# Patient Record
Sex: MALE | Race: WHITE | NOT HISPANIC OR LATINO | Employment: OTHER | URBAN - METROPOLITAN AREA
[De-identification: names, ages, dates, MRNs, and addresses within clinical notes are randomized per-mention and may not be internally consistent; named-entity substitution may affect disease eponyms.]

---

## 2022-12-26 ENCOUNTER — HOSPITAL ENCOUNTER (INPATIENT)
Facility: HOSPITAL | Age: 67
LOS: 16 days | Discharge: NURSING, REHAB OR LONG-TERM CARE | DRG: 853 | End: 2023-01-11
Attending: INTERNAL MEDICINE | Admitting: INTERNAL MEDICINE
Payer: MEDICARE

## 2022-12-26 ENCOUNTER — APPOINTMENT (OUTPATIENT)
Dept: CARDIOLOGY | Facility: HOSPITAL | Age: 67
DRG: 853 | End: 2022-12-26
Payer: MEDICARE

## 2022-12-26 ENCOUNTER — APPOINTMENT (OUTPATIENT)
Dept: GENERAL RADIOLOGY | Facility: HOSPITAL | Age: 67
DRG: 853 | End: 2022-12-26
Payer: MEDICARE

## 2022-12-26 DIAGNOSIS — M00.9 PYOGENIC ARTHRITIS OF RIGHT SHOULDER REGION, DUE TO UNSPECIFIED ORGANISM: Primary | ICD-10-CM

## 2022-12-26 DIAGNOSIS — M00.019: ICD-10-CM

## 2022-12-26 PROBLEM — Z72.0 TOBACCO ABUSE: Status: ACTIVE | Noted: 2022-02-21

## 2022-12-26 PROBLEM — Z98.890 HX OF NECK SURGERY: Status: ACTIVE | Noted: 2019-04-17

## 2022-12-26 PROBLEM — I50.22 ACC/AHA STAGE C CHRONIC SYSTOLIC HEART FAILURE: Status: ACTIVE | Noted: 2022-03-03

## 2022-12-26 PROBLEM — E85.4 CARDIAC AMYLOIDOSIS: Status: ACTIVE | Noted: 2022-03-03

## 2022-12-26 PROBLEM — M54.2 NECK PAIN: Status: ACTIVE | Noted: 2017-11-02

## 2022-12-26 PROBLEM — E87.1 HYPONATREMIA: Status: ACTIVE | Noted: 2022-12-26

## 2022-12-26 PROBLEM — I43 CARDIAC AMYLOIDOSIS: Status: ACTIVE | Noted: 2022-03-03

## 2022-12-26 PROBLEM — I10 PRIMARY HYPERTENSION: Status: ACTIVE | Noted: 2019-04-17

## 2022-12-26 PROBLEM — Z85.46 HISTORY OF PROSTATE CANCER: Status: ACTIVE | Noted: 2019-04-17

## 2022-12-26 LAB
ALBUMIN SERPL-MCNC: 3.3 G/DL (ref 3.5–5.2)
ALBUMIN/GLOB SERPL: 1.7 G/DL
ALP SERPL-CCNC: 83 U/L (ref 39–117)
ALT SERPL W P-5'-P-CCNC: 17 U/L (ref 1–41)
ANION GAP SERPL CALCULATED.3IONS-SCNC: 12 MMOL/L (ref 5–15)
ANION GAP SERPL CALCULATED.3IONS-SCNC: 18 MMOL/L (ref 5–15)
ARTERIAL PATENCY WRIST A: POSITIVE
ARTERIAL PATENCY WRIST A: POSITIVE
AST SERPL-CCNC: 26 U/L (ref 1–40)
ATMOSPHERIC PRESS: ABNORMAL MM[HG]
ATMOSPHERIC PRESS: ABNORMAL MM[HG]
BACTERIA BLD CULT: ABNORMAL
BACTERIA UR QL AUTO: ABNORMAL /HPF
BASE EXCESS BLDA CALC-SCNC: -5.5 MMOL/L (ref 0–3)
BASE EXCESS BLDA CALC-SCNC: -6.4 MMOL/L (ref 0–3)
BDY SITE: ABNORMAL
BDY SITE: ABNORMAL
BH CV ECHO MEAS - ACS: 1.1 CM
BH CV ECHO MEAS - AO MAX PG: 17.6 MMHG
BH CV ECHO MEAS - AO MEAN PG: 13 MMHG
BH CV ECHO MEAS - AO ROOT DIAM: 2.8 CM
BH CV ECHO MEAS - AO V2 MAX: 210 CM/SEC
BH CV ECHO MEAS - AO V2 VTI: 29.2 CM
BH CV ECHO MEAS - AVA(I,D): 1.66 CM2
BH CV ECHO MEAS - EDV(CUBED): 68.9 ML
BH CV ECHO MEAS - EDV(MOD-SP2): 65.4 ML
BH CV ECHO MEAS - EDV(MOD-SP4): 68.3 ML
BH CV ECHO MEAS - EF(MOD-BP): 61.6 %
BH CV ECHO MEAS - EF(MOD-SP2): 61 %
BH CV ECHO MEAS - EF(MOD-SP4): 63 %
BH CV ECHO MEAS - ESV(CUBED): 28.1 ML
BH CV ECHO MEAS - ESV(MOD-SP2): 25.5 ML
BH CV ECHO MEAS - ESV(MOD-SP4): 25.3 ML
BH CV ECHO MEAS - FS: 25.9 %
BH CV ECHO MEAS - IVS/LVPW: 1.19 CM
BH CV ECHO MEAS - IVSD: 1.85 CM
BH CV ECHO MEAS - LA DIMENSION: 3.5 CM
BH CV ECHO MEAS - LV DIASTOLIC VOL/BSA (35-75): 35.7 CM2
BH CV ECHO MEAS - LV MASS(C)D: 295.7 GRAMS
BH CV ECHO MEAS - LV MAX PG: 11.2 MMHG
BH CV ECHO MEAS - LV MEAN PG: 5 MMHG
BH CV ECHO MEAS - LV SYSTOLIC VOL/BSA (12-30): 13.2 CM2
BH CV ECHO MEAS - LV V1 MAX: 167 CM/SEC
BH CV ECHO MEAS - LV V1 VTI: 15.4 CM
BH CV ECHO MEAS - LVIDD: 4.1 CM
BH CV ECHO MEAS - LVIDS: 3 CM
BH CV ECHO MEAS - LVOT AREA: 3.1 CM2
BH CV ECHO MEAS - LVOT DIAM: 2 CM
BH CV ECHO MEAS - LVPWD: 1.56 CM
BH CV ECHO MEAS - MR MAX PG: 57.5 MMHG
BH CV ECHO MEAS - MR MAX VEL: 379 CM/SEC
BH CV ECHO MEAS - MV DEC SLOPE: 545.3 CM/SEC2
BH CV ECHO MEAS - MV DEC TIME: 162 MSEC
BH CV ECHO MEAS - MV E MAX VEL: 113.5 CM/SEC
BH CV ECHO MEAS - MV MAX PG: 4.8 MMHG
BH CV ECHO MEAS - MV MEAN PG: 3 MMHG
BH CV ECHO MEAS - MV P1/2T: 62.8 MSEC
BH CV ECHO MEAS - MV V2 VTI: 22.5 CM
BH CV ECHO MEAS - MVA(P1/2T): 3.5 CM2
BH CV ECHO MEAS - MVA(VTI): 2.15 CM2
BH CV ECHO MEAS - PA ACC TIME: 0.1 SEC
BH CV ECHO MEAS - PA PR(ACCEL): 34.9 MMHG
BH CV ECHO MEAS - PA V2 MAX: 144 CM/SEC
BH CV ECHO MEAS - QP/QS: 0.31
BH CV ECHO MEAS - RV MAX PG: 5.5 MMHG
BH CV ECHO MEAS - RV V1 MAX: 117 CM/SEC
BH CV ECHO MEAS - RV V1 VTI: 13.3 CM
BH CV ECHO MEAS - RVDD: 2.44 CM
BH CV ECHO MEAS - RVOT DIAM: 1.2 CM
BH CV ECHO MEAS - SI(MOD-SP2): 20.9 ML/M2
BH CV ECHO MEAS - SI(MOD-SP4): 22.5 ML/M2
BH CV ECHO MEAS - SV(LVOT): 48.4 ML
BH CV ECHO MEAS - SV(MOD-SP2): 39.9 ML
BH CV ECHO MEAS - SV(MOD-SP4): 43 ML
BH CV ECHO MEAS - SV(RVOT): 15 ML
BH CV ECHO MEAS - TAPSE (>1.6): 1.8 CM
BH CV XLRA - RV BASE: 3.5 CM
BH CV XLRA - RV LENGTH: 8.4 CM
BH CV XLRA - RV MID: 2.8 CM
BILIRUB SERPL-MCNC: 4.8 MG/DL (ref 0–1.2)
BILIRUB UR QL STRIP: ABNORMAL
BOTTLE TYPE: ABNORMAL
BUN SERPL-MCNC: 55 MG/DL (ref 8–23)
BUN SERPL-MCNC: 58 MG/DL (ref 8–23)
BUN/CREAT SERPL: 23.1 (ref 7–25)
BUN/CREAT SERPL: 23.7 (ref 7–25)
BURR CELLS BLD QL SMEAR: ABNORMAL
CA-I SERPL ISE-MCNC: 1.13 MMOL/L (ref 1.2–1.3)
CALCIUM SPEC-SCNC: 7.7 MG/DL (ref 8.6–10.5)
CALCIUM SPEC-SCNC: 8.2 MG/DL (ref 8.6–10.5)
CHLORIDE SERPL-SCNC: 84 MMOL/L (ref 98–107)
CHLORIDE SERPL-SCNC: 91 MMOL/L (ref 98–107)
CHOLEST SERPL-MCNC: 64 MG/DL (ref 0–200)
CK SERPL-CCNC: 166 U/L (ref 20–200)
CLARITY UR: CLEAR
CO2 BLDA-SCNC: 17.3 MMOL/L (ref 22–29)
CO2 BLDA-SCNC: 18.1 MMOL/L (ref 22–29)
CO2 SERPL-SCNC: 16 MMOL/L (ref 22–29)
CO2 SERPL-SCNC: 19 MMOL/L (ref 22–29)
COLOR UR: ABNORMAL
CREAT SERPL-MCNC: 2.38 MG/DL (ref 0.76–1.27)
CREAT SERPL-MCNC: 2.45 MG/DL (ref 0.76–1.27)
CRP SERPL-MCNC: 28.17 MG/DL (ref 0–0.5)
D-LACTATE SERPL-SCNC: 1.9 MMOL/L (ref 0.5–2)
D-LACTATE SERPL-SCNC: 2.5 MMOL/L (ref 0.5–2)
D-LACTATE SERPL-SCNC: 2.5 MMOL/L (ref 0.5–2)
DEPRECATED RDW RBC AUTO: 52.1 FL (ref 37–54)
EGFRCR SERPLBLD CKD-EPI 2021: 28.1 ML/MIN/1.73
EGFRCR SERPLBLD CKD-EPI 2021: 29.1 ML/MIN/1.73
ERYTHROCYTE [DISTWIDTH] IN BLOOD BY AUTOMATED COUNT: 15.2 % (ref 12.3–15.4)
ERYTHROCYTE [SEDIMENTATION RATE] IN BLOOD: 21 MM/HR (ref 0–20)
GLOBULIN UR ELPH-MCNC: 1.9 GM/DL
GLUCOSE BLDC GLUCOMTR-MCNC: 122 MG/DL (ref 70–105)
GLUCOSE BLDC GLUCOMTR-MCNC: 132 MG/DL (ref 70–105)
GLUCOSE BLDC GLUCOMTR-MCNC: 60 MG/DL (ref 70–105)
GLUCOSE BLDC GLUCOMTR-MCNC: 88 MG/DL (ref 70–105)
GLUCOSE BLDC GLUCOMTR-MCNC: 92 MG/DL (ref 70–105)
GLUCOSE BLDC GLUCOMTR-MCNC: 97 MG/DL (ref 74–100)
GLUCOSE SERPL-MCNC: 75 MG/DL (ref 65–99)
GLUCOSE SERPL-MCNC: 87 MG/DL (ref 65–99)
GLUCOSE UR STRIP-MCNC: NEGATIVE MG/DL
GRAN CASTS URNS QL MICRO: ABNORMAL /LPF
HBA1C MFR BLD: 4.2 % (ref 3.5–5.6)
HCO3 BLDA-SCNC: 16.6 MMOL/L (ref 21–28)
HCO3 BLDA-SCNC: 17.3 MMOL/L (ref 21–28)
HCT VFR BLD AUTO: 38 % (ref 37.5–51)
HDLC SERPL-MCNC: 21 MG/DL (ref 40–60)
HEMODILUTION: NO
HEMODILUTION: NO
HGB BLD-MCNC: 12.4 G/DL (ref 13–17.7)
HGB UR QL STRIP.AUTO: ABNORMAL
HYALINE CASTS UR QL AUTO: ABNORMAL /LPF
INHALED O2 CONCENTRATION: 32 %
INHALED O2 CONCENTRATION: 32 %
KETONES UR QL STRIP: ABNORMAL
LDLC SERPL CALC-MCNC: 22 MG/DL (ref 0–100)
LDLC/HDLC SERPL: 0.96 {RATIO}
LEFT ATRIUM VOLUME INDEX: 38.7 ML/M2
LEUKOCYTE ESTERASE UR QL STRIP.AUTO: NEGATIVE
LYMPHOCYTES # BLD MANUAL: 0 10*3/MM3 (ref 0.7–3.1)
LYMPHOCYTES NFR BLD MANUAL: 2 % (ref 5–12)
MAGNESIUM SERPL-MCNC: 1.8 MG/DL (ref 1.6–2.4)
MAXIMAL PREDICTED HEART RATE: 153 BPM
MCH RBC QN AUTO: 32 PG (ref 26.6–33)
MCHC RBC AUTO-ENTMCNC: 32.5 G/DL (ref 31.5–35.7)
MCV RBC AUTO: 98.4 FL (ref 79–97)
METAMYELOCYTES NFR BLD MANUAL: 9 % (ref 0–0)
MODALITY: ABNORMAL
MODALITY: ABNORMAL
MONOCYTES # BLD: 0.14 10*3/MM3 (ref 0.1–0.9)
MRSA DNA SPEC QL NAA+PROBE: NORMAL
NEUTROPHILS # BLD AUTO: 6.41 10*3/MM3 (ref 1.7–7)
NEUTROPHILS NFR BLD MANUAL: 65 % (ref 42.7–76)
NEUTS BAND NFR BLD MANUAL: 24 % (ref 0–5)
NITRITE UR QL STRIP: POSITIVE
PCO2 BLDA: 24.8 MM HG (ref 35–48)
PCO2 BLDA: 25.4 MM HG (ref 35–48)
PH BLDA: 7.43 PH UNITS (ref 7.35–7.45)
PH BLDA: 7.44 PH UNITS (ref 7.35–7.45)
PH UR STRIP.AUTO: 5 [PH] (ref 5–8)
PHOSPHATE SERPL-MCNC: 3.7 MG/DL (ref 2.5–4.5)
PLATELET # BLD AUTO: 99 10*3/MM3 (ref 140–450)
PMV BLD AUTO: 7.6 FL (ref 6–12)
PO2 BLDA: 68 MM HG (ref 83–108)
PO2 BLDA: 71.5 MM HG (ref 83–108)
POIKILOCYTOSIS BLD QL SMEAR: ABNORMAL
POTASSIUM SERPL-SCNC: 4.3 MMOL/L (ref 3.5–5.2)
POTASSIUM SERPL-SCNC: 5.4 MMOL/L (ref 3.5–5.2)
PROCALCITONIN SERPL-MCNC: 7.9 NG/ML (ref 0–0.25)
PROT SERPL-MCNC: 5.2 G/DL (ref 6–8.5)
PROT UR QL STRIP: ABNORMAL
QT INTERVAL: 346 MS
RBC # BLD AUTO: 3.86 10*6/MM3 (ref 4.14–5.8)
RBC # UR STRIP: ABNORMAL /HPF
REF LAB TEST METHOD: ABNORMAL
SAO2 % BLDCOA: 94.4 % (ref 94–98)
SAO2 % BLDCOA: 95.1 % (ref 94–98)
SCAN SLIDE: NORMAL
SINUS: 2.7 CM
SMALL PLATELETS BLD QL SMEAR: ABNORMAL
SODIUM SERPL-SCNC: 118 MMOL/L (ref 136–145)
SODIUM SERPL-SCNC: 122 MMOL/L (ref 136–145)
SODIUM UR-SCNC: <20 MMOL/L
SP GR UR STRIP: 1.01 (ref 1–1.03)
SQUAMOUS #/AREA URNS HPF: ABNORMAL /HPF
STRESS TARGET HR: 130 BPM
TRANS CELLS #/AREA URNS HPF: ABNORMAL /HPF
TRIGL SERPL-MCNC: 114 MG/DL (ref 0–150)
TROPONIN T SERPL-MCNC: 0.07 NG/ML (ref 0–0.03)
UNIDENT CRYS URNS QL MICRO: ABNORMAL /HPF
URATE SERPL-MCNC: 4.7 MG/DL (ref 3.4–7)
UROBILINOGEN UR QL STRIP: ABNORMAL
VARIANT LYMPHS NFR BLD MANUAL: 0 % (ref 19.6–45.3)
VLDLC SERPL-MCNC: 21 MG/DL (ref 5–40)
WBC # UR STRIP: ABNORMAL /HPF
WBC MORPH BLD: NORMAL
WBC NRBC COR # BLD: 7.2 10*3/MM3 (ref 3.4–10.8)

## 2022-12-26 PROCEDURE — 82962 GLUCOSE BLOOD TEST: CPT

## 2022-12-26 PROCEDURE — 93010 ELECTROCARDIOGRAM REPORT: CPT | Performed by: INTERNAL MEDICINE

## 2022-12-26 PROCEDURE — 85007 BL SMEAR W/DIFF WBC COUNT: CPT

## 2022-12-26 PROCEDURE — 93971 EXTREMITY STUDY: CPT

## 2022-12-26 PROCEDURE — 93306 TTE W/DOPPLER COMPLETE: CPT | Performed by: INTERNAL MEDICINE

## 2022-12-26 PROCEDURE — 02HV33Z INSERTION OF INFUSION DEVICE INTO SUPERIOR VENA CAVA, PERCUTANEOUS APPROACH: ICD-10-PCS | Performed by: NURSE PRACTITIONER

## 2022-12-26 PROCEDURE — C1751 CATH, INF, PER/CENT/MIDLINE: HCPCS

## 2022-12-26 PROCEDURE — 71045 X-RAY EXAM CHEST 1 VIEW: CPT

## 2022-12-26 PROCEDURE — 87040 BLOOD CULTURE FOR BACTERIA: CPT

## 2022-12-26 PROCEDURE — 81001 URINALYSIS AUTO W/SCOPE: CPT | Performed by: NURSE PRACTITIONER

## 2022-12-26 PROCEDURE — 85652 RBC SED RATE AUTOMATED: CPT

## 2022-12-26 PROCEDURE — 80053 COMPREHEN METABOLIC PANEL: CPT

## 2022-12-26 PROCEDURE — 84484 ASSAY OF TROPONIN QUANT: CPT

## 2022-12-26 PROCEDURE — 82330 ASSAY OF CALCIUM: CPT

## 2022-12-26 PROCEDURE — 80061 LIPID PANEL: CPT

## 2022-12-26 PROCEDURE — 36600 WITHDRAWAL OF ARTERIAL BLOOD: CPT

## 2022-12-26 PROCEDURE — 87147 CULTURE TYPE IMMUNOLOGIC: CPT

## 2022-12-26 PROCEDURE — 84300 ASSAY OF URINE SODIUM: CPT | Performed by: INTERNAL MEDICINE

## 2022-12-26 PROCEDURE — 83735 ASSAY OF MAGNESIUM: CPT

## 2022-12-26 PROCEDURE — 82550 ASSAY OF CK (CPK): CPT

## 2022-12-26 PROCEDURE — 87186 SC STD MICRODIL/AGAR DIL: CPT

## 2022-12-26 PROCEDURE — 63710000001 INSULIN REGULAR HUMAN PER 5 UNITS

## 2022-12-26 PROCEDURE — 93005 ELECTROCARDIOGRAM TRACING: CPT

## 2022-12-26 PROCEDURE — 82803 BLOOD GASES ANY COMBINATION: CPT

## 2022-12-26 PROCEDURE — 87150 DNA/RNA AMPLIFIED PROBE: CPT

## 2022-12-26 PROCEDURE — 0 MILRINONE LACTATE IN DEXTROSE 20-5 MG/100ML-% SOLUTION

## 2022-12-26 PROCEDURE — 93005 ELECTROCARDIOGRAM TRACING: CPT | Performed by: NURSE PRACTITIONER

## 2022-12-26 PROCEDURE — 25010000002 CEFEPIME PER 500 MG

## 2022-12-26 PROCEDURE — 25010000002 AMIODARONE IN DEXTROSE 5% 150-4.21 MG/100ML-% SOLUTION

## 2022-12-26 PROCEDURE — 83036 HEMOGLOBIN GLYCOSYLATED A1C: CPT

## 2022-12-26 PROCEDURE — 84145 PROCALCITONIN (PCT): CPT

## 2022-12-26 PROCEDURE — 93306 TTE W/DOPPLER COMPLETE: CPT

## 2022-12-26 PROCEDURE — 87641 MR-STAPH DNA AMP PROBE: CPT

## 2022-12-26 PROCEDURE — 25010000002 HEPARIN (PORCINE) PER 1000 UNITS: Performed by: NURSE PRACTITIONER

## 2022-12-26 PROCEDURE — 85025 COMPLETE CBC W/AUTO DIFF WBC: CPT

## 2022-12-26 PROCEDURE — 84100 ASSAY OF PHOSPHORUS: CPT

## 2022-12-26 PROCEDURE — 25010000002 AMIODARONE IN DEXTROSE 5% 150-4.21 MG/100ML-% SOLUTION: Performed by: NURSE PRACTITIONER

## 2022-12-26 PROCEDURE — 86140 C-REACTIVE PROTEIN: CPT

## 2022-12-26 PROCEDURE — 83605 ASSAY OF LACTIC ACID: CPT

## 2022-12-26 PROCEDURE — 25010000002 PHENYLEPHRINE 10 MG/ML SOLUTION 5 ML VIAL: Performed by: NURSE PRACTITIONER

## 2022-12-26 PROCEDURE — 25010000002 CALCIUM GLUCONATE-NACL 1-0.675 GM/50ML-% SOLUTION

## 2022-12-26 PROCEDURE — 84550 ASSAY OF BLOOD/URIC ACID: CPT

## 2022-12-26 PROCEDURE — 25010000002 AMIODARONE IN DEXTROSE 5% 360-4.14 MG/200ML-% SOLUTION: Performed by: NURSE PRACTITIONER

## 2022-12-26 RX ORDER — HYDROCODONE BITARTRATE AND ACETAMINOPHEN 10; 325 MG/1; MG/1
1 TABLET ORAL EVERY 4 HOURS PRN
COMMUNITY
End: 2023-01-11 | Stop reason: HOSPADM

## 2022-12-26 RX ORDER — METOPROLOL SUCCINATE 25 MG/1
25 TABLET, EXTENDED RELEASE ORAL DAILY
COMMUNITY
End: 2023-01-11 | Stop reason: HOSPADM

## 2022-12-26 RX ORDER — SODIUM BICARBONATE 650 MG/1
650 TABLET ORAL 2 TIMES DAILY
COMMUNITY
End: 2023-01-11 | Stop reason: HOSPADM

## 2022-12-26 RX ORDER — MORPHINE SULFATE 2 MG/ML
2 INJECTION, SOLUTION INTRAMUSCULAR; INTRAVENOUS EVERY 4 HOURS PRN
Status: DISPENSED | OUTPATIENT
Start: 2022-12-26 | End: 2023-01-02

## 2022-12-26 RX ORDER — PANTOPRAZOLE SODIUM 40 MG/10ML
40 INJECTION, POWDER, LYOPHILIZED, FOR SOLUTION INTRAVENOUS
Status: DISCONTINUED | OUTPATIENT
Start: 2022-12-27 | End: 2022-12-30

## 2022-12-26 RX ORDER — ACETAMINOPHEN 650 MG/1
650 SUPPOSITORY RECTAL EVERY 4 HOURS PRN
Status: DISCONTINUED | OUTPATIENT
Start: 2022-12-26 | End: 2023-01-11 | Stop reason: HOSPADM

## 2022-12-26 RX ORDER — DEXTROSE MONOHYDRATE 25 G/50ML
50 INJECTION, SOLUTION INTRAVENOUS ONCE
Status: COMPLETED | OUTPATIENT
Start: 2022-12-26 | End: 2022-12-26

## 2022-12-26 RX ORDER — NICOTINE POLACRILEX 4 MG
15 LOZENGE BUCCAL
Status: DISCONTINUED | OUTPATIENT
Start: 2022-12-26 | End: 2023-01-11 | Stop reason: HOSPADM

## 2022-12-26 RX ORDER — ALUMINA, MAGNESIA, AND SIMETHICONE 2400; 2400; 240 MG/30ML; MG/30ML; MG/30ML
15 SUSPENSION ORAL EVERY 6 HOURS PRN
Status: DISCONTINUED | OUTPATIENT
Start: 2022-12-26 | End: 2023-01-11 | Stop reason: HOSPADM

## 2022-12-26 RX ORDER — TIOTROPIUM BROMIDE AND OLODATEROL 3.124; 2.736 UG/1; UG/1
2 SPRAY, METERED RESPIRATORY (INHALATION)
COMMUNITY

## 2022-12-26 RX ORDER — MIDODRINE HYDROCHLORIDE 5 MG/1
10 TABLET ORAL
Status: DISCONTINUED | OUTPATIENT
Start: 2022-12-26 | End: 2022-12-29

## 2022-12-26 RX ORDER — SODIUM BICARBONATE IN D5W 150/1000ML
150 PLASTIC BAG, INJECTION (ML) INTRAVENOUS CONTINUOUS
Status: DISCONTINUED | OUTPATIENT
Start: 2022-12-26 | End: 2022-12-27

## 2022-12-26 RX ORDER — ONDANSETRON 2 MG/ML
4 INJECTION INTRAMUSCULAR; INTRAVENOUS EVERY 6 HOURS PRN
Status: DISCONTINUED | OUTPATIENT
Start: 2022-12-26 | End: 2022-12-26

## 2022-12-26 RX ORDER — ONDANSETRON 4 MG/1
4 TABLET, FILM COATED ORAL EVERY 6 HOURS PRN
Status: DISCONTINUED | OUTPATIENT
Start: 2022-12-26 | End: 2023-01-11 | Stop reason: HOSPADM

## 2022-12-26 RX ORDER — MILRINONE LACTATE 0.2 MG/ML
.25-.75 INJECTION, SOLUTION INTRAVENOUS
Status: DISCONTINUED | OUTPATIENT
Start: 2022-12-26 | End: 2022-12-26

## 2022-12-26 RX ORDER — DICLOFENAC SODIUM 75 MG/1
75 TABLET, DELAYED RELEASE ORAL DAILY PRN
COMMUNITY
End: 2023-01-11 | Stop reason: HOSPADM

## 2022-12-26 RX ORDER — HEPARIN SODIUM 5000 [USP'U]/ML
5000 INJECTION, SOLUTION INTRAVENOUS; SUBCUTANEOUS EVERY 12 HOURS SCHEDULED
Status: DISCONTINUED | OUTPATIENT
Start: 2022-12-26 | End: 2022-12-27

## 2022-12-26 RX ORDER — SODIUM CHLORIDE 9 MG/ML
100 INJECTION, SOLUTION INTRAVENOUS CONTINUOUS
Status: DISCONTINUED | OUTPATIENT
Start: 2022-12-26 | End: 2022-12-27

## 2022-12-26 RX ORDER — SPIRONOLACTONE 25 MG/1
12.5 TABLET ORAL DAILY
COMMUNITY
End: 2023-01-11 | Stop reason: HOSPADM

## 2022-12-26 RX ORDER — CALCIUM GLUCONATE 20 MG/ML
1 INJECTION, SOLUTION INTRAVENOUS ONCE
Status: COMPLETED | OUTPATIENT
Start: 2022-12-26 | End: 2022-12-26

## 2022-12-26 RX ORDER — DEXTROSE MONOHYDRATE 25 G/50ML
25 INJECTION, SOLUTION INTRAVENOUS
Status: DISCONTINUED | OUTPATIENT
Start: 2022-12-26 | End: 2023-01-11 | Stop reason: HOSPADM

## 2022-12-26 RX ORDER — OMEPRAZOLE 20 MG/1
20 CAPSULE, DELAYED RELEASE ORAL DAILY
COMMUNITY

## 2022-12-26 RX ORDER — SODIUM CHLORIDE 0.9 % (FLUSH) 0.9 %
10 SYRINGE (ML) INJECTION AS NEEDED
Status: DISCONTINUED | OUTPATIENT
Start: 2022-12-26 | End: 2023-01-11 | Stop reason: HOSPADM

## 2022-12-26 RX ORDER — DEXTROSE MONOHYDRATE 25 G/50ML
INJECTION, SOLUTION INTRAVENOUS
Status: COMPLETED
Start: 2022-12-26 | End: 2022-12-26

## 2022-12-26 RX ORDER — SODIUM CHLORIDE 9 MG/ML
40 INJECTION, SOLUTION INTRAVENOUS AS NEEDED
Status: DISCONTINUED | OUTPATIENT
Start: 2022-12-26 | End: 2023-01-05

## 2022-12-26 RX ORDER — ACETAMINOPHEN 325 MG/1
650 TABLET ORAL EVERY 4 HOURS PRN
Status: DISCONTINUED | OUTPATIENT
Start: 2022-12-26 | End: 2023-01-11 | Stop reason: HOSPADM

## 2022-12-26 RX ORDER — ONDANSETRON 2 MG/ML
4 INJECTION INTRAMUSCULAR; INTRAVENOUS EVERY 6 HOURS PRN
Status: DISCONTINUED | OUTPATIENT
Start: 2022-12-26 | End: 2023-01-11 | Stop reason: HOSPADM

## 2022-12-26 RX ORDER — OLANZAPINE 10 MG/2ML
1 INJECTION, POWDER, LYOPHILIZED, FOR SOLUTION INTRAMUSCULAR
Status: DISCONTINUED | OUTPATIENT
Start: 2022-12-26 | End: 2023-01-11 | Stop reason: HOSPADM

## 2022-12-26 RX ORDER — SODIUM CHLORIDE 0.9 % (FLUSH) 0.9 %
10 SYRINGE (ML) INJECTION EVERY 12 HOURS SCHEDULED
Status: DISCONTINUED | OUTPATIENT
Start: 2022-12-26 | End: 2023-01-11 | Stop reason: HOSPADM

## 2022-12-26 RX ORDER — ALBUTEROL SULFATE 90 UG/1
2 AEROSOL, METERED RESPIRATORY (INHALATION) EVERY 4 HOURS PRN
COMMUNITY

## 2022-12-26 RX ADMIN — SODIUM CHLORIDE 2454 ML: 9 INJECTION, SOLUTION INTRAVENOUS at 07:55

## 2022-12-26 RX ADMIN — CALCIUM GLUCONATE 1 G: 20 INJECTION, SOLUTION INTRAVENOUS at 06:39

## 2022-12-26 RX ADMIN — PHENYLEPHRINE HYDROCHLORIDE 2 MCG/KG/MIN: 10 INJECTION INTRAVENOUS at 22:49

## 2022-12-26 RX ADMIN — MILRINONE LACTATE 0.25 MCG/KG/MIN: 0.2 INJECTION, SOLUTION INTRAVENOUS at 03:16

## 2022-12-26 RX ADMIN — MILRINONE LACTATE 0.7 MCG/KG/MIN: 0.2 INJECTION, SOLUTION INTRAVENOUS at 09:23

## 2022-12-26 RX ADMIN — Medication 150 MEQ: at 21:04

## 2022-12-26 RX ADMIN — CEFEPIME 2 G: 2 INJECTION, POWDER, FOR SOLUTION INTRAVENOUS at 07:56

## 2022-12-26 RX ADMIN — Medication 150 MEQ: at 10:29

## 2022-12-26 RX ADMIN — INSULIN HUMAN 10 UNITS: 100 INJECTION, SOLUTION PARENTERAL at 06:40

## 2022-12-26 RX ADMIN — AMIODARONE HYDROCHLORIDE 150 MG: 1.5 INJECTION, SOLUTION INTRAVENOUS at 04:26

## 2022-12-26 RX ADMIN — AMIODARONE HYDROCHLORIDE 1 MG/MIN: 1.8 INJECTION, SOLUTION INTRAVENOUS at 21:37

## 2022-12-26 RX ADMIN — Medication 10 ML: at 20:32

## 2022-12-26 RX ADMIN — DEXTROSE MONOHYDRATE 25 G: 25 INJECTION, SOLUTION INTRAVENOUS at 11:34

## 2022-12-26 RX ADMIN — MIDODRINE HYDROCHLORIDE 10 MG: 5 TABLET ORAL at 10:35

## 2022-12-26 RX ADMIN — DEXTROSE MONOHYDRATE 50 ML: 25 INJECTION, SOLUTION INTRAVENOUS at 07:35

## 2022-12-26 RX ADMIN — CEFEPIME HYDROCHLORIDE 1 G: 1 INJECTION, POWDER, FOR SOLUTION INTRAMUSCULAR; INTRAVENOUS at 14:57

## 2022-12-26 RX ADMIN — SODIUM CHLORIDE 1000 ML: 9 INJECTION, SOLUTION INTRAVENOUS at 04:43

## 2022-12-26 RX ADMIN — SODIUM CHLORIDE 75 ML/HR: 9 INJECTION, SOLUTION INTRAVENOUS at 02:56

## 2022-12-26 RX ADMIN — AMIODARONE HYDROCHLORIDE 150 MG: 1.5 INJECTION, SOLUTION INTRAVENOUS at 21:26

## 2022-12-26 RX ADMIN — SODIUM BICARBONATE 50 MEQ: 84 INJECTION, SOLUTION INTRAVENOUS at 06:40

## 2022-12-26 RX ADMIN — PHENYLEPHRINE HYDROCHLORIDE 0.5 MCG/KG/MIN: 10 INJECTION INTRAVENOUS at 14:57

## 2022-12-26 RX ADMIN — Medication 10 ML: at 09:24

## 2022-12-26 RX ADMIN — HEPARIN SODIUM 5000 UNITS: 5000 INJECTION INTRAVENOUS; SUBCUTANEOUS at 20:23

## 2022-12-26 NOTE — NURSING NOTE
Patient increasingly lethargic throughout shift. NPO until neuro status improves. Patient was admitted to [facility] ICU for hyponatremia and STEVEN. Nephrology was consulted. UA and Urine sodium sent. Straight cath x 1 for urinary retention.  Cardiology was consulted for afib with RVR and wide complex tachycardia. Patients BP is liable. Patient was transferred to [facility] on dobutmaine, and gtt was changed to Milrinone. Milrionone d/c and Miguelangel gtt was started. PICC placed by IV team d/t to pressor support. Echo completed. Doppler completed on right arm d/t increased pain, redness and swelling. Sodium improved from 118 to 122. 2.4 liter bolus given for + sepsis screen. Family at bedside updated on plans of care.

## 2022-12-26 NOTE — H&P
Critical Care History and Physical   Jaime Bar : 1955 MRN:[id number] LOS:0 ROOM: 2301/1     Reason for admission: Hyponatremia     Assessment / Plan     Septic Shock: ( HR>90, RR >20 or PCO2 <32, Lactic acid>2, Creatinine >2, Change in mental status and Platelets <100 )  • Likely due to ?right shoulder septic joint, shoulder is red, very painful, hot to touch   • CRP, 28.17, Procal 7.20, lactic 2.5, sed rate 21  • Will do blood / urine   • Started on cefepime.   • Persistent hypotension in spite of adequate fluid resuscitation.   • C/w additional fluids as needed. Avoid fluid overload.   • Titrate vasopressors for a target MAP of 65.    Hyponatremia likely secondary to decreased intake, chronic pain and pain medication regimen   · Patient has chronic history   · Monitor neuro status  · IVF for rehydration    Code Status (Patient has no pulse and is not breathing): CPR (Attempt to Resuscitate)  Medical Interventions (Patient has pulse or is breathing): Full Support       Nutrition: Diet: Regular/House Diet; Texture: Regular Texture (IDDSI 7); Fluid Consistency: Thin (IDDSI 0)     DVT Prophylaxis:   Mechanical Order History:      Ordered        22  Place Sequential Compression Device  Once            22  Maintain Sequential Compression Device  Continuous                    Pharmalogical Order History:     None           History of Present illness     A 67 y.o. old male patient with PMH of cervicalgia, primary osteoarthritis involving multiple joints, hyponatremia, cardiac amloidosis presents to the hospital with complaints of worsening right shoulder pain for 3 days, weakness, and altered mental status. The patient stated that the pain has continued to increase in severity and today the pain was too much. Any movement increases pain. He states that his normal pain regimen for his osteoarthritis has not provided any pain relief. He reports no other alleviating factors. The patient  sought care for his shoulder pain at [facility]. ER work-up showed that the patient was hypotensive and hyponatremic. Lactic was elevated at 3.9. The patient was given 1L fluid and started on dobutamine at 2.5 mcg/kg/min. The patient reported that he has not been eating due no appetite and has attemepted to drink some water but hasn't had anything since the night morning of 12/25/22. He denies nausea and vomiting and relates his decrease in oral intake to weakness. The patient is lethargic but oriented.He denies difficulty breathing, chest pain or pressures, fever, chills, abdominal pain.     ACP: Patient denies having advance directives and declines information at this time.    Patient was seen and examined on 12/26/22 at 06:29 EST .    Subjective / Review of systems     Review of Systems   Constitutional: Positive for activity change, appetite change and fatigue. Negative for fever.   Respiratory: Negative for chest tightness and shortness of breath.    Cardiovascular: Negative for chest pain.   Gastrointestinal: Negative for abdominal pain, nausea and vomiting.   Genitourinary: Positive for decreased urine volume.   Musculoskeletal: Positive for arthralgias and joint swelling.   Skin: Negative for rash.   Neurological: Negative for syncope and light-headedness.   Psychiatric/Behavioral: Positive for sleep disturbance. The patient is not nervous/anxious.         Past Medical/Surgical/Social/Family History & Allergies     History reviewed. No pertinent past medical history.   History reviewed. No pertinent surgical history.   Social History     Socioeconomic History   • Marital status: Unknown   Tobacco Use   • Smoking status: Every Day     Packs/day: 0.50     Years: 10.00     Pack years: 5.00     Types: Cigarettes   • Smokeless tobacco: Never   Vaping Use   • Vaping Use: Never used   Substance and Sexual Activity   • Alcohol use: Not Currently   • Drug use: Never   • Sexual activity: Defer      History  "reviewed. No pertinent family history.   Allergies   Allergen Reactions   • Tramadol-Acetaminophen Anxiety     Worms in the brain feeling   • Codeine Other (See Comments)     nausea   • Tramadol Other (See Comments)     \"I just felt really crawly, it did weird things with my head\"        Home Medications     Prior to Admission medications    Not on File        Objective / Physical Exam     Vital signs:  Temp: 98 °F (36.7 °C)  BP: (!) 78/50  Heart Rate: 111  SpO2: 93 %  Weight: 81.8 kg (180 lb 5.4 oz)    Admission Weight: Weight: 81.8 kg (180 lb 5.4 oz)    Physical Exam     Labs     Results from last 7 days   Lab Units 12/26/22  0435   WBC 10*3/mm3 7.20   HEMATOCRIT % 38.0   PLATELETS 10*3/mm3 99*      Results from last 7 days   Lab Units 12/26/22  0435   SODIUM mmol/L 118*   POTASSIUM mmol/L 5.4*   CHLORIDE mmol/L 84*   CO2 mmol/L 16.0*   BUN mg/dL 55*   CREATININE mg/dL 2.38*        Imaging     Chest X ray: My independent assessment showed no infiltrates or effusions    EKG: My independent evaluation showed atrial fibrillation, no ST -T changes    Current Medications     Scheduled Meds:  amiodarone in dextrose 5%, , ,   calcium gluconate, 1 g, Intravenous, Once  cefepime, 1 g, Intravenous, Q12H  cefepime, 2 g, Intravenous, Once  dextrose, 50 mL, Intravenous, Once   And  insulin regular, 10 Units, Intravenous, Once  sodium bicarbonate, 50 mEq, Intravenous, Once  sodium chloride, 10 mL, Intravenous, Q12H         Continuous Infusions:  milrinone, 0.25-0.75 mcg/kg/min, Last Rate: 0.7 mcg/kg/min (12/26/22 0427)  sodium chloride, 100 mL/hr, Last Rate: 100 mL/hr (12/26/22 0308)           Electronically signed by RITU Ruby, 12/26/22, 6:30 AM EST.      RITU Ruby   Critical Care  12/26/22   06:29 EST     " verbal cues/1 person assist

## 2022-12-26 NOTE — CONSULTS
NEPHROLOGY CONSULTATION-----[facility]    [facility]  [phone]  Kyle Haas MD    Patient Care Team:  Provider, No Known as PCP - Kyle Gardner MD as Consulting Physician (Nephrology)    CC/REASON FOR CONSULTATION: Hyponatremia/STEVEN    PHYSICIAN REQUESTING CONSULTATION: Dr Graves    History of Present Illness  67 year old male presented in transfer from [facility] with hyponatremia and STEVEN. He presented there with shoulder pain, noted to have sodium 118. CR 2.3, CO2 16. His B was in the 60s. Currently on bicarb drip. No hx of vomiting or diarrhea. No dysuria, gross hematuria. Med list not available. He was also noted to be in a fib with RVR.    Review of Systems   As noted above, otherwise 10 systems reviewed and were negative.    History reviewed. No pertinent past medical history.    History reviewed. No pertinent surgical history.    History reviewed. No pertinent family history.    Social History     Tobacco Use   • Smoking status: Every Day     Packs/day: 0.50     Years: 10.00     Pack years: 5.00     Types: Cigarettes   • Smokeless tobacco: Never   Vaping Use   • Vaping Use: Never used   Substance Use Topics   • Alcohol use: Not Currently   • Drug use: Never       Home Meds:   No medications prior to admission.       Scheduled Meds:  cefepime, 1 g, Intravenous, Q12H  heparin (porcine), 5,000 Units, Subcutaneous, Q12H  midodrine, 10 mg, Oral, TID AC  sodium chloride, 10 mL, Intravenous, Q12H        Continuous Infusions:  phenylephrine, 0.5-3 mcg/kg/min, Last Rate: 0.5 mcg/kg/min (12/26/22 9347)  sodium bicarbonate, 150 mEq, Last Rate: 150 mEq (12/26/22 1029)  sodium chloride, 100 mL/hr, Last Rate: 100 mL/hr (12/26/22 0308)        PRN Meds:  •  acetaminophen **OR** acetaminophen  •  aluminum-magnesium hydroxide-simethicone  •  dextrose  •  dextrose  •  glucagon (human recombinant)  •  Morphine  •  ondansetron **OR**  ondansetron  •  sodium chloride  •  sodium chloride    Allergies:  Tramadol-acetaminophen, Codeine, and Tramadol    OBJECTIVE    Vital Signs  Temp:  [97.4 °F (36.3 °C)-98 °F (36.7 °C)] 97.5 °F (36.4 °C)  Heart Rate:  [105-142] 117  Resp:  [22] 22  BP: ()/(41-73) 100/69    No intake/output data recorded.  No intake/output data recorded.    Physical Exam:  General Appearance: NAD  Head: normocephalic, without obvious abnormality and atraumatic  Eyes: conjunctivae and sclerae normal and no icterus  Neck: supple and no JVD  Lungs: clear to auscultation and respirations regular  Heart: regular rhythm & normal rate and normal S1, S2  Chest Wall: no abnormalities observed  Abdomen: normal bowel sounds and soft, nontender  Extremities:  no edema, no cyanosis  Skin: no bleeding, bruising or rash  Neurologic: Lethargic.     Results Review:    I reviewed the patient's new clinical results.    WBC WBC   Date Value Ref Range Status   12/26/2022 7.20 3.40 - 10.80 10*3/mm3 Final      HGB Hemoglobin   Date Value Ref Range Status   12/26/2022 12.4 (L) 13.0 - 17.7 g/dL Final      HCT Hematocrit   Date Value Ref Range Status   12/26/2022 38.0 37.5 - 51.0 % Final      Platelets No results found for: LABPLAT   MCV MCV   Date Value Ref Range Status   12/26/2022 98.4 (H) 79.0 - 97.0 fL Final          Sodium Sodium   Date Value Ref Range Status   12/26/2022 118 (C) 136 - 145 mmol/L Final      Potassium Potassium   Date Value Ref Range Status   12/26/2022 5.4 (H) 3.5 - 5.2 mmol/L Final      Chloride Chloride   Date Value Ref Range Status   12/26/2022 84 (L) 98 - 107 mmol/L Final      CO2 CO2   Date Value Ref Range Status   12/26/2022 16.0 (L) 22.0 - 29.0 mmol/L Final      BUN BUN   Date Value Ref Range Status   12/26/2022 55 (H) 8 - 23 mg/dL Final      Creatinine Creatinine   Date Value Ref Range Status   12/26/2022 2.38 (H) 0.76 - 1.27 mg/dL Final      Calcium Calcium   Date Value Ref Range Status   12/26/2022 8.2 (L) 8.6 - 10.5  mg/dL Final      PO4 No results found for: CAPO4   Albumin Albumin   Date Value Ref Range Status   12/26/2022 3.30 (L) 3.50 - 5.20 g/dL Final      Magnesium Magnesium   Date Value Ref Range Status   12/26/2022 1.8 1.6 - 2.4 mg/dL Final      Uric Acid Uric Acid   Date Value Ref Range Status   12/26/2022 4.7 3.4 - 7.0 mg/dL Final          Imaging Results (Last 72 Hours)     Procedure Component Value Units Date/Time    XR Chest 1 View [[id number]] Resulted: 12/26/22 1652     Updated: 12/26/22 1637    XR Chest 1 View [[id number]] Collected: 12/26/22 0456     Updated: 12/26/22 0657    Narrative:      EXAM:  XR CHEST 1 VW     DATE: 12/26/2022 5:55 AM    HISTORY: Change in patient's condition    COMPARISON:  None.    FINDINGS and     Impression:      1.  Coarsened airspace, bronchovascular thickening.  Consider small airways disease (bronchitis, asthma), edema, atypical/viral infection, aspiration.  2.  Heart size is normal.   3.  No acute bony findings.       Electronically signed by:  Bernadette Huddleston M.D.    12/26/2022 4:56 AM Mountain Time            Results for orders placed during the hospital encounter of 12/26/22    XR Chest 1 View    Narrative  EXAM:  XR CHEST 1 VW  DATE: 12/26/2022 5:55 AM    HISTORY: Change in patient's condition    COMPARISON:  None.    FINDINGS and    Impression  1.  Coarsened airspace, bronchovascular thickening.  Consider small airways disease (bronchitis, asthma), edema, atypical/viral infection, aspiration.  2.  Heart size is normal.  3.  No acute bony findings.      Electronically signed by:  Bernadette Huddleston M.D.  12/26/2022 4:56 AM Mountain Time            ASSESSMENT / PLAN      Hyponatremia    · Hyponatremia--acute. No previous labs available. Not clear etiology, missing med information too. Probably dehydrated, given STEVEN. Will check urine sodium and osm.   · STEVEN--pre-renal and or ATN given hypotension/ a fib with RVR. Baseline CR unknown  · Anion gap metabolic acidosis--due to  STEVEN/hypotension/ elevated lactate. Bicarb drip. Hemodynamic support.  · Acute urinary retention--PVR > 500. Place guillaume  · ? Sepsis--ATBx per primary team    Will check urine na and Uosm  Continue IV hydration with isotonic sodium bicarb  Stat Bmp ordered. Check renal US  Guillaume for urinary retention, and accurate I/O  Closely monitor UOP, renal function, electrolytes          I discussed the patient's findings and my recommendations with patient, family, nursing staff and consulting provider    Will follow along closely. Thank you for allowing us to see this patient in renal consultation.    [facility]  [phone]  MD Kyle Rockwell MD  12/26/22  16:52 EST

## 2022-12-26 NOTE — CONSULTS
PICC Line Insertion Procedure Note    Procedure: Insertion of # PICC    Indications:  Other    Active Time Out:  Correct patient: Yes  Correct procedure: Yes  Correct site: Yes  Verified with: kinsey    Procedure Details:  Informed consent was obtained for the procedure.  Risk include, but are not limited to infection, air embolism, catheter tip moving, catheter blockage and phlebitis.     Maximum sterile technique was used including antiseptics, cap, gloves, gown, hand hygiene, mask and sheet.    Ultrasound Guidance: Yes    #5 FR/16G PICC inserted to the L Basilic vein per hospital protocol by MADHU euceda.   Non-pulsatile blood return: yes    Lot #: [id number]  Expiration date: 06/30/2023    Complications:  none    Findings:  Catheter inserted to 43 cm, with 0 cm exposed.   Mid upper arm circumference is 29 cm.   Catheter was flushed with 20 cc NS and sterile dressing applied.  Patient tolerated procedure well.  PICC tip verified by:       [] Sapiens 3cg       [x] Chest X-ray    Recommendations:  Verbal and/or written Care/Maintenance instructions provided to patient.   Primary nurse notified.    Milly Peopels RN  12/26/22  16:53 EST

## 2022-12-26 NOTE — CASE MANAGEMENT/SOCIAL WORK
Discharge Planning Assessment   Michael     Patient Name: Jaime Bar  MRN: [id number]  Today's Date: 12/26/2022    Admit Date: 12/26/2022    Plan: DC Plan: Anticipate home with family.   Discharge Needs Assessment     Row Name 12/26/22 1534       Living Environment    People in Home spouse    Current Living Arrangements home    Primary Care Provided by self    Provides Primary Care For no one    Family Caregiver if Needed spouse    Quality of Family Relationships supportive    Able to Return to Prior Arrangements yes       Resource/Environmental Concerns    Resource/Environmental Concerns none    Transportation Concerns none       Transition Planning    Patient/Family Anticipates Transition to home with family    Patient/Family Anticipated Services at Transition none    Transportation Anticipated family or friend will provide       Discharge Needs Assessment    Readmission Within the Last 30 Days no previous admission in last 30 days    Equipment Currently Used at Home walker, rolling    Concerns to be Addressed discharge planning    Anticipated Changes Related to Illness none    Equipment Needed After Discharge none               Discharge Plan     Row Name 12/26/22 1536       Plan    Plan DC Plan: Anticipate home with family.    Patient/Family in Agreement with Plan yes    Plan Comments Met with pt, daughter and spouse in room. Pt asleep, CM assessment with pt family. IMM reviewed/discussed, signed copy obtained, and copy provided to pt spouse. Pt lives with spouse, is IADLs for the most part, pt spouse assists with some things. Pt walks with vines and RW. PCP and pharmacy verified. Denies trouble affording home medications. Pt spouse will provide transportation at d/c. Barriers to d/c: Hyponatremia, sodium bicarb gtt., IV Abx.              Continued Care and Services - Admitted Since 12/26/2022           Expected Discharge Date and Time     Expected Discharge Date Expected Discharge Time    Jan 2, 2023           Demographic Summary     Row Name 12/26/22 1533       General Information    Admission Type inpatient    Arrived From emergency department    Required Notices Provided Important Message from Medicare    Referral Source admission list    Reason for Consult discharge planning    Preferred Language English               Functional Status     Row Name 12/26/22 1534       Functional Status    Usual Activity Tolerance moderate    Current Activity Tolerance moderate       Functional Status, IADL    Medications independent    Meal Preparation assistive person    Housekeeping assistive person    Laundry assistive person    Shopping assistive person       Mental Status    General Appearance WDL WDL       Mental Status Summary    Recent Changes in Mental Status/Cognitive Functioning unable to assess                      Patient Forms     Row Name 12/26/22 1533       Patient Forms    Important Message from Medicare (IMM) Delivered    Delivered to Support person    Method of delivery In person                  Dot Lenz RN

## 2022-12-26 NOTE — PLAN OF CARE
Goal Outcome Evaluation:    Pt transferred from [facility] for hyponatremia. On arrival pt was hypotensive on Dobutamine but alert and oriented. Switched to milrinone, given 1L bolus and fluids started at 100ml/hr. Pt BP improved but went into AFIB/RVR with a rate in the 170's. A 150 Amio bolus given per NP. Pt complaining of pain in right shoulder and lacks mobility in that joint.

## 2022-12-27 ENCOUNTER — APPOINTMENT (OUTPATIENT)
Dept: GENERAL RADIOLOGY | Facility: HOSPITAL | Age: 67
DRG: 853 | End: 2022-12-27
Payer: MEDICARE

## 2022-12-27 ENCOUNTER — APPOINTMENT (OUTPATIENT)
Dept: MRI IMAGING | Facility: HOSPITAL | Age: 67
DRG: 853 | End: 2022-12-27
Payer: MEDICARE

## 2022-12-27 PROBLEM — M15.9 PRIMARY OSTEOARTHRITIS INVOLVING MULTIPLE JOINTS: Status: ACTIVE | Noted: 2022-12-27

## 2022-12-27 PROBLEM — M54.2 CERVICALGIA: Status: ACTIVE | Noted: 2022-12-27

## 2022-12-27 LAB
ANION GAP SERPL CALCULATED.3IONS-SCNC: 12 MMOL/L (ref 5–15)
ARTERIAL PATENCY WRIST A: POSITIVE
ATMOSPHERIC PRESS: ABNORMAL MM[HG]
BASE EXCESS BLDA CALC-SCNC: -4.6 MMOL/L (ref 0–3)
BDY SITE: ABNORMAL
BH CV UPPER VENOUS LEFT INTERNAL JUGULAR AUGMENT: NORMAL
BH CV UPPER VENOUS LEFT INTERNAL JUGULAR COMPRESS: NORMAL
BH CV UPPER VENOUS LEFT INTERNAL JUGULAR PHASIC: NORMAL
BH CV UPPER VENOUS LEFT INTERNAL JUGULAR SPONT: NORMAL
BH CV UPPER VENOUS LEFT SUBCLAVIAN AUGMENT: NORMAL
BH CV UPPER VENOUS LEFT SUBCLAVIAN COMPRESS: NORMAL
BH CV UPPER VENOUS LEFT SUBCLAVIAN PHASIC: NORMAL
BH CV UPPER VENOUS LEFT SUBCLAVIAN SPONT: NORMAL
BH CV UPPER VENOUS RIGHT AXILLARY AUGMENT: NORMAL
BH CV UPPER VENOUS RIGHT AXILLARY COMPRESS: NORMAL
BH CV UPPER VENOUS RIGHT AXILLARY PHASIC: NORMAL
BH CV UPPER VENOUS RIGHT AXILLARY SPONT: NORMAL
BH CV UPPER VENOUS RIGHT BASILIC FOREARM COMPRESS: NORMAL
BH CV UPPER VENOUS RIGHT BASILIC UPPER COMPRESS: NORMAL
BH CV UPPER VENOUS RIGHT BRACHIAL COMPRESS: NORMAL
BH CV UPPER VENOUS RIGHT CEPHALIC FOREARM COLOR: 1
BH CV UPPER VENOUS RIGHT CEPHALIC FOREARM COMPRESS: NORMAL
BH CV UPPER VENOUS RIGHT CEPHALIC FOREARM THROMBUS: NORMAL
BH CV UPPER VENOUS RIGHT CEPHALIC UPPER COMPRESS: NORMAL
BH CV UPPER VENOUS RIGHT INTERNAL JUGULAR AUGMENT: NORMAL
BH CV UPPER VENOUS RIGHT INTERNAL JUGULAR COMPRESS: NORMAL
BH CV UPPER VENOUS RIGHT INTERNAL JUGULAR PHASIC: NORMAL
BH CV UPPER VENOUS RIGHT INTERNAL JUGULAR SPONT: NORMAL
BH CV UPPER VENOUS RIGHT RADIAL COMPRESS: NORMAL
BH CV UPPER VENOUS RIGHT SUBCLAVIAN AUGMENT: NORMAL
BH CV UPPER VENOUS RIGHT SUBCLAVIAN COMPRESS: NORMAL
BH CV UPPER VENOUS RIGHT SUBCLAVIAN PHASIC: NORMAL
BH CV UPPER VENOUS RIGHT SUBCLAVIAN SPONT: NORMAL
BH CV UPPER VENOUS RIGHT ULNAR COMPRESS: NORMAL
BUN SERPL-MCNC: 64 MG/DL (ref 8–23)
BUN/CREAT SERPL: 28.4 (ref 7–25)
BURR CELLS BLD QL SMEAR: ABNORMAL
CA-I SERPL ISE-MCNC: 1.08 MMOL/L (ref 1.2–1.3)
CALCIUM SPEC-SCNC: 7.8 MG/DL (ref 8.6–10.5)
CHLORIDE SERPL-SCNC: 92 MMOL/L (ref 98–107)
CO2 BLDA-SCNC: 19.4 MMOL/L (ref 22–29)
CO2 SERPL-SCNC: 21 MMOL/L (ref 22–29)
CORTIS SERPL-MCNC: 26.11 MCG/DL
CREAT SERPL-MCNC: 2.25 MG/DL (ref 0.76–1.27)
D-LACTATE SERPL-SCNC: 1.9 MMOL/L (ref 0.5–2)
DEPRECATED RDW RBC AUTO: 47.7 FL (ref 37–54)
EGFRCR SERPLBLD CKD-EPI 2021: 31.2 ML/MIN/1.73
EOSINOPHIL # BLD MANUAL: 0.09 10*3/MM3 (ref 0–0.4)
EOSINOPHIL NFR BLD MANUAL: 1 % (ref 0.3–6.2)
ERYTHROCYTE [DISTWIDTH] IN BLOOD BY AUTOMATED COUNT: 14.7 % (ref 12.3–15.4)
GLUCOSE BLDC GLUCOMTR-MCNC: 101 MG/DL (ref 70–105)
GLUCOSE BLDC GLUCOMTR-MCNC: 67 MG/DL (ref 70–105)
GLUCOSE BLDC GLUCOMTR-MCNC: 79 MG/DL (ref 70–105)
GLUCOSE BLDC GLUCOMTR-MCNC: 86 MG/DL (ref 70–105)
GLUCOSE BLDC GLUCOMTR-MCNC: 94 MG/DL (ref 70–105)
GLUCOSE SERPL-MCNC: 107 MG/DL (ref 65–99)
HCO3 BLDA-SCNC: 18.5 MMOL/L (ref 21–28)
HCT VFR BLD AUTO: 33 % (ref 37.5–51)
HEMODILUTION: NO
HGB BLD-MCNC: 11 G/DL (ref 13–17.7)
INHALED O2 CONCENTRATION: 28 %
LYMPHOCYTES # BLD MANUAL: 0 10*3/MM3 (ref 0.7–3.1)
LYMPHOCYTES NFR BLD MANUAL: 4 % (ref 5–12)
MAGNESIUM SERPL-MCNC: 1.7 MG/DL (ref 1.6–2.4)
MAXIMAL PREDICTED HEART RATE: 153 BPM
MCH RBC QN AUTO: 31.7 PG (ref 26.6–33)
MCHC RBC AUTO-ENTMCNC: 33.5 G/DL (ref 31.5–35.7)
MCV RBC AUTO: 94.9 FL (ref 79–97)
METAMYELOCYTES NFR BLD MANUAL: 9 % (ref 0–0)
MODALITY: ABNORMAL
MONOCYTES # BLD: 0.36 10*3/MM3 (ref 0.1–0.9)
NEUTROPHILS # BLD AUTO: 7.83 10*3/MM3 (ref 1.7–7)
NEUTROPHILS NFR BLD MANUAL: 67 % (ref 42.7–76)
NEUTS BAND NFR BLD MANUAL: 19 % (ref 0–5)
NEUTS VAC BLD QL SMEAR: ABNORMAL
OSMOLALITY UR: 410 MOSM/KG (ref 300–800)
PCO2 BLDA: 27.6 MM HG (ref 35–48)
PH BLDA: 7.43 PH UNITS (ref 7.35–7.45)
PHOSPHATE SERPL-MCNC: 2.8 MG/DL (ref 2.5–4.5)
PLATELET # BLD AUTO: 66 10*3/MM3 (ref 140–450)
PMV BLD AUTO: 8.1 FL (ref 6–12)
PO2 BLDA: 74.7 MM HG (ref 83–108)
POIKILOCYTOSIS BLD QL SMEAR: ABNORMAL
POTASSIUM SERPL-SCNC: 4.3 MMOL/L (ref 3.5–5.2)
RBC # BLD AUTO: 3.48 10*6/MM3 (ref 4.14–5.8)
SAO2 % BLDCOA: 95.6 % (ref 94–98)
SCAN SLIDE: NORMAL
SMALL PLATELETS BLD QL SMEAR: ABNORMAL
SODIUM SERPL-SCNC: 125 MMOL/L (ref 136–145)
STRESS TARGET HR: 130 BPM
TOXIC GRANULATION: ABNORMAL
VARIANT LYMPHS NFR BLD MANUAL: 0 % (ref 19.6–45.3)
WBC NRBC COR # BLD: 9.1 10*3/MM3 (ref 3.4–10.8)

## 2022-12-27 PROCEDURE — 25010000002 CEFTRIAXONE PER 250 MG: Performed by: INTERNAL MEDICINE

## 2022-12-27 PROCEDURE — 73030 X-RAY EXAM OF SHOULDER: CPT

## 2022-12-27 PROCEDURE — 85025 COMPLETE CBC W/AUTO DIFF WBC: CPT

## 2022-12-27 PROCEDURE — 83935 ASSAY OF URINE OSMOLALITY: CPT | Performed by: INTERNAL MEDICINE

## 2022-12-27 PROCEDURE — 83735 ASSAY OF MAGNESIUM: CPT

## 2022-12-27 PROCEDURE — 71045 X-RAY EXAM CHEST 1 VIEW: CPT

## 2022-12-27 PROCEDURE — 84100 ASSAY OF PHOSPHORUS: CPT

## 2022-12-27 PROCEDURE — 82962 GLUCOSE BLOOD TEST: CPT

## 2022-12-27 PROCEDURE — 83605 ASSAY OF LACTIC ACID: CPT | Performed by: INTERNAL MEDICINE

## 2022-12-27 PROCEDURE — 73221 MRI JOINT UPR EXTREM W/O DYE: CPT

## 2022-12-27 PROCEDURE — 82533 TOTAL CORTISOL: CPT | Performed by: INTERNAL MEDICINE

## 2022-12-27 PROCEDURE — 25010000002 HEPARIN (PORCINE) PER 1000 UNITS: Performed by: NURSE PRACTITIONER

## 2022-12-27 PROCEDURE — 93005 ELECTROCARDIOGRAM TRACING: CPT

## 2022-12-27 PROCEDURE — 93010 ELECTROCARDIOGRAM REPORT: CPT | Performed by: INTERNAL MEDICINE

## 2022-12-27 PROCEDURE — 25010000002 AMIODARONE IN DEXTROSE 5% 360-4.14 MG/200ML-% SOLUTION: Performed by: NURSE PRACTITIONER

## 2022-12-27 PROCEDURE — 87040 BLOOD CULTURE FOR BACTERIA: CPT | Performed by: INTERNAL MEDICINE

## 2022-12-27 PROCEDURE — 25010000002 CALCIUM GLUCONATE-NACL 1-0.675 GM/50ML-% SOLUTION: Performed by: INTERNAL MEDICINE

## 2022-12-27 PROCEDURE — 25010000002 CEFEPIME PER 500 MG

## 2022-12-27 PROCEDURE — 85007 BL SMEAR W/DIFF WBC COUNT: CPT

## 2022-12-27 PROCEDURE — 25010000002 MORPHINE PER 10 MG: Performed by: INTERNAL MEDICINE

## 2022-12-27 PROCEDURE — 25010000002 MAGNESIUM SULFATE IN D5W 1G/100ML (PREMIX) 1-5 GM/100ML-% SOLUTION: Performed by: INTERNAL MEDICINE

## 2022-12-27 PROCEDURE — 80048 BASIC METABOLIC PNL TOTAL CA: CPT | Performed by: INTERNAL MEDICINE

## 2022-12-27 PROCEDURE — 82330 ASSAY OF CALCIUM: CPT

## 2022-12-27 PROCEDURE — 87147 CULTURE TYPE IMMUNOLOGIC: CPT | Performed by: INTERNAL MEDICINE

## 2022-12-27 PROCEDURE — 25010000002 PHENYLEPHRINE 10 MG/ML SOLUTION 5 ML VIAL: Performed by: NURSE PRACTITIONER

## 2022-12-27 PROCEDURE — 82803 BLOOD GASES ANY COMBINATION: CPT

## 2022-12-27 PROCEDURE — 36600 WITHDRAWAL OF ARTERIAL BLOOD: CPT

## 2022-12-27 RX ORDER — ENOXAPARIN SODIUM 100 MG/ML
40 INJECTION SUBCUTANEOUS DAILY
Status: DISCONTINUED | OUTPATIENT
Start: 2022-12-27 | End: 2022-12-27

## 2022-12-27 RX ORDER — MAGNESIUM SULFATE 1 G/100ML
1 INJECTION INTRAVENOUS EVERY 12 HOURS
Status: COMPLETED | OUTPATIENT
Start: 2022-12-27 | End: 2022-12-27

## 2022-12-27 RX ORDER — CALCIUM GLUCONATE 20 MG/ML
1 INJECTION, SOLUTION INTRAVENOUS EVERY 12 HOURS
Status: COMPLETED | OUTPATIENT
Start: 2022-12-27 | End: 2022-12-27

## 2022-12-27 RX ADMIN — MAGNESIUM SULFATE 1 G: 1 INJECTION INTRAVENOUS at 08:23

## 2022-12-27 RX ADMIN — SODIUM CHLORIDE 1000 ML: 9 INJECTION, SOLUTION INTRAVENOUS at 04:00

## 2022-12-27 RX ADMIN — CALCIUM GLUCONATE 1 G: 20 INJECTION, SOLUTION INTRAVENOUS at 08:23

## 2022-12-27 RX ADMIN — CEFTRIAXONE 2 G: 2 INJECTION, POWDER, FOR SOLUTION INTRAMUSCULAR; INTRAVENOUS at 20:50

## 2022-12-27 RX ADMIN — CALCIUM GLUCONATE 1 G: 20 INJECTION, SOLUTION INTRAVENOUS at 20:50

## 2022-12-27 RX ADMIN — PHENYLEPHRINE HYDROCHLORIDE 1.5 MCG/KG/MIN: 10 INJECTION INTRAVENOUS at 04:46

## 2022-12-27 RX ADMIN — SODIUM CHLORIDE 1000 ML: 9 INJECTION, SOLUTION INTRAVENOUS at 05:08

## 2022-12-27 RX ADMIN — PANTOPRAZOLE SODIUM 40 MG: 40 INJECTION, POWDER, FOR SOLUTION INTRAVENOUS at 06:27

## 2022-12-27 RX ADMIN — MAGNESIUM SULFATE 1 G: 1 INJECTION INTRAVENOUS at 23:13

## 2022-12-27 RX ADMIN — Medication 10 ML: at 20:52

## 2022-12-27 RX ADMIN — CEFEPIME 2 G: 2 INJECTION, POWDER, FOR SOLUTION INTRAVENOUS at 07:49

## 2022-12-27 RX ADMIN — Medication 10 ML: at 08:05

## 2022-12-27 RX ADMIN — HEPARIN SODIUM 5000 UNITS: 5000 INJECTION INTRAVENOUS; SUBCUTANEOUS at 08:04

## 2022-12-27 RX ADMIN — AMIODARONE HYDROCHLORIDE 1 MG/MIN: 1.8 INJECTION, SOLUTION INTRAVENOUS at 03:05

## 2022-12-27 RX ADMIN — AMIODARONE HYDROCHLORIDE 0.5 MG/MIN: 1.8 INJECTION, SOLUTION INTRAVENOUS at 17:00

## 2022-12-27 RX ADMIN — DEXTROSE MONOHYDRATE 25 G: 25 INJECTION, SOLUTION INTRAVENOUS at 23:17

## 2022-12-27 RX ADMIN — PHENYLEPHRINE HYDROCHLORIDE 0.5 MCG/KG/MIN: 10 INJECTION INTRAVENOUS at 17:18

## 2022-12-27 RX ADMIN — MORPHINE SULFATE 2 MG: 2 INJECTION, SOLUTION INTRAMUSCULAR; INTRAVENOUS at 20:52

## 2022-12-27 NOTE — NURSING NOTE
Spoke with Tracy, the patients niece, she stated the patient has a wife, however she has dementia and is unable to make decisions for the patient. It was also reported that the patient does not have any children. The patient does have a sister, Vibha Menendez that is being contacted for consents as she is the known NOK.

## 2022-12-27 NOTE — CASE MANAGEMENT/SOCIAL WORK
Continued Stay Note   Michael     Patient Name: Jaime Bar  MRN: [id number]  Today's Date: 12/27/2022    Admit Date: 12/26/2022    Plan: DC Plan: Anticipate home with family pending clinical course.   Discharge Plan     Row Name 12/27/22 1336       Plan    Plan DC Plan: Anticipate home with family pending clinical course.    Provided Post Acute Provider List? N/A    Provided Post Acute Provider Quality & Resource List? N/A    Plan Comments CM spoke with patient’s nurse, intensivist, and NP to obtain clinical updates. Plan to stop IV Bicarb and IVF's today. ID consult placed for possible infected shoulder joint and positive cultures.CM will continue to follow for any additional needs that may develop and adjust discharge plan accordingly. DC Barriers: O2@2L nc, PICC, amiodarone gtt, Neosynepherine gtt, IV abx, abnormal labs.           Expected Discharge Date and Time     Expected Discharge Date Expected Discharge Time    Jan 2, 2023           Phone communication or documentation only- no physical contact with patient or family.    Melyssa Pascual RN     Office Phone: [phone]  Office Cell:     [phone]

## 2022-12-27 NOTE — PROGRESS NOTES
Critical Care Progress Note   Jaime Bar : 1955 MRN:[id number] LOS:1  Rm: 230/     Principal Problem: Hyponatremia     Reason for follow up: All the medical problems listed below    Summary     A 67 y.o. old male patient with PMH of cervicalgia, primary osteoarthritis involving multiple joints, hyponatremia, cardiac amloidosis presents to the hospital with complaints of worsening right shoulder pain for 3 days, weakness, and altered mental status. The patient stated that the pain has continued to increase in severity and today the pain was too much. Any movement increases pain. He states that his normal pain regimen for his osteoarthritis has not provided any pain relief. He reports no other alleviating factors. The patient sought care for his shoulder pain at [facility]. ER work-up showed that the patient was hypotensive and hyponatremic. Lactic was elevated at 3.9. The patient was given 1L fluid and started on dobutamine at 2.5 mcg/kg/min. The patient reported that he has not been eating due no appetite and has attemepted to drink some water but hasn't had anything since the night morning of 22.     Significant Events     22 : ID Consulted for +blood cultures and potentially septic joint. Patient now requiring vasopressor support. Echo pending.    Assessment / Plan     Septic Shock: ( HR>90, RR >20 or PCO2 <32, Lactic acid>2, Creatinine >2, Change in mental status and Platelets <100 )  • Likely due to ?right shoulder septic joint, shoulder is red, very painful, hot to touch   • CRP, 28.17, Procal 7.20, lactic 2.5, sed rate 21  • Will do blood / urine   • Started on cefepime.   • Persistent hypotension in spite of adequate fluid resuscitation.   • C/w additional fluids as needed. Avoid fluid overload.   • Titrate vasopressors for a target MAP of 65.     Hyponatremia likely secondary to decreased intake, chronic pain and pain medication regimen   • Patient has chronic history    • Monitor neuro status  • IVF for rehydration    Code status:   Code Status (Patient has no pulse and is not breathing): CPR (Attempt to Resuscitate)  Medical Interventions (Patient has pulse or is breathing): Full Support       Nutrition: NPO Diet NPO Type: Strict NPO     DVT prophylaxis:   Mechanical Order History:      Ordered        12/26/22 0245  Place Sequential Compression Device  Once            12/26/22 0245  Maintain Sequential Compression Device  Continuous                    Pharmalogical Order History:      Ordered     Dose Route Frequency Stop    12/26/22 1513  heparin (porcine) 5000 UNIT/ML injection 5,000 Units         5,000 Units SC Every 12 Hours Scheduled --                 Subjective / Review of systems     Review of Systems   Constitutional: Positive for fatigue.   Respiratory: Negative for chest tightness and shortness of breath.    Cardiovascular: Negative for chest pain.   Gastrointestinal: Negative for abdominal pain and nausea.   Genitourinary: Negative for difficulty urinating.   Musculoskeletal: Positive for arthralgias and joint swelling.   Skin: Negative for rash.   Neurological: Negative for dizziness and syncope.   Psychiatric/Behavioral: Positive for sleep disturbance.        Objective / Physical Exam     Vital signs:  Temp: 98.3 °F (36.8 °C)  BP: 90/46  Heart Rate: 119  Resp: 22  SpO2: 96 %  Weight: 86 kg (189 lb 9.5 oz)    Admission Weight: Weight: 81.8 kg (180 lb 5.4 oz)  Current Weight: Weight: 86 kg (189 lb 9.5 oz)    Input/Output in last 24 hours:    Intake/Output Summary (Last 24 hours) at 12/27/2022 0936  Last data filed at 12/27/2022 0630  Gross per 24 hour   Intake 66258 ml   Output 390 ml   Net 9707 ml      Net IO Since Admission: 9,707 mL [12/27/22 0936]     Physical Exam  HENT:      Head: Normocephalic.      Mouth/Throat:      Mouth: Mucous membranes are dry.      Pharynx: Oropharynx is clear.   Eyes:      Extraocular Movements: Extraocular movements intact.       Conjunctiva/sclera: Conjunctivae normal.      Pupils: Pupils are equal, round, and reactive to light.   Cardiovascular:      Rate and Rhythm: Normal rate and regular rhythm.      Pulses: Normal pulses.      Heart sounds: Normal heart sounds.   Pulmonary:      Effort: Pulmonary effort is normal.      Breath sounds: Normal breath sounds.   Abdominal:      General: Abdomen is flat. Bowel sounds are normal.      Palpations: Abdomen is soft.   Musculoskeletal:         General: Tenderness present.      Right lower leg: No edema.      Left lower leg: No edema.      Comments: Right arm redness and swelling.    Skin:     General: Skin is warm and dry.      Findings: Erythema present.   Neurological:      Mental Status: He is lethargic.      GCS: GCS eye subscore is 4. GCS verbal subscore is 4. GCS motor subscore is 6.      Motor: Weakness present.   Psychiatric:         Speech: Speech normal.         Behavior: Behavior is cooperative.          Radiology and Labs     Results from last 7 days   Lab Units 12/27/22  0557 12/26/22  0435   WBC 10*3/mm3 9.10 7.20   HEMOGLOBIN g/dL 11.0* 12.4*   HEMATOCRIT % 33.0* 38.0   PLATELETS 10*3/mm3 66* 99*      Results from last 7 days   Lab Units 12/27/22  0742 12/26/22  1641 12/26/22  0435   SODIUM mmol/L 125* 122* 118*   POTASSIUM mmol/L 4.3 4.3 5.4*   CHLORIDE mmol/L 92* 91* 84*   CO2 mmol/L 21.0* 19.0* 16.0*   BUN mg/dL 64* 58* 55*   CREATININE mg/dL 2.25* 2.45* 2.38*        Current medications     Scheduled Meds:   calcium gluconate, 1 g, Intravenous, Q12H  cefepime, 2 g, Intravenous, Q24H  heparin (porcine), 5,000 Units, Subcutaneous, Q12H  magnesium sulfate, 1 g, Intravenous, Q12H  midodrine, 10 mg, Oral, TID AC  pantoprazole, 40 mg, Intravenous, Q AM  sodium chloride, 10 mL, Intravenous, Q12H        Continuous Infusions:   amiodarone, 0.5 mg/min, Last Rate: 0.5 mg/min (12/27/22 5151)  phenylephrine, 0.5-3 mcg/kg/min, Last Rate: 1.5 mcg/kg/min (12/27/22 8523)  sodium bicarbonate, 150  mEq, Last Rate: 150 mEq (12/26/22 2104)  sodium chloride, 100 mL/hr, Last Rate: 100 mL/hr (12/26/22 0308)        Plan discussed with RN. Reviewed all other data in the last 24 hours, including but not limited to vitals, labs, microbiology, imaging and pertinent notes from other providers.  Plan also discussed with patient and family at the bedside.    Electronically signed by RITU Ruby, 12/27/22, 9:36 AM EST.      RITU Ruby   Critical Care  12/27/22   09:36 EST

## 2022-12-27 NOTE — CONSULTS
Infectious Diseases Consult Note    Referring Provider: Doug Graves MD    Reason for Consultation: Bacteremia    Patient Care Team:  Provider, No Known as PCP - Kyle Gardner MD as Consulting Physician (Nephrology)    Chief complaint right shoulder and left shoulder pain    Subjective     History of present illness:      This is 67-year-old male who was hospitalized [facility] on December 26, 2022.  He was transferred from the emergency room at [facility] in [facility].  The patient presented there with a right shoulder pain started few days before admission.  He was noted to be septic with hypotension requiring pressors he was moved to the ICU at [facility].  The patient was started on IV cefepime.  2 sets of blood cultures are positive for Staph aureus.  The patient denied having recent procedure to his right or left shoulder but he had remote surgery to both shoulders.  He also has a right total knee replacement but there is no issue with his knee.  The patient is some what confused and I was able to obtain more history from his family, daughter and wife who are present at bedside.    Review of Systems   Review of Systems   Constitutional: Positive for chills.   Musculoskeletal: Positive for joint swelling.       Medications  Medications Prior to Admission   Medication Sig Dispense Refill Last Dose   • albuterol sulfate  (90 Base) MCG/ACT inhaler Inhale 2 puffs Every 4 (Four) Hours As Needed for Wheezing or Shortness of Air.      • diclofenac (VOLTAREN) 75 MG EC tablet Take 75 mg by mouth Daily As Needed.      • HYDROcodone-acetaminophen (NORCO)  MG per tablet Take 1 tablet by mouth Every 4 (Four) Hours As Needed for Moderate Pain.      • metoprolol succinate XL (TOPROL-XL) 25 MG 24 hr tablet Take 25 mg by mouth Daily.      • omeprazole (priLOSEC) 20 MG capsule Take 20 mg by mouth Daily.      • sodium bicarbonate 650 MG tablet Take 650 mg by  mouth 2 (Two) Times a Day.      • spironolactone (ALDACTONE) 25 MG tablet Take 12.5 mg by mouth Daily.      • tiotropium bromide-olodaterol (Stiolto Respimat) 2.5-2.5 MCG/ACT aerosol solution inhaler Inhale 2 puffs Daily.          History  History reviewed. No pertinent past medical history.  History reviewed. No pertinent surgical history.    Family History  History reviewed. No pertinent family history.    Social History   reports that he has been smoking cigarettes. He has a 5.00 pack-year smoking history. He has never used smokeless tobacco. He reports that he does not currently use alcohol. He reports that he does not use drugs.    Allergies  Tramadol-acetaminophen, Codeine, and Tramadol    Objective     Vital Signs   Vital Signs (last 24 hours)       12/26 0700  12/27 0659 12/27 0700  12/27 1200   Most Recent      Temp (°F) 97.4 -  100.1       98.3 (36.8) 12/27 0400    Heart Rate 103 -  127       119 12/26 1800    Resp   22            BP 62/35 -  100/69       90/46 12/26 1800    SpO2 (%) 90 -  97       96 12/26 1800          Physical Exam:  Physical Exam  Vitals and nursing note reviewed.   Constitutional:       General: He is in acute distress.      Appearance: He is well-developed.      Comments: Lethargic   HENT:      Head: Normocephalic and atraumatic.   Eyes:      Pupils: Pupils are equal, round, and reactive to light.   Cardiovascular:      Rate and Rhythm: Normal rate and regular rhythm.      Heart sounds: Normal heart sounds.   Pulmonary:      Effort: Pulmonary effort is normal. No respiratory distress.      Breath sounds: Normal breath sounds. No wheezing or rales.   Abdominal:      General: Bowel sounds are normal. There is no distension.      Palpations: Abdomen is soft. There is no mass.      Tenderness: There is no abdominal tenderness. There is no guarding or rebound.   Musculoskeletal:      Cervical back: Normal range of motion and neck supple.      Comments: Effusion and tenderness of the right  and left shoulders.  More pronounced on the right side   Skin:     General: Skin is warm.      Findings: No erythema or rash.   Neurological:      Mental Status: He is disoriented.      Cranial Nerves: No cranial nerve deficit.         Microbiology  Microbiology Results (last 10 days)     Procedure Component Value - Date/Time    Blood Culture - Blood, Hand, Right [[id number]]  (Abnormal) Collected: 12/26/22 0435    Lab Status: Preliminary result Specimen: Blood from Hand, Right Updated: 12/27/22 1102     Blood Culture Staphylococcus aureus     Comment:   Infectious disease consultation is highly recommended to rule out distant foci of infection.        Isolated from Aerobic and Anaerobic Bottles     Gram Stain Aerobic Bottle Gram positive cocci in clusters      Anaerobic Bottle Gram positive cocci in clusters    Narrative:      Less than seven (7) mL's of blood was collected.  Insufficient quantity may yield false negative results.    Blood Culture ID, PCR - Blood, Hand, Right [[id number]]  (Abnormal) Collected: 12/26/22 0435    Lab Status: Final result Specimen: Blood from Hand, Right Updated: 12/26/22 2054     BCID, PCR Staph aureus. mecA/C and MREJ (methicillin resistance gene) NOT detected. Identification by BCID2 PCR     BOTTLE TYPE Aerobic Bottle    Narrative:      Infectious disease consultation is highly recommended to rule out distant foci of infection.    Blood Culture - Blood, Hand, Left [[id number]]  (Abnormal) Collected: 12/26/22 0434    Lab Status: Preliminary result Specimen: Blood from Hand, Left Updated: 12/27/22 1103     Blood Culture Staphylococcus aureus     Comment:   Infectious disease consultation is highly recommended to rule out distant foci of infection.        Isolated from Aerobic and Anaerobic Bottles     Gram Stain Aerobic Bottle Gram positive cocci in clusters      Anaerobic Bottle Gram positive cocci in clusters    Narrative:      Less than seven (7) mL's of blood was collected.   Insufficient quantity may yield false negative results.    MRSA Screen, PCR (Inpatient) - Swab, Nares [[id number]]  (Normal) Collected: 12/26/22 0307    Lab Status: Final result Specimen: Swab from Nares Updated: 12/26/22 0507     MRSA PCR No MRSA Detected    Narrative:      The negative predictive value of this diagnostic test is high and should only be used to consider de-escalating anti-MRSA therapy. A positive result may indicate colonization with MRSA and must be correlated clinically.          Laboratory  Results from last 7 days   Lab Units 12/27/22  0557   WBC 10*3/mm3 9.10   HEMOGLOBIN g/dL 11.0*   HEMATOCRIT % 33.0*   PLATELETS 10*3/mm3 66*     Results from last 7 days   Lab Units 12/27/22  0742   SODIUM mmol/L 125*   POTASSIUM mmol/L 4.3   CHLORIDE mmol/L 92*   CO2 mmol/L 21.0*   BUN mg/dL 64*   CREATININE mg/dL 2.25*   GLUCOSE mg/dL 107*   CALCIUM mg/dL 7.8*     Results from last 7 days   Lab Units 12/27/22  0742   SODIUM mmol/L 125*   POTASSIUM mmol/L 4.3   CHLORIDE mmol/L 92*   CO2 mmol/L 21.0*   BUN mg/dL 64*   CREATININE mg/dL 2.25*   GLUCOSE mg/dL 107*   CALCIUM mg/dL 7.8*     Results from last 7 days   Lab Units 12/26/22  0435   CK TOTAL U/L 166     Results from last 7 days   Lab Units 12/26/22  0506   SED RATE mm/hr 21*           Radiology  Imaging Results (Last 72 Hours)     Procedure Component Value Units Date/Time    XR Chest 1 View [[id number]] Collected: 12/27/22 0845     Updated: 12/27/22 0849    Narrative:      DATE OF EXAM:  12/27/2022 8:12 AM     PROCEDURE:  XR CHEST 1 VW-     INDICATIONS:  sob     COMPARISON:  12/26/2022     TECHNIQUE:   Single radiographic view of the chest was obtained.     FINDINGS:  There are persistent interstitial type opacities throughout the lungs  bilaterally. Cardiomegaly is unchanged. Mediastinal contour is  unchanged. Left upper extremity PICC line is unchanged in position.  Lower cervical spine ACDF.       Impression:         1. Persistent diffuse interstitial  opacities. Differential includes  interstitial pulmonary edema, or atypical infection.     Electronically Signed By-Juan Francisco Lozoya MD On:12/27/2022 8:46 AM  This report was finalized on [id number] by  Juan Francisco Lozoya MD.    XR Chest 1 View [[id number]] Collected: 12/26/22 1652     Updated: 12/26/22 1658    Narrative:      XR CHEST 1 VW-     Date of Exam: 12/26/2022 4:26 PM     Indication: confirm left sided picc tip location.     Comparison: 12/26/2022 5:58 AM     Technique: A single view of the chest was obtained.     FINDINGS:      Cardiomegaly stable.  Pulmonary vessels are within normal limits.   Interstitial markings remain prominent but unchanged.  No new airspace  consolidation.  No pleural effusion or pneumothorax.  There is been  interval placement of left-sided PICC line with the tip projecting over  the superior vena cava.  Postoperative and degenerative changes are  noted of the left shoulder.             Impression:            1.  Interval placement of left-sided PICC line with the tip projecting  over the superior vena cava.  2.  No change in coarsened reticular interstitial markings throughout  both lungs.  No new airspace consolidation or pleural effusion.     Electronically Signed By-Los Diop MD On:12/26/2022 4:56 PM  This report was finalized on [id number] by  Los Diop MD.    XR Chest 1 View [[id number]] Collected: 12/26/22 0456     Updated: 12/26/22 0657    Narrative:      EXAM:  XR CHEST 1 VW     DATE: 12/26/2022 5:55 AM    HISTORY: Change in patient's condition    COMPARISON:  None.    FINDINGS and     Impression:      1.  Coarsened airspace, bronchovascular thickening.  Consider small airways disease (bronchitis, asthma), edema, atypical/viral infection, aspiration.  2.  Heart size is normal.   3.  No acute bony findings.       Electronically signed by:  Bernadette Huddleston M.D.    12/26/2022 4:56 AM Mountain Time          Cardiology      Results Review:  I have reviewed all clinical  data, test, lab, and imaging results.       Schedule Meds  calcium gluconate, 1 g, Intravenous, Q12H  cefTRIAXone, 2 g, Intravenous, Q24H  heparin (porcine), 5,000 Units, Subcutaneous, Q12H  magnesium sulfate, 1 g, Intravenous, Q12H  midodrine, 10 mg, Oral, TID AC  pantoprazole, 40 mg, Intravenous, Q AM  sodium chloride, 10 mL, Intravenous, Q12H        Infusion Meds  amiodarone, 0.5 mg/min, Last Rate: 0.5 mg/min (12/27/22 0307)  phenylephrine, 0.5-3 mcg/kg/min, Last Rate: 1.5 mcg/kg/min (12/27/22 6406)        PRN Meds  •  acetaminophen **OR** acetaminophen  •  aluminum-magnesium hydroxide-simethicone  •  dextrose  •  dextrose  •  glucagon (human recombinant)  •  Morphine  •  ondansetron **OR** ondansetron  •  sodium chloride  •  sodium chloride  •  sodium chloride      Assessment & Plan       Assessment    Staph aureus bacteremia.  I am suspecting the source is a right shoulder.  We need to rule out endocarditis.  2D echo showed sclerotic aortic valve and was not good diagnostic test.  PCR did not detect MRSA gene    Right shoulder pain and effusion.  Concerning for right shoulder septic arthritis.  Patient was also complaining of left shoulder pain.  He had remote surgeries of both shoulders but I do not see hardware in both joints    Mild septic shock requiring low-dose of pressors    Plan    Discontinue IV cefepime  Start ceftriaxone 2 g IV daily waiting on final blood culture results  Repeat 1 set of blood culture today  Consult cardiology for transesophageal echo  Consult orthopedic surgery service  Request MRI of the right and left shoulders without contrast  A.m. labs with sed rate    Continue supportive care and pressors    Case was discussed with the patient's family at bedside    Antonina Delacruz MD  12/27/22  12:00 EST    Note is dictated utilizing voice recognition software/Dragon

## 2022-12-27 NOTE — ATTESTATION SEPSIS FOCUSED EXAM
SEPTIC SHOCK FOCUSED EXAM ATTESTATION    I attest that I have reassessed tissue perfusion after the fluid bolus given.    Emily Malik, APRN  12/27/22  06:06 EST

## 2022-12-27 NOTE — PROGRESS NOTES
" COVERING FOR  2022    [facility]  [phone]  Brenda Diamond MD      Patient Care Team:  Provider, No Known as PCP - Kyle Gardner MD as Consulting Physician (Nephrology)      Provider:  Brenda Diamond MD  Patient Name: Jaime Bar  :  1955    SUBJECTIVE:    F/U ARF/ACIDOSIS/HYPONATREMIA        Medication:  cefepime, 2 g, Intravenous, Q24H  heparin (porcine), 5,000 Units, Subcutaneous, Q12H  midodrine, 10 mg, Oral, TID AC  pantoprazole, 40 mg, Intravenous, Q AM  sodium chloride, 10 mL, Intravenous, Q12H      amiodarone, 0.5 mg/min, Last Rate: 0.5 mg/min (22 030)  phenylephrine, 0.5-3 mcg/kg/min, Last Rate: 1.5 mcg/kg/min (22)  sodium bicarbonate, 150 mEq, Last Rate: 150 mEq (22)  sodium chloride, 100 mL/hr, Last Rate: 100 mL/hr (22 030)        OBJECTIVE    Vital Sign Min/Max for last 24 hours  Temp  Min: 97.4 °F (36.3 °C)  Max: 100.1 °F (37.8 °C)   BP  Min: 62/35  Max: 100/69   Pulse  Min: 103  Max: 127   No data recorded   SpO2  Min: 90 %  Max: 97 %   No data recorded   Weight  Min: 86 kg (189 lb 9.5 oz)  Max: 86 kg (189 lb 9.5 oz)     Flowsheet Rows    Flowsheet Row First Filed Value   Admission Height 167.6 cm (66\") Documented at 2022 0230   Admission Weight 81.8 kg (180 lb 5.4 oz) Documented at 2022 0230          No intake/output data recorded.  I/O last 3 completed shifts:  In: 5870 [I.V.:5870]  Out: 325 [Urine:325]    Physical Exam:  General Appearance: alert, appears stated age and cooperative  Head: normocephalic, without obvious abnormality and atraumatic  Eyes: conjunctivae and sclerae normal and no icterus  Neck: supple and no JVD  Lungs: +FEW SCATTERED RHONCHI  Heart: IRREG IRREG +NATAN  Chest: Wall no abnormalities observed  Abdomen: normal bowel sounds and soft, nontender  Extremities: moves extremities well, no edema, no cyanosis and no redness  Skin: no bleeding, " bruising or rash, turgor normal, color normal and no lesions noted  Neurologic: Alert, and oriented. No focal deficits    Labs:    WBC WBC   Date Value Ref Range Status   12/27/2022 9.10 3.40 - 10.80 10*3/mm3 Final   12/26/2022 7.20 3.40 - 10.80 10*3/mm3 Final      HGB Hemoglobin   Date Value Ref Range Status   12/27/2022 11.0 (L) 13.0 - 17.7 g/dL Final   12/26/2022 12.4 (L) 13.0 - 17.7 g/dL Final      HCT Hematocrit   Date Value Ref Range Status   12/27/2022 33.0 (L) 37.5 - 51.0 % Final   12/26/2022 38.0 37.5 - 51.0 % Final      Platelets No results found for: LABPLAT   MCV MCV   Date Value Ref Range Status   12/27/2022 94.9 79.0 - 97.0 fL Final   12/26/2022 98.4 (H) 79.0 - 97.0 fL Final          Sodium Sodium   Date Value Ref Range Status   12/26/2022 122 (L) 136 - 145 mmol/L Final   12/26/2022 118 (C) 136 - 145 mmol/L Final      Potassium Potassium   Date Value Ref Range Status   12/26/2022 4.3 3.5 - 5.2 mmol/L Final   12/26/2022 5.4 (H) 3.5 - 5.2 mmol/L Final      Chloride Chloride   Date Value Ref Range Status   12/26/2022 91 (L) 98 - 107 mmol/L Final   12/26/2022 84 (L) 98 - 107 mmol/L Final      CO2 CO2   Date Value Ref Range Status   12/26/2022 19.0 (L) 22.0 - 29.0 mmol/L Final   12/26/2022 16.0 (L) 22.0 - 29.0 mmol/L Final      BUN BUN   Date Value Ref Range Status   12/26/2022 58 (H) 8 - 23 mg/dL Final   12/26/2022 55 (H) 8 - 23 mg/dL Final      Creatinine Creatinine   Date Value Ref Range Status   12/26/2022 2.45 (H) 0.76 - 1.27 mg/dL Final   12/26/2022 2.38 (H) 0.76 - 1.27 mg/dL Final      Calcium Calcium   Date Value Ref Range Status   12/26/2022 7.7 (L) 8.6 - 10.5 mg/dL Final   12/26/2022 8.2 (L) 8.6 - 10.5 mg/dL Final      PO4 No components found for: PO4   Albumin Albumin   Date Value Ref Range Status   12/26/2022 3.30 (L) 3.50 - 5.20 g/dL Final      Magnesium Magnesium   Date Value Ref Range Status   12/27/2022 1.7 1.6 - 2.4 mg/dL Final   12/26/2022 1.8 1.6 - 2.4 mg/dL Final      Uric Acid No  components found for: URIC ACID     Imaging Results (Last 72 Hours)     Procedure Component Value Units Date/Time    XR Chest 1 View [[id number]] Collected: 12/26/22 1652     Updated: 12/26/22 1658    Narrative:      XR CHEST 1 VW-     Date of Exam: 12/26/2022 4:26 PM     Indication: confirm left sided picc tip location.     Comparison: 12/26/2022 5:58 AM     Technique: A single view of the chest was obtained.     FINDINGS:      Cardiomegaly stable.  Pulmonary vessels are within normal limits.   Interstitial markings remain prominent but unchanged.  No new airspace  consolidation.  No pleural effusion or pneumothorax.  There is been  interval placement of left-sided PICC line with the tip projecting over  the superior vena cava.  Postoperative and degenerative changes are  noted of the left shoulder.             Impression:            1.  Interval placement of left-sided PICC line with the tip projecting  over the superior vena cava.  2.  No change in coarsened reticular interstitial markings throughout  both lungs.  No new airspace consolidation or pleural effusion.     Electronically Signed By-Los Diop MD On:12/26/2022 4:56 PM  This report was finalized on [id number] by  Los Diop MD.    XR Chest 1 View [[id number]] Collected: 12/26/22 0456     Updated: 12/26/22 0657    Narrative:      EXAM:  XR CHEST 1 VW     DATE: 12/26/2022 5:55 AM    HISTORY: Change in patient's condition    COMPARISON:  None.    FINDINGS and     Impression:      1.  Coarsened airspace, bronchovascular thickening.  Consider small airways disease (bronchitis, asthma), edema, atypical/viral infection, aspiration.  2.  Heart size is normal.   3.  No acute bony findings.       Electronically signed by:  Bernadette Huddleston M.D.    12/26/2022 4:56 AM Mountain Time          Results for orders placed during the hospital encounter of 12/26/22    XR Chest 1 View    Narrative  XR CHEST 1 VW-    Date of Exam: 12/26/2022 4:26 PM    Indication:  confirm left sided picc tip location.    Comparison: 12/26/2022 5:58 AM    Technique: A single view of the chest was obtained.    FINDINGS:    Cardiomegaly stable.  Pulmonary vessels are within normal limits.  Interstitial markings remain prominent but unchanged.  No new airspace  consolidation.  No pleural effusion or pneumothorax.  There is been  interval placement of left-sided PICC line with the tip projecting over  the superior vena cava.  Postoperative and degenerative changes are  noted of the left shoulder.    Impression  1.  Interval placement of left-sided PICC line with the tip projecting  over the superior vena cava.  2.  No change in coarsened reticular interstitial markings throughout  both lungs.  No new airspace consolidation or pleural effusion.    Electronically Signed By-Los Diop MD On:12/26/2022 4:56 PM  This report was finalized on [id number] by  Los Diop MD.      XR Chest 1 View    Narrative  EXAM:  XR CHEST 1 VW  DATE: 12/26/2022 5:55 AM    HISTORY: Change in patient's condition    COMPARISON:  None.    FINDINGS and    Impression  1.  Coarsened airspace, bronchovascular thickening.  Consider small airways disease (bronchitis, asthma), edema, atypical/viral infection, aspiration.  2.  Heart size is normal.  3.  No acute bony findings.      Electronically signed by:  Bernadette Huddleston M.D.  12/26/2022 4:56 AM Mountain Time            ASSESSMENT / PLAN      Hyponatremia    • Hyponatremia--acute. No previous labs available. Not clear etiology, missing med information too. Probably dehydrated, given STEVEN. Will check urine sodium and osm.   • STEVEN--pre-renal and or ATN given hypotension/ a fib with RVR. Baseline CR unknown  • Anion gap metabolic acidosis--due to STEVEN/hypotension/ elevated lactate. Bicarb drip. Hemodynamic support.  • Acute urinary retention--PVR > 500. Place guillaume  • ? Sepsis--ATBx per primary team     Hyponatremia and acidosis slowly improving with Bicarb gtt that's  hypertonic.   Replace Ca and Mg.  Hyperkalemia resolved.  ARF/STEVEN slowly improving also.          Brenda Diamond MD  [facility]  [phone]  12/27/22  07:33 EST

## 2022-12-28 ENCOUNTER — ANESTHESIA (OUTPATIENT)
Dept: CARDIOLOGY | Facility: HOSPITAL | Age: 67
DRG: 853 | End: 2022-12-28
Payer: MEDICARE

## 2022-12-28 ENCOUNTER — APPOINTMENT (OUTPATIENT)
Dept: CARDIOLOGY | Facility: HOSPITAL | Age: 67
DRG: 853 | End: 2022-12-28
Payer: MEDICARE

## 2022-12-28 ENCOUNTER — ANESTHESIA EVENT (OUTPATIENT)
Dept: CARDIOLOGY | Facility: HOSPITAL | Age: 67
DRG: 853 | End: 2022-12-28
Payer: MEDICARE

## 2022-12-28 ENCOUNTER — ANESTHESIA (OUTPATIENT)
Dept: PERIOP | Facility: HOSPITAL | Age: 67
DRG: 853 | End: 2022-12-28
Payer: MEDICARE

## 2022-12-28 ENCOUNTER — ANESTHESIA EVENT (OUTPATIENT)
Dept: PERIOP | Facility: HOSPITAL | Age: 67
DRG: 853 | End: 2022-12-28
Payer: MEDICARE

## 2022-12-28 LAB
ABO GROUP BLD: NORMAL
ALBUMIN SERPL-MCNC: 2.4 G/DL (ref 3.5–5.2)
ALBUMIN/GLOB SERPL: 0.9 G/DL
ALP SERPL-CCNC: 83 U/L (ref 39–117)
ALT SERPL W P-5'-P-CCNC: 30 U/L (ref 1–41)
ANION GAP SERPL CALCULATED.3IONS-SCNC: 13 MMOL/L (ref 5–15)
APPEARANCE FLD: ABNORMAL
APPEARANCE FLD: ABNORMAL
AST SERPL-CCNC: 44 U/L (ref 1–40)
BACTERIA SPEC AEROBE CULT: ABNORMAL
BACTERIA SPEC AEROBE CULT: ABNORMAL
BH CV UPPER VENOUS LEFT INTERNAL JUGULAR AUGMENT: NORMAL
BH CV UPPER VENOUS LEFT INTERNAL JUGULAR COMPRESS: NORMAL
BH CV UPPER VENOUS LEFT INTERNAL JUGULAR PHASIC: NORMAL
BH CV UPPER VENOUS LEFT INTERNAL JUGULAR SPONT: NORMAL
BH CV UPPER VENOUS LEFT SUBCLAVIAN AUGMENT: NORMAL
BH CV UPPER VENOUS LEFT SUBCLAVIAN COMPRESS: NORMAL
BH CV UPPER VENOUS LEFT SUBCLAVIAN PHASIC: NORMAL
BH CV UPPER VENOUS LEFT SUBCLAVIAN SPONT: NORMAL
BH CV UPPER VENOUS RIGHT AXILLARY AUGMENT: NORMAL
BH CV UPPER VENOUS RIGHT AXILLARY COMPRESS: NORMAL
BH CV UPPER VENOUS RIGHT AXILLARY PHASIC: NORMAL
BH CV UPPER VENOUS RIGHT AXILLARY SPONT: NORMAL
BH CV UPPER VENOUS RIGHT BASILIC UPPER COMPRESS: NORMAL
BH CV UPPER VENOUS RIGHT BRACHIAL COMPRESS: NORMAL
BH CV UPPER VENOUS RIGHT CEPHALIC FOREARM COLOR: 1
BH CV UPPER VENOUS RIGHT CEPHALIC FOREARM COMPRESS: NORMAL
BH CV UPPER VENOUS RIGHT CEPHALIC FOREARM THROMBUS: NORMAL
BH CV UPPER VENOUS RIGHT CEPHALIC UPPER COMPRESS: NORMAL
BH CV UPPER VENOUS RIGHT INTERNAL JUGULAR AUGMENT: NORMAL
BH CV UPPER VENOUS RIGHT INTERNAL JUGULAR COMPRESS: NORMAL
BH CV UPPER VENOUS RIGHT INTERNAL JUGULAR PHASIC: NORMAL
BH CV UPPER VENOUS RIGHT INTERNAL JUGULAR SPONT: NORMAL
BH CV UPPER VENOUS RIGHT RADIAL COMPRESS: NORMAL
BH CV UPPER VENOUS RIGHT SUBCLAVIAN AUGMENT: NORMAL
BH CV UPPER VENOUS RIGHT SUBCLAVIAN COMPRESS: NORMAL
BH CV UPPER VENOUS RIGHT SUBCLAVIAN PHASIC: NORMAL
BH CV UPPER VENOUS RIGHT SUBCLAVIAN SPONT: NORMAL
BH CV UPPER VENOUS RIGHT ULNAR COMPRESS: NORMAL
BILIRUB SERPL-MCNC: 4.6 MG/DL (ref 0–1.2)
BLD GP AB SCN SERPL QL: NEGATIVE
BUN SERPL-MCNC: 59 MG/DL (ref 8–23)
BUN/CREAT SERPL: 43.4 (ref 7–25)
CA-I SERPL ISE-MCNC: 1.17 MMOL/L (ref 1.2–1.3)
CALCIUM SPEC-SCNC: 8.5 MG/DL (ref 8.6–10.5)
CHLORIDE SERPL-SCNC: 97 MMOL/L (ref 98–107)
CO2 SERPL-SCNC: 20 MMOL/L (ref 22–29)
COLOR FLD: YELLOW
COLOR FLD: YELLOW
CREAT SERPL-MCNC: 1.36 MG/DL (ref 0.76–1.27)
DEPRECATED RDW RBC AUTO: 50.8 FL (ref 37–54)
EGFRCR SERPLBLD CKD-EPI 2021: 57 ML/MIN/1.73
EOSINOPHIL # BLD MANUAL: 0.06 10*3/MM3 (ref 0–0.4)
EOSINOPHIL NFR BLD MANUAL: 1 % (ref 0.3–6.2)
ERYTHROCYTE [DISTWIDTH] IN BLOOD BY AUTOMATED COUNT: 15.4 % (ref 12.3–15.4)
ERYTHROCYTE [SEDIMENTATION RATE] IN BLOOD: 39 MM/HR (ref 0–20)
GLOBULIN UR ELPH-MCNC: 2.6 GM/DL
GLUCOSE BLDC GLUCOMTR-MCNC: 120 MG/DL (ref 70–105)
GLUCOSE BLDC GLUCOMTR-MCNC: 33 MG/DL (ref 70–105)
GLUCOSE BLDC GLUCOMTR-MCNC: 88 MG/DL (ref 70–105)
GLUCOSE BLDC GLUCOMTR-MCNC: 90 MG/DL (ref 70–105)
GLUCOSE SERPL-MCNC: 82 MG/DL (ref 65–99)
GRAM STN SPEC: ABNORMAL
HCT VFR BLD AUTO: 34.2 % (ref 37.5–51)
HGB BLD-MCNC: 11.5 G/DL (ref 13–17.7)
ISOLATED FROM: ABNORMAL
ISOLATED FROM: ABNORMAL
LYMPHOCYTES # BLD MANUAL: 0.06 10*3/MM3 (ref 0.7–3.1)
LYMPHOCYTES NFR BLD MANUAL: 1 % (ref 5–12)
LYMPHOCYTES NFR FLD MANUAL: 19 %
LYMPHOCYTES NFR FLD MANUAL: 7 %
MAGNESIUM SERPL-MCNC: 2.1 MG/DL (ref 1.6–2.4)
MAXIMAL PREDICTED HEART RATE: 153 BPM
MCH RBC QN AUTO: 32.3 PG (ref 26.6–33)
MCHC RBC AUTO-ENTMCNC: 33.8 G/DL (ref 31.5–35.7)
MCV RBC AUTO: 95.7 FL (ref 79–97)
MESOTHL CELL NFR FLD MANUAL: 2 %
METAMYELOCYTES NFR BLD MANUAL: 2 % (ref 0–0)
METHOD: ABNORMAL
METHOD: ABNORMAL
MONOCYTES # BLD: 0.06 10*3/MM3 (ref 0.1–0.9)
MONOCYTES NFR FLD: 2 %
MONOCYTES NFR FLD: 5 %
MYELOCYTES NFR BLD MANUAL: 1 % (ref 0–0)
NEUTROPHILS # BLD AUTO: 5.83 10*3/MM3 (ref 1.7–7)
NEUTROPHILS NFR BLD MANUAL: 72 % (ref 42.7–76)
NEUTROPHILS NFR FLD MANUAL: 74 %
NEUTROPHILS NFR FLD MANUAL: 91 %
NEUTS BAND NFR BLD MANUAL: 22 % (ref 0–5)
NEUTS VAC BLD QL SMEAR: ABNORMAL
NUC CELL # FLD: ABNORMAL /MM3
NUC CELL # FLD: ABNORMAL /MM3
PHOSPHATE SERPL-MCNC: 2.9 MG/DL (ref 2.5–4.5)
PLATELET # BLD AUTO: 33 10*3/MM3 (ref 140–450)
PMV BLD AUTO: 7.9 FL (ref 6–12)
POTASSIUM SERPL-SCNC: 3.9 MMOL/L (ref 3.5–5.2)
PROT SERPL-MCNC: 5 G/DL (ref 6–8.5)
RBC # BLD AUTO: 3.57 10*6/MM3 (ref 4.14–5.8)
RBC MORPH BLD: NORMAL
RH BLD: POSITIVE
SCAN SLIDE: NORMAL
SMALL PLATELETS BLD QL SMEAR: ABNORMAL
SODIUM SERPL-SCNC: 130 MMOL/L (ref 136–145)
STRESS TARGET HR: 130 BPM
T&S EXPIRATION DATE: NORMAL
TOXIC GRANULATION: ABNORMAL
VARIANT LYMPHS NFR BLD MANUAL: 1 % (ref 19.6–45.3)
WBC NRBC COR # BLD: 6.2 10*3/MM3 (ref 3.4–10.8)

## 2022-12-28 PROCEDURE — 82962 GLUCOSE BLOOD TEST: CPT

## 2022-12-28 PROCEDURE — 87147 CULTURE TYPE IMMUNOLOGIC: CPT | Performed by: ORTHOPAEDIC SURGERY

## 2022-12-28 PROCEDURE — 0R9K3ZZ DRAINAGE OF LEFT SHOULDER JOINT, PERCUTANEOUS APPROACH: ICD-10-PCS | Performed by: ORTHOPAEDIC SURGERY

## 2022-12-28 PROCEDURE — 29822 SHO ARTHRS SRG LMTD DBRDMT: CPT

## 2022-12-28 PROCEDURE — 87186 SC STD MICRODIL/AGAR DIL: CPT | Performed by: ORTHOPAEDIC SURGERY

## 2022-12-28 PROCEDURE — 25010000002 PHENYLEPHRINE 10 MG/ML SOLUTION 5 ML VIAL: Performed by: NURSE PRACTITIONER

## 2022-12-28 PROCEDURE — 94799 UNLISTED PULMONARY SVC/PX: CPT

## 2022-12-28 PROCEDURE — 80053 COMPREHEN METABOLIC PANEL: CPT | Performed by: NURSE PRACTITIONER

## 2022-12-28 PROCEDURE — P9100 PATHOGEN TEST FOR PLATELETS: HCPCS

## 2022-12-28 PROCEDURE — 86901 BLOOD TYPING SEROLOGIC RH(D): CPT | Performed by: ANESTHESIOLOGY

## 2022-12-28 PROCEDURE — 25010000002 PROPOFOL 200 MG/20ML EMULSION: Performed by: ANESTHESIOLOGY

## 2022-12-28 PROCEDURE — 0R9J3ZZ DRAINAGE OF RIGHT SHOULDER JOINT, PERCUTANEOUS APPROACH: ICD-10-PCS | Performed by: ORTHOPAEDIC SURGERY

## 2022-12-28 PROCEDURE — 25010000002 CEFTRIAXONE PER 250 MG: Performed by: INTERNAL MEDICINE

## 2022-12-28 PROCEDURE — 93971 EXTREMITY STUDY: CPT

## 2022-12-28 PROCEDURE — 25010000002 HEPARIN (PORCINE) 1000-0.9 UT/500ML-% SOLUTION: Performed by: ANESTHESIOLOGY

## 2022-12-28 PROCEDURE — 25010000002 MIDAZOLAM PER 1 MG

## 2022-12-28 PROCEDURE — 93320 DOPPLER ECHO COMPLETE: CPT | Performed by: INTERNAL MEDICINE

## 2022-12-28 PROCEDURE — 93312 ECHO TRANSESOPHAGEAL: CPT

## 2022-12-28 PROCEDURE — 25010000002 CALCIUM GLUCONATE 2-0.675 GM/100ML-% SOLUTION: Performed by: INTERNAL MEDICINE

## 2022-12-28 PROCEDURE — 29822 SHO ARTHRS SRG LMTD DBRDMT: CPT | Performed by: ORTHOPAEDIC SURGERY

## 2022-12-28 PROCEDURE — 93010 ELECTROCARDIOGRAM REPORT: CPT | Performed by: INTERNAL MEDICINE

## 2022-12-28 PROCEDURE — 86900 BLOOD TYPING SEROLOGIC ABO: CPT

## 2022-12-28 PROCEDURE — 25010000002 EPINEPHRINE PER 0.1 MG: Performed by: ORTHOPAEDIC SURGERY

## 2022-12-28 PROCEDURE — 87205 SMEAR GRAM STAIN: CPT | Performed by: ORTHOPAEDIC SURGERY

## 2022-12-28 PROCEDURE — 0RBK4ZZ EXCISION OF LEFT SHOULDER JOINT, PERCUTANEOUS ENDOSCOPIC APPROACH: ICD-10-PCS | Performed by: ORTHOPAEDIC SURGERY

## 2022-12-28 PROCEDURE — 86900 BLOOD TYPING SEROLOGIC ABO: CPT | Performed by: ANESTHESIOLOGY

## 2022-12-28 PROCEDURE — 36430 TRANSFUSION BLD/BLD COMPNT: CPT

## 2022-12-28 PROCEDURE — 0RBJ4ZZ EXCISION OF RIGHT SHOULDER JOINT, PERCUTANEOUS ENDOSCOPIC APPROACH: ICD-10-PCS | Performed by: ORTHOPAEDIC SURGERY

## 2022-12-28 PROCEDURE — 83735 ASSAY OF MAGNESIUM: CPT | Performed by: NURSE PRACTITIONER

## 2022-12-28 PROCEDURE — 93312 ECHO TRANSESOPHAGEAL: CPT | Performed by: INTERNAL MEDICINE

## 2022-12-28 PROCEDURE — 93325 DOPPLER ECHO COLOR FLOW MAPG: CPT | Performed by: INTERNAL MEDICINE

## 2022-12-28 PROCEDURE — 93325 DOPPLER ECHO COLOR FLOW MAPG: CPT

## 2022-12-28 PROCEDURE — 85652 RBC SED RATE AUTOMATED: CPT | Performed by: NURSE PRACTITIONER

## 2022-12-28 PROCEDURE — 89051 BODY FLUID CELL COUNT: CPT | Performed by: ORTHOPAEDIC SURGERY

## 2022-12-28 PROCEDURE — 82330 ASSAY OF CALCIUM: CPT | Performed by: NURSE PRACTITIONER

## 2022-12-28 PROCEDURE — 86901 BLOOD TYPING SEROLOGIC RH(D): CPT

## 2022-12-28 PROCEDURE — 85007 BL SMEAR W/DIFF WBC COUNT: CPT | Performed by: NURSE PRACTITIONER

## 2022-12-28 PROCEDURE — 29823 SHO ARTHRS SRG XTNSV DBRDMT: CPT | Performed by: ORTHOPAEDIC SURGERY

## 2022-12-28 PROCEDURE — 86850 RBC ANTIBODY SCREEN: CPT | Performed by: ANESTHESIOLOGY

## 2022-12-28 PROCEDURE — 25010000002 FENTANYL CITRATE (PF) 50 MCG/ML SOLUTION

## 2022-12-28 PROCEDURE — 5A1945Z RESPIRATORY VENTILATION, 24-96 CONSECUTIVE HOURS: ICD-10-PCS | Performed by: ORTHOPAEDIC SURGERY

## 2022-12-28 PROCEDURE — 93320 DOPPLER ECHO COMPLETE: CPT

## 2022-12-28 PROCEDURE — B24BZZ4 ULTRASONOGRAPHY OF HEART WITH AORTA, TRANSESOPHAGEAL: ICD-10-PCS | Performed by: INTERNAL MEDICINE

## 2022-12-28 PROCEDURE — 25010000002 FENTANYL CITRATE (PF) 100 MCG/2ML SOLUTION: Performed by: ANESTHESIOLOGY

## 2022-12-28 PROCEDURE — 99221 1ST HOSP IP/OBS SF/LOW 40: CPT | Performed by: ORTHOPAEDIC SURGERY

## 2022-12-28 PROCEDURE — P9035 PLATELET PHERES LEUKOREDUCED: HCPCS

## 2022-12-28 PROCEDURE — 93005 ELECTROCARDIOGRAM TRACING: CPT | Performed by: NURSE PRACTITIONER

## 2022-12-28 PROCEDURE — 84100 ASSAY OF PHOSPHORUS: CPT | Performed by: NURSE PRACTITIONER

## 2022-12-28 PROCEDURE — 85025 COMPLETE CBC W/AUTO DIFF WBC: CPT | Performed by: NURSE PRACTITIONER

## 2022-12-28 PROCEDURE — 25010000002 MIDAZOLAM PER 1 MG: Performed by: ANESTHESIOLOGY

## 2022-12-28 PROCEDURE — 87070 CULTURE OTHR SPECIMN AEROBIC: CPT | Performed by: ORTHOPAEDIC SURGERY

## 2022-12-28 PROCEDURE — 29823 SHO ARTHRS SRG XTNSV DBRDMT: CPT

## 2022-12-28 PROCEDURE — 25010000002 PROPOFOL 10 MG/ML EMULSION: Performed by: ANESTHESIOLOGY

## 2022-12-28 RX ORDER — MIDAZOLAM HYDROCHLORIDE 1 MG/ML
INJECTION INTRAMUSCULAR; INTRAVENOUS
Status: COMPLETED
Start: 2022-12-28 | End: 2022-12-28

## 2022-12-28 RX ORDER — FENTANYL CITRATE 50 UG/ML
INJECTION, SOLUTION INTRAMUSCULAR; INTRAVENOUS AS NEEDED
Status: DISCONTINUED | OUTPATIENT
Start: 2022-12-28 | End: 2022-12-28 | Stop reason: SURG

## 2022-12-28 RX ORDER — MIDAZOLAM HYDROCHLORIDE 1 MG/ML
1 INJECTION INTRAMUSCULAR; INTRAVENOUS ONCE
Status: COMPLETED | OUTPATIENT
Start: 2022-12-28 | End: 2022-12-28

## 2022-12-28 RX ORDER — FENTANYL CITRATE 50 UG/ML
50 INJECTION, SOLUTION INTRAMUSCULAR; INTRAVENOUS
Status: CANCELLED | OUTPATIENT
Start: 2022-12-28

## 2022-12-28 RX ORDER — MIDAZOLAM HYDROCHLORIDE 1 MG/ML
INJECTION INTRAMUSCULAR; INTRAVENOUS AS NEEDED
Status: DISCONTINUED | OUTPATIENT
Start: 2022-12-28 | End: 2022-12-28 | Stop reason: SURG

## 2022-12-28 RX ORDER — FENTANYL CITRATE 50 UG/ML
25 INJECTION, SOLUTION INTRAMUSCULAR; INTRAVENOUS
Status: CANCELLED | OUTPATIENT
Start: 2022-12-28

## 2022-12-28 RX ORDER — MIDAZOLAM HYDROCHLORIDE 1 MG/ML
2 INJECTION INTRAMUSCULAR; INTRAVENOUS ONCE
Status: COMPLETED | OUTPATIENT
Start: 2022-12-28 | End: 2022-12-28

## 2022-12-28 RX ORDER — CALCIUM GLUCONATE 20 MG/ML
2 INJECTION, SOLUTION INTRAVENOUS ONCE
Status: COMPLETED | OUTPATIENT
Start: 2022-12-28 | End: 2022-12-28

## 2022-12-28 RX ORDER — PHENYLEPHRINE HCL IN 0.9% NACL 1 MG/10 ML
SYRINGE (ML) INTRAVENOUS AS NEEDED
Status: DISCONTINUED | OUTPATIENT
Start: 2022-12-28 | End: 2022-12-28 | Stop reason: SURG

## 2022-12-28 RX ORDER — FENTANYL CITRATE 50 UG/ML
25 INJECTION, SOLUTION INTRAMUSCULAR; INTRAVENOUS ONCE
Status: COMPLETED | OUTPATIENT
Start: 2022-12-28 | End: 2022-12-28

## 2022-12-28 RX ORDER — KETAMINE HCL IN NACL, ISO-OSM 100MG/10ML
SYRINGE (ML) INJECTION AS NEEDED
Status: DISCONTINUED | OUTPATIENT
Start: 2022-12-28 | End: 2022-12-28 | Stop reason: SURG

## 2022-12-28 RX ORDER — IPRATROPIUM BROMIDE AND ALBUTEROL SULFATE 2.5; .5 MG/3ML; MG/3ML
3 SOLUTION RESPIRATORY (INHALATION) ONCE AS NEEDED
Status: CANCELLED | OUTPATIENT
Start: 2022-12-28

## 2022-12-28 RX ORDER — ROCURONIUM BROMIDE 10 MG/ML
INJECTION, SOLUTION INTRAVENOUS AS NEEDED
Status: DISCONTINUED | OUTPATIENT
Start: 2022-12-28 | End: 2022-12-28 | Stop reason: SURG

## 2022-12-28 RX ORDER — HEPARIN SODIUM 200 [USP'U]/100ML
INJECTION, SOLUTION INTRAVENOUS
Status: COMPLETED | OUTPATIENT
Start: 2022-12-28 | End: 2022-12-28

## 2022-12-28 RX ORDER — FENTANYL CITRATE 50 UG/ML
INJECTION, SOLUTION INTRAMUSCULAR; INTRAVENOUS
Status: COMPLETED
Start: 2022-12-28 | End: 2022-12-28

## 2022-12-28 RX ORDER — SODIUM CHLORIDE, SODIUM LACTATE, POTASSIUM CHLORIDE, CALCIUM CHLORIDE 600; 310; 30; 20 MG/100ML; MG/100ML; MG/100ML; MG/100ML
INJECTION, SOLUTION INTRAVENOUS CONTINUOUS PRN
Status: DISCONTINUED | OUTPATIENT
Start: 2022-12-28 | End: 2022-12-28 | Stop reason: SURG

## 2022-12-28 RX ORDER — PROPOFOL 10 MG/ML
INJECTION, EMULSION INTRAVENOUS AS NEEDED
Status: DISCONTINUED | OUTPATIENT
Start: 2022-12-28 | End: 2022-12-28 | Stop reason: SURG

## 2022-12-28 RX ORDER — MIDAZOLAM HYDROCHLORIDE 1 MG/ML
1 INJECTION INTRAMUSCULAR; INTRAVENOUS
Status: CANCELLED | OUTPATIENT
Start: 2022-12-28

## 2022-12-28 RX ADMIN — CEFTRIAXONE 2 G: 2 INJECTION, POWDER, FOR SOLUTION INTRAMUSCULAR; INTRAVENOUS at 21:04

## 2022-12-28 RX ADMIN — MIDAZOLAM HYDROCHLORIDE 2 MG: 1 INJECTION INTRAMUSCULAR; INTRAVENOUS at 13:29

## 2022-12-28 RX ADMIN — HEPARIN SODIUM 1000 UNITS: 200 INJECTION, SOLUTION INTRAVENOUS at 19:29

## 2022-12-28 RX ADMIN — Medication 25 MG: at 19:52

## 2022-12-28 RX ADMIN — CALCIUM GLUCONATE 2 G: 20 INJECTION, SOLUTION INTRAVENOUS at 11:07

## 2022-12-28 RX ADMIN — FENTANYL CITRATE 25 MCG: 50 INJECTION, SOLUTION INTRAMUSCULAR; INTRAVENOUS at 13:26

## 2022-12-28 RX ADMIN — PROPOFOL INJECTABLE EMULSION 5 MCG/KG/MIN: 10 INJECTION, EMULSION INTRAVENOUS at 20:53

## 2022-12-28 RX ADMIN — MIDAZOLAM HYDROCHLORIDE 2 MG: 1 INJECTION, SOLUTION INTRAMUSCULAR; INTRAVENOUS at 19:59

## 2022-12-28 RX ADMIN — MIDAZOLAM HYDROCHLORIDE 1 MG: 1 INJECTION INTRAMUSCULAR; INTRAVENOUS at 13:29

## 2022-12-28 RX ADMIN — MIDAZOLAM HYDROCHLORIDE 2 MG: 1 INJECTION, SOLUTION INTRAMUSCULAR; INTRAVENOUS at 13:29

## 2022-12-28 RX ADMIN — PHENYLEPHRINE HYDROCHLORIDE 0.5 MCG/KG/MIN: 10 INJECTION INTRAVENOUS at 05:17

## 2022-12-28 RX ADMIN — Medication 10 ML: at 21:00

## 2022-12-28 RX ADMIN — FENTANYL CITRATE 100 MCG: 50 INJECTION, SOLUTION INTRAMUSCULAR; INTRAVENOUS at 19:08

## 2022-12-28 RX ADMIN — DEXTROSE MONOHYDRATE 25 G: 25 INJECTION, SOLUTION INTRAVENOUS at 23:35

## 2022-12-28 RX ADMIN — Medication 25 MG: at 19:37

## 2022-12-28 RX ADMIN — Medication 200 MCG: at 18:43

## 2022-12-28 RX ADMIN — Medication 10 ML: at 09:08

## 2022-12-28 RX ADMIN — ROCURONIUM BROMIDE 50 MG: 10 INJECTION, SOLUTION INTRAVENOUS at 18:38

## 2022-12-28 RX ADMIN — MIDAZOLAM HYDROCHLORIDE 1 MG: 1 INJECTION, SOLUTION INTRAMUSCULAR; INTRAVENOUS at 13:29

## 2022-12-28 RX ADMIN — PROPOFOL 100 MG: 10 INJECTION, EMULSION INTRAVENOUS at 18:38

## 2022-12-28 RX ADMIN — SODIUM CHLORIDE, SODIUM LACTATE, POTASSIUM CHLORIDE, AND CALCIUM CHLORIDE: .6; .31; .03; .02 INJECTION, SOLUTION INTRAVENOUS at 18:27

## 2022-12-28 RX ADMIN — PANTOPRAZOLE SODIUM 40 MG: 40 INJECTION, POWDER, FOR SOLUTION INTRAVENOUS at 05:17

## 2022-12-28 NOTE — PROGRESS NOTES
Infectious Diseases Progress Note      LOS: 2 days   Patient Care Team:  Provider, No Known as PCP - Kyle Gardner MD as Consulting Physician (Nephrology)    Chief Complaint: Confusion    Subjective     The patient had no fever during the last 24 hours.  He is hemodynamically stable currently on no vasopressors.  He is very lethargic.  He is s/p aspiration of the right and left shoulders yesterday    Review of Systems:   Review of Systems   Unable to perform ROS: Mental status change   Musculoskeletal: Positive for joint swelling.        Objective     Vital Signs  Temp:  [98 °F (36.7 °C)-99.4 °F (37.4 °C)] 98 °F (36.7 °C)  Heart Rate:  [] 93  Resp:  [17] 17  BP: ()/() 112/63    Physical Exam:  Physical Exam  Constitutional:       Comments: Very lethargic   HENT:      Head: Normocephalic and atraumatic.   Eyes:      Pupils: Pupils are equal, round, and reactive to light.   Cardiovascular:      Rate and Rhythm: Normal rate and regular rhythm.   Pulmonary:      Effort: Pulmonary effort is normal.      Breath sounds: Normal breath sounds.   Abdominal:      General: Abdomen is flat. Bowel sounds are normal.      Palpations: Abdomen is soft.   Musculoskeletal:      Cervical back: Neck supple.      Right lower leg: No edema.      Left lower leg: No edema.      Comments: Bilateral shoulder effusion   Neurological:      Mental Status: He is oriented to person, place, and time.          Results Review:    I have reviewed all clinical data, test, lab, and imaging results.     Radiology  MRI Shoulder Right Without Contrast    Result Date: 12/28/2022  MRI SHOULDER RIGHT WO CONTRAST Date of Exam: 12/27/2022 3:21 PM EST Indication: Probable right shoulder septic arthritis.  Comparison: Radiograph 12/27/2022 Technique:  Routine multiplanar/multisequence images of the right shoulder were obtained without contrast administration.  Findings: RC/Biceps Tendons:  Limited evaluation due to motion artifact  and technique. Post surgical changes are seen related to previous rotator cuff repair. Suspected complete tears of the supraspinatus and infraspinatus tendons present. The subscapularis and teres minor tendons are not well evaluated. AC Joints: The acromioclavicular joint appears anatomically aligned. Moderate degenerative changes are present. GH Joint and Labrum: The glenohumeral joint appears anatomically aligned.  A moderate-sized effusion is present. Destructive changes are seen within the glenohumeral joint with severe degenerative changes and deformity of the glenoid. The labrum is completely macerated. No significant intra-articular body identified. Significant subcoracoid bursitis is present. Bones: No evidence of fracture. Patchy edema is present throughout the humeral head and the glenoid. Soft Tissue:  There is no obvious soft tissue swelling.     Impression: 1. Moderate-sized joint effusion with advanced degenerative changes and destructive changes of the glenohumeral joint. Findings may be related to septic arthritis given clinical history. Fluid aspiration is recommended for confirmation. Joint effusion with subcoracoid bursitis may also be reactive and related to severe osteoarthritis. 2. Suspected complete tears of the supraspinatus and infraspinatus tendons. 3. Completely macerated appearance of the labrum. 4. Moderate acromial clavicular osteoarthritis. Electronically Signed: Linnette Pacheco  12/28/2022 10:08 AM EST  Workstation ID: [id number]    MRI Shoulder Left Without Contrast    Result Date: 12/28/2022  MRI SHOULDER LEFT WO CONTRAST Date of Exam: 12/27/2022 3:50 PM EST Indication: Possible left shoulder septic arthritis.  Comparison: Radiograph 12/27/2022 Technique:  Routine multiplanar/multisequence images of the left shoulder were obtained without contrast administration.  Findings: Limited evaluation as only 4 sequences were obtained. Diffuse muscle atrophy is present. Limited evaluation of the  rotator cuff due to technique. Suspected complete tears of the supraspinatus, infraspinatus and possibly subscapularis tendons are present.  The biceps tendon is not visualized. AC Joints: The acromial clavicular joint is not well evaluated. GH Joint and Labrum: There is a very large glenohumeral joint effusion identified. Susceptibility artifact is present within the humeral head likely related to previous rotator cuff repair. Deformity of the glenoid is present with probable destructive changes. The labrum is not well evaluated. Bones: Limited evaluation of the bone marrow signal is limited sequences were obtained. No evidence of fracture. No significant focal edema identified. Soft Tissue:  There is no obvious soft tissue swelling.     Impression: Very large left-sided joint effusion with destructive changes of the glenohumeral joint with deformity of the glenoid likely related to septic arthritis. Fluid sampling is recommended for further characterization. Ancillary findings as described above. Electronically Signed: Linnette Pacheco  12/28/2022 10:06 AM EST  Workstation ID: [id number]    Duplex Venous Upper Extremity - Right CAR    Result Date: 12/28/2022  •  Acute right upper extremity superficial thrombophlebitis noted in the cephalic (forearm). •  All other right sided vessels appear normal. •  No significant change when compared to the previous study.       Cardiology    Laboratory    Results from last 7 days   Lab Units 12/28/22  0839 12/27/22  0557 12/26/22  0435   WBC 10*3/mm3 6.20 9.10 7.20   HEMOGLOBIN g/dL 11.5* 11.0* 12.4*   HEMATOCRIT % 34.2* 33.0* 38.0   PLATELETS 10*3/mm3 33* 66* 99*     Results from last 7 days   Lab Units 12/28/22  0839 12/27/22  0742 12/26/22  1641 12/26/22  0435   SODIUM mmol/L 130* 125* 122* 118*   POTASSIUM mmol/L 3.9 4.3 4.3 5.4*   CHLORIDE mmol/L 97* 92* 91* 84*   CO2 mmol/L 20.0* 21.0* 19.0* 16.0*   BUN mg/dL 59* 64* 58* 55*   CREATININE mg/dL 1.36* 2.25* 2.45* 2.38*   GLUCOSE  mg/dL 82 107* 87 75   ALBUMIN g/dL 2.4*  --   --  3.30*   BILIRUBIN mg/dL 4.6*  --   --  4.8*   ALK PHOS U/L 83  --   --  83   AST (SGOT) U/L 44*  --   --  26   ALT (SGPT) U/L 30  --   --  17   CALCIUM mg/dL 8.5* 7.8* 7.7* 8.2*     Results from last 7 days   Lab Units 12/26/22  0435   CK TOTAL U/L 166     Results from last 7 days   Lab Units 12/28/22  0839   SED RATE mm/hr 39*         Microbiology   Microbiology Results (last 10 days)     Procedure Component Value - Date/Time    Body Fluid Culture - Body Fluid, Shoulder, Right [[id number]] Collected: 12/28/22 0713    Lab Status: Preliminary result Specimen: Body Fluid from Shoulder, Right Updated: 12/28/22 0956     Body Fluid Culture Abnormal Stain     Gram Stain Many (4+) WBCs seen      Many (4+) Gram positive cocci in clusters    Blood Culture - Blood, Arm, Left [[id number]]  (Normal) Collected: 12/27/22 1300    Lab Status: Preliminary result Specimen: Blood from Arm, Left Updated: 12/28/22 1316     Blood Culture No growth at 24 hours    Narrative:      Less than seven (7) mL's of blood was collected.  Insufficient quantity may yield false negative results.    Blood Culture - Blood, Hand, Right [[id number]]  (Abnormal)  (Susceptibility) Collected: 12/26/22 0435    Lab Status: Final result Specimen: Blood from Hand, Right Updated: 12/28/22 0657     Blood Culture Staphylococcus aureus     Comment:   Infectious disease consultation is highly recommended to rule out distant foci of infection.        Isolated from Aerobic and Anaerobic Bottles     Gram Stain Aerobic Bottle Gram positive cocci in clusters      Anaerobic Bottle Gram positive cocci in clusters    Narrative:      Less than seven (7) mL's of blood was collected.  Insufficient quantity may yield false negative results.    Susceptibility      Staphylococcus aureus      PARMINDER      Gentamicin Susceptible      Oxacillin Susceptible      Rifampin Susceptible      Vancomycin Susceptible                        Susceptibility Comments     Staphylococcus aureus    This isolate does not demonstrate inducible clindamycin resistance in vitro.               Blood Culture ID, PCR - Blood, Hand, Right [[id number]]  (Abnormal) Collected: 12/26/22 0435    Lab Status: Final result Specimen: Blood from Hand, Right Updated: 12/26/22 2054     BCID, PCR Staph aureus. mecA/C and MREJ (methicillin resistance gene) NOT detected. Identification by BCID2 PCR     BOTTLE TYPE Aerobic Bottle    Narrative:      Infectious disease consultation is highly recommended to rule out distant foci of infection.    Blood Culture - Blood, Hand, Left [[id number]]  (Abnormal) Collected: 12/26/22 0434    Lab Status: Final result Specimen: Blood from Hand, Left Updated: 12/28/22 0657     Blood Culture Staphylococcus aureus     Comment:   Infectious disease consultation is highly recommended to rule out distant foci of infection.        Isolated from Aerobic and Anaerobic Bottles     Gram Stain Aerobic Bottle Gram positive cocci in clusters      Anaerobic Bottle Gram positive cocci in clusters    Narrative:      Refer to previous blood culture collected on 12/26/2022 0435 for MICs    Less than seven (7) mL's of blood was collected.  Insufficient quantity may yield false negative results.    MRSA Screen, PCR (Inpatient) - Swab, Nares [[id number]]  (Normal) Collected: 12/26/22 0307    Lab Status: Final result Specimen: Swab from Nares Updated: 12/26/22 0507     MRSA PCR No MRSA Detected    Narrative:      The negative predictive value of this diagnostic test is high and should only be used to consider de-escalating anti-MRSA therapy. A positive result may indicate colonization with MRSA and must be correlated clinically.          Medication Review:       Schedule Meds  cefTRIAXone, 2 g, Intravenous, Q24H  midodrine, 10 mg, Oral, TID AC  pantoprazole, 40 mg, Intravenous, Q AM  sodium chloride, 10 mL, Intravenous, Q12H        Infusion Meds  phenylephrine, 0.5-3  mcg/kg/min, Last Rate: Stopped (12/28/22 0526)        PRN Meds  •  acetaminophen **OR** acetaminophen  •  aluminum-magnesium hydroxide-simethicone  •  dextrose  •  dextrose  •  glucagon (human recombinant)  •  Morphine  •  ondansetron **OR** ondansetron  •  sodium chloride  •  sodium chloride  •  sodium chloride        Assessment & Plan       Antimicrobial Therapy   1.  IV ceftriaxone        2.        3.        4.        5.            Assessment    Bilateral shoulder septic arthritis.  Most likely caused by Staph aureus.  Patient s/p aspiration of the right and left shoulders on December 28, 2022 and fluid analysis are consistent with septic arthritis    Staph aureus bacteremia.  Blood cultures were positive x2 sets on admission on December 26, 2022.  Repeat blood culture from December 27, 2022 are negative so far    Mild septic shock.  Currently off vasopressors      Plan    Continue IV ceftriaxone 2 g daily for 6 weeks  The patient is scheduled for bilateral shoulder washout later today by orthopedic surgery service  Continue supportive care  Dennis Delacruz MD  12/28/22  14:03 EST    Note is dictated utilizing voice recognition software/Dragon

## 2022-12-28 NOTE — ANESTHESIA PROCEDURE NOTES
Airway  Urgency: elective    Date/Time: 12/28/2022 6:40 PM  End Time:12/28/2022 6:40 PM  Airway not difficult    General Information and Staff    Patient location during procedure: OR  Anesthesiologist: Los Prasad MD    Indications and Patient Condition  Indications for airway management: airway protection    Preoxygenated: yes  MILS maintained throughout  Mask difficulty assessment: 1 - vent by mask    Final Airway Details  Final airway type: endotracheal airway      Successful airway: ETT  Cuffed: yes   Successful intubation technique: direct laryngoscopy  Endotracheal tube insertion site: oral  Blade: Martinez  Blade size: 4  ETT size (mm): 8.0  Cormack-Lehane Classification: grade I - full view of glottis  Placement verified by: chest auscultation and capnometry   Measured from: teeth  ETT/EBT  to teeth (cm): 24  Number of attempts at approach: 1  Assessment: lips, teeth, and gum same as pre-op and atraumatic intubation    Additional Comments  X1 WITH EASE. USING MARTINEZ 4. ATRAUMATIC.  TEETH IN PREOP CONDITION.  CUFF TO MINIMUM OCCLUSIVE CUFF PRESSURE. POS ETCO2. BS=BS. GAUZE BITE BLOCK

## 2022-12-28 NOTE — PROGRESS NOTES
[facility] Critical Care Medicine Services   Daily ICU Progress Note    Patient Name: Jaime Bar  : 1955  MRN: [id number]  Primary Care Physician:  Provider, No Known  Date of admission: 2022  Date of Service: 2022      Subjective      Interval History:    Patient seen and examined this morning  Off pressors today  Of amiodarone drip  Still lethargic confused      Objective      Vitals:   Temp:  [98.2 °F (36.8 °C)-99.4 °F (37.4 °C)] 98.2 °F (36.8 °C)  Heart Rate:  [] 93  Resp:  [17] 17  BP: ()/() 112/63  Flow (L/min):  [2] 2    Hemodynamic Parameters for last 24 hours       Input/Output:    Intake/Output Summary (Last 24 hours) at 2022 1122  Last data filed at 2022 0600  Gross per 24 hour   Intake 1262.12 ml   Output 1800 ml   Net -537.88 ml       PHYSICAL EXAM:    General: Lethargic, Mild distress  Eyes:  Pupils are equal, round and reactive to light, Extraocular movements are intacts, Normal conjunctiva, vision unchanged    HENT:  Normocephalic, atraumatic   Respiratory: Decrease breathing sounds anteriorly bilaterally, No wheezing  Cardiovascular: Regular rate, Regular, S1 auscultate, S2 auscultated , No edema   Gastrointestinal: Soft, Non-tender, Non-distended, Normal bowel sounds  Musculoskeletal: No tenderness   Neurologic: Lethargic, No focal defect   Psychiatric: Unable to assess now       MEDS:  Medication Administration Record reviewed today    Scheduled meds:    cefTRIAXone, 2 g, Intravenous, Q24H  midodrine, 10 mg, Oral, TID AC  pantoprazole, 40 mg, Intravenous, Q AM  sodium chloride, 10 mL, Intravenous, Q12H          Drips:    phenylephrine, 0.5-3 mcg/kg/min, Last Rate: Stopped (22)        PRNs Meds:    •  acetaminophen **OR** acetaminophen  •  aluminum-magnesium hydroxide-simethicone  •  dextrose  •  dextrose  •  glucagon (human recombinant)  •  Morphine  •  ondansetron **OR** ondansetron  •  sodium chloride  •  sodium chloride  •   sodium chloride        Labs Results:    No results found for: PHVEN, PSV7HHC, PO2VEN, YGL4SAG, U6AYRALH    Lab Results   Component Value Date    LACTATE 1.9 12/27/2022    LACTATE 1.9 12/26/2022    LACTATE 2.5 (C) 12/26/2022       @(TSH)@    Lab Results   Component Value Date    WBC 6.20 12/28/2022    HGB 11.5 (L) 12/28/2022    PLT 33 (C) 12/28/2022        Lab Results   Component Value Date     (L) 12/28/2022    K 3.9 12/28/2022    CL 97 (L) 12/28/2022    CO2 20.0 (L) 12/28/2022     Lab Results   Component Value Date    BUN 59 (H) 12/28/2022     No components found for: CREATINNE  Lab Results   Component Value Date    GLUCOSE 82 12/28/2022       @RESUFAST(BNP,PT,INR,PTT)    Lab Results   Component Value Date    ALT 30 12/28/2022    AST 44 (H) 12/28/2022    ALKPHOS 83 12/28/2022    BILITOT 4.6 (H) 12/28/2022    ALBUMIN 2.4 (L) 12/28/2022       Lab Results   Component Value Date    BLOODCX Staphylococcus aureus (C) 12/26/2022       No results found for: URINECX            IMAGES:  MRI Shoulder Left Without Contrast  Narrative: MRI SHOULDER LEFT WO CONTRAST    Date of Exam: 12/27/2022 3:50 PM EST    Indication: Possible left shoulder septic arthritis.     Comparison: Radiograph 12/27/2022    Technique:  Routine multiplanar/multisequence images of the left shoulder were obtained without contrast administration.      Findings:  Limited evaluation as only 4 sequences were obtained. Diffuse muscle atrophy is present. Limited evaluation of the rotator cuff due to technique. Suspected complete tears of the supraspinatus, infraspinatus and possibly subscapularis tendons are present.   The biceps tendon is not visualized.    AC Joints:  The acromial clavicular joint is not well evaluated.    GH Joint and Labrum:  There is a very large glenohumeral joint effusion identified. Susceptibility artifact is present within the humeral head likely related to previous rotator cuff repair. Deformity of the glenoid is present with  probable destructive changes. The labrum is   not well evaluated.    Bones:  Limited evaluation of the bone marrow signal is limited sequences were obtained. No evidence of fracture. No significant focal edema identified.    Soft Tissue:   There is no obvious soft tissue swelling.  Impression: Impression:  Very large left-sided joint effusion with destructive changes of the glenohumeral joint with deformity of the glenoid likely related to septic arthritis. Fluid sampling is recommended for further characterization. Ancillary findings as described above.    Electronically Signed: Linnette Pacheco    12/28/2022 10:06 AM EST    Workstation ID: [id number]  MRI Shoulder Right Without Contrast  Narrative: MRI SHOULDER RIGHT WO CONTRAST    Date of Exam: 12/27/2022 3:21 PM EST    Indication: Probable right shoulder septic arthritis.     Comparison: Radiograph 12/27/2022    Technique:  Routine multiplanar/multisequence images of the right shoulder were obtained without contrast administration.      Findings:  RC/Biceps Tendons:   Limited evaluation due to motion artifact and technique. Post surgical changes are seen related to previous rotator cuff repair. Suspected complete tears of the supraspinatus and infraspinatus tendons present. The subscapularis and teres minor tendons   are not well evaluated.    AC Joints:  The acromioclavicular joint appears anatomically aligned. Moderate degenerative changes are present.    GH Joint and Labrum:  The glenohumeral joint appears anatomically aligned.  A moderate-sized effusion is present. Destructive changes are seen within the glenohumeral joint with severe degenerative changes and deformity of the glenoid. The labrum is completely macerated. No   significant intra-articular body identified. Significant subcoracoid bursitis is present.    Bones:  No evidence of fracture. Patchy edema is present throughout the humeral head and the glenoid.    Soft Tissue:   There is no obvious soft tissue  swelling.  Impression: Impression:    1. Moderate-sized joint effusion with advanced degenerative changes and destructive changes of the glenohumeral joint. Findings may be related to septic arthritis given clinical history. Fluid aspiration is recommended for confirmation. Joint effusion   with subcoracoid bursitis may also be reactive and related to severe osteoarthritis.  2. Suspected complete tears of the supraspinatus and infraspinatus tendons.  3. Completely macerated appearance of the labrum.  4. Moderate acromial clavicular osteoarthritis.    Electronically Signed: Linnette Pacheco    12/28/2022 10:08 AM EST    Workstation ID: [id number]  Duplex Venous Upper Extremity - Right CAR  •  Acute right upper extremity superficial thrombophlebitis noted in the   cephalic (forearm).  •  All other right sided vessels appear normal.  •  No significant change when compared to the previous study.      Assessment & Plan        Active Hospital Problems:  Active Hospital Problems    Diagnosis    • **Hyponatremia    • Cervicalgia, spine surgery 2017    • Primary osteoarthritis involving multiple joints    • Cardiac amyloidosis (HCC)    • ACC/AHA stage C chronic systolic heart failure (HCC)    • Tobacco abuse    • Primary hypertension          Assessment and Plan:     1.  Septic shock  2.  Shoulder septic arthritis  3.  Bacteremia    Continue with ICU care  off pressors today  Blood culture showed MSSA patient on ceftriaxone  ID follow-up appreciated  Cardiology consulted for ALMA DELIA possible ALMA DELIA today  Orthopedic consult appreciated s/p aspiration of the right shoulder which showed pus plan for arthroscopy and irrigation debridement     4.  A. fib with RVR  Cardiology following up  Of amiodarone drip today  Telemetry    5.  STEVEN  Likely related to dehydration  Will monitor creatinine avoid nephrotoxins  Nephrology following up    6.  Hyponatremia likely related to dehydration improved sodium today 125 will monitor    7.  Acute proximal  respiratory failure  Likely related to fluid overload and edema  I stopped IV fluids  Supplemental oxygen to keep saturation above 92  Echo reviewed showed diastolic dysfunction with a EF 60%    8.  Thrombocytopenia  Plt 33  Acute drop in platelets likely related to sepsis  Will hold heparin subcu for now    9.  Right upper extremity swelling  Likely related to shoulder infection  Doppler showed superficial DVT plan to repeat Doppler to follow-up.    GI and DVT prophylaxis    Today's labs reviewed    CXR independently reviewed , my interpretation is pulmonary edema    Tele strip independently reviewwed; My interpretation is A. fib        Electronically signed by Pascual Hogan MD, 12/28/22, 11:22 EST.  [facility]

## 2022-12-28 NOTE — PROGRESS NOTES
"                                                                                                                                      Nephrology  Progress Note                                        [facility]    Patient Identification    Name: Jaime Bar  Age: 67 y.o.  Sex: male  :  1955  MRN: [id number]      DATE OF SERVICE:  2022        Subective     chart reviewed    patient is currently off pressors and off amiodarone drip  Still confused     Objective   Scheduled Meds:cefTRIAXone, 2 g, Intravenous, Q24H  midodrine, 10 mg, Oral, TID AC  pantoprazole, 40 mg, Intravenous, Q AM  sodium chloride, 10 mL, Intravenous, Q12H          Continuous Infusions:phenylephrine, 0.5-3 mcg/kg/min, Last Rate: Stopped (22 05)        PRN Meds:•  acetaminophen **OR** acetaminophen  •  aluminum-magnesium hydroxide-simethicone  •  dextrose  •  dextrose  •  glucagon (human recombinant)  •  Morphine  •  ondansetron **OR** ondansetron  •  sodium chloride  •  sodium chloride  •  sodium chloride     Exam:  /73   Pulse 89   Temp 98.2 °F (36.8 °C) (Oral)   Resp 22   Ht 167.6 cm (66\")   Wt 87.2 kg (192 lb 3.9 oz)   SpO2 98%   BMI 31.03 kg/m²     Intake/Output last 3 shifts:  I/O last 3 completed shifts:  In: 51150.1 [I.V.:15870.1]  Out: 2190 [Urine:2190]    Intake/Output this shift:  No intake/output data recorded.    Physical exam:  General Appearance: Confused  Head:  Normocephalic, without obvious abnormality, atraumatic  Eyes:  PERRL, conjunctiva/corneas clear     Neck:  Supple,  no adenopathy;      Lungs:  Decreased BS occasion ronchi  Heart:  Regular rate and rhythm, S1 and S2 normal  Abdomen:  Soft, non-tender, bowel sounds active   Extremities: trace edema  Pulses: 2+ and symmetric all extremities  Skin:  No rashes or lesions       Data Review:  All labs (24hrs):   Recent Results (from the past 24 hour(s))   POC Glucose Once    Collection Time: 22 12:59 PM    Specimen: Blood "   Result Value Ref Range    Glucose 86 70 - 105 mg/dL   POC Glucose Once    Collection Time: 12/27/22  5:24 PM    Specimen: Blood   Result Value Ref Range    Glucose 79 70 - 105 mg/dL   POC Glucose Once    Collection Time: 12/27/22 11:15 PM    Specimen: Blood   Result Value Ref Range    Glucose 67 (L) 70 - 105 mg/dL   POC Glucose Once    Collection Time: 12/28/22 12:06 AM    Specimen: Blood   Result Value Ref Range    Glucose 120 (H) 70 - 105 mg/dL   ECG 12 Lead Drug Monitoring; Amiodarone    Collection Time: 12/28/22  5:50 AM   Result Value Ref Range    QT Interval 395 ms   Duplex Venous Upper Extremity - Right CAR    Collection Time: 12/28/22  6:39 AM   Result Value Ref Range    Target HR (85%) 130 bpm    Max. Pred. HR (100%) 153 bpm    Right Cephalic Forearm Color 1.0     Right Internal Jugular Spont Y     Right Internal Jugular Phasic Y     Right Internal Jugular Compress C     Right Internal Jugular Augment Y     Right Subclavian Spont Y     Right Subclavian Phasic Y     Right Subclavian Compress C     Right Subclavian Augment Y     Right Axillary Spont Y     Right Axillary Phasic Y     Right Axillary Compress C     Right Axillary Augment Y     Right Brachial Compress C     Right Radial Compress C     Right Ulnar Compress C     Right Basilic Upper Compress C     Right Cephalic Upper Compress C     Right Cephalic Forearm Compress N     Right Cephalic Forearm Thrombus A     Left Internal Jugular Spont Y     Left Internal Jugular Phasic Y     Left Internal Jugular Compress C     Left Internal Jugular Augment Y     Left Subclavian Spont Y     Left Subclavian Phasic Y     Left Subclavian Compress C     Left Subclavian Augment Y           Imaging:  XR Shoulder 2+ View Right    Result Date: 12/27/2022   1. Severe degenerative changes of the glenohumeral joint as well as a high riding humeral head, suggestive of chronic rotator cuff pathology. No definite evidence of osteomyelitis is identified. However plain films  cannot exclude septic arthritis.  Electronically Signed By-Juan Francisco Lozoya MD On:12/27/2022 1:42 PM This report was finalized on [id number] by  Juan Francisco Lozoya MD.    XR Chest 1 View    Result Date: 12/27/2022   1. Persistent diffuse interstitial opacities. Differential includes interstitial pulmonary edema, or atypical infection.  Electronically Signed By-Juan Francisco Lozoya MD On:12/27/2022 8:46 AM This report was finalized on [id number] by  Juan Francisco Lozoya MD.    XR Chest 1 View    Result Date: 12/26/2022    1.  Interval placement of left-sided PICC line with the tip projecting over the superior vena cava. 2.  No change in coarsened reticular interstitial markings throughout both lungs.  No new airspace consolidation or pleural effusion.  Electronically Signed By-Los Dipo MD On:12/26/2022 4:56 PM This report was finalized on [id number] by  Los Diop MD.    XR Chest 1 View    Result Date: 12/26/2022  1.  Coarsened airspace, bronchovascular thickening.  Consider small airways disease (bronchitis, asthma), edema, atypical/viral infection, aspiration. 2.  Heart size is normal. 3.  No acute bony findings. Electronically signed by:  Bernadette Huddleston M.D.  12/26/2022 4:56 AM Mountain Time      Assessment/Plan:     Hyponatremia    ACC/AHA stage C chronic systolic heart failure (HCC)    Cardiac amyloidosis (HCC)    Primary hypertension    Tobacco abuse    Cervicalgia, spine surgery 2017    Primary osteoarthritis involving multiple joints   hyponatremia improving   Acute kidney injury on chronic kidney disease improving  Atrial fibrillation with rapid ventricular response  Metabolic acidosis  Urinary retention   Sepsis   Hypocalcemia   Hyperkalemia      follow repeat labs   Avoid nephrotoxic medication  Watch sodium and acidosis

## 2022-12-28 NOTE — ANESTHESIA PREPROCEDURE EVALUATION
Anesthesia Evaluation     Patient summary reviewed and Nursing notes reviewed   NPO Solid Status: > 8 hours  NPO Liquid Status: > 8 hours           Airway   Mallampati: II  TM distance: >3 FB  Neck ROM: full  No difficulty expected  Dental - normal exam     Pulmonary - normal exam   Cardiovascular - normal exam    (+) hypertension,       Neuro/Psych  GI/Hepatic/Renal/Endo      Musculoskeletal     (+) neck pain,   Abdominal  - normal exam    Bowel sounds: normal.   Substance History      OB/GYN          Other   arthritis,      ROS/Med Hx Other: Sepsis, aortic stenosis    2d echo  Left ventricular ejection fraction appears to be 61 - 65%.  •  Left ventricular wall thickness is consistent with moderate concentric hypertrophy.  •  Left ventricular diastolic function is consistent with (grade I) impaired relaxation.  •  The right ventricular cavity is mildly dilated.  •  The right atrial cavity is mildly  dilated.  •  Aortic valve was heavily sclerotic with impaired leaflet mobility.  Although gradient across the valve was not very elevated, there seems to be at least moderate aortic stenosis.  Clinical correlation is required.                     Anesthesia Plan    ASA 4     MAC     intravenous induction     Anesthetic plan, risks, benefits, and alternatives have been provided, discussed and informed consent has been obtained with: patient.        CODE STATUS:    Medical Intervention Limits: NO intubation (DNI)  Level Of Support Discussed With: Next of Kin (If No Surrogate)  Code Status (Patient has no pulse and is not breathing): No CPR (Do Not Attempt to Resuscitate)  Medical Interventions (Patient has pulse or is breathing): Limited Support  Release to patient: Routine Release

## 2022-12-28 NOTE — CONSULTS
"     Patient ID: Jaime Bar is a 67 y.o. male.    Chief Complaint:  Bilateral shoulder pain and sepsis  No chief complaint on file.      HPI:  This is a 67-year-old gentleman admitted with sepsis he has had a history of bilateral shoulder surgeries at another facility he is somewhat confused chart review was used to obtain most of the history he is reported worsening bilateral right greater than left shoulder pain and then lethargy with sepsis he has been admitted on antibiotics and low level pressors  History reviewed. No pertinent past medical history.    History reviewed. No pertinent surgical history.    History reviewed. No pertinent family history.       Social History     Occupational History   • Not on file   Tobacco Use   • Smoking status: Every Day     Packs/day: 0.50     Years: 10.00     Pack years: 5.00     Types: Cigarettes   • Smokeless tobacco: Never   Vaping Use   • Vaping Use: Never used   Substance and Sexual Activity   • Alcohol use: Not Currently   • Drug use: Never   • Sexual activity: Defer      Review of Systems   Cardiovascular: Negative for chest pain.   Musculoskeletal: Positive for arthralgias.       Objective:    /73   Pulse 89   Temp 98.9 °F (37.2 °C) (Axillary)   Resp 22   Ht 167.6 cm (66\")   Wt 87.2 kg (192 lb 3.9 oz)   SpO2 98%   BMI 31.03 kg/m²     Physical Examination:  Both shoulders are examined demonstrate healed incisions from previous open shoulder surgery no drainage no significant redness mild warmth bilateral moderate swelling right minimal swelling left painful range of motion of both shoulders arm is a little bit swollen on the right side fingers warm and perfused does wiggle his hands to follow commands    Imaging:  X-rays of the right shoulder demonstrate moderate degenerative joint disease MRI both shoulders demonstrate moderate joint effusion    Assessment:  Bilateral shoulder pain sepsis    Plan:  I recommend aspiration which is performed under sterile " technique both shoulders skylar pus was obtained from the right shoulder cloudy but yellow fluid was obtained from the left will be sent for analysis.  He will require right shoulder arthroscopy and irrigation debridement today depending on lab analysis elective could be a bilateral procedure hold blood thinners continue n.p.o.  Risks and benefits, specifically risks of bleeding, scar, continued infection, , stiffness,, nerve, tendon or artery damage, the need for further surgery, DVT, and loss of life or limb and rehab were discussed. All questions were answered and addressed.    Fluid analysis is consistent with septic arthritis of both shoulders we will proceed this afternoon with bilateral shoulder arthroscopy and debridement    Disclaimer: Part of this note may be an electronic transcription/translation of spoken language to printed text using the Dragon Dictation System

## 2022-12-28 NOTE — ANESTHESIA PREPROCEDURE EVALUATION
Anesthesia Evaluation     Patient summary reviewed and Nursing notes reviewed   no history of anesthetic complications:  NPO Solid Status: > 8 hours  NPO Liquid Status: > 8 hours           Airway   Mallampati: II  TM distance: >3 FB  Neck ROM: full  No difficulty expected  Dental      Pulmonary - negative pulmonary ROS    breath sounds clear to auscultation  Cardiovascular     ECG reviewed  Rhythm: regular  Rate: normal    (+) hypertension, dysrhythmias Atrial Fib,       Neuro/Psych- negative ROS  GI/Hepatic/Renal/Endo - negative ROS     Musculoskeletal (-) negative ROS    Abdominal     Abdomen: soft.   Substance History - negative use     OB/GYN negative ob/gyn ROS         Other   blood dyscrasia thrombocytopenia,                     Anesthesia Plan    ASA 4     general     (Platelets preop)  intravenous induction           CODE STATUS:    Medical Intervention Limits: NO intubation (DNI)  Level Of Support Discussed With: Next of Kin (If No Surrogate)  Code Status (Patient has no pulse and is not breathing): No CPR (Do Not Attempt to Resuscitate)  Medical Interventions (Patient has pulse or is breathing): Limited Support  Release to patient: Routine Release

## 2022-12-28 NOTE — CASE MANAGEMENT/SOCIAL WORK
Continued Stay Note   Michael     Patient Name: Jaime Bar  MRN: [id number]  Today's Date: 12/28/2022    Admit Date: 12/26/2022    Plan: DC Plan: Anticipate home with family pending clinical course. Watch for home IV abx needs.   Discharge Plan     Row Name 12/28/22 1552       Plan    Plan DC Plan: Anticipate home with family pending clinical course. Watch for home IV abx needs.    Provided Post Acute Provider List? N/A    Provided Post Acute Provider Quality & Resource List? N/A    Plan Comments CM spoke with patient’s nurse, intensivist, and NP to obtain clinical updates. Nursing reports surgery aspirated purulent fluid from bilateral shoulders this morning. Plan for patient to have incision and drainage completed.CM will continue to follow for any additional needs that may develop and adjust discharge plan accordingly. DC Barriers: Procedure I&D of shoulder, IV abx, ALMA DELIA, MRI of shoulder, and pending cultures.           Expected Discharge Date and Time     Expected Discharge Date Expected Discharge Time    Dec 30, 2022         Phone communication or documentation only- no physical contact with patient or family.      Melyssa Pascual RN     Office Phone: [phone]  Office Cell:     [phone]

## 2022-12-29 ENCOUNTER — APPOINTMENT (OUTPATIENT)
Dept: GENERAL RADIOLOGY | Facility: HOSPITAL | Age: 67
DRG: 853 | End: 2022-12-29
Payer: MEDICARE

## 2022-12-29 ENCOUNTER — APPOINTMENT (OUTPATIENT)
Dept: CARDIOLOGY | Facility: HOSPITAL | Age: 67
DRG: 853 | End: 2022-12-29
Payer: MEDICARE

## 2022-12-29 LAB
ALBUMIN SERPL-MCNC: 2.6 G/DL (ref 3.5–5.2)
ALBUMIN SERPL-MCNC: 2.7 G/DL (ref 3.5–5.2)
ALBUMIN/GLOB SERPL: 1.1 G/DL
ALBUMIN/GLOB SERPL: 1.1 G/DL
ALP SERPL-CCNC: 108 U/L (ref 39–117)
ALP SERPL-CCNC: 88 U/L (ref 39–117)
ALT SERPL W P-5'-P-CCNC: 117 U/L (ref 1–41)
ALT SERPL W P-5'-P-CCNC: 36 U/L (ref 1–41)
ANION GAP SERPL CALCULATED.3IONS-SCNC: 11 MMOL/L (ref 5–15)
ANION GAP SERPL CALCULATED.3IONS-SCNC: 13 MMOL/L (ref 5–15)
ANION GAP SERPL CALCULATED.3IONS-SCNC: 15 MMOL/L (ref 5–15)
ANISOCYTOSIS BLD QL: ABNORMAL
APTT PPP: 38.7 SECONDS (ref 24–31)
ARTERIAL PATENCY WRIST A: ABNORMAL
ARTERIAL PATENCY WRIST A: ABNORMAL
AST SERPL-CCNC: 238 U/L (ref 1–40)
AST SERPL-CCNC: 62 U/L (ref 1–40)
ATMOSPHERIC PRESS: ABNORMAL MM[HG]
ATMOSPHERIC PRESS: ABNORMAL MM[HG]
BACTERIA SPEC AEROBE CULT: ABNORMAL
BASE EXCESS BLDA CALC-SCNC: -6.6 MMOL/L (ref 0–3)
BASE EXCESS BLDA CALC-SCNC: -6.9 MMOL/L (ref 0–3)
BDY SITE: ABNORMAL
BDY SITE: ABNORMAL
BH CV ECHO SHUNT ASSESSMENT PERFORMED (HIDDEN SCRIPTING): 1
BH CV LOWER VASCULAR LEFT COMMON FEMORAL AUGMENT: NORMAL
BH CV LOWER VASCULAR LEFT COMMON FEMORAL COMPETENT: NORMAL
BH CV LOWER VASCULAR LEFT COMMON FEMORAL COMPRESS: NORMAL
BH CV LOWER VASCULAR LEFT COMMON FEMORAL PHASIC: NORMAL
BH CV LOWER VASCULAR LEFT COMMON FEMORAL SPONT: NORMAL
BH CV LOWER VASCULAR LEFT DISTAL FEMORAL COMPRESS: NORMAL
BH CV LOWER VASCULAR LEFT GASTRONEMIUS COMPRESS: NORMAL
BH CV LOWER VASCULAR LEFT GREATER SAPH AK COMPRESS: NORMAL
BH CV LOWER VASCULAR LEFT GREATER SAPH BK COMPRESS: NORMAL
BH CV LOWER VASCULAR LEFT LESSER SAPH COMPRESS: NORMAL
BH CV LOWER VASCULAR LEFT MID FEMORAL AUGMENT: NORMAL
BH CV LOWER VASCULAR LEFT MID FEMORAL COMPETENT: NORMAL
BH CV LOWER VASCULAR LEFT MID FEMORAL COMPRESS: NORMAL
BH CV LOWER VASCULAR LEFT MID FEMORAL PHASIC: NORMAL
BH CV LOWER VASCULAR LEFT MID FEMORAL SPONT: NORMAL
BH CV LOWER VASCULAR LEFT PERONEAL COMPRESS: NORMAL
BH CV LOWER VASCULAR LEFT POPLITEAL AUGMENT: NORMAL
BH CV LOWER VASCULAR LEFT POPLITEAL COMPETENT: NORMAL
BH CV LOWER VASCULAR LEFT POPLITEAL COMPRESS: NORMAL
BH CV LOWER VASCULAR LEFT POPLITEAL PHASIC: NORMAL
BH CV LOWER VASCULAR LEFT POPLITEAL SPONT: NORMAL
BH CV LOWER VASCULAR LEFT POSTERIOR TIBIAL COMPRESS: NORMAL
BH CV LOWER VASCULAR LEFT PROXIMAL FEMORAL COMPRESS: NORMAL
BH CV LOWER VASCULAR LEFT SAPHENOFEMORAL JUNCTION COMPRESS: NORMAL
BH CV LOWER VASCULAR RIGHT COMMON FEMORAL AUGMENT: NORMAL
BH CV LOWER VASCULAR RIGHT COMMON FEMORAL COMPETENT: NORMAL
BH CV LOWER VASCULAR RIGHT COMMON FEMORAL COMPRESS: NORMAL
BH CV LOWER VASCULAR RIGHT COMMON FEMORAL PHASIC: NORMAL
BH CV LOWER VASCULAR RIGHT COMMON FEMORAL SPONT: NORMAL
BH CV LOWER VASCULAR RIGHT DISTAL FEMORAL COMPRESS: NORMAL
BH CV LOWER VASCULAR RIGHT GASTRONEMIUS COMPRESS: NORMAL
BH CV LOWER VASCULAR RIGHT GREATER SAPH AK COMPRESS: NORMAL
BH CV LOWER VASCULAR RIGHT GREATER SAPH BK COMPRESS: NORMAL
BH CV LOWER VASCULAR RIGHT LESSER SAPH COMPRESS: NORMAL
BH CV LOWER VASCULAR RIGHT MID FEMORAL AUGMENT: NORMAL
BH CV LOWER VASCULAR RIGHT MID FEMORAL COMPETENT: NORMAL
BH CV LOWER VASCULAR RIGHT MID FEMORAL COMPRESS: NORMAL
BH CV LOWER VASCULAR RIGHT MID FEMORAL PHASIC: NORMAL
BH CV LOWER VASCULAR RIGHT MID FEMORAL SPONT: NORMAL
BH CV LOWER VASCULAR RIGHT PERONEAL COMPRESS: NORMAL
BH CV LOWER VASCULAR RIGHT POPLITEAL AUGMENT: NORMAL
BH CV LOWER VASCULAR RIGHT POPLITEAL COMPETENT: NORMAL
BH CV LOWER VASCULAR RIGHT POPLITEAL COMPRESS: NORMAL
BH CV LOWER VASCULAR RIGHT POPLITEAL PHASIC: NORMAL
BH CV LOWER VASCULAR RIGHT POPLITEAL SPONT: NORMAL
BH CV LOWER VASCULAR RIGHT POSTERIOR TIBIAL COMPRESS: NORMAL
BH CV LOWER VASCULAR RIGHT PROXIMAL FEMORAL COMPRESS: NORMAL
BH CV LOWER VASCULAR RIGHT SAPHENOFEMORAL JUNCTION COMPRESS: NORMAL
BH CV UPPER VENOUS LEFT AXILLARY AUGMENT: NORMAL
BH CV UPPER VENOUS LEFT AXILLARY COLOR: 1
BH CV UPPER VENOUS LEFT AXILLARY COMPRESS: NORMAL
BH CV UPPER VENOUS LEFT AXILLARY PHASIC: NORMAL
BH CV UPPER VENOUS LEFT AXILLARY SPONT: NORMAL
BH CV UPPER VENOUS LEFT AXILLARY THROMBUS: NORMAL
BH CV UPPER VENOUS LEFT BASILIC FOREARM COMPRESS: NORMAL
BH CV UPPER VENOUS LEFT BASILIC UPPER COLOR: 1
BH CV UPPER VENOUS LEFT BASILIC UPPER COMPRESS: NORMAL
BH CV UPPER VENOUS LEFT BASILIC UPPER THROMBUS: NORMAL
BH CV UPPER VENOUS LEFT BRACHIAL COMPRESS: NORMAL
BH CV UPPER VENOUS LEFT CEPHALIC FOREARM COMPRESS: NORMAL
BH CV UPPER VENOUS LEFT CEPHALIC UPPER COMPRESS: NORMAL
BH CV UPPER VENOUS LEFT INTERNAL JUGULAR AUGMENT: NORMAL
BH CV UPPER VENOUS LEFT INTERNAL JUGULAR COMPRESS: NORMAL
BH CV UPPER VENOUS LEFT INTERNAL JUGULAR PHASIC: NORMAL
BH CV UPPER VENOUS LEFT INTERNAL JUGULAR SPONT: NORMAL
BH CV UPPER VENOUS LEFT RADIAL COMPRESS: NORMAL
BH CV UPPER VENOUS LEFT SUBCLAVIAN AUGMENT: NORMAL
BH CV UPPER VENOUS LEFT SUBCLAVIAN COMPRESS: NORMAL
BH CV UPPER VENOUS LEFT SUBCLAVIAN PHASIC: NORMAL
BH CV UPPER VENOUS LEFT SUBCLAVIAN SPONT: NORMAL
BH CV UPPER VENOUS LEFT ULNAR COMPRESS: NORMAL
BH CV UPPER VENOUS RIGHT INTERNAL JUGULAR AUGMENT: NORMAL
BH CV UPPER VENOUS RIGHT INTERNAL JUGULAR COMPRESS: NORMAL
BH CV UPPER VENOUS RIGHT INTERNAL JUGULAR PHASIC: NORMAL
BH CV UPPER VENOUS RIGHT INTERNAL JUGULAR SPONT: NORMAL
BH CV UPPER VENOUS RIGHT SUBCLAVIAN AUGMENT: NORMAL
BH CV UPPER VENOUS RIGHT SUBCLAVIAN COMPRESS: NORMAL
BH CV UPPER VENOUS RIGHT SUBCLAVIAN PHASIC: NORMAL
BH CV UPPER VENOUS RIGHT SUBCLAVIAN SPONT: NORMAL
BH CV VAS PRELIMINARY FINDINGS SCRIPTING: 1
BILIRUB CONJ SERPL-MCNC: 4.2 MG/DL (ref 0–0.3)
BILIRUB INDIRECT SERPL-MCNC: 1.1 MG/DL
BILIRUB SERPL-MCNC: 4.6 MG/DL (ref 0–1.2)
BILIRUB SERPL-MCNC: 5.3 MG/DL (ref 0–1.2)
BILIRUB SERPL-MCNC: 5.3 MG/DL (ref 0–1.2)
BUN SERPL-MCNC: 59 MG/DL (ref 8–23)
BUN SERPL-MCNC: 65 MG/DL (ref 8–23)
BUN SERPL-MCNC: 74 MG/DL (ref 8–23)
BUN/CREAT SERPL: 39.2 (ref 7–25)
BUN/CREAT SERPL: 44.8 (ref 7–25)
BUN/CREAT SERPL: 50.4 (ref 7–25)
BURR CELLS BLD QL SMEAR: ABNORMAL
BURR CELLS BLD QL SMEAR: ABNORMAL
CA-I SERPL ISE-MCNC: 1.21 MMOL/L (ref 1.2–1.3)
CALCIUM SPEC-SCNC: 8.1 MG/DL (ref 8.6–10.5)
CALCIUM SPEC-SCNC: 8.6 MG/DL (ref 8.6–10.5)
CALCIUM SPEC-SCNC: 8.7 MG/DL (ref 8.6–10.5)
CHLORIDE SERPL-SCNC: 100 MMOL/L (ref 98–107)
CHLORIDE SERPL-SCNC: 102 MMOL/L (ref 98–107)
CHLORIDE SERPL-SCNC: 99 MMOL/L (ref 98–107)
CO2 BLDA-SCNC: 20.3 MMOL/L (ref 22–29)
CO2 BLDA-SCNC: 22.7 MMOL/L (ref 22–29)
CO2 SERPL-SCNC: 19 MMOL/L (ref 22–29)
CO2 SERPL-SCNC: 20 MMOL/L (ref 22–29)
CO2 SERPL-SCNC: 20 MMOL/L (ref 22–29)
CREAT SERPL-MCNC: 1.17 MG/DL (ref 0.76–1.27)
CREAT SERPL-MCNC: 1.65 MG/DL (ref 0.76–1.27)
CREAT SERPL-MCNC: 1.66 MG/DL (ref 0.76–1.27)
D DIMER PPP FEU-MCNC: 11.83 MG/L (FEU) (ref 0–0.67)
DEPRECATED RDW RBC AUTO: 52.1 FL (ref 37–54)
DEPRECATED RDW RBC AUTO: 56 FL (ref 37–54)
DEPRECATED RDW RBC AUTO: 56 FL (ref 37–54)
DOHLE BODIES: PRESENT
EGFRCR SERPLBLD CKD-EPI 2021: 44.9 ML/MIN/1.73
EGFRCR SERPLBLD CKD-EPI 2021: 45.2 ML/MIN/1.73
EGFRCR SERPLBLD CKD-EPI 2021: 68.3 ML/MIN/1.73
ERYTHROCYTE [DISTWIDTH] IN BLOOD BY AUTOMATED COUNT: 15.5 % (ref 12.3–15.4)
ERYTHROCYTE [DISTWIDTH] IN BLOOD BY AUTOMATED COUNT: 15.9 % (ref 12.3–15.4)
ERYTHROCYTE [DISTWIDTH] IN BLOOD BY AUTOMATED COUNT: 16 % (ref 12.3–15.4)
FERRITIN SERPL-MCNC: 3525 NG/ML (ref 30–400)
FIBRINOGEN PPP-MCNC: 493 MG/DL (ref 210–450)
GIANT PLATELETS: ABNORMAL
GLOBULIN UR ELPH-MCNC: 2.3 GM/DL
GLOBULIN UR ELPH-MCNC: 2.4 GM/DL
GLUCOSE BLDC GLUCOMTR-MCNC: 100 MG/DL (ref 70–105)
GLUCOSE BLDC GLUCOMTR-MCNC: 130 MG/DL (ref 70–105)
GLUCOSE BLDC GLUCOMTR-MCNC: 75 MG/DL (ref 70–105)
GLUCOSE SERPL-MCNC: 108 MG/DL (ref 65–99)
GLUCOSE SERPL-MCNC: 137 MG/DL (ref 65–99)
GLUCOSE SERPL-MCNC: 90 MG/DL (ref 65–99)
GRAM STN SPEC: ABNORMAL
HAV IGM SERPL QL IA: NORMAL
HBV CORE IGM SERPL QL IA: NORMAL
HBV SURFACE AG SERPL QL IA: NORMAL
HCO3 BLDA-SCNC: 19.1 MMOL/L (ref 21–28)
HCO3 BLDA-SCNC: 21.1 MMOL/L (ref 21–28)
HCT VFR BLD AUTO: 32 % (ref 37.5–51)
HCT VFR BLD AUTO: 34.6 % (ref 37.5–51)
HCT VFR BLD AUTO: 35.7 % (ref 37.5–51)
HCV AB SER DONR QL: NORMAL
HEMODILUTION: NO
HEMODILUTION: NO
HGB BLD-MCNC: 10.9 G/DL (ref 13–17.7)
HGB BLD-MCNC: 11.4 G/DL (ref 13–17.7)
HGB BLD-MCNC: 12 G/DL (ref 13–17.7)
INHALED O2 CONCENTRATION: 40 %
INHALED O2 CONCENTRATION: 90 %
INR PPP: 1.55 (ref 0.93–1.1)
IRON 24H UR-MRATE: 29 MCG/DL (ref 59–158)
IRON SATN MFR SERPL: 15 % (ref 20–50)
ISOLATED FROM: ABNORMAL
LARGE PLATELETS: ABNORMAL
LYMPHOCYTES # BLD MANUAL: 0.21 10*3/MM3 (ref 0.7–3.1)
LYMPHOCYTES # BLD MANUAL: 0.28 10*3/MM3 (ref 0.7–3.1)
LYMPHOCYTES # BLD MANUAL: 0.39 10*3/MM3 (ref 0.7–3.1)
LYMPHOCYTES NFR BLD MANUAL: 3 % (ref 5–12)
LYMPHOCYTES NFR BLD MANUAL: 5 % (ref 5–12)
LYMPHOCYTES NFR BLD MANUAL: 5 % (ref 5–12)
MAGNESIUM SERPL-MCNC: 2 MG/DL (ref 1.6–2.4)
MAXIMAL PREDICTED HEART RATE: 153 BPM
MCH RBC QN AUTO: 31.6 PG (ref 26.6–33)
MCH RBC QN AUTO: 31.9 PG (ref 26.6–33)
MCH RBC QN AUTO: 32.3 PG (ref 26.6–33)
MCHC RBC AUTO-ENTMCNC: 32.8 G/DL (ref 31.5–35.7)
MCHC RBC AUTO-ENTMCNC: 33.6 G/DL (ref 31.5–35.7)
MCHC RBC AUTO-ENTMCNC: 34 G/DL (ref 31.5–35.7)
MCV RBC AUTO: 94.9 FL (ref 79–97)
MCV RBC AUTO: 95.1 FL (ref 79–97)
MCV RBC AUTO: 96.5 FL (ref 79–97)
METAMYELOCYTES NFR BLD MANUAL: 1 % (ref 0–0)
METAMYELOCYTES NFR BLD MANUAL: 4 % (ref 0–0)
MODALITY: ABNORMAL
MODALITY: ABNORMAL
MONOCYTES # BLD: 0.49 10*3/MM3 (ref 0.1–0.9)
MONOCYTES # BLD: 0.63 10*3/MM3 (ref 0.1–0.9)
MONOCYTES # BLD: 0.71 10*3/MM3 (ref 0.1–0.9)
NEUTROPHILS # BLD AUTO: 12.64 10*3/MM3 (ref 1.7–7)
NEUTROPHILS # BLD AUTO: 20.26 10*3/MM3 (ref 1.7–7)
NEUTROPHILS # BLD AUTO: 8.82 10*3/MM3 (ref 1.7–7)
NEUTROPHILS NFR BLD MANUAL: 67 % (ref 42.7–76)
NEUTROPHILS NFR BLD MANUAL: 69 % (ref 42.7–76)
NEUTROPHILS NFR BLD MANUAL: 80 % (ref 42.7–76)
NEUTS BAND NFR BLD MANUAL: 10 % (ref 0–5)
NEUTS BAND NFR BLD MANUAL: 20 % (ref 0–5)
NEUTS BAND NFR BLD MANUAL: 29 % (ref 0–5)
NEUTS VAC BLD QL SMEAR: ABNORMAL
PATHOLOGY REVIEW: YES
PCO2 BLDA: 39.1 MM HG (ref 35–48)
PCO2 BLDA: 50.5 MM HG (ref 35–48)
PEEP RESPIRATORY: 5 CM[H2O]
PEEP RESPIRATORY: 5 CM[H2O]
PH BLDA: 7.23 PH UNITS (ref 7.35–7.45)
PH BLDA: 7.3 PH UNITS (ref 7.35–7.45)
PHOSPHATE SERPL-MCNC: 4.3 MG/DL (ref 2.5–4.5)
PLAT MORPH BLD: NORMAL
PLATELET # BLD AUTO: 46 10*3/MM3 (ref 140–450)
PLATELET # BLD AUTO: 47 10*3/MM3 (ref 140–450)
PLATELET # BLD AUTO: 57 10*3/MM3 (ref 140–450)
PMV BLD AUTO: 9 FL (ref 6–12)
PMV BLD AUTO: 9.2 FL (ref 6–12)
PMV BLD AUTO: 9.3 FL (ref 6–12)
PO2 BLDA: 114.4 MM HG (ref 83–108)
PO2 BLDA: 355.5 MM HG (ref 83–108)
POIKILOCYTOSIS BLD QL SMEAR: ABNORMAL
POIKILOCYTOSIS BLD QL SMEAR: ABNORMAL
POTASSIUM SERPL-SCNC: 3.9 MMOL/L (ref 3.5–5.2)
POTASSIUM SERPL-SCNC: 4.3 MMOL/L (ref 3.5–5.2)
POTASSIUM SERPL-SCNC: 4.5 MMOL/L (ref 3.5–5.2)
PROT SERPL-MCNC: 4.9 G/DL (ref 6–8.5)
PROT SERPL-MCNC: 5.1 G/DL (ref 6–8.5)
PROTHROMBIN TIME: 15.6 SECONDS (ref 9.6–11.7)
QT INTERVAL: 381 MS
QT INTERVAL: 403 MS
RBC # BLD AUTO: 3.37 10*6/MM3 (ref 4.14–5.8)
RBC # BLD AUTO: 3.59 10*6/MM3 (ref 4.14–5.8)
RBC # BLD AUTO: 3.75 10*6/MM3 (ref 4.14–5.8)
RESPIRATORY RATE: 16
RESPIRATORY RATE: 20
RETICS # AUTO: 0.03 10*6/MM3 (ref 0.02–0.13)
RETICS/RBC NFR AUTO: 1.05 % (ref 0.7–1.9)
SAO2 % BLDCOA: 98 % (ref 94–98)
SAO2 % BLDCOA: 99.9 % (ref 94–98)
SCAN SLIDE: NORMAL
SMALL PLATELETS BLD QL SMEAR: ABNORMAL
SMALL PLATELETS BLD QL SMEAR: ABNORMAL
SODIUM SERPL-SCNC: 130 MMOL/L (ref 136–145)
SODIUM SERPL-SCNC: 134 MMOL/L (ref 136–145)
SODIUM SERPL-SCNC: 135 MMOL/L (ref 136–145)
STRESS TARGET HR: 130 BPM
TIBC SERPL-MCNC: 195 MCG/DL (ref 298–536)
TOXIC GRANULATION: ABNORMAL
TOXIC GRANULATION: ABNORMAL
TRANSFERRIN SERPL-MCNC: 131 MG/DL (ref 200–360)
VARIANT LYMPHS NFR BLD MANUAL: 1 % (ref 0–5)
VARIANT LYMPHS NFR BLD MANUAL: 1 % (ref 19.6–45.3)
VARIANT LYMPHS NFR BLD MANUAL: 1 % (ref 19.6–45.3)
VARIANT LYMPHS NFR BLD MANUAL: 4 % (ref 19.6–45.3)
VENTILATOR MODE: ABNORMAL
VENTILATOR MODE: ABNORMAL
VT ON VENT VENT: 500 ML
VT ON VENT VENT: 500 ML
WBC MORPH BLD: NORMAL
WBC NRBC COR # BLD: 14.2 10*3/MM3 (ref 3.4–10.8)
WBC NRBC COR # BLD: 21.1 10*3/MM3 (ref 3.4–10.8)
WBC NRBC COR # BLD: 9.8 10*3/MM3 (ref 3.4–10.8)

## 2022-12-29 PROCEDURE — 94003 VENT MGMT INPAT SUBQ DAY: CPT

## 2022-12-29 PROCEDURE — 85379 FIBRIN DEGRADATION QUANT: CPT | Performed by: NURSE PRACTITIONER

## 2022-12-29 PROCEDURE — 93971 EXTREMITY STUDY: CPT

## 2022-12-29 PROCEDURE — 82728 ASSAY OF FERRITIN: CPT | Performed by: NURSE PRACTITIONER

## 2022-12-29 PROCEDURE — 85730 THROMBOPLASTIN TIME PARTIAL: CPT | Performed by: NURSE PRACTITIONER

## 2022-12-29 PROCEDURE — 85045 AUTOMATED RETICULOCYTE COUNT: CPT | Performed by: NURSE PRACTITIONER

## 2022-12-29 PROCEDURE — 25010000002 PHENYLEPHRINE 10 MG/ML SOLUTION 5 ML VIAL: Performed by: ORTHOPAEDIC SURGERY

## 2022-12-29 PROCEDURE — 83010 ASSAY OF HAPTOGLOBIN QUANT: CPT | Performed by: NURSE PRACTITIONER

## 2022-12-29 PROCEDURE — 25010000002 CEFTRIAXONE PER 250 MG: Performed by: INTERNAL MEDICINE

## 2022-12-29 PROCEDURE — 93005 ELECTROCARDIOGRAM TRACING: CPT | Performed by: NURSE PRACTITIONER

## 2022-12-29 PROCEDURE — 82803 BLOOD GASES ANY COMBINATION: CPT

## 2022-12-29 PROCEDURE — 93970 EXTREMITY STUDY: CPT

## 2022-12-29 PROCEDURE — 84165 PROTEIN E-PHORESIS SERUM: CPT | Performed by: NURSE PRACTITIONER

## 2022-12-29 PROCEDURE — 84466 ASSAY OF TRANSFERRIN: CPT | Performed by: NURSE PRACTITIONER

## 2022-12-29 PROCEDURE — G0103 PSA SCREENING: HCPCS | Performed by: NURSE PRACTITIONER

## 2022-12-29 PROCEDURE — 85025 COMPLETE CBC W/AUTO DIFF WBC: CPT | Performed by: NURSE PRACTITIONER

## 2022-12-29 PROCEDURE — 82784 ASSAY IGA/IGD/IGG/IGM EACH: CPT | Performed by: NURSE PRACTITIONER

## 2022-12-29 PROCEDURE — 82248 BILIRUBIN DIRECT: CPT | Performed by: NURSE PRACTITIONER

## 2022-12-29 PROCEDURE — 86022 PLATELET ANTIBODIES: CPT | Performed by: NURSE PRACTITIONER

## 2022-12-29 PROCEDURE — 94799 UNLISTED PULMONARY SVC/PX: CPT

## 2022-12-29 PROCEDURE — 86665 EPSTEIN-BARR CAPSID VCA: CPT | Performed by: NURSE PRACTITIONER

## 2022-12-29 PROCEDURE — 84100 ASSAY OF PHOSPHORUS: CPT | Performed by: ORTHOPAEDIC SURGERY

## 2022-12-29 PROCEDURE — 83521 IG LIGHT CHAINS FREE EACH: CPT | Performed by: NURSE PRACTITIONER

## 2022-12-29 PROCEDURE — 85025 COMPLETE CBC W/AUTO DIFF WBC: CPT | Performed by: STUDENT IN AN ORGANIZED HEALTH CARE EDUCATION/TRAINING PROGRAM

## 2022-12-29 PROCEDURE — 93010 ELECTROCARDIOGRAM REPORT: CPT | Performed by: INTERNAL MEDICINE

## 2022-12-29 PROCEDURE — 25010000002 FENTANYL CITRATE (PF) 50 MCG/ML SOLUTION: Performed by: STUDENT IN AN ORGANIZED HEALTH CARE EDUCATION/TRAINING PROGRAM

## 2022-12-29 PROCEDURE — 86334 IMMUNOFIX E-PHORESIS SERUM: CPT | Performed by: NURSE PRACTITIONER

## 2022-12-29 PROCEDURE — 83735 ASSAY OF MAGNESIUM: CPT | Performed by: ORTHOPAEDIC SURGERY

## 2022-12-29 PROCEDURE — 87040 BLOOD CULTURE FOR BACTERIA: CPT | Performed by: INTERNAL MEDICINE

## 2022-12-29 PROCEDURE — 80053 COMPREHEN METABOLIC PANEL: CPT | Performed by: ORTHOPAEDIC SURGERY

## 2022-12-29 PROCEDURE — 82247 BILIRUBIN TOTAL: CPT | Performed by: NURSE PRACTITIONER

## 2022-12-29 PROCEDURE — 71045 X-RAY EXAM CHEST 1 VIEW: CPT

## 2022-12-29 PROCEDURE — 85610 PROTHROMBIN TIME: CPT | Performed by: NURSE PRACTITIONER

## 2022-12-29 PROCEDURE — 85007 BL SMEAR W/DIFF WBC COUNT: CPT | Performed by: STUDENT IN AN ORGANIZED HEALTH CARE EDUCATION/TRAINING PROGRAM

## 2022-12-29 PROCEDURE — 85384 FIBRINOGEN ACTIVITY: CPT | Performed by: NURSE PRACTITIONER

## 2022-12-29 PROCEDURE — 82330 ASSAY OF CALCIUM: CPT | Performed by: ORTHOPAEDIC SURGERY

## 2022-12-29 PROCEDURE — 87147 CULTURE TYPE IMMUNOLOGIC: CPT | Performed by: INTERNAL MEDICINE

## 2022-12-29 PROCEDURE — 82746 ASSAY OF FOLIC ACID SERUM: CPT | Performed by: NURSE PRACTITIONER

## 2022-12-29 PROCEDURE — 82525 ASSAY OF COPPER: CPT | Performed by: NURSE PRACTITIONER

## 2022-12-29 PROCEDURE — 85362 FIBRIN DEGRADATION PRODUCTS: CPT | Performed by: NURSE PRACTITIONER

## 2022-12-29 PROCEDURE — 85007 BL SMEAR W/DIFF WBC COUNT: CPT | Performed by: NURSE PRACTITIONER

## 2022-12-29 PROCEDURE — 86645 CMV ANTIBODY IGM: CPT | Performed by: NURSE PRACTITIONER

## 2022-12-29 PROCEDURE — 83540 ASSAY OF IRON: CPT | Performed by: NURSE PRACTITIONER

## 2022-12-29 PROCEDURE — 25010000002 HEPARIN (PORCINE) PER 1000 UNITS: Performed by: INTERNAL MEDICINE

## 2022-12-29 PROCEDURE — 82962 GLUCOSE BLOOD TEST: CPT

## 2022-12-29 PROCEDURE — 82607 VITAMIN B-12: CPT | Performed by: NURSE PRACTITIONER

## 2022-12-29 PROCEDURE — 86038 ANTINUCLEAR ANTIBODIES: CPT | Performed by: NURSE PRACTITIONER

## 2022-12-29 PROCEDURE — 80074 ACUTE HEPATITIS PANEL: CPT | Performed by: NURSE PRACTITIONER

## 2022-12-29 RX ORDER — ARGATROBAN 1 MG/ML
.5-1 INJECTION INTRAVENOUS
Status: DISCONTINUED | OUTPATIENT
Start: 2022-12-29 | End: 2023-01-01

## 2022-12-29 RX ORDER — MIDODRINE HYDROCHLORIDE 5 MG/1
10 TABLET ORAL EVERY 8 HOURS
Status: DISCONTINUED | OUTPATIENT
Start: 2022-12-29 | End: 2022-12-30

## 2022-12-29 RX ORDER — FENTANYL CITRATE 50 UG/ML
50 INJECTION, SOLUTION INTRAMUSCULAR; INTRAVENOUS
Status: DISCONTINUED | OUTPATIENT
Start: 2022-12-29 | End: 2023-01-11 | Stop reason: HOSPADM

## 2022-12-29 RX ORDER — HEPARIN SODIUM 5000 [USP'U]/ML
5000 INJECTION, SOLUTION INTRAVENOUS; SUBCUTANEOUS EVERY 8 HOURS
Status: DISCONTINUED | OUTPATIENT
Start: 2022-12-29 | End: 2022-12-29

## 2022-12-29 RX ORDER — DEXTROSE AND SODIUM CHLORIDE 5; .45 G/100ML; G/100ML
50 INJECTION, SOLUTION INTRAVENOUS CONTINUOUS
Status: DISCONTINUED | OUTPATIENT
Start: 2022-12-29 | End: 2023-01-02

## 2022-12-29 RX ADMIN — ACETAMINOPHEN 650 MG: 650 SUPPOSITORY RECTAL at 11:32

## 2022-12-29 RX ADMIN — PHENYLEPHRINE HYDROCHLORIDE 0.5 MCG/KG/MIN: 10 INJECTION INTRAVENOUS at 00:09

## 2022-12-29 RX ADMIN — ACETAMINOPHEN 650 MG: 650 SUPPOSITORY RECTAL at 04:33

## 2022-12-29 RX ADMIN — FENTANYL CITRATE 50 MCG: 50 INJECTION, SOLUTION INTRAMUSCULAR; INTRAVENOUS at 05:25

## 2022-12-29 RX ADMIN — Medication 10 ML: at 10:00

## 2022-12-29 RX ADMIN — PHENYLEPHRINE HYDROCHLORIDE 1 MCG/KG/MIN: 10 INJECTION INTRAVENOUS at 07:39

## 2022-12-29 RX ADMIN — Medication 10 ML: at 21:54

## 2022-12-29 RX ADMIN — ARGATROBAN 0.5 MCG/KG/MIN: 50 INJECTION, SOLUTION INTRAVENOUS at 21:37

## 2022-12-29 RX ADMIN — PANTOPRAZOLE SODIUM 40 MG: 40 INJECTION, POWDER, FOR SOLUTION INTRAVENOUS at 05:25

## 2022-12-29 RX ADMIN — Medication: at 21:36

## 2022-12-29 RX ADMIN — DEXTROSE AND SODIUM CHLORIDE 50 ML/HR: 5; 450 INJECTION, SOLUTION INTRAVENOUS at 11:31

## 2022-12-29 RX ADMIN — HEPARIN SODIUM 5000 UNITS: 5000 INJECTION INTRAVENOUS; SUBCUTANEOUS at 17:00

## 2022-12-29 RX ADMIN — CEFTRIAXONE 2 G: 2 INJECTION, POWDER, FOR SOLUTION INTRAMUSCULAR; INTRAVENOUS at 21:38

## 2022-12-29 NOTE — OP NOTE
ENT Office Consult Note     Date of Consult: 2022     Patient Name: Zaida Yates  MRN: [id number]   : 1993     Referring Provider: No ref. provider found    Care Team: Patient Care Team:  Liset Hunt APRN as PCP - General (Family Medicine)     Chief Complaint:    Chief Complaint   Patient presents with   • New Patient   • Ear Problem     Busted ear drum       History of Present Illness: Zaida Yates is a 28 y.o. female who presents today for evaluation of acute right otitis media with subsequent perforation and otorrhea following an upper respiratory tract infection which began 5 days ago.  2 days ago she was placed on cefdinir 300 mg twice daily and a Medrol Dosepak.  Of significance this is the same area that she had a tympanoplasty performed by Dr. Cortez in 2016.  Her last audiogram in 2018 and apparently did show improvement in her hearing in that ear.  Of significance Mr. Chavarriaacet as many as 6 sets of ear tubes all prior to the tympanoplasty in 2016.  She basically has had a lifelong history of recurrent otitis media.    Subjective      Review of Systems:   Review of Systems   Constitutional: Positive for fatigue and fever.   HENT: Positive for ear discharge, ear pain and hearing loss.    Respiratory: Positive for cough and wheezing.    Allergic/Immunologic: Positive for immunocompromised state.   Neurological: Positive for dizziness.   All other systems reviewed and are negative.     I have reviewed and confirmed the accuracy of the ROS as documented by the MA/LPN/RN Lester Erazo MD     Pertinent items are noted in HPI.     Past Medical History:   Past Medical History:   Diagnosis Date   • Anxiety    • Anxiety    • Depression    • Headache    • HL (hearing loss)    • POTS (postural orthostatic tachycardia syndrome)        Past Surgical History:   Past Surgical History:   Procedure Laterality Date   • OTHER SURGICAL HISTORY      Malious bone left ear   • TONSILLECTOMY   .SHOULDER ARTHROSCOPY INCISION AND DRAINAGE  Procedure Report    Patient Name:  Jaime Bar  YOB: 1955    Date of Surgery:  12/28/2022     Indications: This is a 67 y.o. male with pain to the bilateral shoulders.  Work-up consistent with bilateral septic arthritis.Treatment options were discussed.  They desired to proceed with shoulder arthroscopy with rotator cuff repair after discussing the risks including bleeding, scarring, infection, stiffness, nerve damage, tendon damage, artery damage, continued pain, DVT, loss of life or limb, and a need for further surgery.      Pre-op Diagnosis:   Bilateral shoulder septic arthritis       Post-op Diagnosis:    Same    Procedure/CPT® Codes:87617-58    Procedure(s):  Bilateral shoulder arthroscopy with extensive debridement    Assistant: Nishant Duarte first assist    was responsible for performing the following activities: Retraction, Suction, Irrigation, Suturing, Closing and Placing Dressing and their skilled assistance was necessary for the success of this case.         Anesthesia: General    IV fluids: See anesthesia record    Estimated Blood Loss: minimal    Implants:    2 drains bilateral      Complications: None    Specimens:none    Description of Procedure: The patient's operative site was marked.  .  They were brought to the operating room and placed  on the operating room table.  General anesthesia was administered. Antibiotics were dosed on the floor.  A timeout was taken, confirming the correct operative site and procedure..  They were placed in a semilateral position for the right shoulder first.  An axillary roll and SCDs were placed.  The right shoulder was prepped and draped in the standard surgical fashion.    A posterior portal was created.  A camera was inserted.  Complete destruction of the glenohumeral joint was noted with purulent material synovitis throughout there was chronic changes of the humeral head and glenoid consistent with  "12/19/2016       Family History:   Family History   Problem Relation Age of Onset   • Breast cancer Maternal Aunt 30   • Hypertension Mother    • Thyroid disease Mother    • Heart disease Father    • Heart attack Father    • Stroke Father    • Diabetes Father    • Neuropathy Father    • Ovarian cancer Neg Hx    • Colon cancer Neg Hx    • BRCA 1/2 Neg Hx        Social History:   Social History     Socioeconomic History   • Marital status: Single   Tobacco Use   • Smoking status: Never Smoker   • Smokeless tobacco: Never Used   Substance and Sexual Activity   • Alcohol use: No   • Drug use: No   • Sexual activity: Never     Comment: wearing nuva ring insert       Medications:     Current Outpatient Medications:   •  fexofenadine (ALLEGRA) 180 MG tablet, , Disp: , Rfl:   •  FLUoxetine (PROzac) 20 MG capsule, Take 20 mg by mouth Daily., Disp: , Rfl:   •  fluticasone (FLONASE) 50 MCG/ACT nasal spray, , Disp: , Rfl:   •  sodium chloride 1 g tablet, Take 1 tablet by mouth 2 (Two) Times a Day With Meals., Disp: 60 tablet, Rfl: 11  •  traZODone (DESYREL) 50 MG tablet, Take 50 mg by mouth., Disp: , Rfl:     Allergies:   No Known Allergies    Objective     Physical Exam:  Vital Signs:   Vitals:    05/13/22 1329   BP: 116/74   Pulse: 84   SpO2: 98%   Weight: 110 kg (242 lb 12.8 oz)   Height: 160 cm (63\")     Body mass index is 43.01 kg/m².     General Appearance:  Alert, cooperative, in no acute distress   Head:  Normocephalic, without obvious abnormality, atraumatic   Eyes:          Conjunctivae and sclerae normal, PERRLA   Ears:   Left tympanic membrane is intact and normal in appearance; right tympanic membrane is atrophic and acutely inflamed with some seropurulent drainage which I microscopically debrided from the ear   Nose:  Pink nasal mucosa with   Throat:  Status post tonsillectomy with minimal postnasal drainage   Fiberoptic Exam:  Deferred   Neck:  Right level 2 lymphadenopathy   Lungs:   Clear to " chronic infection several small flaps of bone were present throughout the humeral head and glenoid which were also removed along with apparent material and synovitis rotator cuff completely absent 9 L of fluid ran through the shoulder and subacromial space and 2 drains placed   Patient was then flipped for the contralateral procedure in standard fashion.  Draping had to be modified due to multiple lines present on the left upper arm.  Again there was complete massive destruction of both joints likely consistent with a more chronic infection rotator cuff completely absent not as much bone changes on the glenoid and humeral head is the contralateral extremity but there were several large flaps of cartilage that were delaminated from the humeral head which were removed.  2 drains were placed     sterile dressing was applied to the rest of the shoulder.  They were placed in a sling and taken to the recovery room.  There were no complications.  I was present for all portions.  All counts were correct.  Good capillary refill was noted to the hand.      Nikunj Velez MD     Date: 12/29/2022  Time: 09:00 EST         auscultation,respirations regular, wilda and unlabored      Heart:  Regular rhythm and normal rate, normal S1 and S2, no       murmur, no gallop, no rub, no click   Pulses: Pulses palpable and equal bilaterally   Skin: No bleeding, bruising or rash   Lymph nodes: No palpable adenopathy   Neurologic: Cranial nerves 2 - 12 grossly intact        Results Review:   Labs:     Imaging: No Images in the past 120 days found..      Assessment / Plan      Assessment/Plan:   Diagnoses and all orders for this visit:    1. Recurrent acute suppurative otitis media of right ear with spontaneous rupture of tympanic membrane (Primary)    2. Obstruction of both eustachian tubes         Lilli is a pleasant 28-year-old with longstanding recurrent acute otitis media.  Over the years she has required as many as 6 sets of ear tubes.  She underwent a right tympanoplasty by my partner Dr. Cortez in 2016.  She then had done reasonably well until recently when she developed an upper respiratory tract infection and 4 days ago developed severe otalgia in the right ear and ultimately a spontaneous TM perforation with otorrhea.  2 days ago she was placed on cefdinir 300 mg twice daily and a Medrol Dosepak.  I have debrided the right ear and would like tear to use some Ciprodex drops twice daily for the next 3 days.  She likely will will require a second week of the cefdinir and I will call this in.  I will see her back in our ENT clinic at [facility] on 6/1/2022.  I we will determine at that point if she needs any more antibiotics and will arrange at some point for us to update her hearing test.      Follow Up:   No follow-ups on file.    Time:    Discussed plan of care in detail with patient today. Patient verbally understands and agrees. I have spent and counseled for approximately 30 minutes face to face, with greater than 50 % of the time counseling.     Lester Erazo MD

## 2022-12-29 NOTE — PROGRESS NOTES
"                                                                                                                                      Nephrology  Progress Note                                        [facility]    Patient Identification    Name: Jaime Bar  Age: 67 y.o.  Sex: male  :  1955  MRN: [id number]      DATE OF SERVICE:  2022        Subective    Events noted  Patient had surgery yesterday for his pleural effusion intubated and remained on the vent  Good urine output      Objective   Scheduled Meds:cefTRIAXone, 2 g, Intravenous, Q24H  midodrine, 10 mg, Oral, Q8H  pantoprazole, 40 mg, Intravenous, Q AM  sodium chloride, 10 mL, Intravenous, Q12H          Continuous Infusions:phenylephrine, 0.5-3 mcg/kg/min, Last Rate: 1 mcg/kg/min (22 0739)  propofol, 5-50 mcg/kg/min, Last Rate: Stopped (22)        PRN Meds:•  acetaminophen **OR** acetaminophen  •  aluminum-magnesium hydroxide-simethicone  •  dextrose  •  dextrose  •  fentanyl  •  glucagon (human recombinant)  •  Morphine  •  ondansetron **OR** ondansetron  •  sodium chloride  •  sodium chloride  •  sodium chloride     Exam:  /64   Pulse 95   Temp 98.7 °F (37.1 °C) (Axillary)   Resp 25   Ht 167.6 cm (66\")   Wt 87.8 kg (193 lb 9 oz)   SpO2 98%   BMI 31.24 kg/m²     Intake/Output last 3 shifts:  I/O last 3 completed shifts:  In: 1329.6 [I.V.:1329.6]  Out: 2710 [Urine:2500; Drains:210]    Intake/Output this shift:  No intake/output data recorded.    Physical exam:  General Appearance: Confused  Head:  Normocephalic, without obvious abnormality, atraumatic  Eyes:  PERRL, conjunctiva/corneas clear     Neck:  Supple,  no adenopathy;      Lungs:  Decreased BS occasion ronchi  Heart:  Regular rate and rhythm, S1 and S2 normal  Abdomen:  Soft, non-tender, bowel sounds active   Extremities: trace edema  Pulses: 2+ and symmetric all extremities  Skin:  No rashes or lesions       Data Review:  All labs " (24hrs):   Recent Results (from the past 24 hour(s))   Calcium, Ionized    Collection Time: 12/28/22  8:39 AM    Specimen: Blood   Result Value Ref Range    Ionized Calcium 1.17 (L) 1.20 - 1.30 mmol/L   Comprehensive Metabolic Panel    Collection Time: 12/28/22  8:39 AM    Specimen: Blood   Result Value Ref Range    Glucose 82 65 - 99 mg/dL    BUN 59 (H) 8 - 23 mg/dL    Creatinine 1.36 (H) 0.76 - 1.27 mg/dL    Sodium 130 (L) 136 - 145 mmol/L    Potassium 3.9 3.5 - 5.2 mmol/L    Chloride 97 (L) 98 - 107 mmol/L    CO2 20.0 (L) 22.0 - 29.0 mmol/L    Calcium 8.5 (L) 8.6 - 10.5 mg/dL    Total Protein 5.0 (L) 6.0 - 8.5 g/dL    Albumin 2.4 (L) 3.5 - 5.2 g/dL    ALT (SGPT) 30 1 - 41 U/L    AST (SGOT) 44 (H) 1 - 40 U/L    Alkaline Phosphatase 83 39 - 117 U/L    Total Bilirubin 4.6 (H) 0.0 - 1.2 mg/dL    Globulin 2.6 gm/dL    A/G Ratio 0.9 g/dL    BUN/Creatinine Ratio 43.4 (H) 7.0 - 25.0    Anion Gap 13.0 5.0 - 15.0 mmol/L    eGFR 57.0 (L) >60.0 mL/min/1.73   Magnesium    Collection Time: 12/28/22  8:39 AM    Specimen: Blood   Result Value Ref Range    Magnesium 2.1 1.6 - 2.4 mg/dL   Phosphorus    Collection Time: 12/28/22  8:39 AM    Specimen: Blood   Result Value Ref Range    Phosphorus 2.9 2.5 - 4.5 mg/dL   CBC Auto Differential    Collection Time: 12/28/22  8:39 AM    Specimen: Blood   Result Value Ref Range    WBC 6.20 3.40 - 10.80 10*3/mm3    RBC 3.57 (L) 4.14 - 5.80 10*6/mm3    Hemoglobin 11.5 (L) 13.0 - 17.7 g/dL    Hematocrit 34.2 (L) 37.5 - 51.0 %    MCV 95.7 79.0 - 97.0 fL    MCH 32.3 26.6 - 33.0 pg    MCHC 33.8 31.5 - 35.7 g/dL    RDW 15.4 12.3 - 15.4 %    RDW-SD 50.8 37.0 - 54.0 fl    MPV 7.9 6.0 - 12.0 fL    Platelets 33 (C) 140 - 450 10*3/mm3   Sedimentation Rate    Collection Time: 12/28/22  8:39 AM    Specimen: Blood   Result Value Ref Range    Sed Rate 39 (H) 0 - 20 mm/hr   Scan Slide    Collection Time: 12/28/22  8:39 AM    Specimen: Blood   Result Value Ref Range    Scan Slide     Manual Differential     Collection Time: 12/28/22  8:39 AM    Specimen: Blood   Result Value Ref Range    Neutrophil % 72.0 42.7 - 76.0 %    Lymphocyte % 1.0 (L) 19.6 - 45.3 %    Monocyte % 1.0 (L) 5.0 - 12.0 %    Eosinophil % 1.0 0.3 - 6.2 %    Bands %  22.0 (H) 0.0 - 5.0 %    Metamyelocyte % 2.0 (H) 0.0 - 0.0 %    Myelocyte % 1.0 (H) 0.0 - 0.0 %    Neutrophils Absolute 5.83 1.70 - 7.00 10*3/mm3    Lymphocytes Absolute 0.06 (L) 0.70 - 3.10 10*3/mm3    Monocytes Absolute 0.06 (L) 0.10 - 0.90 10*3/mm3    Eosinophils Absolute 0.06 0.00 - 0.40 10*3/mm3    RBC Morphology Normal Normal    Toxic Granulation Mod/2+ None Seen    Vacuolated Neutrophils Slight/1+ None Seen    Platelet Estimate Decreased Normal   POC Glucose Once    Collection Time: 12/28/22 11:05 AM    Specimen: Blood   Result Value Ref Range    Glucose 88 70 - 105 mg/dL   Adult Transesophageal Echo ([facility] DELIA) W/ Cont if Necessary Per Protocol (Cardiology Department)    Collection Time: 12/28/22  1:36 PM   Result Value Ref Range    Target HR (85%) 130 bpm    Max. Pred. HR (100%) 153 bpm    BH CV ECHO SHUNT ASSESSMENT PERFORMED (HIDDEN SCRIPTING) 1    Type & Screen    Collection Time: 12/28/22  3:59 PM    Specimen: Blood   Result Value Ref Range    ABO Type A     RH type Positive     Antibody Screen Negative     T&S Expiration Date 12/31/2022 11:59:59 PM    POC Glucose Once    Collection Time: 12/28/22  4:42 PM    Specimen: Blood   Result Value Ref Range    Glucose 90 70 - 105 mg/dL   Prepare Platelet Pheresis, 1 Units    Collection Time: 12/28/22  5:31 PM   Result Value Ref Range    Product Code J2996L23     Unit Number [id number]     UNIT  ABO A     UNIT  RH POS     Dispense Status IS     Blood Expiration Date [id number]     Blood Type Barcode 6200    POC Glucose Once    Collection Time: 12/28/22 11:28 PM    Specimen: Blood   Result Value Ref Range    Glucose 33 (L) 70 - 105 mg/dL   POC Glucose Once    Collection Time: 12/29/22 12:01 AM    Specimen: Blood   Result Value Ref Range     Glucose 130 (H) 70 - 105 mg/dL   Calcium, Ionized    Collection Time: 12/29/22 12:02 AM    Specimen: Blood   Result Value Ref Range    Ionized Calcium 1.21 1.20 - 1.30 mmol/L   Comprehensive Metabolic Panel    Collection Time: 12/29/22 12:02 AM    Specimen: Blood   Result Value Ref Range    Glucose 137 (H) 65 - 99 mg/dL    BUN 59 (H) 8 - 23 mg/dL    Creatinine 1.17 0.76 - 1.27 mg/dL    Sodium 130 (L) 136 - 145 mmol/L    Potassium 3.9 3.5 - 5.2 mmol/L    Chloride 99 98 - 107 mmol/L    CO2 20.0 (L) 22.0 - 29.0 mmol/L    Calcium 8.6 8.6 - 10.5 mg/dL    Total Protein 4.9 (L) 6.0 - 8.5 g/dL    Albumin 2.6 (L) 3.5 - 5.2 g/dL    ALT (SGPT) 36 1 - 41 U/L    AST (SGOT) 62 (H) 1 - 40 U/L    Alkaline Phosphatase 88 39 - 117 U/L    Total Bilirubin 4.6 (H) 0.0 - 1.2 mg/dL    Globulin 2.3 gm/dL    A/G Ratio 1.1 g/dL    BUN/Creatinine Ratio 50.4 (H) 7.0 - 25.0    Anion Gap 11.0 5.0 - 15.0 mmol/L    eGFR 68.3 >60.0 mL/min/1.73   Magnesium    Collection Time: 12/29/22 12:02 AM    Specimen: Blood   Result Value Ref Range    Magnesium 2.0 1.6 - 2.4 mg/dL   Phosphorus    Collection Time: 12/29/22 12:02 AM    Specimen: Blood   Result Value Ref Range    Phosphorus 4.3 2.5 - 4.5 mg/dL   CBC Auto Differential    Collection Time: 12/29/22 12:02 AM    Specimen: Blood   Result Value Ref Range    WBC 14.20 (H) 3.40 - 10.80 10*3/mm3    RBC 3.59 (L) 4.14 - 5.80 10*6/mm3    Hemoglobin 11.4 (L) 13.0 - 17.7 g/dL    Hematocrit 34.6 (L) 37.5 - 51.0 %    MCV 96.5 79.0 - 97.0 fL    MCH 31.6 26.6 - 33.0 pg    MCHC 32.8 31.5 - 35.7 g/dL    RDW 15.5 (H) 12.3 - 15.4 %    RDW-SD 52.1 37.0 - 54.0 fl    MPV 9.0 6.0 - 12.0 fL    Platelets 46 (C) 140 - 450 10*3/mm3   Scan Slide    Collection Time: 12/29/22 12:02 AM    Specimen: Blood   Result Value Ref Range    Scan Slide     Manual Differential    Collection Time: 12/29/22 12:02 AM    Specimen: Blood   Result Value Ref Range    Neutrophil % 69.0 42.7 - 76.0 %    Lymphocyte % 1.0 (L) 19.6 - 45.3 %     Monocyte % 5.0 5.0 - 12.0 %    Bands %  20.0 (H) 0.0 - 5.0 %    Metamyelocyte % 4.0 (H) 0.0 - 0.0 %    Atypical Lymphocyte % 1.0 0.0 - 5.0 %    Neutrophils Absolute 12.64 (H) 1.70 - 7.00 10*3/mm3    Lymphocytes Absolute 0.28 (L) 0.70 - 3.10 10*3/mm3    Monocytes Absolute 0.71 0.10 - 0.90 10*3/mm3    Elaine Cells Slight/1+ None Seen    Poikilocytes Slight/1+ None Seen    WBC Morphology Normal Normal    Platelet Estimate Decreased Normal    Large Platelets Slight/1+ None Seen   Blood Gas, Arterial -    Collection Time: 12/29/22 12:38 AM    Specimen: Arterial Blood   Result Value Ref Range    Site Arterial Line     Patrick's Test N/A     pH, Arterial 7.230 (L) 7.350 - 7.450 pH units    pCO2, Arterial 50.5 (H) 35.0 - 48.0 mm Hg    pO2, Arterial 355.5 (H) 83.0 - 108.0 mm Hg    HCO3, Arterial 21.1 21.0 - 28.0 mmol/L    Base Excess, Arterial -6.6 (L) 0.0 - 3.0 mmol/L    O2 Saturation, Arterial 99.9 (H) 94.0 - 98.0 %    CO2 Content 22.7 22 - 29 mmol/L    Barometric Pressure for Blood Gas      Modality Adult Vent     FIO2 90 %    Ventilator Mode ;AC     Set Tidal Volume 500     PEEP 5     Hemodilution No     Respiratory Rate 16    Blood Gas, Arterial -    Collection Time: 12/29/22  4:27 AM    Specimen: Arterial Blood   Result Value Ref Range    Site Arterial Line     Patrick's Test N/A     pH, Arterial 7.297 (L) 7.350 - 7.450 pH units    pCO2, Arterial 39.1 35.0 - 48.0 mm Hg    pO2, Arterial 114.4 (H) 83.0 - 108.0 mm Hg    HCO3, Arterial 19.1 (L) 21.0 - 28.0 mmol/L    Base Excess, Arterial -6.9 (L) 0.0 - 3.0 mmol/L    O2 Saturation, Arterial 98.0 94.0 - 98.0 %    CO2 Content 20.3 (L) 22 - 29 mmol/L    Barometric Pressure for Blood Gas      Modality Adult Vent     FIO2 40 %    Ventilator Mode ;AC     Set Tidal Volume 500     PEEP 5     Hemodilution No     Respiratory Rate 20    ECG 12 Lead Drug Monitoring; Amiodarone    Collection Time: 12/29/22  5:14 AM   Result Value Ref Range    QT Interval 381 ms   Comprehensive Metabolic Panel     Collection Time: 12/29/22  5:41 AM    Specimen: Blood   Result Value Ref Range    Glucose 90 65 - 99 mg/dL    BUN 65 (H) 8 - 23 mg/dL    Creatinine 1.66 (H) 0.76 - 1.27 mg/dL    Sodium 134 (L) 136 - 145 mmol/L    Potassium 4.5 3.5 - 5.2 mmol/L    Chloride 100 98 - 107 mmol/L    CO2 19.0 (L) 22.0 - 29.0 mmol/L    Calcium 8.7 8.6 - 10.5 mg/dL    Total Protein 5.1 (L) 6.0 - 8.5 g/dL    Albumin 2.7 (L) 3.5 - 5.2 g/dL    ALT (SGPT) 117 (H) 1 - 41 U/L    AST (SGOT) 238 (H) 1 - 40 U/L    Alkaline Phosphatase 108 39 - 117 U/L    Total Bilirubin 5.3 (H) 0.0 - 1.2 mg/dL    Globulin 2.4 gm/dL    A/G Ratio 1.1 g/dL    BUN/Creatinine Ratio 39.2 (H) 7.0 - 25.0    Anion Gap 15.0 5.0 - 15.0 mmol/L    eGFR 44.9 (L) >60.0 mL/min/1.73   CBC Auto Differential    Collection Time: 12/29/22  5:41 AM    Specimen: Blood   Result Value Ref Range    WBC 21.10 (H) 3.40 - 10.80 10*3/mm3    RBC 3.75 (L) 4.14 - 5.80 10*6/mm3    Hemoglobin 12.0 (L) 13.0 - 17.7 g/dL    Hematocrit 35.7 (L) 37.5 - 51.0 %    MCV 95.1 79.0 - 97.0 fL    MCH 31.9 26.6 - 33.0 pg    MCHC 33.6 31.5 - 35.7 g/dL    RDW 15.9 (H) 12.3 - 15.4 %    RDW-SD 56.0 (H) 37.0 - 54.0 fl    MPV 9.3 6.0 - 12.0 fL    Platelets 57 (L) 140 - 450 10*3/mm3   Scan Slide    Collection Time: 12/29/22  5:41 AM    Specimen: Blood   Result Value Ref Range    Scan Slide     Manual Differential    Collection Time: 12/29/22  5:41 AM    Specimen: Blood   Result Value Ref Range    Neutrophil % 67.0 42.7 - 76.0 %    Lymphocyte % 1.0 (L) 19.6 - 45.3 %    Monocyte % 3.0 (L) 5.0 - 12.0 %    Bands %  29.0 (H) 0.0 - 5.0 %    Neutrophils Absolute 20.26 (H) 1.70 - 7.00 10*3/mm3    Lymphocytes Absolute 0.21 (L) 0.70 - 3.10 10*3/mm3    Monocytes Absolute 0.63 0.10 - 0.90 10*3/mm3    Anisocytosis Slight/1+ None Seen    Poikilocytes Slight/1+ None Seen    Dohle Bodies Present None Seen    Toxic Granulation Slight/1+ None Seen    Platelet Morphology Normal Normal          Imaging:  XR Shoulder 2+ View  Right    Result Date: 12/27/2022   1. Severe degenerative changes of the glenohumeral joint as well as a high riding humeral head, suggestive of chronic rotator cuff pathology. No definite evidence of osteomyelitis is identified. However plain films cannot exclude septic arthritis.  Electronically Signed By-Juan Francisco Lozoya MD On:12/27/2022 1:42 PM This report was finalized on [id number] by  Juan Francisco Lozoya MD.    XR Chest 1 View    Result Date: 12/29/2022  The endotracheal tube is approximately 6 mm above the sarah. Recommend repositioning. Left subclavian central venous catheter has its catheter tip overlying the superior vena cava. The cardiac silhouette is moderately to significantly enlarged and the pulmonary vessels are within normal limits. There is no focal area of consolidation otherwise seen. Electronically signed by:  Los Mccoy D.O.  12/28/2022 11:13 PM Mountain Time    XR Chest 1 View    Result Date: 12/27/2022   1. Persistent diffuse interstitial opacities. Differential includes interstitial pulmonary edema, or atypical infection.  Electronically Signed By-Juan Francisco Lozoya MD On:12/27/2022 8:46 AM This report was finalized on [id number] by  Juan Francisco Lozoya MD.    XR Chest 1 View    Result Date: 12/26/2022    1.  Interval placement of left-sided PICC line with the tip projecting over the superior vena cava. 2.  No change in coarsened reticular interstitial markings throughout both lungs.  No new airspace consolidation or pleural effusion.  Electronically Signed By-Los Diop MD On:12/26/2022 4:56 PM This report was finalized on [id number] by  Los Diop MD.    XR Chest 1 View    Result Date: 12/26/2022  1.  Coarsened airspace, bronchovascular thickening.  Consider small airways disease (bronchitis, asthma), edema, atypical/viral infection, aspiration. 2.  Heart size is normal. 3.  No acute bony findings. Electronically signed by:  Bernadette Huddleston M.D.  12/26/2022 4:56 AM Mountain  Time    MRI Shoulder Right Without Contrast    Result Date: 12/28/2022  Impression: 1. Moderate-sized joint effusion with advanced degenerative changes and destructive changes of the glenohumeral joint. Findings may be related to septic arthritis given clinical history. Fluid aspiration is recommended for confirmation. Joint effusion with subcoracoid bursitis may also be reactive and related to severe osteoarthritis. 2. Suspected complete tears of the supraspinatus and infraspinatus tendons. 3. Completely macerated appearance of the labrum. 4. Moderate acromial clavicular osteoarthritis. Electronically Signed: Linnette Pacheco  12/28/2022 10:08 AM EST  Workstation ID: [id number]    MRI Shoulder Left Without Contrast    Result Date: 12/28/2022  Impression: Very large left-sided joint effusion with destructive changes of the glenohumeral joint with deformity of the glenoid likely related to septic arthritis. Fluid sampling is recommended for further characterization. Ancillary findings as described above. Electronically Signed: Linnette Pacheco  12/28/2022 10:06 AM EST  Workstation ID: [id number]      Assessment/Plan:     Hyponatremia    ACC/AHA stage C chronic systolic heart failure (HCC)    Cardiac amyloidosis (HCC)    Primary hypertension    Tobacco abuse    Cervicalgia, spine surgery 2017    Primary osteoarthritis involving multiple joints   hyponatremia improving   Acute kidney injury on chronic kidney disease improving  Atrial fibrillation with rapid ventricular response  Metabolic acidosis  Urinary retention   Sepsis   Hypocalcemia   Hyperkalemia      creatinine up to 1.6   Slightly more acidotic 19 with a sodium of 134  Likely due to temporary hypotension during surgery yesterday  Continue IV fluid  Recheck labs this afternoon

## 2022-12-29 NOTE — PROGRESS NOTES
[facility] Critical Care Medicine Services   Daily ICU Progress Note    Patient Name: Jaime Bar  : 1955  MRN: [id number]  Primary Care Physician:  Provider, No Known  Date of admission: 2022  Date of Service: 2022      Subjective      Interval History:    Patient seen and examined this morning  Intubated yesterday for the surgery  Sedated on mechanical ventilation  Started on pressors  Family at the bedside      Objective      Vitals:   Temp:  [98 °F (36.7 °C)-101.9 °F (38.8 °C)] 98.7 °F (37.1 °C)  Heart Rate:  [] 105  Resp:  [25] 25  BP: ()/(59-86) 129/70  Arterial Line BP: ()/(46-60) 103/55  FiO2 (%):  [40 %-90 %] 40 %    Hemodynamic Parameters for last 24 hours       Input/Output:    Intake/Output Summary (Last 24 hours) at 2022 1126  Last data filed at 2022 0400  Gross per 24 hour   Intake 616.61 ml   Output 1560 ml   Net -943.39 ml       PHYSICAL EXAM:    General: Intubated mechanical ventilation   eyes:  Pupils are equal, round and reactive to light, Extraocular movements are intacts, Normal conjunctiva, vision unchanged    HENT:  Normocephalic, atraumatic   Respiratory: Decrease breathing sounds anteriorly bilaterally, No wheezing  Cardiovascular: Regular rate, Regular, S1 auscultate, S2 auscultated , No edema   Gastrointestinal: Soft, Non-tender, Non-distended, Normal bowel sounds  Musculoskeletal: No tenderness   Neurologic: Intubated unable to assess   psychiatric: Unable to assess now       MEDS:  Medication Administration Record reviewed today    Scheduled meds:    cefTRIAXone, 2 g, Intravenous, Q24H  heparin (porcine), 5,000 Units, Subcutaneous, Q8H  midodrine, 10 mg, Oral, Q8H  pantoprazole, 40 mg, Intravenous, Q AM  sodium chloride, 10 mL, Intravenous, Q12H          Drips:    phenylephrine, 0.5-3 mcg/kg/min, Last Rate: 1 mcg/kg/min (22 0874)  propofol, 5-50 mcg/kg/min, Last Rate: Stopped (22 0538)        PRNs Meds:    •   acetaminophen **OR** acetaminophen  •  aluminum-magnesium hydroxide-simethicone  •  dextrose  •  dextrose  •  fentanyl  •  glucagon (human recombinant)  •  Morphine  •  ondansetron **OR** ondansetron  •  sodium chloride  •  sodium chloride  •  sodium chloride        Labs Results:    No results found for: PHVEN, NWY5XNA, PO2VEN, DLV8TIZ, X2JVGOHY    Lab Results   Component Value Date    LACTATE 1.9 12/27/2022    LACTATE 1.9 12/26/2022    LACTATE 2.5 (C) 12/26/2022       @(TSH)@    Lab Results   Component Value Date    WBC 21.10 (H) 12/29/2022    HGB 12.0 (L) 12/29/2022    PLT 57 (L) 12/29/2022        Lab Results   Component Value Date     (L) 12/29/2022    K 4.5 12/29/2022     12/29/2022    CO2 19.0 (L) 12/29/2022     Lab Results   Component Value Date    BUN 65 (H) 12/29/2022     No components found for: CREATINNE  Lab Results   Component Value Date    GLUCOSE 90 12/29/2022       @RESUFAST(BNP,PT,INR,PTT)    Lab Results   Component Value Date     (H) 12/29/2022     (H) 12/29/2022    ALKPHOS 108 12/29/2022    BILITOT 5.3 (H) 12/29/2022    ALBUMIN 2.7 (L) 12/29/2022       Lab Results   Component Value Date    BLOODCX Staphylococcus aureus (C) 12/27/2022       No results found for: URINECX    Wounds (last 24 hours)     LDA Wound     Row Name 12/29/22 0802 12/29/22 0408 12/29/22 0030       Wound 12/28/22 1856 Right shoulder Incision    Wound - Properties Group Placement Date: 12/28/22  -HB Placement Time: 1856 -HB Side: Right  -HB Location: shoulder  -HB Primary Wound Type: Incision  -HB    Dressing Appearance dry;intact;no drainage  -MP -- --    Closure KATHRYN  -MP Tape  -QJ Tape  -QJ    Base dressing in place, unable to visualize  -MP dressing in place, unable to visualize  -QJ dressing in place, unable to visualize  -QJ    Retired Wound - Properties Group Placement Date: 12/28/22  -HB Placement Time: 1856  -HB Side: Right  -HB Location: shoulder  -HB Primary Wound Type: Incision  -HB     Retired Wound - Properties Group Date first assessed: 12/28/22  -HB Time first assessed: 1856 -HB Side: Right  -HB Location: shoulder  -HB Primary Wound Type: Incision  -HB       Wound 12/28/22 1940 Left shoulder Incision    Wound - Properties Group Placement Date: 12/28/22  -HB Placement Time: 1940 -HB Side: Left  -HB Location: shoulder  -HB Primary Wound Type: Incision  -HB    Dressing Appearance dry;intact;no drainage  -MP -- --    Closure KATHRYN  -MP Tape  -QJ Tape  -QJ    Base dressing in place, unable to visualize  -MP dressing in place, unable to visualize  -QJ dressing in place, unable to visualize  -QJ    Retired Wound - Properties Group Placement Date: 12/28/22  -HB Placement Time: 1940 -HB Side: Left  -HB Location: shoulder  -HB Primary Wound Type: Incision  -HB    Retired Wound - Properties Group Date first assessed: 12/28/22  -HB Time first assessed: 1940  -HB Side: Left  -HB Location: shoulder  -HB Primary Wound Type: Incision  -HB    Row Name 12/28/22 2100 12/28/22 1950 12/28/22 1913       Wound 12/28/22 1856 Right shoulder Incision    Wound - Properties Group Placement Date: 12/28/22  -HB Placement Time: 1856 -HB Side: Right  -HB Location: shoulder  -HB Primary Wound Type: Incision  -HB    Closure Tape  -QJ -- --    Base dressing in place, unable to visualize  -QJ -- --    Dressing Care -- -- dressing applied  steri strips,telfa, 4x4, abd, foam tape  -HB    Retired Wound - Properties Group Placement Date: 12/28/22  -HB Placement Time: 1856 -HB Side: Right  -HB Location: shoulder  -HB Primary Wound Type: Incision  -HB    Retired Wound - Properties Group Date first assessed: 12/28/22  -HB Time first assessed: 1856  -HB Side: Right  -HB Location: shoulder  -HB Primary Wound Type: Incision  -HB       Wound 12/28/22 1940 Left shoulder Incision    Wound - Properties Group Placement Date: 12/28/22  -HB Placement Time: 1940 -HB Side: Left  -HB Location: shoulder  -HB Primary Wound Type: Incision  -HB     Closure Tape  -QJ -- --    Base dressing in place, unable to visualize  -QJ -- --    Dressing Care -- dressing applied  telfa, 4x4, abd, foam tape  -HB --    Retired Wound - Properties Group Placement Date: 12/28/22  -HB Placement Time: 1940  -HB Side: Left  -HB Location: shoulder  -HB Primary Wound Type: Incision  -HB    Retired Wound - Properties Group Date first assessed: 12/28/22  -HB Time first assessed: 1940  -HB Side: Left  -HB Location: shoulder  -HB Primary Wound Type: Incision  -HB          User Key  (r) = Recorded By, (t) = Taken By, (c) = Cosigned By    Initials Name Provider Type    Carolyn Jay, RN Registered Nurse    Kathi Almeida RN Registered Nurse    Koffi Guzman RN Registered Nurse                    IMAGES:  Adult Transesophageal Echo (ALMA DELIA) W/ Cont if Necessary Per Protocol (Cardiology Department)  •  Left ventricular ejection fraction appears to be 46 - 50%.  •  Mild aortic valve stenosis is present.  •  Estimated right ventricular systolic pressure from tricuspid   regurgitation is normal (<35 mmHg).  •  All 4 cardiac valves were well visualized, no vegetations were seen, no   evidence of bacterial endocarditis.  Duplex Venous Lower Extremity - Bilateral CAR  •  There was deep venous valvular incompetence noted in the right   popliteal.  •  All other veins appeared normal bilaterally.  Duplex Venous Upper Extremity - Left CAR  •  Acute catheter-associated deep vein thrombosis in the left axillary   vein.  •  Acute superficial vein thrombosis in the left basilic vein in the upper   arm.  •  All other left sided vessels appear normal.  XR Chest 1 View  Narrative: Chest one view.    DATE: 12/29/2022.    COMPARISON: 12/26/2022.    CLINICAL HISTORY: Endotracheal tube placement.  Impression: The endotracheal tube is approximately 6 mm above the sarah. Recommend repositioning. Left subclavian central venous catheter has its catheter tip overlying the superior vena  cava.    The cardiac silhouette is moderately to significantly enlarged and the pulmonary vessels are within normal limits.    There is no focal area of consolidation otherwise seen.    Electronically signed by:  Los Mccoy D.O.    12/28/2022 11:13 PM Mountain Time      Assessment & Plan        Active Hospital Problems:  Active Hospital Problems    Diagnosis    • **Hyponatremia    • Cervicalgia, spine surgery 2017    • Primary osteoarthritis involving multiple joints    • Cardiac amyloidosis (HCC)    • ACC/AHA stage C chronic systolic heart failure (HCC)    • Tobacco abuse    • Primary hypertension          Assessment and Plan:     1.  Septic shock  2.  Shoulder septic arthritis  3.  Bacteremia    Continue with ICU care  On pressors Miguelangel today keep map above 65  Blood culture showed MSSA patient on ceftriaxone  ID follow-up appreciated  Cardiology consulted for ALMA DELIA   Orthopedic consult appreciated s/p aspiration of the right shoulder which showed pus  S/p bilateral shoulders arthroscopy with irrigation and debridement     4.  A. fib with RVR  Cardiology following up  Of amiodarone drip   Telemetry    5.  STEVEN  Likely related to dehydration  Will monitor creatinine avoid nephrotoxins  Nephrology following up    6.  Hyponatremia likely related to dehydration improved sodium today 125 will monitor    7.  Acute hypoxic respiratory failure  8.  Ventilator dependent respiratory failure  Likely related to fluid overload and edema  I stopped IV fluids  On mechanical ventilation discussed setting with RT  Will wean sedation off and do weaning trial  Supplemental oxygen to keep saturation above 92  Echo reviewed showed diastolic dysfunction with a EF 60%    9.  Thrombocytopenia  Acute drop in platelets likely related to sepsis  Will consult hematology    9.  Right upper extremity swelling  Likely related to shoulder infection  Doppler showed superficial DVT plan to repeat Doppler to follow-up.    10.  Left upper extremity  DVT  Patient has low platelets  I will start subcu heparin  I will consult hematology      GI and DVT prophylaxis    Today's labs reviewed    Tele strip independently reviewwed; My interpretation is A. fib        Electronically signed by Pascual Hogan MD, 12/29/22, 11:26 EST.  [facility]ist Team

## 2022-12-29 NOTE — ANESTHESIA POSTPROCEDURE EVALUATION
Patient: Jaime Bar    Procedure Summary     Date: 12/28/22 Room / Location: [facility] OR 06 / [facility] MA[facility]    Anesthesia Start: 1827 Anesthesia Stop: 2030    Procedures:       SHOULDER ARTHROSCOPY INCISION AND DRAINAGE. (Right: Shoulder)      SHOULDER ARTHROSCOPY INCISION AND DRAINAGE (Left: Shoulder) Diagnosis:     Surgeons: Nikunj Velez MD Provider: Daniel Cuellar MD    Anesthesia Type: general ASA Status: 4          Anesthesia Type: general    Vitals  No vitals data found for the desired time range.          Post Anesthesia Care and Evaluation    Patient location during evaluation: ICU  Patient participation: complete - patient cannot participate  Level of consciousness: obtunded/minimal responses  Pain scale: See nurse's notes for pain score.  Pain management: adequate    Airway patency: patent  Anesthetic complications: No anesthetic complications  PONV Status: none  Cardiovascular status: acceptable  Respiratory status: acceptable, ETT and ventilator  Hydration status: acceptable    Comments: Patient seen and examined postoperatively; vital signs stable; SpO2 greater than or equal to 90%; cardiopulmonary status stable; nausea/vomiting adequately controlled; pain adequately controlled; no apparent anesthesia complications; patient discharged from anesthesia care when discharge criteria were met

## 2022-12-29 NOTE — PROGRESS NOTES
"     Patient ID: Jaime Bar is a 67 y.o. male.    Patient intubated    Objective:    /64   Pulse 95   Temp 98.7 °F (37.1 °C) (Axillary)   Resp 25   Ht 167.6 cm (66\")   Wt 87.8 kg (193 lb 9 oz)   SpO2 98%   BMI 31.24 kg/m²     Physical Examination:    Drains in place bilateral with bloody drainage dressing intact hands warm and perfused    Imaging:      Assessment:  Doing well after bilateral shoulder irrigation and debridements    Plan:  Continue antibiotics continue drains keep dressings on we will follow okay to begin blood thinners if medically indicated  "

## 2022-12-29 NOTE — ANESTHESIA PROCEDURE NOTES
Arterial Line      Patient reassessed immediately prior to procedure    Patient location during procedure: OR   Line placed for hemodynamic monitoring and ABGs/Labs/ISTAT.  Performed By   Anesthesiologist: Daniel Cuellar MD   Preanesthetic Checklist  Completed: patient identified, IV checked, site marked, risks and benefits discussed, surgical consent, monitors and equipment checked, pre-op evaluation and timeout performed  Arterial Line Prep    Sterile Tech: cap, gloves and mask  Prep: ChloraPrep  Patient monitoring: blood pressure monitoring, continuous pulse oximetry and EKG  Arterial Line Procedure   Laterality:right  Location:  radial artery  Catheter size: 20 G   Guidance: landmark technique and palpation technique  Number of attempts: 1  Successful placement: yes  Heparin (Porcine) in NaCl 1000-0.9 UT/500ML-% for arterial line pressure bag - Intra-arterial   1,000 Units - 12/28/2022 7:29:00 PM   Post Assessment   Dressing Type: occlusive dressing applied, secured with tape and wrist guard applied.   Complications no  Circ/Move/Sens Assessment: unchanged.   Patient Tolerance: patient tolerated the procedure well with no apparent complications  Additional Notes  Pre-procedure:  Patient identified; pre-procedure vital signs, all relevant labs/studies, complete medical/surgical/anesthetic history, full medication list, full allergy list, and NPO status obtained/reviewed; physical assessment performed; anesthetic options, side effects, potential complications, risks, and benefits discussed; questions answered; written anesthesia procedure consent obtained; patient cleared for procedure; IV access in situ    Procedure:  Arterial line placed after induction of general anesthesia; ASA monitor placed; patient positioned; hand hygiene performed; sterile technique maintained throughout the procedure; sterile prep and drape applied; insertion site determined by anatomical landmarks and palpation; needle placed  into the skin and advanced into the target artery with correct placement confirmed by return of arterial blood; wire slide into the target artery; arterial catheter threaded into the target artery over the needle/wire; needle/wire removed; correct catheter placement confirmed by transducing systemic arterial blood pressure; catheter secured with occlusive dressing and tape    Post-procedure:  Arterial catheter placed successfully; no apparent complications; vital signs stable throughout; minimal estimated blood loss

## 2022-12-29 NOTE — CASE MANAGEMENT/SOCIAL WORK
Continued Stay Note   Michael     Patient Name: Jaime Bar  MRN: [id number]  Today's Date: 12/29/2022    Admit Date: 12/26/2022    Plan: DC Plan: Anticipate home with family pending clinical course. Watch for home IV abx needs.   Discharge Plan     Row Name 12/29/22 1241       Plan    Plan DC Plan: Anticipate home with family pending clinical course. Watch for home IV abx needs.    Provided Post Acute Provider List? N/A    Provided Post Acute Provider Quality & Resource List? N/A    Plan Comments CM spoke with patient’s nurse, intensivist, and NP to obtain clinical updates. Patient remains on ventilator post procedure from 12/28/22. PLan for SBT and possible extubation today.CM will continue to follow for any additional needs that may develop and adjust discharge plan accordingly. DC Barriers: Vent, PICC, arterial line, PADMINI drain x4, Neosynepherine gtt, Propofol gtt, IV abx, abnormal labs, and Restraints.           Expected Discharge Date and Time     Expected Discharge Date Expected Discharge Time    Jan 5, 2023         Phone communication or documentation only- no physical contact with patient or family.      Melyssa Pascual RN     Office Phone: [phone]  Office Cell:     [phone]

## 2022-12-29 NOTE — PLAN OF CARE
Goal Outcome Evaluation:  Plan of Care Reviewed With: family        Progress: improving  Outcome Evaluation: Patient on vent today from surgical procedure yesterday evening. Sedation has been off since 0600 and he is slowly waking throughout the day. Plan is to SBT him when appropriate and assess him for extubation. D5 1/2NS started at 50mL due to NPO status and blood sugars beginning to trend down. Miguelangel was started for hypotension but has been titrated down throughout the day. VSS. PADMINI drains x4, PICC, a-line, and guillaume remain in place.

## 2022-12-29 NOTE — PROGRESS NOTES
Infectious Diseases Progress Note      LOS: 3 days   Patient Care Team:  Provider, No Known as PCP - Kyle Gardner MD as Consulting Physician (Nephrology)    Chief Complaint: On the ventilator    Subjective     The patient spiked fever up to 101.9.  He is s/p bilateral shoulder washout yesterday.  He is currently intubated on the ventilator.  He is on a low-dose of Miguelangel-Synephrine.    Review of Systems:   Review of Systems   Unable to perform ROS: Intubated   Constitutional: Positive for fever.        Objective     Vital Signs  Temp:  [98 °F (36.7 °C)-101.9 °F (38.8 °C)] 101 °F (38.3 °C)  Heart Rate:  [] 98  Resp:  [25] 25  BP: ()/(59-86) 129/70  Arterial Line BP: ()/(46-60) 118/60  FiO2 (%):  [40 %-90 %] 40 %    Physical Exam:  Physical Exam  Constitutional:       Comments: Intubated on the ventilator patient started to follow commands   HENT:      Head: Normocephalic and atraumatic.   Eyes:      Pupils: Pupils are equal, round, and reactive to light.   Cardiovascular:      Rate and Rhythm: Normal rate and regular rhythm.   Pulmonary:      Effort: Pulmonary effort is normal.      Breath sounds: Normal breath sounds.   Abdominal:      General: Abdomen is flat. Bowel sounds are normal.      Palpations: Abdomen is soft.   Musculoskeletal:      Cervical back: Neck supple.      Right lower leg: No edema.      Left lower leg: No edema.      Comments: Drain tubes are present in the right and left shoulders          Results Review:    I have reviewed all clinical data, test, lab, and imaging results.     Radiology  Adult Transesophageal Echo (ALMA DELIA) W/ Cont if Necessary Per Protocol (Cardiology Department)    Result Date: 12/29/2022  •  Left ventricular ejection fraction appears to be 46 - 50%. •  Mild aortic valve stenosis is present. •  Estimated right ventricular systolic pressure from tricuspid regurgitation is normal (<35 mmHg). •  All 4 cardiac valves were well visualized, no vegetations  were seen, no evidence of bacterial endocarditis.     XR Chest 1 View    Result Date: 12/29/2022  Chest one view. DATE: 12/29/2022. COMPARISON: 12/26/2022. CLINICAL HISTORY: Endotracheal tube placement.     The endotracheal tube is approximately 6 mm above the sarah. Recommend repositioning. Left subclavian central venous catheter has its catheter tip overlying the superior vena cava. The cardiac silhouette is moderately to significantly enlarged and the pulmonary vessels are within normal limits. There is no focal area of consolidation otherwise seen. Electronically signed by:  Los Mccoy D.O.  12/28/2022 11:13 PM Mountain Time    Duplex Venous Upper Extremity - Left CAR    Result Date: 12/29/2022  •  Acute catheter-associated deep vein thrombosis in the left axillary vein. •  Acute superficial vein thrombosis in the left basilic vein in the upper arm. •  All other left sided vessels appear normal.     Duplex Venous Lower Extremity - Bilateral CAR    Result Date: 12/29/2022  •  There was deep venous valvular incompetence noted in the right popliteal. •  All other veins appeared normal bilaterally.     Arterial Line    Result Date: 12/28/2022  Daniel Cuellar MD     12/28/2022  7:38 PM Arterial Line Patient reassessed immediately prior to procedure Patient location during procedure: OR Line placed for hemodynamic monitoring and ABGs/Labs/ISTAT. Performed By Anesthesiologist: Daniel Cuellar MD Preanesthetic Checklist Completed: patient identified, IV checked, site marked, risks and benefits discussed, surgical consent, monitors and equipment checked, pre-op evaluation and timeout performed Arterial Line Prep  Sterile Tech: cap, gloves and mask Prep: ChloraPrep Patient monitoring: blood pressure monitoring, continuous pulse oximetry and EKG Arterial Line Procedure Laterality:right Location:  radial artery Catheter size: 20 G Guidance: landmark technique and palpation technique Number of attempts: 1  Successful placement: yes Heparin (Porcine) in NaCl 1000-0.9 UT/500ML-% for arterial line pressure bag - Intra-arterial  1,000 Units - 12/28/2022 7:29:00 PM Post Assessment Dressing Type: occlusive dressing applied, secured with tape and wrist guard applied. Complications no Circ/Move/Sens Assessment: unchanged. Patient Tolerance: patient tolerated the procedure well with no apparent complications Additional Notes Pre-procedure: Patient identified; pre-procedure vital signs, all relevant labs/studies, complete medical/surgical/anesthetic history, full medication list, full allergy list, and NPO status obtained/reviewed; physical assessment performed; anesthetic options, side effects, potential complications, risks, and benefits discussed; questions answered; written anesthesia procedure consent obtained; patient cleared for procedure; IV access in situ Procedure: Arterial line placed after induction of general anesthesia; ASA monitor placed; patient positioned; hand hygiene performed; sterile technique maintained throughout the procedure; sterile prep and drape applied; insertion site determined by anatomical landmarks and palpation; needle placed into the skin and advanced into the target artery with correct placement confirmed by return of arterial blood; wire slide into the target artery; arterial catheter threaded into the target artery over the needle/wire; needle/wire removed; correct catheter placement confirmed by transducing systemic arterial blood pressure; catheter secured with occlusive dressing and tape Post-procedure: Arterial catheter placed successfully; no apparent complications; vital signs stable throughout; minimal estimated blood loss      Cardiology    Laboratory    Results from last 7 days   Lab Units 12/29/22  0541 12/29/22  0002 12/28/22  0839 12/27/22  0557 12/26/22  0435   WBC 10*3/mm3 21.10* 14.20* 6.20 9.10 7.20   HEMOGLOBIN g/dL 12.0* 11.4* 11.5* 11.0* 12.4*   HEMATOCRIT % 35.7* 34.6*  34.2* 33.0* 38.0   PLATELETS 10*3/mm3 57* 46* 33* 66* 99*     Results from last 7 days   Lab Units 12/29/22  0541 12/29/22  0002 12/28/22  0839 12/27/22  0742 12/26/22  1641 12/26/22  0435   SODIUM mmol/L 134* 130* 130* 125* 122* 118*   POTASSIUM mmol/L 4.5 3.9 3.9 4.3 4.3 5.4*   CHLORIDE mmol/L 100 99 97* 92* 91* 84*   CO2 mmol/L 19.0* 20.0* 20.0* 21.0* 19.0* 16.0*   BUN mg/dL 65* 59* 59* 64* 58* 55*   CREATININE mg/dL 1.66* 1.17 1.36* 2.25* 2.45* 2.38*   GLUCOSE mg/dL 90 137* 82 107* 87 75   ALBUMIN g/dL 2.7* 2.6* 2.4*  --   --  3.30*   BILIRUBIN mg/dL 5.3* 4.6* 4.6*  --   --  4.8*   ALK PHOS U/L 108 88 83  --   --  83   AST (SGOT) U/L 238* 62* 44*  --   --  26   ALT (SGPT) U/L 117* 36 30  --   --  17   CALCIUM mg/dL 8.7 8.6 8.5* 7.8* 7.7* 8.2*     Results from last 7 days   Lab Units 12/26/22  0435   CK TOTAL U/L 166     Results from last 7 days   Lab Units 12/28/22  0839   SED RATE mm/hr 39*         Microbiology   Microbiology Results (last 10 days)     Procedure Component Value - Date/Time    Body Fluid Culture - Body Fluid, Shoulder, Right [[id number]]  (Abnormal) Collected: 12/28/22 0713    Lab Status: Preliminary result Specimen: Body Fluid from Shoulder, Right Updated: 12/29/22 1047     Body Fluid Culture Heavy growth (4+) Staphylococcus aureus     Gram Stain Many (4+) WBCs seen      Many (4+) Gram positive cocci in clusters    Blood Culture - Blood, Arm, Left [[id number]]  (Abnormal) Collected: 12/27/22 1300    Lab Status: Final result Specimen: Blood from Arm, Left Updated: 12/29/22 0721     Blood Culture Staphylococcus aureus     Comment:   Infectious disease consultation is highly recommended to rule out distant foci of infection.        Isolated from Anaerobic Bottle     Gram Stain Anaerobic Bottle Gram positive cocci in clusters    Narrative:      Refer to previous blood culture collected on 12/26/2022 0435 for MICs    Less than seven (7) mL's of blood was collected.  Insufficient quantity may yield  false negative results.    Blood Culture - Blood, Hand, Right [[id number]]  (Abnormal)  (Susceptibility) Collected: 12/26/22 0435    Lab Status: Final result Specimen: Blood from Hand, Right Updated: 12/28/22 0657     Blood Culture Staphylococcus aureus     Comment:   Infectious disease consultation is highly recommended to rule out distant foci of infection.        Isolated from Aerobic and Anaerobic Bottles     Gram Stain Aerobic Bottle Gram positive cocci in clusters      Anaerobic Bottle Gram positive cocci in clusters    Narrative:      Less than seven (7) mL's of blood was collected.  Insufficient quantity may yield false negative results.    Susceptibility      Staphylococcus aureus      PARMINDER      Gentamicin Susceptible      Oxacillin Susceptible      Rifampin Susceptible      Vancomycin Susceptible                       Susceptibility Comments     Staphylococcus aureus    This isolate does not demonstrate inducible clindamycin resistance in vitro.               Blood Culture ID, PCR - Blood, Hand, Right [[id number]]  (Abnormal) Collected: 12/26/22 0435    Lab Status: Final result Specimen: Blood from Hand, Right Updated: 12/26/22 2054     BCID, PCR Staph aureus. mecA/C and MREJ (methicillin resistance gene) NOT detected. Identification by BCID2 PCR     BOTTLE TYPE Aerobic Bottle    Narrative:      Infectious disease consultation is highly recommended to rule out distant foci of infection.    Blood Culture - Blood, Hand, Left [[id number]]  (Abnormal) Collected: 12/26/22 0434    Lab Status: Final result Specimen: Blood from Hand, Left Updated: 12/28/22 0657     Blood Culture Staphylococcus aureus     Comment:   Infectious disease consultation is highly recommended to rule out distant foci of infection.        Isolated from Aerobic and Anaerobic Bottles     Gram Stain Aerobic Bottle Gram positive cocci in clusters      Anaerobic Bottle Gram positive cocci in clusters    Narrative:      Refer to previous blood  culture collected on 12/26/2022 0435 for MICs    Less than seven (7) mL's of blood was collected.  Insufficient quantity may yield false negative results.    MRSA Screen, PCR (Inpatient) - Swab, Nares [[id number]]  (Normal) Collected: 12/26/22 0307    Lab Status: Final result Specimen: Swab from Nares Updated: 12/26/22 0507     MRSA PCR No MRSA Detected    Narrative:      The negative predictive value of this diagnostic test is high and should only be used to consider de-escalating anti-MRSA therapy. A positive result may indicate colonization with MRSA and must be correlated clinically.          Medication Review:       Schedule Meds  cefTRIAXone, 2 g, Intravenous, Q24H  heparin (porcine), 5,000 Units, Subcutaneous, Q8H  midodrine, 10 mg, Oral, Q8H  pantoprazole, 40 mg, Intravenous, Q AM  sodium chloride, 10 mL, Intravenous, Q12H        Infusion Meds  dextrose 5 % and sodium chloride 0.45 %, 50 mL/hr, Last Rate: 50 mL/hr (12/29/22 4241)  phenylephrine, 0.5-3 mcg/kg/min, Last Rate: 1 mcg/kg/min (12/29/22 2111)  propofol, 5-50 mcg/kg/min, Last Rate: Stopped (12/29/22 9664)        PRN Meds  •  acetaminophen **OR** acetaminophen  •  aluminum-magnesium hydroxide-simethicone  •  dextrose  •  dextrose  •  fentanyl  •  glucagon (human recombinant)  •  Morphine  •  ondansetron **OR** ondansetron  •  sodium chloride  •  sodium chloride  •  sodium chloride        Assessment & Plan       Antimicrobial Therapy   1.  IV ceftriaxone        2.        3.        4.        5.            Assessment    Bilateral shoulder septic arthritis.  Synovial fluid culture was consistent with infection and cultures are growing 4+ Staph aureus    Methicillin susceptible Staph aureus bacteremia secondary to above.  Blood cultures were positive x2 sets on admission on December 26, 2022.  Repeat blood culture from December 27, 2022 turned positive  ALMA DELIA was done on December 28, 2022 and was verbally reported negative for vegetation    Mild septic  shock.  Currently off vasopressors    Respiratory failure on the ventilator after surgery    DVT of the left arm secondary to PICC line.  Hematology service was consulted      Plan    Continue IV ceftriaxone 2 g daily for 6 weeks  I will repeat 1 set of blood culture today  I will eventually replace the current PICC line with a new PICC line in 2 days after starting anticoagulation and recommending of bacteremia clearance  Continue supportive care  Dennis Delacruz MD  12/29/22  12:43 EST    Note is dictated utilizing voice recognition software/Dragon

## 2022-12-29 NOTE — CONSULTS
Hematology/Oncology Inpatient Consultation    Patient name: Jaime Bar  : 1955  MRN: [id number]  Referring Provider: RITU David  Reason for Consultation: Thrombocytopenia; provoked catheter associated LUE DVT, LUE SVT, and RUE SVT.    Chief complaint: Sepsis and PICC line associated LUE DVT    History of present illness:    67 y.o. male admitted to [facility] on transfer from [facility] on 2022.  The patient was intubated and unresponsive at time of consultation with histories obtained from review of records and discussion with patient's niece and nephew.  He reported a history of bilateral shoulder pain for few days prior to admission.  He had presented to [place] ED on 2022 where he was hypotensive and hyponatremic and transferred to [facility].  CXR on 2022 showed coarsened airspace and bronchovascular thickening with small airway disease such as bronchitis or asthma, edema, atypical/viral infection, and aspiration in differential.  A RUE venous Doppler showed acute RUE superficial thrombophlebitis in the cephalic vein and no evidence of DVT for which he was started on subcu heparin.  On 2022 CBC revealed WBC 7.2, hemoglobin 12.4, MCV 98.4, and platelets 99,000.  Creatinine was elevated to 2.38, BUN 55, sodium was low at 118, potassium was high at 5.4 and LFTs revealed T bili elevated to 4.8 (0-1.2) with transaminases normal.  Blood cultures grew Staph aureus.  Urinalysis was concerning for UTI.  Urine sodium was less than 20, a.m. cortisol 26.11 (6.02-18.4), and urine osmolality was 410 ().   X-rays and MRIs of the bilateral shoulders were performed 2022 revealing joint effusion and advanced degenerative changes/destructive changes of glenohumeral joint.  There was suspected complete tear of the supraspinatus and infraspinatus tendons and a completely macerated appearance of the labrum on the right shoulder.  On 2022 RUE venous Doppler was  repeated and felt stable.  He then underwent bilateral shoulder arthroscopy incision and drainage with culture growing heavy growth of Staph aureus.  He had been intubated prior to the surgery and remained sedated on ventilator with pressors postop.  At time of consultation 12/29/2022 LUE venous Doppler showed acute catheter associated DVT in the left axillary vein, SVT in the left basilic vein of the upper arm.  BLE venous Doppler was negative for DVT/SVT but did show deep venous valvular incompetence in the right popliteal vein.  CBC revealed WBC 14.2, hemoglobin 11.4, MCV 96.5, and platelets 46,000.  T bili was elevated to 5.3 and transaminases were now elevated with  and .  PT was 15.6 (9.6-11.7), PTT was 38.7 (24-31), and fibrinogen was 493 (210-450).  D-dimer was 11.83 (0-0.67).  Transesophageal echocardiogram showed LVEF 46-50%, mild aortic valve stenosis, and no evidence of vegetation or bacterial endocarditis.  Heparin was changed to argatroban.  ID was managing antibiotics with patient on ceftriaxone with plan for treatment x6 weeks and recommending replacing PICC line 2 days after starting anticoagulation.  His niece and nephew reported the patient to have lost some weight but they were unsure the amount.  They reported PMH significant for prostate cancer treated approximately 2010 with radiation alone and no evidence of recurrence to their knowledge.  He also was under the care of Dr. Damico at  for cardiac amyloid.  Chart review showed PSA was 0.1 (0-4) in August 2022.  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  IgG was 768 (603-1613), IgA was 238 (), IgM was 64 ().  Kappa/lambda ratio was 1.36 (0.2-1.65).  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  LVEF in February 2022 was 45-50%.  Cardiac MRI 4/6/2022  revealed a myocardial mass 276 g, global hypokinesis of left ventricle with ejection fraction 43%, no myocardial iron overload, the thickness and appearance of intra-atrial septum was also consistent with cardiac amyloidosis.  The note stated that the patient was followed by Dr. Banks at [facility] heart failure program where he was on Vyndamax therapy for cardiac amyloid and had been on that treatment for about 2 weeks.  Additionally in January 2022 T bili was noted to be elevated to 2.6 (0.2-1.0) and he was anemic with hemoglobin 11.8 and MCV 86.5 platelets were normal at 261,000.    12/29/22  Hematology/Oncology was consulted by RITU Jimenez for thrombocytopenia.    Past Medical History: Prostate cancer approximately 2010 treated with radiation only.  Cardiac amyloid managed by Dr. Damico at .  CKD.  Surgical History: Several orthopedic surgeries in the past.  Bilateral shoulder arthroscopy with incision and drainage 12/28/2022.  Social History: He lives in [place], [place] with his spouse.  His spouse is known to have Alzheimer's disease.  He has smoked for many years.  He has no alcohol or recreational drug use however he was narcotic dependent secondary to chronic pain.  He is a retired .  Family History: No significant known family history.  Allergies: Tramadol causes him to feel weird and codeine causes nausea.    PCP: Provider, No Known    History:  History reviewed. No pertinent past medical history.,   Past Surgical History:   Procedure Laterality Date   • SHOULDER ARTHROSCOPY Right 12/28/2022    Procedure: SHOULDER ARTHROSCOPY INCISION AND DRAINAGE.;  Surgeon: Nikunj Velez MD;  Location: [facility] MAIN OR;  Service: Orthopedics;  Laterality: Right;   • SHOULDER ARTHROSCOPY Left 12/28/2022    Procedure: SHOULDER ARTHROSCOPY INCISION AND DRAINAGE;  Surgeon: Nikunj Velez MD;  Location: [facility] MAIN OR;  Service: Orthopedics;  Laterality: Left;   , History  reviewed. No pertinent family history.,   Social History     Tobacco Use   • Smoking status: Every Day     Packs/day: 0.50     Years: 10.00     Pack years: 5.00     Types: Cigarettes   • Smokeless tobacco: Never   Vaping Use   • Vaping Use: Never used   Substance Use Topics   • Alcohol use: Not Currently   • Drug use: Never   ,   Medications Prior to Admission   Medication Sig Dispense Refill Last Dose   • albuterol sulfate  (90 Base) MCG/ACT inhaler Inhale 2 puffs Every 4 (Four) Hours As Needed for Wheezing or Shortness of Air.      • diclofenac (VOLTAREN) 75 MG EC tablet Take 75 mg by mouth Daily As Needed.      • HYDROcodone-acetaminophen (NORCO)  MG per tablet Take 1 tablet by mouth Every 4 (Four) Hours As Needed for Moderate Pain.      • metoprolol succinate XL (TOPROL-XL) 25 MG 24 hr tablet Take 25 mg by mouth Daily.      • omeprazole (priLOSEC) 20 MG capsule Take 20 mg by mouth Daily.      • sodium bicarbonate 650 MG tablet Take 650 mg by mouth 2 (Two) Times a Day.      • spironolactone (ALDACTONE) 25 MG tablet Take 12.5 mg by mouth Daily.      • tiotropium bromide-olodaterol (Stiolto Respimat) 2.5-2.5 MCG/ACT aerosol solution inhaler Inhale 2 puffs Daily.      , Scheduled Meds:  cefTRIAXone, 2 g, Intravenous, Q24H  midodrine, 10 mg, Oral, Q8H  pantoprazole, 40 mg, Intravenous, Q AM  Pharmacy Consult, , Does not apply, Once  sodium chloride, 10 mL, Intravenous, Q12H    , Continuous Infusions:  argatroban infusion, 0.5-10 mcg/kg/min  dextrose 5 % and sodium chloride 0.45 %, 50 mL/hr, Last Rate: 50 mL/hr (12/29/22 9911)  phenylephrine, 0.5-3 mcg/kg/min, Last Rate: Stopped (12/29/22 2112)  propofol, 5-50 mcg/kg/min, Last Rate: Stopped (12/29/22 3043)    , PRN Meds:  •  acetaminophen **OR** acetaminophen  •  aluminum-magnesium hydroxide-simethicone  •  dextrose  •  dextrose  •  fentanyl  •  glucagon (human recombinant)  •  Morphine  •  ondansetron **OR** ondansetron  •  sodium chloride  •  sodium  "chloride  •  sodium chloride   Allergies:  Tramadol-acetaminophen, Codeine, and Tramadol    ROS:  Review of Systems   Unable to perform ROS: Intubated        Objective     Vital Signs:   /59   Pulse 87   Temp 99.6 °F (37.6 °C) (Axillary)   Resp 25   Ht 167.6 cm (66\")   Wt 87.8 kg (193 lb 9 oz)   SpO2 100%   BMI 31.24 kg/m²     Physical Exam:  Physical Exam  Vitals and nursing note reviewed.   Constitutional:       General: He is not in acute distress.     Appearance: Normal appearance. He is well-developed. He is not diaphoretic.   HENT:      Head: Normocephalic and atraumatic.      Comments: Alopecia     Right Ear: External ear normal.      Left Ear: External ear normal.      Nose: Nose normal.      Mouth/Throat:      Mouth: Mucous membranes are moist.      Pharynx: Oropharynx is clear. No oropharyngeal exudate or posterior oropharyngeal erythema.      Comments: Orally intubated  Eyes:      General: No scleral icterus.     Extraocular Movements: Extraocular movements intact.      Conjunctiva/sclera: Conjunctivae normal.      Pupils: Pupils are equal, round, and reactive to light.   Cardiovascular:      Rate and Rhythm: Normal rate and regular rhythm.      Heart sounds: Normal heart sounds. No murmur heard.  Pulmonary:      Effort: Pulmonary effort is normal. No respiratory distress.      Breath sounds: Normal breath sounds. No wheezing or rales.   Abdominal:      General: Bowel sounds are normal. There is no distension.      Palpations: Abdomen is soft. There is no mass.      Tenderness: There is no abdominal tenderness. There is no guarding.   Genitourinary:     Comments: Thompson catheter.  Musculoskeletal:         General: No swelling, tenderness or deformity. Normal range of motion.      Cervical back: Normal range of motion and neck supple.      Right lower leg: No edema.      Left lower leg: No edema.      Comments: BLE SCDs.  LUE PICC line.  Right hand O2 monitor.   Lymphadenopathy:      Cervical: " No cervical adenopathy.      Upper Body:      Right upper body: No supraclavicular adenopathy.      Left upper body: No supraclavicular adenopathy.   Skin:     General: Skin is warm and dry.      Coloration: Skin is not pale.      Findings: No bruising, erythema or rash.      Comments: Bilateral shoulder dressings with drains.   Neurological:      Mental Status: He is alert.      Comments: Orally intubated on the ventilator.   Psychiatric:      Comments: Unable to assess.          Results Review:  Lab Results (last 48 hours)     Procedure Component Value Units Date/Time    Manual Differential [[id number]]  (Abnormal) Collected: 12/29/22 1721    Specimen: Blood Updated: 12/29/22 1801     Neutrophil % 80.0 %      Lymphocyte % 4.0 %      Monocyte % 5.0 %      Bands %  10.0 %      Metamyelocyte % 1.0 %      Neutrophils Absolute 8.82 10*3/mm3      Lymphocytes Absolute 0.39 10*3/mm3      Monocytes Absolute 0.49 10*3/mm3      Elaine Cells Slight/1+     Toxic Granulation Slight/1+     Vacuolated Neutrophils Slight/1+     Platelet Estimate Decreased     Giant Platelets Slight/1+    CBC & Differential [[id number]]  (Abnormal) Collected: 12/29/22 1721    Specimen: Blood Updated: 12/29/22 1801    Narrative:      The following orders were created for panel order CBC & Differential.  Procedure                               Abnormality         Status                     ---------                               -----------         ------                     CBC Auto Differential[[id number]]        Abnormal            Final result               Scan Slide[[id number]]                                       Final result                 Please view results for these tests on the individual orders.    Scan Slide [[id number]] Collected: 12/29/22 1721    Specimen: Blood Updated: 12/29/22 1801     Scan Slide --     Comment: See Manual Differential Results       CBC Auto Differential [[id number]]  (Abnormal) Collected: 12/29/22 1721    Specimen:  "Blood Updated: 12/29/22 1801     WBC 9.80 10*3/mm3      RBC 3.37 10*6/mm3      Hemoglobin 10.9 g/dL      Hematocrit 32.0 %      MCV 94.9 fL      MCH 32.3 pg      MCHC 34.0 g/dL      RDW 16.0 %      RDW-SD 56.0 fl      MPV 9.2 fL      Platelets 47 10*3/mm3     Narrative:      The previously reported component NRBC is no longer being reported. Previous result was 0.2 /100 WBC (Reference Range: 0.0-0.2 /100 WBC) on 12/29/2022 at 1737 EST.    aPTT [[id number]]  (Abnormal) Collected: 12/29/22 1721    Specimen: Blood Updated: 12/29/22 1754     PTT 38.7 seconds     Protime-INR [[id number]]  (Abnormal) Collected: 12/29/22 1721    Specimen: Blood Updated: 12/29/22 1754     Protime 15.6 Seconds      INR 1.55    Fibrinogen [[id number]]  (Abnormal) Collected: 12/29/22 1721    Specimen: Blood Updated: 12/29/22 1754     Fibrinogen 493 mg/dL     D-dimer, Quantitative [[id number]]  (Abnormal) Collected: 12/29/22 1721    Specimen: Blood Updated: 12/29/22 1754     D-Dimer, Quantitative 11.83 mg/L (FEU)     Narrative:      According to the assay 's published package insert, a normal (<0.50 mg/L (FEU)) D-dimer result in conjunction with a non-high clinical probability assessment, excludes deep vein thrombosis (DVT) and pulmonary embolism (PE) with high sensitivity.    D-dimer values increase with age and this can make VTE exclusion of an older population difficult. To address this, the American College of Physicians, based on best available evidence and recent guidelines, recommends that clinicians use age-adjusted D-dimer thresholds in patients greater than 50 years of age with: a) a low probability of PE who do not meet all Pulmonary Embolism Rule Out Criteria, or b) in those with intermediate probability of PE.   The formula for an age-adjusted D-dimer cut-off is \"age/100\".  For example, a 60 year old patient would have an age-adjusted cut-off of 0.60 mg/L (FEU) and an 80 year old 0.80 mg/L (FEU).    Basic Metabolic Panel " [[id number]]  (Abnormal) Collected: 12/29/22 1710    Specimen: Blood from Cannula Updated: 12/29/22 1752     Glucose 108 mg/dL      BUN 74 mg/dL      Creatinine 1.65 mg/dL      Sodium 135 mmol/L      Potassium 4.3 mmol/L      Chloride 102 mmol/L      CO2 20.0 mmol/L      Calcium 8.1 mg/dL      BUN/Creatinine Ratio 44.8     Anion Gap 13.0 mmol/L      eGFR 45.2 mL/min/1.73      Comment: National Kidney Foundation and American Society of Nephrology (ASN) Task Force recommended calculation based on the Chronic Kidney Disease Epidemiology Collaboration (CKD-EPI) equation refit without adjustment for race.       Narrative:      GFR Normal >60  Chronic Kidney Disease <60  Kidney Failure <15      Peripheral Blood Smear [[id number]] Collected: 12/29/22 1721    Specimen: Blood Updated: 12/29/22 1740     Pathology Review Yes    Heparin Induced PLT Antibody With / Rfx [[id number]] Collected: 12/29/22 1721    Specimen: Blood Updated: 12/29/22 1726    Fibrin Split Products [[id number]] Collected: 12/29/22 1721    Specimen: Blood Updated: 12/29/22 1726    POC Glucose Once [[id number]]  (Normal) Collected: 12/29/22 1050    Specimen: Blood Updated: 12/29/22 1052     Glucose 75 mg/dL      Comment: Serial Number: [id number]:  [id number]       Body Fluid Culture - Body Fluid, Shoulder, Right [[id number]]  (Abnormal) Collected: 12/28/22 0713    Specimen: Body Fluid from Shoulder, Right Updated: 12/29/22 1047     Body Fluid Culture Heavy growth (4+) Staphylococcus aureus     Gram Stain Many (4+) WBCs seen      Many (4+) Gram positive cocci in clusters    Blood Culture - Blood, Hand, Left [[id number]] Collected: 12/29/22 1015    Specimen: Blood from Hand, Left Updated: 12/29/22 1031    Blood Culture - Blood, Arm, Left [[id number]]  (Abnormal) Collected: 12/27/22 1300    Specimen: Blood from Arm, Left Updated: 12/29/22 0721     Blood Culture Staphylococcus aureus     Comment:   Infectious disease consultation is highly recommended to rule  out distant foci of infection.        Isolated from Anaerobic Bottle     Gram Stain Anaerobic Bottle Gram positive cocci in clusters    Narrative:      Refer to previous blood culture collected on 12/26/2022 0435 for MICs    Less than seven (7) mL's of blood was collected.  Insufficient quantity may yield false negative results.    Manual Differential [[id number]]  (Abnormal) Collected: 12/29/22 0541    Specimen: Blood Updated: 12/29/22 0650     Neutrophil % 67.0 %      Lymphocyte % 1.0 %      Monocyte % 3.0 %      Bands %  29.0 %      Neutrophils Absolute 20.26 10*3/mm3      Lymphocytes Absolute 0.21 10*3/mm3      Monocytes Absolute 0.63 10*3/mm3      Anisocytosis Slight/1+     Poikilocytes Slight/1+     Dohle Bodies Present     Toxic Granulation Slight/1+     Platelet Morphology Normal    CBC & Differential [[id number]]  (Abnormal) Collected: 12/29/22 0541    Specimen: Blood Updated: 12/29/22 0650    Narrative:      The following orders were created for panel order CBC & Differential.  Procedure                               Abnormality         Status                     ---------                               -----------         ------                     CBC Auto Differential[[id number]]        Abnormal            Final result               Scan Slide[[id number]]                                       Final result                 Please view results for these tests on the individual orders.    Scan Slide [[id number]] Collected: 12/29/22 0541    Specimen: Blood Updated: 12/29/22 0650     Scan Slide --     Comment: See Manual Differential Results       CBC Auto Differential [[id number]]  (Abnormal) Collected: 12/29/22 0541    Specimen: Blood Updated: 12/29/22 0650     WBC 21.10 10*3/mm3      RBC 3.75 10*6/mm3      Hemoglobin 12.0 g/dL      Hematocrit 35.7 %      MCV 95.1 fL      MCH 31.9 pg      MCHC 33.6 g/dL      RDW 15.9 %      RDW-SD 56.0 fl      MPV 9.3 fL      Platelets 57 10*3/mm3     Narrative:      The  previously reported component NRBC is no longer being reported. Previous result was 0.1 /100 WBC (Reference Range: 0.0-0.2 /100 WBC) on 12/29/2022 at 0603 EST.    Comprehensive Metabolic Panel [[id number]]  (Abnormal) Collected: 12/29/22 0541    Specimen: Blood Updated: 12/29/22 0624     Glucose 90 mg/dL      BUN 65 mg/dL      Creatinine 1.66 mg/dL      Sodium 134 mmol/L      Potassium 4.5 mmol/L      Chloride 100 mmol/L      CO2 19.0 mmol/L      Calcium 8.7 mg/dL      Total Protein 5.1 g/dL      Albumin 2.7 g/dL      ALT (SGPT) 117 U/L      AST (SGOT) 238 U/L      Alkaline Phosphatase 108 U/L      Total Bilirubin 5.3 mg/dL      Globulin 2.4 gm/dL      A/G Ratio 1.1 g/dL      BUN/Creatinine Ratio 39.2     Anion Gap 15.0 mmol/L      eGFR 44.9 mL/min/1.73      Comment: National Kidney Foundation and American Society of Nephrology (ASN) Task Force recommended calculation based on the Chronic Kidney Disease Epidemiology Collaboration (CKD-EPI) equation refit without adjustment for race.       Narrative:      GFR Normal >60  Chronic Kidney Disease <60  Kidney Failure <15      Blood Gas, Arterial - [[id number]]  (Abnormal) Collected: 12/29/22 0427    Specimen: Arterial Blood Updated: 12/29/22 0431     Site Arterial Line     Patrick's Test N/A     pH, Arterial 7.297 pH units      pCO2, Arterial 39.1 mm Hg      pO2, Arterial 114.4 mm Hg      HCO3, Arterial 19.1 mmol/L      Base Excess, Arterial -6.9 mmol/L      Comment: Serial Number: [id number]:  [id number]        O2 Saturation, Arterial 98.0 %      CO2 Content 20.3 mmol/L      Barometric Pressure for Blood Gas --     Comment: N/A        Modality Adult Vent     FIO2 40 %      Ventilator Mode ;AC     Set Tidal Volume 500     PEEP 5     Hemodilution No     Respiratory Rate 20    Manual Differential [[id number]]  (Abnormal) Collected: 12/29/22 0002    Specimen: Blood Updated: 12/29/22 0113     Neutrophil % 69.0 %      Lymphocyte % 1.0 %      Monocyte % 5.0 %      Bands %  20.0 %       Metamyelocyte % 4.0 %      Atypical Lymphocyte % 1.0 %      Neutrophils Absolute 12.64 10*3/mm3      Lymphocytes Absolute 0.28 10*3/mm3      Monocytes Absolute 0.71 10*3/mm3      Sibley Cells Slight/1+     Poikilocytes Slight/1+     WBC Morphology Normal     Platelet Estimate Decreased     Large Platelets Slight/1+    CBC & Differential [[id number]]  (Abnormal) Collected: 12/29/22 0002    Specimen: Blood Updated: 12/29/22 0113    Narrative:      The following orders were created for panel order CBC & Differential.  Procedure                               Abnormality         Status                     ---------                               -----------         ------                     CBC Auto Differential[[id number]]        Abnormal            Final result               Scan Slide[[id number]]                                       Final result                 Please view results for these tests on the individual orders.    Scan Slide [[id number]] Collected: 12/29/22 0002    Specimen: Blood Updated: 12/29/22 0113     Scan Slide --     Comment: See Manual Differential Results       CBC Auto Differential [[id number]]  (Abnormal) Collected: 12/29/22 0002    Specimen: Blood Updated: 12/29/22 0113     WBC 14.20 10*3/mm3      RBC 3.59 10*6/mm3      Hemoglobin 11.4 g/dL      Hematocrit 34.6 %      MCV 96.5 fL      MCH 31.6 pg      MCHC 32.8 g/dL      RDW 15.5 %      RDW-SD 52.1 fl      MPV 9.0 fL      Platelets 46 10*3/mm3     Narrative:      The previously reported component NRBC is no longer being reported. Previous result was 0.0 /100 WBC (Reference Range: 0.0-0.2 /100 WBC) on 12/29/2022 at 0041 EST.    Phosphorus [[id number]]  (Normal) Collected: 12/29/22 0002    Specimen: Blood Updated: 12/29/22 0048     Phosphorus 4.3 mg/dL      Comment: Result checked         Comprehensive Metabolic Panel [[id number]]  (Abnormal) Collected: 12/29/22 0002    Specimen: Blood Updated: 12/29/22 0047     Glucose 137 mg/dL      BUN 59 mg/dL       Creatinine 1.17 mg/dL      Sodium 130 mmol/L      Potassium 3.9 mmol/L      Comment: Slight hemolysis detected by analyzer. Results may be affected.        Chloride 99 mmol/L      CO2 20.0 mmol/L      Calcium 8.6 mg/dL      Total Protein 4.9 g/dL      Albumin 2.6 g/dL      ALT (SGPT) 36 U/L      AST (SGOT) 62 U/L      Alkaline Phosphatase 88 U/L      Total Bilirubin 4.6 mg/dL      Globulin 2.3 gm/dL      A/G Ratio 1.1 g/dL      BUN/Creatinine Ratio 50.4     Anion Gap 11.0 mmol/L      eGFR 68.3 mL/min/1.73      Comment: National Kidney Foundation and American Society of Nephrology (ASN) Task Force recommended calculation based on the Chronic Kidney Disease Epidemiology Collaboration (CKD-EPI) equation refit without adjustment for race.       Narrative:      GFR Normal >60  Chronic Kidney Disease <60  Kidney Failure <15      Blood Gas, Arterial - [[id number]]  (Abnormal) Collected: 12/29/22 0038    Specimen: Arterial Blood Updated: 12/29/22 0041     Site Arterial Line     Patrick's Test N/A     pH, Arterial 7.230 pH units      pCO2, Arterial 50.5 mm Hg      pO2, Arterial 355.5 mm Hg      HCO3, Arterial 21.1 mmol/L      Base Excess, Arterial -6.6 mmol/L      Comment: Serial Number: [id number]:  [id number]        O2 Saturation, Arterial 99.9 %      CO2 Content 22.7 mmol/L      Barometric Pressure for Blood Gas --     Comment: N/A        Modality Adult Vent     FIO2 90 %      Ventilator Mode ;AC     Set Tidal Volume 500     PEEP 5     Hemodilution No     Respiratory Rate 16    Magnesium [[id number]]  (Normal) Collected: 12/29/22 0002    Specimen: Blood Updated: 12/29/22 0033     Magnesium 2.0 mg/dL     Calcium, Ionized [[id number]]  (Normal) Collected: 12/29/22 0002    Specimen: Blood Updated: 12/29/22 0028     Ionized Calcium 1.21 mmol/L     POC Glucose Once [[id number]]  (Abnormal) Collected: 12/29/22 0001    Specimen: Blood Updated: 12/29/22 0002     Glucose 130 mg/dL      Comment: Serial Number: [id number]:   [id number]       POC Glucose Once [[id number]]  (Abnormal) Collected: 12/28/22 2328    Specimen: Blood Updated: 12/28/22 2330     Glucose 33 mg/dL      Comment: Serial Number: [id number]:  [id number]       POC Glucose Once [[id number]]  (Normal) Collected: 12/28/22 1642    Specimen: Blood Updated: 12/28/22 1643     Glucose 90 mg/dL      Comment: Serial Number: [id number]:  [id number]       POC Glucose Once [[id number]]  (Normal) Collected: 12/28/22 1105    Specimen: Blood Updated: 12/28/22 1106     Glucose 88 mg/dL      Comment: Serial Number: [id number]:  [id number]       Manual Differential [[id number]]  (Abnormal) Collected: 12/28/22 0839    Specimen: Blood Updated: 12/28/22 1010     Neutrophil % 72.0 %      Lymphocyte % 1.0 %      Monocyte % 1.0 %      Eosinophil % 1.0 %      Bands %  22.0 %      Metamyelocyte % 2.0 %      Myelocyte % 1.0 %      Neutrophils Absolute 5.83 10*3/mm3      Lymphocytes Absolute 0.06 10*3/mm3      Monocytes Absolute 0.06 10*3/mm3      Eosinophils Absolute 0.06 10*3/mm3      RBC Morphology Normal     Toxic Granulation Mod/2+     Vacuolated Neutrophils Slight/1+     Platelet Estimate Decreased    CBC & Differential [[id number]]  (Abnormal) Collected: 12/28/22 0839    Specimen: Blood Updated: 12/28/22 1010    Narrative:      The following orders were created for panel order CBC & Differential.  Procedure                               Abnormality         Status                     ---------                               -----------         ------                     CBC Auto Differential[[id number]]        Abnormal            Final result               Scan Slide[[id number]]                                       Final result                 Please view results for these tests on the individual orders.    Scan Slide [[id number]] Collected: 12/28/22 0839    Specimen: Blood Updated: 12/28/22 1010     Scan Slide --     Comment: See Manual Differential Results       CBC Auto Differential  [[id number]]  (Abnormal) Collected: 12/28/22 0839    Specimen: Blood Updated: 12/28/22 1010     WBC 6.20 10*3/mm3      RBC 3.57 10*6/mm3      Hemoglobin 11.5 g/dL      Hematocrit 34.2 %      MCV 95.7 fL      MCH 32.3 pg      MCHC 33.8 g/dL      RDW 15.4 %      RDW-SD 50.8 fl      MPV 7.9 fL      Platelets 33 10*3/mm3     Narrative:      The previously reported component NRBC is no longer being reported. Previous result was 0.1 /100 WBC (Reference Range: 0.0-0.2 /100 WBC) on 12/28/2022 at 0933 EST.    Sedimentation Rate [[id number]]  (Abnormal) Collected: 12/28/22 0839    Specimen: Blood Updated: 12/28/22 1010     Sed Rate 39 mm/hr     Comprehensive Metabolic Panel [[id number]]  (Abnormal) Collected: 12/28/22 0839    Specimen: Blood Updated: 12/28/22 0944     Glucose 82 mg/dL      BUN 59 mg/dL      Creatinine 1.36 mg/dL      Sodium 130 mmol/L      Potassium 3.9 mmol/L      Chloride 97 mmol/L      CO2 20.0 mmol/L      Calcium 8.5 mg/dL      Total Protein 5.0 g/dL      Albumin 2.4 g/dL      ALT (SGPT) 30 U/L      AST (SGOT) 44 U/L      Alkaline Phosphatase 83 U/L      Total Bilirubin 4.6 mg/dL      Globulin 2.6 gm/dL      A/G Ratio 0.9 g/dL      BUN/Creatinine Ratio 43.4     Anion Gap 13.0 mmol/L      eGFR 57.0 mL/min/1.73      Comment: National Kidney Foundation and American Society of Nephrology (ASN) Task Force recommended calculation based on the Chronic Kidney Disease Epidemiology Collaboration (CKD-EPI) equation refit without adjustment for race.       Narrative:      GFR Normal >60  Chronic Kidney Disease <60  Kidney Failure <15      Phosphorus [[id number]]  (Normal) Collected: 12/28/22 0839    Specimen: Blood Updated: 12/28/22 0944     Phosphorus 2.9 mg/dL     Magnesium [[id number]]  (Normal) Collected: 12/28/22 0839    Specimen: Blood Updated: 12/28/22 0944     Magnesium 2.1 mg/dL     Body Fluid Cell Count With Differential - Body Fluid, Shoulder, Right [[id number]]  (Abnormal) Collected: 12/28/22 0713    Specimen:  Body Fluid from Shoulder, Right Updated: 12/28/22 0941    Narrative:      The following orders were created for panel order Body Fluid Cell Count With Differential - Body Fluid, Shoulder, Right.  Procedure                               Abnormality         Status                     ---------                               -----------         ------                     Body fluid cell count - ...[[id number]]  Abnormal            Final result               Body fluid differential ...[[id number]]                      Final result                 Please view results for these tests on the individual orders.    Body fluid differential - Body Fluid, Shoulder, Right [[id number]] Collected: 12/28/22 0713    Specimen: Body Fluid from Shoulder, Right Updated: 12/28/22 0941     Neutrophils, Fluid 91 %      Lymphocytes, Fluid 7 %      Monocytes, Fluid 2 %     Narrative:      Concentrated Smear by Cytocentrifuge Prep.    Calcium, Ionized [[id number]]  (Abnormal) Collected: 12/28/22 0839    Specimen: Blood Updated: 12/28/22 0932     Ionized Calcium 1.17 mmol/L     Body Fluid Cell Count With Differential - Body Fluid, Shoulder, Left [[id number]]  (Abnormal) Collected: 12/28/22 0713    Specimen: Body Fluid from Shoulder, Left Updated: 12/28/22 0910    Narrative:      The following orders were created for panel order Body Fluid Cell Count With Differential - Body Fluid, Shoulder, Left.  Procedure                               Abnormality         Status                     ---------                               -----------         ------                     Body fluid cell count - ...[[id number]]  Abnormal            Final result               Body fluid differential ...[[id number]]                      Final result                 Please view results for these tests on the individual orders.    Body fluid differential - Body Fluid, Shoulder, Left [[id number]] Collected: 12/28/22 0713    Specimen: Body Fluid from Shoulder, Left Updated:  12/28/22 0910     Neutrophils, Fluid 74 %      Lymphocytes, Fluid 19 %      Monocytes, Fluid 5 %      Mesothelial Cells, Fluid 2 %     Narrative:      Concentrated Smear by Cytocentrifuge Prep.    Body fluid cell count - Body Fluid, Shoulder, Left [[id number]]  (Abnormal) Collected: 12/28/22 0713    Specimen: Body Fluid from Shoulder, Left Updated: 12/28/22 0908     Color, Fluid Yellow     Appearance, Fluid Turbid     Nucleated Cells, Fluid 81,500 /mm3      Method: Hemacytometer Method    Body fluid cell count - Body Fluid, Shoulder, Right [[id number]]  (Abnormal) Collected: 12/28/22 0713    Specimen: Body Fluid from Shoulder, Right Updated: 12/28/22 0859     Color, Fluid Yellow     Appearance, Fluid Turbid     Nucleated Cells, Fluid 192,000 /mm3      Method: Hemacytometer Method    Blood Culture - Blood, Hand, Left [[id number]]  (Abnormal) Collected: 12/26/22 0434    Specimen: Blood from Hand, Left Updated: 12/28/22 0657     Blood Culture Staphylococcus aureus     Comment:   Infectious disease consultation is highly recommended to rule out distant foci of infection.        Isolated from Aerobic and Anaerobic Bottles     Gram Stain Aerobic Bottle Gram positive cocci in clusters      Anaerobic Bottle Gram positive cocci in clusters    Narrative:      Refer to previous blood culture collected on 12/26/2022 0435 for MICs    Less than seven (7) mL's of blood was collected.  Insufficient quantity may yield false negative results.    Blood Culture - Blood, Hand, Right [[id number]]  (Abnormal)  (Susceptibility) Collected: 12/26/22 0435    Specimen: Blood from Hand, Right Updated: 12/28/22 0657     Blood Culture Staphylococcus aureus     Comment:   Infectious disease consultation is highly recommended to rule out distant foci of infection.        Isolated from Aerobic and Anaerobic Bottles     Gram Stain Aerobic Bottle Gram positive cocci in clusters      Anaerobic Bottle Gram positive cocci in clusters    Narrative:      Less  than seven (7) mL's of blood was collected.  Insufficient quantity may yield false negative results.    Susceptibility      Staphylococcus aureus      PARMINDER      Gentamicin Susceptible      Oxacillin Susceptible      Rifampin Susceptible      Vancomycin Susceptible                       Susceptibility Comments     Staphylococcus aureus    This isolate does not demonstrate inducible clindamycin resistance in vitro.               POC Glucose Once [[id number]]  (Abnormal) Collected: 12/28/22 0006    Specimen: Blood Updated: 12/28/22 0008     Glucose 120 mg/dL      Comment: Serial Number: [id number]:  [id number]       POC Glucose Once [[id number]]  (Abnormal) Collected: 12/27/22 2315    Specimen: Blood Updated: 12/27/22 2317     Glucose 67 mg/dL      Comment: Serial Number: [id number]:  [id number]              Pending Results: HIT ab, vitamin B12, folate, CMV IgM, EBV IgM, SHAR, fractionate bilirubin, p. Smear, acute hepatitis panel, iron, TIBC, ferritin, reticulocytes, haptoglobin, copper, SPEP, SIFE, free light chains, immunoglobulins, UIFE.    Imaging Reviewed:   XR Shoulder 2+ View Right    Result Date: 12/27/2022   1. Severe degenerative changes of the glenohumeral joint as well as a high riding humeral head, suggestive of chronic rotator cuff pathology. No definite evidence of osteomyelitis is identified. However plain films cannot exclude septic arthritis.  Electronically Signed By-Juan Francisco Lozoya MD On:12/27/2022 1:42 PM This report was finalized on [id number] by  Juan Francisco Lozoya MD.    XR Chest 1 View    Result Date: 12/29/2022  The endotracheal tube is approximately 6 mm above the sarah. Recommend repositioning. Left subclavian central venous catheter has its catheter tip overlying the superior vena cava. The cardiac silhouette is moderately to significantly enlarged and the pulmonary vessels are within normal limits. There is no focal area of consolidation otherwise seen. Electronically signed by:   Los Mccoy D.O.  12/28/2022 11:13 PM Mountain Time    XR Chest 1 View    Result Date: 12/27/2022   1. Persistent diffuse interstitial opacities. Differential includes interstitial pulmonary edema, or atypical infection.  Electronically Signed By-Juan Francisco Lozoya MD On:12/27/2022 8:46 AM This report was finalized on [id number] by  Juan Francisco Lozoya MD.    XR Chest 1 View    Result Date: 12/26/2022    1.  Interval placement of left-sided PICC line with the tip projecting over the superior vena cava. 2.  No change in coarsened reticular interstitial markings throughout both lungs.  No new airspace consolidation or pleural effusion.  Electronically Signed By-Los Diop MD On:12/26/2022 4:56 PM This report was finalized on [id number] by  Los Diop MD.    XR Chest 1 View    Result Date: 12/26/2022  1.  Coarsened airspace, bronchovascular thickening.  Consider small airways disease (bronchitis, asthma), edema, atypical/viral infection, aspiration. 2.  Heart size is normal. 3.  No acute bony findings. Electronically signed by:  Bernadette Huddleston M.D.  12/26/2022 4:56 AM Mountain Time    MRI Shoulder Right Without Contrast    Result Date: 12/28/2022  Impression: 1. Moderate-sized joint effusion with advanced degenerative changes and destructive changes of the glenohumeral joint. Findings may be related to septic arthritis given clinical history. Fluid aspiration is recommended for confirmation. Joint effusion with subcoracoid bursitis may also be reactive and related to severe osteoarthritis. 2. Suspected complete tears of the supraspinatus and infraspinatus tendons. 3. Completely macerated appearance of the labrum. 4. Moderate acromial clavicular osteoarthritis. Electronically Signed: Linnette Pacheco  12/28/2022 10:08 AM EST  Workstation ID: [id number]    MRI Shoulder Left Without Contrast    Result Date: 12/28/2022  Impression: Very large left-sided joint effusion with destructive changes of the glenohumeral joint  with deformity of the glenoid likely related to septic arthritis. Fluid sampling is recommended for further characterization. Ancillary findings as described above. Electronically Signed: Linnette Junior  12/28/2022 10:06 AM EST  Workstation ID: [id number]      I have reviewed the patient's labs, imaging, reports, and other clinician documentation.         Assessment & Plan       ASSESSMENT  1. Thrombocytopenia- platelets were normal in early 2022.  Platelets were low on admission and continued to drop.  HIT antibody pending and heparin was changed to argatroban.  Received ceftriaxone and now on cefepime which can cause thrombocytopenia.  Coags mildly elevated with fibrinogen not low, not likely DIC.  We will send remainder of acute on chronic thrombocytopenia work-up to evaluate for viral etiologies, vitamin deficiencies, and autoimmune etiology.  We will plan to hold argatroban anytime patient develops any active bleeding at present.  2. Acute on chronic anemia/IgA lambda monoclonal gammopathy- mild anemia with hemoglobin in the 11's in early 2022.  Gammopathy labs at that time showed IgA lambda monoclonal protein with normal immunoglobulins and free light chains.  We will send work-up to evaluate for iron deficiency, vitamin deficiencies, copper deficiency, and hemolysis.  We will also check gammopathy labs.  3. Acute provoked catheter associated LUE DVT/SVT and RUE SVT- RUE SVT was diagnosed prior to LUE DVT and he had received several doses of subcu heparin starting on 12/26. LUE DVT provoked by PICC line which remains in place and patient receiving argatroban.    4. Elevated LFTs- possible shock liver.  Will fractionate bilirubin and check acute hepatitis panel.  5. Bilateral shoulder septic arthritis/staph RS bacteremia-on cefepime per ID.  No vegetation on echocardiogram.  6. Acute hypoxic respiratory failure/smoking history- remains on ventilator post surgery.  Primary team managing.  7. History of prostate  cancer-PSA in August was normal.   8. Cardiac amyloidosis- on chart review it has been treated at .  He did have IgA lambda monoclonal gammopathy but not sure if he systemic or primary cardiac amyloidosis.  9. STEVEN/hyponatremia-nephrology managing.  10. A. fib with RVR-cardiology managing.    PLAN  1. Thrombocytopenia and anemia work-ups.  2. Monoclonal gammopathy labs.  3. Hold argatroban if patient develops any bleeding.  4. Fractionate bilirubin and check acute hepatitis panel.  5. Obtain records from .    Note prepared by KURT Alicea.  Patient seen and examined by Vicente Mendoza MD.  Electronically signed by RTIU Taylor, 12/29/22, 7:47 PM EST.    I have personally performed a face-to-face diagnostic evaluation on this patient. I have performed a complete history and physical examination, reviewed laboratory studies, and radiographic examinations.  I have completed the majority and substantive portion of the medical decision making.  I have formulated the assessment on this patient and the plan of action as noted above. I have discussed the case with Darrin Nixon NP, have edited/reviewed the note, and agree with the care plan.  The patient came to the hospital with shoulder pains and then was found to be septic.  He is currently post surgical drainage for shoulder effusions and is intubated on the ventilator.  His platelet count dropped from the 90s down to 30,000 and he was originally on heparin.  He also received cephalosporins.  He has developed a PICC associated DVT left upper extremity which is being treated with argatroban.  We will look for reasons for acute drop in platelet count.  We will also obtain records on cardiac amyloid treatments.        I discussed the patient's findings and my recommendations with family.    Thank you for this consult.  We will be happy to follow along in the care of this patient.     Part of this note may be an electronic transcription/translation of spoken  language to printed text using the Dragon Dictation System.    Electronically signed by Vicente Mendoza MD, 12/29/22, 9:04 PM EST.

## 2022-12-30 ENCOUNTER — APPOINTMENT (OUTPATIENT)
Dept: GENERAL RADIOLOGY | Facility: HOSPITAL | Age: 67
DRG: 853 | End: 2022-12-30
Payer: MEDICARE

## 2022-12-30 ENCOUNTER — APPOINTMENT (OUTPATIENT)
Dept: ULTRASOUND IMAGING | Facility: HOSPITAL | Age: 67
DRG: 853 | End: 2022-12-30
Payer: MEDICARE

## 2022-12-30 PROBLEM — R65.21 SHOCK, SEPTIC: Status: RESOLVED | Noted: 2022-12-26 | Resolved: 2022-12-30

## 2022-12-30 PROBLEM — N17.9 ACUTE KIDNEY INJURY SUPERIMPOSED ON CHRONIC KIDNEY DISEASE: Status: ACTIVE | Noted: 2022-12-26

## 2022-12-30 PROBLEM — A41.9 SHOCK, SEPTIC: Status: RESOLVED | Noted: 2022-12-26 | Resolved: 2022-12-30

## 2022-12-30 PROBLEM — N18.9 ACUTE KIDNEY INJURY SUPERIMPOSED ON CHRONIC KIDNEY DISEASE: Status: ACTIVE | Noted: 2022-12-26

## 2022-12-30 PROBLEM — A41.9 SHOCK, SEPTIC: Status: ACTIVE | Noted: 2022-12-26

## 2022-12-30 PROBLEM — R65.21 SHOCK, SEPTIC: Status: ACTIVE | Noted: 2022-12-26

## 2022-12-30 PROBLEM — R78.81 BACTEREMIA DUE TO METHICILLIN SUSCEPTIBLE STAPHYLOCOCCUS AUREUS (MSSA): Status: ACTIVE | Noted: 2022-12-30

## 2022-12-30 PROBLEM — B95.61 BACTEREMIA DUE TO METHICILLIN SUSCEPTIBLE STAPHYLOCOCCUS AUREUS (MSSA): Status: ACTIVE | Noted: 2022-12-30

## 2022-12-30 PROBLEM — M00.019: Status: ACTIVE | Noted: 2022-12-30

## 2022-12-30 LAB
ALBUMIN SERPL-MCNC: 2.3 G/DL (ref 3.5–5.2)
ALBUMIN/GLOB SERPL: 1.1 G/DL
ALP SERPL-CCNC: 105 U/L (ref 39–117)
ALT SERPL W P-5'-P-CCNC: 236 U/L (ref 1–41)
ANION GAP SERPL CALCULATED.3IONS-SCNC: 11 MMOL/L (ref 5–15)
APTT PPP: 111.5 SECONDS (ref 61–76.5)
APTT PPP: 118.2 SECONDS (ref 24–31)
APTT PPP: 119.9 SECONDS (ref 61–76.5)
APTT PPP: 70.8 SECONDS (ref 61–76.5)
APTT PPP: 91.2 SECONDS (ref 61–76.5)
AST SERPL-CCNC: 431 U/L (ref 1–40)
BH BB BLOOD EXPIRATION DATE: NORMAL
BH BB BLOOD TYPE BARCODE: 6200
BH BB DISPENSE STATUS: NORMAL
BH BB PRODUCT CODE: NORMAL
BH BB UNIT NUMBER: NORMAL
BILIRUB SERPL-MCNC: 5.2 MG/DL (ref 0–1.2)
BUN SERPL-MCNC: 77 MG/DL (ref 8–23)
BUN/CREAT SERPL: 51.3 (ref 7–25)
BURR CELLS BLD QL SMEAR: ABNORMAL
CA-I SERPL ISE-MCNC: 1.15 MMOL/L (ref 1.2–1.3)
CALCIUM SPEC-SCNC: 8.1 MG/DL (ref 8.6–10.5)
CHLORIDE SERPL-SCNC: 103 MMOL/L (ref 98–107)
CO2 SERPL-SCNC: 21 MMOL/L (ref 22–29)
CREAT SERPL-MCNC: 1.5 MG/DL (ref 0.76–1.27)
D DIMER PPP FEU-MCNC: 10.87 MG/L (FEU) (ref 0–0.67)
DEPRECATED RDW RBC AUTO: 55.6 FL (ref 37–54)
EGFRCR SERPLBLD CKD-EPI 2021: 50.7 ML/MIN/1.73
ERYTHROCYTE [DISTWIDTH] IN BLOOD BY AUTOMATED COUNT: 16 % (ref 12.3–15.4)
FOLATE SERPL-MCNC: 7.13 NG/ML (ref 4.78–24.2)
GLOBULIN UR ELPH-MCNC: 2.1 GM/DL
GLUCOSE BLDC GLUCOMTR-MCNC: 107 MG/DL (ref 70–105)
GLUCOSE BLDC GLUCOMTR-MCNC: 74 MG/DL (ref 70–105)
GLUCOSE SERPL-MCNC: 122 MG/DL (ref 65–99)
HAPTOGLOB SERPL-MCNC: 148 MG/DL (ref 30–200)
HCT VFR BLD AUTO: 32.3 % (ref 37.5–51)
HGB BLD-MCNC: 11.1 G/DL (ref 13–17.7)
LAB AP CASE REPORT: NORMAL
LYMPHOCYTES # BLD MANUAL: 0.47 10*3/MM3 (ref 0.7–3.1)
LYMPHOCYTES NFR BLD MANUAL: 3 % (ref 5–12)
MAGNESIUM SERPL-MCNC: 2.3 MG/DL (ref 1.6–2.4)
MCH RBC QN AUTO: 32.5 PG (ref 26.6–33)
MCHC RBC AUTO-ENTMCNC: 34.4 G/DL (ref 31.5–35.7)
MCV RBC AUTO: 94.4 FL (ref 79–97)
METAMYELOCYTES NFR BLD MANUAL: 1 % (ref 0–0)
MONOCYTES # BLD: 0.28 10*3/MM3 (ref 0.1–0.9)
NEUTROPHILS # BLD AUTO: 8.46 10*3/MM3 (ref 1.7–7)
NEUTROPHILS NFR BLD MANUAL: 72 % (ref 42.7–76)
NEUTS BAND NFR BLD MANUAL: 19 % (ref 0–5)
NEUTS VAC BLD QL SMEAR: ABNORMAL
PATH REPORT.FINAL DX SPEC: NORMAL
PHOSPHATE SERPL-MCNC: 4 MG/DL (ref 2.5–4.5)
PLATELET # BLD AUTO: 38 10*3/MM3 (ref 140–450)
PMV BLD AUTO: 8.9 FL (ref 6–12)
POTASSIUM SERPL-SCNC: 3.8 MMOL/L (ref 3.5–5.2)
PROT SERPL-MCNC: 4.4 G/DL (ref 6–8.5)
PSA SERPL-MCNC: 0.23 NG/ML (ref 0–4)
RBC # BLD AUTO: 3.42 10*6/MM3 (ref 4.14–5.8)
RETICS # AUTO: 0.03 10*6/MM3 (ref 0.02–0.13)
RETICS/RBC NFR AUTO: 0.96 % (ref 0.7–1.9)
SCAN SLIDE: NORMAL
SMALL PLATELETS BLD QL SMEAR: ABNORMAL
SODIUM SERPL-SCNC: 135 MMOL/L (ref 136–145)
TOXIC GRANULATION: ABNORMAL
UNIT  ABO: NORMAL
UNIT  RH: NORMAL
VARIANT LYMPHS NFR BLD MANUAL: 1 % (ref 0–5)
VARIANT LYMPHS NFR BLD MANUAL: 4 % (ref 19.6–45.3)
VIT B12 BLD-MCNC: >2000 PG/ML (ref 211–946)
WBC NRBC COR # BLD: 9.3 10*3/MM3 (ref 3.4–10.8)

## 2022-12-30 PROCEDURE — 85730 THROMBOPLASTIN TIME PARTIAL: CPT | Performed by: STUDENT IN AN ORGANIZED HEALTH CARE EDUCATION/TRAINING PROGRAM

## 2022-12-30 PROCEDURE — 84100 ASSAY OF PHOSPHORUS: CPT | Performed by: ORTHOPAEDIC SURGERY

## 2022-12-30 PROCEDURE — 85007 BL SMEAR W/DIFF WBC COUNT: CPT | Performed by: ORTHOPAEDIC SURGERY

## 2022-12-30 PROCEDURE — 25010000002 MORPHINE PER 10 MG: Performed by: ORTHOPAEDIC SURGERY

## 2022-12-30 PROCEDURE — 85045 AUTOMATED RETICULOCYTE COUNT: CPT | Performed by: NURSE PRACTITIONER

## 2022-12-30 PROCEDURE — 82962 GLUCOSE BLOOD TEST: CPT

## 2022-12-30 PROCEDURE — 80053 COMPREHEN METABOLIC PANEL: CPT | Performed by: ORTHOPAEDIC SURGERY

## 2022-12-30 PROCEDURE — 99222 1ST HOSP IP/OBS MODERATE 55: CPT | Performed by: INTERNAL MEDICINE

## 2022-12-30 PROCEDURE — 99024 POSTOP FOLLOW-UP VISIT: CPT | Performed by: ORTHOPAEDIC SURGERY

## 2022-12-30 PROCEDURE — 25010000002 CEFTRIAXONE PER 250 MG: Performed by: INTERNAL MEDICINE

## 2022-12-30 PROCEDURE — 87147 CULTURE TYPE IMMUNOLOGIC: CPT | Performed by: INTERNAL MEDICINE

## 2022-12-30 PROCEDURE — 85379 FIBRIN DEGRADATION QUANT: CPT | Performed by: NURSE PRACTITIONER

## 2022-12-30 PROCEDURE — 87040 BLOOD CULTURE FOR BACTERIA: CPT | Performed by: INTERNAL MEDICINE

## 2022-12-30 PROCEDURE — 76705 ECHO EXAM OF ABDOMEN: CPT

## 2022-12-30 PROCEDURE — 74018 RADEX ABDOMEN 1 VIEW: CPT

## 2022-12-30 PROCEDURE — 85730 THROMBOPLASTIN TIME PARTIAL: CPT | Performed by: NURSE PRACTITIONER

## 2022-12-30 PROCEDURE — 85025 COMPLETE CBC W/AUTO DIFF WBC: CPT | Performed by: ORTHOPAEDIC SURGERY

## 2022-12-30 PROCEDURE — 82330 ASSAY OF CALCIUM: CPT | Performed by: ORTHOPAEDIC SURGERY

## 2022-12-30 PROCEDURE — 83735 ASSAY OF MAGNESIUM: CPT | Performed by: ORTHOPAEDIC SURGERY

## 2022-12-30 PROCEDURE — 86335 IMMUNFIX E-PHORSIS/URINE/CSF: CPT | Performed by: NURSE PRACTITIONER

## 2022-12-30 PROCEDURE — 71045 X-RAY EXAM CHEST 1 VIEW: CPT

## 2022-12-30 RX ORDER — MIDODRINE HYDROCHLORIDE 5 MG/1
10 TABLET ORAL EVERY 8 HOURS
Status: DISCONTINUED | OUTPATIENT
Start: 2022-12-30 | End: 2023-01-09

## 2022-12-30 RX ORDER — LANSOPRAZOLE 30 MG/1
30 TABLET, ORALLY DISINTEGRATING, DELAYED RELEASE ORAL
Status: DISCONTINUED | OUTPATIENT
Start: 2022-12-31 | End: 2023-01-11 | Stop reason: HOSPADM

## 2022-12-30 RX ORDER — HYDROCODONE BITARTRATE AND ACETAMINOPHEN 10; 325 MG/1; MG/1
1 TABLET ORAL EVERY 4 HOURS PRN
Status: DISCONTINUED | OUTPATIENT
Start: 2022-12-30 | End: 2023-01-11 | Stop reason: HOSPADM

## 2022-12-30 RX ORDER — FERROUS SULFATE TAB EC 324 MG (65 MG FE EQUIVALENT) 324 (65 FE) MG
324 TABLET DELAYED RESPONSE ORAL
Status: DISCONTINUED | OUTPATIENT
Start: 2022-12-30 | End: 2022-12-30

## 2022-12-30 RX ORDER — FERROUS SULFATE 300 MG/5ML
300 LIQUID (ML) ORAL DAILY
Status: DISCONTINUED | OUTPATIENT
Start: 2022-12-30 | End: 2023-01-11 | Stop reason: HOSPADM

## 2022-12-30 RX ORDER — SODIUM BICARBONATE 650 MG/1
650 TABLET ORAL 2 TIMES DAILY
Status: DISCONTINUED | OUTPATIENT
Start: 2022-12-30 | End: 2023-01-03

## 2022-12-30 RX ADMIN — Medication 10 ML: at 20:31

## 2022-12-30 RX ADMIN — MORPHINE SULFATE 2 MG: 2 INJECTION, SOLUTION INTRAMUSCULAR; INTRAVENOUS at 19:41

## 2022-12-30 RX ADMIN — MIDODRINE HYDROCHLORIDE 10 MG: 5 TABLET ORAL at 17:31

## 2022-12-30 RX ADMIN — PANTOPRAZOLE SODIUM 40 MG: 40 INJECTION, POWDER, FOR SOLUTION INTRAVENOUS at 05:50

## 2022-12-30 RX ADMIN — SODIUM BICARBONATE 650 MG TABLET 650 MG: at 20:31

## 2022-12-30 RX ADMIN — ARGATROBAN 0.5 MCG/KG/MIN: 50 INJECTION, SOLUTION INTRAVENOUS at 08:06

## 2022-12-30 RX ADMIN — CEFTRIAXONE 2 G: 2 INJECTION, POWDER, FOR SOLUTION INTRAMUSCULAR; INTRAVENOUS at 20:30

## 2022-12-30 RX ADMIN — Medication 300 MG: at 15:48

## 2022-12-30 RX ADMIN — HYDROCODONE BITARTRATE AND ACETAMINOPHEN 1 TABLET: 10; 325 TABLET ORAL at 20:31

## 2022-12-30 RX ADMIN — DEXTROSE AND SODIUM CHLORIDE 50 ML/HR: 5; 450 INJECTION, SOLUTION INTRAVENOUS at 07:59

## 2022-12-30 RX ADMIN — Medication 10 ML: at 08:07

## 2022-12-30 NOTE — PROGRESS NOTES
Infectious Diseases Progress Note      LOS: 4 days   Patient Care Team:  Provider, No Known as PCP - Kyle Gardner MD as Consulting Physician (Nephrology)  Vicente Mendoza MD as Consulting Physician (Hematology and Oncology)    Chief Complaint: On the ventilator    Subjective     The patient had no high-grade fever during the last 24 hours.  He remained intubated on the ventilator currently on 40% FiO2.  He is on no vasopressors.    Review of Systems:   Review of Systems   Unable to perform ROS: Intubated   Constitutional: Positive for fever.        Objective     Vital Signs  Temp:  [97 °F (36.1 °C)-99.6 °F (37.6 °C)] 97.8 °F (36.6 °C)  Heart Rate:  [74-98] 87  BP: ()/(47-66) 101/59  Arterial Line BP: ()/(42-63) 128/62  FiO2 (%):  [40 %] 40 %    Physical Exam:  Physical Exam  Constitutional:       Comments: Intubated on the ventilator patient started to follow commands   HENT:      Head: Normocephalic and atraumatic.   Eyes:      Pupils: Pupils are equal, round, and reactive to light.   Cardiovascular:      Rate and Rhythm: Normal rate and regular rhythm.   Pulmonary:      Effort: Pulmonary effort is normal.      Breath sounds: Normal breath sounds.   Abdominal:      General: Abdomen is flat. Bowel sounds are normal.      Palpations: Abdomen is soft.   Musculoskeletal:      Cervical back: Neck supple.      Right lower leg: No edema.      Left lower leg: No edema.      Comments: Drain tubes are present in the right and left shoulders          Results Review:    I have reviewed all clinical data, test, lab, and imaging results.     Radiology  XR Chest 1 View    Result Date: 12/30/2022   DATE OF EXAM: 12/30/2022 8:52 AM  PROCEDURE: XR CHEST 1 VW-  INDICATIONS: sob; M00.9-Pyogenic arthritis, unspecified  COMPARISON: 12/29/2022 and prior  TECHNIQUE: Portable  FINDINGS:     Left upper extremity PICC line projects at the SVC. Endotracheal tube has been removed. Study is limited by overlying  support and monitoring apparatus. The heart and mediastinal contours appear grossly stable with mild cardiomegaly and pulmonary vascular congestion. Diffuse increased groundglass and interstitial opacities with a bibasilar predominance are slightly more prominent in the comparison. Osseous structures appear stable      Cardiomegaly and increased pulmonary vascular congestion and suspected interstitial edema  Lines and tubes as above[  Electronically Signed By-Jaxon Aly On:12/30/2022 10:19 AM This report was finalized on [id number] by  Jaxon Aly, .    XR Abdomen KUB    Result Date: 12/30/2022  DATE OF EXAM: 12/30/2022 11:28 AM  PROCEDURE: XR ABDOMEN KUB-  INDICATIONS: NGt placement; M00.9-Pyogenic arthritis, unspecified  COMPARISON: No Comparisons Available  TECHNIQUE: Single radiographic view of the abdomen was obtained.   FINDINGS: NG tube is seen extending to the left midabdomen over the expected position of the stomach.  Limited imaging of the lung bases there shows mild pulmonary vascular congestion and suspected underlying pulmonary edema. PICC line extends to the cavoatrial junction.  Nonspecific nonobstructing bowel gas pattern noted. Osseous structures demonstrate multilevel degenerative change      NG tube projects over the expected position of the body of the stomach  Electronically Signed By-Jaxon Aly On:12/30/2022 12:00 PM This report was finalized on [id number] by  Jaxon Aly, .      Cardiology    Laboratory    Results from last 7 days   Lab Units 12/30/22  0540 12/29/22  1721 12/29/22  0541 12/29/22  0002 12/28/22  0839 12/27/22  0557 12/26/22  0435   WBC 10*3/mm3 9.30 9.80 21.10* 14.20* 6.20 9.10 7.20   HEMOGLOBIN g/dL 11.1* 10.9* 12.0* 11.4* 11.5* 11.0* 12.4*   HEMATOCRIT % 32.3* 32.0* 35.7* 34.6* 34.2* 33.0* 38.0   PLATELETS 10*3/mm3 38* 47* 57* 46* 33* 66* 99*     Results from last 7 days   Lab Units 12/30/22  0540 12/29/22  1710 12/29/22  0541 12/29/22  0002  12/28/22  0839 12/27/22  0742 12/26/22  1641 12/26/22  0435   SODIUM mmol/L 135* 135* 134* 130* 130* 125* 122* 118*   POTASSIUM mmol/L 3.8 4.3 4.5 3.9 3.9 4.3 4.3 5.4*   CHLORIDE mmol/L 103 102 100 99 97* 92* 91* 84*   CO2 mmol/L 21.0* 20.0* 19.0* 20.0* 20.0* 21.0* 19.0* 16.0*   BUN mg/dL 77* 74* 65* 59* 59* 64* 58* 55*   CREATININE mg/dL 1.50* 1.65* 1.66* 1.17 1.36* 2.25* 2.45* 2.38*   GLUCOSE mg/dL 122* 108* 90 137* 82 107* 87 75   ALBUMIN g/dL 2.3*  --  2.7* 2.6* 2.4*  --   --  3.30*   BILIRUBIN mg/dL 5.2* 5.3* 5.3* 4.6* 4.6*  --   --  4.8*   ALK PHOS U/L 105  --  108 88 83  --   --  83   AST (SGOT) U/L 431*  --  238* 62* 44*  --   --  26   ALT (SGPT) U/L 236*  --  117* 36 30  --   --  17   CALCIUM mg/dL 8.1* 8.1* 8.7 8.6 8.5* 7.8* 7.7* 8.2*     Results from last 7 days   Lab Units 12/26/22  0435   CK TOTAL U/L 166     Results from last 7 days   Lab Units 12/28/22  0839   SED RATE mm/hr 39*         Microbiology   Microbiology Results (last 10 days)     Procedure Component Value - Date/Time    Blood Culture - Blood, Hand, Left [[id number]]  (Abnormal) Collected: 12/29/22 1015    Lab Status: Preliminary result Specimen: Blood from Hand, Left Updated: 12/30/22 0627     Blood Culture Abnormal Stain     Gram Stain Aerobic Bottle Gram positive cocci in clusters    Narrative:      Less than seven (7) mL's of blood was collected.  Insufficient quantity may yield false negative results.    Body Fluid Culture - Body Fluid, Shoulder, Right [[id number]]  (Abnormal)  (Susceptibility) Collected: 12/28/22 0758    Lab Status: Preliminary result Specimen: Body Fluid from Shoulder, Right Updated: 12/30/22 0934     Body Fluid Culture Heavy growth (4+) Staphylococcus aureus     Gram Stain Many (4+) WBCs seen      Many (4+) Gram positive cocci in clusters    Susceptibility      Staphylococcus aureus      PARMINDER      Gentamicin Susceptible      Oxacillin Susceptible      Rifampin Susceptible      Vancomycin Susceptible                        Susceptibility Comments     Staphylococcus aureus    This isolate does not demonstrate inducible clindamycin resistance in vitro.               Blood Culture - Blood, Arm, Left [[id number]]  (Abnormal) Collected: 12/27/22 1300    Lab Status: Final result Specimen: Blood from Arm, Left Updated: 12/29/22 0721     Blood Culture Staphylococcus aureus     Comment:   Infectious disease consultation is highly recommended to rule out distant foci of infection.        Isolated from Anaerobic Bottle     Gram Stain Anaerobic Bottle Gram positive cocci in clusters    Narrative:      Refer to previous blood culture collected on 12/26/2022 0435 for MICs    Less than seven (7) mL's of blood was collected.  Insufficient quantity may yield false negative results.    Blood Culture - Blood, Hand, Right [[id number]]  (Abnormal)  (Susceptibility) Collected: 12/26/22 0435    Lab Status: Final result Specimen: Blood from Hand, Right Updated: 12/28/22 0657     Blood Culture Staphylococcus aureus     Comment:   Infectious disease consultation is highly recommended to rule out distant foci of infection.        Isolated from Aerobic and Anaerobic Bottles     Gram Stain Aerobic Bottle Gram positive cocci in clusters      Anaerobic Bottle Gram positive cocci in clusters    Narrative:      Less than seven (7) mL's of blood was collected.  Insufficient quantity may yield false negative results.    Susceptibility      Staphylococcus aureus      PARMINDER      Gentamicin Susceptible      Oxacillin Susceptible      Rifampin Susceptible      Vancomycin Susceptible                       Susceptibility Comments     Staphylococcus aureus    This isolate does not demonstrate inducible clindamycin resistance in vitro.               Blood Culture ID, PCR - Blood, Hand, Right [[id number]]  (Abnormal) Collected: 12/26/22 0435    Lab Status: Final result Specimen: Blood from Hand, Right Updated: 12/26/22 2054     BCID, PCR Staph aureus. mecA/C and MREJ (methicillin  resistance gene) NOT detected. Identification by BCID2 PCR     BOTTLE TYPE Aerobic Bottle    Narrative:      Infectious disease consultation is highly recommended to rule out distant foci of infection.    Blood Culture - Blood, Hand, Left [[id number]]  (Abnormal) Collected: 12/26/22 0434    Lab Status: Final result Specimen: Blood from Hand, Left Updated: 12/28/22 0657     Blood Culture Staphylococcus aureus     Comment:   Infectious disease consultation is highly recommended to rule out distant foci of infection.        Isolated from Aerobic and Anaerobic Bottles     Gram Stain Aerobic Bottle Gram positive cocci in clusters      Anaerobic Bottle Gram positive cocci in clusters    Narrative:      Refer to previous blood culture collected on 12/26/2022 0435 for MICs    Less than seven (7) mL's of blood was collected.  Insufficient quantity may yield false negative results.    MRSA Screen, PCR (Inpatient) - Swab, Nares [[id number]]  (Normal) Collected: 12/26/22 0307    Lab Status: Final result Specimen: Swab from Nares Updated: 12/26/22 0507     MRSA PCR No MRSA Detected    Narrative:      The negative predictive value of this diagnostic test is high and should only be used to consider de-escalating anti-MRSA therapy. A positive result may indicate colonization with MRSA and must be correlated clinically.          Medication Review:       Schedule Meds  cefTRIAXone, 2 g, Intravenous, Q24H  ferrous sulfate, 324 mg, Oral, Daily With Breakfast  midodrine, 10 mg, Oral, Q8H  pantoprazole, 40 mg, Intravenous, Q AM  sodium chloride, 10 mL, Intravenous, Q12H        Infusion Meds  argatroban infusion, 0.5-10 mcg/kg/min, Last Rate: 0.5 mcg/kg/min (12/30/22 0806)  dextrose 5 % and sodium chloride 0.45 %, 75 mL/hr, Last Rate: 75 mL/hr (12/30/22 1150)  phenylephrine, 0.5-3 mcg/kg/min, Last Rate: Stopped (12/29/22 1805)  propofol, 5-50 mcg/kg/min, Last Rate: Stopped (12/29/22 5148)        PRN Meds  •  acetaminophen **OR**  acetaminophen  •  aluminum-magnesium hydroxide-simethicone  •  dextrose  •  dextrose  •  fentanyl  •  glucagon (human recombinant)  •  Morphine  •  ondansetron **OR** ondansetron  •  sodium chloride  •  sodium chloride  •  sodium chloride        Assessment & Plan       Antimicrobial Therapy   1.  IV ceftriaxone        2.        3.        4.        5.            Assessment    Bilateral shoulder septic arthritis.  Synovial fluid culture was consistent with infection and cultures are growing 4+ Staph aureus    Methicillin susceptible Staph aureus bacteremia secondary to above.  Blood cultures were positive x2 sets on admission on December 26, 2022.  Repeat blood culture from December 27, 2022 turned positive  Repeat blood culture from December 29, 2022 is positive  ALMA DELIA was done on December 28, 2022 and showed no vegetation    Mild septic shock.  Currently off vasopressors    Respiratory failure on the ventilator after surgery    DVT of the left arm secondary to PICC line.  Hematology service was consulted and patient was started on anticoagulation      Plan    Continue IV ceftriaxone 2 g daily for 6 weeks  Repeat 1 more sets of blood culture today  I will eventually replace the current PICC line with a new PICC line in 2 days after starting anticoagulation and recommending of bacteremia clearance  Continue supportive care  A.celestina Delacruz MD  12/30/22  12:49 EST    Note is dictated utilizing voice recognition software/Dragon

## 2022-12-30 NOTE — PROGRESS NOTES
Hematology/Oncology Inpatient Progress Note    PATIENT NAME: Jaime Bar  : 1955  MRN: [id number]    CHIEF COMPLAINT: Sepsis; Thrombocytopenia; provoked catheter associated LUE DVT, LUE SVT, and RUE SVT    HISTORY OF PRESENT ILLNESS:    67 y.o. male admitted to [facility] on transfer from [facility] on 2022.  The patient was intubated and unresponsive at time of consultation with histories obtained from review of records and discussion with patient's niece and nephew.  He reported a history of bilateral shoulder pain for few days prior to admission.  He had presented to [place] [facility] on 2022 where he was hypotensive and hyponatremic and transferred to [facility].  CXR on 2022 showed coarsened airspace and bronchovascular thickening with small airway disease such as bronchitis or asthma, edema, atypical/viral infection, and aspiration in differential.  A RUE venous Doppler showed acute RUE superficial thrombophlebitis in the cephalic vein and no evidence of DVT for which he was started on subcu heparin.  On 2022 CBC revealed WBC 7.2, hemoglobin 12.4, MCV 98.4, and platelets 99,000.  Creatinine was elevated to 2.38, BUN 55, sodium was low at 118, potassium was high at 5.4 and LFTs revealed T bili elevated to 4.8 (0-1.2) with transaminases normal.  Blood cultures grew Staph aureus.  Urinalysis was concerning for UTI.  Urine sodium was less than 20, a.m. cortisol 26.11 (6.02-18.4), and urine osmolality was 410 ().   X-rays and MRIs of the bilateral shoulders were performed 2022 revealing joint effusion and advanced degenerative changes/destructive changes of glenohumeral joint.  There was suspected complete tear of the supraspinatus and infraspinatus tendons and a completely macerated appearance of the labrum on the right shoulder.  On 2022 RUE venous Doppler was repeated and felt stable.  He then underwent bilateral shoulder arthroscopy incision and drainage  with culture growing heavy growth of Staph aureus.  He had been intubated prior to the surgery and remained sedated on ventilator with pressors postop.  At time of consultation 12/29/2022 LUE venous Doppler showed acute catheter associated DVT in the left axillary vein, SVT in the left basilic vein of the upper arm.  BLE venous Doppler was negative for DVT/SVT but did show deep venous valvular incompetence in the right popliteal vein.  CBC revealed WBC 14.2, hemoglobin 11.4, MCV 96.5, and platelets 46,000.  T bili was elevated to 5.3 and transaminases were now elevated with  and .  PT was 15.6 (9.6-11.7), PTT was 38.7 (24-31), and fibrinogen was 493 (210-450).  D-dimer was 11.83 (0-0.67).  Transesophageal echocardiogram showed LVEF 46-50%, mild aortic valve stenosis, and no evidence of vegetation or bacterial endocarditis.  Heparin was changed to argatroban.  ID was managing antibiotics with patient on ceftriaxone with plan for treatment x6 weeks and recommending replacing PICC line 2 days after starting anticoagulation.  His niece and nephew reported the patient to have lost some weight but they were unsure the amount.  They reported PMH significant for prostate cancer treated approximately 2010 with radiation alone and no evidence of recurrence to their knowledge.  He also was under the care of Dr. Damico at  for cardiac amyloid.  Chart review showed PSA was 0.1 (0-4) in August 2022.  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  IgG was 768 (603-1613), IgA was 238 (), IgM was 64 ().  Kappa/lambda ratio was 1.36 (0.2-1.65).  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  LVEF in February 2022 was 45-50%.  Cardiac MRI 4/6/2022 revealed a myocardial mass 276 g, global hypokinesis of left ventricle with ejection fraction 43%, no  myocardial iron overload, the thickness and appearance of intra-atrial septum was also consistent with cardiac amyloidosis.  The note stated that the patient was followed by Dr. Banks at  [facility] where he was on Vyndamax therapy for cardiac amyloid and had been on that treatment for about 2 weeks.  Additionally in January 2022 T bili was noted to be elevated to 2.6 (0.2-1.0) and he was anemic with hemoglobin 11.8 and MCV 86.5 platelets were normal at 261,000.     12/29/22  Hematology/Oncology was consulted by RITU Jimenez for thrombocytopenia.     Past Medical History: Prostate cancer approximately 2010 treated with radiation only.  Cardiac amyloid managed by Dr. Damico at .  [facility].  Surgical History: Several orthopedic surgeries in the past.  Bilateral shoulder arthroscopy with incision and drainage 12/28/2022.  Social History: He lives in [place], [place] with his spouse.  His spouse is known to have Alzheimer's disease.  He has smoked for many years.  He has no alcohol or recreational drug use however he was narcotic dependent secondary to chronic pain.  He is a retired .  Family History: No significant known family history.  Allergies: Tramadol causes him to feel weird and codeine causes nausea.     PCP: Provider, No Known    INTERVAL HISTORY:  • 12/30/2022- platelets 38,000, hemoglobin 11.1.  Folate 7.13 (4.78-24.2), vitamin B12 greater than 2000.  Iron 29 (), iron saturation 15% (20-50), TIBC 195 (298-536), and ferritin 3525 ().  Reticulocytes 1.05 (0.7-1.9), haptoglobin 148 (). PSA 0.234 (0-4).  T bili 5.3 with direct bili 4.2 (0-0.3).  Hepatitis panel nonreactive.  Extubated 12/29/2022 and off pressors.    History of present illness reviewed since last visit and changes noted on 12/30/22.    Subjective   Feels sleepy and weak.    ROS:  Review of Systems   Constitutional: Positive for fatigue. Negative for activity change, chills, fever and  unexpected weight change.   HENT: Negative for congestion, dental problem, hearing loss, mouth sores, nosebleeds, sore throat and trouble swallowing.    Eyes: Negative for photophobia and visual disturbance.   Respiratory: Negative for apnea, cough, chest tightness and shortness of breath.    Cardiovascular: Negative for chest pain, palpitations and leg swelling.   Gastrointestinal: Negative for abdominal distention, abdominal pain, blood in stool, constipation, diarrhea, nausea and vomiting.   Endocrine: Negative for cold intolerance and heat intolerance.   Genitourinary: Negative for decreased urine volume, difficulty urinating, frequency, hematuria and urgency.   Musculoskeletal: Negative for arthralgias and gait problem.   Skin: Negative for rash and wound.   Neurological: Positive for weakness. Negative for dizziness, tremors, light-headedness, numbness and headaches.   Hematological: Negative for adenopathy. Does not bruise/bleed easily.   Psychiatric/Behavioral: Negative for confusion and hallucinations. The patient is not nervous/anxious.    All other systems reviewed and are negative.       MEDICATIONS:    Scheduled Meds:  cefTRIAXone, 2 g, Intravenous, Q24H  midodrine, 10 mg, Oral, Q8H  pantoprazole, 40 mg, Intravenous, Q AM  sodium chloride, 10 mL, Intravenous, Q12H       Continuous Infusions:  argatroban infusion, 0.5-10 mcg/kg/min, Last Rate: 0.5 mcg/kg/min (12/30/22 0806)  dextrose 5 % and sodium chloride 0.45 %, 50 mL/hr, Last Rate: 50 mL/hr (12/30/22 7244)  phenylephrine, 0.5-3 mcg/kg/min, Last Rate: Stopped (12/29/22 1758)  propofol, 5-50 mcg/kg/min, Last Rate: Stopped (12/29/22 6912)       PRN Meds:  •  acetaminophen **OR** acetaminophen  •  aluminum-magnesium hydroxide-simethicone  •  dextrose  •  dextrose  •  fentanyl  •  glucagon (human recombinant)  •  Morphine  •  ondansetron **OR** ondansetron  •  sodium chloride  •  sodium chloride  •  sodium chloride     ALLERGIES:    Allergies   Allergen  "Reactions   • Tramadol-Acetaminophen Anxiety     Worms in the brain feeling   • Codeine Other (See Comments)     nausea   • Tramadol Other (See Comments)     \"I just felt really crawly, it did weird things with my head\"       Objective    VITALS:   /59   Pulse 87   Temp 97.5 °F (36.4 °C)   Resp 25   Ht 167.6 cm (66\")   Wt 88.7 kg (195 lb 8.8 oz)   SpO2 96%   BMI 31.56 kg/m²     PHYSICAL EXAM:  Physical Exam  Vitals and nursing note reviewed.   Constitutional:       General: He is not in acute distress.     Appearance: Normal appearance. He is well-developed. He is ill-appearing. He is not diaphoretic.   HENT:      Head: Normocephalic and atraumatic.      Comments: Male pattern baldness     Ears:      Comments: Right ear O2 monitor.     Nose: Nose normal.      Mouth/Throat:      Mouth: Mucous membranes are moist.      Pharynx: Oropharynx is clear. No oropharyngeal exudate or posterior oropharyngeal erythema.      Comments: Dental fillings  Eyes:      General: No scleral icterus.     Extraocular Movements: Extraocular movements intact.      Conjunctiva/sclera: Conjunctivae normal.      Pupils: Pupils are equal, round, and reactive to light.   Cardiovascular:      Rate and Rhythm: Normal rate and regular rhythm.      Heart sounds: Normal heart sounds. No murmur heard.     Comments: Cardiac monitor leads  Pulmonary:      Effort: Pulmonary effort is normal. No respiratory distress.      Breath sounds: Normal breath sounds. No wheezing or rales.   Abdominal:      General: Bowel sounds are normal. There is no distension.      Palpations: Abdomen is soft. There is no mass.      Tenderness: There is no abdominal tenderness. There is no guarding.   Genitourinary:     Comments: Deferred   Musculoskeletal:         General: Swelling (BUE swelling and 1+ BLE edema) present. No tenderness or deformity. Normal range of motion.      Cervical back: Normal range of motion and neck supple.      Comments: Bilateral shoulder " dressings with drains.  RUE IV.  LUE PICC line.  BLE SCDs.  Right foot inflation boot.   Lymphadenopathy:      Cervical: No cervical adenopathy.      Upper Body:      Right upper body: No supraclavicular adenopathy.      Left upper body: No supraclavicular adenopathy.   Skin:     General: Skin is warm and dry.      Coloration: Skin is not pale.      Findings: No bruising, erythema or rash.      Comments: Vertical scar right knee   Neurological:      General: No focal deficit present.      Mental Status: He is alert and oriented to person, place, and time.      Coordination: Coordination normal.   Psychiatric:         Mood and Affect: Mood normal.         Behavior: Behavior normal.         Thought Content: Thought content normal.           RECENT LABS:  Lab Results (last 24 hours)     Procedure Component Value Units Date/Time    Folate [[id number]]  (Normal) Collected: 12/29/22 2133    Specimen: Blood Updated: 12/30/22 1100     Folate 7.13 ng/mL     Narrative:      Results may be falsely increased if patient taking Biotin.      Vitamin B12 [[id number]]  (Abnormal) Collected: 12/29/22 2133    Specimen: Blood Updated: 12/30/22 1100     Vitamin B-12 >2,000 pg/mL     Narrative:      Results may be falsely increased if patient taking Biotin.      Pathology Consultation [[id number]] Collected: 12/29/22 1721    Specimen: Blood, Venous Line Updated: 12/30/22 1057     Final Diagnosis --     Left shifted granulocytes  Anemia  Thrombocytopenia  No blasts identified       Case Report --     Surgical Pathology Report                         Case: [id number]                                  Authorizing Provider:  Catarino Gonzalez APRN      Collected:           12/29/2022 05:21 PM          Ordering Location:     [facility]       Received:            12/30/2022 08:29 AM                                 [facility]                                                          Pathologist:           Satinder Bullard MD                                                             Specimen:    Blood, Venous Line                                                                         PSA Screen [[id number]]  (Normal) Collected: 12/29/22 1710    Specimen: Blood from Cannula Updated: 12/30/22 1052     PSA 0.234 ng/mL     Narrative:      Results may be falsely decreased if patient taking Biotin.      Haptoglobin [[id number]]  (Normal) Collected: 12/29/22 2133    Specimen: Blood Updated: 12/30/22 1042     Haptoglobin 148 mg/dL     Body Fluid Culture - Body Fluid, Shoulder, Right [[id number]]  (Abnormal)  (Susceptibility) Collected: 12/28/22 0713    Specimen: Body Fluid from Shoulder, Right Updated: 12/30/22 0934     Body Fluid Culture Heavy growth (4+) Staphylococcus aureus     Gram Stain Many (4+) WBCs seen      Many (4+) Gram positive cocci in clusters    Susceptibility      Staphylococcus aureus      PARMINDER      Gentamicin Susceptible      Oxacillin Susceptible      Rifampin Susceptible      Vancomycin Susceptible                       Susceptibility Comments     Staphylococcus aureus    This isolate does not demonstrate inducible clindamycin resistance in vitro.               Comprehensive Metabolic Panel [[id number]]  (Abnormal) Collected: 12/30/22 0540    Specimen: Blood Updated: 12/30/22 0858     Glucose 122 mg/dL      BUN 77 mg/dL      Creatinine 1.50 mg/dL      Sodium 135 mmol/L      Potassium 3.8 mmol/L      Chloride 103 mmol/L      CO2 21.0 mmol/L      Calcium 8.1 mg/dL      Total Protein 4.4 g/dL      Albumin 2.3 g/dL      ALT (SGPT) 236 U/L      AST (SGOT) 431 U/L      Alkaline Phosphatase 105 U/L      Total Bilirubin 5.2 mg/dL      Globulin 2.1 gm/dL      A/G Ratio 1.1 g/dL      BUN/Creatinine Ratio 51.3     Anion Gap 11.0 mmol/L      eGFR 50.7 mL/min/1.73      Comment: National Kidney Foundation and American Society of Nephrology (ASN) Task Force recommended calculation based on the Chronic Kidney Disease Epidemiology Collaboration  (CKD-EPI) equation refit without adjustment for race.       Narrative:      GFR Normal >60  Chronic Kidney Disease <60  Kidney Failure <15      Phosphorus [[id number]]  (Normal) Collected: 12/30/22 0540    Specimen: Blood Updated: 12/30/22 0857     Phosphorus 4.0 mg/dL     Magnesium [[id number]]  (Normal) Collected: 12/30/22 0540    Specimen: Blood Updated: 12/30/22 0857     Magnesium 2.3 mg/dL     Manual Differential [[id number]]  (Abnormal) Collected: 12/30/22 0540    Specimen: Blood Updated: 12/30/22 0713     Neutrophil % 72.0 %      Lymphocyte % 4.0 %      Monocyte % 3.0 %      Bands %  19.0 %      Metamyelocyte % 1.0 %      Atypical Lymphocyte % 1.0 %      Neutrophils Absolute 8.46 10*3/mm3      Lymphocytes Absolute 0.47 10*3/mm3      Monocytes Absolute 0.28 10*3/mm3      Crenated RBC's Slight/1+     Toxic Granulation Slight/1+     Vacuolated Neutrophils Slight/1+     Platelet Estimate Decreased    CBC & Differential [[id number]]  (Abnormal) Collected: 12/30/22 0540    Specimen: Blood Updated: 12/30/22 0713    Narrative:      The following orders were created for panel order CBC & Differential.  Procedure                               Abnormality         Status                     ---------                               -----------         ------                     CBC Auto Differential[[id number]]        Abnormal            Final result               Scan Slide[[id number]]                                       Final result                 Please view results for these tests on the individual orders.    Scan Slide [[id number]] Collected: 12/30/22 0540    Specimen: Blood Updated: 12/30/22 0713     Scan Slide --     Comment: See Manual Differential Results       CBC Auto Differential [[id number]]  (Abnormal) Collected: 12/30/22 0540    Specimen: Blood Updated: 12/30/22 0713     WBC 9.30 10*3/mm3      RBC 3.42 10*6/mm3      Hemoglobin 11.1 g/dL      Hematocrit 32.3 %      MCV 94.4 fL      MCH 32.5 pg      MCHC 34.4 g/dL   "    RDW 16.0 %      RDW-SD 55.6 fl      MPV 8.9 fL      Platelets 38 10*3/mm3     Narrative:      The previously reported component NRBC is no longer being reported. Previous result was 0.0 /100 WBC (Reference Range: 0.0-0.2 /100 WBC) on 12/30/2022 at 0613 EST.    aPTT [[id number]]  (Abnormal) Collected: 12/30/22 0540    Specimen: Blood Updated: 12/30/22 0656     PTT 91.2 seconds     D-dimer, Quantitative [[id number]]  (Abnormal) Collected: 12/30/22 0540    Specimen: Blood Updated: 12/30/22 0630     D-Dimer, Quantitative 10.87 mg/L (FEU)     Narrative:      According to the assay 's published package insert, a normal (<0.50 mg/L (FEU)) D-dimer result in conjunction with a non-high clinical probability assessment, excludes deep vein thrombosis (DVT) and pulmonary embolism (PE) with high sensitivity.    D-dimer values increase with age and this can make VTE exclusion of an older population difficult. To address this, the American College of Physicians, based on best available evidence and recent guidelines, recommends that clinicians use age-adjusted D-dimer thresholds in patients greater than 50 years of age with: a) a low probability of PE who do not meet all Pulmonary Embolism Rule Out Criteria, or b) in those with intermediate probability of PE.   The formula for an age-adjusted D-dimer cut-off is \"age/100\".  For example, a 60 year old patient would have an age-adjusted cut-off of 0.60 mg/L (FEU) and an 80 year old 0.80 mg/L (FEU).    Reticulocytes [[id number]]  (Normal) Collected: 12/30/22 0540    Specimen: Blood Updated: 12/30/22 0630     Reticulocyte % 0.96 %      Reticulocyte Absolute 0.0327 10*6/mm3     Blood Culture - Blood, Hand, Left [[id number]]  (Abnormal) Collected: 12/29/22 1015    Specimen: Blood from Hand, Left Updated: 12/30/22 0627     Blood Culture Abnormal Stain     Gram Stain Aerobic Bottle Gram positive cocci in clusters    Narrative:      Less than seven (7) mL's of blood was " collected.  Insufficient quantity may yield false negative results.    Calcium, Ionized [[id number]]  (Abnormal) Collected: 12/30/22 0540    Specimen: Blood Updated: 12/30/22 0604     Ionized Calcium 1.15 mmol/L     aPTT [[id number]]  (Normal) Collected: 12/30/22 0212    Specimen: Blood Updated: 12/30/22 0255     PTT 70.8 seconds     Immunofixation, Urine - Urine, Clean Catch [[id number]] Collected: 12/30/22 0015    Specimen: Urine, Clean Catch Updated: 12/30/22 0018    POC Glucose Once [[id number]]  (Normal) Collected: 12/29/22 2321    Specimen: Blood Updated: 12/29/22 2323     Glucose 100 mg/dL      Comment: Serial Number: [id number]:  [id number]       Hepatitis Panel, Acute [[id number]]  (Normal) Collected: 12/29/22 2133    Specimen: Blood Updated: 12/29/22 2244     Hepatitis B Surface Ag Non-Reactive     Hep A IgM Non-Reactive     Hep B C IgM Non-Reactive     Hepatitis C Ab Non-Reactive    Narrative:      Results may be falsely decreased if patient taking Biotin.     Reticulocytes [[id number]]  (Normal) Collected: 12/29/22 2133    Specimen: Blood Updated: 12/29/22 2143     Reticulocyte % 1.05 %      Reticulocyte Absolute 0.0345 10*6/mm3     Cytomegalovirus Antibody, IgM [[id number]] Collected: 12/29/22 2133    Specimen: Blood Updated: 12/29/22 2136    Ivy-Barr Virus VCA, IgM [[id number]] Collected: 12/29/22 2133    Specimen: Blood Updated: 12/29/22 2136    Immunoglobulin Free LT Chains Blood [[id number]] Collected: 12/29/22 2133    Specimen: Blood Updated: 12/29/22 2136    SHAR by IFA, Reflex 9-biomarkers profile [[id number]] Collected: 12/29/22 2133    Specimen: Blood Updated: 12/29/22 2136    Immunofixation, Serum [[id number]] Collected: 12/29/22 2133    Specimen: Blood Updated: 12/29/22 2136    Protein Elec + Interp, Serum [[id number]] Collected: 12/29/22 2133    Specimen: Blood Updated: 12/29/22 2136    Copper, Serum [[id number]] Collected: 12/29/22 2133    Specimen: Blood Updated: 12/29/22 2136    Ferritin  [[id number]]  (Abnormal) Collected: 12/29/22 1710    Specimen: Blood from Cannula Updated: 12/29/22 2104     Ferritin 3,525.00 ng/mL     Narrative:      Results may be falsely decreased if patient taking Biotin.      Iron Profile [[id number]]  (Abnormal) Collected: 12/29/22 1710    Specimen: Blood from Cannula Updated: 12/29/22 2039     Iron 29 mcg/dL      Iron Saturation 15 %      Transferrin 131 mg/dL      TIBC 195 mcg/dL     Bilirubin, Total & Direct [[id number]]  (Abnormal) Collected: 12/29/22 1710    Specimen: Blood from Cannula Updated: 12/29/22 2039     Total Bilirubin 5.3 mg/dL      Bilirubin, Direct 4.2 mg/dL      Bilirubin, Indirect 1.1 mg/dL     Manual Differential [[id number]]  (Abnormal) Collected: 12/29/22 1721    Specimen: Blood Updated: 12/29/22 1801     Neutrophil % 80.0 %      Lymphocyte % 4.0 %      Monocyte % 5.0 %      Bands %  10.0 %      Metamyelocyte % 1.0 %      Neutrophils Absolute 8.82 10*3/mm3      Lymphocytes Absolute 0.39 10*3/mm3      Monocytes Absolute 0.49 10*3/mm3      Waldorf Cells Slight/1+     Toxic Granulation Slight/1+     Vacuolated Neutrophils Slight/1+     Platelet Estimate Decreased     Giant Platelets Slight/1+    CBC & Differential [[id number]]  (Abnormal) Collected: 12/29/22 1721    Specimen: Blood Updated: 12/29/22 1801    Narrative:      The following orders were created for panel order CBC & Differential.  Procedure                               Abnormality         Status                     ---------                               -----------         ------                     CBC Auto Differential[[id number]]        Abnormal            Final result               Scan Slide[[id number]]                                       Final result                 Please view results for these tests on the individual orders.    Scan Slide [[id number]] Collected: 12/29/22 1721    Specimen: Blood Updated: 12/29/22 1801     Scan Slide --     Comment: See Manual Differential Results       CBC  "Auto Differential [[id number]]  (Abnormal) Collected: 12/29/22 1721    Specimen: Blood Updated: 12/29/22 1801     WBC 9.80 10*3/mm3      RBC 3.37 10*6/mm3      Hemoglobin 10.9 g/dL      Hematocrit 32.0 %      MCV 94.9 fL      MCH 32.3 pg      MCHC 34.0 g/dL      RDW 16.0 %      RDW-SD 56.0 fl      MPV 9.2 fL      Platelets 47 10*3/mm3     Narrative:      The previously reported component NRBC is no longer being reported. Previous result was 0.2 /100 WBC (Reference Range: 0.0-0.2 /100 WBC) on 12/29/2022 at 1737 EST.    aPTT [[id number]]  (Abnormal) Collected: 12/29/22 1721    Specimen: Blood Updated: 12/29/22 1754     PTT 38.7 seconds     Protime-INR [[id number]]  (Abnormal) Collected: 12/29/22 1721    Specimen: Blood Updated: 12/29/22 1754     Protime 15.6 Seconds      INR 1.55    Fibrinogen [[id number]]  (Abnormal) Collected: 12/29/22 1721    Specimen: Blood Updated: 12/29/22 1754     Fibrinogen 493 mg/dL     D-dimer, Quantitative [[id number]]  (Abnormal) Collected: 12/29/22 1721    Specimen: Blood Updated: 12/29/22 1754     D-Dimer, Quantitative 11.83 mg/L (FEU)     Narrative:      According to the assay 's published package insert, a normal (<0.50 mg/L (FEU)) D-dimer result in conjunction with a non-high clinical probability assessment, excludes deep vein thrombosis (DVT) and pulmonary embolism (PE) with high sensitivity.    D-dimer values increase with age and this can make VTE exclusion of an older population difficult. To address this, the American College of Physicians, based on best available evidence and recent guidelines, recommends that clinicians use age-adjusted D-dimer thresholds in patients greater than 50 years of age with: a) a low probability of PE who do not meet all Pulmonary Embolism Rule Out Criteria, or b) in those with intermediate probability of PE.   The formula for an age-adjusted D-dimer cut-off is \"age/100\".  For example, a 60 year old patient would have an age-adjusted cut-off of " 0.60 mg/L (FEU) and an 80 year old 0.80 mg/L (FEU).    Basic Metabolic Panel [[id number]]  (Abnormal) Collected: 12/29/22 1710    Specimen: Blood from Cannula Updated: 12/29/22 1752     Glucose 108 mg/dL      BUN 74 mg/dL      Creatinine 1.65 mg/dL      Sodium 135 mmol/L      Potassium 4.3 mmol/L      Chloride 102 mmol/L      CO2 20.0 mmol/L      Calcium 8.1 mg/dL      BUN/Creatinine Ratio 44.8     Anion Gap 13.0 mmol/L      eGFR 45.2 mL/min/1.73      Comment: National Kidney Foundation and American Society of Nephrology (ASN) Task Force recommended calculation based on the Chronic Kidney Disease Epidemiology Collaboration (CKD-EPI) equation refit without adjustment for race.       Narrative:      GFR Normal >60  Chronic Kidney Disease <60  Kidney Failure <15      Peripheral Blood Smear [[id number]] Collected: 12/29/22 1721    Specimen: Blood Updated: 12/29/22 1740     Pathology Review Yes    Heparin Induced PLT Antibody With / Rfx [[id number]] Collected: 12/29/22 1721    Specimen: Blood Updated: 12/29/22 1726    Fibrin Split Products [[id number]] Collected: 12/29/22 1721    Specimen: Blood Updated: 12/29/22 1726          PENDING RESULTS: HIT ab, CMV IgM, EBV IgM, SHAR, p. Smear, copper, SPEP, SIFE, free light chains, immunoglobulins, UIFE. US abd/liver    IMAGING REVIEWED:  XR Chest 1 View    Result Date: 12/30/2022  Cardiomegaly and increased pulmonary vascular congestion and suspected interstitial edema  Lines and tubes as above[  Electronically Signed By-Jaxon Aly On:12/30/2022 10:19 AM This report was finalized on [id number] by  Jaxon Aly, .    XR Chest 1 View    Result Date: 12/29/2022  The endotracheal tube is approximately 6 mm above the sarah. Recommend repositioning. Left subclavian central venous catheter has its catheter tip overlying the superior vena cava. The cardiac silhouette is moderately to significantly enlarged and the pulmonary vessels are within normal limits. There is no focal  area of consolidation otherwise seen. Electronically signed by:  Los Mccoy D.O.  12/28/2022 11:13 PM Mountain Time      I have reviewed the patient's labs, imaging, reports, and other clinician documentation.    Assessment & Plan   ASSESSMENT:  1. Thrombocytopenia- platelets were normal in early 2022.  Platelets were low on admission and continued to drop.  HIT antibody remains pending and heparin was changed to argatroban.  Received ceftriaxone and now on cefepime which can cause thrombocytopenia.  Coags mildly elevated with fibrinogen not low, not likely DIC.  No hemolysis so not likely TTP either.  No vitamin B12 or folate deficiencies. Remainder of acute on chronic thrombocytopenia work-up in progress.  We will plan to hold argatroban if patient develops any active bleeding.  2. Acute on chronic anemia/IgA lambda monoclonal gammopathy/SWETHA- mild anemia with hemoglobin in the 11's in early 2022.  Gammopathy labs at that time showed IgA lambda monoclonal protein with normal immunoglobulins and free light chains.  Iron studies show SWETHA and starting oral iron.  No hemolysis or vitamin B12/folate deficiencies.  Hemoglobin stable.  Copper and gammopathy labs remain pending.  3. Acute provoked catheter associated LUE DVT/SVT and RUE SVT- RUE SVT was diagnosed prior to LUE DVT and he had received several doses of subcu heparin starting on 12/26. LUE DVT provoked by PICC line which remains in place and patient is receiving argatroban.    4. Elevated LFTs- possible shock liver.  T bili remains elevated with elevated direct bili suggesting primary liver disease.  Hepatitis panel negative.  Getting ultrasound of the liver.  5. Bilateral shoulder septic arthritis/staph RS bacteremia-on cefepime per ID.  No vegetation on echocardiogram.  6. Acute hypoxic respiratory failure/smoking history-  transiently on ventilator postsurgery.  7. History of prostate cancer-PSA remains normal.   8. Cardiac amyloidosis- on chart review  it has been treated at .  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  Pending receipt of records.  9. STEVEN/hyponatremia-nephrology managing.  10. A. fib with RVR-cardiology managing.    PLAN:  1. Hold argatroban if patient develops any bleeding.  2. Ultrasound abdomen attention liver.  3. Start ferrous sulfate 325 mg by mouth daily.  4. Continue daily CBC.  5. Await remaining thrombocytopenia, anemia, and gammopathy labs.  6. Pending records from  regarding cardiac amyloid        I have personally performed a face-to-face diagnostic evaluation on this patient. I have performed a complete history and physical examination, reviewed laboratory studies, and radiographic examinations.  I have completed the majority and substantive portion of the medical decision making.  I have formulated the assessment on this patient and the plan of action as noted above. I have discussed the case with Darrin Nixon NP, have edited/reviewed the note, and agree with the care plan.  Patient complains of weakness.  On examination, he has bilateral shoulder dressing with drains present.  He has been extubated but remains seriously ill in ICU.  Labs are significant for further drop in platelet count.  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  Extensive work-up in progress.  We will hold argatroban if active bleeding develops.        Note prepared by KURT Alicea.  Patient seen and examined by Vicente Mendoza MD.  Electronically signed by RITU Taylor, 12/30/22, 11:44 AM EST.    I discussed the patient's findings and my recommendations with patient and nursing.    Part of this note may be an electronic transcription/translation of spoken language to printed text using the Dragon Dictation System.    Electronically signed by Vicente Mendoza MD, 12/30/22, 3:23 PM EST.

## 2022-12-30 NOTE — CONSULTS
"Nutrition Services    Patient Name: Jaime Bar  YOB: 1955  MRN: [id number]  Admission date: 12/26/2022    Comment:  -- Begin Nutren 1.5 at 20 mL/hr + 10mL q 4 hr water flush       PPE Documentation        PPE Worn By Provider mask, gloves and eye protection   PPE Worn By Patient  None      CLINICAL NUTRITION ASSESSMENT      Reason for Assessment 12/30: NPO x 4 days, Consult for tube feeding      H&P      History reviewed. No pertinent past medical history.    Past Surgical History:   Procedure Laterality Date   • SHOULDER ARTHROSCOPY Right 12/28/2022    Procedure: SHOULDER ARTHROSCOPY INCISION AND DRAINAGE.;  Surgeon: Nikunj Velez MD;  Location: [facility] OR;  Service: Orthopedics;  Laterality: Right;   • SHOULDER ARTHROSCOPY Left 12/28/2022    Procedure: SHOULDER ARTHROSCOPY INCISION AND DRAINAGE;  Surgeon: Nikunj Velez MD;  Location: [facility];  Service: Orthopedics;  Laterality: Left;        Current Problems   Septic shock    Hyponatremia   - Nephrology following    Right upper extremity swelling  - I&D 12/28, extubated 12/29       Encounter Information        Trending Narrative     12/30: RD visited patient at bedside.  Patient asleep.  No visitors present.  RN concerned about nutritional status, MD agreed to placement of NG tube and beginning tube feeding per discussion in rounds.  Extubated yesterday.  No pressors.  Unable to diagnosis with malnutrition at this time.  Patient with severe temporal and clavicle muscle loss, unable to assess multiple areas legs seems edematous despite documentation of no edema.  RD will continue to monitor.       Anthropometrics        Current Height, Weight Height: 167.6 cm (66\")  Weight: 88.7 kg (195 lb 8.8 oz) (12/30/22 3245)       Ideal Body Weight (IBW) 142#   Usual Body Weight (UBW) Unable to obtain from patient        Trending Weight Hx     This admission: 12/30: 180-195# range              PTA: No weight history to note     Wt " Readings from Last 30 Encounters:   12/30/22 0435 88.7 kg (195 lb 8.8 oz)   12/29/22 0457 87.8 kg (193 lb 9 oz)   12/28/22 0516 87.2 kg (192 lb 3.9 oz)   12/27/22 0245 86 kg (189 lb 9.5 oz)   12/26/22 0542 81.8 kg (180 lb 5.4 oz)   12/26/22 0230 81.8 kg (180 lb 5.4 oz)      BMI kg/m2 Body mass index is 31.56 kg/m².       Labs        Pertinent Labs    Results from last 7 days   Lab Units 12/30/22  0540 12/29/22  1710 12/29/22  0541 12/29/22  0002   SODIUM mmol/L 135* 135* 134* 130*   POTASSIUM mmol/L 3.8 4.3 4.5 3.9   CHLORIDE mmol/L 103 102 100 99   CO2 mmol/L 21.0* 20.0* 19.0* 20.0*   BUN mg/dL 77* 74* 65* 59*   CREATININE mg/dL 1.50* 1.65* 1.66* 1.17   CALCIUM mg/dL 8.1* 8.1* 8.7 8.6   BILIRUBIN mg/dL 5.2* 5.3* 5.3* 4.6*   ALK PHOS U/L 105  --  108 88   ALT (SGPT) U/L 236*  --  117* 36   AST (SGOT) U/L 431*  --  238* 62*   GLUCOSE mg/dL 122* 108* 90 137*     Results from last 7 days   Lab Units 12/30/22  0540 12/29/22  0541 12/29/22  0002 12/28/22  0839 12/27/22  0557 12/26/22  0435   MAGNESIUM mg/dL 2.3  --  2.0 2.1   < > 1.8   PHOSPHORUS mg/dL 4.0  --  4.3 2.9   < > 3.7   HEMOGLOBIN g/dL 11.1*   < > 11.4* 11.5*   < > 12.4*   HEMATOCRIT % 32.3*   < > 34.6* 34.2*   < > 38.0   TRIGLYCERIDES mg/dL  --   --   --   --   --  114    < > = values in this interval not displayed.     SARS-CoV-2, CASANDRA   Date Value Ref Range Status   09/10/2021 Not Detected Not Detected Final     Comment:     Testing was performed using the lauren(R) SARS-CoV-2 test.  This nucleic acid amplification test was developed and its performance  characteristics determined by [employer]. Nucleic acid  amplification tests include RT-PCR and TMA. This test has not been  FDA cleared or approved. This test has been authorized by FDA under  an Emergency Use Authorization (EUA). This test is only authorized  for the duration of time the declaration that circumstances exist  justifying the authorization of the emergency use of in vitro  diagnostic  tests for detection of SARS-CoV-2 virus and/or diagnosis  of COVID-19 infection under section 564(b)(1) of the Act, 21 U.S.C.  360bbb-3(b) (1), unless the authorization is terminated or revoked  sooner.  When diagnostic testing is negative, the possibility of a false  negative result should be considered in the context of a patient's  recent exposures and the presence of clinical signs and symptoms  consistent with COVID-19. An individual without symptoms of COVID-19  and who is not shedding SARS-CoV-2 virus would expect to have a  negative (not detected) result in this assay.  [address], [place]  [id number]  : Leo Rod PhD, Phone:  [phone]     Lab Results   Component Value Date    BA1C 4.2 12/26/2022        Medications    Scheduled Medications cefTRIAXone, 2 g, Intravenous, Q24H  ferrous sulfate, 324 mg, Oral, Daily With Breakfast  midodrine, 10 mg, Oral, Q8H  pantoprazole, 40 mg, Intravenous, Q AM  sodium chloride, 10 mL, Intravenous, Q12H        Infusions argatroban infusion, 0.5-10 mcg/kg/min, Last Rate: Stopped (12/30/22 1355)  dextrose 5 % and sodium chloride 0.45 %, 75 mL/hr, Last Rate: 75 mL/hr (12/30/22 1150)  phenylephrine, 0.5-3 mcg/kg/min, Last Rate: Stopped (12/29/22 3135)  propofol, 5-50 mcg/kg/min, Last Rate: Stopped (12/29/22 2343)        PRN Medications •  acetaminophen **OR** acetaminophen  •  aluminum-magnesium hydroxide-simethicone  •  dextrose  •  dextrose  •  fentanyl  •  glucagon (human recombinant)  •  Morphine  •  ondansetron **OR** ondansetron  •  sodium chloride  •  sodium chloride  •  sodium chloride     Physical Findings        Trending Physical   Appearance, NFPE 12/30: Unable to diagnosis with malnutrition at this time.  Patient with severe temporal and clavicle muscle loss, unable to assess multiple areas, legs seems edematous despite documentation of no edema.  Unsure of PO intakes PTA.  No weight history available.  RD will continue to monitor.   "    Fat Loss Unable  None  Mild  Moderate Severe   Orbital    x    Upper Arm x       Thoracic  x       Muscle Loss         Temple     x   Clavicle      x   Acromion  x       Scapular  x       Dorsal Hand         Patellar x       Thigh  x       Calf  x            --  Edema  No edema documented      Bowel Function Last BM 12/27 (x 3 days ago)     Tubes NG tube      Chewing/Swallowing NPO      Skin Right shoulder incisions  Left shoulder incisions        Estimated/Assessed Needs    Estimated 12/30/22   Energy Requirements    Height for Calculation  Height: 167.6 cm (66\")   Weight for Calculation 64.5 kg IBW   Method for Estimation  30-40 kcal/kg    EST Needs (kcal/day) 4678-2310 kcal/day        Protein Requirements    Weight for Calculation 64.5 kg IBW   EST Protein Needs (g/kg) 1.5-2.0 g/kg    EST Daily Needs (g/day)  grams/day       Fluid Requirements     Estimated Needs (mL/day) 1 mL/kcal, will monitor per hydration status        Fluid Deficit    Current Na Level (mEq/L)    Desired Na Level (mEq/L)    Estimated Fluid Deficit (L)       Current Nutrition Orders & Evaluation of Intake       Oral Nutrition     Food Allergies NKFA   Current PO Diet NPO Diet NPO Type: Strict NPO   Supplement None ordered    PO Evaluation     Trending % PO Intake 12/30: NPO      Enteral Nutrition    Enteral Route    TF Modular    TF Delivery Method    Current TF Order    Current Water Flush     TF Residual/Tolerance     TF Observation         Parenteral Nutrition     TPN Route    Current TPN Order    Lipids (mL/%/frequency)     MVI Frequency     Trace Element Frequency     Total # Days on TPN    TPN Observation         Nutrition Course Details    PO Diets: Regular 12/26  NPO 12/26 to current 12/30   Nutrition Support: Plans to begin tube feeding today      Nutritional Risk Screening        NRS-2002 Score          Nutrition Diagnosis         Nutrition Dx Problem 1 Inadequate oral intake related to clinical course as evidence by NPO, " TF for nutrition.        Nutrition Dx Problem 2        Intervention Goal         Intervention Goal(s) Begin and tolerate EN     Nutrition Intervention        RD Action Order EN     Nutrition Prescription          Diet Prescription NPO   Supplement Prescription      Enteral Prescription Initial Goal:  *initial goal conservative d/t risk of RFS     Nutren 1.5 at 20 mL/hr + 10mL q 4 hr water flush      End Goal:    Nutren 1.5 at 65 mL/hr + water flush per clinical picture      Calories  2145 kcals (111% lower end)    Protein  97 g (100% lower end)    Free water  1087 mL    Flushes  Will monitor per clinical picture      The above end goal rate is for 22 hrs/day to assume interruptions for ADLs. Water flushes adjusted based on clinical picture + Rx flushes/IV fluids          TPN Prescription      Monitor/Evaluation        Monitor Per protocol, I&O, Pertinent labs, EN delivery/tolerance, Weight, Skin status, GI status, Symptoms, POC/GOC       Electronically signed by:  Gloria Park RD  12/30/22 13:58 EST

## 2022-12-30 NOTE — PROGRESS NOTES
[facility] Critical Care Medicine Services   Daily ICU Progress Note    Patient Name: Jaime Bar  : 1955  MRN: [id number]  Primary Care Physician:  Provider, No Known  Date of admission: 2022  Date of Service: 2022      Subjective      Interval History:  Guarded prognosis.  Patient seen and examined this morning  Extubated last night, now with decreased mentation, with critical/chronic illness myopathy.  Vasopressors remain off as of yesterday.  Family at the bedside.  Remains with bilateral drains.  On argatroban, monitor for S/Sx of bleeding.    Objective      Vitals:   Temp:  [97 °F (36.1 °C)-99.6 °F (37.6 °C)] 97.8 °F (36.6 °C)  Heart Rate:  [74-98] 83  BP: (101-124)/(59-66) 101/59  Arterial Line BP: ()/(42-67) 125/64  Flow (L/min):  [2-4] 2  FiO2 (%):  [40 %] 40 %    Hemodynamic Parameters for last 24 hours       Input/Output:    Intake/Output Summary (Last 24 hours) at 2022 1423  Last data filed at 2022 0806  Gross per 24 hour   Intake 998 ml   Output 1100 ml   Net -102 ml       PHYSICAL EXAM:  Physical Exam  Vitals and nursing note reviewed.   Constitutional:       General: He is not in acute distress.     Appearance: He is ill-appearing and toxic-appearing. He is not diaphoretic.   HENT:      Head: Normocephalic and atraumatic.      Nose: Nose normal. No congestion.      Mouth/Throat:      Mouth: Mucous membranes are moist.      Pharynx: Oropharynx is clear. No oropharyngeal exudate.   Eyes:      General: No scleral icterus.     Conjunctiva/sclera: Conjunctivae normal.      Pupils: Pupils are equal, round, and reactive to light.   Cardiovascular:      Rate and Rhythm: Normal rate and regular rhythm.      Pulses: Normal pulses.      Heart sounds: Normal heart sounds. No murmur heard.  Pulmonary:      Effort: Pulmonary effort is normal. No respiratory distress.      Comments: Diminished bibasilar breath sounds, without rhonchi, rales, or wheezes.  Abdominal:       General: Bowel sounds are normal. There is no distension.      Palpations: Abdomen is soft.      Tenderness: There is no abdominal tenderness.      Hernia: No hernia is present.   Musculoskeletal:      Comments: Bilateral shoulders with drains in place.   Skin:     General: Skin is warm and dry.   Neurological:      Comments: Drowsy, unable to assess orientation, weak and frail, no obvious unilateral deficits noted.   Psychiatric:      Comments: Flat, drowsy.            MEDS:  Medication Administration Record reviewed today    Scheduled meds:    cefTRIAXone, 2 g, Intravenous, Q24H  ferrous sulfate, 324 mg, Oral, Daily With Breakfast  midodrine, 10 mg, Oral, Q8H  pantoprazole, 40 mg, Intravenous, Q AM  sodium chloride, 10 mL, Intravenous, Q12H          Drips:    argatroban infusion, 0.5-10 mcg/kg/min, Last Rate: Stopped (12/30/22 1355)  dextrose 5 % and sodium chloride 0.45 %, 75 mL/hr, Last Rate: 75 mL/hr (12/30/22 1150)  phenylephrine, 0.5-3 mcg/kg/min, Last Rate: Stopped (12/29/22 1805)  propofol, 5-50 mcg/kg/min, Last Rate: Stopped (12/29/22 7063)        PRNs Meds:    •  acetaminophen **OR** acetaminophen  •  aluminum-magnesium hydroxide-simethicone  •  dextrose  •  dextrose  •  fentanyl  •  glucagon (human recombinant)  •  Morphine  •  ondansetron **OR** ondansetron  •  sodium chloride  •  sodium chloride  •  sodium chloride        Labs Results:    No results found for: PHVEN, TVO8PKO, PO2VEN, ZKN9WWQ, R0OEFCEF    Lab Results   Component Value Date    LACTATE 1.9 12/27/2022    LACTATE 1.9 12/26/2022    LACTATE 2.5 (C) 12/26/2022       Lab Results   Component Value Date    WBC 9.30 12/30/2022    HGB 11.1 (L) 12/30/2022    PLT 38 (C) 12/30/2022        Lab Results   Component Value Date     (L) 12/30/2022    K 3.8 12/30/2022     12/30/2022    CO2 21.0 (L) 12/30/2022     Lab Results   Component Value Date    BUN 77 (H) 12/30/2022       Lab Results   Component Value Date    GLUCOSE 122 (H) 12/30/2022        Lab Results   Component Value Date     (H) 12/30/2022     (H) 12/30/2022    ALKPHOS 105 12/30/2022    BILITOT 5.2 (H) 12/30/2022    ALBUMIN 2.3 (L) 12/30/2022       Lab Results   Component Value Date    BLOODCX Abnormal Stain (C) 12/29/2022       Wounds (last 24 hours)     LDA Wound     Row Name 12/30/22 0806 12/30/22 0400 12/30/22 0000       Wound 12/28/22 1856 Right shoulder Incision    Wound - Properties Group Placement Date: 12/28/22  -HB Placement Time: 1856  -HB Side: Right  -HB Location: shoulder  -HB Primary Wound Type: Incision  -HB    Closure KATHRYN  -AA KATHRYN  -QJ KATHRYN  -QJ    Base dressing in place, unable to visualize  -AA dressing in place, unable to visualize  -QJ dressing in place, unable to visualize  -QJ    Retired Wound - Properties Group Placement Date: 12/28/22  -HB Placement Time: 1856  -HB Side: Right  -HB Location: shoulder  -HB Primary Wound Type: Incision  -HB    Retired Wound - Properties Group Date first assessed: 12/28/22  -HB Time first assessed: 1856  -HB Side: Right  -HB Location: shoulder  -HB Primary Wound Type: Incision  -HB       Wound 12/28/22 1940 Left shoulder Incision    Wound - Properties Group Placement Date: 12/28/22  -HB Placement Time: 1940  -HB Side: Left  -HB Location: shoulder  -HB Primary Wound Type: Incision  -HB    Closure KATHRYN  -AA KATHRYN  -QJ KATHRYN  -QJ    Base dressing in place, unable to visualize  -AA dressing in place, unable to visualize  -QJ dressing in place, unable to visualize  -QJ    Retired Wound - Properties Group Placement Date: 12/28/22  -HB Placement Time: 1940  -HB Side: Left  -HB Location: shoulder  -HB Primary Wound Type: Incision  -HB    Retired Wound - Properties Group Date first assessed: 12/28/22  -HB Time first assessed: 1940  -HB Side: Left  -HB Location: shoulder  -HB Primary Wound Type: Incision  -HB    Row Name 12/29/22 2000 12/29/22 1521          Wound 12/28/22 1856 Right shoulder Incision    Wound - Properties Group Placement  Date: 12/28/22  -HB Placement Time: 1856 -HB Side: Right  -HB Location: shoulder  -HB Primary Wound Type: Incision  -HB    Dressing Appearance -- dry;intact;no drainage  -MP     Closure KATHRYN  -QJ KATHRYN  -MP     Base dressing in place, unable to visualize  -QJ dressing in place, unable to visualize  -MP     Retired Wound - Properties Group Placement Date: 12/28/22  -HB Placement Time: 1856  -HB Side: Right  -HB Location: shoulder  -HB Primary Wound Type: Incision  -HB    Retired Wound - Properties Group Date first assessed: 12/28/22  -HB Time first assessed: 1856  -HB Side: Right  -HB Location: shoulder  -HB Primary Wound Type: Incision  -HB       Wound 12/28/22 1940 Left shoulder Incision    Wound - Properties Group Placement Date: 12/28/22 -HB Placement Time: 1940 -HB Side: Left  -HB Location: shoulder  -HB Primary Wound Type: Incision  -HB    Dressing Appearance -- dry;intact;no drainage  -MP     Closure KATHRYN  -QJ KATHRYN  -MP     Base dressing in place, unable to visualize  -QJ dressing in place, unable to visualize  -MP     Retired Wound - Properties Group Placement Date: 12/28/22 -HB Placement Time: 1940 -HB Side: Left  -HB Location: shoulder  -HB Primary Wound Type: Incision  -HB    Retired Wound - Properties Group Date first assessed: 12/28/22 -HB Time first assessed: 1940 -HB Side: Left  -HB Location: shoulder  -HB Primary Wound Type: Incision  -HB          User Key  (r) = Recorded By, (t) = Taken By, (c) = Cosigned By    Initials Name Provider Type    AA Anurag Boland RN Registered Nurse    Carolyn Jay RN Registered Nurse    Kathi Almeida RN Registered Nurse    Koffi Guzman RN Registered Nurse                IMAGES:  XR Abdomen KUB  Narrative: DATE OF EXAM:  12/30/2022 11:28 AM     PROCEDURE:  XR ABDOMEN KUB-     INDICATIONS:  NGt placement; M00.9-Pyogenic arthritis, unspecified     COMPARISON:  No Comparisons Available     TECHNIQUE:   Single radiographic view of the  abdomen was obtained.        FINDINGS:  NG tube is seen extending to the left midabdomen over the expected  position of the stomach.     Limited imaging of the lung bases there shows mild pulmonary vascular  congestion and suspected underlying pulmonary edema. PICC line extends  to the cavoatrial junction.     Nonspecific nonobstructing bowel gas pattern noted. Osseous structures  demonstrate multilevel degenerative change      Impression: NG tube projects over the expected position of the body of the stomach     Electronically Signed By-Jaxon Aly On:12/30/2022 12:00 PM  This report was finalized on [id number] by  Jaxon Aly, .  XR Chest 1 View  Narrative:    DATE OF EXAM:   12/30/2022 8:52 AM     PROCEDURE:   XR CHEST 1 VW-     INDICATIONS:   sob; M00.9-Pyogenic arthritis, unspecified     COMPARISON:  12/29/2022 and prior     TECHNIQUE:   Portable     FINDINGS:             Left upper extremity PICC line projects at the SVC. Endotracheal tube  has been removed. Study is limited by overlying support and monitoring  apparatus. The heart and mediastinal contours appear grossly stable with  mild cardiomegaly and pulmonary vascular congestion. Diffuse increased  groundglass and interstitial opacities with a bibasilar predominance are  slightly more prominent in the comparison. Osseous structures appear  stable     Impression: Cardiomegaly and increased pulmonary vascular congestion and suspected  interstitial edema     Lines and tubes as above[     Electronically Signed By-Jaxon Aly On:12/30/2022 10:19 AM  This report was finalized on [id number] by  Jaxon Aly, .      Assessment & Plan        Active Hospital Problems:  Active Hospital Problems    Diagnosis    • **Hyponatremia    • Cervicalgia, spine surgery 2017    • Primary osteoarthritis involving multiple joints    • Cardiac amyloidosis (HCC)    • ACC/AHA stage C chronic systolic heart failure (HCC)    • Tobacco abuse    • Primary  hypertension          Assessment and Plan:     Severe sepsis, shock-resolved  Shoulder septic arthritis with Staphylococcus aureus  Bacteremia with Staphylococcus aureus  -IVF as ordered, vasopressors have been discontinued.  -Blood culture showed MSSA patient on ceftriaxone  -ID follow-up appreciated  -Cardiology consulted for ALMA DELIA   -Ortho following, s/p bilateral shoulders arthroscopy with irrigation and debridement     A. fib with RVR, resolved  -Cardiology following up  -Off amiodarone drip     STEVEN, likely related to dehydration  -Will monitor creatinine avoid nephrotoxins  -Nephrology following up    Hyponatremia likely related to dehydration  -Monitor and trend labs    Acute hypoxic respiratory failure, secondary to surgery  -Extubated on 12/29, now a DNI  -Supplemental oxygen to keep saturation above 92    Chronic diastolic heart failure, with preserved ejection fraction  -Echo reviewed showed diastolic dysfunction with a EF 60%  -Avoid excessive fluids.    DVT in left upper extremity  -On argatroban, monitor for bleeding  -Heme/Onc following.    Thrombocytopenia  -Acute drop in platelets likely related to sepsis  -Will consult hematology      GI and DVT prophylaxis    Today's labs reviewed      Electronically signed by RITU Ybarra, 12/30/22, 14:23 ROGERS Rodriguez Intensivist Team

## 2022-12-30 NOTE — CONSULTS
[facility] Medicine Services   Consult Note    Patient Name: Jaime Bar  : 1955  MRN: [id number]  Primary Care Physician:  Provider, No Known  Referring Physician: No Known Provider  Date of admission: 2022  Date and Time of Care: 2022 at 10:45    Consults    Subjective      Reason for Consult/ Chief Complaint: Hyponatremia, septic shock    Consult Requested By: Intensivist     History of Present Illness: Jaime Bar is a 67 y.o. male with past medical history of cervicalgia, primary osteoarthritis involving multiple joints with surgery on bilateral shoulders and right knee replacement but no recent procedure or surgery, hyponatremia, prostate cancer, and cardiac amyloidosis presented to [facility] on 2022 with complaints of right shoulder pain, weakness, and altered mental status.  He was found to be hypotensive and hyponatremic with elevated lactic acid.  Patient had reported decreased oral intake.  He was transferred to [facility] ICU for further treatment of septic shock requiring vasopressors and hyponatremia.     Since admission he was found to be bacteremic with 2 sets of blood cultures positive for staph aureus, source being bilateral shoulder septic arthritis. ID has been treating the patient with IV ceftriaxone 2G and he will need a total of 6 weeks of antibiotic therapy.  He has status post bilateral shoulder arthroscopy with extensive debridement by Dr. Velez 2022. He remained intubated after surgery until  and now extubated. He has been weaned off vasopressors. Nephrology is managing his hyponatremia and STEVEN on CKD. He was found to have LUE DVT provoked from PICC line currently on argatroban. Hematology has been consulted for his thrombocytopenia.     Review of Systems   Constitutional: Negative.   HENT: Negative.    Eyes: Negative.    Cardiovascular: Negative.    Respiratory: Negative.    Endocrine: Negative.   "  Hematologic/Lymphatic: Negative.    Skin: Negative.    Musculoskeletal: Positive for joint pain.   Gastrointestinal: Negative.    Genitourinary: Negative.    Neurological: Negative.    Psychiatric/Behavioral: Negative.    Allergic/Immunologic: Negative.    All other systems reviewed and are negative.       Personal History     History reviewed. No pertinent past medical history.    Past Surgical History:   Procedure Laterality Date   • SHOULDER ARTHROSCOPY Right 12/28/2022    Procedure: SHOULDER ARTHROSCOPY INCISION AND DRAINAGE.;  Surgeon: Nikunj Velez MD;  Location: [facility] MAIN OR;  Service: Orthopedics;  Laterality: Right;   • SHOULDER ARTHROSCOPY Left 12/28/2022    Procedure: SHOULDER ARTHROSCOPY INCISION AND DRAINAGE;  Surgeon: Nikunj Velez MD;  Location: [facility] OR;  Service: Orthopedics;  Laterality: Left;       Family History: KATHRYN, unknown by pt    Social History:  reports that he has been smoking cigarettes. He has a 5.00 pack-year smoking history. He has never used smokeless tobacco. He reports that he does not currently use alcohol. He reports that he does not use drugs.    Home Medications:   HYDROcodone-acetaminophen, albuterol sulfate HFA, diclofenac, metoprolol succinate XL, omeprazole, sodium bicarbonate, spironolactone, and tiotropium bromide-olodaterol    Allergies:  Allergies   Allergen Reactions   • Tramadol-Acetaminophen Anxiety     Worms in the brain feeling   • Codeine Other (See Comments)     nausea   • Tramadol Other (See Comments)     \"I just felt really crawly, it did weird things with my head\"         Objective      Vitals:  Temp:  [97 °F (36.1 °C)-99.6 °F (37.6 °C)] 97.8 °F (36.6 °C)  Heart Rate:  [74-98] 90  BP: (101-124)/(59-66) 101/59  Arterial Line BP: ()/(42-67) 123/64  Flow (L/min):  [2-4] 2  FiO2 (%):  [40 %] 40 %    Physical Exam  Vitals and nursing note reviewed.   HENT:      Head: Normocephalic and atraumatic.   Eyes:      Extraocular " Movements: Extraocular movements intact.      Pupils: Pupils are equal, round, and reactive to light.   Cardiovascular:      Rate and Rhythm: Normal rate and regular rhythm.      Pulses: Normal pulses.      Heart sounds: Normal heart sounds.   Pulmonary:      Effort: Pulmonary effort is normal.      Breath sounds: Normal breath sounds.   Abdominal:      General: Bowel sounds are normal.      Palpations: Abdomen is soft.      Tenderness: There is no abdominal tenderness.   Musculoskeletal:      Comments: Bilateral shoulders with surgical dressings in place   Skin:     General: Skin is warm and dry.   Neurological:      Mental Status: He is alert.      Comments: Pt drowsy, confused. Follows all commands and moves all extremities.    Psychiatric:         Mood and Affect: Mood normal.         Behavior: Behavior normal.          Result Review    Result Review:  I have personally reviewed the results from the time of this admission to 12/30/2022 14:31 EST and agree with these findings:  [x]  Laboratory  []  Microbiology  [x]  Radiology  []  EKG/Telemetry   []  Cardiology/Vascular   []  Pathology  [x]  Old records      Assessment & Plan        Active Hospital Problems:  Active Hospital Problems    Diagnosis    • Bacteremia due to methicillin susceptible Staphylococcus aureus (MSSA)    • Staphylococcal arthritis of shoulder (HCC)    • Hyponatremia    • Cervicalgia, spine surgery 2017    • Primary osteoarthritis involving multiple joints    • Cardiac amyloidosis (HCC)    • ACC/AHA stage C chronic systolic heart failure (HCC)    • Tobacco abuse    • Primary hypertension      Brief patient summary:  67 year old male with PMH of transferred from [facility] on 12/25/2022 with complaints of right shoulder pain, weakness, and altered mental status. He was found to have hyponatremia and septic shock requiring vasopressors. He has since been found to have MSSA bacteremia secondary to bilateral shoulder  septic arthritis. He is status post debridement of bilateral shoulders and is on antibiotics per ID. Weaned off the ventilator post surgery now on room air. Weaned off vasopressors now on midodrine.     Assessment/Plan:    Septic shock secondary to MSSA bacteremia  - 2/2 blood cultures positive for MSSA secondary to bilateral shoulder septic arthritis   - ALMA DELIA 12/28: Left ventricular ejection fraction appears to be 46 - 50%.  •  Mild aortic valve stenosis is present.  •  Estimated right ventricular systolic pressure from tricuspid regurgitation is normal (<35 mmHg).  •  All 4 cardiac valves were well visualized, no vegetations were seen, no evidence of bacterial endocarditis.  - IV Rocephin 2 g daily x6 weeks per ID  - Weaned off vasopressors now on midodrine 10 mg every 8 hours    Bilateral shoulder septic arthritis  - Synovial fluid culture was consistent with infection and cultures are growing 4+ Staph aureus  - status post bilateral shoulder arthroscopy with extensive debridement by Dr. Velez 12/28/2022  - abx as above    STEVEN on CKD  Hyponatremia  - Managed by nephrology on D5 half-normal saline at 50ml/hr    Acute respiratory failure with hypoxia  - remained intubated postoperatively  - now extubated on room air    Thrombocytopenia  - plts 38  - subcutaneous heparin discontinued 12/29 now on argatroban drip  - hematology following     A. fib with RVR  - amiodarone off   - argatroban drip as above  - cardiology has been consulted     LUE DVT   -provoked from PICC  -on argatroban drip as above     H/o cardiac amyloidosis  H/o prostate CA  Osteoarthritis       This patient has been examined wearing appropriate Personal Protective Equipment. 12/30/22      Electronically signed by RITU Merchant, 12/30/22, 2:33 PM EST.

## 2022-12-30 NOTE — CASE MANAGEMENT/SOCIAL WORK
Continued Stay Note   Michael     Patient Name: Jaime Bar  MRN: [id number]  Today's Date: 12/30/2022    Admit Date: 12/26/2022    Plan: DC Plan: Anticipate home with family pending clinical course. Watch for home IV abx needs.   Discharge Plan     Row Name 12/30/22 1344       Plan    Plan DC Plan: Anticipate home with family pending clinical course. Watch for home IV abx needs.    Provided Post Acute Provider List? N/A    Provided Post Acute Provider Quality & Resource List? N/A    Plan Comments CM spoke with patient’s nurse, intensivist, and NP to obtain clinical updates. Patient extubated 12/29/22 and off of all pressors. Patient remains drowsy. Patient downgraded to PCU level of care.CM will continue to follow for any additional needs that may develop and adjust discharge plan accordingly. DC Barriers: Recent extubation, NGT, PICC, arterial line, PADMINI drains x4, Precedex gtt, Argatroban gtt, IVF's, IV abx, and abnormal labs.           Expected Discharge Date and Time     Expected Discharge Date Expected Discharge Time    Jan 4, 2023         Phone communication or documentation only- no physical contact with patient or family.      Melyssa Pascual RN     Office Phone: [phone]  Office Cell:     [phone]

## 2022-12-30 NOTE — CONSULTS
HP      Name: Jaime Bra ADMIT: 2022   : 1955  PCP: Provider, No Known    MRN: [id number] LOS: 4 days   AGE/SEX: 67 y.o. male  ROOM: 2301/1     No chief complaint on file.      Subjective        History of present illness  Jaime Bar is a 67-year-old male patient who presented to [place] and then transferred to our facility for primarily shoulder pain.  Patient was found to be septic, bacteremic with staph aureus and was found to have septic arthritis in both of his shoulders.  He underwent arthroscopy and drainage of abscess for both his shoulders on 2022.  I was initially consulted to perform a transesophageal echocardiogram to rule out bacterial endocarditis due to positive blood cultures.  ALMA DELIA was done on 2022 which did not show any vegetations.    Patient however does have prior cardiac history, he does see an outside cardiologist and was told he had cardiac amyloidosis.  During this hospitalization he was also found to be in atrial fibrillation which is probably of new or recent onset since his previous EKG according to chart review was from 2022 and he was in sinus rhythm then.  Most of the history is gathered from chart review and also speaking with family members.  They denied him having any history of coronary artery disease.    History reviewed. No pertinent past medical history.  Past Surgical History:   Procedure Laterality Date   • SHOULDER ARTHROSCOPY Right 2022    Procedure: SHOULDER ARTHROSCOPY INCISION AND DRAINAGE.;  Surgeon: Nikunj Velez MD;  Location: [facility] OR;  Service: Orthopedics;  Laterality: Right;   • SHOULDER ARTHROSCOPY Left 2022    Procedure: SHOULDER ARTHROSCOPY INCISION AND DRAINAGE;  Surgeon: Nikunj Velez MD;  Location: [facility] OR;  Service: Orthopedics;  Laterality: Left;     History reviewed. No pertinent family history.  Social History     Tobacco Use   • Smoking status: Every Day      Packs/day: 0.50     Years: 10.00     Pack years: 5.00     Types: Cigarettes   • Smokeless tobacco: Never   Vaping Use   • Vaping Use: Never used   Substance Use Topics   • Alcohol use: Not Currently   • Drug use: Never     Medications Prior to Admission   Medication Sig Dispense Refill Last Dose   • albuterol sulfate  (90 Base) MCG/ACT inhaler Inhale 2 puffs Every 4 (Four) Hours As Needed for Wheezing or Shortness of Air.      • diclofenac (VOLTAREN) 75 MG EC tablet Take 75 mg by mouth Daily As Needed.      • HYDROcodone-acetaminophen (NORCO)  MG per tablet Take 1 tablet by mouth Every 4 (Four) Hours As Needed for Moderate Pain.      • metoprolol succinate XL (TOPROL-XL) 25 MG 24 hr tablet Take 25 mg by mouth Daily.      • omeprazole (priLOSEC) 20 MG capsule Take 20 mg by mouth Daily.      • sodium bicarbonate 650 MG tablet Take 650 mg by mouth 2 (Two) Times a Day.      • spironolactone (ALDACTONE) 25 MG tablet Take 12.5 mg by mouth Daily.      • tiotropium bromide-olodaterol (Stiolto Respimat) 2.5-2.5 MCG/ACT aerosol solution inhaler Inhale 2 puffs Daily.        Allergies:  Tramadol-acetaminophen, Codeine, and Tramadol    Review of systems    Constitutional: Negative.    Respiratory and cardiovascular: As detailed in HPI section.  Gastrointestinal: Negative for constipation, nausea and vomiting negative for abdominal distention, abdominal pain and diarrhea.   Genitourinary: Negative for difficulty urinating and flank pain.   Musculoskeletal: Negative for arthralgias, joint swelling and myalgias.   Skin: Negative for color change, rash and wound.   Neurological: Negative for dizziness, syncope, weakness and headaches.   Hematological: Negative for adenopathy.   Psychiatric/Behavioral: Negative for confusion.   All other systems reviewed and are negative.       Physical Exam  VITALS REVIEWED    General:      well developed, in no acute distress.    Head:      normocephalic and atraumatic.    Eyes:       PERRL/EOM intact, conjunctiva and sclera clear with out nystagmus.    Neck:      no masses, thyromegaly,  trachea central with normal respiratory effort and PMI displaced laterally  Lungs:      Clear to auscultation bilaterally  Heart:       Irregular rhythm  Msk:      no deformity or scoliosis noted of thoracic or lumbar spine.    Pulses:      pulses normal in all 4 extremities.    Extremities:       No lower extremity edema  Neurologic:      no focal deficits.   alert oriented x3  Skin:      intact without lesions or rashes.    Psych:      alert and cooperative; normal mood and affect; normal attention span and concentration.      Result Review :               Pertinent cardiac workup    1. ALMA DELIA 12/28/2022 ejection fraction 45 to 50%, no evidence of endocarditis  2. EKG 12/26/2022 atrial fibrillation with right bundle branch block and left anterior fascicular block, ventricular rate of 128 bpm.  3. Cardiac MRI 3/31/2022 outside facility, ejection fraction 45%, suspicious for cardiac amyloidosis.  4. EKG outside facility on 1/13/2022 sinus rhythm with right bundle branch block and left anterior fascicular block.      Assessment and Plan         Hyponatremia    ACC/AHA stage C chronic systolic heart failure (HCC)    Cardiac amyloidosis (HCC)    Primary hypertension    Tobacco abuse    Cervicalgia, spine surgery 2017    Primary osteoarthritis involving multiple joints    Bacteremia due to methicillin susceptible Staphylococcus aureus (MSSA)    Staphylococcal arthritis of shoulder (HCC)    Acute kidney injury superimposed on chronic kidney disease (HCC)      Jaime Bar is a 67-year-old male patient who presented to the hospital with complaints of bilateral shoulder pain.  Patient was found to be septic with staph aureus bacteremia and had bilateral shoulder abscesses.  He underwent arthroscopy and drainage of both shoulders on 12/28/2022.  A transesophageal echocardiogram done on 12/28/2022 did not show any  endocarditis.  Patient however does have history of cardiac amyloidosis based on an MRI done in March 2022, he does have cardiomyopathy with ejection fraction of around 45% based on MRI as well as ALMA DELIA.  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  Patient was found to be in atrial fibrillation during this hospitalization.  The exact onset of A. fib is unknown, however in January 2022 he was in sinus rhythm based on EKG report from then.  It is likely that the A. fib was triggered by his infection.  We will go ahead and start him on Eliquis 5 mg twice daily since he does not have any anticipated surgeries and if he still in A. fib next week, we can perform cardioversion.  No need to repeat ALMA DELIA for cardioversion.        No follow-ups on file.  Patient was given instructions and counseling regarding his condition or for health maintenance advice. Please see specific information pulled into the AVS if appropriate.

## 2022-12-30 NOTE — PLAN OF CARE
Goal Outcome Evaluation:  Patient is now a PCU overflow. Hospitalist consulted. NGT placed d/t lethargy and inability to eat. Dietary consulted for tube feeds. VSS on room air. Argatroban held for elevated PTTs. Pharmacy and MD aware. D5 + 1/2 NS increased to 75cc/hr for hypoglycemia. Cardiology suggested patient may need cardiac ablation during this admission for afib. Patient and family updated at bedside for plans of care.

## 2022-12-30 NOTE — PROGRESS NOTES
"                                                                                                                                      Nephrology  Progress Note                                        [facility]    Patient Identification    Name: Jaime Bar  Age: 67 y.o.  Sex: male  :  1955  MRN: [id number]      DATE OF SERVICE:  2022        Subective    Events noted  Extubated      Objective   Scheduled Meds:cefTRIAXone, 2 g, Intravenous, Q24H  midodrine, 10 mg, Oral, Q8H  pantoprazole, 40 mg, Intravenous, Q AM  sodium chloride, 10 mL, Intravenous, Q12H          Continuous Infusions:argatroban infusion, 0.5-10 mcg/kg/min, Last Rate: 0.5 mcg/kg/min (22 0806)  dextrose 5 % and sodium chloride 0.45 %, 50 mL/hr, Last Rate: 50 mL/hr (22 075)  phenylephrine, 0.5-3 mcg/kg/min, Last Rate: Stopped (22 180)  propofol, 5-50 mcg/kg/min, Last Rate: Stopped (22 05)        PRN Meds:•  acetaminophen **OR** acetaminophen  •  aluminum-magnesium hydroxide-simethicone  •  dextrose  •  dextrose  •  fentanyl  •  glucagon (human recombinant)  •  Morphine  •  ondansetron **OR** ondansetron  •  sodium chloride  •  sodium chloride  •  sodium chloride     Exam:  /59   Pulse 93   Temp 97.5 °F (36.4 °C)   Resp 25   Ht 167.6 cm (66\")   Wt 88.7 kg (195 lb 8.8 oz)   SpO2 96%   BMI 31.56 kg/m²     Intake/Output last 3 shifts:  I/O last 3 completed shifts:  In: 3389 [I.V.:3389]  Out: 2315 [Urine:2000; Drains:315]    Intake/Output this shift:  No intake/output data recorded.    Physical exam:  General Appearance: Confused  Head:  Normocephalic, without obvious abnormality, atraumatic  Eyes:  PERRL, conjunctiva/corneas clear     Neck:  Supple,  no adenopathy;      Lungs:  Decreased BS occasion ronchi  Heart:  Regular rate and rhythm, S1 and S2 normal  Abdomen:  Soft, non-tender, bowel sounds active   Extremities: trace edema  Pulses: 2+ and symmetric all extremities  Skin:  No " rashes or lesions       Data Review:  All labs (24hrs):   Recent Results (from the past 24 hour(s))   Duplex Venous Lower Extremity - Bilateral CAR    Collection Time: 12/29/22  9:12 AM   Result Value Ref Range    Target HR (85%) 130 bpm    Max. Pred. HR (100%) 153 bpm    Right Common Femoral Spont Y     Right Common Femoral Competent Y     Right Common Femoral Phasic Y     Right Common Femoral Compress C     Right Common Femoral Augment Y     Right Saphenofemoral Junction Compress C     Right Proximal Femoral Compress C     Right Mid Femoral Spont Y     Right Mid Femoral Competent Y     Right Mid Femoral Phasic Y     Right Mid Femoral Compress C     Right Mid Femoral Augment Y     Right Distal Femoral Compress C     Right Popliteal Spont Y     Right Popliteal Competent Y     Right Popliteal Phasic Y     Right Popliteal Compress C     Right Popliteal Augment Y     Right Posterior Tibial Compress C     Right Peroneal Compress C     Right Gastronemius Compress C     Right Greater Saph AK Compress C     Right Greater Saph BK Compress C     Right Lesser Saph Compress C     Left Common Femoral Spont Y     Left Common Femoral Competent Y     Left Common Femoral Phasic Y     Left Common Femoral Compress C     Left Common Femoral Augment Y     Left Saphenofemoral Junction Compress C     Left Proximal Femoral Compress C     Left Mid Femoral Spont Y     Left Mid Femoral Competent Y     Left Mid Femoral Phasic Y     Left Mid Femoral Compress C     Left Mid Femoral Augment Y     Left Distal Femoral Compress C     Left Popliteal Spont Y     Left Popliteal Competent Y     Left Popliteal Phasic Y     Left Popliteal Compress C     Left Popliteal Augment Y     Left Posterior Tibial Compress C     Left Peroneal Compress C     Left Gastronemius Compress C     Left Greater Saph AK Compress C     Left Greater Saph BK Compress C     Left Lesser Saph Compress C    Duplex Venous Upper Extremity - Left CAR    Collection Time: 12/29/22  9:25  AM   Result Value Ref Range    Target HR (85%) 130 bpm    Max. Pred. HR (100%) 153 bpm    Left Axillary Color 1.0     Left Basilic Upper Color 1.0     Right Internal Jugular Spont Y     Right Internal Jugular Phasic Y     Right Internal Jugular Compress C     Right Internal Jugular Augment Y     Right Subclavian Spont Y     Right Subclavian Phasic Y     Right Subclavian Compress C     Right Subclavian Augment Y     Left Internal Jugular Spont Y     Left Internal Jugular Phasic Y     Left Internal Jugular Compress C     Left Internal Jugular Augment Y     Left Subclavian Spont Y     Left Subclavian Phasic Y     Left Subclavian Compress C     Left Subclavian Augment Y     Left Axillary Spont N     Left Axillary Phasic N     Left Axillary Compress P     Left Axillary Augment N     Left Axillary Thrombus A     Left Brachial Compress C     Left Radial Compress C     Left Ulnar Compress C     Left Basilic Upper Compress N     Left Basilic Upper Thrombus A     Left Basilic Forearm Compress C     Left Cephalic Upper Compress C     Left Cephalic Forearm Compress C     BH CV VAS PRELIMINARY FINDINGS SCRIPTING 1.0    Blood Culture - Blood, Hand, Left    Collection Time: 12/29/22 10:15 AM    Specimen: Hand, Left; Blood   Result Value Ref Range    Blood Culture Abnormal Stain (C)     Gram Stain Aerobic Bottle Gram positive cocci in clusters (C)    POC Glucose Once    Collection Time: 12/29/22 10:50 AM    Specimen: Blood   Result Value Ref Range    Glucose 75 70 - 105 mg/dL   Basic Metabolic Panel    Collection Time: 12/29/22  5:10 PM    Specimen: Cannula; Blood   Result Value Ref Range    Glucose 108 (H) 65 - 99 mg/dL    BUN 74 (H) 8 - 23 mg/dL    Creatinine 1.65 (H) 0.76 - 1.27 mg/dL    Sodium 135 (L) 136 - 145 mmol/L    Potassium 4.3 3.5 - 5.2 mmol/L    Chloride 102 98 - 107 mmol/L    CO2 20.0 (L) 22.0 - 29.0 mmol/L    Calcium 8.1 (L) 8.6 - 10.5 mg/dL    BUN/Creatinine Ratio 44.8 (H) 7.0 - 25.0    Anion Gap 13.0 5.0 - 15.0  mmol/L    eGFR 45.2 (L) >60.0 mL/min/1.73   Bilirubin, Total & Direct    Collection Time: 12/29/22  5:10 PM    Specimen: Cannula; Blood   Result Value Ref Range    Total Bilirubin 5.3 (H) 0.0 - 1.2 mg/dL    Bilirubin, Direct 4.2 (H) 0.0 - 0.3 mg/dL    Bilirubin, Indirect 1.1 mg/dL   Iron Profile    Collection Time: 12/29/22  5:10 PM    Specimen: Cannula; Blood   Result Value Ref Range    Iron 29 (L) 59 - 158 mcg/dL    Iron Saturation 15 (L) 20 - 50 %    Transferrin 131 (L) 200 - 360 mg/dL    TIBC 195 (L) 298 - 536 mcg/dL   Ferritin    Collection Time: 12/29/22  5:10 PM    Specimen: Cannula; Blood   Result Value Ref Range    Ferritin 3,525.00 (H) 30.00 - 400.00 ng/mL   Fibrinogen    Collection Time: 12/29/22  5:21 PM    Specimen: Blood   Result Value Ref Range    Fibrinogen 493 (H) 210 - 450 mg/dL   D-dimer, Quantitative    Collection Time: 12/29/22  5:21 PM    Specimen: Blood   Result Value Ref Range    D-Dimer, Quantitative 11.83 (H) 0.00 - 0.67 mg/L (FEU)   Protime-INR    Collection Time: 12/29/22  5:21 PM    Specimen: Blood   Result Value Ref Range    Protime 15.6 (H) 9.6 - 11.7 Seconds    INR 1.55 (H) 0.93 - 1.10   aPTT    Collection Time: 12/29/22  5:21 PM    Specimen: Blood   Result Value Ref Range    PTT 38.7 (H) 24.0 - 31.0 seconds   Peripheral Blood Smear    Collection Time: 12/29/22  5:21 PM    Specimen: Blood   Result Value Ref Range    Pathology Review Yes    CBC Auto Differential    Collection Time: 12/29/22  5:21 PM    Specimen: Blood   Result Value Ref Range    WBC 9.80 3.40 - 10.80 10*3/mm3    RBC 3.37 (L) 4.14 - 5.80 10*6/mm3    Hemoglobin 10.9 (L) 13.0 - 17.7 g/dL    Hematocrit 32.0 (L) 37.5 - 51.0 %    MCV 94.9 79.0 - 97.0 fL    MCH 32.3 26.6 - 33.0 pg    MCHC 34.0 31.5 - 35.7 g/dL    RDW 16.0 (H) 12.3 - 15.4 %    RDW-SD 56.0 (H) 37.0 - 54.0 fl    MPV 9.2 6.0 - 12.0 fL    Platelets 47 (C) 140 - 450 10*3/mm3   Scan Slide    Collection Time: 12/29/22  5:21 PM    Specimen: Blood   Result Value Ref  Range    Scan Slide     Manual Differential    Collection Time: 12/29/22  5:21 PM    Specimen: Blood   Result Value Ref Range    Neutrophil % 80.0 (H) 42.7 - 76.0 %    Lymphocyte % 4.0 (L) 19.6 - 45.3 %    Monocyte % 5.0 5.0 - 12.0 %    Bands %  10.0 (H) 0.0 - 5.0 %    Metamyelocyte % 1.0 (H) 0.0 - 0.0 %    Neutrophils Absolute 8.82 (H) 1.70 - 7.00 10*3/mm3    Lymphocytes Absolute 0.39 (L) 0.70 - 3.10 10*3/mm3    Monocytes Absolute 0.49 0.10 - 0.90 10*3/mm3    Elaine Cells Slight/1+ None Seen    Toxic Granulation Slight/1+ None Seen    Vacuolated Neutrophils Slight/1+ None Seen    Platelet Estimate Decreased Normal    Giant Platelets Slight/1+ None Seen   Hepatitis Panel, Acute    Collection Time: 12/29/22  9:33 PM    Specimen: Blood   Result Value Ref Range    Hepatitis B Surface Ag Non-Reactive Non-Reactive    Hep A IgM Non-Reactive Non-Reactive    Hep B C IgM Non-Reactive Non-Reactive    Hepatitis C Ab Non-Reactive Non-Reactive   Reticulocytes    Collection Time: 12/29/22  9:33 PM    Specimen: Blood   Result Value Ref Range    Reticulocyte % 1.05 0.70 - 1.90 %    Reticulocyte Absolute 0.0345 0.0200 - 0.1300 10*6/mm3   POC Glucose Once    Collection Time: 12/29/22 11:21 PM    Specimen: Blood   Result Value Ref Range    Glucose 100 70 - 105 mg/dL   Prepare Platelet Pheresis, 1 Units    Collection Time: 12/30/22  2:00 AM   Result Value Ref Range    Product Code R0978J64     Unit Number [id number]     UNIT  ABO A     UNIT  RH POS     Dispense Status PT     Blood Expiration Date 641350811035     Blood Type Barcode 6200    aPTT    Collection Time: 12/30/22  2:12 AM    Specimen: Blood   Result Value Ref Range    PTT 70.8 61.0 - 76.5 seconds   Calcium, Ionized    Collection Time: 12/30/22  5:40 AM    Specimen: Blood   Result Value Ref Range    Ionized Calcium 1.15 (L) 1.20 - 1.30 mmol/L   D-dimer, Quantitative    Collection Time: 12/30/22  5:40 AM    Specimen: Blood   Result Value Ref Range    D-Dimer, Quantitative  10.87 (H) 0.00 - 0.67 mg/L (FEU)   Reticulocytes    Collection Time: 12/30/22  5:40 AM    Specimen: Blood   Result Value Ref Range    Reticulocyte % 0.96 0.70 - 1.90 %    Reticulocyte Absolute 0.0327 0.0200 - 0.1300 10*6/mm3   CBC Auto Differential    Collection Time: 12/30/22  5:40 AM    Specimen: Blood   Result Value Ref Range    WBC 9.30 3.40 - 10.80 10*3/mm3    RBC 3.42 (L) 4.14 - 5.80 10*6/mm3    Hemoglobin 11.1 (L) 13.0 - 17.7 g/dL    Hematocrit 32.3 (L) 37.5 - 51.0 %    MCV 94.4 79.0 - 97.0 fL    MCH 32.5 26.6 - 33.0 pg    MCHC 34.4 31.5 - 35.7 g/dL    RDW 16.0 (H) 12.3 - 15.4 %    RDW-SD 55.6 (H) 37.0 - 54.0 fl    MPV 8.9 6.0 - 12.0 fL    Platelets 38 (C) 140 - 450 10*3/mm3   aPTT    Collection Time: 12/30/22  5:40 AM    Specimen: Blood   Result Value Ref Range    PTT 91.2 (H) 61.0 - 76.5 seconds   Scan Slide    Collection Time: 12/30/22  5:40 AM    Specimen: Blood   Result Value Ref Range    Scan Slide     Manual Differential    Collection Time: 12/30/22  5:40 AM    Specimen: Blood   Result Value Ref Range    Neutrophil % 72.0 42.7 - 76.0 %    Lymphocyte % 4.0 (L) 19.6 - 45.3 %    Monocyte % 3.0 (L) 5.0 - 12.0 %    Bands %  19.0 (H) 0.0 - 5.0 %    Metamyelocyte % 1.0 (H) 0.0 - 0.0 %    Atypical Lymphocyte % 1.0 0.0 - 5.0 %    Neutrophils Absolute 8.46 (H) 1.70 - 7.00 10*3/mm3    Lymphocytes Absolute 0.47 (L) 0.70 - 3.10 10*3/mm3    Monocytes Absolute 0.28 0.10 - 0.90 10*3/mm3    Crenated RBC's Slight/1+ None Seen    Toxic Granulation Slight/1+ None Seen    Vacuolated Neutrophils Slight/1+ None Seen    Platelet Estimate Decreased Normal          Imaging:  XR Shoulder 2+ View Right    Result Date: 12/27/2022   1. Severe degenerative changes of the glenohumeral joint as well as a high riding humeral head, suggestive of chronic rotator cuff pathology. No definite evidence of osteomyelitis is identified. However plain films cannot exclude septic arthritis.  Electronically Signed By-Juan Francisco Lozoya MD On:12/27/2022  1:42 PM This report was finalized on [id number] by  Juan Francisco Lozoya MD.    XR Chest 1 View    Result Date: 12/29/2022  The endotracheal tube is approximately 6 mm above the sarah. Recommend repositioning. Left subclavian central venous catheter has its catheter tip overlying the superior vena cava. The cardiac silhouette is moderately to significantly enlarged and the pulmonary vessels are within normal limits. There is no focal area of consolidation otherwise seen. Electronically signed by:  Los Mccoy D.O.  12/28/2022 11:13 PM Mountain Time    XR Chest 1 View    Result Date: 12/27/2022   1. Persistent diffuse interstitial opacities. Differential includes interstitial pulmonary edema, or atypical infection.  Electronically Signed By-Juan Francisco Lozoya MD On:12/27/2022 8:46 AM This report was finalized on [id number] by  Juan Francisco Lozoya MD.    XR Chest 1 View    Result Date: 12/26/2022    1.  Interval placement of left-sided PICC line with the tip projecting over the superior vena cava. 2.  No change in coarsened reticular interstitial markings throughout both lungs.  No new airspace consolidation or pleural effusion.  Electronically Signed By-Los Diop MD On:12/26/2022 4:56 PM This report was finalized on [id number] by  Los Diop MD.    XR Chest 1 View    Result Date: 12/26/2022  1.  Coarsened airspace, bronchovascular thickening.  Consider small airways disease (bronchitis, asthma), edema, atypical/viral infection, aspiration. 2.  Heart size is normal. 3.  No acute bony findings. Electronically signed by:  Bernadette Huddleston M.D.  12/26/2022 4:56 AM Mountain Time    MRI Shoulder Right Without Contrast    Result Date: 12/28/2022  Impression: 1. Moderate-sized joint effusion with advanced degenerative changes and destructive changes of the glenohumeral joint. Findings may be related to septic arthritis given clinical history. Fluid aspiration is recommended for confirmation. Joint effusion with  subcoracoid bursitis may also be reactive and related to severe osteoarthritis. 2. Suspected complete tears of the supraspinatus and infraspinatus tendons. 3. Completely macerated appearance of the labrum. 4. Moderate acromial clavicular osteoarthritis. Electronically Signed: Linnette Pacheco  12/28/2022 10:08 AM EST  Workstation ID: [id number]    MRI Shoulder Left Without Contrast    Result Date: 12/28/2022  Impression: Very large left-sided joint effusion with destructive changes of the glenohumeral joint with deformity of the glenoid likely related to septic arthritis. Fluid sampling is recommended for further characterization. Ancillary findings as described above. Electronically Signed: Linnette Pacheco  12/28/2022 10:06 AM EST  Workstation ID: [id number]      Assessment/Plan:     Hyponatremia    ACC/AHA stage C chronic systolic heart failure (HCC)    Cardiac amyloidosis (HCC)    Primary hypertension    Tobacco abuse    Cervicalgia, spine surgery 2017    Primary osteoarthritis involving multiple joints   hyponatremia improving   Acute kidney injury on chronic kidney disease improving  Atrial fibrillation with rapid ventricular response  Metabolic acidosis  Urinary retention   Sepsis   Hypocalcemia   Hyperkalemia     Creatinine down to 1.5 from 1.7  Continue gentle hydration  Watch electrolytes  Recheck labs am

## 2022-12-31 ENCOUNTER — APPOINTMENT (OUTPATIENT)
Dept: CT IMAGING | Facility: HOSPITAL | Age: 67
DRG: 853 | End: 2022-12-31
Payer: MEDICARE

## 2022-12-31 LAB
ALBUMIN SERPL-MCNC: 2.2 G/DL (ref 3.5–5.2)
ALBUMIN/GLOB SERPL: 1 G/DL
ALP SERPL-CCNC: 157 U/L (ref 39–117)
ALT SERPL W P-5'-P-CCNC: 347 U/L (ref 1–41)
ANION GAP SERPL CALCULATED.3IONS-SCNC: 11 MMOL/L (ref 5–15)
ANISOCYTOSIS BLD QL: ABNORMAL
APTT PPP: 105.2 SECONDS (ref 24–31)
APTT PPP: 74.1 SECONDS (ref 24–31)
APTT PPP: 94.8 SECONDS (ref 24–31)
APTT PPP: 98.2 SECONDS (ref 24–31)
ARTERIAL PATENCY WRIST A: ABNORMAL
AST SERPL-CCNC: 520 U/L (ref 1–40)
ATMOSPHERIC PRESS: ABNORMAL MM[HG]
BASE EXCESS BLDA CALC-SCNC: -2.3 MMOL/L (ref 0–3)
BDY SITE: ABNORMAL
BILIRUB SERPL-MCNC: 4.4 MG/DL (ref 0–1.2)
BUN SERPL-MCNC: 68 MG/DL (ref 8–23)
BUN/CREAT SERPL: 60.7 (ref 7–25)
CA-I SERPL ISE-MCNC: 1.15 MMOL/L (ref 1.2–1.3)
CALCIUM SPEC-SCNC: 8 MG/DL (ref 8.6–10.5)
CHLORIDE SERPL-SCNC: 107 MMOL/L (ref 98–107)
CMV IGM SERPL IA-ACNC: <30 AU/ML (ref 0–29.9)
CO2 BLDA-SCNC: 23.5 MMOL/L (ref 22–29)
CO2 SERPL-SCNC: 22 MMOL/L (ref 22–29)
CREAT SERPL-MCNC: 1.12 MG/DL (ref 0.76–1.27)
D DIMER PPP FEU-MCNC: 8.94 MG/L (FEU) (ref 0–0.67)
DEPRECATED RDW RBC AUTO: 54.7 FL (ref 37–54)
EBV VCA IGM SER IA-ACNC: <36 U/ML (ref 0–35.9)
EGFRCR SERPLBLD CKD-EPI 2021: 72 ML/MIN/1.73
ERYTHROCYTE [DISTWIDTH] IN BLOOD BY AUTOMATED COUNT: 15.8 % (ref 12.3–15.4)
FSP PPP-MCNC: 20 UG/ML
GLOBULIN UR ELPH-MCNC: 2.1 GM/DL
GLUCOSE BLDC GLUCOMTR-MCNC: 115 MG/DL (ref 70–105)
GLUCOSE BLDC GLUCOMTR-MCNC: 116 MG/DL (ref 70–105)
GLUCOSE BLDC GLUCOMTR-MCNC: 126 MG/DL (ref 70–105)
GLUCOSE BLDC GLUCOMTR-MCNC: 132 MG/DL (ref 70–105)
GLUCOSE SERPL-MCNC: 144 MG/DL (ref 65–99)
HCO3 BLDA-SCNC: 22.4 MMOL/L (ref 21–28)
HCT VFR BLD AUTO: 32.6 % (ref 37.5–51)
HEMODILUTION: NO
HGB BLD-MCNC: 11.3 G/DL (ref 13–17.7)
INHALED O2 CONCENTRATION: 28 %
LARGE PLATELETS: ABNORMAL
LYMPHOCYTES # BLD MANUAL: 0.29 10*3/MM3 (ref 0.7–3.1)
LYMPHOCYTES NFR BLD MANUAL: 3 % (ref 5–12)
MAGNESIUM SERPL-MCNC: 2.2 MG/DL (ref 1.6–2.4)
MCH RBC QN AUTO: 32.4 PG (ref 26.6–33)
MCHC RBC AUTO-ENTMCNC: 34.6 G/DL (ref 31.5–35.7)
MCV RBC AUTO: 93.7 FL (ref 79–97)
MODALITY: ABNORMAL
MONOCYTES # BLD: 0.29 10*3/MM3 (ref 0.1–0.9)
NEUTROPHILS # BLD AUTO: 9.21 10*3/MM3 (ref 1.7–7)
NEUTROPHILS NFR BLD MANUAL: 80 % (ref 42.7–76)
NEUTS BAND NFR BLD MANUAL: 14 % (ref 0–5)
NRBC SPEC MANUAL: 1 /100 WBC (ref 0–0.2)
PATHOLOGY REVIEW: YES
PCO2 BLDA: 37.1 MM HG (ref 35–48)
PF4 HEPARIN CMPLX IGG SERPL IA: 0.12 OD (ref 0–0.4)
PH BLDA: 7.39 PH UNITS (ref 7.35–7.45)
PHOSPHATE SERPL-MCNC: 2.9 MG/DL (ref 2.5–4.5)
PLATELET # BLD AUTO: 46 10*3/MM3 (ref 140–450)
PMV BLD AUTO: 10.1 FL (ref 6–12)
PO2 BLDA: 84.5 MM HG (ref 83–108)
POTASSIUM SERPL-SCNC: 3.8 MMOL/L (ref 3.5–5.2)
PROT SERPL-MCNC: 4.3 G/DL (ref 6–8.5)
RBC # BLD AUTO: 3.48 10*6/MM3 (ref 4.14–5.8)
RETICS # AUTO: 0.02 10*6/MM3 (ref 0.02–0.13)
RETICS/RBC NFR AUTO: 0.63 % (ref 0.7–1.9)
SAO2 % BLDCOA: 96.3 % (ref 94–98)
SCAN SLIDE: NORMAL
SMALL PLATELETS BLD QL SMEAR: ABNORMAL
SODIUM SERPL-SCNC: 140 MMOL/L (ref 136–145)
TOXIC GRANULATION: ABNORMAL
VARIANT LYMPHS NFR BLD MANUAL: 3 % (ref 19.6–45.3)
WBC NRBC COR # BLD: 9.8 10*3/MM3 (ref 3.4–10.8)

## 2022-12-31 PROCEDURE — C1751 CATH, INF, PER/CENT/MIDLINE: HCPCS

## 2022-12-31 PROCEDURE — 85730 THROMBOPLASTIN TIME PARTIAL: CPT | Performed by: INTERNAL MEDICINE

## 2022-12-31 PROCEDURE — 25010000002 CEFTRIAXONE PER 250 MG: Performed by: NURSE PRACTITIONER

## 2022-12-31 PROCEDURE — 83735 ASSAY OF MAGNESIUM: CPT | Performed by: ORTHOPAEDIC SURGERY

## 2022-12-31 PROCEDURE — 70450 CT HEAD/BRAIN W/O DYE: CPT

## 2022-12-31 PROCEDURE — 82962 GLUCOSE BLOOD TEST: CPT

## 2022-12-31 PROCEDURE — 82330 ASSAY OF CALCIUM: CPT | Performed by: ORTHOPAEDIC SURGERY

## 2022-12-31 PROCEDURE — 85379 FIBRIN DEGRADATION QUANT: CPT | Performed by: NURSE PRACTITIONER

## 2022-12-31 PROCEDURE — 82803 BLOOD GASES ANY COMBINATION: CPT

## 2022-12-31 PROCEDURE — 80053 COMPREHEN METABOLIC PANEL: CPT | Performed by: ORTHOPAEDIC SURGERY

## 2022-12-31 PROCEDURE — 85007 BL SMEAR W/DIFF WBC COUNT: CPT | Performed by: ORTHOPAEDIC SURGERY

## 2022-12-31 PROCEDURE — 25010000002 FENTANYL CITRATE (PF) 50 MCG/ML SOLUTION: Performed by: STUDENT IN AN ORGANIZED HEALTH CARE EDUCATION/TRAINING PROGRAM

## 2022-12-31 PROCEDURE — 87040 BLOOD CULTURE FOR BACTERIA: CPT | Performed by: INTERNAL MEDICINE

## 2022-12-31 PROCEDURE — 84100 ASSAY OF PHOSPHORUS: CPT | Performed by: ORTHOPAEDIC SURGERY

## 2022-12-31 PROCEDURE — 87070 CULTURE OTHR SPECIMN AEROBIC: CPT | Performed by: NURSE PRACTITIONER

## 2022-12-31 PROCEDURE — 85045 AUTOMATED RETICULOCYTE COUNT: CPT | Performed by: NURSE PRACTITIONER

## 2022-12-31 PROCEDURE — 85025 COMPLETE CBC W/AUTO DIFF WBC: CPT | Performed by: ORTHOPAEDIC SURGERY

## 2022-12-31 RX ADMIN — HYDROCODONE BITARTRATE AND ACETAMINOPHEN 1 TABLET: 10; 325 TABLET ORAL at 11:36

## 2022-12-31 RX ADMIN — MIDODRINE HYDROCHLORIDE 10 MG: 5 TABLET ORAL at 17:32

## 2022-12-31 RX ADMIN — SODIUM BICARBONATE 650 MG TABLET 650 MG: at 08:39

## 2022-12-31 RX ADMIN — HYDROCODONE BITARTRATE AND ACETAMINOPHEN 1 TABLET: 10; 325 TABLET ORAL at 23:00

## 2022-12-31 RX ADMIN — SODIUM BICARBONATE 650 MG TABLET 650 MG: at 20:49

## 2022-12-31 RX ADMIN — HYDROCODONE BITARTRATE AND ACETAMINOPHEN 1 TABLET: 10; 325 TABLET ORAL at 02:46

## 2022-12-31 RX ADMIN — DEXTROSE AND SODIUM CHLORIDE 50 ML/HR: 5; 450 INJECTION, SOLUTION INTRAVENOUS at 11:49

## 2022-12-31 RX ADMIN — FENTANYL CITRATE 50 MCG: 50 INJECTION, SOLUTION INTRAMUSCULAR; INTRAVENOUS at 05:11

## 2022-12-31 RX ADMIN — HYDROCODONE BITARTRATE AND ACETAMINOPHEN 1 TABLET: 10; 325 TABLET ORAL at 19:08

## 2022-12-31 RX ADMIN — Medication 10 ML: at 08:39

## 2022-12-31 RX ADMIN — Medication 10 ML: at 20:54

## 2022-12-31 RX ADMIN — CEFTRIAXONE SODIUM 2 G: 2 INJECTION, POWDER, FOR SOLUTION INTRAMUSCULAR; INTRAVENOUS at 14:44

## 2022-12-31 RX ADMIN — LANSOPRAZOLE 30 MG: 30 TABLET, ORALLY DISINTEGRATING ORAL at 05:11

## 2022-12-31 RX ADMIN — DEXTROSE AND SODIUM CHLORIDE 75 ML/HR: 5; 450 INJECTION, SOLUTION INTRAVENOUS at 02:48

## 2022-12-31 RX ADMIN — Medication 300 MG: at 08:39

## 2022-12-31 RX ADMIN — FENTANYL CITRATE 50 MCG: 50 INJECTION, SOLUTION INTRAMUSCULAR; INTRAVENOUS at 00:30

## 2022-12-31 RX ADMIN — MIDODRINE HYDROCHLORIDE 10 MG: 5 TABLET ORAL at 02:46

## 2022-12-31 NOTE — PROGRESS NOTES
Hematology/Oncology Inpatient Progress Note    PATIENT NAME: Jaime Bar  : 1955  MRN: [id number]    CHIEF COMPLAINT: Sepsis; Thrombocytopenia; provoked catheter associated LUE DVT, LUE SVT, and RUE SVT    HISTORY OF PRESENT ILLNESS:    67 y.o. male admitted to [facility] on transfer from [facility] on 2022.  The patient was intubated and unresponsive at time of consultation with histories obtained from review of records and discussion with patient's niece and nephew.  He reported a history of bilateral shoulder pain for few days prior to admission.  He had presented to [place] ED on 2022 where he was hypotensive and hyponatremic and transferred to [facility].  CXR on 2022 showed coarsened airspace and bronchovascular thickening with small airway disease such as bronchitis or asthma, edema, atypical/viral infection, and aspiration in differential.  A RUE venous Doppler showed acute RUE superficial thrombophlebitis in the cephalic vein and no evidence of DVT for which he was started on subcu heparin.  On 2022 CBC revealed WBC 7.2, hemoglobin 12.4, MCV 98.4, and platelets 99,000.  Creatinine was elevated to 2.38, BUN 55, sodium was low at 118, potassium was high at 5.4 and LFTs revealed T bili elevated to 4.8 (0-1.2) with transaminases normal.  Blood cultures grew Staph aureus.  Urinalysis was concerning for UTI.  Urine sodium was less than 20, a.m. cortisol 26.11 (6.02-18.4), and urine osmolality was 410 ().   X-rays and MRIs of the bilateral shoulders were performed 2022 revealing joint effusion and advanced degenerative changes/destructive changes of glenohumeral joint.  There was suspected complete tear of the supraspinatus and infraspinatus tendons and a completely macerated appearance of the labrum on the right shoulder.  On 2022 RUE venous Doppler was repeated and felt stable.  He then underwent bilateral shoulder arthroscopy incision and drainage  with culture growing heavy growth of Staph aureus.  He had been intubated prior to the surgery and remained sedated on ventilator with pressors postop.  At time of consultation 12/29/2022 LUE venous Doppler showed acute catheter associated DVT in the left axillary vein, SVT in the left basilic vein of the upper arm.  BLE venous Doppler was negative for DVT/SVT but did show deep venous valvular incompetence in the right popliteal vein.  CBC revealed WBC 14.2, hemoglobin 11.4, MCV 96.5, and platelets 46,000.  T bili was elevated to 5.3 and transaminases were now elevated with  and .  PT was 15.6 (9.6-11.7), PTT was 38.7 (24-31), and fibrinogen was 493 (210-450).  D-dimer was 11.83 (0-0.67).  Transesophageal echocardiogram showed LVEF 46-50%, mild aortic valve stenosis, and no evidence of vegetation or bacterial endocarditis.  Heparin was changed to argatroban.  ID was managing antibiotics with patient on ceftriaxone with plan for treatment x6 weeks and recommending replacing PICC line 2 days after starting anticoagulation.  His niece and nephew reported the patient to have lost some weight but they were unsure the amount.  They reported PMH significant for prostate cancer treated approximately 2010 with radiation alone and no evidence of recurrence to their knowledge.  He also was under the care of Dr. Damico at  for cardiac amyloid.  Chart review showed PSA was 0.1 (0-4) in August 2022.  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  IgG was 768 (603-1613), IgA was 238 (), IgM was 64 ().  Kappa/lambda ratio was 1.36 (0.2-1.65).  A note from Roosevelt Damico MD (cardiologist) at [facility] dated 7/13/2022 reported patient was followed for restrictive cardiomyopathy secondary to cardiac amyloidosis.  LVEF in February 2022 was 45-50%.  Cardiac MRI 4/6/2022 revealed a myocardial mass 276 g, global hypokinesis of left ventricle with ejection fraction 43%, no  myocardial iron overload, the thickness and appearance of intra-atrial septum was also consistent with cardiac amyloidosis.  The note stated that the patient was followed by Dr. Banks at  [facility] where he was on Vyndamax therapy for cardiac amyloid and had been on that treatment for about 2 weeks.  Additionally in January 2022 T bili was noted to be elevated to 2.6 (0.2-1.0) and he was anemic with hemoglobin 11.8 and MCV 86.5 platelets were normal at 261,000.     12/29/22  Hematology/Oncology was consulted by RITU Jimenez for thrombocytopenia.     Past Medical History: Prostate cancer approximately 2010 treated with radiation only.  Cardiac amyloid managed by Dr. Damico at .  CKD.  Surgical History: Several orthopedic surgeries in the past.  Bilateral shoulder arthroscopy with incision and drainage 12/28/2022.  Social History: He lives in [place], [place] with his spouse.  His spouse is known to have Alzheimer's disease.  He has smoked for many years.  He has no alcohol or recreational drug use however he was narcotic dependent secondary to chronic pain.  He is a retired .  Family History: No significant known family history.  Allergies: Tramadol causes him to feel weird and codeine causes nausea.     PCP: Provider, No Known    INTERVAL HISTORY:  • 12/30/2022- platelets 38,000, hemoglobin 11.1.  Folate 7.13 (4.78-24.2), vitamin B12 greater than 2000.  Iron 29 (), iron saturation 15% (20-50), TIBC 195 (298-536), and ferritin 3525 ().  Reticulocytes 1.05 (0.7-1.9), haptoglobin 148 (). PSA 0.234 (0-4).  T bili 5.3 with direct bili 4.2 (0-0.3).  Hepatitis panel nonreactive.  Extubated 12/29/2022 and off pressors.  • 12/31/2022- platelets 46,000, hemoglobin 11.3.  HIT antibody negative at 0.121 (0-0.4).  CMV IgM less than 30 (0-29.9) and EBV IgM less than 36 (0-35.9).  D-dimer mildly improved to 8.94 (0-0.67).  Transaminases rising with  and AST  520.  T bili 4.4.  Peripheral smear was negative for schistocytes again.  Abdominal ultrasound revealed cholelithiasis with adenomyomatosis involving the gallbladder wall.  The common bile duct was in the upper limits of normal.  The liver appeared normal.    History of present illness reviewed since last visit and changes noted on 12/31/22.    Subjective   He reports feeling tired.  He remains very drowsy.      ROS:   Review of Systems   Constitutional: Positive for fatigue. Negative for activity change, chills, fever and unexpected weight change.        Limited ROS due to patient drowsiness.   HENT: Negative for congestion, dental problem, hearing loss, mouth sores, nosebleeds, sore throat and trouble swallowing.    Eyes: Negative for photophobia and visual disturbance.   Respiratory: Negative for apnea, cough, chest tightness, shortness of breath and stridor.    Cardiovascular: Negative for chest pain, palpitations and leg swelling.   Gastrointestinal: Negative for abdominal distention, abdominal pain, blood in stool, constipation, diarrhea, nausea and vomiting.   Endocrine: Negative for cold intolerance and heat intolerance.   Genitourinary: Negative for decreased urine volume, difficulty urinating, frequency, hematuria and urgency.   Musculoskeletal: Negative for arthralgias and gait problem.   Skin: Negative for rash and wound.   Neurological: Positive for weakness. Negative for dizziness, tremors, light-headedness, numbness and headaches.   Hematological: Negative for adenopathy. Does not bruise/bleed easily.   Psychiatric/Behavioral: Negative for confusion and hallucinations. The patient is not nervous/anxious.    All other systems reviewed and are negative.       MEDICATIONS:    Scheduled Meds:  cefTRIAXone, 2 g, Intravenous, Q12H  ferrous sulfate, 300 mg, Nasogastric, Daily  lansoprazole, 30 mg, Nasogastric, Q AM  midodrine, 10 mg, Nasogastric, Q8H  sodium bicarbonate, 650 mg, Nasogastric, BID  sodium  "chloride, 10 mL, Intravenous, Q12H       Continuous Infusions:  argatroban infusion, 0.5-10 mcg/kg/min, Last Rate: Stopped (12/30/22 1355)  dextrose 5 % and sodium chloride 0.45 %, 50 mL/hr, Last Rate: 50 mL/hr (12/31/22 1150)  phenylephrine, 0.5-3 mcg/kg/min, Last Rate: Stopped (12/29/22 1805)       PRN Meds:  •  acetaminophen **OR** acetaminophen  •  aluminum-magnesium hydroxide-simethicone  •  dextrose  •  dextrose  •  fentanyl  •  glucagon (human recombinant)  •  HYDROcodone-acetaminophen  •  Morphine  •  ondansetron **OR** ondansetron  •  sodium chloride  •  sodium chloride  •  sodium chloride     ALLERGIES:    Allergies   Allergen Reactions   • Tramadol-Acetaminophen Anxiety     Worms in the brain feeling   • Codeine Other (See Comments)     nausea   • Tramadol Other (See Comments)     \"I just felt really crawly, it did weird things with my head\"       Objective    VITALS:   /81   Pulse 90   Temp 99.2 °F (37.3 °C) (Oral)   Resp 25   Ht 167.6 cm (66\")   Wt 90.1 kg (198 lb 10.2 oz)   SpO2 96%   BMI 32.06 kg/m²     PHYSICAL EXAM:  Physical Exam  Vitals and nursing note reviewed.   Constitutional:       General: He is not in acute distress.     Appearance: Normal appearance. He is well-developed. He is ill-appearing. He is not diaphoretic.   HENT:      Head: Normocephalic and atraumatic.      Comments: Male pattern baldness     Right Ear: External ear normal.      Left Ear: External ear normal.      Ears:      Comments: Left ear O2 monitor.     Nose: Nose normal.      Comments: NG tube.  O2 per NC.     Mouth/Throat:      Mouth: Mucous membranes are moist.      Pharynx: Oropharynx is clear. No oropharyngeal exudate or posterior oropharyngeal erythema.      Comments: Dental fillings  Eyes:      General: No scleral icterus.     Extraocular Movements: Extraocular movements intact.      Conjunctiva/sclera: Conjunctivae normal.      Pupils: Pupils are equal, round, and reactive to light.   Cardiovascular: "      Rate and Rhythm: Normal rate and regular rhythm.      Heart sounds: Normal heart sounds. No murmur heard.     Comments: Cardiac monitor leads  Pulmonary:      Effort: Pulmonary effort is normal. No respiratory distress.      Breath sounds: Normal breath sounds. No wheezing or rales.   Abdominal:      General: Bowel sounds are normal. There is no distension.      Palpations: Abdomen is soft. There is no mass.      Tenderness: There is no abdominal tenderness. There is no guarding.   Genitourinary:     Comments: Thompson catheter  Musculoskeletal:         General: Swelling (2+ BUE edema) present. No tenderness or deformity. Normal range of motion.      Cervical back: Normal range of motion and neck supple.      Comments: Bilateral shoulder dressings with drains. BLE SCDs.   Lymphadenopathy:      Cervical: No cervical adenopathy.      Upper Body:      Right upper body: No supraclavicular adenopathy.      Left upper body: No supraclavicular adenopathy.   Skin:     General: Skin is warm and dry.      Coloration: Skin is not pale.      Findings: No bruising, erythema or rash.      Comments: Vertical scar right knee   Neurological:      General: No focal deficit present.      Mental Status: He is alert and oriented to person, place, and time.      Coordination: Coordination normal.   Psychiatric:         Mood and Affect: Mood normal.         Behavior: Behavior normal.         Thought Content: Thought content normal.           RECENT LABS:  Lab Results (last 24 hours)     Procedure Component Value Units Date/Time    Heparin Induced PLT Antibody With / Rfx [[id number]] Collected: 12/29/22 1721    Specimen: Blood Updated: 12/31/22 1509     Heparin Induced Plt Ab 0.121 OD     Narrative:      Performed at:  [address] [place] [address]  [id number]  : Kaylyn Luo MD, Phone:  [phone]    Blood Culture - Blood, Blood, PICC Line [[id number]] Collected: 12/31/22 1148    Specimen: Blood,  PICC Line Updated: 12/31/22 1155    POC Glucose Once [[id number]]  (Abnormal) Collected: 12/31/22 1120    Specimen: Blood Updated: 12/31/22 1121     Glucose 116 mg/dL      Comment: Serial Number: [id number]:  [id number]       Fibrin Split Products [[id number]]  (Abnormal) Collected: 12/29/22 1721    Specimen: Blood Updated: 12/31/22 1110     FDP 20 ug/mL     Narrative:      Performed at:  [address] [place] [address]  [id number]  : Kaylyn Luo MD, Phone:  [phone]    Peripheral Blood Smear [[id number]] Collected: 12/31/22 0520    Specimen: Blood Updated: 12/31/22 1110     Pathology Review Yes    Blood Culture - Blood, Hand, Left [[id number]]  (Abnormal) Collected: 12/29/22 1015    Specimen: Blood from Hand, Left Updated: 12/31/22 1042     Blood Culture Staphylococcus aureus     Comment:   Infectious disease consultation is highly recommended to rule out distant foci of infection.    Refer to previous blood culture collected on 12/26/2022 0435 for MICs.        Isolated from Aerobic Bottle     Blood Culture Staphylococcus, coagulase negative     Isolated from --     Gram Stain Aerobic Bottle Gram positive cocci in clusters    Narrative:      Less than seven (7) mL's of blood was collected.  Insufficient quantity may yield false negative results.    aPTT [[id number]]  (Abnormal) Collected: 12/31/22 0832    Specimen: Blood from Cannula Updated: 12/31/22 0915     PTT 94.8 seconds     Blood Gas, Arterial - [[id number]]  (Abnormal) Collected: 12/31/22 0843    Specimen: Arterial Blood Updated: 12/31/22 0846     Site Arterial Line     Patrick's Test N/A     pH, Arterial 7.388 pH units      pCO2, Arterial 37.1 mm Hg      pO2, Arterial 84.5 mm Hg      HCO3, Arterial 22.4 mmol/L      Base Excess, Arterial -2.3 mmol/L      Comment: Serial Number: [id number]:  [id number]        O2 Saturation, Arterial 96.3 %      CO2 Content 23.5 mmol/L      Barometric Pressure for Blood Gas --      Comment: N/A        Modality Cannula     FIO2 28 %      Hemodilution No    Ivy-Barr Virus VCA, IgM [[id number]] Collected: 12/29/22 2133    Specimen: Blood Updated: 12/31/22 0813     EBV VCA IgM <36.0 U/mL      Comment:                                  Negative        <36.0                                   Equivocal 36.0 - 43.9                                   Positive        >43.9       Narrative:      Performed at:  [address] [place] [address]  [id number]  : Leo Rod PhD, Phone:  [phone]    Blood Culture - Blood, Hand, Right [[id number]]  (Abnormal) Collected: 12/30/22 1251    Specimen: Blood from Hand, Right Updated: 12/31/22 0643     Blood Culture Abnormal Stain     Gram Stain Aerobic Bottle Gram positive cocci in clusters    Narrative:      Less than seven (7) mL's of blood was collected.  Insufficient quantity may yield false negative results.    Manual Differential [[id number]]  (Abnormal) Collected: 12/31/22 0520    Specimen: Blood Updated: 12/31/22 0636     Neutrophil % 80.0 %      Lymphocyte % 3.0 %      Monocyte % 3.0 %      Bands %  14.0 %      Neutrophils Absolute 9.21 10*3/mm3      Lymphocytes Absolute 0.29 10*3/mm3      Monocytes Absolute 0.29 10*3/mm3      nRBC 1.0 /100 WBC      Anisocytosis Slight/1+     Toxic Granulation Slight/1+     Platelet Estimate Decreased     Large Platelets Slight/1+    CBC & Differential [[id number]]  (Abnormal) Collected: 12/31/22 0520    Specimen: Blood Updated: 12/31/22 0636    Narrative:      The following orders were created for panel order CBC & Differential.  Procedure                               Abnormality         Status                     ---------                               -----------         ------                     CBC Auto Differential[[id number]]        Abnormal            Final result               Scan Slide[[id number]]                                       Final result                 Please view  results for these tests on the individual orders.    Scan Slide [[id number]] Collected: 12/31/22 0520    Specimen: Blood Updated: 12/31/22 0636     Scan Slide --     Comment: See Manual Differential Results       CBC Auto Differential [[id number]]  (Abnormal) Collected: 12/31/22 0520    Specimen: Blood Updated: 12/31/22 0636     WBC 9.80 10*3/mm3      RBC 3.48 10*6/mm3      Hemoglobin 11.3 g/dL      Hematocrit 32.6 %      MCV 93.7 fL      MCH 32.4 pg      MCHC 34.6 g/dL      RDW 15.8 %      RDW-SD 54.7 fl      MPV 10.1 fL      Platelets 46 10*3/mm3     Narrative:      The previously reported component NRBC is no longer being reported. Previous result was 0.1 /100 WBC (Reference Range: 0.0-0.2 /100 WBC) on 12/31/2022 at 0559 EST.    Phosphorus [[id number]]  (Normal) Collected: 12/31/22 0520    Specimen: Blood Updated: 12/31/22 0605     Phosphorus 2.9 mg/dL     Comprehensive Metabolic Panel [[id number]]  (Abnormal) Collected: 12/31/22 0520    Specimen: Blood Updated: 12/31/22 0603     Glucose 144 mg/dL      BUN 68 mg/dL      Creatinine 1.12 mg/dL      Sodium 140 mmol/L      Potassium 3.8 mmol/L      Chloride 107 mmol/L      CO2 22.0 mmol/L      Calcium 8.0 mg/dL      Total Protein 4.3 g/dL      Albumin 2.2 g/dL      ALT (SGPT) 347 U/L      AST (SGOT) 520 U/L      Alkaline Phosphatase 157 U/L      Total Bilirubin 4.4 mg/dL      Globulin 2.1 gm/dL      A/G Ratio 1.0 g/dL      BUN/Creatinine Ratio 60.7     Anion Gap 11.0 mmol/L      eGFR 72.0 mL/min/1.73      Comment: National Kidney Foundation and American Society of Nephrology (ASN) Task Force recommended calculation based on the Chronic Kidney Disease Epidemiology Collaboration (CKD-EPI) equation refit without adjustment for race.       Narrative:      GFR Normal >60  Chronic Kidney Disease <60  Kidney Failure <15      Magnesium [[id number]]  (Normal) Collected: 12/31/22 0520    Specimen: Blood Updated: 12/31/22 0603     Magnesium 2.2 mg/dL     Reticulocytes [[id number]]   "(Abnormal) Collected: 12/31/22 0520    Specimen: Blood Updated: 12/31/22 0557     Reticulocyte % 0.63 %      Reticulocyte Absolute 0.0218 10*6/mm3     Calcium, Ionized [[id number]]  (Abnormal) Collected: 12/31/22 0520    Specimen: Blood Updated: 12/31/22 0555     Ionized Calcium 1.15 mmol/L     Cytomegalovirus Antibody, IgM [[id number]] Collected: 12/29/22 2133    Specimen: Blood Updated: 12/31/22 0508     CMV IgM <30.0 AU/mL      Comment:                                 Negative         <30.0                                  Equivocal  30.0 - 34.9                                  Positive         >34.9  A positive result is generally indicative of acute  infection, reactivation or persistent IgM production.       Narrative:      Performed at:  [address] [place] [address]  [id number]  : Leo Rod PhD, Phone:  [phone]    D-dimer, Quantitative [[id number]]  (Abnormal) Collected: 12/31/22 0413    Specimen: Blood Updated: 12/31/22 0506     D-Dimer, Quantitative 8.94 mg/L (FEU)     Narrative:      According to the assay 's published package insert, a normal (<0.50 mg/L (FEU)) D-dimer result in conjunction with a non-high clinical probability assessment, excludes deep vein thrombosis (DVT) and pulmonary embolism (PE) with high sensitivity.    D-dimer values increase with age and this can make VTE exclusion of an older population difficult. To address this, the American College of Physicians, based on best available evidence and recent guidelines, recommends that clinicians use age-adjusted D-dimer thresholds in patients greater than 50 years of age with: a) a low probability of PE who do not meet all Pulmonary Embolism Rule Out Criteria, or b) in those with intermediate probability of PE.   The formula for an age-adjusted D-dimer cut-off is \"age/100\".  For example, a 60 year old patient would have an age-adjusted cut-off of 0.60 mg/L (FEU) and an 80 year old 0.80 mg/L " (FEU).    aPTT [[id number]]  (Abnormal) Collected: 12/31/22 0413    Specimen: Blood Updated: 12/31/22 0506     PTT 98.2 seconds     aPTT [[id number]]  (Abnormal) Collected: 12/31/22 0002    Specimen: Blood Updated: 12/31/22 0038     .2 seconds     POC Glucose Once [[id number]]  (Abnormal) Collected: 12/31/22 0004    Specimen: Blood Updated: 12/31/22 0005     Glucose 132 mg/dL      Comment: Serial Number: [id number]:  [id number]       aPTT [[id number]]  (Abnormal) Collected: 12/30/22 1943    Specimen: Blood Updated: 12/30/22 2034     .5 seconds     POC Glucose Once [[id number]]  (Abnormal) Collected: 12/30/22 1729    Specimen: Blood Updated: 12/30/22 1731     Glucose 107 mg/dL      Comment: Serial Number: [id number]:  [id number]       aPTT [[id number]]  (Abnormal) Collected: 12/30/22 1542    Specimen: Blood Updated: 12/30/22 1629     .9 seconds           PENDING RESULTS: SHAR, copper, SPEP, SIFE, free light chains, immunoglobulins, UIFE.    IMAGING REVIEWED:  US Liver    Result Date: 12/30/2022  1. Normal hepatic parenchyma. 2. Cholelithiasis with adenomyomatosis involving gallbladder wall. 3. Common bile duct at the upper limits of normal measuring 6 to 7 mm.  Electronically Signed By-Genaro Canada MD On:12/30/2022 3:40 PM This report was finalized on [id number] by  Genaro Canada MD.    XR Chest 1 View    Result Date: 12/30/2022  Cardiomegaly and increased pulmonary vascular congestion and suspected interstitial edema  Lines and tubes as above[  Electronically Signed By-Jaoxn Aly On:12/30/2022 10:19 AM This report was finalized on [id number] by  Jaxon Aly, .    XR Abdomen KUB    Result Date: 12/30/2022  NG tube projects over the expected position of the body of the stomach  Electronically Signed By-Jaxon Aly On:12/30/2022 12:00 PM This report was finalized on [id number] by  Jaxon Aly, .      I have reviewed the patient's labs, imaging, reports, and  other clinician documentation.    Assessment & Plan   ASSESSMENT:  1. Thrombocytopenia- platelets were normal in early 2022.  Platelets were low on admission and continued to drop.  HIT antibody negative.  Received ceftriaxone and now on cefepime which can cause thrombocytopenia.  Coags mildly elevated with fibrinogen not low, not likely DIC but sepsis likely contributing to thrombocytopenia.  No hemolysis and no schistocytes on peripheral smear ruling out TTP.  No vitamin B12 or folate deficiencies. SHAR remains pending.    2. Acute on chronic anemia/IgA lambda monoclonal gammopathy/SWETHA- mild anemia with hemoglobin in the 11's in early 2022.  Gammopathy labs at that time showed IgA lambda monoclonal protein with normal immunoglobulins and free light chains.  Iron studies show SWETHA and started oral iron.  No hemolysis or vitamin B12/folate deficiencies. Copper and gammopathy labs remain pending.  3. Acute provoked catheter associated LUE DVT/SVT and RUE SVT- RUE SVT was diagnosed prior to LUE DVT and he had received several doses of subcu heparin starting on 12/26. LUE DVT provoked by PICC line which remains in place and patient is receiving argatroban.    4. Elevated LFT/cholelithiasis and adenomyomatosis of the gallbladder wall- possible shock liver.  T bili remains elevated with elevated direct bili with normal liver on US.  Hepatitis panel negative.    5. Bilateral shoulder septic arthritis/staph RS bacteremia-on cefepime per ID.  No vegetation on echocardiogram.  6. Acute hypoxic respiratory failure/smoking history-  transiently on ventilator postsurgery.  7. History of prostate cancer-PSA remains normal.   8. Cardiac amyloidosis- on chart review it has been treated at .  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  Pending receipt of records.  9. STEVEN/hyponatremia-nephrology managing.  10. A. fib with RVR- patient evaluated by EP and recommended  start Eliquis 5 mg p.o. twice daily (not started as patient currently on argatroban) with plan for cardioversion should patient remain in A. fib.    PLAN:  1. Hold argatroban if patient develops any bleeding.  2. Consider removing left upper extremity PICC line.  3. Continue ferrous sulfate 325 mg by mouth daily (liquid given NGT).  4. Continue daily CBC.  5. Await remaining thrombocytopenia, anemia, and gammopathy labs.  6. Pending records from  regarding cardiac amyloid        Note prepared by KURT Alicea.  Patient seen and examined by Vicente Mendoza MD.  Electronically signed by RITU Taylor, 12/31/22, 3:50 PM EST.      I have personally performed a face-to-face diagnostic evaluation on this patient. I have performed a complete history and physical examination, reviewed laboratory studies, and radiographic examinations.  I have completed the majority and substantive portion of the medical decision making.  I have formulated the assessment on this patient and the plan of action as noted above. I have discussed the case with Darrin Nixon NP, have edited/reviewed the note, and agree with the care plan.  The patient is complaining of tiredness.  On examination he is quite sleepy with bilateral shoulder dressings and PADMINI drains.  Platelet count has improved some.  Peripheral smear reviewed and does not reveal schistocytes.  Thrombocytopenia seems secondary to sepsis and/or antibiotics.  He remains on anticoagulation for left upper extremity DVT.  Would recommend removing PICC line now that he has been on argatroban for a few days.    I discussed the patient's findings and my recommendations with patient and nursing.    Part of this note may be an electronic transcription/translation of spoken language to printed text using the Dragon Dictation System.    Electronically signed by Vicente Mendoza MD, 12/31/22, 4:16 PM EST.

## 2022-12-31 NOTE — PROGRESS NOTES
[facility] Medicine Services Daily Progress Note    Patient Name: Jaime Bar  : 1955  MRN: [id number]  Primary Care Physician:  Provider, No Known  Date of admission: 2022      Subjective      Chief Complaint: AMS      Patient seen and examined this morning.  Confused, does not follow commands.  Lethargic but arousable to voice command.  Getting more confused per nursing, ABG stable.  Discussed with oncology regarding concern for TTP.    Unable to obtain full review of system due to patient's underlying mental status.      Objective      Vitals:   Temp:  [98.3 °F (36.8 °C)-99.2 °F (37.3 °C)] 99.2 °F (37.3 °C)  Heart Rate:  [] 90  BP: ()/(55-84) 143/81  Arterial Line BP: ()/(46-66) 98/61  Flow (L/min):  [3-4] 4    Physical Exam:    General: Lethargic but arousable, confused, ill-appearing, lying in bed  Eyes: PERRL, EOMI, conjunctivae are clear  Cardiovascular: Tachycardic, irregularly irregular rhythm, no murmurs  Respiratory: Decreased breath sounds bilaterally, no wheezing or rales, unlabored breathing  Abdomen: Soft, nontender, positive bowel sounds, no guarding  Neurologic: Lethargic, does not follow commands, moves all extremities spontaneously  Musculoskeletal: Generalized weakness, no deformities  Skin: Warm, bilateral shoulder incisions bandaged, drains in place.  Bilateral upper extremity pitting edema noted L>R         Result Review    Result Review:  I have personally reviewed the results from the time of this admission to 2022 12:14 EST and agree with these findings:  [x]  Laboratory  [x]  Microbiology  [x]  Radiology  [x]  EKG/Telemetry   []  Cardiology/Vascular   []  Pathology  []  Old records  []  Other:    Wounds (last 24 hours)     LDA Wound     Row Name 22 0830 22 0410 22 0040       Wound 22 185 Right shoulder Incision    Wound - Properties Group Placement Date: 22  -HB Placement Time:   -HB Side:  Right  -HB Location: shoulder  -HB Primary Wound Type: Incision  -HB    Dressing Appearance dry;intact  -SR -- --    Closure Adhesive bandage  -SR KATHRYN  -MB --    Base -- dressing in place, unable to visualize  -MB --    Retired Wound - Properties Group Placement Date: 12/28/22  -HB Placement Time: 1856  -HB Side: Right  -HB Location: shoulder  -HB Primary Wound Type: Incision  -HB    Retired Wound - Properties Group Date first assessed: 12/28/22  -HB Time first assessed: 1856  -HB Side: Right  -HB Location: shoulder  -HB Primary Wound Type: Incision  -HB       Wound 12/28/22 1940 Left shoulder Incision    Wound - Properties Group Placement Date: 12/28/22  -HB Placement Time: 1940  -HB Side: Left  -HB Location: shoulder  -HB Primary Wound Type: Incision  -HB    Dressing Appearance intact  -SR -- --    Closure Adhesive bandage  -SR KATHRYN  -MB --    Base -- dressing in place, unable to visualize  -MB --    Retired Wound - Properties Group Placement Date: 12/28/22  -HB Placement Time: 1940  -HB Side: Left  -HB Location: shoulder  -HB Primary Wound Type: Incision  -HB    Retired Wound - Properties Group Date first assessed: 12/28/22  -HB Time first assessed: 1940  -HB Side: Left  -HB Location: shoulder  -HB Primary Wound Type: Incision  -HB       Wound 12/30/22 2000 sacral spine Pressure Injury    Wound - Properties Group Placement Date: 12/30/22 -MB Placement Time: 2000 -MB Location: sacral spine  -MB Primary Wound Type: Pressure inj  -MB    Wound Image -- -- Images linked: 1  -MB    Pressure Injury Stage -- DTPI  -MB --    Dressing Appearance -- dry;intact  -MB --    Closure Adhesive bandage  -SR -- --    Base non-blanchable;purple  -SR maroon/purple;non-blanchable  -MB --    Retired Wound - Properties Group Placement Date: 12/30/22  -MB Placement Time: 2000 -MB Location: sacral spine  -MB Primary Wound Type: Pressure inj  -MB    Retired Wound - Properties Group Date first assessed: 12/30/22  -MB Time first assessed:  2000  -MB Location: sacral spine  -MB Primary Wound Type: Pressure inj  -MB    Row Name 12/31/22 0000 12/30/22 2030 12/30/22 1545       Wound 12/28/22 1856 Right shoulder Incision    Wound - Properties Group Placement Date: 12/28/22  -HB Placement Time: 1856 -HB Side: Right  -HB Location: shoulder  -HB Primary Wound Type: Incision  -HB    Dressing Appearance dry;intact  -MB dry;intact;no drainage  -MB --    Closure KATHRYN  -MB KATHRYN  -MB KATHRYN  -AA    Base dressing in place, unable to visualize  -MB dressing in place, unable to visualize  -MB dressing in place, unable to visualize  -AA    Retired Wound - Properties Group Placement Date: 12/28/22  -HB Placement Time: 1856  -HB Side: Right  -HB Location: shoulder  -HB Primary Wound Type: Incision  -HB    Retired Wound - Properties Group Date first assessed: 12/28/22  -HB Time first assessed: 1856  -HB Side: Right  -HB Location: shoulder  -HB Primary Wound Type: Incision  -HB       Wound 12/28/22 1940 Left shoulder Incision    Wound - Properties Group Placement Date: 12/28/22  -HB Placement Time: 1940 -HB Side: Left  -HB Location: shoulder  -HB Primary Wound Type: Incision  -HB    Dressing Appearance dry;intact  -MB dry;intact  -MB --    Closure KATHRYN  -MB KATHRYN  -MB KATHRYN  -AA    Base dressing in place, unable to visualize  -MB dressing in place, unable to visualize  -MB dressing in place, unable to visualize  -AA    Retired Wound - Properties Group Placement Date: 12/28/22  -HB Placement Time: 1940  -HB Side: Left  -HB Location: shoulder  -HB Primary Wound Type: Incision  -HB    Retired Wound - Properties Group Date first assessed: 12/28/22  -HB Time first assessed: 1940  -HB Side: Left  -HB Location: shoulder  -HB Primary Wound Type: Incision  -HB       Wound 12/30/22 2000 sacral spine Pressure Injury    Wound - Properties Group Placement Date: 12/30/22  -MB Placement Time: 2000 -MB Location: sacral spine  -MB Primary Wound Type: Pressure inj  -MB    Pressure Injury Stage  DTPI  -MB DTPI  -MB --    Dressing Appearance dry;intact  -MB dry;intact  -MB --    Base maroon/purple;non-blanchable  -MB purple;non-blanchable  -MB --    Retired Wound - Properties Group Placement Date: 12/30/22  -MB Placement Time: 2000 -MB Location: sacral spine  -MB Primary Wound Type: Pressure inj  -MB    Retired Wound - Properties Group Date first assessed: 12/30/22 -MB Time first assessed: 2000 -MB Location: sacral spine  -MB Primary Wound Type: Pressure inj  -MB    Row Name 12/30/22 1225             Wound 12/28/22 1856 Right shoulder Incision    Wound - Properties Group Placement Date: 12/28/22  -HB Placement Time: 1856 -HB Side: Right  -HB Location: shoulder  -HB Primary Wound Type: Incision  -HB    Closure KATHRYN  -AA      Base dressing in place, unable to visualize  -AA      Retired Wound - Properties Group Placement Date: 12/28/22  -HB Placement Time: 1856 -HB Side: Right  -HB Location: shoulder  -HB Primary Wound Type: Incision  -HB    Retired Wound - Properties Group Date first assessed: 12/28/22  -HB Time first assessed: 1856  -HB Side: Right  -HB Location: shoulder  -HB Primary Wound Type: Incision  -HB       Wound 12/28/22 1940 Left shoulder Incision    Wound - Properties Group Placement Date: 12/28/22  -HB Placement Time: 1940 -HB Side: Left  -HB Location: shoulder  -HB Primary Wound Type: Incision  -HB    Closure KATHRYN  -AA      Base dressing in place, unable to visualize  -AA      Retired Wound - Properties Group Placement Date: 12/28/22  -HB Placement Time: 1940 -HB Side: Left  -HB Location: shoulder  -HB Primary Wound Type: Incision  -HB    Retired Wound - Properties Group Date first assessed: 12/28/22  -HB Time first assessed: 1940 -HB Side: Left  -HB Location: shoulder  -HB Primary Wound Type: Incision  -HB          User Key  (r) = Recorded By, (t) = Taken By, (c) = Cosigned By    Initials Name Provider Type    Anurag Thorne, RN Registered Nurse    Martha Monson RN  Registered Nurse    Carolyn Jay, RN Registered Nurse    Sofi Medrano RN Registered Nurse                  Assessment & Plan      Brief Patient Summary:  Jaime Bar is a 67 y.o. male with past medical history of cervicalgia, primary osteoarthritis involving multiple joints with surgery on bilateral shoulders and right knee replacement but no recent procedure or surgery, hyponatremia, prostate cancer, and cardiac amyloidosis presented to [facility] on 12/25/2022 with complaints of right shoulder pain, weakness, and altered mental status.  He was found to be hypotensive and hyponatremic with elevated lactic acid.  Patient had reported decreased oral intake.  He was transferred to [facility] ICU for further treatment of septic shock requiring vasopressors and hyponatremia.      Since admission he was found to be bacteremic with 2 sets of blood cultures positive for staph aureus, source being bilateral shoulder septic arthritis. ID has been treating the patient with IV ceftriaxone 2G and he will need a total of 6 weeks of antibiotic therapy.  He has status post bilateral shoulder arthroscopy with extensive debridement by Dr. Velez 12/28/2022. He remained intubated after surgery until 12/29 and now extubated. He has been weaned off vasopressors. Nephrology is managing his hyponatremia and STEVEN on CKD. He was found to have LUE DVT provoked from PICC line currently on argatroban. Hematology has been consulted for his thrombocytopenia.       cefTRIAXone, 2 g, Intravenous, Q12H  ferrous sulfate, 300 mg, Nasogastric, Daily  lansoprazole, 30 mg, Nasogastric, Q AM  midodrine, 10 mg, Nasogastric, Q8H  sodium bicarbonate, 650 mg, Nasogastric, BID  sodium chloride, 10 mL, Intravenous, Q12H       argatroban infusion, 0.5-10 mcg/kg/min, Last Rate: Stopped (12/30/22 1355)  dextrose 5 % and sodium chloride 0.45 %, 50 mL/hr, Last Rate: 50 mL/hr (12/31/22 1150)  phenylephrine, 0.5-3  mcg/kg/min, Last Rate: Stopped (12/29/22 9408)  propofol, 5-50 mcg/kg/min, Last Rate: Stopped (12/29/22 6690)         Active Hospital Problems:  Active Hospital Problems    Diagnosis    • Bacteremia due to methicillin susceptible Staphylococcus aureus (MSSA)    • Staphylococcal arthritis of shoulder (HCC)    • Cervicalgia, spine surgery 2017    • Primary osteoarthritis involving multiple joints    • Hyponatremia    • Acute kidney injury superimposed on chronic kidney disease (HCC)    • Cardiac amyloidosis (HCC)    • ACC/AHA stage C chronic systolic heart failure (HCC)    • Tobacco abuse    • Primary hypertension      Plan:    Bilateral shoulder septic arthritis  Septic shock secondary to MSSA bacteremia  -Synovial fluid culture was consistent with infection and cultures are growing 4+ Staph aureus  -s/p bilateral shoulder arthroscopy with extensive debridement by Dr. Velez 12/28/2022  - 2/2 blood cultures positive for MSSA secondary to bilateral shoulder septic arthritis   -ALMA DELIA on 12/28, Negative for endocarditis  -On IV Rocephin 2 g daily x6 weeks per ID  -Off IV vasopressors, remains on midodrine  -Art line in place, MAP greater than 65 mmHg at this time     Acute metabolic encephalopathy  Severe thrombocytopenia  LUE DVT, provoked from PICC line  -Patient's mental status worsening  -Concern for TTP, discussed with oncology, will check for schistocytes  -Platelets remain low  -Off argatroban drip, PTT elevated    STEVEN on CKD  Hyponatremia  -Sodium has improved, creatinine also improving  -Fluid management per nephrology     A. fib with RVR  -Off amnio drip  -Not an anticoagulation candidate at this time due to thrombocytopenia  -Cardiology following    Elevated LFTs  -Likely due to shock liver  -Hep panel negative  -Liver ultrasound showed normal hepatic parenchyma, cholelithiasis with adenomyomatosis involving gallbladder wall, CBD upper limit of normal  -Monitor    Acute respiratory failure with hypoxia  -Postop  respiratory failure, extubated on 12/39  -Supportive care, pulmonary toilet     H/o cardiac amyloidosis  H/o prostate CA  Osteoarthritis     GI Ppx.    Guarded prognosis.  DNR/DNI.    CODE STATUS:    Medical Intervention Limits: NO intubation (DNI)  Level Of Support Discussed With: Next of Kin (If No Surrogate)  Code Status (Patient has no pulse and is not breathing): No CPR (Do Not Attempt to Resuscitate)  Medical Interventions (Patient has pulse or is breathing): Limited Support  Release to patient: Routine Release      Disposition:      Electronically signed by James Holguin DO, 12/31/22, 12:14 EST.  [facility] Hospitalist Team      Part of this note may be an electronic transcription/translation of spoken language to printed text using the Dragon Dictation System.

## 2022-12-31 NOTE — PROGRESS NOTES
Nutrition Services    Patient Name: Jaime Bar  YOB: 1955  MRN: [id number]  Admission date: 12/26/2022    PPE Documentation        PPE Worn By Provider Did not enter room for this encounter   PPE Worn By Patient  N/A     PROGRESS NOTE      Encounter Information: Check on for tube feeding.         PO Diet: NPO Diet NPO Type: Strict NPO   PO Supplements: None ordered    PO Intake:  NPO       Current nutrition support: Nutren 1.5 at 20 mL/hr + 10mL q 4 hr water flush   Nutrition support review: Documented as above per EMR        Labs (reviewed below): Elevated BUN  Elevated LFTs  Hyperglycemia         GI Function:  Last BM 12/31 (today)  Residuals WNL       Nutrition Intervention Updates: Gradual advance,ment in tube feeding towards goal to 40 mL/hour     Increase water flush to 10 mL/hour        Results from last 7 days   Lab Units 12/31/22  0520 12/30/22  0540 12/29/22  1710 12/29/22  0541   SODIUM mmol/L 140 135* 135* 134*   POTASSIUM mmol/L 3.8 3.8 4.3 4.5   CHLORIDE mmol/L 107 103 102 100   CO2 mmol/L 22.0 21.0* 20.0* 19.0*   BUN mg/dL 68* 77* 74* 65*   CREATININE mg/dL 1.12 1.50* 1.65* 1.66*   CALCIUM mg/dL 8.0* 8.1* 8.1* 8.7   BILIRUBIN mg/dL 4.4* 5.2* 5.3* 5.3*   ALK PHOS U/L 157* 105  --  108   ALT (SGPT) U/L 347* 236*  --  117*   AST (SGOT) U/L 520* 431*  --  238*   GLUCOSE mg/dL 144* 122* 108* 90     Results from last 7 days   Lab Units 12/31/22  0520 12/30/22  0540 12/29/22  0541 12/29/22  0002 12/27/22  0557 12/26/22  0435   MAGNESIUM mg/dL 2.2 2.3  --  2.0   < > 1.8   PHOSPHORUS mg/dL 2.9 4.0  --  4.3   < > 3.7   HEMOGLOBIN g/dL 11.3* 11.1*   < > 11.4*   < > 12.4*   HEMATOCRIT % 32.6* 32.3*   < > 34.6*   < > 38.0   TRIGLYCERIDES mg/dL  --   --   --   --   --  114    < > = values in this interval not displayed.     Lab Results   Component Value Date    HGBA1C 4.2 12/26/2022       RD to follow up per protocol.    Electronically signed by:  Gloria Park RD  12/31/22 07:04 EST

## 2022-12-31 NOTE — PROGRESS NOTES
"                                                                                                                                      Nephrology  Progress Note                                        [facility]    Patient Identification    Name: Jaime Bar  Age: 67 y.o.  Sex: male  :  1955  MRN: [id number]      DATE OF SERVICE:  2022        Subective    Events noted  Remains off the vent      Objective   Scheduled Meds:cefTRIAXone, 2 g, Intravenous, Q24H  ferrous sulfate, 300 mg, Nasogastric, Daily  lansoprazole, 30 mg, Nasogastric, Q AM  midodrine, 10 mg, Nasogastric, Q8H  sodium bicarbonate, 650 mg, Nasogastric, BID  sodium chloride, 10 mL, Intravenous, Q12H          Continuous Infusions:argatroban infusion, 0.5-10 mcg/kg/min, Last Rate: Stopped (22 3615)  dextrose 5 % and sodium chloride 0.45 %, 75 mL/hr, Last Rate: 75 mL/hr (22 7018)  phenylephrine, 0.5-3 mcg/kg/min, Last Rate: Stopped (22 1805)  propofol, 5-50 mcg/kg/min, Last Rate: Stopped (22 7982)        PRN Meds:•  acetaminophen **OR** acetaminophen  •  aluminum-magnesium hydroxide-simethicone  •  dextrose  •  dextrose  •  fentanyl  •  glucagon (human recombinant)  •  HYDROcodone-acetaminophen  •  Morphine  •  ondansetron **OR** ondansetron  •  sodium chloride  •  sodium chloride  •  sodium chloride     Exam:  BP 98/55   Pulse 90   Temp 98.5 °F (36.9 °C) (Axillary)   Resp 25   Ht 167.6 cm (66\")   Wt 90.1 kg (198 lb 10.2 oz)   SpO2 95%   BMI 32.06 kg/m²     Intake/Output last 3 shifts:  I/O last 3 completed shifts:  In: 2805 [I.V.:2805]  Out: 3697 [Urine:3600; Drains:97]    Intake/Output this shift:  No intake/output data recorded.    Physical exam:  General Appearance: Confused  Head:  Normocephalic, without obvious abnormality, atraumatic  Eyes:  PERRL, conjunctiva/corneas clear     Neck:  Supple,  no adenopathy;      Lungs:  Decreased BS occasion ronchi  Heart:  Regular rate and rhythm, S1 " and S2 normal  Abdomen:  Soft, non-tender, bowel sounds active   Extremities: trace edema  Pulses: 2+ and symmetric all extremities  Skin:  No rashes or lesions       Data Review:  All labs (24hrs):   Recent Results (from the past 24 hour(s))   POC Glucose Once    Collection Time: 12/30/22 11:26 AM    Specimen: Blood   Result Value Ref Range    Glucose 74 70 - 105 mg/dL   Blood Culture - Blood, Hand, Right    Collection Time: 12/30/22 12:51 PM    Specimen: Hand, Right; Blood   Result Value Ref Range    Blood Culture Abnormal Stain (C)     Gram Stain Aerobic Bottle Gram positive cocci in clusters (C)    aPTT    Collection Time: 12/30/22 12:58 PM    Specimen: Blood   Result Value Ref Range    .2 (C) 24.0 - 31.0 seconds   aPTT    Collection Time: 12/30/22  3:42 PM    Specimen: Blood   Result Value Ref Range    .9 (C) 61.0 - 76.5 seconds   POC Glucose Once    Collection Time: 12/30/22  5:29 PM    Specimen: Blood   Result Value Ref Range    Glucose 107 (H) 70 - 105 mg/dL   aPTT    Collection Time: 12/30/22  7:43 PM    Specimen: Blood   Result Value Ref Range    .5 (C) 61.0 - 76.5 seconds   aPTT    Collection Time: 12/31/22 12:02 AM    Specimen: Blood   Result Value Ref Range    .2 (C) 24.0 - 31.0 seconds   POC Glucose Once    Collection Time: 12/31/22 12:04 AM    Specimen: Blood   Result Value Ref Range    Glucose 132 (H) 70 - 105 mg/dL   D-dimer, Quantitative    Collection Time: 12/31/22  4:13 AM    Specimen: Blood   Result Value Ref Range    D-Dimer, Quantitative 8.94 (H) 0.00 - 0.67 mg/L (FEU)   aPTT    Collection Time: 12/31/22  4:13 AM    Specimen: Blood   Result Value Ref Range    PTT 98.2 (C) 24.0 - 31.0 seconds   Calcium, Ionized    Collection Time: 12/31/22  5:20 AM    Specimen: Blood   Result Value Ref Range    Ionized Calcium 1.15 (L) 1.20 - 1.30 mmol/L   Comprehensive Metabolic Panel    Collection Time: 12/31/22  5:20 AM    Specimen: Blood   Result Value Ref Range    Glucose 144  (H) 65 - 99 mg/dL    BUN 68 (H) 8 - 23 mg/dL    Creatinine 1.12 0.76 - 1.27 mg/dL    Sodium 140 136 - 145 mmol/L    Potassium 3.8 3.5 - 5.2 mmol/L    Chloride 107 98 - 107 mmol/L    CO2 22.0 22.0 - 29.0 mmol/L    Calcium 8.0 (L) 8.6 - 10.5 mg/dL    Total Protein 4.3 (L) 6.0 - 8.5 g/dL    Albumin 2.2 (L) 3.5 - 5.2 g/dL    ALT (SGPT) 347 (H) 1 - 41 U/L    AST (SGOT) 520 (H) 1 - 40 U/L    Alkaline Phosphatase 157 (H) 39 - 117 U/L    Total Bilirubin 4.4 (H) 0.0 - 1.2 mg/dL    Globulin 2.1 gm/dL    A/G Ratio 1.0 g/dL    BUN/Creatinine Ratio 60.7 (H) 7.0 - 25.0    Anion Gap 11.0 5.0 - 15.0 mmol/L    eGFR 72.0 >60.0 mL/min/1.73   Magnesium    Collection Time: 12/31/22  5:20 AM    Specimen: Blood   Result Value Ref Range    Magnesium 2.2 1.6 - 2.4 mg/dL   Phosphorus    Collection Time: 12/31/22  5:20 AM    Specimen: Blood   Result Value Ref Range    Phosphorus 2.9 2.5 - 4.5 mg/dL   Reticulocytes    Collection Time: 12/31/22  5:20 AM    Specimen: Blood   Result Value Ref Range    Reticulocyte % 0.63 (L) 0.70 - 1.90 %    Reticulocyte Absolute 0.0218 0.0200 - 0.1300 10*6/mm3   CBC Auto Differential    Collection Time: 12/31/22  5:20 AM    Specimen: Blood   Result Value Ref Range    WBC 9.80 3.40 - 10.80 10*3/mm3    RBC 3.48 (L) 4.14 - 5.80 10*6/mm3    Hemoglobin 11.3 (L) 13.0 - 17.7 g/dL    Hematocrit 32.6 (L) 37.5 - 51.0 %    MCV 93.7 79.0 - 97.0 fL    MCH 32.4 26.6 - 33.0 pg    MCHC 34.6 31.5 - 35.7 g/dL    RDW 15.8 (H) 12.3 - 15.4 %    RDW-SD 54.7 (H) 37.0 - 54.0 fl    MPV 10.1 6.0 - 12.0 fL    Platelets 46 (C) 140 - 450 10*3/mm3   Scan Slide    Collection Time: 12/31/22  5:20 AM    Specimen: Blood   Result Value Ref Range    Scan Slide     Manual Differential    Collection Time: 12/31/22  5:20 AM    Specimen: Blood   Result Value Ref Range    Neutrophil % 80.0 (H) 42.7 - 76.0 %    Lymphocyte % 3.0 (L) 19.6 - 45.3 %    Monocyte % 3.0 (L) 5.0 - 12.0 %    Bands %  14.0 (H) 0.0 - 5.0 %    Neutrophils Absolute 9.21 (H) 1.70 -  7.00 10*3/mm3    Lymphocytes Absolute 0.29 (L) 0.70 - 3.10 10*3/mm3    Monocytes Absolute 0.29 0.10 - 0.90 10*3/mm3    nRBC 1.0 (H) 0.0 - 0.2 /100 WBC    Anisocytosis Slight/1+ None Seen    Toxic Granulation Slight/1+ None Seen    Platelet Estimate Decreased Normal    Large Platelets Slight/1+ None Seen          Imaging:  XR Shoulder 2+ View Right    Result Date: 12/27/2022   1. Severe degenerative changes of the glenohumeral joint as well as a high riding humeral head, suggestive of chronic rotator cuff pathology. No definite evidence of osteomyelitis is identified. However plain films cannot exclude septic arthritis.  Electronically Signed By-Juan Francisco Lozoya MD On:12/27/2022 1:42 PM This report was finalized on [id number] by  Juan Francisco Lozoya MD.    US Liver    Result Date: 12/30/2022  1. Normal hepatic parenchyma. 2. Cholelithiasis with adenomyomatosis involving gallbladder wall. 3. Common bile duct at the upper limits of normal measuring 6 to 7 mm.  Electronically Signed By-Genaro Canada MD On:12/30/2022 3:40 PM This report was finalized on [id number] by  Genaro Canada MD.    XR Chest 1 View    Result Date: 12/30/2022  Cardiomegaly and increased pulmonary vascular congestion and suspected interstitial edema  Lines and tubes as above[  Electronically Signed By-Jaxon Aly On:12/30/2022 10:19 AM This report was finalized on [id number] by  Jaxon Aly, .    XR Chest 1 View    Result Date: 12/29/2022  The endotracheal tube is approximately 6 mm above the sarah. Recommend repositioning. Left subclavian central venous catheter has its catheter tip overlying the superior vena cava. The cardiac silhouette is moderately to significantly enlarged and the pulmonary vessels are within normal limits. There is no focal area of consolidation otherwise seen. Electronically signed by:  Los Mccoy D.O.  12/28/2022 11:13 PM Mountain Time    XR Chest 1 View    Result Date: 12/27/2022   1. Persistent diffuse  interstitial opacities. Differential includes interstitial pulmonary edema, or atypical infection.  Electronically Signed By-Juan Francisco Lozoya MD On:12/27/2022 8:46 AM This report was finalized on [id number] by  Juan Francisco Lozoya MD.    XR Chest 1 View    Result Date: 12/26/2022    1.  Interval placement of left-sided PICC line with the tip projecting over the superior vena cava. 2.  No change in coarsened reticular interstitial markings throughout both lungs.  No new airspace consolidation or pleural effusion.  Electronically Signed By-Los Diop MD On:12/26/2022 4:56 PM This report was finalized on [id number] by  Lso Diop MD.    XR Chest 1 View    Result Date: 12/26/2022  1.  Coarsened airspace, bronchovascular thickening.  Consider small airways disease (bronchitis, asthma), edema, atypical/viral infection, aspiration. 2.  Heart size is normal. 3.  No acute bony findings. Electronically signed by:  Bernadette Huddleston M.D.  12/26/2022 4:56 AM Mountain Time    MRI Shoulder Right Without Contrast    Result Date: 12/28/2022  Impression: 1. Moderate-sized joint effusion with advanced degenerative changes and destructive changes of the glenohumeral joint. Findings may be related to septic arthritis given clinical history. Fluid aspiration is recommended for confirmation. Joint effusion with subcoracoid bursitis may also be reactive and related to severe osteoarthritis. 2. Suspected complete tears of the supraspinatus and infraspinatus tendons. 3. Completely macerated appearance of the labrum. 4. Moderate acromial clavicular osteoarthritis. Electronically Signed: Linnette Pacheco  12/28/2022 10:08 AM EST  Workstation ID: [id number]    MRI Shoulder Left Without Contrast    Result Date: 12/28/2022  Impression: Very large left-sided joint effusion with destructive changes of the glenohumeral joint with deformity of the glenoid likely related to septic arthritis. Fluid sampling is recommended for further characterization.  Ancillary findings as described above. Electronically Signed: Linnette Pacheco  12/28/2022 10:06 AM EST  Workstation ID: [id number]    XR Abdomen KUB    Result Date: 12/30/2022  NG tube projects over the expected position of the body of the stomach  Electronically Signed By-Jaxon Aly On:12/30/2022 12:00 PM This report was finalized on [id number] by  Jaxon Aly, .      Assessment/Plan:     Hyponatremia    ACC/AHA stage C chronic systolic heart failure (HCC)    Cardiac amyloidosis (HCC)    Primary hypertension    Tobacco abuse    Cervicalgia, spine surgery 2017    Primary osteoarthritis involving multiple joints    Bacteremia due to methicillin susceptible Staphylococcus aureus (MSSA)    Staphylococcal arthritis of shoulder (HCC)    Acute kidney injury superimposed on chronic kidney disease (HCC)   hyponatremia improving   Acute kidney injury on chronic kidney disease improving  Atrial fibrillation with rapid ventricular response  Metabolic acidosis  Urinary retention   Sepsis   Hypocalcemia   Hyperkalemia     Creatinine down to 1.1 from 1.7  Continue gentle hydration  Reduce the rate and may stop it if the tube feed advances  Watch electrolytes  Recheck labs am

## 2022-12-31 NOTE — PROGRESS NOTES
Infectious Diseases Progress Note      LOS: 5 days   Patient Care Team:  Provider, No Known as PCP - Kyle Gardner MD as Consulting Physician (Nephrology)  Vicente Mendoza MD as Consulting Physician (Hematology and Oncology)    Chief Complaint: On the ventilator    Subjective     The patient had no high-grade fever during the last 24 hours.  Now extubated and on 4 L of oxygen by nasal cannula.  He is currently off pressors.  Patient's eyes are open but he did not respond to any commands today    Review of Systems:   Review of Systems   Unable to perform ROS: Acuity of condition        Objective     Vital Signs  Temp:  [98.3 °F (36.8 °C)-99.2 °F (37.3 °C)] 99.2 °F (37.3 °C)  Heart Rate:  [] 90  BP: ()/(55-84) 143/81  Arterial Line BP: ()/(46-66) 98/61    Physical Exam:  Physical Exam  Vitals and nursing note reviewed.   Constitutional:       General: He is not in acute distress.     Appearance: He is well-developed and normal weight. He is ill-appearing. He is not diaphoretic.      Comments: Alert responding to commands at the time of my assessment   HENT:      Head: Normocephalic and atraumatic.   Eyes:      Extraocular Movements: Extraocular movements intact.      Conjunctiva/sclera: Conjunctivae normal.      Pupils: Pupils are equal, round, and reactive to light.   Cardiovascular:      Rate and Rhythm: Normal rate and regular rhythm.      Heart sounds: Normal heart sounds, S1 normal and S2 normal.   Pulmonary:      Effort: Pulmonary effort is normal. No respiratory distress.      Breath sounds: No stridor. Rales present. No wheezing.   Abdominal:      General: Abdomen is flat. Bowel sounds are normal. There is no distension.      Palpations: Abdomen is soft. There is no mass.      Tenderness: There is no abdominal tenderness. There is no guarding.      Comments: NG tube   Musculoskeletal:         General: No deformity. Normal range of motion.      Cervical back: Neck supple.       Right lower leg: No edema.      Left lower leg: No edema.      Comments: Drain tubes are present in the right and left shoulders with bloody serous drainage   Skin:     General: Skin is warm and dry.      Coloration: Skin is not pale.      Findings: No erythema or rash.   Neurological:      Mental Status: He is alert.      Cranial Nerves: No cranial nerve deficit.      Comments: Alert          Results Review:    I have reviewed all clinical data, test, lab, and imaging results.     Radiology  US Liver    Result Date: 12/30/2022  DATE OF EXAM: 12/30/2022 1:43 PM  PROCEDURE: US LIVER-  INDICATIONS: elevated direct bilirubin; M00.9-Pyogenic arthritis, unspecified  COMPARISON: No Comparisons Available  TECHNIQUE: Grayscale and color Doppler ultrasound evaluation of the limited abdomen was performed.  FINDINGS: The background liver echogenicity is within normal limits for technique. There is no perihepatic ascites. No discrete liver lesion identified. There is hepatopedal flow in the portal vein. Visualized hepatic veins are patent.  Common bile duct measures 6-7 mm. Echogenic shadowing gallstones. Echogenic foci with ringdown artifact from the gallbladder wall consistent with adenomyomatosis. No pericholecystic fluid.      1. Normal hepatic parenchyma. 2. Cholelithiasis with adenomyomatosis involving gallbladder wall. 3. Common bile duct at the upper limits of normal measuring 6 to 7 mm.  Electronically Signed By-Genaro Canada MD On:12/30/2022 3:40 PM This report was finalized on [id number] by  Genaro Canada MD.      Cardiology    Laboratory    Results from last 7 days   Lab Units 12/31/22  0520 12/30/22  0540 12/29/22  1721 12/29/22  0541 12/29/22  0002 12/28/22  0839 12/27/22  0557   WBC 10*3/mm3 9.80 9.30 9.80 21.10* 14.20* 6.20 9.10   HEMOGLOBIN g/dL 11.3* 11.1* 10.9* 12.0* 11.4* 11.5* 11.0*   HEMATOCRIT % 32.6* 32.3* 32.0* 35.7* 34.6* 34.2* 33.0*   PLATELETS 10*3/mm3 46* 38* 47* 57* 46* 33* 66*     Results from  last 7 days   Lab Units 12/31/22  0520 12/30/22  0540 12/29/22  1710 12/29/22  0541 12/29/22  0002 12/28/22  0839 12/27/22  0742 12/26/22  1641 12/26/22  0435   SODIUM mmol/L 140 135* 135* 134* 130* 130* 125*   < > 118*   POTASSIUM mmol/L 3.8 3.8 4.3 4.5 3.9 3.9 4.3   < > 5.4*   CHLORIDE mmol/L 107 103 102 100 99 97* 92*   < > 84*   CO2 mmol/L 22.0 21.0* 20.0* 19.0* 20.0* 20.0* 21.0*   < > 16.0*   BUN mg/dL 68* 77* 74* 65* 59* 59* 64*   < > 55*   CREATININE mg/dL 1.12 1.50* 1.65* 1.66* 1.17 1.36* 2.25*   < > 2.38*   GLUCOSE mg/dL 144* 122* 108* 90 137* 82 107*   < > 75   ALBUMIN g/dL 2.2* 2.3*  --  2.7* 2.6* 2.4*  --   --  3.30*   BILIRUBIN mg/dL 4.4* 5.2* 5.3* 5.3* 4.6* 4.6*  --   --  4.8*   ALK PHOS U/L 157* 105  --  108 88 83  --   --  83   AST (SGOT) U/L 520* 431*  --  238* 62* 44*  --   --  26   ALT (SGPT) U/L 347* 236*  --  117* 36 30  --   --  17   CALCIUM mg/dL 8.0* 8.1* 8.1* 8.7 8.6 8.5* 7.8*   < > 8.2*    < > = values in this interval not displayed.     Results from last 7 days   Lab Units 12/26/22  0435   CK TOTAL U/L 166     Results from last 7 days   Lab Units 12/28/22  0839   SED RATE mm/hr 39*         Microbiology   Microbiology Results (last 10 days)     Procedure Component Value - Date/Time    Blood Culture - Blood, Hand, Right [[id number]]  (Abnormal) Collected: 12/30/22 1251    Lab Status: Preliminary result Specimen: Blood from Hand, Right Updated: 12/31/22 0643     Blood Culture Abnormal Stain     Gram Stain Aerobic Bottle Gram positive cocci in clusters    Narrative:      Less than seven (7) mL's of blood was collected.  Insufficient quantity may yield false negative results.    Blood Culture - Blood, Hand, Left [[id number]]  (Abnormal) Collected: 12/29/22 1015    Lab Status: Preliminary result Specimen: Blood from Hand, Left Updated: 12/31/22 1042     Blood Culture Staphylococcus aureus     Comment:   Infectious disease consultation is highly recommended to rule out distant foci of  infection.    Refer to previous blood culture collected on 12/26/2022 0435 for MICs.        Isolated from Aerobic Bottle     Blood Culture Staphylococcus, coagulase negative     Isolated from --     Gram Stain Aerobic Bottle Gram positive cocci in clusters    Narrative:      Less than seven (7) mL's of blood was collected.  Insufficient quantity may yield false negative results.    Body Fluid Culture - Body Fluid, Shoulder, Right [[id number]]  (Abnormal)  (Susceptibility) Collected: 12/28/22 0713    Lab Status: Preliminary result Specimen: Body Fluid from Shoulder, Right Updated: 12/30/22 0934     Body Fluid Culture Heavy growth (4+) Staphylococcus aureus     Gram Stain Many (4+) WBCs seen      Many (4+) Gram positive cocci in clusters    Susceptibility      Staphylococcus aureus      PARMINDER      Gentamicin Susceptible      Oxacillin Susceptible      Rifampin Susceptible      Vancomycin Susceptible                       Susceptibility Comments     Staphylococcus aureus    This isolate does not demonstrate inducible clindamycin resistance in vitro.               Blood Culture - Blood, Arm, Left [[id number]]  (Abnormal) Collected: 12/27/22 1300    Lab Status: Final result Specimen: Blood from Arm, Left Updated: 12/29/22 0721     Blood Culture Staphylococcus aureus     Comment:   Infectious disease consultation is highly recommended to rule out distant foci of infection.        Isolated from Anaerobic Bottle     Gram Stain Anaerobic Bottle Gram positive cocci in clusters    Narrative:      Refer to previous blood culture collected on 12/26/2022 0435 for MICs    Less than seven (7) mL's of blood was collected.  Insufficient quantity may yield false negative results.    Blood Culture - Blood, Hand, Right [[id number]]  (Abnormal)  (Susceptibility) Collected: 12/26/22 0435    Lab Status: Final result Specimen: Blood from Hand, Right Updated: 12/28/22 0657     Blood Culture Staphylococcus aureus     Comment:   Infectious disease  consultation is highly recommended to rule out distant foci of infection.        Isolated from Aerobic and Anaerobic Bottles     Gram Stain Aerobic Bottle Gram positive cocci in clusters      Anaerobic Bottle Gram positive cocci in clusters    Narrative:      Less than seven (7) mL's of blood was collected.  Insufficient quantity may yield false negative results.    Susceptibility      Staphylococcus aureus      PARMINDER      Gentamicin Susceptible      Oxacillin Susceptible      Rifampin Susceptible      Vancomycin Susceptible                       Susceptibility Comments     Staphylococcus aureus    This isolate does not demonstrate inducible clindamycin resistance in vitro.               Blood Culture ID, PCR - Blood, Hand, Right [[id number]]  (Abnormal) Collected: 12/26/22 0435    Lab Status: Final result Specimen: Blood from Hand, Right Updated: 12/26/22 2054     BCID, PCR Staph aureus. mecA/C and MREJ (methicillin resistance gene) NOT detected. Identification by BCID2 PCR     BOTTLE TYPE Aerobic Bottle    Narrative:      Infectious disease consultation is highly recommended to rule out distant foci of infection.    Blood Culture - Blood, Hand, Left [[id number]]  (Abnormal) Collected: 12/26/22 0434    Lab Status: Final result Specimen: Blood from Hand, Left Updated: 12/28/22 0657     Blood Culture Staphylococcus aureus     Comment:   Infectious disease consultation is highly recommended to rule out distant foci of infection.        Isolated from Aerobic and Anaerobic Bottles     Gram Stain Aerobic Bottle Gram positive cocci in clusters      Anaerobic Bottle Gram positive cocci in clusters    Narrative:      Refer to previous blood culture collected on 12/26/2022 0435 for MICs    Less than seven (7) mL's of blood was collected.  Insufficient quantity may yield false negative results.    MRSA Screen, PCR (Inpatient) - Swab, Nares [[id number]]  (Normal) Collected: 12/26/22 0307    Lab Status: Final result Specimen: Swab  from Mike Updated: 12/26/22 0507     MRSA PCR No MRSA Detected    Narrative:      The negative predictive value of this diagnostic test is high and should only be used to consider de-escalating anti-MRSA therapy. A positive result may indicate colonization with MRSA and must be correlated clinically.          Medication Review:       Schedule Meds  cefTRIAXone, 2 g, Intravenous, Q12H  ferrous sulfate, 300 mg, Nasogastric, Daily  lansoprazole, 30 mg, Nasogastric, Q AM  midodrine, 10 mg, Nasogastric, Q8H  sodium bicarbonate, 650 mg, Nasogastric, BID  sodium chloride, 10 mL, Intravenous, Q12H        Infusion Meds  argatroban infusion, 0.5-10 mcg/kg/min, Last Rate: Stopped (12/30/22 1355)  dextrose 5 % and sodium chloride 0.45 %, 50 mL/hr, Last Rate: 50 mL/hr (12/31/22 1150)  phenylephrine, 0.5-3 mcg/kg/min, Last Rate: Stopped (12/29/22 1805)        PRN Meds  •  acetaminophen **OR** acetaminophen  •  aluminum-magnesium hydroxide-simethicone  •  dextrose  •  dextrose  •  fentanyl  •  glucagon (human recombinant)  •  HYDROcodone-acetaminophen  •  Morphine  •  ondansetron **OR** ondansetron  •  sodium chloride  •  sodium chloride  •  sodium chloride        Assessment & Plan       Antimicrobial Therapy   1.  IV ceftriaxone        2.        3.        4.        5.            Assessment    Bilateral shoulder septic arthritis.  Synovial fluid culture was consistent with infection and cultures are growing 4+ Staph aureus    Methicillin susceptible Staph aureus bacteremia secondary to above.  Blood cultures were positive x2 sets on admission on December 26, 2022.  Repeat blood culture from December 27, 2022 turned positive  Repeat blood culture from December 29, 2022 is positive  ALMA DELIA was done on December 28, 2022 and showed no vegetation  -Repeat blood culture from 12/30/2022 is now positive    Mild septic shock.  Currently off vasopressors    Respiratory failure on the ventilator after surgery  -Currently extubated and on 4 L  of oxygen by nasal cannula    DVT of the left arm secondary to PICC line.  Hematology service was consulted and patient was started on anticoagulation    Significantly elevated liver transaminase and bilirubin--likely related to infection  -Hepatitis panel was negative      Plan    Continue IV ceftriaxone 2 g for 6 weeks but we will switch to every 12 hours since patient is persistently bacteremic  Ideally we would add IV rifampin however patient's liver transaminase and bilirubin are too elevated  Repeat 1 more sets of blood culture today  I will eventually replace the current PICC line with a new PICC line in 2 days after starting anticoagulation and recommending of bacteremia clearance  Would like to remove A line as soon as possible  Continue supportive care  Dennis labs  Case discussed with pharmacy      Brooklyn Smith, RITU  12/31/22  13:45 EST    Note is dictated utilizing voice recognition software/Dragon

## 2023-01-01 LAB
ALBUMIN SERPL-MCNC: 2.2 G/DL (ref 3.5–5.2)
ALBUMIN/GLOB SERPL: 0.8 G/DL
ALP SERPL-CCNC: 107 U/L (ref 39–117)
ALT SERPL W P-5'-P-CCNC: 306 U/L (ref 1–41)
ANION GAP SERPL CALCULATED.3IONS-SCNC: 8 MMOL/L (ref 5–15)
APTT PPP: 61.1 SECONDS (ref 24–31)
AST SERPL-CCNC: 308 U/L (ref 1–40)
BACTERIA SPEC AEROBE CULT: ABNORMAL
BASOPHILS # BLD AUTO: 0.1 10*3/MM3 (ref 0–0.2)
BASOPHILS NFR BLD AUTO: 1.2 % (ref 0–1.5)
BILIRUB SERPL-MCNC: 4 MG/DL (ref 0–1.2)
BUN SERPL-MCNC: 52 MG/DL (ref 8–23)
BUN/CREAT SERPL: 63.4 (ref 7–25)
CA-I SERPL ISE-MCNC: 1.17 MMOL/L (ref 1.2–1.3)
CALCIUM SPEC-SCNC: 8 MG/DL (ref 8.6–10.5)
CHLORIDE SERPL-SCNC: 113 MMOL/L (ref 98–107)
CO2 SERPL-SCNC: 25 MMOL/L (ref 22–29)
CREAT SERPL-MCNC: 0.82 MG/DL (ref 0.76–1.27)
D DIMER PPP FEU-MCNC: 9.84 MG/L (FEU) (ref 0–0.67)
DEPRECATED RDW RBC AUTO: 53.8 FL (ref 37–54)
EGFRCR SERPLBLD CKD-EPI 2021: 96.3 ML/MIN/1.73
EOSINOPHIL # BLD AUTO: 0 10*3/MM3 (ref 0–0.4)
EOSINOPHIL NFR BLD AUTO: 0.1 % (ref 0.3–6.2)
ERYTHROCYTE [DISTWIDTH] IN BLOOD BY AUTOMATED COUNT: 16 % (ref 12.3–15.4)
FIBRINOGEN PPP-MCNC: 445 MG/DL (ref 210–450)
GLOBULIN UR ELPH-MCNC: 2.6 GM/DL
GLUCOSE BLDC GLUCOMTR-MCNC: 118 MG/DL (ref 70–105)
GLUCOSE BLDC GLUCOMTR-MCNC: 122 MG/DL (ref 70–105)
GLUCOSE BLDC GLUCOMTR-MCNC: 126 MG/DL (ref 70–105)
GLUCOSE BLDC GLUCOMTR-MCNC: 130 MG/DL (ref 70–105)
GLUCOSE SERPL-MCNC: 135 MG/DL (ref 65–99)
GRAM STN SPEC: ABNORMAL
GRAM STN SPEC: ABNORMAL
HCT VFR BLD AUTO: 34.6 % (ref 37.5–51)
HGB BLD-MCNC: 11.4 G/DL (ref 13–17.7)
ISOLATED FROM: ABNORMAL
LYMPHOCYTES # BLD AUTO: 0.2 10*3/MM3 (ref 0.7–3.1)
LYMPHOCYTES NFR BLD AUTO: 2.9 % (ref 19.6–45.3)
MAGNESIUM SERPL-MCNC: 2 MG/DL (ref 1.6–2.4)
MCH RBC QN AUTO: 31.3 PG (ref 26.6–33)
MCHC RBC AUTO-ENTMCNC: 32.8 G/DL (ref 31.5–35.7)
MCV RBC AUTO: 95.5 FL (ref 79–97)
MONOCYTES # BLD AUTO: 0.6 10*3/MM3 (ref 0.1–0.9)
MONOCYTES NFR BLD AUTO: 7 % (ref 5–12)
NEUTROPHILS NFR BLD AUTO: 7 10*3/MM3 (ref 1.7–7)
NEUTROPHILS NFR BLD AUTO: 88.8 % (ref 42.7–76)
NRBC BLD AUTO-RTO: 0 /100 WBC (ref 0–0.2)
PHOSPHATE SERPL-MCNC: 3 MG/DL (ref 2.5–4.5)
PLATELET # BLD AUTO: 60 10*3/MM3 (ref 140–450)
PMV BLD AUTO: 10 FL (ref 6–12)
POTASSIUM SERPL-SCNC: 4 MMOL/L (ref 3.5–5.2)
PROT SERPL-MCNC: 4.8 G/DL (ref 6–8.5)
RBC # BLD AUTO: 3.63 10*6/MM3 (ref 4.14–5.8)
SODIUM SERPL-SCNC: 146 MMOL/L (ref 136–145)
WBC NRBC COR # BLD: 7.8 10*3/MM3 (ref 3.4–10.8)

## 2023-01-01 PROCEDURE — 85730 THROMBOPLASTIN TIME PARTIAL: CPT | Performed by: NURSE PRACTITIONER

## 2023-01-01 PROCEDURE — 82330 ASSAY OF CALCIUM: CPT | Performed by: ORTHOPAEDIC SURGERY

## 2023-01-01 PROCEDURE — 84100 ASSAY OF PHOSPHORUS: CPT | Performed by: ORTHOPAEDIC SURGERY

## 2023-01-01 PROCEDURE — 85025 COMPLETE CBC W/AUTO DIFF WBC: CPT | Performed by: ORTHOPAEDIC SURGERY

## 2023-01-01 PROCEDURE — 85379 FIBRIN DEGRADATION QUANT: CPT | Performed by: NURSE PRACTITIONER

## 2023-01-01 PROCEDURE — 83735 ASSAY OF MAGNESIUM: CPT | Performed by: ORTHOPAEDIC SURGERY

## 2023-01-01 PROCEDURE — 25010000002 ENOXAPARIN PER 10 MG: Performed by: NURSE PRACTITIONER

## 2023-01-01 PROCEDURE — 82962 GLUCOSE BLOOD TEST: CPT

## 2023-01-01 PROCEDURE — 25010000002 CEFTRIAXONE PER 250 MG: Performed by: NURSE PRACTITIONER

## 2023-01-01 PROCEDURE — 80053 COMPREHEN METABOLIC PANEL: CPT | Performed by: ORTHOPAEDIC SURGERY

## 2023-01-01 PROCEDURE — 85384 FIBRINOGEN ACTIVITY: CPT | Performed by: NURSE PRACTITIONER

## 2023-01-01 RX ORDER — ENOXAPARIN SODIUM 100 MG/ML
30 INJECTION SUBCUTANEOUS EVERY 12 HOURS
Status: DISCONTINUED | OUTPATIENT
Start: 2023-01-01 | End: 2023-01-02

## 2023-01-01 RX ADMIN — HYDROCODONE BITARTRATE AND ACETAMINOPHEN 1 TABLET: 10; 325 TABLET ORAL at 18:25

## 2023-01-01 RX ADMIN — MIDODRINE HYDROCHLORIDE 10 MG: 5 TABLET ORAL at 18:25

## 2023-01-01 RX ADMIN — HYDROCODONE BITARTRATE AND ACETAMINOPHEN 1 TABLET: 10; 325 TABLET ORAL at 06:42

## 2023-01-01 RX ADMIN — Medication 300 MG: at 08:20

## 2023-01-01 RX ADMIN — MIDODRINE HYDROCHLORIDE 10 MG: 5 TABLET ORAL at 03:05

## 2023-01-01 RX ADMIN — ACETAMINOPHEN 650 MG: 325 TABLET, FILM COATED ORAL at 11:43

## 2023-01-01 RX ADMIN — HYDROCODONE BITARTRATE AND ACETAMINOPHEN 1 TABLET: 10; 325 TABLET ORAL at 22:45

## 2023-01-01 RX ADMIN — CEFTRIAXONE SODIUM 2 G: 2 INJECTION, POWDER, FOR SOLUTION INTRAMUSCULAR; INTRAVENOUS at 14:48

## 2023-01-01 RX ADMIN — Medication 10 ML: at 08:20

## 2023-01-01 RX ADMIN — ACETAMINOPHEN 650 MG: 325 TABLET, FILM COATED ORAL at 16:55

## 2023-01-01 RX ADMIN — HYDROCODONE BITARTRATE AND ACETAMINOPHEN 1 TABLET: 10; 325 TABLET ORAL at 11:43

## 2023-01-01 RX ADMIN — ENOXAPARIN SODIUM 30 MG: 100 INJECTION SUBCUTANEOUS at 18:25

## 2023-01-01 RX ADMIN — CEFTRIAXONE SODIUM 2 G: 2 INJECTION, POWDER, FOR SOLUTION INTRAMUSCULAR; INTRAVENOUS at 03:05

## 2023-01-01 RX ADMIN — LANSOPRAZOLE 30 MG: 30 TABLET, ORALLY DISINTEGRATING ORAL at 06:15

## 2023-01-01 RX ADMIN — MIDODRINE HYDROCHLORIDE 10 MG: 5 TABLET ORAL at 11:43

## 2023-01-01 RX ADMIN — SODIUM BICARBONATE 650 MG TABLET 650 MG: at 20:30

## 2023-01-01 RX ADMIN — HYDROCODONE BITARTRATE AND ACETAMINOPHEN 1 TABLET: 10; 325 TABLET ORAL at 03:05

## 2023-01-01 RX ADMIN — Medication 10 ML: at 20:30

## 2023-01-01 RX ADMIN — SODIUM BICARBONATE 650 MG TABLET 650 MG: at 08:20

## 2023-01-01 NOTE — PROGRESS NOTES
Infectious Diseases Progress Note      LOS: 6 days   Patient Care Team:  Provider, No Known as PCP - Kyle Gardner MD as Consulting Physician (Nephrology)  Vicente Mendoza MD as Consulting Physician (Hematology and Oncology)    Chief Complaint: On the ventilator    Subjective     The patient had no high-grade fever during the last 24 hours.  Now extubated and on 4 L of oxygen by nasal cannula.  He is currently off pressors.  Patient appears very lethargic today    Review of Systems:   Review of Systems   Unable to perform ROS: Acuity of condition        Objective     Vital Signs  Temp:  [98.7 °F (37.1 °C)-101 °F (38.3 °C)] 101 °F (38.3 °C)  Heart Rate:  [] 91  Arterial Line BP: ()/() 117/64    Physical Exam:  Physical Exam  Vitals and nursing note reviewed.   Constitutional:       General: He is not in acute distress.     Appearance: He is well-developed and normal weight. He is ill-appearing. He is not diaphoretic.      Comments: Appears lethargic   HENT:      Head: Normocephalic and atraumatic.   Eyes:      Pupils: Pupils are equal, round, and reactive to light.   Cardiovascular:      Rate and Rhythm: Normal rate and regular rhythm.      Heart sounds: Normal heart sounds, S1 normal and S2 normal.   Pulmonary:      Effort: Pulmonary effort is normal. No respiratory distress.      Breath sounds: No stridor. Rales present. No wheezing.   Abdominal:      General: Abdomen is flat. Bowel sounds are normal. There is no distension.      Palpations: Abdomen is soft. There is no mass.      Tenderness: There is no abdominal tenderness. There is no guarding.      Comments: NG tube   Musculoskeletal:         General: No deformity. Normal range of motion.      Cervical back: Neck supple.      Right lower leg: No edema.      Left lower leg: No edema.      Comments: Drain tubes are present in the right and left shoulders with bloody serous drainage   Skin:     General: Skin is warm and dry.       Coloration: Skin is not pale.      Findings: No erythema or rash.   Neurological:      Mental Status: He is alert.      Cranial Nerves: No cranial nerve deficit.          Results Review:    I have reviewed all clinical data, test, lab, and imaging results.     Radiology  CT Head Without Contrast    Result Date: 12/31/2022  Exam: CT HEAD WO CONTRAST-  Date of Exam: 12/31/2022 7:59 PM  Indication: Worsening lethargy/ mental status; M00.9-Pyogenic arthritis, unspecified.  Comparison: None available  Technique:  Axial CT images of the head were obtained from skull base to vertex without IV contrast.  Coronal reconstructions were performed. Automated exposure control and iterative reconstruction methods were used.  FINDINGS The ventricles and sulci are proportionately prominent compatible with global cerebral volume loss. There is no mass effect or midline shift. There is no acute intracranial hemorrhage. The gray-white matter differentiation is preserved. There is no significant white matter disease by CT. The calvarium is intact. The mastoid air cells and middle ears are well aerated. The paranasal sinuses demonstrate mild bilateral air-fluid levels. There is a NG tube which courses through the right nasal cavity. Visualized orbits and globes appear symmetric. There is atherosclerotic calcification within the carotid siphons.      1. No acute intracranial abnormality. Specifically, no evidence of acute hemorrhage, mass effect or midline shift. 2. Mild global cerebral volume loss. 3. Mild bilateral air-fluid levels within the maxillary sinuses which can be seen with acute sinus disease in the correct clinical setting.  Electronically Signed By-Willy Baxter MD On:12/31/2022 9:01 PM This report was finalized on [id number] by  Willy Baxter MD.      Cardiology    Laboratory    Results from last 7 days   Lab Units 01/01/23  0614 12/31/22  0520 12/30/22  0540 12/29/22  1721 12/29/22  0541 12/29/22  0002  12/28/22  0839   WBC 10*3/mm3 7.80 9.80 9.30 9.80 21.10* 14.20* 6.20   HEMOGLOBIN g/dL 11.4* 11.3* 11.1* 10.9* 12.0* 11.4* 11.5*   HEMATOCRIT % 34.6* 32.6* 32.3* 32.0* 35.7* 34.6* 34.2*   PLATELETS 10*3/mm3 60* 46* 38* 47* 57* 46* 33*     Results from last 7 days   Lab Units 01/01/23  0614 12/31/22  0520 12/30/22  0540 12/29/22  1710 12/29/22  0541 12/29/22  0002 12/28/22  0839 12/26/22  1641 12/26/22  0435   SODIUM mmol/L 146* 140 135* 135* 134* 130* 130*   < > 118*   POTASSIUM mmol/L 4.0 3.8 3.8 4.3 4.5 3.9 3.9   < > 5.4*   CHLORIDE mmol/L 113* 107 103 102 100 99 97*   < > 84*   CO2 mmol/L 25.0 22.0 21.0* 20.0* 19.0* 20.0* 20.0*   < > 16.0*   BUN mg/dL 52* 68* 77* 74* 65* 59* 59*   < > 55*   CREATININE mg/dL 0.82 1.12 1.50* 1.65* 1.66* 1.17 1.36*   < > 2.38*   GLUCOSE mg/dL 135* 144* 122* 108* 90 137* 82   < > 75   ALBUMIN g/dL 2.2* 2.2* 2.3*  --  2.7* 2.6* 2.4*  --  3.30*   BILIRUBIN mg/dL 4.0* 4.4* 5.2* 5.3* 5.3* 4.6* 4.6*  --  4.8*   ALK PHOS U/L 107 157* 105  --  108 88 83  --  83   AST (SGOT) U/L 308* 520* 431*  --  238* 62* 44*  --  26   ALT (SGPT) U/L 306* 347* 236*  --  117* 36 30  --  17   CALCIUM mg/dL 8.0* 8.0* 8.1* 8.1* 8.7 8.6 8.5*   < > 8.2*    < > = values in this interval not displayed.     Results from last 7 days   Lab Units 12/26/22  0435   CK TOTAL U/L 166     Results from last 7 days   Lab Units 12/28/22  0839   SED RATE mm/hr 39*         Microbiology   Microbiology Results (last 10 days)     Procedure Component Value - Date/Time    Blood Culture - Blood, Blood, PICC Line [[id number]]  (Normal) Collected: 12/31/22 1148    Lab Status: Preliminary result Specimen: Blood, PICC Line Updated: 01/01/23 1200     Blood Culture No growth at 24 hours    Blood Culture - Blood, Hand, Right [[id number]]  (Abnormal) Collected: 12/30/22 1251    Lab Status: Final result Specimen: Blood from Hand, Right Updated: 01/01/23 1030     Blood Culture Staphylococcus aureus     Comment:   Infectious disease consultation  is highly recommended to rule out distant foci of infection.        Isolated from Aerobic Bottle     Gram Stain Aerobic Bottle Gram positive cocci in clusters    Narrative:      Less than seven (7) mL's of blood was collected.  Insufficient quantity may yield false negative results.    Refer to previous blood culture collected on 12/26/2022 0435 for MICs.    Blood Culture - Blood, Hand, Left [[id number]]  (Abnormal) Collected: 12/29/22 1015    Lab Status: Final result Specimen: Blood from Hand, Left Updated: 01/01/23 1030     Blood Culture Staphylococcus aureus     Comment:   Infectious disease consultation is highly recommended to rule out distant foci of infection.    Refer to previous blood culture collected on 12/26/2022 0435 for MICs.        Isolated from Aerobic Bottle     Blood Culture Staphylococcus, coagulase negative     Comment: Probable contaminant requires clinical correlation, susceptibility not performed unless requested by physician.          Isolated from --     Gram Stain Aerobic Bottle Gram positive cocci in clusters    Narrative:      Less than seven (7) mL's of blood was collected.  Insufficient quantity may yield false negative results.    Body Fluid Culture - Body Fluid, Shoulder, Right [[id number]]  (Abnormal)  (Susceptibility) Collected: 12/28/22 0713    Lab Status: Preliminary result Specimen: Body Fluid from Shoulder, Right Updated: 12/30/22 0934     Body Fluid Culture Heavy growth (4+) Staphylococcus aureus     Gram Stain Many (4+) WBCs seen      Many (4+) Gram positive cocci in clusters    Susceptibility      Staphylococcus aureus      PARMINDER      Gentamicin Susceptible      Oxacillin Susceptible      Rifampin Susceptible      Vancomycin Susceptible                       Susceptibility Comments     Staphylococcus aureus    This isolate does not demonstrate inducible clindamycin resistance in vitro.               Blood Culture - Blood, Arm, Left [[id number]]  (Abnormal) Collected: 12/27/22 1300     Lab Status: Final result Specimen: Blood from Arm, Left Updated: 12/29/22 0721     Blood Culture Staphylococcus aureus     Comment:   Infectious disease consultation is highly recommended to rule out distant foci of infection.        Isolated from Anaerobic Bottle     Gram Stain Anaerobic Bottle Gram positive cocci in clusters    Narrative:      Refer to previous blood culture collected on 12/26/2022 0435 for MICs    Less than seven (7) mL's of blood was collected.  Insufficient quantity may yield false negative results.    Blood Culture - Blood, Hand, Right [[id number]]  (Abnormal)  (Susceptibility) Collected: 12/26/22 0435    Lab Status: Final result Specimen: Blood from Hand, Right Updated: 12/28/22 0657     Blood Culture Staphylococcus aureus     Comment:   Infectious disease consultation is highly recommended to rule out distant foci of infection.        Isolated from Aerobic and Anaerobic Bottles     Gram Stain Aerobic Bottle Gram positive cocci in clusters      Anaerobic Bottle Gram positive cocci in clusters    Narrative:      Less than seven (7) mL's of blood was collected.  Insufficient quantity may yield false negative results.    Susceptibility      Staphylococcus aureus      PARMINDER      Gentamicin Susceptible      Oxacillin Susceptible      Rifampin Susceptible      Vancomycin Susceptible                       Susceptibility Comments     Staphylococcus aureus    This isolate does not demonstrate inducible clindamycin resistance in vitro.               Blood Culture ID, PCR - Blood, Hand, Right [[id number]]  (Abnormal) Collected: 12/26/22 0435    Lab Status: Final result Specimen: Blood from Hand, Right Updated: 12/26/22 2054     BCID, PCR Staph aureus. mecA/C and MREJ (methicillin resistance gene) NOT detected. Identification by BCID2 PCR     BOTTLE TYPE Aerobic Bottle    Narrative:      Infectious disease consultation is highly recommended to rule out distant foci of infection.    Blood Culture - Blood,  Hand, Left [[id number]]  (Abnormal) Collected: 12/26/22 0434    Lab Status: Final result Specimen: Blood from Hand, Left Updated: 12/28/22 0657     Blood Culture Staphylococcus aureus     Comment:   Infectious disease consultation is highly recommended to rule out distant foci of infection.        Isolated from Aerobic and Anaerobic Bottles     Gram Stain Aerobic Bottle Gram positive cocci in clusters      Anaerobic Bottle Gram positive cocci in clusters    Narrative:      Refer to previous blood culture collected on 12/26/2022 0435 for MICs    Less than seven (7) mL's of blood was collected.  Insufficient quantity may yield false negative results.    MRSA Screen, PCR (Inpatient) - Swab, Nares [[id number]]  (Normal) Collected: 12/26/22 0307    Lab Status: Final result Specimen: Swab from Nares Updated: 12/26/22 0507     MRSA PCR No MRSA Detected    Narrative:      The negative predictive value of this diagnostic test is high and should only be used to consider de-escalating anti-MRSA therapy. A positive result may indicate colonization with MRSA and must be correlated clinically.          Medication Review:       Schedule Meds  cefTRIAXone, 2 g, Intravenous, Q12H  ferrous sulfate, 300 mg, Nasogastric, Daily  lansoprazole, 30 mg, Nasogastric, Q AM  midodrine, 10 mg, Nasogastric, Q8H  sodium bicarbonate, 650 mg, Nasogastric, BID  sodium chloride, 10 mL, Intravenous, Q12H        Infusion Meds  argatroban infusion, 0.5-10 mcg/kg/min, Last Rate: Stopped (12/30/22 1355)  dextrose 5 % and sodium chloride 0.45 %, 50 mL/hr, Last Rate: 50 mL/hr (12/31/22 1150)        PRN Meds  •  acetaminophen **OR** acetaminophen  •  aluminum-magnesium hydroxide-simethicone  •  dextrose  •  dextrose  •  fentanyl  •  glucagon (human recombinant)  •  HYDROcodone-acetaminophen  •  Morphine  •  ondansetron **OR** ondansetron  •  sodium chloride  •  sodium chloride  •  sodium chloride        Assessment & Plan       Antimicrobial Therapy   1.  IV  ceftriaxone        2.        3.        4.        5.            Assessment    Bilateral shoulder septic arthritis.  Synovial fluid culture was consistent with infection and cultures are growing 4+ Staph aureus    Methicillin susceptible Staph aureus bacteremia secondary to above.  Blood cultures were positive x2 sets on admission on December 26, 2022.  Repeat blood culture from December 27, 2022 turned positive  Repeat blood culture from December 29, 2022 is positive  ALMA DELIA was done on December 28, 2022 and showed no vegetation  -Repeat blood culture from 12/30/2022 is now positive  -PICC line was removed on 12/31/2022- cultures pending  -Blood culture from 12/31/2022 is negative so far    Mild septic shock.  Currently off vasopressors    Respiratory failure on the ventilator after surgery  -Currently extubated and on 4 L of oxygen by nasal cannula    DVT of the left arm secondary to PICC line.  Hematology service was consulted and patient was started on anticoagulation    Significantly elevated liver transaminase and bilirubin--likely related to infection  -Hepatitis panel was negative  -Slowly improving      Plan    Continue IV ceftriaxone 2 g every 12 hours for 6 weeks  Would like to remove A line as soon as possible  Continue supportive care  Dennis Smith, APRN  01/01/23  13:54 EST    Note is dictated utilizing voice recognition software/Dragon

## 2023-01-01 NOTE — PROGRESS NOTES
[facility] Medicine Services Daily Progress Note    Patient Name: Jaime Bar  : 1955  MRN: [id number]  Primary Care Physician:  Provider, No Known  Date of admission: 2022      Subjective      Chief Complaint: AMS      Patient seen and examined this morning.  Remains lethargic but slightly improved compared to yesterday.  No acute events overnight per nursing.  Left upper extremity PICC line has been removed.  No schistocytes seen on peripheral smear per hematology, TTP ruled out at this time.    Unable to obtain full review of system due to patient's underlying mental status.      Objective      Vitals:   Temp:  [98.7 °F (37.1 °C)-101 °F (38.3 °C)] 101 °F (38.3 °C)  Heart Rate:  [] 91  Arterial Line BP: ()/() 117/64  Flow (L/min):  [4] 4    Physical Exam:    General: Lethargic but arousable, confused, ill-appearing, lying in bed  Eyes: PERRL, EOMI, conjunctivae are clear  Cardiovascular: Tachycardic, irregularly irregular rhythm, no murmurs  Respiratory: Decreased breath sounds bilaterally, no wheezing or rales, unlabored breathing  Abdomen: Soft, nontender, positive bowel sounds, no guarding  Neurologic: Lethargic, does not follow commands, limited  Skin: Warm, bilateral shoulder incisions bandaged, drains in place.  Bilateral upper extremity pitting edema noted L>R         Result Review    Result Review:  I have personally reviewed the results from the time of this admission to 2023 12:23 EST and agree with these findings:  [x]  Laboratory  [x]  Microbiology  [x]  Radiology  [x]  EKG/Telemetry   []  Cardiology/Vascular   []  Pathology  []  Old records  []  Other:    Wounds (last 24 hours)     LDA Wound     Row Name 23 0820 23 0400 23 0000       Wound 22 Right shoulder Incision    Wound - Properties Group Placement Date: 22  -HB Placement Time:   -HB Side: Right  -HB Location: shoulder  -HB Primary Wound Type:  Incision  -HB    Dressing Appearance dry;intact  -BR -- --    Closure Adhesive bandage  -BR Adhesive bandage  -MH Adhesive bandage  -MH    Base dressing in place, unable to visualize  -BR -- --    Retired Wound - Properties Group Placement Date: 12/28/22  -HB Placement Time: 1856  -HB Side: Right  -HB Location: shoulder  -HB Primary Wound Type: Incision  -HB    Retired Wound - Properties Group Date first assessed: 12/28/22  -HB Time first assessed: 1856  -HB Side: Right  -HB Location: shoulder  -HB Primary Wound Type: Incision  -HB       Wound 12/28/22 1940 Left shoulder Incision    Wound - Properties Group Placement Date: 12/28/22  -HB Placement Time: 1940 -HB Side: Left  -HB Location: shoulder  -HB Primary Wound Type: Incision  -HB    Dressing Appearance dry;intact  -BR -- --    Closure Adhesive bandage  -BR Adhesive bandage  -MH Adhesive bandage  -MH    Base dressing in place, unable to visualize  -BR -- --    Retired Wound - Properties Group Placement Date: 12/28/22  -HB Placement Time: 1940 -HB Side: Left  -HB Location: shoulder  -HB Primary Wound Type: Incision  -HB    Retired Wound - Properties Group Date first assessed: 12/28/22 -HB Time first assessed: 1940  -HB Side: Left  -HB Location: shoulder  -HB Primary Wound Type: Incision  -HB       Wound 12/30/22 2000 sacral spine Pressure Injury    Wound - Properties Group Placement Date: 12/30/22 -MB Placement Time: 2000 -MB Location: sacral spine  -MB Primary Wound Type: Pressure inj  -MB    Dressing Appearance dry;intact  -BR -- --    Closure Adhesive bandage  -BR Adhesive bandage  -MH Adhesive bandage  -MH    Base non-blanchable;purple  -BR non-blanchable;purple  -MH non-blanchable;purple  -MH    Retired Wound - Properties Group Placement Date: 12/30/22 -MB Placement Time: 2000 -MB Location: sacral spine  -MB Primary Wound Type: Pressure inj  -MB    Retired Wound - Properties Group Date first assessed: 12/30/22 -MB Time first assessed: 2000 -MB  Location: sacral spine  -MB Primary Wound Type: Pressure inj  -MB    Row Name 12/31/22 2000 12/31/22 1540          Wound 12/28/22 1856 Right shoulder Incision    Wound - Properties Group Placement Date: 12/28/22  -HB Placement Time: 1856 -HB Side: Right  -HB Location: shoulder  -HB Primary Wound Type: Incision  -HB    Dressing Appearance intact;dry  -MH --     Closure Adhesive bandage  -MH Adhesive bandage  -SR     Retired Wound - Properties Group Placement Date: 12/28/22  -HB Placement Time: 1856  -HB Side: Right  -HB Location: shoulder  -HB Primary Wound Type: Incision  -HB    Retired Wound - Properties Group Date first assessed: 12/28/22  -HB Time first assessed: 1856  -HB Side: Right  -HB Location: shoulder  -HB Primary Wound Type: Incision  -HB       Wound 12/28/22 1940 Left shoulder Incision    Wound - Properties Group Placement Date: 12/28/22  -HB Placement Time: 1940  -HB Side: Left  -HB Location: shoulder  -HB Primary Wound Type: Incision  -HB    Dressing Appearance intact  -MH --     Closure Adhesive bandage  -MH Adhesive bandage  -SR     Retired Wound - Properties Group Placement Date: 12/28/22  -HB Placement Time: 1940  -HB Side: Left  -HB Location: shoulder  -HB Primary Wound Type: Incision  -HB    Retired Wound - Properties Group Date first assessed: 12/28/22  -HB Time first assessed: 1940  -HB Side: Left  -HB Location: shoulder  -HB Primary Wound Type: Incision  -HB       Wound 12/30/22 2000 sacral spine Pressure Injury    Wound - Properties Group Placement Date: 12/30/22  -MB Placement Time: 2000 -MB Location: sacral spine  -MB Primary Wound Type: Pressure inj  -MB    Closure Adhesive bandage  -MH Adhesive bandage  -SR     Base non-blanchable;purple  -MH non-blanchable;purple  -SR     Retired Wound - Properties Group Placement Date: 12/30/22  -MB Placement Time: 2000 -MB Location: sacral spine  -MB Primary Wound Type: Pressure inj  -MB    Retired Wound - Properties Group Date first assessed:  12/30/22  -MB Time first assessed: 2000 -MB Location: sacral spine  -MB Primary Wound Type: Pressure inj  -MB          User Key  (r) = Recorded By, (t) = Taken By, (c) = Cosigned By    Initials Name Provider Type    Martha Monson, RN Registered Nurse    Carolyn Jay RN Registered Nurse    Sofi Medrano RN Registered Nurse    Funmi Patel RN Registered Nurse    Marimar Renteria RN Registered Nurse                  Assessment & Plan      Brief Patient Summary:  Jaime Bar is a 67 y.o. male with past medical history of cervicalgia, primary osteoarthritis involving multiple joints with surgery on bilateral shoulders and right knee replacement but no recent procedure or surgery, hyponatremia, prostate cancer, and cardiac amyloidosis presented to [facility] on 12/25/2022 with complaints of right shoulder pain, weakness, and altered mental status.  He was found to be hypotensive and hyponatremic with elevated lactic acid.  Patient had reported decreased oral intake.  He was transferred to [facility] ICU for further treatment of septic shock requiring vasopressors and hyponatremia.      Since admission he was found to be bacteremic with 2 sets of blood cultures positive for staph aureus, source being bilateral shoulder septic arthritis. ID has been treating the patient with IV ceftriaxone 2G and he will need a total of 6 weeks of antibiotic therapy.  He has status post bilateral shoulder arthroscopy with extensive debridement by Dr. Velez 12/28/2022. He remained intubated after surgery until 12/29 and now extubated. He has been weaned off vasopressors. Nephrology is managing his hyponatremia and STEVEN on CKD. He was found to have LUE DVT provoked from PICC line currently on argatroban. Hematology has been consulted for his thrombocytopenia.       cefTRIAXone, 2 g, Intravenous, Q12H  ferrous sulfate, 300 mg, Nasogastric, Daily  lansoprazole, 30 mg, Nasogastric,  Q AM  midodrine, 10 mg, Nasogastric, Q8H  sodium bicarbonate, 650 mg, Nasogastric, BID  sodium chloride, 10 mL, Intravenous, Q12H       argatroban infusion, 0.5-10 mcg/kg/min, Last Rate: Stopped (12/30/22 1355)  dextrose 5 % and sodium chloride 0.45 %, 50 mL/hr, Last Rate: 50 mL/hr (12/31/22 1150)  phenylephrine, 0.5-3 mcg/kg/min, Last Rate: Stopped (12/29/22 1805)         Active Hospital Problems:  Active Hospital Problems    Diagnosis    • Bacteremia due to methicillin susceptible Staphylococcus aureus (MSSA)    • Staphylococcal arthritis of shoulder (HCC)    • Cervicalgia, spine surgery 2017    • Primary osteoarthritis involving multiple joints    • Hyponatremia    • Acute kidney injury superimposed on chronic kidney disease (HCC)    • Cardiac amyloidosis (HCC)    • ACC/AHA stage C chronic systolic heart failure (HCC)    • Tobacco abuse    • Primary hypertension      Plan:    Bilateral shoulder septic arthritis  Septic shock secondary to MSSA bacteremia  -Synovial fluid culture was consistent with infection and cultures are growing 4+ Staph aureus  -s/p bilateral shoulder arthroscopy with extensive debridement by Dr. Velez 12/28/2022  - 2/2 blood cultures positive for MSSA secondary to bilateral shoulder septic arthritis   -ALMA DELIA on 12/28, Negative for endocarditis  -On IV Rocephin 2 g daily x6 weeks per ID  -Off IV vasopressors, remains on midodrine  -Art line in place, MAP greater than 65 mmHg at this time  -LUE PICC line removed and tip also sent for culture     Acute metabolic encephalopathy  Severe thrombocytopenia  LUE DVT, provoked from PICC line  -Patient's mental status worsening  -Concern for TTP however no schistocytes seen on peripheral smear per hematology  -Platelets remain low  -Off argatroban drip, PTT elevated  -Anticoagulation per hematology recommendations    STEVEN on CKD  Hyponatremia  -Sodium has improved, creatinine also improving  -Fluid management per nephrology     A. fib with RVR  -Off amnio  drip  -Not an anticoagulation candidate at this time due to thrombocytopenia  -Cardiology following    Elevated LFTs  -Likely due to shock liver  -Hep panel negative  -Liver ultrasound showed normal hepatic parenchyma, cholelithiasis with adenomyomatosis involving gallbladder wall, CBD upper limit of normal  -Monitor    Acute respiratory failure with hypoxia  -Postop respiratory failure, extubated on 12/39  -Supportive care, pulmonary toilet     H/o cardiac amyloidosis  H/o prostate CA  Osteoarthritis     GI Ppx.    Guarded prognosis.  DNR/DNI.    CODE STATUS:    Medical Intervention Limits: NO intubation (DNI)  Level Of Support Discussed With: Next of Kin (If No Surrogate)  Code Status (Patient has no pulse and is not breathing): No CPR (Do Not Attempt to Resuscitate)  Medical Interventions (Patient has pulse or is breathing): Limited Support  Release to patient: Routine Release      Disposition:      Electronically signed by James Holguin DO, 01/01/23, 12:23 EST.  [facility] Hospitalist Team      Part of this note may be an electronic transcription/translation of spoken language to printed text using the Dragon Dictation System.

## 2023-01-01 NOTE — PROGRESS NOTES
"                                                                                                                                      Nephrology  Progress Note                                        [facility]    Patient Identification    Name: Jaime Bar  Age: 67 y.o.  Sex: male  :  1955  MRN: [id number]      DATE OF SERVICE:  2023        Subective    Events noted  Remains off the vent      Objective   Scheduled Meds:cefTRIAXone, 2 g, Intravenous, Q12H  ferrous sulfate, 300 mg, Nasogastric, Daily  lansoprazole, 30 mg, Nasogastric, Q AM  midodrine, 10 mg, Nasogastric, Q8H  sodium bicarbonate, 650 mg, Nasogastric, BID  sodium chloride, 10 mL, Intravenous, Q12H          Continuous Infusions:argatroban infusion, 0.5-10 mcg/kg/min, Last Rate: Stopped (22 1355)  dextrose 5 % and sodium chloride 0.45 %, 50 mL/hr, Last Rate: 50 mL/hr (22 1150)  phenylephrine, 0.5-3 mcg/kg/min, Last Rate: Stopped (22 1805)        PRN Meds:•  acetaminophen **OR** acetaminophen  •  aluminum-magnesium hydroxide-simethicone  •  dextrose  •  dextrose  •  fentanyl  •  glucagon (human recombinant)  •  HYDROcodone-acetaminophen  •  Morphine  •  ondansetron **OR** ondansetron  •  sodium chloride  •  sodium chloride  •  sodium chloride     Exam:  /81   Pulse 93   Temp 99 °F (37.2 °C) (Axillary)   Resp 25   Ht 167.6 cm (66\")   Wt 90.1 kg (198 lb 10.2 oz)   SpO2 95%   BMI 32.06 kg/m²     Intake/Output last 3 shifts:  I/O last 3 completed shifts:  In: 3217 [I.V.:2625; Other:151; NG/GT:441]  Out: 3169.5 [Urine:3050; Drains:119.5]    Intake/Output this shift:  I/O this shift:  In: 1363 [I.V.:839; Other:20; NG/GT:504]  Out: 1610 [Urine:1525; Drains:85]    Physical exam:  General Appearance: Confused  Head:  Normocephalic, without obvious abnormality, atraumatic  Eyes:  PERRL, conjunctiva/corneas clear     Neck:  Supple,  no adenopathy;      Lungs:  Decreased BS occasion ronchi  Heart:  Regular " rate and rhythm, S1 and S2 normal  Abdomen:  Soft, non-tender, bowel sounds active   Extremities: trace edema  Pulses: 2+ and symmetric all extremities  Skin:  No rashes or lesions       Data Review:  All labs (24hrs):   Recent Results (from the past 24 hour(s))   aPTT    Collection Time: 12/31/22  8:32 AM    Specimen: Cannula; Blood   Result Value Ref Range    PTT 94.8 (C) 24.0 - 31.0 seconds   Blood Gas, Arterial -    Collection Time: 12/31/22  8:43 AM    Specimen: Arterial Blood   Result Value Ref Range    Site Arterial Line     Patrick's Test N/A     pH, Arterial 7.388 7.350 - 7.450 pH units    pCO2, Arterial 37.1 35.0 - 48.0 mm Hg    pO2, Arterial 84.5 83.0 - 108.0 mm Hg    HCO3, Arterial 22.4 21.0 - 28.0 mmol/L    Base Excess, Arterial -2.3 (L) 0.0 - 3.0 mmol/L    O2 Saturation, Arterial 96.3 94.0 - 98.0 %    CO2 Content 23.5 22 - 29 mmol/L    Barometric Pressure for Blood Gas      Modality Cannula     FIO2 28 %    Hemodilution No    POC Glucose Once    Collection Time: 12/31/22 11:20 AM    Specimen: Blood   Result Value Ref Range    Glucose 116 (H) 70 - 105 mg/dL   POC Glucose Once    Collection Time: 12/31/22  5:27 PM    Specimen: Blood   Result Value Ref Range    Glucose 126 (H) 70 - 105 mg/dL   aPTT    Collection Time: 12/31/22  5:29 PM    Specimen: Cannula; Blood   Result Value Ref Range    PTT 74.1 (C) 24.0 - 31.0 seconds   POC Glucose Once    Collection Time: 12/31/22 11:22 PM    Specimen: Blood   Result Value Ref Range    Glucose 115 (H) 70 - 105 mg/dL   POC Glucose Once    Collection Time: 01/01/23  6:13 AM    Specimen: Blood   Result Value Ref Range    Glucose 126 (H) 70 - 105 mg/dL   Calcium, Ionized    Collection Time: 01/01/23  6:14 AM    Specimen: Blood   Result Value Ref Range    Ionized Calcium 1.17 (L) 1.20 - 1.30 mmol/L   Comprehensive Metabolic Panel    Collection Time: 01/01/23  6:14 AM    Specimen: Blood   Result Value Ref Range    Glucose 135 (H) 65 - 99 mg/dL    BUN 52 (H) 8 - 23 mg/dL     Creatinine 0.82 0.76 - 1.27 mg/dL    Sodium 146 (H) 136 - 145 mmol/L    Potassium 4.0 3.5 - 5.2 mmol/L    Chloride 113 (H) 98 - 107 mmol/L    CO2 25.0 22.0 - 29.0 mmol/L    Calcium 8.0 (L) 8.6 - 10.5 mg/dL    Total Protein 4.8 (L) 6.0 - 8.5 g/dL    Albumin 2.2 (L) 3.5 - 5.2 g/dL    ALT (SGPT) 306 (H) 1 - 41 U/L    AST (SGOT) 308 (H) 1 - 40 U/L    Alkaline Phosphatase 107 39 - 117 U/L    Total Bilirubin 4.0 (H) 0.0 - 1.2 mg/dL    Globulin 2.6 gm/dL    A/G Ratio 0.8 g/dL    BUN/Creatinine Ratio 63.4 (H) 7.0 - 25.0    Anion Gap 8.0 5.0 - 15.0 mmol/L    eGFR 96.3 >60.0 mL/min/1.73   Magnesium    Collection Time: 01/01/23  6:14 AM    Specimen: Blood   Result Value Ref Range    Magnesium 2.0 1.6 - 2.4 mg/dL   Phosphorus    Collection Time: 01/01/23  6:14 AM    Specimen: Blood   Result Value Ref Range    Phosphorus 3.0 2.5 - 4.5 mg/dL   aPTT    Collection Time: 01/01/23  6:14 AM    Specimen: Blood   Result Value Ref Range    PTT 61.1 (C) 24.0 - 31.0 seconds   D-dimer, Quantitative    Collection Time: 01/01/23  6:14 AM    Specimen: Blood   Result Value Ref Range    D-Dimer, Quantitative 9.84 (H) 0.00 - 0.67 mg/L (FEU)   CBC Auto Differential    Collection Time: 01/01/23  6:14 AM    Specimen: Blood   Result Value Ref Range    WBC 7.80 3.40 - 10.80 10*3/mm3    RBC 3.63 (L) 4.14 - 5.80 10*6/mm3    Hemoglobin 11.4 (L) 13.0 - 17.7 g/dL    Hematocrit 34.6 (L) 37.5 - 51.0 %    MCV 95.5 79.0 - 97.0 fL    MCH 31.3 26.6 - 33.0 pg    MCHC 32.8 31.5 - 35.7 g/dL    RDW 16.0 (H) 12.3 - 15.4 %    RDW-SD 53.8 37.0 - 54.0 fl    MPV 10.0 6.0 - 12.0 fL    Platelets 60 (L) 140 - 450 10*3/mm3    Neutrophil % 88.8 (H) 42.7 - 76.0 %    Lymphocyte % 2.9 (L) 19.6 - 45.3 %    Monocyte % 7.0 5.0 - 12.0 %    Eosinophil % 0.1 (L) 0.3 - 6.2 %    Basophil % 1.2 0.0 - 1.5 %    Neutrophils, Absolute 7.00 1.70 - 7.00 10*3/mm3    Lymphocytes, Absolute 0.20 (L) 0.70 - 3.10 10*3/mm3    Monocytes, Absolute 0.60 0.10 - 0.90 10*3/mm3    Eosinophils, Absolute 0.00  0.00 - 0.40 10*3/mm3    Basophils, Absolute 0.10 0.00 - 0.20 10*3/mm3    nRBC 0.0 0.0 - 0.2 /100 WBC          Imaging:  XR Shoulder 2+ View Right    Result Date: 12/27/2022   1. Severe degenerative changes of the glenohumeral joint as well as a high riding humeral head, suggestive of chronic rotator cuff pathology. No definite evidence of osteomyelitis is identified. However plain films cannot exclude septic arthritis.  Electronically Signed By-Juan Francisco Lozoya MD On:12/27/2022 1:42 PM This report was finalized on [id number] by  Juan Francisco Lozoya MD.    CT Head Without Contrast    Result Date: 12/31/2022  1. No acute intracranial abnormality. Specifically, no evidence of acute hemorrhage, mass effect or midline shift. 2. Mild global cerebral volume loss. 3. Mild bilateral air-fluid levels within the maxillary sinuses which can be seen with acute sinus disease in the correct clinical setting.  Electronically Signed By-Willy Baxter MD On:12/31/2022 9:01 PM This report was finalized on [id number] by  Willy Baxter MD.    US Liver    Result Date: 12/30/2022  1. Normal hepatic parenchyma. 2. Cholelithiasis with adenomyomatosis involving gallbladder wall. 3. Common bile duct at the upper limits of normal measuring 6 to 7 mm.  Electronically Signed By-Genaro Canada MD On:12/30/2022 3:40 PM This report was finalized on [id number] by  Genaro Canada MD.    XR Chest 1 View    Result Date: 12/30/2022  Cardiomegaly and increased pulmonary vascular congestion and suspected interstitial edema  Lines and tubes as above[  Electronically Signed By-Jaxon Aly On:12/30/2022 10:19 AM This report was finalized on [id number] by  Jaxon Aly, .    XR Chest 1 View    Result Date: 12/29/2022  The endotracheal tube is approximately 6 mm above the sarah. Recommend repositioning. Left subclavian central venous catheter has its catheter tip overlying the superior vena cava. The cardiac silhouette is moderately to  significantly enlarged and the pulmonary vessels are within normal limits. There is no focal area of consolidation otherwise seen. Electronically signed by:  Los Mccoy D.O.  12/28/2022 11:13 PM Mountain Time    XR Chest 1 View    Result Date: 12/27/2022   1. Persistent diffuse interstitial opacities. Differential includes interstitial pulmonary edema, or atypical infection.  Electronically Signed By-Juan Francisco Lozoya MD On:12/27/2022 8:46 AM This report was finalized on [id number] by  Juan Francisco Lozoya MD.    XR Chest 1 View    Result Date: 12/26/2022    1.  Interval placement of left-sided PICC line with the tip projecting over the superior vena cava. 2.  No change in coarsened reticular interstitial markings throughout both lungs.  No new airspace consolidation or pleural effusion.  Electronically Signed By-Los Diop MD On:12/26/2022 4:56 PM This report was finalized on [id number] by  Los Diop MD.    XR Chest 1 View    Result Date: 12/26/2022  1.  Coarsened airspace, bronchovascular thickening.  Consider small airways disease (bronchitis, asthma), edema, atypical/viral infection, aspiration. 2.  Heart size is normal. 3.  No acute bony findings. Electronically signed by:  Bernadette Huddleston M.D.  12/26/2022 4:56 AM Mountain Time    MRI Shoulder Right Without Contrast    Result Date: 12/28/2022  Impression: 1. Moderate-sized joint effusion with advanced degenerative changes and destructive changes of the glenohumeral joint. Findings may be related to septic arthritis given clinical history. Fluid aspiration is recommended for confirmation. Joint effusion with subcoracoid bursitis may also be reactive and related to severe osteoarthritis. 2. Suspected complete tears of the supraspinatus and infraspinatus tendons. 3. Completely macerated appearance of the labrum. 4. Moderate acromial clavicular osteoarthritis. Electronically Signed: Linnette Pacheco  12/28/2022 10:08 AM EST  Workstation ID: [id number]    MRI  Shoulder Left Without Contrast    Result Date: 12/28/2022  Impression: Very large left-sided joint effusion with destructive changes of the glenohumeral joint with deformity of the glenoid likely related to septic arthritis. Fluid sampling is recommended for further characterization. Ancillary findings as described above. Electronically Signed: Linnette Pacheco  12/28/2022 10:06 AM EST  Workstation ID: [id number]    XR Abdomen KUB    Result Date: 12/30/2022  NG tube projects over the expected position of the body of the stomach  Electronically Signed By-Jaxon Aly On:12/30/2022 12:00 PM This report was finalized on [id number] by  Jaxon Aly, .      Assessment/Plan:     Hyponatremia    ACC/AHA stage C chronic systolic heart failure (HCC)    Cardiac amyloidosis (HCC)    Primary hypertension    Tobacco abuse    Cervicalgia, spine surgery 2017    Primary osteoarthritis involving multiple joints    Bacteremia due to methicillin susceptible Staphylococcus aureus (MSSA)    Staphylococcal arthritis of shoulder (HCC)    Acute kidney injury superimposed on chronic kidney disease (HCC)   hyponatremia improving   Acute kidney injury on chronic kidney disease improving  Atrial fibrillation with rapid ventricular response  Metabolic acidosis  Urinary retention   Sepsis   Hypocalcemia   Hyperkalemia     Creatinine down to 0.8 from 1.1 from 1.7  Sodium up to 146  Add free water flushes    Continue gentle hydration  Reduce the rate and may stop it if the tube feed advances  Watch electrolytes  Recheck labs am

## 2023-01-01 NOTE — PROGRESS NOTES
Nutrition Services    Patient Name: Jaime Bar  YOB: 1955  MRN: [id number]  Admission date: 12/26/2022    PPE Documentation        PPE Worn By Provider Did not enter room for this encounter   PPE Worn By Patient  N/A     PROGRESS NOTE      Encounter Information: Check on for tube feeding.         PO Diet: NPO Diet NPO Type: Strict NPO   PO Supplements: None ordered    PO Intake:  NPO       Current nutrition support: Nutren 1.5 at 40 mL/hr + 10mL/ hr water flush   Nutrition support review: Documented as above per EMR        Labs (reviewed below): Hypernatremia- will increase water flush         GI Function:  Last BM 12/31   Residuals WNL       Nutrition Intervention Updates: Advance tube feeding to goal of 65 mL/hour     Increase water flush to 25 mL/hour        Results from last 7 days   Lab Units 01/01/23  0614 12/31/22  0520 12/30/22  0540   SODIUM mmol/L 146* 140 135*   POTASSIUM mmol/L 4.0 3.8 3.8   CHLORIDE mmol/L 113* 107 103   CO2 mmol/L 25.0 22.0 21.0*   BUN mg/dL 52* 68* 77*   CREATININE mg/dL 0.82 1.12 1.50*   CALCIUM mg/dL 8.0* 8.0* 8.1*   BILIRUBIN mg/dL 4.0* 4.4* 5.2*   ALK PHOS U/L 107 157* 105   ALT (SGPT) U/L 306* 347* 236*   AST (SGOT) U/L 308* 520* 431*   GLUCOSE mg/dL 135* 144* 122*     Results from last 7 days   Lab Units 01/01/23  0614 12/31/22  0520 12/30/22  0540 12/27/22  0557 12/26/22  0435   MAGNESIUM mg/dL 2.0 2.2 2.3   < > 1.8   PHOSPHORUS mg/dL 3.0 2.9 4.0   < > 3.7   HEMOGLOBIN g/dL 11.4* 11.3* 11.1*   < > 12.4*   HEMATOCRIT % 34.6* 32.6* 32.3*   < > 38.0   TRIGLYCERIDES mg/dL  --   --   --   --  114    < > = values in this interval not displayed.     Lab Results   Component Value Date    HGBA1C 4.2 12/26/2022       RD to follow up per protocol.    Electronically signed by:  Barbara Melendez RD  01/01/23 08:22 EST

## 2023-01-01 NOTE — PROGRESS NOTES
Hematology/Oncology Inpatient Progress Note    PATIENT NAME: Jaime Bar  : 1955  MRN: [id number]    CHIEF COMPLAINT: Sepsis; Thrombocytopenia; provoked catheter associated LUE DVT, LUE SVT, and RUE SVT    HISTORY OF PRESENT ILLNESS:    67 y.o. male admitted to [facility] on transfer from [facility] on 2022.  The patient was intubated and unresponsive at time of consultation with histories obtained from review of records and discussion with patient's niece and nephew.  He reported a history of bilateral shoulder pain for few days prior to admission.  He had presented to [place] ED on 2022 where he was hypotensive and hyponatremic and transferred to [facility].  CXR on 2022 showed coarsened airspace and bronchovascular thickening with small airway disease such as bronchitis or asthma, edema, atypical/viral infection, and aspiration in differential.  A RUE venous Doppler showed acute RUE superficial thrombophlebitis in the cephalic vein and no evidence of DVT for which he was started on subcu heparin.  On 2022 CBC revealed WBC 7.2, hemoglobin 12.4, MCV 98.4, and platelets 99,000.  Creatinine was elevated to 2.38, BUN 55, sodium was low at 118, potassium was high at 5.4 and LFTs revealed T bili elevated to 4.8 (0-1.2) with transaminases normal.  Blood cultures grew Staph aureus.  Urinalysis was concerning for UTI.  Urine sodium was less than 20, a.m. cortisol 26.11 (6.02-18.4), and urine osmolality was 410 ().   X-rays and MRIs of the bilateral shoulders were performed 2022 revealing joint effusion and advanced degenerative changes/destructive changes of glenohumeral joint.  There was suspected complete tear of the supraspinatus and infraspinatus tendons and a completely macerated appearance of the labrum on the right shoulder.  On 2022 RUE venous Doppler was repeated and felt stable.  He then underwent bilateral shoulder arthroscopy incision and drainage  with culture growing heavy growth of Staph aureus.  He had been intubated prior to the surgery and remained sedated on ventilator with pressors postop.  At time of consultation 12/29/2022 LUE venous Doppler showed acute catheter associated DVT in the left axillary vein, SVT in the left basilic vein of the upper arm.  BLE venous Doppler was negative for DVT/SVT but did show deep venous valvular incompetence in the right popliteal vein.  CBC revealed WBC 14.2, hemoglobin 11.4, MCV 96.5, and platelets 46,000.  T bili was elevated to 5.3 and transaminases were now elevated with  and .  PT was 15.6 (9.6-11.7), PTT was 38.7 (24-31), and fibrinogen was 493 (210-450).  D-dimer was 11.83 (0-0.67).  Transesophageal echocardiogram showed LVEF 46-50%, mild aortic valve stenosis, and no evidence of vegetation or bacterial endocarditis.  Heparin was changed to argatroban.  ID was managing antibiotics with patient on ceftriaxone with plan for treatment x6 weeks and recommending replacing PICC line 2 days after starting anticoagulation.  His niece and nephew reported the patient to have lost some weight but they were unsure the amount.  They reported PMH significant for prostate cancer treated approximately 2010 with radiation alone and no evidence of recurrence to their knowledge.  He also was under the care of Dr. Damico at  for cardiac amyloid.  Chart review showed PSA was 0.1 (0-4) in August 2022.  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  IgG was 768 (603-1613), IgA was 238 (), IgM was 64 ().  Kappa/lambda ratio was 1.36 (0.2-1.65).  A note from Roosevelt Damico MD (cardiologist) at [facility] dated 7/13/2022 reported patient was followed for restrictive cardiomyopathy secondary to cardiac amyloidosis.  LVEF in February 2022 was 45-50%.  Cardiac MRI 4/6/2022 revealed a myocardial mass 276 g, global hypokinesis of left ventricle with ejection fraction 43%, no  myocardial iron overload, the thickness and appearance of intra-atrial septum was also consistent with cardiac amyloidosis.  The note stated that the patient was followed by Dr. Banks at  [facility] where he was on Vyndamax therapy for cardiac amyloid and had been on that treatment for about 2 weeks.  Additionally in January 2022 T bili was noted to be elevated to 2.6 (0.2-1.0) and he was anemic with hemoglobin 11.8 and MCV 86.5 platelets were normal at 261,000.     12/29/22  Hematology/Oncology was consulted by RITU Jimenez for thrombocytopenia.     Past Medical History: Prostate cancer approximately 2010 treated with radiation only.  Cardiac amyloid managed by Dr. Damico at .  CKD.  Surgical History: Several orthopedic surgeries in the past.  Bilateral shoulder arthroscopy with incision and drainage 12/28/2022.  Social History: He lives in [place], [place] with his spouse.  His spouse is known to have Alzheimer's disease.  He has smoked for many years.  He has no alcohol or recreational drug use however he was narcotic dependent secondary to chronic pain.  He is a retired .  Family History: No significant known family history.  Allergies: Tramadol causes him to feel weird and codeine causes nausea.     PCP: Provider, No Known    INTERVAL HISTORY:  • 12/30/2022- platelets 38,000, hemoglobin 11.1.  Folate 7.13 (4.78-24.2), vitamin B12 greater than 2000.  Iron 29 (), iron saturation 15% (20-50), TIBC 195 (298-536), and ferritin 3525 ().  Reticulocytes 1.05 (0.7-1.9), haptoglobin 148 (). PSA 0.234 (0-4).  T bili 5.3 with direct bili 4.2 (0-0.3).  Hepatitis panel nonreactive.  Extubated 12/29/2022 and off pressors.  • 12/31/2022- platelets 46,000, hemoglobin 11.3.  HIT antibody negative at 0.121 (0-0.4).  CMV IgM less than 30 (0-29.9) and EBV IgM less than 36 (0-35.9).  D-dimer mildly improved to 8.94 (0-0.67).  Transaminases rising with  and AST  "520.  T bili 4.4.  Peripheral smear was negative for schistocytes again.  Abdominal ultrasound revealed cholelithiasis with adenomyomatosis involving the gallbladder wall.  The common bile duct was in the upper limits of normal.  The liver appeared normal.  • 1/1/2023- platelets 60,000, hemoglobin 11.4.  Argatroban was held with repeat PTT remaining high with subsequent continued hold of argatroban.  D-dimer 9.84.  PTT 61.1 (24-31).  CT head without contrast was negative for acute findings.     History of present illness reviewed since last visit and changes noted on 01/01/23.    Subjective   Sedated/lethargic.  Bedside RN thinks possibly related to hydrocodone.    ROS:   Review of Systems   Unable to perform ROS: Mental status change (Limited ROS due to sedation.  Answers yes to shortness of air)        MEDICATIONS:    Scheduled Meds:  cefTRIAXone, 2 g, Intravenous, Q12H  ferrous sulfate, 300 mg, Nasogastric, Daily  lansoprazole, 30 mg, Nasogastric, Q AM  midodrine, 10 mg, Nasogastric, Q8H  sodium bicarbonate, 650 mg, Nasogastric, BID  sodium chloride, 10 mL, Intravenous, Q12H       Continuous Infusions:  argatroban infusion, 0.5-10 mcg/kg/min, Last Rate: Stopped (12/30/22 1355)  dextrose 5 % and sodium chloride 0.45 %, 50 mL/hr, Last Rate: 50 mL/hr (12/31/22 1150)       PRN Meds:  •  acetaminophen **OR** acetaminophen  •  aluminum-magnesium hydroxide-simethicone  •  dextrose  •  dextrose  •  fentanyl  •  glucagon (human recombinant)  •  HYDROcodone-acetaminophen  •  Morphine  •  ondansetron **OR** ondansetron  •  sodium chloride  •  sodium chloride  •  sodium chloride     ALLERGIES:    Allergies   Allergen Reactions   • Tramadol-Acetaminophen Anxiety     Worms in the brain feeling   • Codeine Other (See Comments)     nausea   • Tramadol Other (See Comments)     \"I just felt really crawly, it did weird things with my head\"       Objective    VITALS:   /81   Pulse 91   Temp (!) 101 °F (38.3 °C) (Axillary) " "Comment: RN notified of temp  Resp 25   Ht 167.6 cm (66\")   Wt 90.1 kg (198 lb 10.2 oz)   SpO2 97%   BMI 32.06 kg/m²     PHYSICAL EXAM:  Physical Exam  Vitals and nursing note reviewed.   Constitutional:       General: He is not in acute distress.     Appearance: Normal appearance. He is well-developed. He is ill-appearing. He is not diaphoretic.   HENT:      Head: Normocephalic and atraumatic.      Comments: Male pattern baldness     Right Ear: External ear normal.      Left Ear: External ear normal.      Nose: Nose normal.      Comments: NG tube.  O2 per NC.  O2 monitor left nostril.     Mouth/Throat:      Mouth: Mucous membranes are moist.      Pharynx: Oropharynx is clear. No oropharyngeal exudate or posterior oropharyngeal erythema.      Comments: Dental fillings  Eyes:      General: No scleral icterus.     Extraocular Movements: Extraocular movements intact.      Conjunctiva/sclera: Conjunctivae normal.      Pupils: Pupils are equal, round, and reactive to light.   Cardiovascular:      Rate and Rhythm: Normal rate and regular rhythm.      Heart sounds: Normal heart sounds. No murmur heard.     Comments: Cardiac monitor leads  Pulmonary:      Effort: Pulmonary effort is normal. No respiratory distress.      Breath sounds: Normal breath sounds. No wheezing or rales.   Abdominal:      General: Bowel sounds are normal. There is no distension.      Palpations: Abdomen is soft. There is no mass.      Tenderness: There is no abdominal tenderness. There is no guarding.   Genitourinary:     Comments: Deferred  Musculoskeletal:         General: Swelling (2+ BUE edema) present. No tenderness or deformity. Normal range of motion.      Cervical back: Normal range of motion and neck supple.      Comments: Bilateral shoulder dressings with drains. BLE SCDs.  Right inflation boot.  RUE A-line.  LUE IV.   Lymphadenopathy:      Cervical: No cervical adenopathy.      Upper Body:      Right upper body: No supraclavicular " adenopathy.      Left upper body: No supraclavicular adenopathy.   Skin:     General: Skin is warm and dry.      Coloration: Skin is not pale.      Findings: No bruising, erythema or rash.      Comments: Vertical scar right knee   Neurological:      Mental Status: He is alert.      Comments: Lethargic.   Psychiatric:      Comments: Unable to assess as patient poorly responsive.           RECENT LABS:  Lab Results (last 24 hours)     Procedure Component Value Units Date/Time    Blood Culture - Blood, Blood, PICC Line [[id number]]  (Normal) Collected: 12/31/22 1148    Specimen: Blood, PICC Line Updated: 01/01/23 1200     Blood Culture No growth at 24 hours    POC Glucose Once [[id number]]  (Abnormal) Collected: 01/01/23 1105    Specimen: Blood Updated: 01/01/23 1107     Glucose 130 mg/dL      Comment: Serial Number: [id number]:  [id number]       Blood Culture - Blood, Hand, Right [[id number]]  (Abnormal) Collected: 12/30/22 1251    Specimen: Blood from Hand, Right Updated: 01/01/23 1030     Blood Culture Staphylococcus aureus     Comment:   Infectious disease consultation is highly recommended to rule out distant foci of infection.        Isolated from Aerobic Bottle     Gram Stain Aerobic Bottle Gram positive cocci in clusters    Narrative:      Less than seven (7) mL's of blood was collected.  Insufficient quantity may yield false negative results.    Refer to previous blood culture collected on 12/26/2022 0435 for MICs.    Blood Culture - Blood, Hand, Left [[id number]]  (Abnormal) Collected: 12/29/22 1015    Specimen: Blood from Hand, Left Updated: 01/01/23 1030     Blood Culture Staphylococcus aureus     Comment:   Infectious disease consultation is highly recommended to rule out distant foci of infection.    Refer to previous blood culture collected on 12/26/2022 0435 for MICs.        Isolated from Aerobic Bottle     Blood Culture Staphylococcus, coagulase negative     Comment: Probable contaminant requires  "clinical correlation, susceptibility not performed unless requested by physician.          Isolated from --     Gram Stain Aerobic Bottle Gram positive cocci in clusters    Narrative:      Less than seven (7) mL's of blood was collected.  Insufficient quantity may yield false negative results.    D-dimer, Quantitative [[id number]]  (Abnormal) Collected: 01/01/23 0614    Specimen: Blood Updated: 01/01/23 0647     D-Dimer, Quantitative 9.84 mg/L (FEU)     Narrative:      According to the assay 's published package insert, a normal (<0.50 mg/L (FEU)) D-dimer result in conjunction with a non-high clinical probability assessment, excludes deep vein thrombosis (DVT) and pulmonary embolism (PE) with high sensitivity.    D-dimer values increase with age and this can make VTE exclusion of an older population difficult. To address this, the American College of Physicians, based on best available evidence and recent guidelines, recommends that clinicians use age-adjusted D-dimer thresholds in patients greater than 50 years of age with: a) a low probability of PE who do not meet all Pulmonary Embolism Rule Out Criteria, or b) in those with intermediate probability of PE.   The formula for an age-adjusted D-dimer cut-off is \"age/100\".  For example, a 60 year old patient would have an age-adjusted cut-off of 0.60 mg/L (FEU) and an 80 year old 0.80 mg/L (FEU).    aPTT [[id number]]  (Abnormal) Collected: 01/01/23 0614    Specimen: Blood Updated: 01/01/23 0647     PTT 61.1 seconds     Comprehensive Metabolic Panel [[id number]]  (Abnormal) Collected: 01/01/23 0614    Specimen: Blood Updated: 01/01/23 0646     Glucose 135 mg/dL      BUN 52 mg/dL      Creatinine 0.82 mg/dL      Sodium 146 mmol/L      Potassium 4.0 mmol/L      Chloride 113 mmol/L      CO2 25.0 mmol/L      Calcium 8.0 mg/dL      Total Protein 4.8 g/dL      Albumin 2.2 g/dL      ALT (SGPT) 306 U/L      AST (SGOT) 308 U/L      Alkaline Phosphatase 107 U/L      " Total Bilirubin 4.0 mg/dL      Globulin 2.6 gm/dL      A/G Ratio 0.8 g/dL      BUN/Creatinine Ratio 63.4     Anion Gap 8.0 mmol/L      eGFR 96.3 mL/min/1.73      Comment: National Kidney Foundation and American Society of Nephrology (ASN) Task Force recommended calculation based on the Chronic Kidney Disease Epidemiology Collaboration (CKD-EPI) equation refit without adjustment for race.       Narrative:      GFR Normal >60  Chronic Kidney Disease <60  Kidney Failure <15      Phosphorus [[id number]]  (Normal) Collected: 01/01/23 0614    Specimen: Blood Updated: 01/01/23 0646     Phosphorus 3.0 mg/dL     Magnesium [[id number]]  (Normal) Collected: 01/01/23 0614    Specimen: Blood Updated: 01/01/23 0646     Magnesium 2.0 mg/dL     Calcium, Ionized [[id number]]  (Abnormal) Collected: 01/01/23 0614    Specimen: Blood Updated: 01/01/23 0632     Ionized Calcium 1.17 mmol/L     CBC & Differential [[id number]]  (Abnormal) Collected: 01/01/23 0614    Specimen: Blood Updated: 01/01/23 0622    Narrative:      The following orders were created for panel order CBC & Differential.  Procedure                               Abnormality         Status                     ---------                               -----------         ------                     CBC Auto Differential[[id number]]        Abnormal            Final result                 Please view results for these tests on the individual orders.    CBC Auto Differential [[id number]]  (Abnormal) Collected: 01/01/23 0614    Specimen: Blood Updated: 01/01/23 0622     WBC 7.80 10*3/mm3      RBC 3.63 10*6/mm3      Hemoglobin 11.4 g/dL      Hematocrit 34.6 %      MCV 95.5 fL      MCH 31.3 pg      MCHC 32.8 g/dL      RDW 16.0 %      RDW-SD 53.8 fl      MPV 10.0 fL      Platelets 60 10*3/mm3      Neutrophil % 88.8 %      Lymphocyte % 2.9 %      Monocyte % 7.0 %      Eosinophil % 0.1 %      Basophil % 1.2 %      Neutrophils, Absolute 7.00 10*3/mm3      Lymphocytes, Absolute 0.20 10*3/mm3       Monocytes, Absolute 0.60 10*3/mm3      Eosinophils, Absolute 0.00 10*3/mm3      Basophils, Absolute 0.10 10*3/mm3      nRBC 0.0 /100 WBC     POC Glucose Once [[id number]]  (Abnormal) Collected: 01/01/23 0613    Specimen: Blood Updated: 01/01/23 0614     Glucose 126 mg/dL      Comment: Serial Number: [id number]:  [id number]       POC Glucose Once [[id number]]  (Abnormal) Collected: 12/31/22 2322    Specimen: Blood Updated: 12/31/22 2324     Glucose 115 mg/dL      Comment: Serial Number: [id number]:  [id number]       Catheter Culture - Cath Tip, Hand, Left [[id number]] Collected: 12/31/22 1839    Specimen: Cath Tip from Hand, Left Updated: 12/31/22 1845    aPTT [[id number]]  (Abnormal) Collected: 12/31/22 1729    Specimen: Blood from Cannula Updated: 12/31/22 1811     PTT 74.1 seconds     POC Glucose Once [[id number]]  (Abnormal) Collected: 12/31/22 1727    Specimen: Blood Updated: 12/31/22 1728     Glucose 126 mg/dL      Comment: Serial Number: [id number]:  [id number]             PENDING RESULTS: SHAR, copper, SPEP, SIFE, free light chains, immunoglobulins, UIFE.    IMAGING REVIEWED:  CT Head Without Contrast    Result Date: 12/31/2022  1. No acute intracranial abnormality. Specifically, no evidence of acute hemorrhage, mass effect or midline shift. 2. Mild global cerebral volume loss. 3. Mild bilateral air-fluid levels within the maxillary sinuses which can be seen with acute sinus disease in the correct clinical setting.  Electronically Signed By-Willy Baxter MD On:12/31/2022 9:01 PM This report was finalized on [id number] by  Willy Baxter MD.      I have reviewed the patient's labs, imaging, reports, and other clinician documentation.    Assessment & Plan   ASSESSMENT:  1. Thrombocytopenia-platelets were normal in early 2022.  Platelets were low on admission and continued to drop.  HIT antibody negative.    On ceftriaxone which can cause thrombocytopenia.  Coags mildly elevated with  fibrinogen not low, not likely DIC but sepsis likely contributing to thrombocytopenia. No hemolysis and no schistocytes on peripheral smear ruling out TTP.  No vitamin B12 or folate deficiencies. SHAR remains pending. Coags remain elevated and D-dimer high.  We will recheck fibrinogen.    Some improvement of platelets today, now over 50,000.  2. Acute on chronic anemia/IgA lambda monoclonal gammopathy/SWETHA- mild anemia with hemoglobin in the 11's in early 2022.  Gammopathy labs at that time showed IgA lambda monoclonal protein with normal immunoglobulins and free light chains.  Iron studies show SWETHA and started oral iron.  No hemolysis or vitamin B12/folate deficiencies. Copper and gammopathy labs remain pending.  Hemoglobin stable.  3. Acute provoked catheter associated LUE DVT/SVT and RUE SVT- RUE SVT was diagnosed prior to LUE DVT and he had received several doses of subcu heparin starting on 12/26. LUE DVT provoked by PICC line which remains in place.  Argatroban was difficult to manage with elevated PTT.  We will start low-dose Lovenox.  4. Elevated LFT/cholelithiasis and adenomyomatosis of the gallbladder wall- possible shock liver.  T bili remains elevated with elevated direct bili with normal liver on US.  Hepatitis panel negative.  Mild improvement today.  5. Bilateral shoulder septic arthritis/staph RS bacteremia-on ceftriaxone per ID.  No vegetation on echocardiogram.  6. Acute hypoxic respiratory failure/smoking history-  transiently on ventilator postsurgery.  7. History of prostate cancer-PSA remains normal.   8. Cardiac amyloidosis- on chart review it has been treated at .  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  Pending receipt of records.  9. STEVEN/hyponatremia-nephrology managing.  10. A. fib with RVR- patient evaluated by EP and recommended start Eliquis 5 mg p.o. twice daily (not started as patient currently on Lovenox) with plan  for cardioversion should patient remain in A. fib.    PLAN:  1. Check fibrinogen.  2. Start Lovenox 30 mg SQ twice daily.  3. Discontinue argatroban.  4. Consider removing left upper extremity PICC line.  5. Continue ferrous sulfate 325 mg by mouth daily (liquid given NGT).  6. Continue daily CBC.  7. Await remaining thrombocytopenia, anemia, and gammopathy labs.  8. Pending records from  regarding cardiac amyloid open (our office has requested)          Note prepared by KURT Alicea.  Patient seen and examined by Vicente Mendoza MD.  Electronically signed by RITU Taylor, 01/01/23, 5:07 PM EST.    I have personally performed a face-to-face diagnostic evaluation on this patient. I have performed a complete history and physical examination, reviewed laboratory studies, and radiographic examinations.  I have completed the majority and substantive portion of the medical decision making.  I have formulated the assessment on this patient and the plan of action as noted above. I have discussed the case with Darrin Nixon NP, have edited/reviewed the note, and agree with the care plan.  Patient is complaining of shortness of air but is overall lethargic and hard to obtain review of systems from.  On examination, continues with 2 PADMINI drains in each shoulder.  Labs are significant for platelet count of 60,000 today.  PTTs have been too high for continuation of argatroban.  We will put him on prophylactic doses of Lovenox while checking for DIC again.  Recommend removing left upper extremity PICC line.    I discussed the patient's findings and my recommendations with patient and nursing.    Part of this note may be an electronic transcription/translation of spoken language to printed text using the Dragon Dictation System.    Electronically signed by Vicente Mendoza MD, 01/01/23, 5:22 PM EST.

## 2023-01-02 LAB
ALBUMIN SERPL-MCNC: 2.4 G/DL (ref 3.5–5.2)
ALBUMIN/GLOB SERPL: 0.9 G/DL
ALP SERPL-CCNC: 102 U/L (ref 39–117)
ALT SERPL W P-5'-P-CCNC: 200 U/L (ref 1–41)
ANA SER QL IF: NEGATIVE
ANION GAP SERPL CALCULATED.3IONS-SCNC: 5 MMOL/L (ref 5–15)
AST SERPL-CCNC: 104 U/L (ref 1–40)
BACTERIA FLD CULT: ABNORMAL
BASOPHILS # BLD AUTO: 0.1 10*3/MM3 (ref 0–0.2)
BASOPHILS NFR BLD AUTO: 1.1 % (ref 0–1.5)
BILIRUB SERPL-MCNC: 3.3 MG/DL (ref 0–1.2)
BUN SERPL-MCNC: 41 MG/DL (ref 8–23)
BUN/CREAT SERPL: 59.4 (ref 7–25)
CA-I SERPL ISE-MCNC: 1.16 MMOL/L (ref 1.2–1.3)
CALCIUM SPEC-SCNC: 8.2 MG/DL (ref 8.6–10.5)
CHLORIDE SERPL-SCNC: 120 MMOL/L (ref 98–107)
CO2 SERPL-SCNC: 27 MMOL/L (ref 22–29)
CREAT SERPL-MCNC: 0.69 MG/DL (ref 0.76–1.27)
DEPRECATED RDW RBC AUTO: 56.4 FL (ref 37–54)
EGFRCR SERPLBLD CKD-EPI 2021: 101.4 ML/MIN/1.73
EOSINOPHIL # BLD AUTO: 0 10*3/MM3 (ref 0–0.4)
EOSINOPHIL NFR BLD AUTO: 0.2 % (ref 0.3–6.2)
ERYTHROCYTE [DISTWIDTH] IN BLOOD BY AUTOMATED COUNT: 16.5 % (ref 12.3–15.4)
GLOBULIN UR ELPH-MCNC: 2.6 GM/DL
GLUCOSE BLDC GLUCOMTR-MCNC: 117 MG/DL (ref 70–105)
GLUCOSE BLDC GLUCOMTR-MCNC: 128 MG/DL (ref 70–105)
GLUCOSE BLDC GLUCOMTR-MCNC: 131 MG/DL (ref 70–105)
GLUCOSE BLDC GLUCOMTR-MCNC: 137 MG/DL (ref 70–105)
GLUCOSE SERPL-MCNC: 144 MG/DL (ref 65–99)
GRAM STN SPEC: ABNORMAL
GRAM STN SPEC: ABNORMAL
HCT VFR BLD AUTO: 34.6 % (ref 37.5–51)
HGB BLD-MCNC: 11.3 G/DL (ref 13–17.7)
LABORATORY COMMENT REPORT: NORMAL
LYMPHOCYTES # BLD AUTO: 0.3 10*3/MM3 (ref 0.7–3.1)
LYMPHOCYTES NFR BLD AUTO: 2.8 % (ref 19.6–45.3)
MAGNESIUM SERPL-MCNC: 2 MG/DL (ref 1.6–2.4)
MCH RBC QN AUTO: 31.8 PG (ref 26.6–33)
MCHC RBC AUTO-ENTMCNC: 32.7 G/DL (ref 31.5–35.7)
MCV RBC AUTO: 97.1 FL (ref 79–97)
MONOCYTES # BLD AUTO: 0.5 10*3/MM3 (ref 0.1–0.9)
MONOCYTES NFR BLD AUTO: 5.4 % (ref 5–12)
NEUTROPHILS NFR BLD AUTO: 8.4 10*3/MM3 (ref 1.7–7)
NEUTROPHILS NFR BLD AUTO: 90.5 % (ref 42.7–76)
NRBC BLD AUTO-RTO: 0 /100 WBC (ref 0–0.2)
PHOSPHATE SERPL-MCNC: 2.6 MG/DL (ref 2.5–4.5)
PLATELET # BLD AUTO: 78 10*3/MM3 (ref 140–450)
PMV BLD AUTO: 10.2 FL (ref 6–12)
POTASSIUM SERPL-SCNC: 4.3 MMOL/L (ref 3.5–5.2)
PROT SERPL-MCNC: 5 G/DL (ref 6–8.5)
RBC # BLD AUTO: 3.56 10*6/MM3 (ref 4.14–5.8)
SODIUM SERPL-SCNC: 152 MMOL/L (ref 136–145)
WBC NRBC COR # BLD: 9.3 10*3/MM3 (ref 3.4–10.8)

## 2023-01-02 PROCEDURE — 80053 COMPREHEN METABOLIC PANEL: CPT | Performed by: ORTHOPAEDIC SURGERY

## 2023-01-02 PROCEDURE — 25010000002 MORPHINE PER 10 MG: Performed by: ORTHOPAEDIC SURGERY

## 2023-01-02 PROCEDURE — 25010000002 CEFTRIAXONE PER 250 MG: Performed by: NURSE PRACTITIONER

## 2023-01-02 PROCEDURE — 85025 COMPLETE CBC W/AUTO DIFF WBC: CPT | Performed by: ORTHOPAEDIC SURGERY

## 2023-01-02 PROCEDURE — 82330 ASSAY OF CALCIUM: CPT | Performed by: ORTHOPAEDIC SURGERY

## 2023-01-02 PROCEDURE — 84100 ASSAY OF PHOSPHORUS: CPT | Performed by: ORTHOPAEDIC SURGERY

## 2023-01-02 PROCEDURE — 82962 GLUCOSE BLOOD TEST: CPT

## 2023-01-02 PROCEDURE — 83735 ASSAY OF MAGNESIUM: CPT | Performed by: ORTHOPAEDIC SURGERY

## 2023-01-02 PROCEDURE — 25010000002 ENOXAPARIN PER 10 MG: Performed by: NURSE PRACTITIONER

## 2023-01-02 RX ORDER — DEXTROSE MONOHYDRATE 50 MG/ML
50 INJECTION, SOLUTION INTRAVENOUS CONTINUOUS
Status: DISCONTINUED | OUTPATIENT
Start: 2023-01-02 | End: 2023-01-03

## 2023-01-02 RX ORDER — ENOXAPARIN SODIUM 100 MG/ML
100 INJECTION SUBCUTANEOUS ONCE
Status: COMPLETED | OUTPATIENT
Start: 2023-01-02 | End: 2023-01-02

## 2023-01-02 RX ORDER — ENOXAPARIN SODIUM 150 MG/ML
1.5 INJECTION SUBCUTANEOUS DAILY
Status: DISCONTINUED | OUTPATIENT
Start: 2023-01-03 | End: 2023-01-11 | Stop reason: HOSPADM

## 2023-01-02 RX ADMIN — ENOXAPARIN SODIUM 100 MG: 100 INJECTION SUBCUTANEOUS at 13:43

## 2023-01-02 RX ADMIN — Medication 10 ML: at 21:00

## 2023-01-02 RX ADMIN — DEXTROSE AND SODIUM CHLORIDE 50 ML/HR: 5; 450 INJECTION, SOLUTION INTRAVENOUS at 00:53

## 2023-01-02 RX ADMIN — Medication: at 21:01

## 2023-01-02 RX ADMIN — Medication 10 ML: at 10:10

## 2023-01-02 RX ADMIN — SODIUM BICARBONATE 650 MG TABLET 650 MG: at 21:00

## 2023-01-02 RX ADMIN — MIDODRINE HYDROCHLORIDE 10 MG: 5 TABLET ORAL at 18:04

## 2023-01-02 RX ADMIN — HYDROCODONE BITARTRATE AND ACETAMINOPHEN 1 TABLET: 10; 325 TABLET ORAL at 10:09

## 2023-01-02 RX ADMIN — HYDROCODONE BITARTRATE AND ACETAMINOPHEN 1 TABLET: 10; 325 TABLET ORAL at 02:44

## 2023-01-02 RX ADMIN — MIDODRINE HYDROCHLORIDE 10 MG: 5 TABLET ORAL at 10:09

## 2023-01-02 RX ADMIN — ENOXAPARIN SODIUM 30 MG: 100 INJECTION SUBCUTANEOUS at 06:25

## 2023-01-02 RX ADMIN — SODIUM BICARBONATE 650 MG TABLET 650 MG: at 10:09

## 2023-01-02 RX ADMIN — ACETAMINOPHEN 650 MG: 325 TABLET, FILM COATED ORAL at 11:58

## 2023-01-02 RX ADMIN — MORPHINE SULFATE 2 MG: 2 INJECTION, SOLUTION INTRAMUSCULAR; INTRAVENOUS at 00:51

## 2023-01-02 RX ADMIN — Medication 1 APPLICATION: at 15:28

## 2023-01-02 RX ADMIN — Medication 300 MG: at 10:09

## 2023-01-02 RX ADMIN — HYDROCODONE BITARTRATE AND ACETAMINOPHEN 1 TABLET: 10; 325 TABLET ORAL at 06:25

## 2023-01-02 RX ADMIN — HYDROCODONE BITARTRATE AND ACETAMINOPHEN 1 TABLET: 10; 325 TABLET ORAL at 23:21

## 2023-01-02 RX ADMIN — CEFTRIAXONE SODIUM 2 G: 2 INJECTION, POWDER, FOR SOLUTION INTRAMUSCULAR; INTRAVENOUS at 02:43

## 2023-01-02 RX ADMIN — DEXTROSE MONOHYDRATE 50 ML/HR: 50 INJECTION, SOLUTION INTRAVENOUS at 10:09

## 2023-01-02 RX ADMIN — CEFTRIAXONE SODIUM 2 G: 2 INJECTION, POWDER, FOR SOLUTION INTRAMUSCULAR; INTRAVENOUS at 13:34

## 2023-01-02 RX ADMIN — LANSOPRAZOLE 30 MG: 30 TABLET, ORALLY DISINTEGRATING ORAL at 06:25

## 2023-01-02 NOTE — CASE MANAGEMENT/SOCIAL WORK
Social Work Assessment  [facility]     Patient Name: Jaime Bar  MRN: [id number]  Today's Date: 1/2/2023    Admit Date: 12/26/2022     Psychosocial     Row Name 01/02/23 1419       Values/Beliefs    Spiritual, Cultural Beliefs, Sikhism Practices, Values that Affect Care no    Values/Beliefs Comment Judaism       Intellectual Performance WDL    Level of Consciousness Confusion       Coping/Stress    Major Change/Loss/Stressor medical condition/diagnosis;caregiver strain;family/significant other illness;financial;job change/loss  Wife is reportedly diagnosed with Alzheimer's dementia.    Patient Personal Strengths self-reliant    Sources of Support Buddhism/Nondenominational organization;other family members;sibling(s)    Techniques to [place] with Loss/Stress/Change diversional activities    Reaction to Health Status unable to assess    Understanding of Condition and Treatment unable to assess       Developmental Stage (Joseikchikaon's)    Developmental Stage Stage 8 (65 years-death/Late Adulthood) Integrity vs. Despair               Abuse/Neglect     Row Name 01/02/23 1426       Personal Safety    Feels Unsafe at Home or Work/School unable to answer (comment required)  Pt confused per RN. SW completed LACE with pt's sister.    Feels Threatened by Someone unable to answer (comment required)  Pt confused per RN. SW completed LACE with pt's sister.    Does Anyone Try to Keep You From Having Contact with Others or Doing Things Outside Your Home? unable to answer (comment required)  Pt confused per RN. SW completed LACE with pt's sister.               Legal     Row Name 01/02/23 1427       Financial/Legal    Source of Income pension/senior care;social security    Who Manages Finances if Patient Unable Self.    Finance Comments Pt's sister reports concern for pt's finances, but then states pt and his wife go out to eat daily and bring in about $4200/month in SSI and pt's pension. She was told he deferred his last house payment in  "2022.       Legal    Criminal Activity/Legal Involvement none               Substance Abuse     Row Name 23 1430       Substance Use    Substance Use Status current tobacco use    Substance Use Comment Pt began smoking at 18 y.o and currently smokes 1ppd. He doesn't drink or participate in illicit substance abuse.       Family Member Substance Use (#4)    Family History of Substance Use father    Substance Use Comments Pt's father was \"a heavy drinker and smoker.\"              SW spoke with RN and was informed pt is not oriented. SW was informed there were some reported concerns about home conditions, as well as NOK issues. SW reviewed chart and there are no Advanced directives on file. SW called pt's sister Vibha, who denies pt has a guardian, reports pt's spouse Fatou ([phone]) is diagnosed with Alzheimer's dementia and has received SSDI for this diagnosis for 2 years (lives at home alone, but her dtr Nadine is helping with her care and ensuring safety), reports pt never had any biological children, nor adopted any children, and both of their parents are . Vibha reports being the only sibling, and therefore, the legal NOK according to House Bill 1119. Vibha also relayed that per Nadine, pt and his wife appear to be hoarding in their home, and says there's little to no space to walk. She is aware that rehab with transition to LTC may be needed and is willing to assist with the discharge planning.    Phone communication or documentation only - no physical contact with patient or family.    [facility], [facility]  Medical Social Worker  Ph [phone]  Fax [phone]  [email or web address]    "

## 2023-01-02 NOTE — PROGRESS NOTES
Infectious Diseases Progress Note      LOS: 7 days   Patient Care Team:  Provider, No Known as PCP - Kyle Gardner MD as Consulting Physician (Nephrology)  Vicente Mendoza MD as Consulting Physician (Hematology and Oncology)    Chief Complaint: Confusion    Subjective     The patient had low-grade fever up to 100.9.  He is hemodynamically stable.  He is currently on no vasopressors.  He is on 4 L of oxygen via nasal cannula.  He is can sedated secondary to pain medication    Review of Systems:   Review of Systems   Unable to perform ROS: Acuity of condition        Objective     Vital Signs  Temp:  [97.5 °F (36.4 °C)-101 °F (38.3 °C)] 100.9 °F (38.3 °C)  Heart Rate:  [] 96  Resp:  [22-32] 32  BP: (106-117)/(55-71) 106/71  Arterial Line BP: ()/(43-73) 115/60    Physical Exam:  Physical Exam  Vitals and nursing note reviewed.   Constitutional:       General: He is not in acute distress.     Appearance: He is well-developed and normal weight. He is ill-appearing. He is not diaphoretic.      Comments: Appears lethargic   HENT:      Head: Normocephalic and atraumatic.   Eyes:      Pupils: Pupils are equal, round, and reactive to light.   Cardiovascular:      Rate and Rhythm: Normal rate and regular rhythm.      Heart sounds: Normal heart sounds, S1 normal and S2 normal.   Pulmonary:      Effort: Pulmonary effort is normal. No respiratory distress.      Breath sounds: No stridor. Rales present. No wheezing.   Abdominal:      General: Abdomen is flat. Bowel sounds are normal. There is no distension.      Palpations: Abdomen is soft. There is no mass.      Tenderness: There is no abdominal tenderness. There is no guarding.      Comments: NG tube   Musculoskeletal:         General: No deformity. Normal range of motion.      Cervical back: Neck supple.      Right lower leg: No edema.      Left lower leg: No edema.      Comments: Drain tubes are present in the right and left shoulders with bloody  serous drainage   Skin:     General: Skin is warm and dry.      Coloration: Skin is not pale.      Findings: No erythema or rash.   Neurological:      Mental Status: He is alert.      Cranial Nerves: No cranial nerve deficit.          Results Review:    I have reviewed all clinical data, test, lab, and imaging results.     Radiology  No Radiology Exams Resulted Within Past 24 Hours    Cardiology    Laboratory    Results from last 7 days   Lab Units 01/02/23  0606 01/01/23  0614 12/31/22  0520 12/30/22  0540 12/29/22  1721 12/29/22  0541 12/29/22  0002   WBC 10*3/mm3 9.30 7.80 9.80 9.30 9.80 21.10* 14.20*   HEMOGLOBIN g/dL 11.3* 11.4* 11.3* 11.1* 10.9* 12.0* 11.4*   HEMATOCRIT % 34.6* 34.6* 32.6* 32.3* 32.0* 35.7* 34.6*   PLATELETS 10*3/mm3 78* 60* 46* 38* 47* 57* 46*     Results from last 7 days   Lab Units 01/02/23  0606 01/01/23  0614 12/31/22  0520 12/30/22  0540 12/29/22  1710 12/29/22  0541 12/29/22  0002 12/28/22  0839   SODIUM mmol/L 152* 146* 140 135* 135* 134* 130* 130*   POTASSIUM mmol/L 4.3 4.0 3.8 3.8 4.3 4.5 3.9 3.9   CHLORIDE mmol/L 120* 113* 107 103 102 100 99 97*   CO2 mmol/L 27.0 25.0 22.0 21.0* 20.0* 19.0* 20.0* 20.0*   BUN mg/dL 41* 52* 68* 77* 74* 65* 59* 59*   CREATININE mg/dL 0.69* 0.82 1.12 1.50* 1.65* 1.66* 1.17 1.36*   GLUCOSE mg/dL 144* 135* 144* 122* 108* 90 137* 82   ALBUMIN g/dL 2.4* 2.2* 2.2* 2.3*  --  2.7* 2.6* 2.4*   BILIRUBIN mg/dL 3.3* 4.0* 4.4* 5.2* 5.3* 5.3* 4.6* 4.6*   ALK PHOS U/L 102 107 157* 105  --  108 88 83   AST (SGOT) U/L 104* 308* 520* 431*  --  238* 62* 44*   ALT (SGPT) U/L 200* 306* 347* 236*  --  117* 36 30   CALCIUM mg/dL 8.2* 8.0* 8.0* 8.1* 8.1* 8.7 8.6 8.5*         Results from last 7 days   Lab Units 12/28/22  0839   SED RATE mm/hr 39*         Microbiology   Microbiology Results (last 10 days)     Procedure Component Value - Date/Time    Catheter Culture - Cath Tip, Hand, Left [[id number]] Collected: 12/31/22 1839    Lab Status: Preliminary result Specimen: Cath  Tip from Hand, Left Updated: 01/02/23 0905     CATHETER CULTURE No growth    Blood Culture - Blood, Blood, PICC Line [[id number]]  (Normal) Collected: 12/31/22 1148    Lab Status: Preliminary result Specimen: Blood, PICC Line Updated: 01/02/23 1200     Blood Culture No growth at 2 days    Blood Culture - Blood, Hand, Right [[id number]]  (Abnormal) Collected: 12/30/22 1251    Lab Status: Final result Specimen: Blood from Hand, Right Updated: 01/01/23 1030     Blood Culture Staphylococcus aureus     Comment:   Infectious disease consultation is highly recommended to rule out distant foci of infection.        Isolated from Aerobic Bottle     Gram Stain Aerobic Bottle Gram positive cocci in clusters    Narrative:      Less than seven (7) mL's of blood was collected.  Insufficient quantity may yield false negative results.    Refer to previous blood culture collected on 12/26/2022 0435 for MICs.    Blood Culture - Blood, Hand, Left [[id number]]  (Abnormal) Collected: 12/29/22 1015    Lab Status: Final result Specimen: Blood from Hand, Left Updated: 01/01/23 1030     Blood Culture Staphylococcus aureus     Comment:   Infectious disease consultation is highly recommended to rule out distant foci of infection.    Refer to previous blood culture collected on 12/26/2022 0435 for MICs.        Isolated from Aerobic Bottle     Blood Culture Staphylococcus, coagulase negative     Comment: Probable contaminant requires clinical correlation, susceptibility not performed unless requested by physician.          Isolated from --     Gram Stain Aerobic Bottle Gram positive cocci in clusters    Narrative:      Less than seven (7) mL's of blood was collected.  Insufficient quantity may yield false negative results.    Body Fluid Culture - Body Fluid, Shoulder, Right [[id number]]  (Abnormal)  (Susceptibility) Collected: 12/28/22 0713    Lab Status: Preliminary result Specimen: Body Fluid from Shoulder, Right Updated: 12/30/22 0934     Body  Fluid Culture Heavy growth (4+) Staphylococcus aureus     Gram Stain Many (4+) WBCs seen      Many (4+) Gram positive cocci in clusters    Susceptibility      Staphylococcus aureus      PARMINDER      Gentamicin Susceptible      Oxacillin Susceptible      Rifampin Susceptible      Vancomycin Susceptible                       Susceptibility Comments     Staphylococcus aureus    This isolate does not demonstrate inducible clindamycin resistance in vitro.               Blood Culture - Blood, Arm, Left [[id number]]  (Abnormal) Collected: 12/27/22 1300    Lab Status: Final result Specimen: Blood from Arm, Left Updated: 12/29/22 0721     Blood Culture Staphylococcus aureus     Comment:   Infectious disease consultation is highly recommended to rule out distant foci of infection.        Isolated from Anaerobic Bottle     Gram Stain Anaerobic Bottle Gram positive cocci in clusters    Narrative:      Refer to previous blood culture collected on 12/26/2022 0435 for MICs    Less than seven (7) mL's of blood was collected.  Insufficient quantity may yield false negative results.    Blood Culture - Blood, Hand, Right [[id number]]  (Abnormal)  (Susceptibility) Collected: 12/26/22 0435    Lab Status: Final result Specimen: Blood from Hand, Right Updated: 12/28/22 0657     Blood Culture Staphylococcus aureus     Comment:   Infectious disease consultation is highly recommended to rule out distant foci of infection.        Isolated from Aerobic and Anaerobic Bottles     Gram Stain Aerobic Bottle Gram positive cocci in clusters      Anaerobic Bottle Gram positive cocci in clusters    Narrative:      Less than seven (7) mL's of blood was collected.  Insufficient quantity may yield false negative results.    Susceptibility      Staphylococcus aureus      PARMINDER      Gentamicin Susceptible      Oxacillin Susceptible      Rifampin Susceptible      Vancomycin Susceptible                       Susceptibility Comments     Staphylococcus aureus    This  isolate does not demonstrate inducible clindamycin resistance in vitro.               Blood Culture ID, PCR - Blood, Hand, Right [[id number]]  (Abnormal) Collected: 12/26/22 0435    Lab Status: Final result Specimen: Blood from Hand, Right Updated: 12/26/22 2054     BCID, PCR Staph aureus. mecA/C and MREJ (methicillin resistance gene) NOT detected. Identification by BCID2 PCR     BOTTLE TYPE Aerobic Bottle    Narrative:      Infectious disease consultation is highly recommended to rule out distant foci of infection.    Blood Culture - Blood, Hand, Left [[id number]]  (Abnormal) Collected: 12/26/22 0434    Lab Status: Final result Specimen: Blood from Hand, Left Updated: 12/28/22 0657     Blood Culture Staphylococcus aureus     Comment:   Infectious disease consultation is highly recommended to rule out distant foci of infection.        Isolated from Aerobic and Anaerobic Bottles     Gram Stain Aerobic Bottle Gram positive cocci in clusters      Anaerobic Bottle Gram positive cocci in clusters    Narrative:      Refer to previous blood culture collected on 12/26/2022 0435 for MICs    Less than seven (7) mL's of blood was collected.  Insufficient quantity may yield false negative results.    MRSA Screen, PCR (Inpatient) - Swab, Nares [[id number]]  (Normal) Collected: 12/26/22 0307    Lab Status: Final result Specimen: Swab from Nares Updated: 12/26/22 0507     MRSA PCR No MRSA Detected    Narrative:      The negative predictive value of this diagnostic test is high and should only be used to consider de-escalating anti-MRSA therapy. A positive result may indicate colonization with MRSA and must be correlated clinically.          Medication Review:       Schedule Meds  cefTRIAXone, 2 g, Intravenous, Q12H  [START ON 1/3/2023] enoxaparin, 1.5 mg/kg, Subcutaneous, Daily  ferrous sulfate, 300 mg, Nasogastric, Daily  lansoprazole, 30 mg, Nasogastric, Q AM  midodrine, 10 mg, Nasogastric, Q8H  sodium bicarbonate, 650 mg,  Nasogastric, BID  sodium chloride, 10 mL, Intravenous, Q12H  Zinc Oxide, , Apply externally, TID        Infusion Meds  dextrose, 50 mL/hr, Last Rate: 50 mL/hr (01/02/23 1009)        PRN Meds  •  acetaminophen **OR** acetaminophen  •  aluminum-magnesium hydroxide-simethicone  •  dextrose  •  dextrose  •  fentanyl  •  glucagon (human recombinant)  •  HYDROcodone-acetaminophen  •  ondansetron **OR** ondansetron  •  sodium chloride  •  sodium chloride  •  sodium chloride        Assessment & Plan       Antimicrobial Therapy   1.  IV ceftriaxone        2.        3.        4.        5.            Assessment    Bilateral shoulder septic arthritis.  Synovial fluid culture was consistent with infection and cultures are growing 4+ Staph aureus    Methicillin susceptible Staph aureus bacteremia secondary to above.  Blood cultures were positive x2 sets on admission on December 26, 2022.  Repeat blood culture from December 27, 2022 turned positive  Repeat blood culture from December 29, 2022 is positive  ALMA DELIA was done on December 28, 2022 and showed no vegetation  -Repeat blood culture from 12/30/2022 is now positive  -PICC line was removed on 12/31/2022- cultures pending  -Blood culture from 12/31/2022 is negative so far    Mild septic shock.  Currently off vasopressors    Respiratory failure was on the ventilator after surgery  -Currently extubated and on 4 L of oxygen by nasal cannula    DVT of the left arm secondary to PICC line.  Hematology service was consulted and patient was started on anticoagulation    Significantly elevated liver transaminase and bilirubin--likely related to infection  -Hepatitis panel was negative  Liver enzymes are trending down      Plan    Continue IV ceftriaxone 2 g every 12 hours for now.  The patient will need 6 weeks of IV antibiotic  Continue supportive care  A.mMikayla Delacruz MD  01/02/23  14:09 EST    Note is dictated utilizing voice recognition software/Dragon

## 2023-01-02 NOTE — PLAN OF CARE
Goal Outcome Evaluation:           Progress: no change       Pt on 4LNC. Febrile @ 101. Tylenol given and ice packs applied. VSS.   Pt is a PCU overflow.

## 2023-01-02 NOTE — PROGRESS NOTES
Hematology/Oncology Inpatient Progress Note    PATIENT NAME: Jaime Bar  : 1955  MRN: [id number]    CHIEF COMPLAINT: Sepsis; Thrombocytopenia; provoked catheter associated LUE DVT, LUE SVT, and RUE SVT    HISTORY OF PRESENT ILLNESS:    67 y.o. male admitted to [facility] on transfer from [facility] on 2022.  The patient was intubated and unresponsive at time of consultation with histories obtained from review of records and discussion with patient's niece and nephew.  He reported a history of bilateral shoulder pain for few days prior to admission.  He had presented to [place] ED on 2022 where he was hypotensive and hyponatremic and transferred to [facility].  CXR on 2022 showed coarsened airspace and bronchovascular thickening with small airway disease such as bronchitis or asthma, edema, atypical/viral infection, and aspiration in differential.  A RUE venous Doppler showed acute RUE superficial thrombophlebitis in the cephalic vein and no evidence of DVT for which he was started on subcu heparin.  On 2022 CBC revealed WBC 7.2, hemoglobin 12.4, MCV 98.4, and platelets 99,000.  Creatinine was elevated to 2.38, BUN 55, sodium was low at 118, potassium was high at 5.4 and LFTs revealed T bili elevated to 4.8 (0-1.2) with transaminases normal.  Blood cultures grew Staph aureus.  Urinalysis was concerning for UTI.  Urine sodium was less than 20, a.m. cortisol 26.11 (6.02-18.4), and urine osmolality was 410 ().   X-rays and MRIs of the bilateral shoulders were performed 2022 revealing joint effusion and advanced degenerative changes/destructive changes of glenohumeral joint.  There was suspected complete tear of the supraspinatus and infraspinatus tendons and a completely macerated appearance of the labrum on the right shoulder.  On 2022 RUE venous Doppler was repeated and felt stable.  He then underwent bilateral shoulder arthroscopy incision and drainage  with culture growing heavy growth of Staph aureus.  He had been intubated prior to the surgery and remained sedated on ventilator with pressors postop.  At time of consultation 12/29/2022 LUE venous Doppler showed acute catheter associated DVT in the left axillary vein, SVT in the left basilic vein of the upper arm.  BLE venous Doppler was negative for DVT/SVT but did show deep venous valvular incompetence in the right popliteal vein.  CBC revealed WBC 14.2, hemoglobin 11.4, MCV 96.5, and platelets 46,000.  T bili was elevated to 5.3 and transaminases were now elevated with  and .  PT was 15.6 (9.6-11.7), PTT was 38.7 (24-31), and fibrinogen was 493 (210-450).  D-dimer was 11.83 (0-0.67).  Transesophageal echocardiogram showed LVEF 46-50%, mild aortic valve stenosis, and no evidence of vegetation or bacterial endocarditis.  Heparin was changed to argatroban.  ID was managing antibiotics with patient on ceftriaxone with plan for treatment x6 weeks and recommending replacing PICC line 2 days after starting anticoagulation.  His niece and nephew reported the patient to have lost some weight but they were unsure the amount.  They reported PMH significant for prostate cancer treated approximately 2010 with radiation alone and no evidence of recurrence to their knowledge.  He also was under the care of Dr. Damcio at  for cardiac amyloid.  Chart review showed PSA was 0.1 (0-4) in August 2022.  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  IgG was 768 (603-1613), IgA was 238 (), IgM was 64 ().  Kappa/lambda ratio was 1.36 (0.2-1.65).  A note from Roosevelt Damico MD (cardiologist) at [facility] dated 7/13/2022 reported patient was followed for restrictive cardiomyopathy secondary to cardiac amyloidosis.  LVEF in February 2022 was 45-50%.  Cardiac MRI 4/6/2022 revealed a myocardial mass 276 g, global hypokinesis of left ventricle with ejection fraction 43%, no  myocardial iron overload, the thickness and appearance of intra-atrial septum was also consistent with cardiac amyloidosis.  The note stated that the patient was followed by Dr. Banks at  [facility] where he was on Vyndamax therapy for cardiac amyloid and had been on that treatment for about 2 weeks.  Additionally in January 2022 T bili was noted to be elevated to 2.6 (0.2-1.0) and he was anemic with hemoglobin 11.8 and MCV 86.5 platelets were normal at 261,000.     12/29/22  Hematology/Oncology was consulted by RITU Jimenez for thrombocytopenia.     Past Medical History: Prostate cancer approximately 2010 treated with radiation only.  Cardiac amyloid managed by Dr. Damico at .  CKD.  Surgical History: Several orthopedic surgeries in the past.  Bilateral shoulder arthroscopy with incision and drainage 12/28/2022.  Social History: He lives in [place], [place] with his spouse.  His spouse is known to have Alzheimer's disease.  He has smoked for many years.  He has no alcohol or recreational drug use however he was narcotic dependent secondary to chronic pain.  He is a retired .  Family History: No significant known family history.  Allergies: Tramadol causes him to feel weird and codeine causes nausea.     PCP: Provider, No Known    INTERVAL HISTORY:  • 12/30/2022- platelets 38,000, hemoglobin 11.1.  Folate 7.13 (4.78-24.2), vitamin B12 greater than 2000.  Iron 29 (), iron saturation 15% (20-50), TIBC 195 (298-536), and ferritin 3525 ().  Reticulocytes 1.05 (0.7-1.9), haptoglobin 148 (). PSA 0.234 (0-4).  T bili 5.3 with direct bili 4.2 (0-0.3).  Hepatitis panel nonreactive.  Extubated 12/29/2022 and off pressors.  • 12/31/2022- platelets 46,000, hemoglobin 11.3.  HIT antibody negative at 0.121 (0-0.4).  CMV IgM less than 30 (0-29.9) and EBV IgM less than 36 (0-35.9).  D-dimer mildly improved to 8.94 (0-0.67).  Transaminases rising with  and AST  520.  T bili 4.4.  Peripheral smear was negative for schistocytes again.  Abdominal ultrasound revealed cholelithiasis with adenomyomatosis involving the gallbladder wall.  The common bile duct was in the upper limits of normal.  The liver appeared normal.  • 1/1/2023- platelets 60,000, hemoglobin 11.4.  Argatroban was held with repeat PTT remaining high with subsequent continued hold of argatroban.  D-dimer 9.84.  PTT 61.1 (24-31).  CT head without contrast was negative for acute findings.  • 1/2/2023- hemoglobin 11.3 and platelets 78,000.  ALT improved to 200 and AST improved to 104.  T bili improved to 3.3.  Fibrinogen 445 (210-150).     History of present illness reviewed since last visit and changes noted on 01/02/23.    Subjective   The patient is slightly more awake today and slow to respond but answering negative to ROS questions.    ROS:   Review of Systems   Constitutional: Negative for activity change, chills, fatigue, fever and unexpected weight change.   HENT: Negative for congestion, dental problem, hearing loss, mouth sores, nosebleeds, sore throat and trouble swallowing.    Eyes: Negative for photophobia and visual disturbance.   Respiratory: Negative for cough, chest tightness, shortness of breath and stridor.    Cardiovascular: Negative for chest pain, palpitations and leg swelling.   Gastrointestinal: Negative for abdominal distention, abdominal pain, blood in stool, constipation, diarrhea, nausea and vomiting.   Endocrine: Negative for cold intolerance and heat intolerance.   Genitourinary: Negative for decreased urine volume, difficulty urinating, frequency, hematuria and urgency.   Musculoskeletal: Negative for arthralgias and gait problem.   Skin: Negative for rash and wound.   Neurological: Negative for dizziness, tremors, weakness, light-headedness, numbness and headaches.   Hematological: Negative for adenopathy. Does not bruise/bleed easily.   Psychiatric/Behavioral: Negative for confusion  "and hallucinations. The patient is not nervous/anxious.    All other systems reviewed and are negative.       MEDICATIONS:    Scheduled Meds:  cefTRIAXone, 2 g, Intravenous, Q12H  enoxaparin, 30 mg, Subcutaneous, Q12H  ferrous sulfate, 300 mg, Nasogastric, Daily  lansoprazole, 30 mg, Nasogastric, Q AM  midodrine, 10 mg, Nasogastric, Q8H  sodium bicarbonate, 650 mg, Nasogastric, BID  sodium chloride, 10 mL, Intravenous, Q12H  Zinc Oxide, , Apply externally, TID       Continuous Infusions:  dextrose, 50 mL/hr, Last Rate: 50 mL/hr (01/02/23 1009)       PRN Meds:  •  acetaminophen **OR** acetaminophen  •  aluminum-magnesium hydroxide-simethicone  •  dextrose  •  dextrose  •  fentanyl  •  glucagon (human recombinant)  •  HYDROcodone-acetaminophen  •  ondansetron **OR** ondansetron  •  sodium chloride  •  sodium chloride  •  sodium chloride     ALLERGIES:    Allergies   Allergen Reactions   • Tramadol-Acetaminophen Anxiety     Worms in the brain feeling   • Codeine Other (See Comments)     nausea   • Tramadol Other (See Comments)     \"I just felt really crawly, it did weird things with my head\"       Objective    VITALS:   /71   Pulse 96   Temp (!) 100.9 °F (38.3 °C) (Axillary)   Resp (!) 32   Ht 167.6 cm (66\")   Wt 90.1 kg (198 lb 10.2 oz)   SpO2 95%   BMI 32.06 kg/m²     PHYSICAL EXAM:  Physical Exam  Vitals and nursing note reviewed.   Constitutional:       General: He is not in acute distress.     Appearance: Normal appearance. He is well-developed. He is ill-appearing. He is not diaphoretic.   HENT:      Head: Normocephalic and atraumatic.      Comments: Male pattern baldness     Right Ear: External ear normal.      Left Ear: External ear normal.      Ears:      Comments: Left ear O2 monitor     Nose: Nose normal.      Comments: NG tube.  O2 per NC.     Mouth/Throat:      Mouth: Mucous membranes are moist.      Pharynx: Oropharynx is clear. No oropharyngeal exudate or posterior oropharyngeal erythema.    "   Comments: Dental fillings  Eyes:      General: No scleral icterus.     Extraocular Movements: Extraocular movements intact.      Conjunctiva/sclera: Conjunctivae normal.      Pupils: Pupils are equal, round, and reactive to light.   Cardiovascular:      Rate and Rhythm: Normal rate and regular rhythm.      Heart sounds: Normal heart sounds. No murmur heard.     Comments: Cardiac monitor leads  Pulmonary:      Effort: Pulmonary effort is normal. No respiratory distress.      Breath sounds: Normal breath sounds. No stridor. No wheezing or rales.   Abdominal:      General: Bowel sounds are normal. There is no distension.      Palpations: Abdomen is soft. There is no mass.      Tenderness: There is no abdominal tenderness. There is no guarding.   Genitourinary:     Comments: Thompson catheter  Musculoskeletal:         General: Swelling (BUE edema is improving) present. No tenderness or deformity. Normal range of motion.      Cervical back: Normal range of motion and neck supple.      Comments: Bilateral shoulder dressings with drains.  BUE IVs.  BLE SCDs.   Lymphadenopathy:      Cervical: No cervical adenopathy.      Upper Body:      Right upper body: No supraclavicular adenopathy.      Left upper body: No supraclavicular adenopathy.   Skin:     General: Skin is warm and dry.      Coloration: Skin is not pale.      Findings: No bruising, erythema or rash.      Comments: Vertical scar right knee.  1 cm scar/scab on left cheek.   Neurological:      Mental Status: He is alert.      Comments: Staring in the air.  Slow to respond.   Psychiatric:      Comments: Slow to respond           RECENT LABS:  Lab Results (last 24 hours)     Procedure Component Value Units Date/Time    Blood Culture - Blood, Blood, PICC Line [[id number]]  (Normal) Collected: 12/31/22 1148    Specimen: Blood, PICC Line Updated: 01/02/23 1200     Blood Culture No growth at 2 days    POC Glucose Once [[id number]]  (Abnormal) Collected: 01/02/23 1155     Specimen: Blood Updated: 01/02/23 1157     Glucose 137 mg/dL      Comment: Serial Number: [id number]:  [id number]       Catheter Culture - Cath Tip, Hand, Left [[id number]] Collected: 12/31/22 1839    Specimen: Cath Tip from Hand, Left Updated: 01/02/23 0905     CATHETER CULTURE No growth    Comprehensive Metabolic Panel [[id number]]  (Abnormal) Collected: 01/02/23 0606    Specimen: Blood Updated: 01/02/23 0634     Glucose 144 mg/dL      BUN 41 mg/dL      Creatinine 0.69 mg/dL      Sodium 152 mmol/L      Potassium 4.3 mmol/L      Chloride 120 mmol/L      CO2 27.0 mmol/L      Calcium 8.2 mg/dL      Total Protein 5.0 g/dL      Albumin 2.4 g/dL      ALT (SGPT) 200 U/L      AST (SGOT) 104 U/L      Alkaline Phosphatase 102 U/L      Total Bilirubin 3.3 mg/dL      Globulin 2.6 gm/dL      A/G Ratio 0.9 g/dL      BUN/Creatinine Ratio 59.4     Anion Gap 5.0 mmol/L      eGFR 101.4 mL/min/1.73      Comment: National Kidney Foundation and American Society of Nephrology (ASN) Task Force recommended calculation based on the Chronic Kidney Disease Epidemiology Collaboration (CKD-EPI) equation refit without adjustment for race.       Narrative:      GFR Normal >60  Chronic Kidney Disease <60  Kidney Failure <15      Phosphorus [[id number]]  (Normal) Collected: 01/02/23 0606    Specimen: Blood Updated: 01/02/23 0634     Phosphorus 2.6 mg/dL     Magnesium [[id number]]  (Normal) Collected: 01/02/23 0606    Specimen: Blood Updated: 01/02/23 0634     Magnesium 2.0 mg/dL     Calcium, Ionized [[id number]]  (Abnormal) Collected: 01/02/23 0606    Specimen: Blood Updated: 01/02/23 0621     Ionized Calcium 1.16 mmol/L     CBC & Differential [[id number]]  (Abnormal) Collected: 01/02/23 0606    Specimen: Blood Updated: 01/02/23 0615    Narrative:      The following orders were created for panel order CBC & Differential.  Procedure                               Abnormality         Status                     ---------                                -----------         ------                     CBC Auto Differential[[id number]]        Abnormal            Final result                 Please view results for these tests on the individual orders.    CBC Auto Differential [[id number]]  (Abnormal) Collected: 01/02/23 0606    Specimen: Blood Updated: 01/02/23 0615     WBC 9.30 10*3/mm3      RBC 3.56 10*6/mm3      Hemoglobin 11.3 g/dL      Hematocrit 34.6 %      MCV 97.1 fL      MCH 31.8 pg      MCHC 32.7 g/dL      RDW 16.5 %      RDW-SD 56.4 fl      MPV 10.2 fL      Platelets 78 10*3/mm3      Neutrophil % 90.5 %      Lymphocyte % 2.8 %      Monocyte % 5.4 %      Eosinophil % 0.2 %      Basophil % 1.1 %      Neutrophils, Absolute 8.40 10*3/mm3      Lymphocytes, Absolute 0.30 10*3/mm3      Monocytes, Absolute 0.50 10*3/mm3      Eosinophils, Absolute 0.00 10*3/mm3      Basophils, Absolute 0.10 10*3/mm3      nRBC 0.0 /100 WBC     POC Glucose Once [[id number]]  (Abnormal) Collected: 01/02/23 0612    Specimen: Blood Updated: 01/02/23 0614     Glucose 131 mg/dL      Comment: Serial Number: [id number]:  [id number]       POC Glucose Once [[id number]]  (Abnormal) Collected: 01/01/23 2357    Specimen: Blood Updated: 01/01/23 2359     Glucose 118 mg/dL      Comment: Serial Number: [id number]:  [id number]       Fibrinogen [[id number]]  (Normal) Collected: 01/01/23 1825    Specimen: Blood Updated: 01/01/23 1844     Fibrinogen 445 mg/dL     POC Glucose Once [[id number]]  (Abnormal) Collected: 01/01/23 1713    Specimen: Blood Updated: 01/01/23 1715     Glucose 122 mg/dL      Comment: Serial Number: [id number]:  [id number]             PENDING RESULTS: SHAR, copper, SPEP, SIFE, free light chains, immunoglobulins, UIFE.    IMAGING REVIEWED:  CT Head Without Contrast    Result Date: 12/31/2022  1. No acute intracranial abnormality. Specifically, no evidence of acute hemorrhage, mass effect or midline shift. 2. Mild global cerebral volume loss. 3. Mild bilateral air-fluid  levels within the maxillary sinuses which can be seen with acute sinus disease in the correct clinical setting.  Electronically Signed By-Willy Baxter MD On:12/31/2022 9:01 PM This report was finalized on [id number] by  Willy Baxter MD.      I have reviewed the patient's labs, imaging, reports, and other clinician documentation.    Assessment & Plan   ASSESSMENT:  1. Thrombocytopenia-platelets were normal in early 2022.  Platelets were low on admission and continued to drop.  HIT antibody negative.    On ceftriaxone which can cause thrombocytopenia.  Coags mildly elevated with fibrinogen not low, not likely DIC but sepsis likely contributing to thrombocytopenia. No hemolysis and no schistocytes on peripheral smear ruling out TTP.  No vitamin B12 or folate deficiencies. SHAR remains pending. Coags remain elevated and D-dimer high.  Repeat fibrinogen remained elevated.    Platelets now rising some.  2. Acute on chronic anemia/IgA lambda monoclonal gammopathy/SWETHA- mild anemia with hemoglobin in the 11's in early 2022.  Gammopathy labs at that time showed IgA lambda monoclonal protein with normal immunoglobulins and free light chains.  Iron studies show SWETHA and started oral iron.  No hemolysis or vitamin B12/folate deficiencies. Copper and gammopathy labs remain pending.  Hemoglobin stable.  3. Acute provoked catheter associated LUE DVT/SVT and RUE SVT- RUE SVT was diagnosed prior to LUE DVT and he had received several doses of subcu heparin starting on 12/26. LUE DVT provoked by PICC line which remains in place.  Argatroban was difficult to manage with elevated PTT.  Started low-dose Lovenox 1/1 and now that platelets are rising some will increase to full treatment dose.  4. Elevated LFT/cholelithiasis and adenomyomatosis of the gallbladder wall- possible shock liver.  T bili remains elevated with elevated direct bili with normal liver on US.  Hepatitis panel negative.  Mild improvement today.  5. Bilateral  shoulder septic arthritis/staph RS bacteremia-on ceftriaxone per ID.  No vegetation on echocardiogram.  6. Acute hypoxic respiratory failure/smoking history-  transiently on ventilator postsurgery.  7. History of prostate cancer-PSA remains normal.   8. Cardiac amyloidosis- on chart review it has been treated at .  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  Pending receipt of records.  9. STEVEN/hyponatremia-nephrology managing.  Creatinine now normal and sodium normalized.  10. A. fib with RVR- patient evaluated by EP and recommended start Eliquis 5 mg p.o. twice daily (not started as patient currently on Lovenox) with plan for cardioversion should patient remain in A. fib.    PLAN:  1. Increase Lovenox to full treatment dose at 1.5 mg/kg daily.  2. Continue ferrous sulfate 325 mg by mouth daily (liquid given NGT).  3. Continue daily CBC.  4. Await remaining thrombocytopenia, anemia, and gammopathy labs.  5. Pending records from  regarding cardiac amyloid open (our office has requested)        Note prepared by KURT Alicea.  Patient seen and examined by Vicente Mendoza MD.  Electronically signed by RITU Taylor, 01/02/23, 1:24 PM EST.    I have personally performed a face-to-face diagnostic evaluation on this patient. I have performed a complete history and physical examination, reviewed laboratory studies, and radiographic examinations.  I have completed the majority and substantive portion of the medical decision making.  I have formulated the assessment on this patient and the plan of action as noted above. I have discussed the case with Darrin Nixon NP, have edited/reviewed the note, and agree with the care plan.  Patient slow to respond but is denying Problems on review of systems.  He has mouth and eyes open staring at the ceiling.  Left arm PICC has been removed.   Platelet count is improving.  Will increase Lovenox to  treatment dose.    I discussed the patient's findings and my recommendations with patient and nursing.    Part of this note may be an electronic transcription/translation of spoken language to printed text using the Dragon Dictation System.    Electronically signed by Vicente Mendoza MD, 01/02/23, 2:01 PM EST.

## 2023-01-02 NOTE — CASE MANAGEMENT/SOCIAL WORK
Continued Stay Note   Michael     Patient Name: Jaime Bar  MRN: [id number]  Today's Date: 1/2/2023    Admit Date: 12/26/2022    Plan: DC Plan: Anticipate home with family pending clinical course. Watch for home IV abx needs.   Discharge Plan     Row Name 01/02/23 1706       Plan    Plan DC Plan: Anticipate home with family pending clinical course. Watch for home IV abx needs.    Provided Post Acute Provider List? N/A    Provided Post Acute Provider Quality & Resource List? N/A    Plan Comments CM reviewed chart documentation to obtain clinical updates.No significant changes in condition or care plans to report. CM will continue to follow for any further needs and adjust discharge plan accordingly. DC Barriers: 4L nc, NGT, D5W gtt, PADMINI drains x3, IV abx, and abnormal labs.           Expected Discharge Date and Time     Expected Discharge Date Expected Discharge Time    Jan 6, 2023         Phone communication or documentation only- no physical contact with patient or family.      Melyssa Pascual RN      Office Phone: [phone]  Office Cell:     [phone]

## 2023-01-02 NOTE — NURSING NOTE
67-year-old male who presented to the hospital on December 26 with weakness, shoulder pain and an altered mental status change.  Patient seen today for a new hospital-acquired pressure injury to the sacrum.    Patient is awake responds to questions but is confused.  Patient has a great deal of generalized edema.  Patient has developed an area of nonblanchable ecchymosis or deep tissue pressure injury to the sacrum the area is intact.  Approximate size of the area 1 x 1-1/2 cm.  The area is not open at this time.  Patient is incontinent.  Will recommend treating with Evan's Butt paste.  He is currently on a critical care surface and has heels offloaded as well as using foam wedges and dry flow pads to help manage moisture and maintain position changes.  Continue with pressure injury prevention strategies and will continue to follow

## 2023-01-02 NOTE — PROGRESS NOTES
[facility] Medicine Services Daily Progress Note    Patient Name: Jaime Bar  : 1955  MRN: [id number]  Primary Care Physician:  Provider, No Known  Date of admission: 2022      Subjective      Chief Complaint: AMS      Patient seen and examined this morning.  Mental status slightly improved, little bit more awake compared to yesterday.  No acute events overnight per nursing.    Unable to obtain full review of system due to patient's underlying mental status.      Objective      Vitals:   Temp:  [97.5 °F (36.4 °C)-101 °F (38.3 °C)] 100.7 °F (38.2 °C)  Heart Rate:  [] 101  Resp:  [22-25] 22  BP: (117)/(55) 117/55  Arterial Line BP: ()/(42-73) 133/67  Flow (L/min):  [4] 4    Physical Exam:    General: Lethargic but arousable, confused, ill-appearing, lying in bed  Cardiovascular: Tachycardic, irregularly irregular rhythm, no murmurs  Respiratory: Decreased breath sounds bilaterally, no wheezing or rales, unlabored breathing  Abdomen: Soft, nontender, positive bowel sounds, no guarding  Neurologic: Lethargic, does not follow commands, limited  Skin: Warm, bilateral shoulder incisions bandaged, drains in place.  Bilateral upper extremity pitting edema noted L>R         Result Review    Result Review:  I have personally reviewed the results from the time of this admission to 2023 10:11 EST and agree with these findings:  [x]  Laboratory  [x]  Microbiology  [x]  Radiology  [x]  EKG/Telemetry   []  Cardiology/Vascular   []  Pathology  []  Old records  []  Other:    Wounds (last 24 hours)     LDA Wound     Row Name 23 0400 23 0000 23 2000       Wound 22 Right shoulder Incision    Wound - Properties Group Placement Date: 22  -HB Placement Time:   -HB Side: Right  -HB Location: shoulder  -HB Primary Wound Type: Incision  -HB    Dressing Appearance -- -- dry;intact  -MH    Closure Adhesive bandage  - Adhesive bandage  - Adhesive  bandage  -MH    Base dressing in place, unable to visualize  -MH dressing in place, unable to visualize  -MH dressing in place, unable to visualize  -MH    Retired Wound - Properties Group Placement Date: 12/28/22  -HB Placement Time: 1856  -HB Side: Right  -HB Location: shoulder  -HB Primary Wound Type: Incision  -HB    Retired Wound - Properties Group Date first assessed: 12/28/22  -HB Time first assessed: 1856  -HB Side: Right  -HB Location: shoulder  -HB Primary Wound Type: Incision  -HB       Wound 12/28/22 1940 Left shoulder Incision    Wound - Properties Group Placement Date: 12/28/22 -HB Placement Time: 1940 -HB Side: Left  -HB Location: shoulder  -HB Primary Wound Type: Incision  -HB    Dressing Appearance -- -- dry;intact  -MH    Closure Adhesive bandage  -MH Adhesive bandage  -MH Adhesive bandage  -MH    Base dressing in place, unable to visualize  -MH dressing in place, unable to visualize  -MH dressing in place, unable to visualize  -MH    Retired Wound - Properties Group Placement Date: 12/28/22  -HB Placement Time: 1940 -HB Side: Left  -HB Location: shoulder  -HB Primary Wound Type: Incision  -HB    Retired Wound - Properties Group Date first assessed: 12/28/22  -HB Time first assessed: 1940  -HB Side: Left  -HB Location: shoulder  -HB Primary Wound Type: Incision  -HB       Wound 12/30/22 2000 sacral spine Pressure Injury    Wound - Properties Group Placement Date: 12/30/22 -MB Placement Time: 2000 -MB Location: sacral spine  -MB Primary Wound Type: Pressure inj  -MB    Dressing Appearance -- -- dry;intact  -MH    Base non-blanchable;purple  -MH non-blanchable;purple  -MH non-blanchable;purple  -MH    Retired Wound - Properties Group Placement Date: 12/30/22  -MB Placement Time: 2000 -MB Location: sacral spine  -MB Primary Wound Type: Pressure inj  -MB    Retired Wound - Properties Group Date first assessed: 12/30/22  -MB Time first assessed: 2000 -MB Location: sacral spine  -MB Primary Wound  Type: Pressure inj  -MB          User Key  (r) = Recorded By, (t) = Taken By, (c) = Cosigned By    Initials Name Provider Type    Martha Monson, RN Registered Nurse    Carolyn Jay, RN Registered Nurse    Marimar Renteria RN Registered Nurse                  Assessment & Plan      Brief Patient Summary:  Jaime Bar is a 67 y.o. male with past medical history of cervicalgia, primary osteoarthritis involving multiple joints with surgery on bilateral shoulders and right knee replacement but no recent procedure or surgery, hyponatremia, prostate cancer, and cardiac amyloidosis presented to [facility] on 12/25/2022 with complaints of right shoulder pain, weakness, and altered mental status.  He was found to be hypotensive and hyponatremic with elevated lactic acid.  Patient had reported decreased oral intake.  He was transferred to [facility] ICU for further treatment of septic shock requiring vasopressors and hyponatremia.      Since admission he was found to be bacteremic with 2 sets of blood cultures positive for staph aureus, source being bilateral shoulder septic arthritis. ID has been treating the patient with IV ceftriaxone 2G and he will need a total of 6 weeks of antibiotic therapy.  He has status post bilateral shoulder arthroscopy with extensive debridement by Dr. Velez 12/28/2022. He remained intubated after surgery until 12/29 and now extubated. He has been weaned off vasopressors. Nephrology is managing his hyponatremia and STEVEN on CKD. He was found to have LUE DVT provoked from PICC line currently on argatroban. Hematology has been consulted for his thrombocytopenia.       cefTRIAXone, 2 g, Intravenous, Q12H  enoxaparin, 30 mg, Subcutaneous, Q12H  ferrous sulfate, 300 mg, Nasogastric, Daily  lansoprazole, 30 mg, Nasogastric, Q AM  midodrine, 10 mg, Nasogastric, Q8H  sodium bicarbonate, 650 mg, Nasogastric, BID  sodium chloride, 10 mL, Intravenous, Q12H        dextrose, 50 mL/hr, Last Rate: 50 mL/hr (01/02/23 1009)         Active Hospital Problems:  Active Hospital Problems    Diagnosis    • Bacteremia due to methicillin susceptible Staphylococcus aureus (MSSA)    • Staphylococcal arthritis of shoulder (HCC)    • Cervicalgia, spine surgery 2017    • Primary osteoarthritis involving multiple joints    • Hyponatremia    • Acute kidney injury superimposed on chronic kidney disease (HCC)    • Cardiac amyloidosis (HCC)    • ACC/AHA stage C chronic systolic heart failure (HCC)    • Tobacco abuse    • Primary hypertension      Plan:    Bilateral shoulder septic arthritis  Septic shock secondary to MSSA bacteremia  -Synovial fluid culture was consistent with infection and cultures are growing 4+ Staph aureus  -s/p bilateral shoulder arthroscopy with extensive debridement by Dr. Velez 12/28/2022  - 2/2 blood cultures positive for MSSA secondary to bilateral shoulder septic arthritis   -ALMA DELIA on 12/28, Negative for endocarditis  -On IV Rocephin 2 g daily x6 weeks per ID  -Off IV vasopressors, remains on midodrine  -Discontinue art line, MAP greater than 65 mmHg at this time  -LUE PICC line removed and tip also sent for culture     Acute metabolic encephalopathy  Severe thrombocytopenia, improved  LUE DVT, provoked from PICC line  -Patient's mental status worsening  -Initially concern for TTP however no schistocytes seen on peripheral smear per hematology  -Platelets improving  -Off argatroban drip, PTT elevated  -On low-dose Lovenox twice daily for now per heme-onc pending DIC work-up    STEVEN on CKD  Hyponatremia on admission and now hyponatremia  -Creatinine stable now but sodium trending up  -Free water flushes increased per nephrology  -Management per nephrology     A. fib with RVR  -Off amnio drip, rate controlled  -Anticoagulation recommendations per cardiology and heme-onc  -Cardiology following    Elevated LFTs  -Likely due to shock liver  -Hep panel negative  -Liver  ultrasound showed normal hepatic parenchyma, cholelithiasis with adenomyomatosis involving gallbladder wall, CBD upper limit of normal  -Monitor    Acute respiratory failure with hypoxia  -Postop respiratory failure, extubated on 12/39  -Supportive care, pulmonary toilet     H/o cardiac amyloidosis  H/o prostate CA  Osteoarthritis     DVT/GI Ppx.    Guarded prognosis.  DNR/DNI.    CODE STATUS:    Medical Intervention Limits: NO intubation (DNI)  Level Of Support Discussed With: Next of Kin (If No Surrogate)  Code Status (Patient has no pulse and is not breathing): No CPR (Do Not Attempt to Resuscitate)  Medical Interventions (Patient has pulse or is breathing): Limited Support  Release to patient: Routine Release      Disposition: Will likely need placement.  Pending improvement.    Electronically signed by James Holguin DO, 01/02/23, 10:11 EST.  [facility] Hospitalist Team      Part of this note may be an electronic transcription/translation of spoken language to printed text using the Dragon Dictation System.

## 2023-01-02 NOTE — PROGRESS NOTES
"                                                                                                                                      Nephrology  Progress Note                                        [facility]    Patient Identification    Name: Jaime Bar  Age: 67 y.o.  Sex: male  :  1955  MRN: [id number]      DATE OF SERVICE:  2023        Subective    Events noted  Remains off the vent      Objective   Scheduled Meds:cefTRIAXone, 2 g, Intravenous, Q12H  enoxaparin, 30 mg, Subcutaneous, Q12H  ferrous sulfate, 300 mg, Nasogastric, Daily  lansoprazole, 30 mg, Nasogastric, Q AM  midodrine, 10 mg, Nasogastric, Q8H  sodium bicarbonate, 650 mg, Nasogastric, BID  sodium chloride, 10 mL, Intravenous, Q12H          Continuous Infusions:dextrose 5 % and sodium chloride 0.45 %, 50 mL/hr, Last Rate: 50 mL/hr (23)        PRN Meds:•  acetaminophen **OR** acetaminophen  •  aluminum-magnesium hydroxide-simethicone  •  dextrose  •  dextrose  •  fentanyl  •  glucagon (human recombinant)  •  HYDROcodone-acetaminophen  •  Morphine  •  ondansetron **OR** ondansetron  •  sodium chloride  •  sodium chloride  •  sodium chloride     Exam:  /55   Pulse 101   Temp (!) 100.7 °F (38.2 °C) (Axillary)   Resp 22   Ht 167.6 cm (66\")   Wt 90.1 kg (198 lb 10.2 oz)   SpO2 97%   BMI 32.06 kg/m²     Intake/Output last 3 shifts:  I/O last 3 completed shifts:  In: 4724 [I.V.:2849; Other:76; NG/GT:1799]  Out: 3850 [Urine:3650; Drains:200]    Intake/Output this shift:  No intake/output data recorded.    Physical exam:  General Appearance: Confused  Head:  Normocephalic, without obvious abnormality, atraumatic  Eyes:  PERRL, conjunctiva/corneas clear     Neck:  Supple,  no adenopathy;      Lungs:  Decreased BS occasion ronchi  Heart:  Regular rate and rhythm, S1 and S2 normal  Abdomen:  Soft, non-tender, bowel sounds active   Extremities: trace edema  Pulses: 2+ and symmetric all extremities  Skin:  No " rashes or lesions       Data Review:  All labs (24hrs):   Recent Results (from the past 24 hour(s))   POC Glucose Once    Collection Time: 01/01/23 11:05 AM    Specimen: Blood   Result Value Ref Range    Glucose 130 (H) 70 - 105 mg/dL   POC Glucose Once    Collection Time: 01/01/23  5:13 PM    Specimen: Blood   Result Value Ref Range    Glucose 122 (H) 70 - 105 mg/dL   Fibrinogen    Collection Time: 01/01/23  6:25 PM    Specimen: Blood   Result Value Ref Range    Fibrinogen 445 210 - 450 mg/dL   POC Glucose Once    Collection Time: 01/01/23 11:57 PM    Specimen: Blood   Result Value Ref Range    Glucose 118 (H) 70 - 105 mg/dL   Calcium, Ionized    Collection Time: 01/02/23  6:06 AM    Specimen: Blood   Result Value Ref Range    Ionized Calcium 1.16 (L) 1.20 - 1.30 mmol/L   Comprehensive Metabolic Panel    Collection Time: 01/02/23  6:06 AM    Specimen: Blood   Result Value Ref Range    Glucose 144 (H) 65 - 99 mg/dL    BUN 41 (H) 8 - 23 mg/dL    Creatinine 0.69 (L) 0.76 - 1.27 mg/dL    Sodium 152 (H) 136 - 145 mmol/L    Potassium 4.3 3.5 - 5.2 mmol/L    Chloride 120 (H) 98 - 107 mmol/L    CO2 27.0 22.0 - 29.0 mmol/L    Calcium 8.2 (L) 8.6 - 10.5 mg/dL    Total Protein 5.0 (L) 6.0 - 8.5 g/dL    Albumin 2.4 (L) 3.5 - 5.2 g/dL    ALT (SGPT) 200 (H) 1 - 41 U/L    AST (SGOT) 104 (H) 1 - 40 U/L    Alkaline Phosphatase 102 39 - 117 U/L    Total Bilirubin 3.3 (H) 0.0 - 1.2 mg/dL    Globulin 2.6 gm/dL    A/G Ratio 0.9 g/dL    BUN/Creatinine Ratio 59.4 (H) 7.0 - 25.0    Anion Gap 5.0 5.0 - 15.0 mmol/L    eGFR 101.4 >60.0 mL/min/1.73   Magnesium    Collection Time: 01/02/23  6:06 AM    Specimen: Blood   Result Value Ref Range    Magnesium 2.0 1.6 - 2.4 mg/dL   Phosphorus    Collection Time: 01/02/23  6:06 AM    Specimen: Blood   Result Value Ref Range    Phosphorus 2.6 2.5 - 4.5 mg/dL   CBC Auto Differential    Collection Time: 01/02/23  6:06 AM    Specimen: Blood   Result Value Ref Range    WBC 9.30 3.40 - 10.80 10*3/mm3     RBC 3.56 (L) 4.14 - 5.80 10*6/mm3    Hemoglobin 11.3 (L) 13.0 - 17.7 g/dL    Hematocrit 34.6 (L) 37.5 - 51.0 %    MCV 97.1 (H) 79.0 - 97.0 fL    MCH 31.8 26.6 - 33.0 pg    MCHC 32.7 31.5 - 35.7 g/dL    RDW 16.5 (H) 12.3 - 15.4 %    RDW-SD 56.4 (H) 37.0 - 54.0 fl    MPV 10.2 6.0 - 12.0 fL    Platelets 78 (L) 140 - 450 10*3/mm3    Neutrophil % 90.5 (H) 42.7 - 76.0 %    Lymphocyte % 2.8 (L) 19.6 - 45.3 %    Monocyte % 5.4 5.0 - 12.0 %    Eosinophil % 0.2 (L) 0.3 - 6.2 %    Basophil % 1.1 0.0 - 1.5 %    Neutrophils, Absolute 8.40 (H) 1.70 - 7.00 10*3/mm3    Lymphocytes, Absolute 0.30 (L) 0.70 - 3.10 10*3/mm3    Monocytes, Absolute 0.50 0.10 - 0.90 10*3/mm3    Eosinophils, Absolute 0.00 0.00 - 0.40 10*3/mm3    Basophils, Absolute 0.10 0.00 - 0.20 10*3/mm3    nRBC 0.0 0.0 - 0.2 /100 WBC   POC Glucose Once    Collection Time: 01/02/23  6:12 AM    Specimen: Blood   Result Value Ref Range    Glucose 131 (H) 70 - 105 mg/dL          Imaging:  XR Shoulder 2+ View Right    Result Date: 12/27/2022   1. Severe degenerative changes of the glenohumeral joint as well as a high riding humeral head, suggestive of chronic rotator cuff pathology. No definite evidence of osteomyelitis is identified. However plain films cannot exclude septic arthritis.  Electronically Signed By-Juan Francisco Lozoya MD On:12/27/2022 1:42 PM This report was finalized on [id number] by  Juan Francisco Lozoya MD.    CT Head Without Contrast    Result Date: 12/31/2022  1. No acute intracranial abnormality. Specifically, no evidence of acute hemorrhage, mass effect or midline shift. 2. Mild global cerebral volume loss. 3. Mild bilateral air-fluid levels within the maxillary sinuses which can be seen with acute sinus disease in the correct clinical setting.  Electronically Signed By-Willy Baxter MD On:12/31/2022 9:01 PM This report was finalized on [id number] by  Willy Baxter MD.    US Liver    Result Date: 12/30/2022  1. Normal hepatic parenchyma. 2.  Cholelithiasis with adenomyomatosis involving gallbladder wall. 3. Common bile duct at the upper limits of normal measuring 6 to 7 mm.  Electronically Signed By-Genaro Canada MD On:12/30/2022 3:40 PM This report was finalized on [id number] by  Genaro Canada MD.    XR Chest 1 View    Result Date: 12/30/2022  Cardiomegaly and increased pulmonary vascular congestion and suspected interstitial edema  Lines and tubes as above[  Electronically Signed By-Jaxon Aly On:12/30/2022 10:19 AM This report was finalized on [id number] by  Jaxon Aly, .    XR Chest 1 View    Result Date: 12/29/2022  The endotracheal tube is approximately 6 mm above the sarah. Recommend repositioning. Left subclavian central venous catheter has its catheter tip overlying the superior vena cava. The cardiac silhouette is moderately to significantly enlarged and the pulmonary vessels are within normal limits. There is no focal area of consolidation otherwise seen. Electronically signed by:  Los Mccoy D.O.  12/28/2022 11:13 PM Mountain Time    XR Chest 1 View    Result Date: 12/27/2022   1. Persistent diffuse interstitial opacities. Differential includes interstitial pulmonary edema, or atypical infection.  Electronically Signed By-Juan Francisco Lozoya MD On:12/27/2022 8:46 AM This report was finalized on [id number] by  Juan Francisco Lozoya MD.    XR Chest 1 View    Result Date: 12/26/2022    1.  Interval placement of left-sided PICC line with the tip projecting over the superior vena cava. 2.  No change in coarsened reticular interstitial markings throughout both lungs.  No new airspace consolidation or pleural effusion.  Electronically Signed By-Los Diop MD On:12/26/2022 4:56 PM This report was finalized on [id number] by  Los Diop MD.    XR Chest 1 View    Result Date: 12/26/2022  1.  Coarsened airspace, bronchovascular thickening.  Consider small airways disease (bronchitis, asthma), edema, atypical/viral infection,  aspiration. 2.  Heart size is normal. 3.  No acute bony findings. Electronically signed by:  Bernadette Huddleston M.D.  12/26/2022 4:56 AM Mountain Time    MRI Shoulder Right Without Contrast    Result Date: 12/28/2022  Impression: 1. Moderate-sized joint effusion with advanced degenerative changes and destructive changes of the glenohumeral joint. Findings may be related to septic arthritis given clinical history. Fluid aspiration is recommended for confirmation. Joint effusion with subcoracoid bursitis may also be reactive and related to severe osteoarthritis. 2. Suspected complete tears of the supraspinatus and infraspinatus tendons. 3. Completely macerated appearance of the labrum. 4. Moderate acromial clavicular osteoarthritis. Electronically Signed: Linnette Pacheco  12/28/2022 10:08 AM EST  Workstation ID: [id number]    MRI Shoulder Left Without Contrast    Result Date: 12/28/2022  Impression: Very large left-sided joint effusion with destructive changes of the glenohumeral joint with deformity of the glenoid likely related to septic arthritis. Fluid sampling is recommended for further characterization. Ancillary findings as described above. Electronically Signed: Linnette Pacheco  12/28/2022 10:06 AM EST  Workstation ID: [id number]    XR Abdomen KUB    Result Date: 12/30/2022  NG tube projects over the expected position of the body of the stomach  Electronically Signed By-Jaxon Aly On:12/30/2022 12:00 PM This report was finalized on [id number] by  Jaxon Aly, .      Assessment/Plan:     Hyponatremia    ACC/AHA stage C chronic systolic heart failure (HCC)    Cardiac amyloidosis (HCC)    Primary hypertension    Tobacco abuse    Cervicalgia, spine surgery 2017    Primary osteoarthritis involving multiple joints    Bacteremia due to methicillin susceptible Staphylococcus aureus (MSSA)    Staphylococcal arthritis of shoulder (HCC)    Acute kidney injury superimposed on chronic kidney disease (HCC)   hyponatremia  improving   Acute kidney injury on chronic kidney disease improving  Atrial fibrillation with rapid ventricular response  Metabolic acidosis  Urinary retention   Sepsis   Hypocalcemia   Hyperkalemia     Creatinine down to 0.6 from 1.1 from 1.7  Sodium up to 152 from 146  Add free water flushes    Continue gentle hydration to D5W

## 2023-01-02 NOTE — PLAN OF CARE
Goal Outcome Evaluation:  Plan of Care Reviewed With: patient, family     Patient alert and oriented x1-3, follows commands, calm, VSS on 4L NC and D5W.  Moderate drainage from shoulder PADMINI drains, good urine output, tolerating tube feeds well; no BM this shift.  PRN pain med given once for shoulder pain.  PCU overflow awaiting transfer.     Progress: improving

## 2023-01-03 ENCOUNTER — APPOINTMENT (OUTPATIENT)
Dept: CARDIOLOGY | Facility: HOSPITAL | Age: 68
DRG: 853 | End: 2023-01-03
Payer: MEDICARE

## 2023-01-03 ENCOUNTER — APPOINTMENT (OUTPATIENT)
Dept: GENERAL RADIOLOGY | Facility: HOSPITAL | Age: 68
DRG: 853 | End: 2023-01-03
Payer: MEDICARE

## 2023-01-03 ENCOUNTER — APPOINTMENT (OUTPATIENT)
Dept: CT IMAGING | Facility: HOSPITAL | Age: 68
DRG: 853 | End: 2023-01-03
Payer: MEDICARE

## 2023-01-03 LAB
ALBUMIN SERPL ELPH-MCNC: 2.2 G/DL (ref 2.9–4.4)
ALBUMIN SERPL-MCNC: 2.4 G/DL (ref 3.5–5.2)
ALBUMIN/GLOB SERPL: 0.9 G/DL
ALBUMIN/GLOB SERPL: 1.2 {RATIO} (ref 0.7–1.7)
ALP SERPL-CCNC: 120 U/L (ref 39–117)
ALPHA1 GLOB SERPL ELPH-MCNC: 0.4 G/DL (ref 0–0.4)
ALPHA2 GLOB SERPL ELPH-MCNC: 0.6 G/DL (ref 0.4–1)
ALT SERPL W P-5'-P-CCNC: 122 U/L (ref 1–41)
ANION GAP SERPL CALCULATED.3IONS-SCNC: 6 MMOL/L (ref 5–15)
AST SERPL-CCNC: 44 U/L (ref 1–40)
B-GLOBULIN SERPL ELPH-MCNC: 0.6 G/DL (ref 0.7–1.3)
BASOPHILS # BLD AUTO: 0 10*3/MM3 (ref 0–0.2)
BASOPHILS NFR BLD AUTO: 0.3 % (ref 0–1.5)
BH CV LOWER ARTERIAL LEFT ABI RATIO: 1.35
BH CV LOWER ARTERIAL LEFT DORSALIS PEDIS SYS MAX: 172
BH CV LOWER ARTERIAL LEFT GREAT TOE SYS MAX: 0
BH CV LOWER ARTERIAL LEFT POST TIBIAL SYS MAX: 169
BH CV LOWER ARTERIAL LEFT TBI RATIO: 0
BH CV LOWER ARTERIAL RIGHT ABI RATIO: 1.31
BH CV LOWER ARTERIAL RIGHT DORSALIS PEDIS SYS MAX: 158
BH CV LOWER ARTERIAL RIGHT GREAT TOE SYS MAX: 70
BH CV LOWER ARTERIAL RIGHT POST TIBIAL SYS MAX: 167
BH CV LOWER ARTERIAL RIGHT TBI RATIO: 0.55
BILIRUB SERPL-MCNC: 3.4 MG/DL (ref 0–1.2)
BUN SERPL-MCNC: 36 MG/DL (ref 8–23)
BUN/CREAT SERPL: 58.1 (ref 7–25)
CA-I SERPL ISE-MCNC: 1.15 MMOL/L (ref 1.2–1.3)
CALCIUM SPEC-SCNC: 8 MG/DL (ref 8.6–10.5)
CATHETER CULTURE: NORMAL
CHLORIDE SERPL-SCNC: 116 MMOL/L (ref 98–107)
CO2 SERPL-SCNC: 28 MMOL/L (ref 22–29)
CREAT SERPL-MCNC: 0.62 MG/DL (ref 0.76–1.27)
DEPRECATED RDW RBC AUTO: 56.9 FL (ref 37–54)
EGFRCR SERPLBLD CKD-EPI 2021: 104.8 ML/MIN/1.73
EOSINOPHIL # BLD AUTO: 0 10*3/MM3 (ref 0–0.4)
EOSINOPHIL NFR BLD AUTO: 0.1 % (ref 0.3–6.2)
ERYTHROCYTE [DISTWIDTH] IN BLOOD BY AUTOMATED COUNT: 16.5 % (ref 12.3–15.4)
GAMMA GLOB SERPL ELPH-MCNC: 0.4 G/DL (ref 0.4–1.8)
GLOBULIN SER CALC-MCNC: 1.9 G/DL (ref 2.2–3.9)
GLOBULIN UR ELPH-MCNC: 2.7 GM/DL
GLUCOSE BLDC GLUCOMTR-MCNC: 113 MG/DL (ref 70–105)
GLUCOSE BLDC GLUCOMTR-MCNC: 115 MG/DL (ref 70–105)
GLUCOSE BLDC GLUCOMTR-MCNC: 115 MG/DL (ref 70–105)
GLUCOSE BLDC GLUCOMTR-MCNC: 137 MG/DL (ref 70–105)
GLUCOSE SERPL-MCNC: 122 MG/DL (ref 65–99)
HCT VFR BLD AUTO: 34.1 % (ref 37.5–51)
HGB BLD-MCNC: 11.1 G/DL (ref 13–17.7)
KAPPA LC FREE SER-MCNC: 28 MG/L (ref 3.3–19.4)
KAPPA LC FREE/LAMBDA FREE SER: 1.02 {RATIO} (ref 0.26–1.65)
LAB AP CASE REPORT: NORMAL
LABORATORY COMMENT REPORT: ABNORMAL
LAMBDA LC FREE SERPL-MCNC: 27.5 MG/L (ref 5.7–26.3)
LYMPHOCYTES # BLD AUTO: 0.3 10*3/MM3 (ref 0.7–3.1)
LYMPHOCYTES NFR BLD AUTO: 3.4 % (ref 19.6–45.3)
M PROTEIN SERPL ELPH-MCNC: ABNORMAL G/DL
MAGNESIUM SERPL-MCNC: 2 MG/DL (ref 1.6–2.4)
MAXIMAL PREDICTED HEART RATE: 153 BPM
MCH RBC QN AUTO: 31.7 PG (ref 26.6–33)
MCHC RBC AUTO-ENTMCNC: 32.6 G/DL (ref 31.5–35.7)
MCV RBC AUTO: 97.2 FL (ref 79–97)
MONOCYTES # BLD AUTO: 0.8 10*3/MM3 (ref 0.1–0.9)
MONOCYTES NFR BLD AUTO: 9.4 % (ref 5–12)
NEUTROPHILS NFR BLD AUTO: 7.1 10*3/MM3 (ref 1.7–7)
NEUTROPHILS NFR BLD AUTO: 86.8 % (ref 42.7–76)
NRBC BLD AUTO-RTO: 0.2 /100 WBC (ref 0–0.2)
PATH REPORT.FINAL DX SPEC: NORMAL
PHOSPHATE SERPL-MCNC: 3 MG/DL (ref 2.5–4.5)
PLATELET # BLD AUTO: 104 10*3/MM3 (ref 140–450)
PMV BLD AUTO: 10.2 FL (ref 6–12)
POTASSIUM SERPL-SCNC: 4.7 MMOL/L (ref 3.5–5.2)
PROT PATTERN SERPL ELPH-IMP: ABNORMAL
PROT SERPL-MCNC: 4.1 G/DL (ref 6–8.5)
PROT SERPL-MCNC: 5.1 G/DL (ref 6–8.5)
QT INTERVAL: 395 MS
RBC # BLD AUTO: 3.5 10*6/MM3 (ref 4.14–5.8)
SODIUM SERPL-SCNC: 150 MMOL/L (ref 136–145)
STRESS TARGET HR: 130 BPM
UPPER ARTERIAL RIGHT ARM BRACHIAL SYS MAX: 127 MMHG
WBC NRBC COR # BLD: 8.2 10*3/MM3 (ref 3.4–10.8)

## 2023-01-03 PROCEDURE — 71045 X-RAY EXAM CHEST 1 VIEW: CPT

## 2023-01-03 PROCEDURE — 0 IOPAMIDOL PER 1 ML: Performed by: INTERNAL MEDICINE

## 2023-01-03 PROCEDURE — 85025 COMPLETE CBC W/AUTO DIFF WBC: CPT | Performed by: ORTHOPAEDIC SURGERY

## 2023-01-03 PROCEDURE — 80053 COMPREHEN METABOLIC PANEL: CPT | Performed by: ORTHOPAEDIC SURGERY

## 2023-01-03 PROCEDURE — 82962 GLUCOSE BLOOD TEST: CPT

## 2023-01-03 PROCEDURE — 93922 UPR/L XTREMITY ART 2 LEVELS: CPT

## 2023-01-03 PROCEDURE — 84100 ASSAY OF PHOSPHORUS: CPT | Performed by: ORTHOPAEDIC SURGERY

## 2023-01-03 PROCEDURE — 83735 ASSAY OF MAGNESIUM: CPT | Performed by: ORTHOPAEDIC SURGERY

## 2023-01-03 PROCEDURE — 25010000002 FENTANYL CITRATE (PF) 50 MCG/ML SOLUTION: Performed by: STUDENT IN AN ORGANIZED HEALTH CARE EDUCATION/TRAINING PROGRAM

## 2023-01-03 PROCEDURE — 25010000002 FUROSEMIDE PER 20 MG: Performed by: INTERNAL MEDICINE

## 2023-01-03 PROCEDURE — 72132 CT LUMBAR SPINE W/DYE: CPT

## 2023-01-03 PROCEDURE — 99221 1ST HOSP IP/OBS SF/LOW 40: CPT | Performed by: PSYCHIATRY & NEUROLOGY

## 2023-01-03 PROCEDURE — 82330 ASSAY OF CALCIUM: CPT | Performed by: ORTHOPAEDIC SURGERY

## 2023-01-03 PROCEDURE — 72126 CT NECK SPINE W/DYE: CPT

## 2023-01-03 PROCEDURE — 25010000002 CEFTRIAXONE PER 250 MG: Performed by: NURSE PRACTITIONER

## 2023-01-03 PROCEDURE — 72129 CT CHEST SPINE W/DYE: CPT

## 2023-01-03 PROCEDURE — 25010000002 ENOXAPARIN PER 10 MG: Performed by: NURSE PRACTITIONER

## 2023-01-03 PROCEDURE — 94799 UNLISTED PULMONARY SVC/PX: CPT

## 2023-01-03 RX ORDER — FUROSEMIDE 10 MG/ML
60 INJECTION INTRAMUSCULAR; INTRAVENOUS ONCE
Status: COMPLETED | OUTPATIENT
Start: 2023-01-03 | End: 2023-01-03

## 2023-01-03 RX ORDER — FUROSEMIDE 10 MG/ML
40 INJECTION INTRAMUSCULAR; INTRAVENOUS EVERY 12 HOURS
Status: DISCONTINUED | OUTPATIENT
Start: 2023-01-04 | End: 2023-01-11

## 2023-01-03 RX ADMIN — FUROSEMIDE 60 MG: 10 INJECTION, SOLUTION INTRAMUSCULAR; INTRAVENOUS at 11:10

## 2023-01-03 RX ADMIN — HYDROCODONE BITARTRATE AND ACETAMINOPHEN 1 TABLET: 10; 325 TABLET ORAL at 16:00

## 2023-01-03 RX ADMIN — MIDODRINE HYDROCHLORIDE 10 MG: 5 TABLET ORAL at 02:07

## 2023-01-03 RX ADMIN — ACETAMINOPHEN 650 MG: 325 TABLET, FILM COATED ORAL at 20:42

## 2023-01-03 RX ADMIN — DEXTROSE MONOHYDRATE 50 ML/HR: 50 INJECTION, SOLUTION INTRAVENOUS at 07:25

## 2023-01-03 RX ADMIN — CEFTRIAXONE SODIUM 2 G: 2 INJECTION, POWDER, FOR SOLUTION INTRAMUSCULAR; INTRAVENOUS at 13:44

## 2023-01-03 RX ADMIN — CEFTRIAXONE SODIUM 2 G: 2 INJECTION, POWDER, FOR SOLUTION INTRAMUSCULAR; INTRAVENOUS at 02:06

## 2023-01-03 RX ADMIN — Medication 10 ML: at 20:42

## 2023-01-03 RX ADMIN — Medication 1 APPLICATION: at 09:26

## 2023-01-03 RX ADMIN — Medication: at 20:42

## 2023-01-03 RX ADMIN — HYDROCODONE BITARTRATE AND ACETAMINOPHEN 1 TABLET: 10; 325 TABLET ORAL at 23:45

## 2023-01-03 RX ADMIN — HYDROCODONE BITARTRATE AND ACETAMINOPHEN 1 TABLET: 10; 325 TABLET ORAL at 09:23

## 2023-01-03 RX ADMIN — ENOXAPARIN SODIUM 140 MG: 150 INJECTION SUBCUTANEOUS at 13:44

## 2023-01-03 RX ADMIN — Medication 300 MG: at 09:23

## 2023-01-03 RX ADMIN — IOPAMIDOL 100 ML: 755 INJECTION, SOLUTION INTRAVENOUS at 22:05

## 2023-01-03 RX ADMIN — FENTANYL CITRATE 50 MCG: 50 INJECTION, SOLUTION INTRAMUSCULAR; INTRAVENOUS at 20:42

## 2023-01-03 RX ADMIN — FUROSEMIDE 40 MG: 10 INJECTION, SOLUTION INTRAMUSCULAR; INTRAVENOUS at 23:44

## 2023-01-03 RX ADMIN — MIDODRINE HYDROCHLORIDE 10 MG: 5 TABLET ORAL at 09:25

## 2023-01-03 RX ADMIN — LANSOPRAZOLE 30 MG: 30 TABLET, ORALLY DISINTEGRATING ORAL at 05:35

## 2023-01-03 RX ADMIN — SODIUM BICARBONATE 650 MG TABLET 650 MG: at 09:23

## 2023-01-03 RX ADMIN — MIDODRINE HYDROCHLORIDE 10 MG: 5 TABLET ORAL at 17:30

## 2023-01-03 RX ADMIN — Medication 1 APPLICATION: at 16:00

## 2023-01-03 RX ADMIN — Medication 10 ML: at 09:23

## 2023-01-03 NOTE — PROGRESS NOTES
Hematology/Oncology Inpatient Progress Note    PATIENT NAME: Jaime Bar  : 1955  MRN: [id number]    CHIEF COMPLAINT: Sepsis; Thrombocytopenia; provoked catheter associated LUE DVT, LUE SVT, and RUE SVT    HISTORY OF PRESENT ILLNESS:    67 y.o. male admitted to [facility] on transfer from [facility] on 2022.  The patient was intubated and unresponsive at time of consultation with histories obtained from review of records and discussion with patient's niece and nephew.  He reported a history of bilateral shoulder pain for few days prior to admission.  He had presented to [facility] on 2022 where he was hypotensive and hyponatremic and transferred to [facility].  CXR on 2022 showed coarsened airspace and bronchovascular thickening with small airway disease such as bronchitis or asthma, edema, atypical/viral infection, and aspiration in differential.  A RUE venous Doppler showed acute RUE superficial thrombophlebitis in the cephalic vein and no evidence of DVT for which he was started on subcu heparin.  On 2022 CBC revealed WBC 7.2, hemoglobin 12.4, MCV 98.4, and platelets 99,000.  Creatinine was elevated to 2.38, BUN 55, sodium was low at 118, potassium was high at 5.4 and LFTs revealed T bili elevated to 4.8 (0-1.2) with transaminases normal.  Blood cultures grew Staph aureus.  Urinalysis was concerning for UTI.  Urine sodium was less than 20, a.m. cortisol 26.11 (6.02-18.4), and urine osmolality was 410 ().   X-rays and MRIs of the bilateral shoulders were performed 2022 revealing joint effusion and advanced degenerative changes/destructive changes of glenohumeral joint.  There was suspected complete tear of the supraspinatus and infraspinatus tendons and a completely macerated appearance of the labrum on the right shoulder.  On 2022 RUE venous Doppler was repeated and felt stable.  He then underwent bilateral shoulder arthroscopy incision and drainage  with culture growing heavy growth of Staph aureus.  He had been intubated prior to the surgery and remained sedated on ventilator with pressors postop.  At time of consultation 12/29/2022 LUE venous Doppler showed acute catheter associated DVT in the left axillary vein, SVT in the left basilic vein of the upper arm.  BLE venous Doppler was negative for DVT/SVT but did show deep venous valvular incompetence in the right popliteal vein.  CBC revealed WBC 14.2, hemoglobin 11.4, MCV 96.5, and platelets 46,000.  T bili was elevated to 5.3 and transaminases were now elevated with  and .  PT was 15.6 (9.6-11.7), PTT was 38.7 (24-31), and fibrinogen was 493 (210-450).  D-dimer was 11.83 (0-0.67).  Transesophageal echocardiogram showed LVEF 46-50%, mild aortic valve stenosis, and no evidence of vegetation or bacterial endocarditis.  Heparin was changed to argatroban.  ID was managing antibiotics with patient on ceftriaxone with plan for treatment x6 weeks and recommending replacing PICC line 2 days after starting anticoagulation.  His niece and nephew reported the patient to have lost some weight but they were unsure the amount.  They reported PMH significant for prostate cancer treated approximately 2010 with radiation alone and no evidence of recurrence to their knowledge.  He also was under the care of Dr. Damico at  for cardiac amyloid.  Chart review showed PSA was 0.1 (0-4) in August 2022.  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  IgG was 768 (603-1613), IgA was 238 (), IgM was 64 ().  Kappa/lambda ratio was 1.36 (0.2-1.65).  A note from Roosevelt Damico MD (cardiologist) at [facility] dated 7/13/2022 reported patient was followed for restrictive cardiomyopathy secondary to cardiac amyloidosis.  LVEF in February 2022 was 45-50%.  Cardiac MRI 4/6/2022 revealed a myocardial mass 276 g, global hypokinesis of left ventricle with ejection fraction 43%, no  myocardial iron overload, the thickness and appearance of intra-atrial septum was also consistent with cardiac amyloidosis.  The note stated that the patient was followed by Dr. Banks at  [facility] where he was on Vyndamax therapy for cardiac amyloid and had been on that treatment for about 2 weeks.  Additionally in January 2022 T bili was noted to be elevated to 2.6 (0.2-1.0) and he was anemic with hemoglobin 11.8 and MCV 86.5 platelets were normal at 261,000.     12/29/22  Hematology/Oncology was consulted by RITU Jimenez for thrombocytopenia.     Past Medical History: Prostate cancer approximately 2010 treated with radiation only.  Cardiac amyloid managed by Dr. Damico at .  CKD.  Surgical History: Several orthopedic surgeries in the past.  Bilateral shoulder arthroscopy with incision and drainage 12/28/2022.  Social History: He lives in [place], [place] with his spouse.  His spouse is known to have Alzheimer's disease.  He has smoked for many years.  He has no alcohol or recreational drug use however he was narcotic dependent secondary to chronic pain.  He is a retired .  Family History: No significant known family history.  Allergies: Tramadol causes him to feel weird and codeine causes nausea.     PCP: Provider, No Known    INTERVAL HISTORY:  • 12/30/2022- platelets 38,000, hemoglobin 11.1.  Folate 7.13 (4.78-24.2), vitamin B12 greater than 2000.  Iron 29 (), iron saturation 15% (20-50), TIBC 195 (298-536), and ferritin 3525 ().  Reticulocytes 1.05 (0.7-1.9), haptoglobin 148 (). PSA 0.234 (0-4).  T bili 5.3 with direct bili 4.2 (0-0.3).  Hepatitis panel nonreactive.  Extubated 12/29/2022 and off pressors.  • 12/31/2022- platelets 46,000, hemoglobin 11.3.  HIT antibody negative at 0.121 (0-0.4).  CMV IgM less than 30 (0-29.9) and EBV IgM less than 36 (0-35.9).  D-dimer mildly improved to 8.94 (0-0.67).  Transaminases rising with  and AST  520.  T bili 4.4.  Peripheral smear was negative for schistocytes again.  Abdominal ultrasound revealed cholelithiasis with adenomyomatosis involving the gallbladder wall.  The common bile duct was in the upper limits of normal.  The liver appeared normal.  • 1/1/2023- platelets 60,000, hemoglobin 11.4.  Argatroban was held with repeat PTT remaining high with subsequent continued hold of argatroban.  D-dimer 9.84.  PTT 61.1 (24-31).  CT head without contrast was negative for acute findings.  • 1/2/2023- hemoglobin 11.3 and platelets 78,000.  ALT improved to 200 and AST improved to 104.  T bili improved to 3.3.  Fibrinogen 445 (210-150).  • 1/3/2023- evaluated by neurology and felt to have multifactorial encephalopathy.  SPEP with no M spike.  Transaminases improved.  Hemoglobin 11.1, platelet count 104,000.  Kappa/lambda free light chain ratio 1.02 (0.26-1.25).  SHAR screen negative.  Right BERNARDINO normal with mild digital ischemia.  Left BERNARDINO normal with severe digital ischemia.     History of present illness reviewed since last visit and changes noted on 01/03/23.    Subjective   Patient with eyes open but staring at the ceiling and barely able to respond verbally to questioning.    ROS:   Review of Systems   Unable to perform ROS: Mental status change        MEDICATIONS:    Scheduled Meds:  cefTRIAXone, 2 g, Intravenous, Q12H  enoxaparin, 1.5 mg/kg, Subcutaneous, Daily  ferrous sulfate, 300 mg, Nasogastric, Daily  [START ON 1/4/2023] furosemide, 40 mg, Intravenous, Q12H  lansoprazole, 30 mg, Nasogastric, Q AM  midodrine, 10 mg, Nasogastric, Q8H  sodium chloride, 10 mL, Intravenous, Q12H  Zinc Oxide, , Apply externally, TID       Continuous Infusions:      PRN Meds:  •  acetaminophen **OR** acetaminophen  •  aluminum-magnesium hydroxide-simethicone  •  dextrose  •  dextrose  •  fentanyl  •  glucagon (human recombinant)  •  HYDROcodone-acetaminophen  •  ondansetron **OR** ondansetron  •  sodium chloride  •  sodium  "chloride  •  sodium chloride     ALLERGIES:    Allergies   Allergen Reactions   • Tramadol-Acetaminophen Anxiety     Worms in the brain feeling   • Codeine Other (See Comments)     nausea   • Tramadol Other (See Comments)     \"I just felt really crawly, it did weird things with my head\"       Objective    VITALS:   BP 96/60   Pulse 105   Temp 100.2 °F (37.9 °C) (Axillary)   Resp 24   Ht 167.6 cm (66\")   Wt 92.3 kg (203 lb 7.8 oz)   SpO2 100%   BMI 32.84 kg/m²     PHYSICAL EXAM:  Physical Exam  Vitals and nursing note reviewed.   Constitutional:       General: He is not in acute distress.     Appearance: Normal appearance. He is well-developed. He is ill-appearing. He is not diaphoretic.   HENT:      Head: Normocephalic and atraumatic.      Comments: Male pattern baldness     Right Ear: External ear normal.      Left Ear: External ear normal.      Ears:      Comments: Left ear O2 monitor     Nose: Nose normal.      Comments: NG tube.  O2 per NC.     Mouth/Throat:      Mouth: Mucous membranes are moist.      Pharynx: Oropharynx is clear. No oropharyngeal exudate or posterior oropharyngeal erythema.      Comments: Dental fillings  Eyes:      General: No scleral icterus.     Extraocular Movements: Extraocular movements intact.      Conjunctiva/sclera: Conjunctivae normal.      Pupils: Pupils are equal, round, and reactive to light.   Cardiovascular:      Rate and Rhythm: Regular rhythm. Tachycardia present.      Heart sounds: Normal heart sounds. No murmur heard.     Comments: Cardiac monitor leads  Pulmonary:      Effort: Pulmonary effort is normal. No respiratory distress.      Breath sounds: Normal breath sounds. No stridor. No wheezing or rales.   Abdominal:      General: Bowel sounds are normal. There is no distension.      Palpations: Abdomen is soft. There is no mass.      Tenderness: There is no abdominal tenderness. There is no guarding.   Genitourinary:     Comments: Thompson catheter  Musculoskeletal:    "      General: Swelling (BUE edema is improving) present. No tenderness or deformity. Normal range of motion.      Cervical back: Normal range of motion and neck supple.      Comments: Bilateral shoulder dressings with 2 drains on the left and one on the right.  BUE IVs.  Right LE SCDs and support boot.   Lymphadenopathy:      Cervical: No cervical adenopathy.      Upper Body:      Right upper body: No supraclavicular adenopathy.      Left upper body: No supraclavicular adenopathy.   Skin:     General: Skin is warm and dry.      Coloration: Skin is not pale.      Findings: No bruising, erythema or rash.      Comments: Vertical scar right knee.  Left foot cooler than right.   Neurological:      Mental Status: He is alert.      Comments: Staring in the air.  Slow to respond.   Psychiatric:      Comments: Slow to respond           RECENT LABS:  Lab Results (last 24 hours)     Procedure Component Value Units Date/Time    POC Glucose Once [[id number]]  (Abnormal) Collected: 01/03/23 1732    Specimen: Blood Updated: 01/03/23 1733     Glucose 115 mg/dL      Comment: Serial Number: [id number]:  [id number]       Protein Elec + Interp, Serum [[id number]]  (Abnormal) Collected: 12/29/22 2133    Specimen: Blood Updated: 01/03/23 1311     Total Protein 4.1 g/dL      Albumin 2.2 g/dL      Alpha-1-Globulin 0.4 g/dL      Alpha-2-Globulin 0.6 g/dL      Beta Globulin 0.6 g/dL      Gamma Globulin 0.4 g/dL      M-Lazaro Not Observed g/dL      Globulin 1.9 g/dL      A/G Ratio 1.2     Please note Comment     Comment: Protein electrophoresis scan will follow via computer, mail, or   delivery.        SPE Interpretation Comment     Comment: The SPE pattern reflects non-selective protein loss. Protein losing  syndromes, in which this pattern has been observed include:  malnutrition, exudative dermatopathies, burns, exudative pulmonary  disease, essential hypoproteinemia, protein losing gastroentero-  pathies, blood loss, and  plasmaphoresis. Monoclonal protein is not  apparent.       Narrative:      Performed at:  [address] [facility]  [id number]  : Leo Rod PhD, Phone:  [phone]    Blood Culture - Blood, Blood, PICC Line [[id number]]  (Normal) Collected: 12/31/22 1148    Specimen: Blood, PICC Line Updated: 01/03/23 1200     Blood Culture No growth at 3 days    POC Glucose Once [[id number]]  (Abnormal) Collected: 01/03/23 1108    Specimen: Blood Updated: 01/03/23 1110     Glucose 115 mg/dL      Comment: Serial Number: [id number]:  [id number]       Pathology Consultation [[id number]] Collected: 12/31/22 0520    Specimen: Blood, Venous Line Updated: 01/03/23 1032     Final Diagnosis --     Anemia  Thrombocytopenia  No blasts identified       Case Report --     Surgical Pathology Report                         Case: [id number]                                  Authorizing Provider:  Darrin Nixon APRN      Collected:           12/31/2022 05:20 AM          Ordering Location:     [facility]       Received:            01/03/2023 09:03 AM                                 [facility]                                                          Pathologist:           Satinder Bullard MD                                                            Specimen:    Blood, Venous Line                                                                         Comprehensive Metabolic Panel [[id number]]  (Abnormal) Collected: 01/03/23 0914    Specimen: Blood Updated: 01/03/23 1004     Glucose 122 mg/dL      BUN 36 mg/dL      Creatinine 0.62 mg/dL      Sodium 150 mmol/L      Potassium 4.7 mmol/L      Comment: Slight hemolysis detected by analyzer. Results may be affected.        Chloride 116 mmol/L      CO2 28.0 mmol/L      Calcium 8.0 mg/dL      Total Protein 5.1 g/dL      Albumin 2.4 g/dL      ALT (SGPT) 122 U/L      AST (SGOT) 44 U/L      Alkaline Phosphatase 120 U/L      Total Bilirubin 3.4  mg/dL      Globulin 2.7 gm/dL      A/G Ratio 0.9 g/dL      BUN/Creatinine Ratio 58.1     Anion Gap 6.0 mmol/L      eGFR 104.8 mL/min/1.73      Comment: National Kidney Foundation and American Society of Nephrology (ASN) Task Force recommended calculation based on the Chronic Kidney Disease Epidemiology Collaboration (CKD-EPI) equation refit without adjustment for race.       Narrative:      GFR Normal >60  Chronic Kidney Disease <60  Kidney Failure <15      Phosphorus [[id number]]  (Normal) Collected: 01/03/23 0914    Specimen: Blood Updated: 01/03/23 1004     Phosphorus 3.0 mg/dL     Magnesium [[id number]]  (Normal) Collected: 01/03/23 0914    Specimen: Blood Updated: 01/03/23 1004     Magnesium 2.0 mg/dL     Catheter Culture - Cath Tip, Hand, Left [[id number]] Collected: 12/31/22 1839    Specimen: Cath Tip from Hand, Left Updated: 01/03/23 0958     CATHETER CULTURE No growth at 2 days    Calcium, Ionized [[id number]]  (Abnormal) Collected: 01/03/23 0914    Specimen: Blood Updated: 01/03/23 0935     Ionized Calcium 1.15 mmol/L     CBC & Differential [[id number]]  (Abnormal) Collected: 01/03/23 0914    Specimen: Blood Updated: 01/03/23 0920    Narrative:      The following orders were created for panel order CBC & Differential.  Procedure                               Abnormality         Status                     ---------                               -----------         ------                     CBC Auto Differential[[id number]]        Abnormal            Final result                 Please view results for these tests on the individual orders.    CBC Auto Differential [[id number]]  (Abnormal) Collected: 01/03/23 0914    Specimen: Blood Updated: 01/03/23 0920     WBC 8.20 10*3/mm3      RBC 3.50 10*6/mm3      Hemoglobin 11.1 g/dL      Hematocrit 34.1 %      MCV 97.2 fL      MCH 31.7 pg      MCHC 32.6 g/dL      RDW 16.5 %      RDW-SD 56.9 fl      MPV 10.2 fL      Platelets 104 10*3/mm3      Neutrophil % 86.8 %       Lymphocyte % 3.4 %      Monocyte % 9.4 %      Eosinophil % 0.1 %      Basophil % 0.3 %      Neutrophils, Absolute 7.10 10*3/mm3      Lymphocytes, Absolute 0.30 10*3/mm3      Monocytes, Absolute 0.80 10*3/mm3      Eosinophils, Absolute 0.00 10*3/mm3      Basophils, Absolute 0.00 10*3/mm3      nRBC 0.2 /100 WBC     POC Glucose Once [[id number]]  (Abnormal) Collected: 01/03/23 0544    Specimen: Blood Updated: 01/03/23 0545     Glucose 137 mg/dL      Comment: Serial Number: [id number]:  [id number]       Immunoglobulin Free LT Chains Blood [[id number]]  (Abnormal) Collected: 12/29/22 2133    Specimen: Blood Updated: 01/03/23 0006     Free Light Chain, Kappa 28.0 mg/L      Free Lambda Light Chains 27.5 mg/L      Kappa/Lambda Ratio 1.02    Narrative:      Performed at:  [address] [place] [address]  [id number]  : Leo Rod PhD, Phone:  [phone]    POC Glucose Once [[id number]]  (Abnormal) Collected: 01/02/23 2317    Specimen: Blood Updated: 01/02/23 2318     Glucose 128 mg/dL      Comment: Serial Number: [id number]:  [id number]       SHAR by IFA, Reflex 9-biomarkers profile [[id number]] Collected: 12/29/22 2133    Specimen: Blood Updated: 01/02/23 1907     SHAR Negative     Comment:                                      Negative   <1:80                                       Borderline  1:80                                       Positive   >1:80  ICAP nomenclature: AC-0  For more information about Hep-2 cell patterns use  ANApatterns.org, the official website for the International  Consensus on Antinuclear Antibody (SHAR) Patterns (ICAP).        Please note Comment     Comment: SHAR Multiplex methodology was designed to detect up to 11 antibodies  of the 100+ antibodies that may be detected by SHAR IFA methodology.       Narrative:      Performed at:  [address] [place] [address]  [id number]  : Leo Rod PhD, Phone:  [phone]           PENDING RESULTS: Copper, SIFE, immunoglobulins, UIFE.    IMAGING REVIEWED:  XR Chest 1 View    Result Date: 1/3/2023  Impression: 1. Radiographic changes consistent with congestive heart failure pulmonary edema with interval increase in pulmonary edema and development of small bilateral pleural effusions. Electronically Signed: Jaime Momin  1/3/2023 9:14 AM EST  Workstation ID: [id number]      I have reviewed the patient's labs, imaging, reports, and other clinician documentation.    Assessment & Plan   ASSESSMENT:  1. Thrombocytopenia-platelets were normal in early 2022.  Platelets were low on admission and continued to drop.  HIT antibody negative.    On ceftriaxone which can cause thrombocytopenia but platelets have started to improve now while patient continued on ceftriaxone.  Coags mildly elevated with fibrinogen not low, not likely DIC but sepsis likely contributing to thrombocytopenia. No hemolysis and no schistocytes on peripheral smear ruling out TTP.  No vitamin B12 or folate deficiencies. SHAR negative. Coags remain elevated and D-dimer high.  Repeat fibrinogen remained elevated.     2. Acute on chronic anemia/IgA lambda monoclonal gammopathy/SWETHA- mild anemia with hemoglobin in the 11's in early 2022.  Gammopathy labs at that time showed IgA lambda monoclonal protein with normal immunoglobulins and free light chains.  Iron studies show SWETHA and started oral iron.  No hemolysis or vitamin B12/folate deficiencies. Copper remains pending.  SPEP with no M spike and free light chain ratio normal.  SIFE, UIFE and immunoglobulin levels pending.  Hemoglobin stable.  3. Acute provoked catheter associated LUE DVT/SVT and RUE SVT- RUE SVT was diagnosed prior to LUE DVT and he had received several doses of subcu heparin starting on 12/26. LUE DVT provoked by PICC line which  has since been removed.  Argatroban was difficult to manage with elevated PTT.  Started low-dose Lovenox 1/1 and now that platelets are  rising, increased to full treatment dose.  4. Elevated LFT/cholelithiasis and adenomyomatosis of the gallbladder wall- possible shock liver.  T bili remains elevated with elevated direct bili with normal liver on US.  Hepatitis panel negative.  Improving.  5. Bilateral shoulder septic arthritis/staph RS bacteremia-on ceftriaxone per ID.  No vegetation on echocardiogram.  6. Acute hypoxic respiratory failure/smoking history-  transiently on ventilator postsurgery.  7. History of prostate cancer-PSA remains normal.   8. Cardiac amyloidosis- on chart review it has been treated at .  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  Pending receipt of records.  9. STEVEN/hyponatremia-nephrology managing.  Creatinine now normal and sodium normalized.  10. A. fib with RVR- patient evaluated by EP and recommended start Eliquis 5 mg p.o. twice daily (not started as patient currently on Lovenox) with plan for cardioversion should patient remain in A. fib.    PLAN:  1. Continue Lovenox at 1.5 mg/kg daily.  2. Continue ferrous sulfate 325 mg by mouth daily (liquid given NGT).  3. Continue daily CBC.  4. Await remaining anemia, and gammopathy labs.  5. Pending records from  regarding cardiac amyloid open (our office has requested)        I discussed the patient's findings and my recommendations with patient and nursing.    Part of this note may be an electronic transcription/translation of spoken language to printed text using the Dragon Dictation System.    Electronically signed by Vicente Mendoza MD, 01/03/23, 6:44 PM EST.

## 2023-01-03 NOTE — CASE MANAGEMENT/SOCIAL WORK
Continued Stay Note   Michael     Patient Name: Jaime Bar  MRN: [id number]  Today's Date: 1/3/2023    Admit Date: 12/26/2022    Plan: DC Plan: Anticipate home with family pending clinical course. Watch for home IV abx needs.   Discharge Plan     Row Name 01/03/23 1539       Plan    Plan DC Plan: Anticipate home with family pending clinical course. Watch for home IV abx needs.    Provided Post Acute Provider List? N/A    Provided Post Acute Provider Quality & Resource List? N/A    Plan Comments CM reviewed chart documentation to obtain clinical updates. Patient oxygen demands have increased and is now on 15 L Venturi mask this morning.CM will continue to follow for any further needs and adjust discharge plan accordingly. DC Barriers: Venturi mask at 15L, NGT, PADMINI drains x3, D5W gtt, IV abx, Neuro consult and abnormal labs.           Expected Discharge Date and Time     Expected Discharge Date Expected Discharge Time    Jan 6, 2023         Phone communication or documentation only- no physical contact with patient or family.      Melyssa Pascual RN     Office Phone: [phone]  Office Cell:     [phone]

## 2023-01-03 NOTE — PROGRESS NOTES
Infectious Diseases Progress Note      LOS: 8 days   Patient Care Team:  Provider, No Known as PCP - Kyle Gardner MD as Consulting Physician (Nephrology)  Vicente Mendoza MD as Consulting Physician (Hematology and Oncology)    Chief Complaint: Confusion    Subjective       Patient had no high-grade fever during the last 24 hours.  The patient mental status had worsened and he is severely confused.  He is probably aspirated and required to be placed on higher oxygen.  He is hemodynamically stable requiring no vasopressors    Review of Systems:   Review of Systems   Unable to perform ROS: Acuity of condition        Objective     Vital Signs  Temp:  [97.5 °F (36.4 °C)-99.9 °F (37.7 °C)] 99.5 °F (37.5 °C)  Heart Rate:  [] 106  Resp:  [24-31] 24  BP: (107-139)/(68-90) 115/78    Physical Exam:  Physical Exam  Vitals and nursing note reviewed.   Constitutional:       General: He is not in acute distress.     Appearance: He is well-developed and normal weight. He is ill-appearing. He is not diaphoretic.      Comments: The patient is more lethargic   HENT:      Head: Normocephalic and atraumatic.   Eyes:      Pupils: Pupils are equal, round, and reactive to light.   Cardiovascular:      Rate and Rhythm: Normal rate and regular rhythm.      Heart sounds: Normal heart sounds, S1 normal and S2 normal.   Pulmonary:      Effort: Pulmonary effort is normal. No respiratory distress.      Breath sounds: No stridor. Rales present. No wheezing.   Abdominal:      General: Abdomen is flat. Bowel sounds are normal. There is no distension.      Palpations: Abdomen is soft. There is no mass.      Tenderness: There is no abdominal tenderness. There is no guarding.      Comments: NG tube   Musculoskeletal:         General: No deformity. Normal range of motion.      Cervical back: Neck supple.      Right lower leg: Edema present.      Left lower leg: Edema present.      Comments: Drain tubes are present in the right and  left shoulders with bloody serous drainage    Bilateral upper extremity edema with right arm worse than left   Skin:     General: Skin is warm and dry.      Coloration: Skin is not pale.      Findings: No erythema or rash.   Neurological:      Cranial Nerves: No cranial nerve deficit.          Results Review:    I have reviewed all clinical data, test, lab, and imaging results.     Radiology  XR Chest 1 View    Result Date: 1/3/2023  XR CHEST 1 VW Date of Exam: 1/3/2023 8:55 AM EST Indication: congestion. Comparison: 12/30/2022 Findings: Cardiac size is enlarged. There is pulmonary vascular congestion and interstitial pulmonary edema which appears worse than on the previous study. There are small bilateral pleural effusions. A nasoenteric tube is in place passing at least into the stomach.     Impression: 1. Radiographic changes consistent with congestive heart failure pulmonary edema with interval increase in pulmonary edema and development of small bilateral pleural effusions. Electronically Signed: Jaime Momin  1/3/2023 9:14 AM EST  Workstation ID: [id number]    Doppler Ankle Brachial Index Single Level CAR    Result Date: 1/3/2023  •  Right Conclusion: The right BERNARDINO is normal. Mild digital ischemia. •  Left Conclusion: The left BERNARDINO is normal. Severe digital ischemia.       Cardiology    Laboratory    Results from last 7 days   Lab Units 01/03/23  0914 01/02/23  0606 01/01/23  0614 12/31/22  0520 12/30/22  0540 12/29/22  1721 12/29/22  0541   WBC 10*3/mm3 8.20 9.30 7.80 9.80 9.30 9.80 21.10*   HEMOGLOBIN g/dL 11.1* 11.3* 11.4* 11.3* 11.1* 10.9* 12.0*   HEMATOCRIT % 34.1* 34.6* 34.6* 32.6* 32.3* 32.0* 35.7*   PLATELETS 10*3/mm3 104* 78* 60* 46* 38* 47* 57*     Results from last 7 days   Lab Units 01/03/23  0914 01/02/23  0606 01/01/23  0614 12/31/22  0520 12/30/22  0540 12/29/22  2133 12/29/22  1710 12/29/22  0541 12/29/22  0002   SODIUM mmol/L 150* 152* 146* 140 135*  --  135* 134* 130*   POTASSIUM mmol/L 4.7 4.3  4.0 3.8 3.8  --  4.3 4.5 3.9   CHLORIDE mmol/L 116* 120* 113* 107 103  --  102 100 99   CO2 mmol/L 28.0 27.0 25.0 22.0 21.0*  --  20.0* 19.0* 20.0*   BUN mg/dL 36* 41* 52* 68* 77*  --  74* 65* 59*   CREATININE mg/dL 0.62* 0.69* 0.82 1.12 1.50*  --  1.65* 1.66* 1.17   GLUCOSE mg/dL 122* 144* 135* 144* 122*  --  108* 90 137*   ALBUMIN g/dL 2.4* 2.4* 2.2* 2.2* 2.3* 2.2*  --  2.7* 2.6*   BILIRUBIN mg/dL 3.4* 3.3* 4.0* 4.4* 5.2*  --  5.3* 5.3* 4.6*   ALK PHOS U/L 120* 102 107 157* 105  --   --  108 88   AST (SGOT) U/L 44* 104* 308* 520* 431*  --   --  238* 62*   ALT (SGPT) U/L 122* 200* 306* 347* 236*  --   --  117* 36   CALCIUM mg/dL 8.0* 8.2* 8.0* 8.0* 8.1*  --  8.1* 8.7 8.6         Results from last 7 days   Lab Units 12/28/22  0839   SED RATE mm/hr 39*         Microbiology   Microbiology Results (last 10 days)     Procedure Component Value - Date/Time    Catheter Culture - Cath Tip, Hand, Left [[id number]] Collected: 12/31/22 1839    Lab Status: Final result Specimen: Cath Tip from Hand, Left Updated: 01/03/23 0958     CATHETER CULTURE No growth at 2 days    Blood Culture - Blood, Blood, PICC Line [[id number]]  (Normal) Collected: 12/31/22 1148    Lab Status: Preliminary result Specimen: Blood, PICC Line Updated: 01/03/23 1200     Blood Culture No growth at 3 days    Blood Culture - Blood, Hand, Right [[id number]]  (Abnormal) Collected: 12/30/22 1251    Lab Status: Final result Specimen: Blood from Hand, Right Updated: 01/01/23 1030     Blood Culture Staphylococcus aureus     Comment:   Infectious disease consultation is highly recommended to rule out distant foci of infection.        Isolated from Aerobic Bottle     Gram Stain Aerobic Bottle Gram positive cocci in clusters    Narrative:      Less than seven (7) mL's of blood was collected.  Insufficient quantity may yield false negative results.    Refer to previous blood culture collected on 12/26/2022 6935 for MICs.    Blood Culture - Blood, Hand, Left [[id number]]   (Abnormal) Collected: 12/29/22 1015    Lab Status: Final result Specimen: Blood from Hand, Left Updated: 01/01/23 1030     Blood Culture Staphylococcus aureus     Comment:   Infectious disease consultation is highly recommended to rule out distant foci of infection.    Refer to previous blood culture collected on 12/26/2022 0435 for MICs.        Isolated from Aerobic Bottle     Blood Culture Staphylococcus, coagulase negative     Comment: Probable contaminant requires clinical correlation, susceptibility not performed unless requested by physician.          Isolated from --     Gram Stain Aerobic Bottle Gram positive cocci in clusters    Narrative:      Less than seven (7) mL's of blood was collected.  Insufficient quantity may yield false negative results.    Body Fluid Culture - Body Fluid, Shoulder, Right [[id number]]  (Abnormal)  (Susceptibility) Collected: 12/28/22 0713    Lab Status: Final result Specimen: Body Fluid from Shoulder, Right Updated: 01/02/23 1505     Body Fluid Culture Heavy growth (4+) Staphylococcus aureus     Gram Stain Many (4+) WBCs seen      Many (4+) Gram positive cocci in clusters    Susceptibility      Staphylococcus aureus      PARMINDER      Gentamicin Susceptible      Oxacillin Susceptible      Rifampin Susceptible      Vancomycin Susceptible                       Susceptibility Comments     Staphylococcus aureus    This isolate does not demonstrate inducible clindamycin resistance in vitro.               Blood Culture - Blood, Arm, Left [[id number]]  (Abnormal) Collected: 12/27/22 1300    Lab Status: Final result Specimen: Blood from Arm, Left Updated: 12/29/22 0721     Blood Culture Staphylococcus aureus     Comment:   Infectious disease consultation is highly recommended to rule out distant foci of infection.        Isolated from Anaerobic Bottle     Gram Stain Anaerobic Bottle Gram positive cocci in clusters    Narrative:      Refer to previous blood culture collected on 12/26/2022 0435  for MICs    Less than seven (7) mL's of blood was collected.  Insufficient quantity may yield false negative results.    Blood Culture - Blood, Hand, Right [[id number]]  (Abnormal)  (Susceptibility) Collected: 12/26/22 0435    Lab Status: Final result Specimen: Blood from Hand, Right Updated: 12/28/22 0657     Blood Culture Staphylococcus aureus     Comment:   Infectious disease consultation is highly recommended to rule out distant foci of infection.        Isolated from Aerobic and Anaerobic Bottles     Gram Stain Aerobic Bottle Gram positive cocci in clusters      Anaerobic Bottle Gram positive cocci in clusters    Narrative:      Less than seven (7) mL's of blood was collected.  Insufficient quantity may yield false negative results.    Susceptibility      Staphylococcus aureus      PARMINDER      Gentamicin Susceptible      Oxacillin Susceptible      Rifampin Susceptible      Vancomycin Susceptible                       Susceptibility Comments     Staphylococcus aureus    This isolate does not demonstrate inducible clindamycin resistance in vitro.               Blood Culture ID, PCR - Blood, Hand, Right [[id number]]  (Abnormal) Collected: 12/26/22 0435    Lab Status: Final result Specimen: Blood from Hand, Right Updated: 12/26/22 2054     BCID, PCR Staph aureus. mecA/C and MREJ (methicillin resistance gene) NOT detected. Identification by BCID2 PCR     BOTTLE TYPE Aerobic Bottle    Narrative:      Infectious disease consultation is highly recommended to rule out distant foci of infection.    Blood Culture - Blood, Hand, Left [[id number]]  (Abnormal) Collected: 12/26/22 0434    Lab Status: Final result Specimen: Blood from Hand, Left Updated: 12/28/22 0657     Blood Culture Staphylococcus aureus     Comment:   Infectious disease consultation is highly recommended to rule out distant foci of infection.        Isolated from Aerobic and Anaerobic Bottles     Gram Stain Aerobic Bottle Gram positive cocci in clusters       Anaerobic Bottle Gram positive cocci in clusters    Narrative:      Refer to previous blood culture collected on 12/26/2022 0435 for MICs    Less than seven (7) mL's of blood was collected.  Insufficient quantity may yield false negative results.    MRSA Screen, PCR (Inpatient) - Swab, Nares [[id number]]  (Normal) Collected: 12/26/22 0307    Lab Status: Final result Specimen: Swab from Nares Updated: 12/26/22 0507     MRSA PCR No MRSA Detected    Narrative:      The negative predictive value of this diagnostic test is high and should only be used to consider de-escalating anti-MRSA therapy. A positive result may indicate colonization with MRSA and must be correlated clinically.          Medication Review:       Schedule Meds  cefTRIAXone, 2 g, Intravenous, Q12H  enoxaparin, 1.5 mg/kg, Subcutaneous, Daily  ferrous sulfate, 300 mg, Nasogastric, Daily  [START ON 1/4/2023] furosemide, 40 mg, Intravenous, Q12H  lansoprazole, 30 mg, Nasogastric, Q AM  midodrine, 10 mg, Nasogastric, Q8H  sodium chloride, 10 mL, Intravenous, Q12H  Zinc Oxide, , Apply externally, TID        Infusion Meds       PRN Meds  •  acetaminophen **OR** acetaminophen  •  aluminum-magnesium hydroxide-simethicone  •  dextrose  •  dextrose  •  fentanyl  •  glucagon (human recombinant)  •  HYDROcodone-acetaminophen  •  ondansetron **OR** ondansetron  •  sodium chloride  •  sodium chloride  •  sodium chloride        Assessment & Plan       Antimicrobial Therapy   1.  IV ceftriaxone        2.        3.        4.        5.            Assessment    Bilateral shoulder septic arthritis.  Synovial fluid culture was consistent with infection and cultures are growing 4+ Staph aureus    Methicillin susceptible Staph aureus bacteremia secondary to above.  Blood cultures were positive x2 sets on admission on December 26, 2022.  Repeat blood culture from December 27, 2022 turned positive  Repeat blood culture from December 29, 2022 is positive  ALMA DELIA was done on December  28, 2022 and showed no vegetation  -Repeat blood culture from 12/30/2022 is now positive  -PICC line was removed on 12/31/2022- cultures pending  -Blood culture from 12/31/2022 is negative so far    Mild septic shock.  Currently off vasopressors    Respiratory failure.  Patient was on the ventilator after shoulder surgeries.  Was extubated and currently requiring 15 L of oxygen    DVT of the left arm secondary to PICC line.  Hematology service was consulted and patient was started on anticoagulation    Elevated transaminase and hyperbilirubinemia secondary to sepsis and infection.  Liver enzymes are trending down    Worsening right upper extremity edema    Mental status changes with worsening condition.  We need to rule out paraspinal infection      Plan    Continue IV ceftriaxone 2 g every 12 hours for now.  The patient will need 6 weeks of IV antibiotic  Continue supportive care  A.m. labs   consult neurology service to evaluate patient  Request CT scan of the thoracic, lumbar and cervical spine with contrast.  Nephrology service was okay with giving patient contrast.  I doubt patient would be able to tolerate MRI of the whole spine secondary to risk of aspiration.  If condition does not improve and we need MRI we will have to intubate the patient again    Request right upper extremity venous Doppler to rule out DVT    Case was discussed with the patient's family at bedside        Antonina Delacruz MD  01/03/23  13:42 EST    Note is dictated utilizing voice recognition software/Dragon

## 2023-01-03 NOTE — NURSING NOTE
Patient's O2 sats dropping to low to mid 80s. RN called RT to ask for new device.  NRB placed on patient until RT arrived.  RT placed venturi mask at 50%/15L.  Sats increased to 90s.

## 2023-01-03 NOTE — PLAN OF CARE
Problem: Adult Inpatient Plan of Care  Goal: Plan of Care Review  Outcome: Ongoing, Progressing  Goal: Patient-Specific Goal (Individualized)  Outcome: Ongoing, Progressing  Goal: Absence of Hospital-Acquired Illness or Injury  Outcome: Ongoing, Progressing  Intervention: Identify and Manage Fall Risk  Recent Flowsheet Documentation  Taken 1/3/2023 0400 by Ambar Koehler RN  Safety Promotion/Fall Prevention:   activity supervised   lighting adjusted   safety round/check completed  Taken 1/3/2023 0200 by Ambar Koehler RN  Safety Promotion/Fall Prevention: activity supervised  Taken 1/3/2023 0000 by Ambar Koehler RN  Safety Promotion/Fall Prevention: activity supervised  Taken 1/2/2023 2200 by Ambar Koehler RN  Safety Promotion/Fall Prevention:   activity supervised   fall prevention program maintained   clutter free environment maintained   lighting adjusted   room organization consistent   safety round/check completed  Taken 1/2/2023 2100 by Ambar Koehler RN  Safety Promotion/Fall Prevention:   activity supervised   fall prevention program maintained   clutter free environment maintained   lighting adjusted   safety round/check completed  Taken 1/2/2023 2000 by Ambar Koehler RN  Safety Promotion/Fall Prevention:   activity supervised   clutter free environment maintained   fall prevention program maintained   lighting adjusted   safety round/check completed   room organization consistent  Intervention: Prevent Skin Injury  Recent Flowsheet Documentation  Taken 1/3/2023 0400 by Ambar Koehler RN  Body Position:   turned   left  Skin Protection: incontinence pads utilized  Taken 1/3/2023 0200 by Ambar Koehler RN  Body Position:   turned   right  Taken 1/3/2023 0100 by Ambar Koehler RN  Skin Protection: incontinence pads utilized  Taken 1/3/2023 0000 by Ambar Koehler RN  Body Position:   turned   left  Taken 1/2/2023 2200 by Ambar Koehler RN  Body Position:   turned    left  Taken 1/2/2023 2100 by Ambar Koehler RN  Skin Protection: incontinence pads utilized  Taken 1/2/2023 2000 by Ambar Koehler RN  Body Position:   turned   right  Intervention: Prevent and Manage VTE (Venous Thromboembolism) Risk  Recent Flowsheet Documentation  Taken 1/3/2023 0400 by Ambar Koehler RN  VTE Prevention/Management:   bilateral   sequential compression devices on  Taken 1/3/2023 0100 by Ambar Koehler RN  VTE Prevention/Management:   bilateral   sequential compression devices on  Taken 1/2/2023 2100 by Ambar Koehler RN  VTE Prevention/Management:   bilateral   sequential compression devices on  Range of Motion:   active ROM (range of motion) encouraged   ROM (range of motion) performed  Intervention: Prevent Infection  Recent Flowsheet Documentation  Taken 1/3/2023 0400 by Ambar Koehler RN  Infection Prevention: rest/sleep promoted  Taken 1/3/2023 0200 by Ambar Koehler RN  Infection Prevention: rest/sleep promoted  Taken 1/3/2023 0000 by Ambar Koehler RN  Infection Prevention: rest/sleep promoted  Taken 1/2/2023 2100 by Ambar Koehler RN  Infection Prevention: environmental surveillance performed  Goal: Optimal Comfort and Wellbeing  Outcome: Ongoing, Progressing  Intervention: Monitor Pain and Promote Comfort  Recent Flowsheet Documentation  Taken 1/2/2023 2100 by Ambar Koehler RN  Pain Management Interventions: care clustered  Intervention: Provide Person-Centered Care  Recent Flowsheet Documentation  Taken 1/2/2023 2100 by Ambar Koehler RN  Trust Relationship/Rapport: emotional support provided  Goal: Readiness for Transition of Care  Outcome: Ongoing, Progressing     Problem: Skin Injury Risk Increased  Goal: Skin Health and Integrity  Outcome: Ongoing, Progressing  Intervention: Optimize Skin Protection  Recent Flowsheet Documentation  Taken 1/3/2023 0400 by Ambar Koehler RN  Pressure Reduction Techniques: weight shift assistance provided  Head of  Bed ([facility]) Positioning: HOB at 30-45 degrees  Pressure Reduction Devices: pressure-redistributing mattress utilized  Skin Protection: incontinence pads utilized  Taken 1/3/2023 0200 by Ambar Koehler RN  Head of Bed ([facility]) Positioning: HOB at 30-45 degrees  Taken 1/3/2023 0100 by Ambar Koehler RN  Pressure Reduction Techniques: weight shift assistance provided  Pressure Reduction Devices: pressure-redistributing mattress utilized  Skin Protection: incontinence pads utilized  Taken 1/3/2023 0000 by Ambar Koehler RN  Head of Bed ([facility]) Positioning: HOB at 45 degrees  Taken 1/2/2023 2200 by Ambar Koehler RN  Head of Bed ([facility]) Positioning: HOB at 30-45 degrees  Taken 1/2/2023 2100 by Ambar Koehler RN  Pressure Reduction Techniques: weight shift assistance provided  Pressure Reduction Devices: pressure-redistributing mattress utilized  Skin Protection: incontinence pads utilized     Problem: Electrolyte Imbalance  Goal: Electrolyte Balance  Outcome: Ongoing, Progressing     Problem: Fall Injury Risk  Goal: Absence of Fall and Fall-Related Injury  Outcome: Ongoing, Progressing  Intervention: Identify and Manage Contributors  Recent Flowsheet Documentation  Taken 1/3/2023 0400 by Ambar Koehler RN  Medication Review/Management: medications reviewed  Taken 1/3/2023 0200 by Ambar Koehler RN  Medication Review/Management: medications reviewed  Taken 1/3/2023 0000 by Ambar Koehler RN  Medication Review/Management: medications reviewed  Taken 1/2/2023 2200 by Ambar Koehler RN  Medication Review/Management: medications reviewed  Taken 1/2/2023 2100 by Ambar Koehler RN  Medication Review/Management: medications reviewed  Taken 1/2/2023 2000 by Ambar Koehler RN  Medication Review/Management: medications reviewed  Intervention: Promote Injury-Free Environment  Recent Flowsheet Documentation  Taken 1/3/2023 0400 by Ambar Koehler RN  Safety Promotion/Fall Prevention:   activity supervised    lighting adjusted   safety round/check completed  Taken 1/3/2023 0200 by Ambar Koehler RN  Safety Promotion/Fall Prevention: activity supervised  Taken 1/3/2023 0000 by Ambar Koehler RN  Safety Promotion/Fall Prevention: activity supervised  Taken 1/2/2023 2200 by Ambar oKehler RN  Safety Promotion/Fall Prevention:   activity supervised   fall prevention program maintained   clutter free environment maintained   lighting adjusted   room organization consistent   safety round/check completed  Taken 1/2/2023 2100 by Ambar Koehler RN  Safety Promotion/Fall Prevention:   activity supervised   fall prevention program maintained   clutter free environment maintained   lighting adjusted   safety round/check completed  Taken 1/2/2023 2000 by Ambar Koehler RN  Safety Promotion/Fall Prevention:   activity supervised   clutter free environment maintained   fall prevention program maintained   lighting adjusted   safety round/check completed   room organization consistent     Problem: Aspiration (Enteral Nutrition)  Goal: Absence of Aspiration Signs and Symptoms  Outcome: Ongoing, Progressing  Intervention: Minimize Aspiration Risk  Recent Flowsheet Documentation  Taken 1/3/2023 0400 by Ambar Koehler RN  Head of Bed (HOB) Positioning: HOB at 30-45 degrees  Taken 1/3/2023 0245 by Ambar Koehler RN  Oral Care:   swabbed with antiseptic solution   tongue brushed   lip/mouth moisturizer applied  Taken 1/3/2023 0200 by Ambar Koehler RN  Head of Bed (HOB) Positioning: HOB at 30-45 degrees  Taken 1/3/2023 0000 by Ambar Koehler RN  Head of Bed (HOB) Positioning: HOB at 45 degrees  Taken 1/2/2023 2200 by Ambar Koehler RN  Head of Bed (HOB) Positioning: HOB at 30-45 degrees  Taken 1/2/2023 2100 by Ambar Koehler RN  Enteral Feeding Safety: tube marked at exit site   Goal Outcome Evaluation:

## 2023-01-03 NOTE — PROGRESS NOTES
Nutrition Services    Patient Name: Jaime Bar  YOB: 1955  MRN: [id number]  Admission date: 12/26/2022    PPE Documentation        PPE Worn By Provider Did not enter room for this encounter   PPE Worn By Patient  N/A     PROGRESS NOTE      Encounter Information: Check on for tube feeding.  WOCN saw patient for new sacrum area , see their note 1/2.         PO Diet: NPO Diet NPO Type: Strict NPO   PO Supplements: None ordered    PO Intake:  NPO       Current nutrition support: Nutren 1.5 at 65 mL/hr + 25mL/hr water flush   Nutrition support review: Documented as above per EMR        Labs (reviewed below): Hypernatremia- will increase water flush further, estimated 1.8-2.5L fluid deficit        GI Function:  Last BM 1/2 (yesterday)  Residuals WNL       Nutrition Intervention Updates: Continue tube feeding at goal of 65 mL/hour     Increase water flush to 100 mL/hour related to hypernatremia        Results from last 7 days   Lab Units 01/02/23  0606 01/01/23  0614 12/31/22  0520   SODIUM mmol/L 152* 146* 140   POTASSIUM mmol/L 4.3 4.0 3.8   CHLORIDE mmol/L 120* 113* 107   CO2 mmol/L 27.0 25.0 22.0   BUN mg/dL 41* 52* 68*   CREATININE mg/dL 0.69* 0.82 1.12   CALCIUM mg/dL 8.2* 8.0* 8.0*   BILIRUBIN mg/dL 3.3* 4.0* 4.4*   ALK PHOS U/L 102 107 157*   ALT (SGPT) U/L 200* 306* 347*   AST (SGOT) U/L 104* 308* 520*   GLUCOSE mg/dL 144* 135* 144*     Results from last 7 days   Lab Units 01/02/23  0606 01/01/23  0614 12/31/22  0520   MAGNESIUM mg/dL 2.0 2.0 2.2   PHOSPHORUS mg/dL 2.6 3.0 2.9   HEMOGLOBIN g/dL 11.3* 11.4* 11.3*   HEMATOCRIT % 34.6* 34.6* 32.6*     Lab Results   Component Value Date    HGBA1C 4.2 12/26/2022       RD to follow up per protocol.    Electronically signed by:  Gloria Park RD  01/03/23 07:17 EST

## 2023-01-03 NOTE — PROGRESS NOTES
[facility] Medicine Services Daily Progress Note    Patient Name: Jaime Bar  : 1955  MRN: [id number]  Primary Care Physician:  Provider, No Known  Date of admission: 2022      Subjective      Chief Complaint: AMS      Patient seen and examined this morning.  Mental status about the same.  Discussed with nursing, patient getting more congested, chest x-ray noted with congestion, will discuss with nephrology if okay with some diuresis.  Patient's left lower extremity appears to be cold but not cyanotic yet.  Arterial Dopplers ordered.    Unable to obtain full review of system due to patient's underlying mental status.      Objective      Vitals:   Temp:  [97.5 °F (36.4 °C)-100.9 °F (38.3 °C)] 97.5 °F (36.4 °C)  Heart Rate:  [] 106  Resp:  [24-31] 24  BP: (107-142)/(68-90) 115/78  Flow (L/min):  [4-15] 15    Physical Exam:    General: Lethargic but arousable, confused, ill-appearing, lying in bed  Cardiovascular: Tachycardic, irregularly irregular rhythm, no murmurs  Respiratory: Decreased breath sounds bilaterally, no wheezing or rales, unlabored breathing  Abdomen: Soft, nontender, positive bowel sounds, no guarding  Neurologic: Lethargic, does not follow commands, limited  Skin: Warm, bilateral shoulder incisions bandaged, drains in place.  Bilateral upper extremity pitting edema noted L>R         Result Review    Result Review:  I have personally reviewed the results from the time of this admission to 1/3/2023 10:20 EST and agree with these findings:  [x]  Laboratory  [x]  Microbiology  [x]  Radiology  [x]  EKG/Telemetry   []  Cardiology/Vascular   []  Pathology  []  Old records  []  Other:    Wounds (last 24 hours)     LDA Wound     Row Name 23 0400 23 0100 23 2100       Wound 22 Right shoulder Incision    Wound - Properties Group Placement Date: 22  -HB Placement Time:   -HB Side: Right  -HB Location: shoulder  -HB Primary Wound  Type: Incision  -HB    Dressing Appearance intact;dry  -AG dry;intact  -AG dry;intact  -AG    Closure Adhesive bandage  -AG Adhesive bandage  -AG Adhesive bandage  -AG    Base dressing in place, unable to visualize  -AG dressing in place, unable to visualize  -AG dressing in place, unable to visualize  -AG    Retired Wound - Properties Group Placement Date: 12/28/22  -HB Placement Time: 1856  -HB Side: Right  -HB Location: shoulder  -HB Primary Wound Type: Incision  -HB    Retired Wound - Properties Group Date first assessed: 12/28/22  -HB Time first assessed: 1856  -HB Side: Right  -HB Location: shoulder  -HB Primary Wound Type: Incision  -HB       Wound 12/28/22 1940 Left shoulder Incision    Wound - Properties Group Placement Date: 12/28/22  -HB Placement Time: 1940  -HB Side: Left  -HB Location: shoulder  -HB Primary Wound Type: Incision  -HB    Dressing Appearance -- dry;intact  -AG dry;intact  -AG    Closure Adhesive bandage  -AG Adhesive bandage  -AG Adhesive bandage  -AG    Base dressing in place, unable to visualize  -AG dressing in place, unable to visualize  -AG dressing in place, unable to visualize  -AG    Retired Wound - Properties Group Placement Date: 12/28/22  -HB Placement Time: 1940  -HB Side: Left  -HB Location: shoulder  -HB Primary Wound Type: Incision  -HB    Retired Wound - Properties Group Date first assessed: 12/28/22  -HB Time first assessed: 1940  -HB Side: Left  -HB Location: shoulder  -HB Primary Wound Type: Incision  -HB       Wound 12/30/22 2000 sacral spine Pressure Injury    Wound - Properties Group Placement Date: 12/30/22  -MB Placement Time: 2000 -MB Location: sacral spine  -MB Primary Wound Type: Pressure inj  -MB    Pressure Injury Stage -- -- DTPI  -AG    Dressing Appearance -- open to air  -AG open to air  -AG    Closure Adhesive bandage  -AG Adhesive bandage  -AG Adhesive bandage  -AG    Base non-blanchable;maroon/purple  -AG non-blanchable;maroon/purple  -AG  non-blanchable;maroon/purple  -AG    Care, Wound -- -- other (see comments)  cream  -AG    Retired Wound - Properties Group Placement Date: 12/30/22  -MB Placement Time: 2000 -MB Location: sacral spine  -MB Primary Wound Type: Pressure inj  -MB    Retired Wound - Properties Group Date first assessed: 12/30/22  -MB Time first assessed: 2000 -MB Location: sacral spine  -MB Primary Wound Type: Pressure inj  -MB    Row Name 01/02/23 1600 01/02/23 1200          Wound 12/28/22 1856 Right shoulder Incision    Wound - Properties Group Placement Date: 12/28/22  -HB Placement Time: 1856  -HB Side: Right  -HB Location: shoulder  -HB Primary Wound Type: Incision  -HB    Dressing Appearance dry;intact  -MN dry;intact  -MN     Closure Adhesive bandage  -MN Adhesive bandage  -MN     Base dressing in place, unable to visualize  -MN dressing in place, unable to visualize  -MN     Care, Wound cleansed with;sterile normal saline  -MN --     Dressing Care dressing changed  -MN --     Wound Output (mL) 20  -AG --     Retired Wound - Properties Group Placement Date: 12/28/22  -HB Placement Time: 1856  -HB Side: Right  -HB Location: shoulder  -HB Primary Wound Type: Incision  -HB    Retired Wound - Properties Group Date first assessed: 12/28/22  -HB Time first assessed: 1856  -HB Side: Right  -HB Location: shoulder  -HB Primary Wound Type: Incision  -HB       Wound 12/28/22 1940 Left shoulder Incision    Wound - Properties Group Placement Date: 12/28/22  -HB Placement Time: 1940  -HB Side: Left  -HB Location: shoulder  -HB Primary Wound Type: Incision  -HB    Dressing Appearance dry;intact  -MN dry;intact  -MN     Closure Adhesive bandage  -MN Adhesive bandage  -MN     Base dressing in place, unable to visualize  -MN dressing in place, unable to visualize  -MN     Care, Wound cleansed with;sterile normal saline  -MN --     Dressing Care dressing changed  -MN --     Wound Output (mL) 10  -AG --     Retired Wound - Properties Group  Placement Date: 12/28/22 -HB Placement Time: 1940 -HB Side: Left  -HB Location: shoulder  -HB Primary Wound Type: Incision  -HB    Retired Wound - Properties Group Date first assessed: 12/28/22 -HB Time first assessed: 1940 -HB Side: Left  -HB Location: shoulder  -HB Primary Wound Type: Incision  -HB       Wound 12/30/22 2000 sacral spine Pressure Injury    Wound - Properties Group Placement Date: 12/30/22 -MB Placement Time: 2000 -MB Location: sacral spine  -MB Primary Wound Type: Pressure inj  -MB    Pressure Injury Stage -- DTPI  -MN     Dressing Appearance open to air  -MN open to air  -MN     Base non-blanchable;maroon/purple  -MN non-blanchable;maroon/purple  -MN     Care, Wound -- other (see comments)  cream applied  -MN     Retired Wound - Properties Group Placement Date: 12/30/22 -MB Placement Time: 2000 -MB Location: sacral spine  -MB Primary Wound Type: Pressure inj  -MB    Retired Wound - Properties Group Date first assessed: 12/30/22 -MB Time first assessed: 2000 -MB Location: sacral spine  -MB Primary Wound Type: Pressure inj  -MB          User Key  (r) = Recorded By, (t) = Taken By, (c) = Cosigned By    Initials Name Provider Type    Martha Monson, RN Registered Nurse    Carolyn Jay, RN Registered Nurse    Ambar La RN Registered Nurse    Lakesha Gomes, RN Registered Nurse                  Assessment & Plan      Brief Patient Summary:  Jaime Bar is a 67 y.o. male with past medical history of cervicalgia, primary osteoarthritis involving multiple joints with surgery on bilateral shoulders and right knee replacement but no recent procedure or surgery, hyponatremia, prostate cancer, and cardiac amyloidosis presented to [facility] on 12/25/2022 with complaints of right shoulder pain, weakness, and altered mental status.  He was found to be hypotensive and hyponatremic with elevated lactic acid.  Patient had reported decreased oral  intake.  He was transferred to [facility] ICU for further treatment of septic shock requiring vasopressors and hyponatremia.      Since admission he was found to be bacteremic with 2 sets of blood cultures positive for staph aureus, source being bilateral shoulder septic arthritis. ID has been treating the patient with IV ceftriaxone 2G and he will need a total of 6 weeks of antibiotic therapy.  He has status post bilateral shoulder arthroscopy with extensive debridement by Dr. Velez 12/28/2022. He remained intubated after surgery until 12/29 and now extubated. He has been weaned off vasopressors. Nephrology is managing his hyponatremia and STEVEN on CKD. He was found to have LUE DVT provoked from PICC line currently on argatroban. Hematology has been consulted for his thrombocytopenia.       cefTRIAXone, 2 g, Intravenous, Q12H  enoxaparin, 1.5 mg/kg, Subcutaneous, Daily  ferrous sulfate, 300 mg, Nasogastric, Daily  lansoprazole, 30 mg, Nasogastric, Q AM  midodrine, 10 mg, Nasogastric, Q8H  sodium bicarbonate, 650 mg, Nasogastric, BID  sodium chloride, 10 mL, Intravenous, Q12H  Zinc Oxide, , Apply externally, TID       dextrose, 50 mL/hr, Last Rate: 50 mL/hr (01/03/23 2543)         Active Hospital Problems:  Active Hospital Problems    Diagnosis    • Bacteremia due to methicillin susceptible Staphylococcus aureus (MSSA)    • Staphylococcal arthritis of shoulder (HCC)    • Cervicalgia, spine surgery 2017    • Primary osteoarthritis involving multiple joints    • Hyponatremia    • Acute kidney injury superimposed on chronic kidney disease (HCC)    • Cardiac amyloidosis (HCC)    • ACC/AHA stage C chronic systolic heart failure (HCC)    • Tobacco abuse    • Primary hypertension      Plan:    Bilateral shoulder septic arthritis  Septic shock secondary to MSSA bacteremia  -Synovial fluid culture was consistent with infection and cultures are growing 4+ Staph aureus  -s/p bilateral shoulder arthroscopy with extensive  debridement by Dr. Velez 12/28/2022  - 2/2 blood cultures positive for MSSA secondary to bilateral shoulder septic arthritis   -ALMA DELIA on 12/28, Negative for endocarditis  -On IV Rocephin 2 g daily x6 weeks per ID  -Off IV vasopressors, remains on midodrine  -Discontinue art line, MAP greater than 65 mmHg at this time  -LUE PICC line removed and tip also sent for culture     Acute metabolic encephalopathy  Severe thrombocytopenia, improved  LUE DVT, provoked from PICC line  -Patient's mental status worsening  -Initially concern for TTP however no schistocytes seen on peripheral smear per hematology  -Platelets improving  -Off argatroban drip, PTT elevated  -On low-dose Lovenox twice daily for now per heme-onc pending DIC work-up  -Patient's left lower extremity appears cold now but not cyanotic, check arterial Doppler to rule out any acute clot    Acute respiratory failure with hypoxia  Pulmonary edema  -Postop respiratory failure, extubated on 12/39  -Chest x-ray showing congestion  -Start diuresis if okay with nephrology     STEVEN on CKD  Hyponatremia on admission and now hypernatremia  -Creatinine stable now but sodium trending up  -Free water flushes increased per nephrology  -Management per nephrology     A. fib with RVR  -Off amnio drip, rate controlled  -Anticoagulation recommendations per cardiology and heme-onc  -Cardiology following    Elevated LFTs  -Likely due to shock liver  -Hep panel negative  -Liver ultrasound showed normal hepatic parenchyma, cholelithiasis with adenomyomatosis involving gallbladder wall, CBD upper limit of normal  -Monitor    H/o cardiac amyloidosis  H/o prostate CA  Osteoarthritis     DVT/GI Ppx.    Guarded prognosis.  DNR/DNI.    CODE STATUS:    Medical Intervention Limits: NO intubation (DNI)  Level Of Support Discussed With: Next of Kin (If No Surrogate)  Code Status (Patient has no pulse and is not breathing): No CPR (Do Not Attempt to Resuscitate)  Medical Interventions (Patient  has pulse or is breathing): Limited Support  Release to patient: Routine Release      Disposition: Will likely need placement.  Pending improvement.    Electronically signed by James Holguin DO, 01/03/23, 10:20 EST.  [facility] Hospitalist Team      Part of this note may be an electronic transcription/translation of spoken language to printed text using the Dragon Dictation System.

## 2023-01-03 NOTE — CONSULTS
Primary Care Provider: Provider, No Known     Consult requested by:  Dr Delacruz  Reason for Consultation: Neurological evaluation /mental status changes    History taken from: chart family RN Consulting physician    Chief complaint: Mental status changes     SUBJECTIVE:    History of present illness: Background per H&P: Jaime Bar is a 67 y.o. year old male who was evaluated in room ICU 2301 at [facility]    Also of information is mostly the medical records and my discussion with the team    He is status post surgery bilateral shoulders and has had knee surgeries.  After the surgery he is slow to arouse.  His CT was okay and basic labs reviewed.  He had some medications but they are restricted now.  Dr. Delacruz is doing further scans to make sure the spine is okay    He is arousable, he told me his name, able to identify and give me thumbs up and move extremities but very very weak and slow to respond    Nothing suggesting seizures  No forceful eye deviation  No ptosis or nystagmus  No focal twitching    Does not look like large stroke and he is already on multiple antibiotics and does not look like he is having seizures    CNS involvement is being reviewed      As per admitting,  Jaime Bar is a 67 y.o. male with past medical history of cervicalgia, primary osteoarthritis involving multiple joints with surgery on bilateral shoulders and right knee replacement but no recent procedure or surgery, hyponatremia, prostate cancer, and cardiac amyloidosis presented to [facility] on 12/25/2022 with complaints of right shoulder pain, weakness, and altered mental status.  He was found to be hypotensive and hyponatremic with elevated lactic acid.  Patient had reported decreased oral intake.  He was transferred to [facility] [place], operated by [facility] ICU for further treatment of septic shock requiring vasopressors and hyponatremia.      Since admission he was found to be bacteremic with 2 sets of blood  cultures positive for staph aureus, source being bilateral shoulder septic arthritis. ID has been treating the patient with IV ceftriaxone 2G and he will need a total of 6 weeks of antibiotic therapy.  He has status post bilateral shoulder arthroscopy with extensive debridement by Dr. Velez 12/28/2022. He remained intubated after surgery until 12/29 and now extubated. He has been weaned off vasopressors. Nephrology is managing his hyponatremia and STEVEN on CKD. He was found to have LUE DVT provoked from PICC line currently on argatroban. Hematology has been consulted for his thrombocytopenia      As per other records,  67 y.o. male admitted to [facility] on transfer from [facility] on 12/25/2022.  The patient was intubated and unresponsive at time of consultation with histories obtained from review of records and discussion with patient's niece and nephew.  He reported a history of bilateral shoulder pain for few days prior to admission.  He had presented to [place] ED on 12/25/2022 where he was hypotensive and hyponatremic and transferred to [facility].  CXR on 12/26/2022 showed coarsened airspace and bronchovascular thickening with small airway disease such as bronchitis or asthma, edema, atypical/viral infection, and aspiration in differential.  A RUE venous Doppler showed acute RUE superficial thrombophlebitis in the cephalic vein and no evidence of DVT for which he was started on subcu heparin.  On 12/26/2022 CBC revealed WBC 7.2, hemoglobin 12.4, MCV 98.4, and platelets 99,000.  Creatinine was elevated to 2.38, BUN 55, sodium was low at 118, potassium was high at 5.4 and LFTs revealed T bili elevated to 4.8 (0-1.2) with transaminases normal.  Blood cultures grew Staph aureus.  Urinalysis was concerning for UTI.  Urine sodium was less than 20, a.m. cortisol 26.11 (6.02-18.4), and urine osmolality was 410 ().   X-rays and MRIs of the bilateral shoulders were performed 12/27/2022 revealing joint  effusion and advanced degenerative changes/destructive changes of glenohumeral joint.  There was suspected complete tear of the supraspinatus and infraspinatus tendons and a completely macerated appearance of the labrum on the right shoulder.  On 12/28/2022 RUE venous Doppler was repeated and felt stable.  He then underwent bilateral shoulder arthroscopy incision and drainage with culture growing heavy growth of Staph aureus.  He had been intubated prior to the surgery and remained sedated on ventilator with pressors postop.  At time of consultation 12/29/2022 LUE venous Doppler showed acute catheter associated DVT in the left axillary vein, SVT in the left basilic vein of the upper arm.  BLE venous Doppler was negative for DVT/SVT but did show deep venous valvular incompetence in the right popliteal vein.  CBC revealed WBC 14.2, hemoglobin 11.4, MCV 96.5, and platelets 46,000.  T bili was elevated to 5.3 and transaminases were now elevated with  and .  PT was 15.6 (9.6-11.7), PTT was 38.7 (24-31), and fibrinogen was 493 (210-450).  D-dimer was 11.83 (0-0.67).  Transesophageal echocardiogram showed LVEF 46-50%, mild aortic valve stenosis, and no evidence of vegetation or bacterial endocarditis.  Heparin was changed to argatroban.  ID was managing antibiotics with patient on ceftriaxone with plan for treatment x6 weeks and recommending replacing PICC line 2 days after starting anticoagulation.  His niece and nephew reported the patient to have lost some weight but they were unsure the amount.  They reported PMH significant for prostate cancer treated approximately 2010 with radiation alone and no evidence of recurrence to their knowledge.  He also was under the care of Dr. Damico at  for cardiac amyloid.  Chart review showed PSA was 0.1 (0-4) in August 2022.  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  IgG was 768 (603-1613), IgA was 238 (), IgM was 64 ().   Kappa/lambda ratio was 1.36 (0.2-1.65).  A note from Roosevelt Damico MD (cardiologist) at [facility] dated 7/13/2022 reported patient was followed for restrictive cardiomyopathy secondary to cardiac amyloidosis.  LVEF in February 2022 was 45-50%.  Cardiac MRI 4/6/2022 revealed a myocardial mass 276 g, global hypokinesis of left ventricle with ejection fraction 43%, no myocardial iron overload, the thickness and appearance of intra-atrial septum was also consistent with cardiac amyloidosis.  The note stated that the patient was followed by Dr. Banks at [facility] heart failure program where he was on Vyndamax therapy for cardiac amyloid and had been on that treatment for about 2 weeks.  Additionally in January 2022 T bili was noted to be elevated to 2.6 (0.2-1.0) and he was anemic with hemoglobin 11.8 and MCV 86.5 platelets were normal at 261,000.     12/29/22  Hematology/Oncology was consulted by RITU Jimenez for thrombocytopenia.     Past Medical History: Prostate cancer approximately 2010 treated with radiation only.  Cardiac amyloid managed by Dr. Damico at .  CKD.  Surgical History: Several orthopedic surgeries in the past.  Bilateral shoulder arthroscopy with incision and drainage 12/28/2022.  Social History: He lives in [place], [place] with his spouse.  His spouse is known to have Alzheimer's disease.  He has smoked for many years.  He has no alcohol or recreational drug use however he was narcotic dependent secondary to chronic pain.  He is a retired .  Family History: No significant known family history.  Allergies: Tramadol causes him to feel weird and codeine causes nausea.     PCP: Provider, No Known     INTERVAL HISTORY:  • 12/30/2022- platelets 38,000, hemoglobin 11.1.  Folate 7.13 (4.78-24.2), vitamin B12 greater than 2000.  Iron 29 (), iron saturation 15% (20-50), TIBC 195 (298-536), and ferritin 3525 ().  Reticulocytes 1.05 (0.7-1.9), haptoglobin  148 (). PSA 0.234 (0-4).  T bili 5.3 with direct bili 4.2 (0-0.3).  Hepatitis panel nonreactive.  Extubated 12/29/2022 and off pressors.  • 12/31/2022- platelets 46,000, hemoglobin 11.3.  HIT antibody negative at 0.121 (0-0.4).  CMV IgM less than 30 (0-29.9) and EBV IgM less than 36 (0-35.9).  D-dimer mildly improved to 8.94 (0-0.67).  Transaminases rising with  and .  T bili 4.4.  Peripheral smear was negative for schistocytes again.  Abdominal ultrasound revealed cholelithiasis with adenomyomatosis involving the gallbladder wall.  The common bile duct was in the upper limits of normal.  The liver appeared normal.  • 1/1/2023- platelets 60,000, hemoglobin 11.4.  Argatroban was held with repeat PTT remaining high with subsequent continued hold of argatroban.  D-dimer 9.84.  PTT 61.1 (24-31).  CT head without contrast was negative for acute findings.  • 1/2/2023- hemoglobin 11.3 and platelets 78,000.  ALT improved to 200 and AST improved to 104.  T bili improved to 3.3.  Fibrinogen 445 (210-150).         - Portions of the above HPI were copied from previous encounters and edited as appropriate. PMH as detailed below.     Review of Systems   Not possible because of his present condition    PATIENT HISTORY:  History reviewed. No pertinent past medical history.,   Past Surgical History:   Procedure Laterality Date   • SHOULDER ARTHROSCOPY Right 12/28/2022    Procedure: SHOULDER ARTHROSCOPY INCISION AND DRAINAGE.;  Surgeon: Nikunj Velez MD;  Location: [facility] MAIN OR;  Service: Orthopedics;  Laterality: Right;   • SHOULDER ARTHROSCOPY Left 12/28/2022    Procedure: SHOULDER ARTHROSCOPY INCISION AND DRAINAGE;  Surgeon: Nikunj Velez MD;  Location: [facility] MAIN OR;  Service: Orthopedics;  Laterality: Left;   , History reviewed. No pertinent family history.,   Social History     Tobacco Use   • Smoking status: Every Day     Packs/day: 0.50     Years: 10.00     Pack years: 5.00      Types: Cigarettes   • Smokeless tobacco: Never   Vaping Use   • Vaping Use: Never used   Substance Use Topics   • Alcohol use: Not Currently   • Drug use: Never   ,   Prior to Admission medications    Medication Sig Start Date End Date Taking? Authorizing Provider   albuterol sulfate  (90 Base) MCG/ACT inhaler Inhale 2 puffs Every 4 (Four) Hours As Needed for Wheezing or Shortness of Air.    Jeffrey Diamond MD   diclofenac (VOLTAREN) 75 MG EC tablet Take 75 mg by mouth Daily As Needed.    Jeffrey Diamond MD   HYDROcodone-acetaminophen (NORCO)  MG per tablet Take 1 tablet by mouth Every 4 (Four) Hours As Needed for Moderate Pain.    Jeffrey Diamond MD   metoprolol succinate XL (TOPROL-XL) 25 MG 24 hr tablet Take 25 mg by mouth Daily.    Jeffrey Diamond MD   omeprazole (priLOSEC) 20 MG capsule Take 20 mg by mouth Daily.    Jeffrey Diamond MD   sodium bicarbonate 650 MG tablet Take 650 mg by mouth 2 (Two) Times a Day.    Jeffrey Diamond MD   spironolactone (ALDACTONE) 25 MG tablet Take 12.5 mg by mouth Daily.    Jeffrey Diamond MD   tiotropium bromide-olodaterol (Stiolto Respimat) 2.5-2.5 MCG/ACT aerosol solution inhaler Inhale 2 puffs Daily.    Jeffrey Diamond MD    Allergies:  Tramadol-acetaminophen, Codeine, and Tramadol    Current Facility-Administered Medications   Medication Dose Route Frequency Provider Last Rate Last Admin   • acetaminophen (TYLENOL) tablet 650 mg  650 mg Oral Q4H PRN Nikunj Velez MD   650 mg at 01/02/23 1158    Or   • acetaminophen (TYLENOL) suppository 650 mg  650 mg Rectal Q4H PRN Nikunj Velez MD   650 mg at 12/29/22 1132   • aluminum-magnesium hydroxide-simethicone (MAALOX MAX) 400-400-40 MG/5ML suspension 15 mL  15 mL Oral Q6H PRN Nikunj Velez MD       • cefTRIAXone (ROCEPHIN) 2 g in sodium chloride 0.9 % 100 mL IVPB  2 g Intravenous Q12H Brooklyn Smith APRN 200 mL/hr at 01/03/23 1344 2 g  at 01/03/23 1344   • dextrose (D50W) (25 g/50 mL) IV injection 25 g  25 g Intravenous Q15 Min PRN Nikunj Velez MD   25 g at 12/28/22 2335   • dextrose (GLUTOSE) oral gel 15 g  15 g Oral Q15 Min PRN Nikunj Velez MD       • Enoxaparin Sodium (LOVENOX) syringe 140 mg  1.5 mg/kg Subcutaneous Daily Darrin Nixon APRN   140 mg at 01/03/23 1344   • fentaNYL citrate (PF) (SUBLIMAZE) injection 50 mcg  50 mcg Intravenous Q1H PRN Sandy Ly APRN   50 mcg at 12/31/22 0511   • ferrous sulfate 300 (60 Fe) MG/5ML syrup 300 mg  300 mg Nasogastric Daily Darrin Nixon APRN   300 mg at 01/03/23 0923   • [START ON 1/4/2023] furosemide (LASIX) injection 40 mg  40 mg Intravenous Q12H Katherin Malik MD       • glucagon (human recombinant) (GLUCAGEN DIAGNOSTIC) 1 mg in sterile water (preservative free) 1 mL injection  1 mg Subcutaneous Q15 Min PRN Nikunj Velez MD       • HYDROcodone-acetaminophen (NORCO)  MG per tablet 1 tablet  1 tablet Nasogastric Q4H PRN Catarino Gonzalez APRN   1 tablet at 01/03/23 0923   • lansoprazole (PREVACID SOLUTAB) disintegrating tablet Tablet Delayed Release Dispersible 30 mg  30 mg Nasogastric Q AM Catarino Gonzalez APRN   30 mg at 01/03/23 0535   • midodrine (PROAMATINE) tablet 10 mg  10 mg Nasogastric Q8H Catarino Gonzalez APRN   10 mg at 01/03/23 0925   • ondansetron (ZOFRAN) tablet 4 mg  4 mg Oral Q6H PRN Nikunj Velez MD        Or   • ondansetron (ZOFRAN) injection 4 mg  4 mg Intravenous Q6H PRN Nikunj Velez MD       • sodium chloride 0.9 % bolus 2,454 mL  30 mL/kg Intravenous PRN Nikunj Velez  mL/hr at 12/27/22 0508 1,000 mL at 12/27/22 0508   • sodium chloride 0.9 % flush 10 mL  10 mL Intravenous Q12H Nikunj Velez MD   10 mL at 01/03/23 0923   • sodium chloride 0.9 % flush 10 mL  10 mL Intravenous PRN Nikunj Velez MD       • sodium chloride 0.9 % infusion 40 mL  40 mL Intravenous PRN Agustin  Nikunj Ramírez MD       • Zinc Oxide 16 % ointment   Apply externally TID PeriZulma, APRN   1 application at 01/03/23 0926        ________________________________________________________        OBJECTIVE:  Upon today's exam, patient very obtunded but responds     Neurologic Exam  Obtunded Physical exam  PHYSICAL EXAM:    Constitutional: The patient is in no apparent distress, Obtunded, encephalopathic     Head: Normocephalic, atraumatic.     Chest: No respiratory distress.    Cardiac: Regular rate and rhythm.     Extremities:  No clubbing, cyanosis, or edema.     NEUROLOGICAL:    Cognition:   Obtunded  Opens eyes to physical stimulation  Follows simple commands  Gave thumbs up   Exam limited given mental status      Cranial nerves;  No apparent facial asymmetry   Pupils equal and reactive  Tracking   Exam limited given mental status    Sensory:  Moans and withdraws to painful stimuli    Motor: Normal movement like 2/5    Cerebellar and gait not tested given patient's mental status  No reflexes and toes are mute  ________________________________________________________   RESULTS REVIEW:    VITAL SIGNS:   Temp:  [97.5 °F (36.4 °C)-99.9 °F (37.7 °C)] 99.5 °F (37.5 °C)  Heart Rate:  [] 102  Resp:  [24-31] 31  BP: (105-139)/(68-90) 117/68     LABS:      Lab 01/03/23  0914 01/02/23  0606 01/01/23  0614 12/31/22  1729 12/31/22  0832 12/31/22  0520 12/31/22  0413 12/31/22  0002 12/30/22  1258 12/30/22  0540 12/30/22  0212 12/29/22  1721 12/29/22  0002 12/28/22  0839   WBC 8.20 9.30 7.80  --   --  9.80  --   --   --  9.30  --  9.80   < > 6.20   HEMOGLOBIN 11.1* 11.3* 11.4*  --   --  11.3*  --   --   --  11.1*  --  10.9*   < > 11.5*   HEMATOCRIT 34.1* 34.6* 34.6*  --   --  32.6*  --   --   --  32.3*  --  32.0*   < > 34.2*   PLATELETS 104* 78* 60*  --   --  46*  --   --   --  38*  --  47*   < > 33*   NEUTROS ABS 7.10* 8.40* 7.00  --   --  9.21*  --   --   --  8.46*  --  8.82*   < > 5.83   LYMPHS ABS 0.30* 0.30*  0.20*  --   --   --   --   --   --   --   --   --   --   --    MONOS ABS 0.80 0.50 0.60  --   --   --   --   --   --   --   --   --   --   --    EOS ABS 0.00 0.00 0.00  --   --   --   --   --   --   --   --   --   --  0.06   MCV 97.2* 97.1* 95.5  --   --  93.7  --   --   --  94.4  --  94.9   < > 95.7   SED RATE  --   --   --   --   --   --   --   --   --   --   --   --   --  39*   PROTIME  --   --   --   --   --   --   --   --   --   --   --  15.6*  --   --    APTT  --   --  61.1* 74.1* 94.8*  --  98.2* 105.2*   < > 91.2*   < > 38.7*  --   --     < > = values in this interval not displayed.         Lab 01/03/23 0914 01/02/23  0606 01/01/23 06 12/31/22  05 12/30/22  0540   SODIUM 150* 152* 146* 140 135*   POTASSIUM 4.7 4.3 4.0 3.8 3.8   CHLORIDE 116* 120* 113* 107 103   CO2 28.0 27.0 25.0 22.0 21.0*   ANION GAP 6.0 5.0 8.0 11.0 11.0   BUN 36* 41* 52* 68* 77*   CREATININE 0.62* 0.69* 0.82 1.12 1.50*   EGFR 104.8 101.4 96.3 72.0 50.7*   GLUCOSE 122* 144* 135* 144* 122*   CALCIUM 8.0* 8.2* 8.0* 8.0* 8.1*   IONIZED CALCIUM 1.15* 1.16* 1.17* 1.15* 1.15*   MAGNESIUM 2.0 2.0 2.0 2.2 2.3   PHOSPHORUS 3.0 2.6 3.0 2.9 4.0         Lab 01/03/23 0914 01/02/23  0606 01/01/23 0614 12/31/22  0520 12/30/22  0540 12/29/22  2133 12/29/22  1710   TOTAL PROTEIN 5.1* 5.0* 4.8* 4.3* 4.4*  --   --    ALBUMIN 2.4* 2.4* 2.2* 2.2* 2.3*   < >  --    GLOBULIN 2.7 2.6 2.6 2.1 2.1  --   --    ALT (SGPT) 122* 200* 306* 347* 236*  --   --    AST (SGOT) 44* 104* 308* 520* 431*  --   --    BILIRUBIN 3.4* 3.3* 4.0* 4.4* 5.2*  --  5.3*   INDIRECT BILIRUBIN  --   --   --   --   --   --  1.1   BILIRUBIN DIRECT  --   --   --   --   --   --  4.2*   ALK PHOS 120* 102 107 157* 105  --   --     < > = values in this interval not displayed.         Lab 12/29/22  1721   PROTIME 15.6*   INR 1.55*             Lab 12/29/22  2133 12/29/22  1710 12/28/22  1559   IRON  --  29*  --    IRON SATURATION  --  15*  --    TIBC  --  195*  --    TRANSFERRIN  --  131*   --    FERRITIN  --  3,525.00*  --    FOLATE 7.13  --   --    VITAMIN B 12 >2,000*  --   --    ABO TYPING  --   --  A   RH TYPING  --   --  Positive   ANTIBODY SCREEN  --   --  Negative         Lab 12/31/22  0843 12/29/22  0427 12/29/22  0038   PH, ARTERIAL 7.388 7.297* 7.230*   PCO2, ARTERIAL 37.1 39.1 50.5*   PO2 ART 84.5 114.4* 355.5*   O2 SATURATION ART 96.3 98.0 99.9*   FIO2 28 40 90   HCO3 ART 22.4 19.1* 21.1   BASE EXCESS ART -2.3* -6.9* -6.6*     UA    Urinalysis 12/26/22 12/26/22    1500 1500   Squamous Epithelial Cells, UA  3-6 (A)   Specific Gravity, UA 1.015    Ketones, UA Trace (A)    Blood, UA Trace (A)    Leukocytes, UA Negative    Nitrite, UA Positive (A)    RBC, UA  0-2 (A)   WBC, UA  0-2 (A)   Bacteria, UA  3+ (A)   (A) Abnormal value       Comments are available for some flowsheets but are not being displayed.             Lab Results   Component Value Date    LDL 22 12/26/2022    HGBA1C 4.2 12/26/2022    [id number] >2,000 (H) 12/29/2022       IMAGING STUDIES:  XR Chest 1 View    Result Date: 1/3/2023  Impression: 1. Radiographic changes consistent with congestive heart failure pulmonary edema with interval increase in pulmonary edema and development of small bilateral pleural effusions. Electronically Signed: Jaime Momin  1/3/2023 9:14 AM EST  Workstation ID: [id number]      I reviewed the patient's new clinical results.    ________________________________________________________     PROBLEM LIST:    Hyponatremia    ACC/AHA stage C chronic systolic heart failure (HCC)    Cardiac amyloidosis (HCC)    Primary hypertension    Tobacco abuse    Cervicalgia, spine surgery 2017    Primary osteoarthritis involving multiple joints    Bacteremia due to methicillin susceptible Staphylococcus aureus (MSSA)    Staphylococcal arthritis of shoulder (HCC)    Acute kidney injury superimposed on chronic kidney disease (HCC)            ASSESSMENT/PLAN:  Encephalopathy, likely multifactorial    Continue present  supportive care    I will follow-up on imaging studies and if needed further work-up including MRI would be done    I will follow-up and I had discussions with the patient's wife and stepdaughter who are at bedside    It does not look like seizure or large stroke but I will follow-up      I discussed the patient's findings and my recommendations with patient, family and nursing staff    Shahana Douglass MD  01/03/23  15:24 EST

## 2023-01-03 NOTE — PROGRESS NOTES
"                                                                                                                                      Nephrology  Progress Note                                        [facility]    Patient Identification    Name: Jaime Bar  Age: 67 y.o.  Sex: male  :  1955  MRN: [id number]      DATE OF SERVICE:  1/3/2023        Subective     required more oxygen      Objective   Scheduled Meds:cefTRIAXone, 2 g, Intravenous, Q12H  enoxaparin, 1.5 mg/kg, Subcutaneous, Daily  ferrous sulfate, 300 mg, Nasogastric, Daily  lansoprazole, 30 mg, Nasogastric, Q AM  midodrine, 10 mg, Nasogastric, Q8H  sodium bicarbonate, 650 mg, Nasogastric, BID  sodium chloride, 10 mL, Intravenous, Q12H  Zinc Oxide, , Apply externally, TID          Continuous Infusions:dextrose, 50 mL/hr, Last Rate: 50 mL/hr (23)        PRN Meds:•  acetaminophen **OR** acetaminophen  •  aluminum-magnesium hydroxide-simethicone  •  dextrose  •  dextrose  •  fentanyl  •  glucagon (human recombinant)  •  HYDROcodone-acetaminophen  •  ondansetron **OR** ondansetron  •  sodium chloride  •  sodium chloride  •  sodium chloride     Exam:  /86   Pulse 107   Temp 99.9 °F (37.7 °C) (Axillary)   Resp (!) 31   Ht 167.6 cm (66\")   Wt 92.3 kg (203 lb 7.8 oz)   SpO2 96%   BMI 32.84 kg/m²     Intake/Output last 3 shifts:  I/O last 3 completed shifts:  In: 4831 [I.V.:2456; Other:225; NG/GT:2150]  Out: 3430 [Urine:3225; Drains:175]    Intake/Output this shift:  No intake/output data recorded.    Physical exam:  General Appearance: Confused  Head:  Normocephalic, without obvious abnormality, atraumatic  Eyes:  PERRL, conjunctiva/corneas clear     Neck:  Supple,  no adenopathy;      Lungs:  Decreased BS occasion ronchi  Heart:  Regular rate and rhythm, S1 and S2 normal  Abdomen:  Soft, non-tender, bowel sounds active   Extremities: trace edema  Pulses: 2+ and symmetric all extremities  Skin:  No rashes or " lesions       Data Review:  All labs (24hrs):   Recent Results (from the past 24 hour(s))   POC Glucose Once    Collection Time: 01/02/23 11:55 AM    Specimen: Blood   Result Value Ref Range    Glucose 137 (H) 70 - 105 mg/dL   POC Glucose Once    Collection Time: 01/02/23  6:00 PM    Specimen: Blood   Result Value Ref Range    Glucose 117 (H) 70 - 105 mg/dL   POC Glucose Once    Collection Time: 01/02/23 11:17 PM    Specimen: Blood   Result Value Ref Range    Glucose 128 (H) 70 - 105 mg/dL   POC Glucose Once    Collection Time: 01/03/23  5:44 AM    Specimen: Blood   Result Value Ref Range    Glucose 137 (H) 70 - 105 mg/dL          Imaging:  XR Shoulder 2+ View Right    Result Date: 12/27/2022   1. Severe degenerative changes of the glenohumeral joint as well as a high riding humeral head, suggestive of chronic rotator cuff pathology. No definite evidence of osteomyelitis is identified. However plain films cannot exclude septic arthritis.  Electronically Signed By-Juan Francisco Lozoya MD On:12/27/2022 1:42 PM This report was finalized on [id number] by  Juan Francisco Lozoya MD.    CT Head Without Contrast    Result Date: 12/31/2022  1. No acute intracranial abnormality. Specifically, no evidence of acute hemorrhage, mass effect or midline shift. 2. Mild global cerebral volume loss. 3. Mild bilateral air-fluid levels within the maxillary sinuses which can be seen with acute sinus disease in the correct clinical setting.  Electronically Signed By-Willy Baxter MD On:12/31/2022 9:01 PM This report was finalized on [id number] by  Willy Baxter MD.    US Liver    Result Date: 12/30/2022  1. Normal hepatic parenchyma. 2. Cholelithiasis with adenomyomatosis involving gallbladder wall. 3. Common bile duct at the upper limits of normal measuring 6 to 7 mm.  Electronically Signed By-Genaro Canada MD On:12/30/2022 3:40 PM This report was finalized on [id number] by  Genaro Canada MD.    XR Chest 1 View    Result Date:  12/30/2022  Cardiomegaly and increased pulmonary vascular congestion and suspected interstitial edema  Lines and tubes as above[  Electronically Signed By-Jaxon Aly On:12/30/2022 10:19 AM This report was finalized on [id number] by  Jaxon Aly, .    XR Chest 1 View    Result Date: 12/29/2022  The endotracheal tube is approximately 6 mm above the sarah. Recommend repositioning. Left subclavian central venous catheter has its catheter tip overlying the superior vena cava. The cardiac silhouette is moderately to significantly enlarged and the pulmonary vessels are within normal limits. There is no focal area of consolidation otherwise seen. Electronically signed by:  Los Mccoy D.O.  12/28/2022 11:13 PM Mountain Time    XR Chest 1 View    Result Date: 12/27/2022   1. Persistent diffuse interstitial opacities. Differential includes interstitial pulmonary edema, or atypical infection.  Electronically Signed By-Juan Francisco Lozoya MD On:12/27/2022 8:46 AM This report was finalized on [id number] by  Juan Francisco Lozoya MD.    XR Chest 1 View    Result Date: 12/26/2022    1.  Interval placement of left-sided PICC line with the tip projecting over the superior vena cava. 2.  No change in coarsened reticular interstitial markings throughout both lungs.  No new airspace consolidation or pleural effusion.  Electronically Signed By-Los Diop MD On:12/26/2022 4:56 PM This report was finalized on [id number] by  Los Diop MD.    MRI Shoulder Right Without Contrast    Result Date: 12/28/2022  Impression: 1. Moderate-sized joint effusion with advanced degenerative changes and destructive changes of the glenohumeral joint. Findings may be related to septic arthritis given clinical history. Fluid aspiration is recommended for confirmation. Joint effusion with subcoracoid bursitis may also be reactive and related to severe osteoarthritis. 2. Suspected complete tears of the supraspinatus and infraspinatus  tendons. 3. Completely macerated appearance of the labrum. 4. Moderate acromial clavicular osteoarthritis. Electronically Signed: Linnette Logan  12/28/2022 10:08 AM EST  Workstation ID: [id number]    MRI Shoulder Left Without Contrast    Result Date: 12/28/2022  Impression: Very large left-sided joint effusion with destructive changes of the glenohumeral joint with deformity of the glenoid likely related to septic arthritis. Fluid sampling is recommended for further characterization. Ancillary findings as described above. Electronically Signed: Linnette Junior  12/28/2022 10:06 AM EST  Workstation ID: [id number]    XR Abdomen KUB    Result Date: 12/30/2022  NG tube projects over the expected position of the body of the stomach  Electronically Signed By-Jaxon Aly On:12/30/2022 12:00 PM This report was finalized on [id number] by  Jaxon Aly, .      Assessment/Plan:     Hyponatremia    ACC/AHA stage C chronic systolic heart failure (HCC)    Cardiac amyloidosis (HCC)    Primary hypertension    Tobacco abuse    Cervicalgia, spine surgery 2017    Primary osteoarthritis involving multiple joints    Bacteremia due to methicillin susceptible Staphylococcus aureus (MSSA)    Staphylococcal arthritis of shoulder (HCC)    Acute kidney injury superimposed on chronic kidney disease (HCC)   hyponatremia improving   Acute kidney injury on chronic kidney disease improving  Atrial fibrillation with rapid ventricular response  Metabolic acidosis  Urinary retention   Sepsis   Hypocalcemia   Hyperkalemia      required more oxygen  Ordered 1 dose of Lasix 60 mg and DC IV fluids  Increase free water from  cc through the tube feeds  And and add Lasix twice a day starting tomorrow morning  If urine output does not  we may have to give him more diuretics today   discussed with the nurse

## 2023-01-03 NOTE — PLAN OF CARE
Goal Outcome Evaluation:  Plan of Care Reviewed With: patient, family     Patient lethargic and oriented to self throughout the day but following commands.  O2 needs increasing and venturi mask placed on patient.  Good urine output with minimal BM, tolerating tube feeds.  Neuro consulted for changes in LOC.  PCU overflow awaiting transfer.

## 2023-01-04 ENCOUNTER — APPOINTMENT (OUTPATIENT)
Dept: CARDIOLOGY | Facility: HOSPITAL | Age: 68
DRG: 853 | End: 2023-01-04
Payer: MEDICARE

## 2023-01-04 LAB
ALBUMIN SERPL-MCNC: 2.2 G/DL (ref 3.5–5.2)
ALBUMIN/GLOB SERPL: 0.8 G/DL
ALP SERPL-CCNC: 96 U/L (ref 39–117)
ALT SERPL W P-5'-P-CCNC: 93 U/L (ref 1–41)
ANION GAP SERPL CALCULATED.3IONS-SCNC: 8 MMOL/L (ref 5–15)
AST SERPL-CCNC: 36 U/L (ref 1–40)
BASOPHILS # BLD AUTO: 0 10*3/MM3 (ref 0–0.2)
BASOPHILS NFR BLD AUTO: 0.2 % (ref 0–1.5)
BH CV UPPER VENOUS LEFT INTERNAL JUGULAR AUGMENT: NORMAL
BH CV UPPER VENOUS LEFT INTERNAL JUGULAR COMPRESS: NORMAL
BH CV UPPER VENOUS LEFT INTERNAL JUGULAR PHASIC: NORMAL
BH CV UPPER VENOUS LEFT INTERNAL JUGULAR SPONT: NORMAL
BH CV UPPER VENOUS LEFT SUBCLAVIAN AUGMENT: NORMAL
BH CV UPPER VENOUS LEFT SUBCLAVIAN COMPRESS: NORMAL
BH CV UPPER VENOUS LEFT SUBCLAVIAN PHASIC: NORMAL
BH CV UPPER VENOUS LEFT SUBCLAVIAN SPONT: NORMAL
BH CV UPPER VENOUS RIGHT AXILLARY AUGMENT: NORMAL
BH CV UPPER VENOUS RIGHT AXILLARY COMPRESS: NORMAL
BH CV UPPER VENOUS RIGHT AXILLARY PHASIC: NORMAL
BH CV UPPER VENOUS RIGHT AXILLARY SPONT: NORMAL
BH CV UPPER VENOUS RIGHT BASILIC FOREARM COMPRESS: NORMAL
BH CV UPPER VENOUS RIGHT BASILIC UPPER COMPRESS: NORMAL
BH CV UPPER VENOUS RIGHT BRACHIAL COMPRESS: NORMAL
BH CV UPPER VENOUS RIGHT CEPHALIC FOREARM COLOR: 1
BH CV UPPER VENOUS RIGHT CEPHALIC FOREARM COMPRESS: NORMAL
BH CV UPPER VENOUS RIGHT CEPHALIC FOREARM THROMBUS: NORMAL
BH CV UPPER VENOUS RIGHT CEPHALIC UPPER COMPRESS: NORMAL
BH CV UPPER VENOUS RIGHT INTERNAL JUGULAR AUGMENT: NORMAL
BH CV UPPER VENOUS RIGHT INTERNAL JUGULAR COMPRESS: NORMAL
BH CV UPPER VENOUS RIGHT INTERNAL JUGULAR PHASIC: NORMAL
BH CV UPPER VENOUS RIGHT INTERNAL JUGULAR SPONT: NORMAL
BH CV UPPER VENOUS RIGHT RADIAL COMPRESS: NORMAL
BH CV UPPER VENOUS RIGHT SUBCLAVIAN AUGMENT: NORMAL
BH CV UPPER VENOUS RIGHT SUBCLAVIAN COMPRESS: NORMAL
BH CV UPPER VENOUS RIGHT SUBCLAVIAN PHASIC: NORMAL
BH CV UPPER VENOUS RIGHT SUBCLAVIAN SPONT: NORMAL
BH CV UPPER VENOUS RIGHT ULNAR COMPRESS: NORMAL
BILIRUB SERPL-MCNC: 3.5 MG/DL (ref 0–1.2)
BUN SERPL-MCNC: 40 MG/DL (ref 8–23)
BUN/CREAT SERPL: 51.9 (ref 7–25)
CA-I SERPL ISE-MCNC: 1.09 MMOL/L (ref 1.2–1.3)
CALCIUM SPEC-SCNC: 8 MG/DL (ref 8.6–10.5)
CHLORIDE SERPL-SCNC: 108 MMOL/L (ref 98–107)
CO2 SERPL-SCNC: 32 MMOL/L (ref 22–29)
COPPER SERPL-MCNC: 110 UG/DL (ref 69–132)
CREAT SERPL-MCNC: 0.77 MG/DL (ref 0.76–1.27)
DEPRECATED RDW RBC AUTO: 56.9 FL (ref 37–54)
EGFRCR SERPLBLD CKD-EPI 2021: 98.1 ML/MIN/1.73
EOSINOPHIL # BLD AUTO: 0 10*3/MM3 (ref 0–0.4)
EOSINOPHIL NFR BLD AUTO: 0.2 % (ref 0.3–6.2)
ERYTHROCYTE [DISTWIDTH] IN BLOOD BY AUTOMATED COUNT: 16.5 % (ref 12.3–15.4)
GLOBULIN UR ELPH-MCNC: 2.9 GM/DL
GLUCOSE BLDC GLUCOMTR-MCNC: 96 MG/DL (ref 70–105)
GLUCOSE BLDC GLUCOMTR-MCNC: 99 MG/DL (ref 70–105)
GLUCOSE SERPL-MCNC: 127 MG/DL (ref 65–99)
HCT VFR BLD AUTO: 34.1 % (ref 37.5–51)
HGB BLD-MCNC: 11 G/DL (ref 13–17.7)
IGA SERPL-MCNC: 166 MG/DL (ref 61–437)
IGA1 MFR SER: 486 MG/DL (ref 70–400)
IGG SERPL-MCNC: 496 MG/DL (ref 603–1613)
IGG1 SER-MCNC: 902 MG/DL (ref 700–1600)
IGM SERPL-MCNC: 35 MG/DL (ref 20–172)
IGM SERPL-MCNC: 56 MG/DL (ref 40–230)
INTERPRETATION UR IFE-IMP: NORMAL
LYMPHOCYTES # BLD AUTO: 0.3 10*3/MM3 (ref 0.7–3.1)
LYMPHOCYTES NFR BLD AUTO: 4.2 % (ref 19.6–45.3)
MAGNESIUM SERPL-MCNC: 1.9 MG/DL (ref 1.6–2.4)
MAXIMAL PREDICTED HEART RATE: 153 BPM
MCH RBC QN AUTO: 31.3 PG (ref 26.6–33)
MCHC RBC AUTO-ENTMCNC: 32.3 G/DL (ref 31.5–35.7)
MCV RBC AUTO: 97.1 FL (ref 79–97)
MONOCYTES # BLD AUTO: 0.8 10*3/MM3 (ref 0.1–0.9)
MONOCYTES NFR BLD AUTO: 11.9 % (ref 5–12)
NEUTROPHILS NFR BLD AUTO: 5.9 10*3/MM3 (ref 1.7–7)
NEUTROPHILS NFR BLD AUTO: 83.5 % (ref 42.7–76)
NRBC BLD AUTO-RTO: 0 /100 WBC (ref 0–0.2)
PHOSPHATE SERPL-MCNC: 3.6 MG/DL (ref 2.5–4.5)
PLATELET # BLD AUTO: 116 10*3/MM3 (ref 140–450)
PMV BLD AUTO: 9.9 FL (ref 6–12)
POTASSIUM SERPL-SCNC: 4.4 MMOL/L (ref 3.5–5.2)
PROT PATTERN SERPL IFE-IMP: ABNORMAL
PROT SERPL-MCNC: 5.1 G/DL (ref 6–8.5)
RBC # BLD AUTO: 3.51 10*6/MM3 (ref 4.14–5.8)
SODIUM SERPL-SCNC: 148 MMOL/L (ref 136–145)
STRESS TARGET HR: 130 BPM
WBC NRBC COR # BLD: 7 10*3/MM3 (ref 3.4–10.8)

## 2023-01-04 PROCEDURE — 25010000002 CEFTRIAXONE PER 250 MG: Performed by: NURSE PRACTITIONER

## 2023-01-04 PROCEDURE — 82962 GLUCOSE BLOOD TEST: CPT

## 2023-01-04 PROCEDURE — 25010000002 ENOXAPARIN PER 10 MG: Performed by: NURSE PRACTITIONER

## 2023-01-04 PROCEDURE — 99231 SBSQ HOSP IP/OBS SF/LOW 25: CPT | Performed by: PSYCHIATRY & NEUROLOGY

## 2023-01-04 PROCEDURE — 84100 ASSAY OF PHOSPHORUS: CPT | Performed by: ORTHOPAEDIC SURGERY

## 2023-01-04 PROCEDURE — 92610 EVALUATE SWALLOWING FUNCTION: CPT

## 2023-01-04 PROCEDURE — 25010000002 FUROSEMIDE PER 20 MG: Performed by: INTERNAL MEDICINE

## 2023-01-04 PROCEDURE — 93971 EXTREMITY STUDY: CPT

## 2023-01-04 PROCEDURE — 85025 COMPLETE CBC W/AUTO DIFF WBC: CPT | Performed by: ORTHOPAEDIC SURGERY

## 2023-01-04 PROCEDURE — 99232 SBSQ HOSP IP/OBS MODERATE 35: CPT | Performed by: INTERNAL MEDICINE

## 2023-01-04 PROCEDURE — 82330 ASSAY OF CALCIUM: CPT | Performed by: ORTHOPAEDIC SURGERY

## 2023-01-04 PROCEDURE — 82784 ASSAY IGA/IGD/IGG/IGM EACH: CPT | Performed by: NURSE PRACTITIONER

## 2023-01-04 PROCEDURE — 80053 COMPREHEN METABOLIC PANEL: CPT | Performed by: ORTHOPAEDIC SURGERY

## 2023-01-04 PROCEDURE — 83735 ASSAY OF MAGNESIUM: CPT | Performed by: ORTHOPAEDIC SURGERY

## 2023-01-04 RX ADMIN — HYDROCODONE BITARTRATE AND ACETAMINOPHEN 1 TABLET: 10; 325 TABLET ORAL at 11:30

## 2023-01-04 RX ADMIN — Medication 1 APPLICATION: at 16:55

## 2023-01-04 RX ADMIN — ENOXAPARIN SODIUM 140 MG: 150 INJECTION SUBCUTANEOUS at 13:48

## 2023-01-04 RX ADMIN — MIDODRINE HYDROCHLORIDE 10 MG: 5 TABLET ORAL at 17:55

## 2023-01-04 RX ADMIN — Medication: at 20:26

## 2023-01-04 RX ADMIN — CEFTRIAXONE SODIUM 2 G: 2 INJECTION, POWDER, FOR SOLUTION INTRAMUSCULAR; INTRAVENOUS at 13:47

## 2023-01-04 RX ADMIN — CEFTRIAXONE SODIUM 2 G: 2 INJECTION, POWDER, FOR SOLUTION INTRAMUSCULAR; INTRAVENOUS at 01:35

## 2023-01-04 RX ADMIN — Medication 300 MG: at 08:14

## 2023-01-04 RX ADMIN — Medication 1 APPLICATION: at 08:14

## 2023-01-04 RX ADMIN — Medication 10 ML: at 08:15

## 2023-01-04 RX ADMIN — Medication 10 ML: at 20:26

## 2023-01-04 RX ADMIN — HYDROCODONE BITARTRATE AND ACETAMINOPHEN 1 TABLET: 10; 325 TABLET ORAL at 19:36

## 2023-01-04 RX ADMIN — FUROSEMIDE 40 MG: 10 INJECTION, SOLUTION INTRAMUSCULAR; INTRAVENOUS at 23:14

## 2023-01-04 RX ADMIN — LANSOPRAZOLE 30 MG: 30 TABLET, ORALLY DISINTEGRATING ORAL at 05:44

## 2023-01-04 RX ADMIN — MIDODRINE HYDROCHLORIDE 10 MG: 5 TABLET ORAL at 01:35

## 2023-01-04 RX ADMIN — FUROSEMIDE 40 MG: 10 INJECTION, SOLUTION INTRAMUSCULAR; INTRAVENOUS at 11:30

## 2023-01-04 NOTE — PROGRESS NOTES
LOS: 9 days     Chief Complaint: Mental status changes, postsurgery       SUBJECTIVE:    History taken from: patient chart RN Consulting physician    Interval History: Jaime Bar was admitted on 12/26/2022 and I am seeing him since yesterday.  He has improved significantly    Much more awake and alert and essentially fully oriented    Nonfocal examination    Mild dysphonia and dysarthria but no aphasia    Able to identify and repeat  Mild generalized weakness    - Portions of the above HPI were copied from previous encounters and edited as appropriate.    Patient Complaints: Weakness    Review of Systems   Mostly related to weakness and nothing major focal    Pertinent PMH:  has no past medical history on file.   ________________________________________________     OBJECTIVE:  Awake and alert and does not look like in any acute distress    Neurologic Exam    Limited neurological examination but the patient is awake, alert, oriented  Dysphonic and minimal dysarthria but no aphasia    Cranial nerve screening was unremarkable   motor examination 4/5  Sensory examination intact for soft touch and pain    Gait and coordination and ataxia not possible at this time    Toes are downgoing and I could not get any reflexes  ________________________________________________   RESULTS REVIEW    VITAL SIGNS:  Temp:  [98 °F (36.7 °C)-100.2 °F (37.9 °C)] 98 °F (36.7 °C)  Heart Rate:  [] 107  Resp:  [19-31] 19  BP: ()/(60-90) 119/72    LABS:       Lab 01/04/23  0352 01/03/23  0914 01/02/23  0606 01/01/23  0614 12/31/22  1729 12/31/22  0832 12/31/22  0520 12/31/22  0413 12/31/22  0002 12/30/22  0212 12/29/22  1721   WBC 7.00 8.20 9.30 7.80  --   --  9.80  --   --    < > 9.80   HEMOGLOBIN 11.0* 11.1* 11.3* 11.4*  --   --  11.3*  --   --    < > 10.9*   HEMATOCRIT 34.1* 34.1* 34.6* 34.6*  --   --  32.6*  --   --    < > 32.0*   PLATELETS 116* 104* 78* 60*  --   --  46*  --   --    < > 47*   NEUTROS ABS 5.90 7.10* 8.40*  7.00  --   --  9.21*  --   --    < > 8.82*   LYMPHS ABS 0.30* 0.30* 0.30* 0.20*  --   --   --   --   --   --   --    MONOS ABS 0.80 0.80 0.50 0.60  --   --   --   --   --   --   --    EOS ABS 0.00 0.00 0.00 0.00  --   --   --   --   --   --   --    MCV 97.1* 97.2* 97.1* 95.5  --   --  93.7  --   --    < > 94.9   PROTIME  --   --   --   --   --   --   --   --   --   --  15.6*   APTT  --   --   --  61.1* 74.1* 94.8*  --  98.2* 105.2*   < > 38.7*    < > = values in this interval not displayed.         Lab 01/04/23 0352 01/03/23 0914 01/02/23 0606 01/01/23 0614 12/31/22  0520   SODIUM 148* 150* 152* 146* 140   POTASSIUM 4.4 4.7 4.3 4.0 3.8   CHLORIDE 108* 116* 120* 113* 107   CO2 32.0* 28.0 27.0 25.0 22.0   ANION GAP 8.0 6.0 5.0 8.0 11.0   BUN 40* 36* 41* 52* 68*   CREATININE 0.77 0.62* 0.69* 0.82 1.12   EGFR 98.1 104.8 101.4 96.3 72.0   GLUCOSE 127* 122* 144* 135* 144*   CALCIUM 8.0* 8.0* 8.2* 8.0* 8.0*   IONIZED CALCIUM 1.09* 1.15* 1.16* 1.17* 1.15*   MAGNESIUM 1.9 2.0 2.0 2.0 2.2   PHOSPHORUS 3.6 3.0 2.6 3.0 2.9         Lab 01/04/23 0352 01/03/23 0914 01/02/23  0606 01/01/23  0614 12/31/22  0520 12/29/22  2133 12/29/22  1710   TOTAL PROTEIN 5.1* 5.1* 5.0* 4.8* 4.3*   < >  --    ALBUMIN 2.2* 2.4* 2.4* 2.2* 2.2*   < >  --    GLOBULIN 2.9 2.7 2.6 2.6 2.1   < >  --    ALT (SGPT) 93* 122* 200* 306* 347*   < >  --    AST (SGOT) 36 44* 104* 308* 520*   < >  --    BILIRUBIN 3.5* 3.4* 3.3* 4.0* 4.4*   < > 5.3*   INDIRECT BILIRUBIN  --   --   --   --   --   --  1.1   BILIRUBIN DIRECT  --   --   --   --   --   --  4.2*   ALK PHOS 96 120* 102 107 157*   < >  --     < > = values in this interval not displayed.         Lab 12/29/22  1721   PROTIME 15.6*   INR 1.55*             Lab 12/29/22  2133 12/29/22  1710 12/28/22  1559   IRON  --  29*  --    IRON SATURATION  --  15*  --    TIBC  --  195*  --    TRANSFERRIN  --  131*  --    FERRITIN  --  3,525.00*  --    FOLATE 7.13  --   --    VITAMIN B 12 >2,000*  --   --    ABO  TYPING  --   --  A   RH TYPING  --   --  Positive   ANTIBODY SCREEN  --   --  Negative         Lab 12/31/22  0843 12/29/22  0427 12/29/22  0038   PH, ARTERIAL 7.388 7.297* 7.230*   PCO2, ARTERIAL 37.1 39.1 50.5*   PO2 ART 84.5 114.4* 355.5*   O2 SATURATION ART 96.3 98.0 99.9*   FIO2 28 40 90   HCO3 ART 22.4 19.1* 21.1   BASE EXCESS ART -2.3* -6.9* -6.6*     UA    Urinalysis 12/26/22 12/26/22    1500 1500   Squamous Epithelial Cells, UA  3-6 (A)   Specific Gravity, UA 1.015    Ketones, UA Trace (A)    Blood, UA Trace (A)    Leukocytes, UA Negative    Nitrite, UA Positive (A)    RBC, UA  0-2 (A)   WBC, UA  0-2 (A)   Bacteria, UA  3+ (A)   (A) Abnormal value       Comments are available for some flowsheets but are not being displayed.             Lab Results   Component Value Date    LDL 22 12/26/2022    HGBA1C 4.2 12/26/2022    [id number] >2,000 (H) 12/29/2022         IMAGING STUDIES:  CT Cervical Spine With Contrast    Result Date: 1/3/2023  1.Postoperative changes status post prior fusion. No definitive signs of hardware failure or loosening are noted. 2.No evidence for displaced fracture or malalignment throughout the cervical spine. 3.No evidence for acute soft tissue abnormality. There is no evidence for abnormal enhancement. No abnormal peripherally enhancing fluid collection is seen. 4.Changes of cervical spondylosis are noted throughout the cervical spine. Associated hypertrophic posterior facet changes and uncovertebral changes are also observed. Electronically Signed: Solo Ray  1/3/2023 10:25 PM EST  Workstation ID: [id number]    CT Thoracic Spine With Contrast    Result Date: 1/3/2023  1.No evidence for displaced fracture or malalignment throughout the thoracic spine. No evidence for erosive endplate changes. 2.No evidence for abnormal enhancement. There is no abnormal fluid collection within the paraspinal soft tissues. There is no evidence for epidural abscess. 3.Degenerative changes are noted  throughout the thoracic spine. 4.Interstitial changes are seen throughout the lungs. Bibasilar infiltrates are noted. Bilateral pleural effusions are observed. Electronically Signed: Solo Ray  1/3/2023 10:31 PM EST  Workstation ID: [id number]    CT Lumbar Spine With Contrast    Result Date: 1/3/2023  1.No evidence for displaced fracture or malalignment throughout the lumbar spine. No evidence for erosive endplate changes. 2.No evidence for abnormal enhancement. There is no abnormal fluid collection within the paraspinal soft tissues. There is no evidence for epidural abscess. 3.Advanced degenerative changes are noted throughout the lumbar spine. 4.Evidence for bilateral spondylolysis with associated spondylolisthesis at the L5 level. Prior postoperative changes are also noted. Electronically Signed: Solo Ray  1/3/2023 10:35 PM EST  Workstation ID: [id number]    XR Chest 1 View    Result Date: 1/3/2023  Impression: 1. Radiographic changes consistent with congestive heart failure pulmonary edema with interval increase in pulmonary edema and development of small bilateral pleural effusions. Electronically Signed: Jaime Momin  1/3/2023 9:14 AM EST  Workstation ID: [id number]      I reviewed the patient's new clinical results.    ________________________________________________      PROBLEM LIST:    Hyponatremia    ACC/AHA stage C chronic systolic heart failure (HCC)    Cardiac amyloidosis (HCC)    Primary hypertension    Tobacco abuse    Cervicalgia, spine surgery 2017    Primary osteoarthritis involving multiple joints    Bacteremia due to methicillin susceptible Staphylococcus aureus (MSSA)    Staphylococcal arthritis of shoulder (HCC)    Acute kidney injury superimposed on chronic kidney disease (HCC)        ASSESSMENT/PLAN:  Likely multifactorial toxic and metabolic encephalopathy    Improving  Continue present plan    **Please refer to previous notes for further details and recommendations.     I discussed  the patient's findings and my recommendations with patient, nursing staff and consulting provider    Shahana Douglass MD  01/04/23  14:57 EST

## 2023-01-04 NOTE — PROGRESS NOTES
Hematology/Oncology Inpatient Progress Note    PATIENT NAME: Jaime Bar  : 1955  MRN: [id number]    CHIEF COMPLAINT: Sepsis; Thrombocytopenia; provoked catheter associated LUE DVT, LUE SVT, and RUE SVT    HISTORY OF PRESENT ILLNESS:    67 y.o. male admitted to [facility] on transfer from [facility] on 2022.  The patient was intubated and unresponsive at time of consultation with histories obtained from review of records and discussion with patient's niece and nephew.  He reported a history of bilateral shoulder pain for few days prior to admission.  He had presented to [place] ED on 2022 where he was hypotensive and hyponatremic and transferred to [facility].  CXR on 2022 showed coarsened airspace and bronchovascular thickening with small airway disease such as bronchitis or asthma, edema, atypical/viral infection, and aspiration in differential.  A RUE venous Doppler showed acute RUE superficial thrombophlebitis in the cephalic vein and no evidence of DVT for which he was started on subcu heparin.  On 2022 CBC revealed WBC 7.2, hemoglobin 12.4, MCV 98.4, and platelets 99,000.  Creatinine was elevated to 2.38, BUN 55, sodium was low at 118, potassium was high at 5.4 and LFTs revealed T bili elevated to 4.8 (0-1.2) with transaminases normal.  Blood cultures grew Staph aureus.  Urinalysis was concerning for UTI.  Urine sodium was less than 20, a.m. cortisol 26.11 (6.02-18.4), and urine osmolality was 410 ().   X-rays and MRIs of the bilateral shoulders were performed 2022 revealing joint effusion and advanced degenerative changes/destructive changes of glenohumeral joint.  There was suspected complete tear of the supraspinatus and infraspinatus tendons and a completely macerated appearance of the labrum on the right shoulder.  On 2022 RUE venous Doppler was repeated and felt stable.  He then underwent bilateral shoulder arthroscopy incision and drainage  with culture growing heavy growth of Staph aureus.  He had been intubated prior to the surgery and remained sedated on ventilator with pressors postop.  At time of consultation 12/29/2022 LUE venous Doppler showed acute catheter associated DVT in the left axillary vein, SVT in the left basilic vein of the upper arm.  BLE venous Doppler was negative for DVT/SVT but did show deep venous valvular incompetence in the right popliteal vein.  CBC revealed WBC 14.2, hemoglobin 11.4, MCV 96.5, and platelets 46,000.  T bili was elevated to 5.3 and transaminases were now elevated with  and .  PT was 15.6 (9.6-11.7), PTT was 38.7 (24-31), and fibrinogen was 493 (210-450).  D-dimer was 11.83 (0-0.67).  Transesophageal echocardiogram showed LVEF 46-50%, mild aortic valve stenosis, and no evidence of vegetation or bacterial endocarditis.  Heparin was changed to argatroban.  ID was managing antibiotics with patient on ceftriaxone with plan for treatment x6 weeks and recommending replacing PICC line 2 days after starting anticoagulation.  His niece and nephew reported the patient to have lost some weight but they were unsure the amount.  They reported PMH significant for prostate cancer treated approximately 2010 with radiation alone and no evidence of recurrence to their knowledge.  He also was under the care of Dr. Damico at  for cardiac amyloid.  Chart review showed PSA was 0.1 (0-4) in August 2022.  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  IgG was 768 (603-1613), IgA was 238 (), IgM was 64 ().  Kappa/lambda ratio was 1.36 (0.2-1.65).  A note from Roosevelt Damico MD (cardiologist) at [facility] dated 7/13/2022 reported patient was followed for restrictive cardiomyopathy secondary to cardiac amyloidosis.  LVEF in February 2022 was 45-50%.  Cardiac MRI 4/6/2022 revealed a myocardial mass 276 g, global hypokinesis of left ventricle with ejection fraction 43%, no  myocardial iron overload, the thickness and appearance of intra-atrial septum was also consistent with cardiac amyloidosis.  The note stated that the patient was followed by Dr. Banks at  [facility] where he was on Vyndamax therapy for cardiac amyloid and had been on that treatment for about 2 weeks.  Additionally in January 2022 T bili was noted to be elevated to 2.6 (0.2-1.0) and he was anemic with hemoglobin 11.8 and MCV 86.5 platelets were normal at 261,000.     12/29/22  Hematology/Oncology was consulted by RITU Jimenez for thrombocytopenia.     Past Medical History: Prostate cancer approximately 2010 treated with radiation only.  Cardiac amyloid managed by Dr. Damico at .  CKD.  Surgical History: Several orthopedic surgeries in the past.  Bilateral shoulder arthroscopy with incision and drainage 12/28/2022.  Social History: He lives in [place], [place] with his spouse.  His spouse is known to have Alzheimer's disease.  He has smoked for many years.  He has no alcohol or recreational drug use however he was narcotic dependent secondary to chronic pain.  He is a retired .  Family History: No significant known family history.  Allergies: Tramadol causes him to feel weird and codeine causes nausea.     PCP: Provider, No Known    INTERVAL HISTORY:  • 12/30/2022- platelets 38,000, hemoglobin 11.1.  Folate 7.13 (4.78-24.2), vitamin B12 greater than 2000.  Iron 29 (), iron saturation 15% (20-50), TIBC 195 (298-536), and ferritin 3525 ().  Reticulocytes 1.05 (0.7-1.9), haptoglobin 148 (). PSA 0.234 (0-4).  T bili 5.3 with direct bili 4.2 (0-0.3).  Hepatitis panel nonreactive.  Extubated 12/29/2022 and off pressors.  • 12/31/2022- platelets 46,000, hemoglobin 11.3.  HIT antibody negative at 0.121 (0-0.4).  CMV IgM less than 30 (0-29.9) and EBV IgM less than 36 (0-35.9).  D-dimer mildly improved to 8.94 (0-0.67).  Transaminases rising with  and AST  520.  T bili 4.4.  Peripheral smear was negative for schistocytes again.  Abdominal ultrasound revealed cholelithiasis with adenomyomatosis involving the gallbladder wall.  The common bile duct was in the upper limits of normal.  The liver appeared normal.  • 1/1/2023- platelets 60,000, hemoglobin 11.4.  Argatroban was held with repeat PTT remaining high with subsequent continued hold of argatroban.  D-dimer 9.84.  PTT 61.1 (24-31).  CT head without contrast was negative for acute findings.  • 1/2/2023- hemoglobin 11.3 and platelets 78,000.  ALT improved to 200 and AST improved to 104.  T bili improved to 3.3.  Fibrinogen 445 (210-150).  • 1/3/2023- evaluated by neurology and felt to have multifactorial encephalopathy.  SPEP with no M spike.  Transaminases improved.  Hemoglobin 11.1, platelet count 104,000.  Kappa/lambda free light chain ratio 1.02 (0.26-1.25).  SHAR screen negative.  Right BERNARDINO normal with mild digital ischemia.  Left BERNARDINO normal with severe digital ischemia.  • 1/4/2023- hemoglobin 11.0 and platelets 116,000.  Copper 110 ().  ALT 93 (1-41), AST normalized, and T bili 3.5 (0-1.2).  RUE venous Doppler showed acute RUE superficial thrombophlebitis in the cephalic vein (forearm).     History of present illness reviewed since last visit and changes noted on 01/04/23.    Subjective   Patient able to answer questions and is denying anything on ROS.     ROS:   Review of Systems   Constitutional: Negative for activity change, chills, fatigue, fever and unexpected weight change.   HENT: Negative for congestion, dental problem, hearing loss, mouth sores, nosebleeds, sore throat and trouble swallowing.    Eyes: Negative for photophobia and visual disturbance.   Respiratory: Negative for apnea, cough, chest tightness and shortness of breath.    Cardiovascular: Negative for chest pain, palpitations and leg swelling.   Gastrointestinal: Negative for abdominal distention, abdominal pain, blood in stool,  "constipation, diarrhea, nausea and vomiting.   Endocrine: Negative for cold intolerance and heat intolerance.   Genitourinary: Negative for decreased urine volume, difficulty urinating, frequency, hematuria and urgency.   Musculoskeletal: Negative for arthralgias and gait problem.   Skin: Negative for rash and wound.   Neurological: Negative for dizziness, tremors, weakness, light-headedness, numbness and headaches.   Hematological: Negative for adenopathy. Does not bruise/bleed easily.   Psychiatric/Behavioral: Negative for confusion and hallucinations. The patient is not nervous/anxious.    All other systems reviewed and are negative.       MEDICATIONS:    Scheduled Meds:  cefTRIAXone, 2 g, Intravenous, Q12H  enoxaparin, 1.5 mg/kg, Subcutaneous, Daily  ferrous sulfate, 300 mg, Nasogastric, Daily  furosemide, 40 mg, Intravenous, Q12H  lansoprazole, 30 mg, Nasogastric, Q AM  midodrine, 10 mg, Nasogastric, Q8H  sodium chloride, 10 mL, Intravenous, Q12H  Zinc Oxide, , Apply externally, TID       Continuous Infusions:      PRN Meds:  •  acetaminophen **OR** acetaminophen  •  aluminum-magnesium hydroxide-simethicone  •  dextrose  •  dextrose  •  fentanyl  •  glucagon (human recombinant)  •  HYDROcodone-acetaminophen  •  ondansetron **OR** ondansetron  •  sodium chloride  •  sodium chloride  •  sodium chloride     ALLERGIES:    Allergies   Allergen Reactions   • Tramadol-Acetaminophen Anxiety     Worms in the brain feeling   • Codeine Other (See Comments)     nausea   • Tramadol Other (See Comments)     \"I just felt really crawly, it did weird things with my head\"       Objective    VITALS:   /80   Pulse 97   Temp 98.4 °F (36.9 °C) (Axillary)   Resp 26   Ht 167.6 cm (66\")   Wt 84.9 kg (187 lb 2.7 oz)   SpO2 100%   BMI 30.21 kg/m²     PHYSICAL EXAM:  Physical Exam  Vitals and nursing note reviewed.   Constitutional:       General: He is not in acute distress.     Appearance: Normal appearance. He is " well-developed. He is ill-appearing. He is not diaphoretic.   HENT:      Head: Normocephalic and atraumatic.      Comments: Alopecia     Right Ear: External ear normal.      Left Ear: External ear normal.      Nose: Nose normal.      Comments: NG tube.  O2 per NC.     Mouth/Throat:      Mouth: Mucous membranes are moist.      Pharynx: Oropharynx is clear. No oropharyngeal exudate or posterior oropharyngeal erythema.      Comments: Dental fillings  Eyes:      General: No scleral icterus.     Extraocular Movements: Extraocular movements intact.      Conjunctiva/sclera: Conjunctivae normal.      Pupils: Pupils are equal, round, and reactive to light.   Cardiovascular:      Rate and Rhythm: Regular rhythm. Tachycardia present.      Heart sounds: Normal heart sounds. No murmur heard.     Comments: Cardiac monitor leads  Pulmonary:      Effort: Pulmonary effort is normal. No respiratory distress.      Breath sounds: Normal breath sounds. No stridor. No wheezing or rales.   Abdominal:      General: Bowel sounds are normal. There is no distension.      Palpations: Abdomen is soft. There is no mass.      Tenderness: There is no abdominal tenderness. There is no guarding.      Hernia: No hernia is present.   Genitourinary:     Comments: Thompson catheter  Musculoskeletal:         General: Swelling (BUE edema is improving) present. No tenderness or deformity. Normal range of motion.      Cervical back: Normal range of motion and neck supple.      Comments: Bilateral shoulder dressings with 2 drains on the left and one on the right.  BUE IVs and right hand O2 monitor. BLE SCDs and left foot pressure boot.   Lymphadenopathy:      Cervical: No cervical adenopathy.      Upper Body:      Right upper body: No supraclavicular adenopathy.      Left upper body: No supraclavicular adenopathy.   Skin:     General: Skin is warm and dry.      Coloration: Skin is not pale.      Findings: No bruising, erythema or rash.      Comments: Vertical  scar right knee.  1 cm scab right cheek.   Neurological:      Mental Status: He is alert.      Comments: Staring in the air.  Slow to respond.   Psychiatric:      Comments: Slow to respond           RECENT LABS:  Lab Results (last 24 hours)     Procedure Component Value Units Date/Time    Copper, Serum [[id number]] Collected: 12/29/22 2133    Specimen: Blood Updated: 01/04/23 0816     Copper 110 ug/dL      Comment:                                 Detection Limit = 5       Narrative:      Test(s) 001586-Copper, Serum or Plasma  was developed and its performance characteristics determined  by [facility]. It has not been cleared or approved by the Food  and Drug Administration.  Performed at:  [address]  [id number]  : Kaylyn Luo MD, Phone:  [phone]    Calcium, Ionized [[id number]]  (Abnormal) Collected: 01/04/23 0352    Specimen: Blood Updated: 01/04/23 0531     Ionized Calcium 1.09 mmol/L     Comprehensive Metabolic Panel [[id number]]  (Abnormal) Collected: 01/04/23 0352    Specimen: Blood Updated: 01/04/23 0520     Glucose 127 mg/dL      BUN 40 mg/dL      Creatinine 0.77 mg/dL      Sodium 148 mmol/L      Potassium 4.4 mmol/L      Chloride 108 mmol/L      CO2 32.0 mmol/L      Calcium 8.0 mg/dL      Total Protein 5.1 g/dL      Albumin 2.2 g/dL      ALT (SGPT) 93 U/L      AST (SGOT) 36 U/L      Alkaline Phosphatase 96 U/L      Total Bilirubin 3.5 mg/dL      Globulin 2.9 gm/dL      A/G Ratio 0.8 g/dL      BUN/Creatinine Ratio 51.9     Anion Gap 8.0 mmol/L      eGFR 98.1 mL/min/1.73      Comment: National Kidney Foundation and American Society of Nephrology (ASN) Task Force recommended calculation based on the Chronic Kidney Disease Epidemiology Collaboration (CKD-EPI) equation refit without adjustment for race.       Narrative:      GFR Normal >60  Chronic Kidney Disease <60  Kidney Failure <15      Phosphorus [[id number]]  (Normal) Collected: 01/04/23 0352     Specimen: Blood Updated: 01/04/23 0520     Phosphorus 3.6 mg/dL     Magnesium [[id number]]  (Normal) Collected: 01/04/23 0352    Specimen: Blood Updated: 01/04/23 0520     Magnesium 1.9 mg/dL     CBC & Differential [[id number]]  (Abnormal) Collected: 01/04/23 0352    Specimen: Blood Updated: 01/04/23 0432    Narrative:      The following orders were created for panel order CBC & Differential.  Procedure                               Abnormality         Status                     ---------                               -----------         ------                     CBC Auto Differential[[id number]]        Abnormal            Final result                 Please view results for these tests on the individual orders.    CBC Auto Differential [[id number]]  (Abnormal) Collected: 01/04/23 0352    Specimen: Blood Updated: 01/04/23 0432     WBC 7.00 10*3/mm3      RBC 3.51 10*6/mm3      Hemoglobin 11.0 g/dL      Hematocrit 34.1 %      MCV 97.1 fL      MCH 31.3 pg      MCHC 32.3 g/dL      RDW 16.5 %      RDW-SD 56.9 fl      MPV 9.9 fL      Platelets 116 10*3/mm3      Neutrophil % 83.5 %      Lymphocyte % 4.2 %      Monocyte % 11.9 %      Eosinophil % 0.2 %      Basophil % 0.2 %      Neutrophils, Absolute 5.90 10*3/mm3      Lymphocytes, Absolute 0.30 10*3/mm3      Monocytes, Absolute 0.80 10*3/mm3      Eosinophils, Absolute 0.00 10*3/mm3      Basophils, Absolute 0.00 10*3/mm3      nRBC 0.0 /100 WBC     POC Glucose Once [[id number]]  (Abnormal) Collected: 01/03/23 2346    Specimen: Blood Updated: 01/03/23 2347     Glucose 113 mg/dL      Comment: Serial Number: [id number]:  [id number]       POC Glucose Once [[id number]]  (Abnormal) Collected: 01/03/23 1732    Specimen: Blood Updated: 01/03/23 1733     Glucose 115 mg/dL      Comment: Serial Number: [id number]:  [id number]       Protein Elec + Interp, Serum [[id number]]  (Abnormal) Collected: 12/29/22 2133    Specimen: Blood Updated: 01/03/23 1311     Total Protein 4.1 g/dL       Albumin 2.2 g/dL      Alpha-1-Globulin 0.4 g/dL      Alpha-2-Globulin 0.6 g/dL      Beta Globulin 0.6 g/dL      Gamma Globulin 0.4 g/dL      M-Lazaro Not Observed g/dL      Globulin 1.9 g/dL      A/G Ratio 1.2     Please note Comment     Comment: Protein electrophoresis scan will follow via computer, mail, or   delivery.        SPE Interpretation Comment     Comment: The SPE pattern reflects non-selective protein loss. Protein losing  syndromes, in which this pattern has been observed include:  malnutrition, exudative dermatopathies, burns, exudative pulmonary  disease, essential hypoproteinemia, protein losing gastroentero-  pathies, blood loss, and plasmaphoresis. Monoclonal protein is not  apparent.       Narrative:      Performed at:  [address] [place] [address]  [id number]  : Leo Rod PhD, Phone:  [phone]    Blood Culture - Blood, Blood, PICC Line [[id number]]  (Normal) Collected: 12/31/22 1148    Specimen: Blood, PICC Line Updated: 01/03/23 1200     Blood Culture No growth at 3 days    POC Glucose Once [[id number]]  (Abnormal) Collected: 01/03/23 1108    Specimen: Blood Updated: 01/03/23 1110     Glucose 115 mg/dL      Comment: Serial Number: [id number]:  [id number]       Pathology Consultation [[id number]] Collected: 12/31/22 0520    Specimen: Blood, Venous Line Updated: 01/03/23 1032     Final Diagnosis --     Anemia  Thrombocytopenia  No blasts identified       Case Report --     Surgical Pathology Report                         Case: [id number]                                  Authorizing Provider:  Darrin Nixon APRN      Collected:           12/31/2022 05:20 AM          Ordering Location:     [facility]       Received:            01/03/2023 09:03 AM                                 [facility]                                                          Pathologist:           Satinder Bullard MD                                                             Specimen:    Blood, Venous Line                                                                         Comprehensive Metabolic Panel [[id number]]  (Abnormal) Collected: 01/03/23 0914    Specimen: Blood Updated: 01/03/23 1004     Glucose 122 mg/dL      BUN 36 mg/dL      Creatinine 0.62 mg/dL      Sodium 150 mmol/L      Potassium 4.7 mmol/L      Comment: Slight hemolysis detected by analyzer. Results may be affected.        Chloride 116 mmol/L      CO2 28.0 mmol/L      Calcium 8.0 mg/dL      Total Protein 5.1 g/dL      Albumin 2.4 g/dL      ALT (SGPT) 122 U/L      AST (SGOT) 44 U/L      Alkaline Phosphatase 120 U/L      Total Bilirubin 3.4 mg/dL      Globulin 2.7 gm/dL      A/G Ratio 0.9 g/dL      BUN/Creatinine Ratio 58.1     Anion Gap 6.0 mmol/L      eGFR 104.8 mL/min/1.73      Comment: National Kidney Foundation and American Society of Nephrology (ASN) Task Force recommended calculation based on the Chronic Kidney Disease Epidemiology Collaboration (CKD-EPI) equation refit without adjustment for race.       Narrative:      GFR Normal >60  Chronic Kidney Disease <60  Kidney Failure <15      Phosphorus [[id number]]  (Normal) Collected: 01/03/23 0914    Specimen: Blood Updated: 01/03/23 1004     Phosphorus 3.0 mg/dL     Magnesium [[id number]]  (Normal) Collected: 01/03/23 0914    Specimen: Blood Updated: 01/03/23 1004     Magnesium 2.0 mg/dL     Catheter Culture - Cath Tip, Hand, Left [[id number]] Collected: 12/31/22 1839    Specimen: Cath Tip from Hand, Left Updated: 01/03/23 0958     CATHETER CULTURE No growth at 2 days    Calcium, Ionized [[id number]]  (Abnormal) Collected: 01/03/23 0914    Specimen: Blood Updated: 01/03/23 0935     Ionized Calcium 1.15 mmol/L     CBC & Differential [[id number]]  (Abnormal) Collected: 01/03/23 0914    Specimen: Blood Updated: 01/03/23 0920    Narrative:      The following orders were created for panel order CBC & Differential.  Procedure                                Abnormality         Status                     ---------                               -----------         ------                     CBC Auto Differential[[id number]]        Abnormal            Final result                 Please view results for these tests on the individual orders.    CBC Auto Differential [[id number]]  (Abnormal) Collected: 01/03/23 0914    Specimen: Blood Updated: 01/03/23 0920     WBC 8.20 10*3/mm3      RBC 3.50 10*6/mm3      Hemoglobin 11.1 g/dL      Hematocrit 34.1 %      MCV 97.2 fL      MCH 31.7 pg      MCHC 32.6 g/dL      RDW 16.5 %      RDW-SD 56.9 fl      MPV 10.2 fL      Platelets 104 10*3/mm3      Neutrophil % 86.8 %      Lymphocyte % 3.4 %      Monocyte % 9.4 %      Eosinophil % 0.1 %      Basophil % 0.3 %      Neutrophils, Absolute 7.10 10*3/mm3      Lymphocytes, Absolute 0.30 10*3/mm3      Monocytes, Absolute 0.80 10*3/mm3      Eosinophils, Absolute 0.00 10*3/mm3      Basophils, Absolute 0.00 10*3/mm3      nRBC 0.2 /100 WBC           PENDING RESULTS: SIFE, immunoglobulins, UIFE.    IMAGING REVIEWED:  CT Cervical Spine With Contrast    Result Date: 1/3/2023  1.Postoperative changes status post prior fusion. No definitive signs of hardware failure or loosening are noted. 2.No evidence for displaced fracture or malalignment throughout the cervical spine. 3.No evidence for acute soft tissue abnormality. There is no evidence for abnormal enhancement. No abnormal peripherally enhancing fluid collection is seen. 4.Changes of cervical spondylosis are noted throughout the cervical spine. Associated hypertrophic posterior facet changes and uncovertebral changes are also observed. Electronically Signed: Solo Ray  1/3/2023 10:25 PM EST  Workstation ID: [id number]    CT Thoracic Spine With Contrast    Result Date: 1/3/2023  1.No evidence for displaced fracture or malalignment throughout the thoracic spine. No evidence for erosive endplate changes. 2.No evidence for abnormal enhancement. There  is no abnormal fluid collection within the paraspinal soft tissues. There is no evidence for epidural abscess. 3.Degenerative changes are noted throughout the thoracic spine. 4.Interstitial changes are seen throughout the lungs. Bibasilar infiltrates are noted. Bilateral pleural effusions are observed. Electronically Signed: Solo Ray  1/3/2023 10:31 PM EST  Workstation ID: [id number]    CT Lumbar Spine With Contrast    Result Date: 1/3/2023  1.No evidence for displaced fracture or malalignment throughout the lumbar spine. No evidence for erosive endplate changes. 2.No evidence for abnormal enhancement. There is no abnormal fluid collection within the paraspinal soft tissues. There is no evidence for epidural abscess. 3.Advanced degenerative changes are noted throughout the lumbar spine. 4.Evidence for bilateral spondylolysis with associated spondylolisthesis at the L5 level. Prior postoperative changes are also noted. Electronically Signed: Solo Ray  1/3/2023 10:35 PM EST  Workstation ID: [id number]    XR Chest 1 View    Result Date: 1/3/2023  Impression: 1. Radiographic changes consistent with congestive heart failure pulmonary edema with interval increase in pulmonary edema and development of small bilateral pleural effusions. Electronically Signed: Jaime Momin  1/3/2023 9:14 AM EST  Workstation ID: [id number]      I have reviewed the patient's labs, imaging, reports, and other clinician documentation.    Assessment & Plan   ASSESSMENT:  1. Thrombocytopenia-platelets were normal in early 2022.  Platelets were low on admission and continued to drop.  HIT antibody negative.    On ceftriaxone which can cause thrombocytopenia but platelets have started to improve now while patient continued on ceftriaxone.  Coags mildly elevated with fibrinogen not low, not DIC but sepsis likely contributing to thrombocytopenia. No hemolysis and no schistocytes on peripheral smear ruling out TTP.  No vitamin B12 or folate  deficiencies. SHAR negative. Coags remain elevated and D-dimer high.  Repeat fibrinogen remained elevated.    Platelets continue to improve.  2. Acute on chronic anemia/IgA lambda monoclonal gammopathy/SWETHA- mild anemia with hemoglobin in the 11's in early 2022.  Gammopathy labs at that time showed IgA lambda monoclonal protein with normal immunoglobulins and free light chains.  Iron studies showed SWETHA and started oral iron.  No hemolysis or vitamin B12/folate deficiencies. Copper remains pending.  SPEP with no M spike and free light chain ratio normal.  SIFE and UIFE remain pending and immunoglobulin levels reordered.  Hemoglobin stable.  3. Acute provoked catheter associated LUE DVT/SVT and RUE SVT- RUE SVT was diagnosed prior to LUE DVT and he had received several doses of subcu heparin starting on 12/26. LUE DVT provoked by PICC line which  has since been removed.  Argatroban was difficult to manage with elevated PTT.  Started low-dose Lovenox 1/1 and increased to full treatment dose 1/3 as platelets were rising.  Repeat RUE venous Doppler showed stable SVT.  4. Elevated LFT/cholelithiasis and adenomyomatosis of the gallbladder wall- possible shock liver.  T bili remains elevated with elevated direct bili and normal liver on US.  Hepatitis panel negative.  LFTs improving.  5. Bilateral shoulder septic arthritis/staph RS bacteremia-on ceftriaxone per ID.  No vegetation on echocardiogram.  6. Acute hypoxic respiratory failure/smoking history-  transiently on ventilator postsurgery.  7. History of prostate cancer-PSA remains normal.   8. Cardiac amyloidosis- on chart review it has been treated at .  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  Records reviewed from  cardiology.  Genetic testing was being done for hereditary ATTR amyloidosis and patient was referred to Dr. Nguyen to look for systemic amyloidosis..  9. STEVEN/hyponatremia-nephrology  managing.  Creatinine now normal and sodium normalized.  10. A. fib with RVR- patient evaluated by EP and recommended start Eliquis 5 mg p.o. twice daily (not started as patient currently on Lovenox) with plan for cardioversion should patient remain in A. fib.    PLAN:  1. Continue Lovenox at 1.5 mg/kg daily.  2. Continue ferrous sulfate 325 mg by mouth daily (liquid given NGT).  3. Continue daily CBC.  4. Await remaining gammopathy labs.  5. Obtain records from Dr. Nguyen at .    Note prepared by KURT Alicea.  Patient seen and examined by Vicente Mendoza MD.  Electronically signed by RITU Taylor, 01/04/23, 9:32 AM EST.    I have personally performed a face-to-face diagnostic evaluation on this patient. I have performed a complete history and physical examination, reviewed laboratory studies, and radiographic examinations.  I have completed the majority and substantive portion of the medical decision making.  I have formulated the assessment on this patient and the plan of action as noted above. I have discussed the case with Darrin Nixon NP, have edited/reviewed the note, and agree with the care plan.  The patient denies any problems on review of systems today.  On examination, he is more alert.  Platelet count continues to improve.  Was apparently being worked up for systemic amyloidosis by  hematology and work-up here so far for gammopathy is negative.            I discussed the patient's findings and my recommendations with patient and nursing.    Part of this note may be an electronic transcription/translation of spoken language to printed text using the Dragon Dictation System.    Electronically signed by Vicente Mendoza MD, 01/04/23, 8:09 PM EST.

## 2023-01-04 NOTE — PROGRESS NOTES
Nutrition Services    Patient Name: Jaime Bar  YOB: 1955  MRN: [id number]  Admission date: 12/26/2022    PPE Documentation        PPE Worn By Provider Did not enter room for this encounter   PPE Worn By Patient  N/A     PROGRESS NOTE      Encounter Information: Check on for tube feeding.         PO Diet: NPO Diet NPO Type: Strict NPO   PO Supplements: None ordered    PO Intake:  NPO       Current nutrition support: Nutren 1.5 at 65 mL/hr + 150mL/hr water flush   Nutrition support review: Documented as above per EMR        Labs (reviewed below): Hypernatremia- improving        GI Function:  Last BM 1/3 (yesterday)  Residuals WNL       Nutrition Intervention Updates: Continue tube feeding at goal of 65 mL/hour + 150 mL/hour water flush        Results from last 7 days   Lab Units 01/04/23  0352 01/03/23  0914 01/02/23  0606   SODIUM mmol/L 148* 150* 152*   POTASSIUM mmol/L 4.4 4.7 4.3   CHLORIDE mmol/L 108* 116* 120*   CO2 mmol/L 32.0* 28.0 27.0   BUN mg/dL 40* 36* 41*   CREATININE mg/dL 0.77 0.62* 0.69*   CALCIUM mg/dL 8.0* 8.0* 8.2*   BILIRUBIN mg/dL 3.5* 3.4* 3.3*   ALK PHOS U/L 96 120* 102   ALT (SGPT) U/L 93* 122* 200*   AST (SGOT) U/L 36 44* 104*   GLUCOSE mg/dL 127* 122* 144*     Results from last 7 days   Lab Units 01/04/23  0352 01/03/23  0914 01/02/23  0606   MAGNESIUM mg/dL 1.9 2.0 2.0   PHOSPHORUS mg/dL 3.6 3.0 2.6   HEMOGLOBIN g/dL 11.0* 11.1* 11.3*   HEMATOCRIT % 34.1* 34.1* 34.6*     Lab Results   Component Value Date    HGBA1C 4.2 12/26/2022       RD to follow up per protocol.    Electronically signed by:  Gloria Park RD  01/04/23 07:38 EST

## 2023-01-04 NOTE — THERAPY EVALUATION
Acute Care - Speech Language Pathology   Swallow Initial Evaluation  Michael     Patient Name: Jaime Bar  : 1955  MRN: [id number]  Today's Date: 2023               Admit Date: 2022  Patient was not wearing a face mask during this therapy encounter. Therapist used appropriate personal protective equipment including mask, eye protection and gloves.  Mask used was standard procedure mask. Appropriate PPE was worn during the entire therapy session. Hand hygiene was completed before and after therapy session. Patient is not in enhanced droplet precautions.             Visit Dx:     ICD-10-CM ICD-9-CM   1. Pyogenic arthritis of right shoulder region, due to unspecified organism (HCC)  M00.9 711.01     Patient Active Problem List   Diagnosis   • Hyponatremia   • ACC/AHA stage C chronic systolic heart failure (HCC)   • Cardiac amyloidosis (HCC)   • History of prostate cancer   • Hx of neck surgery   • Neck pain   • Primary hypertension   • Tobacco abuse   • Cervicalgia, spine surgery 2017   • Primary osteoarthritis involving multiple joints   • Bacteremia due to methicillin susceptible Staphylococcus aureus (MSSA)   • Staphylococcal arthritis of shoulder (HCC)   • Acute kidney injury superimposed on chronic kidney disease (HCC)     History reviewed. No pertinent past medical history.  Past Surgical History:   Procedure Laterality Date   • SHOULDER ARTHROSCOPY Right 2022    Procedure: SHOULDER ARTHROSCOPY INCISION AND DRAINAGE.;  Surgeon: Nikunj Velez MD;  Location: [facility];  Service: Orthopedics;  Laterality: Right;   • SHOULDER ARTHROSCOPY Left 2022    Procedure: SHOULDER ARTHROSCOPY INCISION AND DRAINAGE;  Surgeon: Nikunj Velez MD;  Location: [facility] OR;  Service: Orthopedics;  Laterality: Left;       SLP Recommendation and Plan  SLP Swallowing Diagnosis: mild, oral dysphagia, suspected pharyngeal dysphagia (23 1500)  SLP Diet Recommendation: NPO  (01/04/23 1500)     SLP Rec. for Method of Medication Administration: meds via alternate route (01/04/23 1500)     Monitor for Signs of Aspiration: yes, notify SLP if any concerns (01/04/23 1500)  Recommended Diagnostics: reassess via clinical swallow evaluation (Possibly VFSS if indicated.) (01/04/23 1500)  Swallow Criteria for Skilled Therapeutic Interventions Met: demonstrates skilled criteria (01/04/23 1500)     Rehab Potential/Prognosis, Swallowing: good, to achieve stated therapy goals (01/04/23 1500)  Therapy Frequency (Swallow): PRN (01/04/23 1500)  Predicted Duration Therapy Intervention (Days): until discharge (01/04/23 1500)       SWALLOW EVALUATION (last 72 hours)     SLP Adult Swallow Evaluation     Row Name 01/04/23 1500          Document Type evaluation  -CB    Subjective Information no complaints  -CB    Patient Observations alert;cooperative  -CB    Patient/Family/Caregiver Comments/Observations Patient was able to follow directives.  -CB    Patient Effort good  -CB          Patient Profile Reviewed yes  -CB    Pertinent History Of Current Problem Jaime Bar is a 67 y.o. male with past medical history of cervicalgia, primary osteoarthritis involving multiple joints with surgery on bilateral shoulders and right knee replacement but no recent procedure or surgery, hyponatremia, prostate cancer, and cardiac amyloidosis presented to [facility] on 12/25/2022 with complaints of right shoulder pain, weakness, and altered mental status.  He was found to be hypotensive and hyponatremic with elevated lactic acid.  Patient had reported decreased oral intake.  He was transferred to [facility] ICU for further treatment of septic shock requiring vasopressors and hyponatremia.      Since admission he was found to be bacteremic with 2 sets of blood cultures positive for staph aureus, source being bilateral shoulder septic arthritis. ID has been treating the patient with IV  ceftriaxone 2G and he will need a total of 6 weeks of antibiotic therapy.  He has status post bilateral shoulder arthroscopy with extensive debridement by Dr. Velez 12/28/2022. He remained intubated after surgery until 12/29 and now extubated. He has been weaned off vasopressors. Nephrology is managing his hyponatremia and STEVEN on CKD. He was found to have LUE DVT provoked from PICC line currently on argatroban. Hematology has been consulted for his thrombocytopenia.  -CB    Current Method of Nutrition NPO  -CB          Additional Documentation Pain Scale: FACES Pre/Post-Treatment (Group)  -CB          Pain: FACES Scale, Pretreatment 0-->no hurt  -CB    Posttreatment Pain Rating 0-->no hurt  -CB          Dentition Assessment natural, present and adequate  -CB    Secretion Management WNL/WFL  -CB    Mucosal Quality cracked;dry  -CB          Oral Motor General Assessment generalized oral motor weakness  -CB    Oral Motor, Comment Oral mechanism examination revealed generalized oral weakness with reduce ROM and mobility of lingual and labial structures.  -CB          Respiratory Support Currently in Use nasal cannula  -CB    O2 Liters 5L  -CB    Eating/Swallowing Skills fed by SLP  -CB    Positioning During Eating upright in bed  -CB    Utensils Used spoon  -CB    Consistencies Trialed pureed;ice chips;thin liquids  -CB          Clinical Swallow Evaluation Summary Patient was seen for dysphagia evaluation per failed swallow screen. Patient was reported having been intubated for procedure x 1 day. Prior to procedure patient reports that he has not had an appetite and has not had anything to eat/drink since 12/25. Patient remained NPO until neuro state improved. Patient is much more alert as he is alert and oriented x 3. Patient able to follow directives. Most recent chest x-ray on 1/2/23 revealed radiographic changes with chronic heart failure, pulmonary edema with interval increase in pulmonary edema and development of  small pleural effusion. Patient noted to have an ineffective cough when attempting to clear secretions.  Patient has his own natural teeth. Oral mechanism examination revealed generalized weakness with noted reduce ROM and mobilty of lingual and labial structures. Patient exhibits weak vocal quailty when voicing. Oral cavity was dry and cracked. Provided oral care prior to assessment. Properly positioned patient upright in bed prior to trials. Provided ice chips x 2. No evidence patient elicited a swallow. Provided trials of 1/2 tsp of thins via spoon x 9.  Swallow response was timely as he initiated swallow within 2 seconds in duration. Noted coughing x 3. Given digital palpatation laryngeal elevation was only slightly reduced. No wet vocal quality was detected. Provided trials of 1/2 tsp of  puree x 3. Oral transit was 5-6 seconds in duration. No wet vocal quality was evident. Patient noted to exhibit delay cough x 1. Patient clears oral cavity with noted trace residue noted on dry, cracked lingual surface. Since patient has ineffective cough, weak vocal quality and intermittent difficulty with PO, it is recommended that patient remain NPO with the exception of ice chips and small sips of water outlined in [facility] water protocol at this time. ST will continue ongoing re-evaluation in anticipation of  readiness for  PO and/or VFSS.  -CB          SLP Swallowing Diagnosis mild;oral dysphagia;suspected pharyngeal dysphagia  -CB    Functional Impact risk of aspiration/pneumonia  -CB    Rehab Potential/Prognosis, Swallowing good, to achieve stated therapy goals  -CB    Swallow Criteria for Skilled Therapeutic Interventions Met demonstrates skilled criteria  -CB          Therapy Frequency (Swallow) PRN  -CB    Predicted Duration Therapy Intervention (Days) until discharge  -CB    SLP Diet Recommendation NPO  -CB    Recommended Diagnostics reassess via clinical swallow evaluation  Possibly VFSS if indicated.  -CB    Oral  Care Recommendations Oral Care BID/PRN;Swab  -CB    SLP Rec. for Method of Medication Administration meds via alternate route  -CB    Monitor for Signs of Aspiration yes;notify SLP if any concerns  -CB          Swallow LTGs Swallow Long Term Goal (free text)  -CB    Swallow STGs diet tolerance goal selection (SLP)  -CB    Diet Tolerance Goal Selection (SLP) Swallow Short Term Goal 1  -CB          (LTG) Swallow Patient will tolerate safest and least restrictive diet without complications from aspiration.  -CB    Time Frame (Swallow Long Term Goal) by discharge  -CB          (STG) Swallow 1 The patient will participate in ongoing assessment of swallow, including reevaluation clinically and/or inclulding instrumental assessment of swallow if indicated to further assess swallow function in anticipation of initiation of PO diet.  -CB    Time Frame (Swallow Short Term Goal 1) 1 week  -CB          User Key  (r) = Recorded By, (t) = Taken By, (c) = Cosigned By    Initials Name Effective Dates    Padma Medellin, SLP 09/21/21 -                 EDUCATION  The patient has been educated in the following areas:   Dysphagia (Swallowing Impairment) Oral Care/Hydration NPO rationale.        SLP GOALS     Row Name 01/04/23 1500          (LTG) Swallow Patient will tolerate safest and least restrictive diet without complications from aspiration.  -CB    Time Frame (Swallow Long Term Goal) by discharge  -CB          (STG) Swallow 1 The patient will participate in ongoing assessment of swallow, including reevaluation clinically and/or inclulding instrumental assessment of swallow if indicated to further assess swallow function in anticipation of initiation of PO diet.  -CB    Time Frame (Swallow Short Term Goal 1) 1 week  -CB          User Key  (r) = Recorded By, (t) = Taken By, (c) = Cosigned By    Initials Name Provider Type    Padma Medellin, SLP Speech and Language Pathologist                   Time Calculation:                 Padma Moss, SLP  1/4/2023

## 2023-01-04 NOTE — PLAN OF CARE
Goal Outcome Evaluation:  Plan of Care Reviewed With: patient, family     Patient A&Ox3, following commands, VSS on 5L NC, no drips.  Tolerating tube feeds well and having adequate urine output.  Speech therapy consulted for swallow evaluation.  PCU overflow, awaiting transfer.       Progress: improving

## 2023-01-04 NOTE — PROGRESS NOTES
Infectious Diseases Progress Note      LOS: 9 days   Patient Care Team:  Provider, No Known as PCP - Kyle Gardner MD as Consulting Physician (Nephrology)  Vicente Mendoza MD as Consulting Physician (Hematology and Oncology)    Chief Complaint: Confusion    Subjective       Patient had no high-grade fever during the last 24 hours.  The patient mental status had improved significantly.  He is currently on 4 L oxygen via nasal cannula.  He is hemodynamically stable on no vasopressors    Review of Systems:   Review of Systems   Unable to perform ROS: Acuity of condition        Objective     Vital Signs  Temp:  [98.4 °F (36.9 °C)-100.2 °F (37.9 °C)] 98.4 °F (36.9 °C)  Heart Rate:  [] 95  Resp:  [19-31] 19  BP: ()/(60-90) 144/90    Physical Exam:  Physical Exam  Vitals and nursing note reviewed.   Constitutional:       General: He is not in acute distress.     Appearance: He is well-developed and normal weight. He is ill-appearing. He is not diaphoretic.      Comments: The patient is more awake and alert today   HENT:      Head: Normocephalic and atraumatic.   Eyes:      Pupils: Pupils are equal, round, and reactive to light.   Cardiovascular:      Rate and Rhythm: Normal rate and regular rhythm.      Heart sounds: Normal heart sounds, S1 normal and S2 normal.   Pulmonary:      Effort: Pulmonary effort is normal. No respiratory distress.      Breath sounds: No stridor. Rales present. No wheezing.   Abdominal:      General: Abdomen is flat. Bowel sounds are normal. There is no distension.      Palpations: Abdomen is soft. There is no mass.      Tenderness: There is no abdominal tenderness. There is no guarding.      Comments: NG tube   Musculoskeletal:         General: No deformity. Normal range of motion.      Cervical back: Neck supple.      Right lower leg: Edema present.      Left lower leg: Edema present.      Comments: Drain tubes are present in the right and left shoulders with bloody  serous drainage    Bilateral upper extremity edema with right arm worse than left   Skin:     General: Skin is warm and dry.      Coloration: Skin is not pale.      Findings: No erythema or rash.   Neurological:      Mental Status: He is alert and oriented to person, place, and time.      Cranial Nerves: No cranial nerve deficit.          Results Review:    I have reviewed all clinical data, test, lab, and imaging results.     Radiology  CT Cervical Spine With Contrast    Result Date: 1/3/2023  CT CERVICAL SPINE W CONTRAST Date of Exam: 1/3/2023 9:54 PM EST Indication: To rule out spinal infection such as discitis or osteomyelitis. Comparison: None available. Technique: Axial CT images were obtained of the cervical spine after the uneventful intravenous administration of iodinated contrast.  Sagittal and coronal reconstructions were performed.  Automated exposure control and iterative reconstruction methods were used. FINDINGS: Postoperative changes are noted status post prior fusion at the C4-C6 spinal levels. No definitive signs of hardware failure or loosening are noted. There is no displaced fracture or subluxation. The cervical vertebral body heights are maintained. No compression fracture deformities are seen. There is no malalignment. The posterior facets are intact. Multilevel cervical spondylosis is noted. Multilevel posterior facet changes and uncovertebral changes are also seen throughout the cervical spine. These findings contribute to residual central canal narrowing and neural foraminal narrowing at multiple levels. The central canal has been decompressed through the levels of postoperative change. There is no evidence for abnormal enhancement. No paraspinal edema or fluid collections are seen. There is no evidence for peripheral enhancing fluid collection. There is no evidence for paraspinal abscess. There is no evidence for epidural abscess. There is no significant hemorrhage or hematoma. The mastoid  air cells are clear. Interstitial changes are seen throughout the lungs. Bilateral pleural effusions are noted.     1.Postoperative changes status post prior fusion. No definitive signs of hardware failure or loosening are noted. 2.No evidence for displaced fracture or malalignment throughout the cervical spine. 3.No evidence for acute soft tissue abnormality. There is no evidence for abnormal enhancement. No abnormal peripherally enhancing fluid collection is seen. 4.Changes of cervical spondylosis are noted throughout the cervical spine. Associated hypertrophic posterior facet changes and uncovertebral changes are also observed. Electronically Signed: Solo Flor  1/3/2023 10:25 PM EST  Workstation ID: [id number]    CT Thoracic Spine With Contrast    Result Date: 1/3/2023  CT THORACIC SPINE W CONTRAST Date of Exam: 1/3/2023 9:54 PM EST Indication: To rule out spinal infection such as discitis or osteomyelitis. Comparison: None available. Technique: Axial CT images were obtained of the thoracic spine after the uneventful intravenous administration of iodinated contrast.  Sagittal and coronal reconstructions were performed.  Automated exposure control and iterative reconstruction methods were used. FINDINGS: There is no displaced fracture or subluxation. The thoracic vertebral body heights are maintained. No compression fracture deformities are seen. There is no malalignment. The posterior facets are intact. No erosive endplate changes are noted. No definitive large posterior disc bulge or disc protrusion/extrusion is observed throughout the thoracic spine. Central canal appears grossly patent. Degenerative changes involving the posterior facets contribute to neural foraminal narrowing bilaterally at multiple levels. There is no evidence for abnormal paraspinal soft tissue enhancement. No definitive paraspinal fluid collection is seen. There is no evidence for enhancement or fluid collection within the central  canal. Note is made of diffuse nonspecific edema throughout the surrounding soft tissues. Diffuse interstitial changes are seen throughout the lungs. Bibasilar infiltrates are noted. There are bilateral pleural effusions.     1.No evidence for displaced fracture or malalignment throughout the thoracic spine. No evidence for erosive endplate changes. 2.No evidence for abnormal enhancement. There is no abnormal fluid collection within the paraspinal soft tissues. There is no evidence for epidural abscess. 3.Degenerative changes are noted throughout the thoracic spine. 4.Interstitial changes are seen throughout the lungs. Bibasilar infiltrates are noted. Bilateral pleural effusions are observed. Electronically Signed: Solo Ray  1/3/2023 10:31 PM EST  Workstation ID: [id number]    CT Lumbar Spine With Contrast    Result Date: 1/3/2023  CT LUMBAR SPINE W CONTRAST Date of Exam: 1/3/2023 9:54 PM EST Indication: To rule out spinal infection such as discitis or osteomyelitis. Comparison: None available. Technique: Axial CT images were obtained of the lumbar spine after the uneventful intravenous administration of iodinated contrast.  Sagittal and coronal reconstructions were performed.  Automated exposure control and iterative reconstruction methods were used. FINDINGS: There is no displaced fracture or subluxation. The lumbar vertebral body heights are maintained. No compression fracture deformities are seen. The posterior facets are intact. No erosive endplate changes are noted. Vacuum disc phenomenon is noted at multiple levels related to discogenic degenerative changes. Multilevel discogenic degenerative changes are seen throughout the lumbar spine. Multilevel hypertrophic posterior facet changes are noted. There is evidence of bilateral pars defects at the L5 level. These findings contribute to anterolisthesis of L5 on  S1 measuring 1 cm. Prior partial posterior laminectomy defects are seen at the L5 and S1 levels.  There is no evidence for abnormal paraspinal soft tissue enhancement. No definitive paraspinal fluid collection is seen. There is no evidence for enhancement or fluid collection within the central canal. Note is made of diffuse nonspecific edema throughout the surrounding soft tissues.     1.No evidence for displaced fracture or malalignment throughout the lumbar spine. No evidence for erosive endplate changes. 2.No evidence for abnormal enhancement. There is no abnormal fluid collection within the paraspinal soft tissues. There is no evidence for epidural abscess. 3.Advanced degenerative changes are noted throughout the lumbar spine. 4.Evidence for bilateral spondylolysis with associated spondylolisthesis at the L5 level. Prior postoperative changes are also noted. Electronically Signed: Solo Flor  1/3/2023 10:35 PM EST  Workstation ID: [id number]    Duplex Venous Upper Extremity - Right CAR    Result Date: 1/4/2023  •  Acute right upper extremity superficial thrombophlebitis noted in the cephalic (forearm). •  All other right sided vessels appear normal.       Cardiology    Laboratory    Results from last 7 days   Lab Units 01/04/23  0352 01/03/23  0914 01/02/23  0606 01/01/23  0614 12/31/22  0520 12/30/22  0540 12/29/22  1721   WBC 10*3/mm3 7.00 8.20 9.30 7.80 9.80 9.30 9.80   HEMOGLOBIN g/dL 11.0* 11.1* 11.3* 11.4* 11.3* 11.1* 10.9*   HEMATOCRIT % 34.1* 34.1* 34.6* 34.6* 32.6* 32.3* 32.0*   PLATELETS 10*3/mm3 116* 104* 78* 60* 46* 38* 47*     Results from last 7 days   Lab Units 01/04/23  0352 01/03/23  0914 01/02/23  0606 01/01/23  0614 12/31/22  0520 12/30/22  0540 12/29/22  2133 12/29/22  1710 12/29/22  0541   SODIUM mmol/L 148* 150* 152* 146* 140 135*  --  135* 134*   POTASSIUM mmol/L 4.4 4.7 4.3 4.0 3.8 3.8  --  4.3 4.5   CHLORIDE mmol/L 108* 116* 120* 113* 107 103  --  102 100   CO2 mmol/L 32.0* 28.0 27.0 25.0 22.0 21.0*  --  20.0* 19.0*   BUN mg/dL 40* 36* 41* 52* 68* 77*  --  74* 65*   CREATININE  mg/dL 0.77 0.62* 0.69* 0.82 1.12 1.50*  --  1.65* 1.66*   GLUCOSE mg/dL 127* 122* 144* 135* 144* 122*  --  108* 90   ALBUMIN g/dL 2.2* 2.4* 2.4* 2.2* 2.2* 2.3* 2.2*  --  2.7*   BILIRUBIN mg/dL 3.5* 3.4* 3.3* 4.0* 4.4* 5.2*  --  5.3* 5.3*   ALK PHOS U/L 96 120* 102 107 157* 105  --   --  108   AST (SGOT) U/L 36 44* 104* 308* 520* 431*  --   --  238*   ALT (SGPT) U/L 93* 122* 200* 306* 347* 236*  --   --  117*   CALCIUM mg/dL 8.0* 8.0* 8.2* 8.0* 8.0* 8.1*  --  8.1* 8.7                 Microbiology   Microbiology Results (last 10 days)     Procedure Component Value - Date/Time    Catheter Culture - Cath Tip, Hand, Left [[id number]] Collected: 12/31/22 1839    Lab Status: Final result Specimen: Cath Tip from Hand, Left Updated: 01/03/23 0958     CATHETER CULTURE No growth at 2 days    Blood Culture - Blood, Blood, PICC Line [[id number]]  (Normal) Collected: 12/31/22 1148    Lab Status: Preliminary result Specimen: Blood, PICC Line Updated: 01/04/23 1201     Blood Culture No growth at 4 days    Blood Culture - Blood, Hand, Right [[id number]]  (Abnormal) Collected: 12/30/22 1251    Lab Status: Final result Specimen: Blood from Hand, Right Updated: 01/01/23 1030     Blood Culture Staphylococcus aureus     Comment:   Infectious disease consultation is highly recommended to rule out distant foci of infection.        Isolated from Aerobic Bottle     Gram Stain Aerobic Bottle Gram positive cocci in clusters    Narrative:      Less than seven (7) mL's of blood was collected.  Insufficient quantity may yield false negative results.    Refer to previous blood culture collected on 12/26/2022 0435 for MICs.    Blood Culture - Blood, Hand, Left [[id number]]  (Abnormal) Collected: 12/29/22 1015    Lab Status: Final result Specimen: Blood from Hand, Left Updated: 01/01/23 1030     Blood Culture Staphylococcus aureus     Comment:   Infectious disease consultation is highly recommended to rule out distant foci of infection.    Refer to  previous blood culture collected on 12/26/2022 0435 for MICs.        Isolated from Aerobic Bottle     Blood Culture Staphylococcus, coagulase negative     Comment: Probable contaminant requires clinical correlation, susceptibility not performed unless requested by physician.          Isolated from --     Gram Stain Aerobic Bottle Gram positive cocci in clusters    Narrative:      Less than seven (7) mL's of blood was collected.  Insufficient quantity may yield false negative results.    Body Fluid Culture - Body Fluid, Shoulder, Right [[id number]]  (Abnormal)  (Susceptibility) Collected: 12/28/22 0713    Lab Status: Final result Specimen: Body Fluid from Shoulder, Right Updated: 01/02/23 1505     Body Fluid Culture Heavy growth (4+) Staphylococcus aureus     Gram Stain Many (4+) WBCs seen      Many (4+) Gram positive cocci in clusters    Susceptibility      Staphylococcus aureus      PARMINDER      Gentamicin Susceptible      Oxacillin Susceptible      Rifampin Susceptible      Vancomycin Susceptible                       Susceptibility Comments     Staphylococcus aureus    This isolate does not demonstrate inducible clindamycin resistance in vitro.               Blood Culture - Blood, Arm, Left [[id number]]  (Abnormal) Collected: 12/27/22 1300    Lab Status: Final result Specimen: Blood from Arm, Left Updated: 12/29/22 0721     Blood Culture Staphylococcus aureus     Comment:   Infectious disease consultation is highly recommended to rule out distant foci of infection.        Isolated from Anaerobic Bottle     Gram Stain Anaerobic Bottle Gram positive cocci in clusters    Narrative:      Refer to previous blood culture collected on 12/26/2022 0435 for MICs    Less than seven (7) mL's of blood was collected.  Insufficient quantity may yield false negative results.    Blood Culture - Blood, Hand, Right [[id number]]  (Abnormal)  (Susceptibility) Collected: 12/26/22 0435    Lab Status: Final result Specimen: Blood from Hand,  Right Updated: 12/28/22 0657     Blood Culture Staphylococcus aureus     Comment:   Infectious disease consultation is highly recommended to rule out distant foci of infection.        Isolated from Aerobic and Anaerobic Bottles     Gram Stain Aerobic Bottle Gram positive cocci in clusters      Anaerobic Bottle Gram positive cocci in clusters    Narrative:      Less than seven (7) mL's of blood was collected.  Insufficient quantity may yield false negative results.    Susceptibility      Staphylococcus aureus      PARMINDER      Gentamicin Susceptible      Oxacillin Susceptible      Rifampin Susceptible      Vancomycin Susceptible                       Susceptibility Comments     Staphylococcus aureus    This isolate does not demonstrate inducible clindamycin resistance in vitro.               Blood Culture ID, PCR - Blood, Hand, Right [[id number]]  (Abnormal) Collected: 12/26/22 0435    Lab Status: Final result Specimen: Blood from Hand, Right Updated: 12/26/22 2054     BCID, PCR Staph aureus. mecA/C and MREJ (methicillin resistance gene) NOT detected. Identification by BCID2 PCR     BOTTLE TYPE Aerobic Bottle    Narrative:      Infectious disease consultation is highly recommended to rule out distant foci of infection.    Blood Culture - Blood, Hand, Left [[id number]]  (Abnormal) Collected: 12/26/22 0434    Lab Status: Final result Specimen: Blood from Hand, Left Updated: 12/28/22 0657     Blood Culture Staphylococcus aureus     Comment:   Infectious disease consultation is highly recommended to rule out distant foci of infection.        Isolated from Aerobic and Anaerobic Bottles     Gram Stain Aerobic Bottle Gram positive cocci in clusters      Anaerobic Bottle Gram positive cocci in clusters    Narrative:      Refer to previous blood culture collected on 12/26/2022 0435 for MICs    Less than seven (7) mL's of blood was collected.  Insufficient quantity may yield false negative results.    MRSA Screen, PCR (Inpatient) -  Swab, Nares [[id number]]  (Normal) Collected: 12/26/22 0307    Lab Status: Final result Specimen: Swab from Nares Updated: 12/26/22 0507     MRSA PCR No MRSA Detected    Narrative:      The negative predictive value of this diagnostic test is high and should only be used to consider de-escalating anti-MRSA therapy. A positive result may indicate colonization with MRSA and must be correlated clinically.          Medication Review:       Schedule Meds  cefTRIAXone, 2 g, Intravenous, Q12H  enoxaparin, 1.5 mg/kg, Subcutaneous, Daily  ferrous sulfate, 300 mg, Nasogastric, Daily  furosemide, 40 mg, Intravenous, Q12H  lansoprazole, 30 mg, Nasogastric, Q AM  midodrine, 10 mg, Nasogastric, Q8H  sodium chloride, 10 mL, Intravenous, Q12H  Zinc Oxide, , Apply externally, TID        Infusion Meds       PRN Meds  •  acetaminophen **OR** acetaminophen  •  aluminum-magnesium hydroxide-simethicone  •  dextrose  •  dextrose  •  fentanyl  •  glucagon (human recombinant)  •  HYDROcodone-acetaminophen  •  ondansetron **OR** ondansetron  •  sodium chloride  •  sodium chloride  •  sodium chloride        Assessment & Plan       Antimicrobial Therapy   1.  IV ceftriaxone        2.        3.        4.        5.            Assessment    Bilateral shoulder septic arthritis.  Synovial fluid culture was consistent with infection and cultures are growing 4+ methicillin susceptible Staph aureus    Methicillin susceptible Staph aureus bacteremia secondary to above.  Blood cultures were positive x2 sets on admission on December 26, 2022.  Repeat blood culture from December 27, 2022 turned positive  Repeat blood culture from December 29, 2022 is positive  ALMA DELIA was done on December 28, 2022 and showed no vegetation  -Repeat blood culture from 12/30/2022 is now positive  -PICC line was removed on 12/31/2022- cultures pending  -Blood culture from 12/31/2022 is negative so far  CT scan of the cervical, thoracic and lumbar spine with contrast showed no  obvious infection    Mild septic shock.  Currently off vasopressors    Respiratory failure.  Patient was on the ventilator after shoulder surgeries.  Was extubated and currently requiring 15 L of oxygen    DVT of the left arm secondary to PICC line.  Hematology service was consulted and patient was started on anticoagulation    Elevated transaminase and hyperbilirubinemia secondary to sepsis and infection.  Liver enzymes are trending down    Worsening right upper extremity edema.  Venous Doppler of the right upper extremity showed SVT but there was no DVT    Toxic metabolic encephalopathy.  Improved significantly      Plan    Continue IV ceftriaxone 2 g every 12 hours for now.  The patient will need 6 weeks of IV antibiotic.  I will eventually switch back to once a day ceftriaxone  Continue supportive care  A.m. labs      Antonina Delacruz MD  01/04/23  12:26 EST    Note is dictated utilizing voice recognition software/Dragon

## 2023-01-04 NOTE — PLAN OF CARE
Goal Outcome Evaluation:      Patient was seen for dysphagia evaluation per failed swallow screen. Patient was reported having been intubated for procedure x 1 day. Prior to procedure patient reports that he has not had an appetite and has not had anything to eat/drink since 12/25. Patient remained NPO until neuro state improved. Patient is much more alert as he is alert and oriented x 3. Patient able to follow directives. Most recent chest x-ray on 1/2/23 revealed radiographic changes with chronic heart failure, pulmonary edema with interval increase in pulmonary edema and development of small pleural effusion. Patient noted to have an ineffective cough when attempting to clear secretions.  Patient has his own natural teeth. Oral mechanism examination revealed generalized weakness with noted reduce ROM and mobilty of lingual and labial structures. Patient exhibits weak vocal quailty when voicing. Oral cavity was dry and cracked. Provided oral care prior to assessment. Properly positioned patient upright in bed prior to trials. Provided ice chips x 2. No evidence patient elicited a swallow. Provided trials of 1/2 tsp of thins via spoon x 9.  Swallow response was timely as he initiated swallow within 2 seconds in duration. Noted coughing x 3. Given digital palpatation laryngeal elevation was only slightly reduced. No wet vocal quality was detected. Provided trials of 1/2 tsp of  puree x 3. Oral transit was 5-6 seconds in duration. No wet vocal quality was evident. Patient noted to exhibit delay cough x 1. Patient clears oral cavity with noted trace residue noted on dry, cracked lingual surface. Since patient has ineffective cough, weak vocal quality and intermittent difficulty with PO, it is recommended that patient remain NPO with the exception of ice chips and small sips of water outlined in [facility] water protocol at this time. ST will continue ongoing re-evaluation in anticipation of  readiness for  PO and/or  VFSS          The rationale to recommend water when a PO diet cannot appropriately/functionally sustain nutrition (or if thickened liquids are prescribed due to aspiration of thin liquids) is because water is low risk for aspiration pna when compared to aspiration of food or other liquids.    Benefits of a water protocol include but are not limited to:     Oral gratification   Engagement of oropharyngeal swallow musculature   Decrease likelihood of dehydration      Guidelines for proper implementation include:     Thorough oral care prior to consuming water  Upright at 90 degree hip flexion  Small sips at slow rate  Monitor for any changes in respiratory status and discontinue if distress noted    The Last Free Water Protocol is a research based protocol established in 1984.

## 2023-01-04 NOTE — PROGRESS NOTES
[facility] Daily Progress Note    Patient Name: Jaime Bar  : 1955  MRN: [id number]  Primary Care Physician:  Provider, No Known  Date of admission: 2022      Subjective      Chief Complaint: AMS      Patient seen and examined this morning.  Better today more awake and alert and following commands.  Respite status also significantly improved compared to yesterday.  Remains on nasal cannula now.  No acute events overnight per nursing.    Unable to obtain full review of system due to patient's underlying mental status.      Objective      Vitals:   Temp:  [98.4 °F (36.9 °C)-100.2 °F (37.9 °C)] 98.4 °F (36.9 °C)  Heart Rate:  [] 97  Resp:  [22-31] 26  BP: ()/(60-87) 142/87  Flow (L/min):  [5-15] 5    Physical Exam:    General: Awake, alert, ill-appearing, lying in bed  Cardiovascular: Regular rate and rhythm, no murmurs  Respiratory: Decreased breath sounds bilaterally, no wheezing or rales, unlabored breathing  Abdomen: Soft, nontender, positive bowel sounds, no guarding  Neurologic: A&O, nonfocal, follows commands  Skin: Warm, bilateral shoulder incisions bandaged, drains in place. Bilateral upper extremity pitting edema noted L>R         Result Review    Result Review:  I have personally reviewed the results from the time of this admission to 2023 11:19 EST and agree with these findings:  [x]  Laboratory  [x]  Microbiology  [x]  Radiology  [x]  EKG/Telemetry   []  Cardiology/Vascular   []  Pathology  []  Old records  []  Other:    Wounds (last 24 hours)     LDA Wound     Row Name 23 0753 23 0302 23 2303       Wound 22 185 Right shoulder Incision    Wound - Properties Group Placement Date: 22  -HB Placement Time:   -HB Side: Right  -HB Location: shoulder  -HB Primary Wound Type: Incision  -HB    Dressing Appearance dry;intact;no drainage  -MN -- --    Closure Adhesive bandage;Sutures  -MN Adhesive bandage  -MA  Adhesive bandage  -MA    Base dressing in place, unable to visualize  -MN dressing in place, unable to visualize  -MA dressing in place, unable to visualize  -MA    Drainage Amount -- moderate  -MA moderate  -MA    Retired Wound - Properties Group Placement Date: 12/28/22  -HB Placement Time: 1856 -HB Side: Right  -HB Location: shoulder  -HB Primary Wound Type: Incision  -HB    Retired Wound - Properties Group Date first assessed: 12/28/22  -HB Time first assessed: 1856  -HB Side: Right  -HB Location: shoulder  -HB Primary Wound Type: Incision  -HB       Wound 12/28/22 1940 Left shoulder Incision    Wound - Properties Group Placement Date: 12/28/22  -HB Placement Time: 1940 -HB Side: Left  -HB Location: shoulder  -HB Primary Wound Type: Incision  -HB    Dressing Appearance dry;intact;no drainage  -MN -- --    Closure Adhesive bandage;Sutures  -MN Adhesive bandage  -MA Adhesive bandage  -MA    Base clean;blanchable  -MN dressing in place, unable to visualize  -MA dressing in place, unable to visualize  -MA    Drainage Amount none  -MN small  -MA small  -MA    Care, Wound cleansed with;sterile normal saline  -MN -- --    Dressing Care dressing changed  -MN -- --    Retired Wound - Properties Group Placement Date: 12/28/22  -HB Placement Time: 1940 -HB Side: Left  -HB Location: shoulder  -HB Primary Wound Type: Incision  -HB    Retired Wound - Properties Group Date first assessed: 12/28/22  -HB Time first assessed: 1940 -HB Side: Left  -HB Location: shoulder  -HB Primary Wound Type: Incision  -HB       Wound 12/30/22 2000 sacral spine Pressure Injury    Wound - Properties Group Placement Date: 12/30/22  -MB Placement Time: 2000 -MB Location: sacral spine  -MB Primary Wound Type: Pressure inj  -MB    Pressure Injury Stage DTPI  -MN -- --    Dressing Appearance open to air  -MN -- --    Closure -- Adhesive bandage  -MA Adhesive bandage  -MA    Base non-blanchable;maroon/purple  -MN non-blanchable;maroon/purple  -MA  non-blanchable;maroon/purple  -MA    Drainage Amount none  -MN none  -MA none  -MA    Care, Wound other (see comments)  zinc ointment applied  -MN -- --    Retired Wound - Properties Group Placement Date: 12/30/22  -MB Placement Time: 2000 -MB Location: sacral spine  -MB Primary Wound Type: Pressure inj  -MB    Retired Wound - Properties Group Date first assessed: 12/30/22  -MB Time first assessed: 2000 -MB Location: sacral spine  -MB Primary Wound Type: Pressure inj  -MB    Row Name 01/03/23 1939 01/03/23 1600 01/03/23 1200       Wound 12/28/22 1856 Right shoulder Incision    Wound - Properties Group Placement Date: 12/28/22  -HB Placement Time: 1856 -HB Side: Right  -HB Location: shoulder  -HB Primary Wound Type: Incision  -HB    Dressing Appearance dry;intact  -MA dry;intact;no drainage  -MN dry;intact;no drainage  -MN    Closure Adhesive bandage  -MA Adhesive bandage  -MN Adhesive bandage  -MN    Base dressing in place, unable to visualize  -MA dressing in place, unable to visualize  -MN dressing in place, unable to visualize  -MN    Drainage Amount moderate  -MA -- --    Care, Wound other (see comments)  Surgical Dresing  -MA -- --    Retired Wound - Properties Group Placement Date: 12/28/22  -HB Placement Time: 1856 -HB Side: Right  -HB Location: shoulder  -HB Primary Wound Type: Incision  -HB    Retired Wound - Properties Group Date first assessed: 12/28/22  -HB Time first assessed: 1856  -HB Side: Right  -HB Location: shoulder  -HB Primary Wound Type: Incision  -HB       Wound 12/28/22 1940 Left shoulder Incision    Wound - Properties Group Placement Date: 12/28/22  -HB Placement Time: 1940  -HB Side: Left  -HB Location: shoulder  -HB Primary Wound Type: Incision  -HB    Dressing Appearance dry;intact  -MA dry;intact;no drainage  -MN dry;intact;no drainage  -MN    Closure Adhesive bandage  -MA Adhesive bandage  -MN Adhesive bandage  -MN    Base dressing in place, unable to visualize  -MA dressing in  place, unable to visualize  -MN dressing in place, unable to visualize  -MN    Drainage Amount small  -MA -- --    Retired Wound - Properties Group Placement Date: 12/28/22 -HB Placement Time: 1940 -HB Side: Left  -HB Location: shoulder  -HB Primary Wound Type: Incision  -HB    Retired Wound - Properties Group Date first assessed: 12/28/22 -HB Time first assessed: 1940  -HB Side: Left  -HB Location: shoulder  -HB Primary Wound Type: Incision  -HB       Wound 12/30/22 2000 sacral spine Pressure Injury    Wound - Properties Group Placement Date: 12/30/22 -MB Placement Time: 2000 -MB Location: sacral spine  -MB Primary Wound Type: Pressure inj  -MB    Dressing Appearance open to air  -MA open to air  -MN open to air  -MN    Closure Adhesive bandage  -MA -- --    Base non-blanchable;maroon/purple  -MA non-blanchable;maroon/purple  -MN non-blanchable;maroon/purple  -MN    Drainage Amount none  -MA -- --    Dressing Care hydrocolloid  -MA -- --    Retired Wound - Properties Group Placement Date: 12/30/22 -MB Placement Time: 2000 -MB Location: sacral spine  -MB Primary Wound Type: Pressure inj  -MB    Retired Wound - Properties Group Date first assessed: 12/30/22 -MB Time first assessed: 2000 -MB Location: sacral spine  -MB Primary Wound Type: Pressure inj  -MB          User Key  (r) = Recorded By, (t) = Taken By, (c) = Cosigned By    Initials Name Provider Type    Martha Monson RN Registered Nurse    Carolyn Jay RN Registered Nurse    Lakesha Gomes RN Registered Nurse    Herlinda Novoa RN Registered Nurse                  Assessment & Plan      Brief Patient Summary:  Jaime Bar is a 67 y.o. male with past medical history of cervicalgia, primary osteoarthritis involving multiple joints with surgery on bilateral shoulders and right knee replacement but no recent procedure or surgery, hyponatremia, prostate cancer, and cardiac amyloidosis presented to [facility] [place]  hospital on 12/25/2022 with complaints of right shoulder pain, weakness, and altered mental status.  He was found to be hypotensive and hyponatremic with elevated lactic acid.  Patient had reported decreased oral intake.  He was transferred to [facility] ICU for further treatment of septic shock requiring vasopressors and hyponatremia.      Since admission he was found to be bacteremic with 2 sets of blood cultures positive for staph aureus, source being bilateral shoulder septic arthritis. ID has been treating the patient with IV ceftriaxone 2G and he will need a total of 6 weeks of antibiotic therapy.  He has status post bilateral shoulder arthroscopy with extensive debridement by Dr. Velez 12/28/2022. He remained intubated after surgery until 12/29 and now extubated. He has been weaned off vasopressors. Nephrology is managing his hyponatremia and STEVEN on CKD. He was found to have LUE DVT provoked from PICC line currently on argatroban. Hematology has been consulted for his thrombocytopenia.       cefTRIAXone, 2 g, Intravenous, Q12H  enoxaparin, 1.5 mg/kg, Subcutaneous, Daily  ferrous sulfate, 300 mg, Nasogastric, Daily  furosemide, 40 mg, Intravenous, Q12H  lansoprazole, 30 mg, Nasogastric, Q AM  midodrine, 10 mg, Nasogastric, Q8H  sodium chloride, 10 mL, Intravenous, Q12H  Zinc Oxide, , Apply externally, TID             Active Hospital Problems:  Active Hospital Problems    Diagnosis    • Bacteremia due to methicillin susceptible Staphylococcus aureus (MSSA)    • Staphylococcal arthritis of shoulder (HCC)    • Cervicalgia, spine surgery 2017    • Primary osteoarthritis involving multiple joints    • Hyponatremia    • Acute kidney injury superimposed on chronic kidney disease (HCC)    • Cardiac amyloidosis (HCC)    • ACC/AHA stage C chronic systolic heart failure (HCC)    • Tobacco abuse    • Primary hypertension      Plan:    Bilateral shoulder septic arthritis  Septic shock secondary to MSSA  bacteremia  -Synovial fluid culture was consistent with infection and cultures are growing 4+ Staph aureus  -s/p bilateral shoulder arthroscopy with extensive debridement by Dr. Velez 12/28/2022  - 2/2 blood cultures positive for MSSA secondary to bilateral shoulder septic arthritis   -ALMA DELIA on 12/28, Negative for endocarditis  -On IV Rocephin 2 g daily x6 weeks per ID  -Off IV vasopressors, remains on midodrine  -Discontinued art line, MAP greater than 65 mmHg at this time  -LUE PICC line removed and tip also sent for culture  -Fevers have improved, CT of the spine does not show any acute abscess or infectious changes  -ID following     Acute metabolic encephalopathy, improved  Severe thrombocytopenia, improved  LUE DVT, provoked from PICC line  -Patient's mental status worsening  -Initially concern for TTP however no schistocytes seen on peripheral smear per hematology  -Platelets improving  -Off argatroban drip, PTT elevated  -On low-dose Lovenox twice daily for now per heme-onc pending DIC work-up  -Patient's left lower extremity is back to normal now, good pulses noted, BERNARDINO showed mild digital ischemia  -No peripheral cyanosis noted at this time    Acute respiratory failure with hypoxia, improved  Pulmonary edema, improved  -Postop respiratory failure, extubated on 12/39  -Chest x-ray showing congestion  -On diuresis per nephrology     STEVEN on CKD  Hyponatremia on admission and now hypernatremia  -Creatinine stable now but sodium trending up  -Free water flushes per nephrology  -Management per nephrology     A. fib with RVR  -Off amnio drip, rate controlled  -Anticoagulation recommendations per cardiology and heme-onc  -Cardiology following    Elevated LFTs  -Likely due to shock liver  -Hep panel negative  -Liver ultrasound showed normal hepatic parenchyma, cholelithiasis with adenomyomatosis involving gallbladder wall, CBD upper limit of normal  -Monitor    H/o cardiac amyloidosis  H/o prostate CA  Osteoarthritis      DVT/GI Ppx.    DNR/DNI.    CODE STATUS:    Medical Intervention Limits: NO intubation (DNI)  Level Of Support Discussed With: Next of Kin (If No Surrogate)  Code Status (Patient has no pulse and is not breathing): No CPR (Do Not Attempt to Resuscitate)  Medical Interventions (Patient has pulse or is breathing): Limited Support  Release to patient: Routine Release      Disposition: Will likely need placement.  Pending improvement.    Electronically signed by James Holguin DO, 01/04/23, 11:19 EST.  [facility]      Part of this note may be an electronic transcription/translation of spoken language to printed text using the Dragon Dictation System.

## 2023-01-04 NOTE — CASE MANAGEMENT/SOCIAL WORK
Continued Stay Note   Michael     Patient Name: Jaime Bar  MRN: [id number]  Today's Date: 1/4/2023    Admit Date: 12/26/2022    Plan: DC Plan: Anticipate home with family pending clinical course and PT/OT evals. Watch for home O2 needs. Watch for home IV abx needs.   Discharge Plan     Row Name 01/04/23 1318       Plan    Plan DC Plan: Anticipate home with family pending clinical course and PT/OT evals. Watch for home O2 needs. Watch for home IV abx needs.    Provided Post Acute Provider List? N/A    Provided Post Acute Provider Quality & Resource List? N/A    Plan Comments CM reviewed chart documentation to obtain clinical updates. Patient slowly improving. Awaiting PT/OT evaluations and recommendations.CM will continue to follow for any further needs and adjust discharge plan accordingly. DC Barriers: Venturi mask 9L, NGT, PADMINI drains x3, IV abx/Lasix.           Expected Discharge Date and Time     Expected Discharge Date Expected Discharge Time    Jan 9, 2023         Phone communication or documentation only- no physical contact with patient or family.      Melyssa Pascual RN     Office Phone: [phone]  Office Cell:     [phone]

## 2023-01-04 NOTE — PROGRESS NOTES
"    Reason for follow-up: Atrial fibrillation, cardiomyopathy     Patient Care Team:  Provider, No Known as PCP - General  Kyle Haas MD as Consulting Physician (Nephrology)  Vicente Mendoza MD as Consulting Physician (Hematology and Oncology)    Subjective .   Jaime Bar is doing fair today, earlier he had episode of confusion.     ROS    Tramadol-acetaminophen, Codeine, and Tramadol    Scheduled Meds:cefTRIAXone, 2 g, Intravenous, Q12H  enoxaparin, 1.5 mg/kg, Subcutaneous, Daily  ferrous sulfate, 300 mg, Nasogastric, Daily  furosemide, 40 mg, Intravenous, Q12H  lansoprazole, 30 mg, Nasogastric, Q AM  midodrine, 10 mg, Nasogastric, Q8H  sodium chloride, 10 mL, Intravenous, Q12H  Zinc Oxide, , Apply externally, TID      Continuous Infusions:   PRN Meds:.•  acetaminophen **OR** acetaminophen  •  aluminum-magnesium hydroxide-simethicone  •  dextrose  •  dextrose  •  fentanyl  •  glucagon (human recombinant)  •  HYDROcodone-acetaminophen  •  ondansetron **OR** ondansetron  •  sodium chloride  •  sodium chloride  •  sodium chloride      VITAL SIGNS  Vitals:    01/04/23 1300 01/04/23 1400 01/04/23 1500 01/04/23 1510   BP: 120/81 119/72  107/69   Pulse: 98 107 93 100   Resp:       Temp:       TempSrc:       SpO2: 96% 97% 96% 97%   Weight:       Height:           Flowsheet Rows    Flowsheet Row First Filed Value   Admission Height 167.6 cm (66\") Documented at 12/26/2022 0230   Admission Weight 81.8 kg (180 lb 5.4 oz) Documented at 12/26/2022 0230             Physical Exam  VITALS REVIEWED    General:      well developed, in no acute distress.    Head:      normocephalic and atraumatic.    Eyes:      PERRL/EOM intact, conjunctiva and sclera clear with out nystagmus.    Neck:      no masses, thyromegaly,  trachea central with normal respiratory effort and PMI displaced laterally  Lungs:      Clear  Heart:       Irregular rhythm  Msk:      no deformity or scoliosis noted of thoracic or lumbar spine.    Pulses:    "   pulses normal in all 4 extremities.    Extremities:       1+ edema  Neurologic:      no focal deficits.   alert oriented x3  Skin:      intact without lesions or rashes.    Psych:      alert and cooperative; normal mood and affect; normal attention span and concentration.          LAB RESULTS (LAST 7 DAYS)    CBC  Results from last 7 days   Lab Units 01/04/23  0352 01/03/23  0914 01/02/23  0606 01/01/23  0614 12/31/22  0520 12/30/22  0540 12/29/22  1721   WBC 10*3/mm3 7.00 8.20 9.30 7.80 9.80 9.30 9.80   RBC 10*6/mm3 3.51* 3.50* 3.56* 3.63* 3.48* 3.42* 3.37*   HEMOGLOBIN g/dL 11.0* 11.1* 11.3* 11.4* 11.3* 11.1* 10.9*   HEMATOCRIT % 34.1* 34.1* 34.6* 34.6* 32.6* 32.3* 32.0*   MCV fL 97.1* 97.2* 97.1* 95.5 93.7 94.4 94.9   PLATELETS 10*3/mm3 116* 104* 78* 60* 46* 38* 47*       BMP  Results from last 7 days   Lab Units 01/04/23  0352 01/03/23  0914 01/02/23  0606 01/01/23  0614 12/31/22  0520 12/30/22  0540 12/29/22  1710 12/29/22  0541 12/29/22  0002   SODIUM mmol/L 148* 150* 152* 146* 140 135* 135*   < > 130*   POTASSIUM mmol/L 4.4 4.7 4.3 4.0 3.8 3.8 4.3   < > 3.9   CHLORIDE mmol/L 108* 116* 120* 113* 107 103 102   < > 99   CO2 mmol/L 32.0* 28.0 27.0 25.0 22.0 21.0* 20.0*   < > 20.0*   BUN mg/dL 40* 36* 41* 52* 68* 77* 74*   < > 59*   CREATININE mg/dL 0.77 0.62* 0.69* 0.82 1.12 1.50* 1.65*   < > 1.17   GLUCOSE mg/dL 127* 122* 144* 135* 144* 122* 108*   < > 137*   MAGNESIUM mg/dL 1.9 2.0 2.0 2.0 2.2 2.3  --   --  2.0   PHOSPHORUS mg/dL 3.6 3.0 2.6 3.0 2.9 4.0  --   --  4.3    < > = values in this interval not displayed.       CMP   Results from last 7 days   Lab Units 01/04/23  0352 01/03/23  0914 01/02/23  0606 01/01/23  0614 12/31/22  0520 12/30/22  0540 12/29/22  2133 12/29/22  1710 12/29/22  0541   SODIUM mmol/L 148* 150* 152* 146* 140 135*  --  135* 134*   POTASSIUM mmol/L 4.4 4.7 4.3 4.0 3.8 3.8  --  4.3 4.5   CHLORIDE mmol/L 108* 116* 120* 113* 107 103  --  102 100   CO2 mmol/L 32.0* 28.0 27.0 25.0 22.0  21.0*  --  20.0* 19.0*   BUN mg/dL 40* 36* 41* 52* 68* 77*  --  74* 65*   CREATININE mg/dL 0.77 0.62* 0.69* 0.82 1.12 1.50*  --  1.65* 1.66*   GLUCOSE mg/dL 127* 122* 144* 135* 144* 122*  --  108* 90   ALBUMIN g/dL 2.2* 2.4* 2.4* 2.2* 2.2* 2.3* 2.2*  --  2.7*   BILIRUBIN mg/dL 3.5* 3.4* 3.3* 4.0* 4.4* 5.2*  --  5.3* 5.3*   ALK PHOS U/L 96 120* 102 107 157* 105  --   --  108   AST (SGOT) U/L 36 44* 104* 308* 520* 431*  --   --  238*   ALT (SGPT) U/L 93* 122* 200* 306* 347* 236*  --   --  117*         BNP        TROPONIN        CoAg  Results from last 7 days   Lab Units 01/01/23  0614 12/31/22  1729 12/31/22  0832 12/31/22  0413 12/31/22  0002 12/30/22  1943 12/30/22  1542 12/30/22  0212 12/29/22  1721   INR   --   --   --   --   --   --   --   --  1.55*   APTT seconds 61.1* 74.1* 94.8* 98.2* 105.2* 111.5* 119.9*   < > 38.7*    < > = values in this interval not displayed.       Creatinine Clearance  Estimated Creatinine Clearance: 95.1 mL/min (by C-G formula based on SCr of 0.77 mg/dL).    ABG  Results from last 7 days   Lab Units 12/31/22  0843 12/29/22  0427 12/29/22  0038   PH, ARTERIAL pH units 7.388 7.297* 7.230*   PCO2, ARTERIAL mm Hg 37.1 39.1 50.5*   PO2 ART mm Hg 84.5 114.4* 355.5*   O2 SATURATION ART % 96.3 98.0 99.9*   BASE EXCESS ART mmol/L -2.3* -6.9* -6.6*         EKG    I personally reviewed the patient's EKG/Telemetry data: Atrial fibrillation      Assessment & Plan       Hyponatremia    ACC/AHA stage C chronic systolic heart failure (HCC)    Cardiac amyloidosis (HCC)    Primary hypertension    Tobacco abuse    Cervicalgia, spine surgery 2017    Primary osteoarthritis involving multiple joints    Bacteremia due to methicillin susceptible Staphylococcus aureus (MSSA)    Staphylococcal arthritis of shoulder (HCC)    Acute kidney injury superimposed on chronic kidney disease (HCC)      Jaime Bar is a 67-year-old male patient who presented to the hospital with complaints of bilateral shoulder pain.   Patient was found to be septic with staph aureus bacteremia and had bilateral shoulder abscesses.  He underwent arthroscopy and drainage of both shoulders on 12/28/2022.  A transesophageal echocardiogram done on 12/28/2022 did not show any endocarditis.  Patient however does have history of cardiac amyloidosis based on an MRI done in March 2022, he does have cardiomyopathy with ejection fraction of around 45% based on MRI as well as ALMA DELIA.  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  Patient was found to be in atrial fibrillation during this hospitalization.  The exact onset of A. fib is unknown, however in January 2022 he was in sinus rhythm based on EKG report from then.  It is likely that the A. fib was triggered by his infection.  Patient is receiving full dose Lovenox, he is already had a ALMA DELIA which did not show thrombus.  He is still frail, possibly still septic, still has drain in his left shoulder and had spiked fever earlier today.  For that I will defer cardioversion but we will keep following him and perform cardioversion prior to his discharge as long as he remains on anticoagulation.  Also consider switching to p.o. anticoagulation such as Xarelto or Eliquis if no further surgeries are anticipated.      Gunnar Yu MD  01/04/23  16:26 EST

## 2023-01-04 NOTE — PROGRESS NOTES
"                                                                                                                                      Nephrology  Progress Note                                        [facility]    Patient Identification    Name: Jaime Bar  Age: 67 y.o.  Sex: male  :  1955  MRN: [id number]      DATE OF SERVICE:  2023        Subective     required more oxygen      Objective   Scheduled Meds:cefTRIAXone, 2 g, Intravenous, Q12H  enoxaparin, 1.5 mg/kg, Subcutaneous, Daily  ferrous sulfate, 300 mg, Nasogastric, Daily  furosemide, 40 mg, Intravenous, Q12H  lansoprazole, 30 mg, Nasogastric, Q AM  midodrine, 10 mg, Nasogastric, Q8H  sodium chloride, 10 mL, Intravenous, Q12H  Zinc Oxide, , Apply externally, TID          Continuous Infusions:     PRN Meds:•  acetaminophen **OR** acetaminophen  •  aluminum-magnesium hydroxide-simethicone  •  dextrose  •  dextrose  •  fentanyl  •  glucagon (human recombinant)  •  HYDROcodone-acetaminophen  •  ondansetron **OR** ondansetron  •  sodium chloride  •  sodium chloride  •  sodium chloride     Exam:  BP 96/60   Pulse 105   Temp 99.6 °F (37.6 °C)   Resp 24   Ht 167.6 cm (66\")   Wt 84.9 kg (187 lb 2.7 oz)   SpO2 100%   BMI 30.21 kg/m²     Intake/Output last 3 shifts:  I/O last 3 completed shifts:  In: 6173 [I.V.:2102; Other:1551; NG/GT:2520]  Out: 5305 [Urine:5100; Drains:175]    Intake/Output this shift:  I/O this shift:  In: 450 [Other:450]  Out: 1362 [Urine:1280; Drains:82]    Physical exam:  General Appearance: Confused  Head:  Normocephalic, without obvious abnormality, atraumatic  Eyes:  PERRL, conjunctiva/corneas clear     Neck:  Supple,  no adenopathy;      Lungs:  Decreased BS occasion ronchi  Heart:  Regular rate and rhythm, S1 and S2 normal  Abdomen:  Soft, non-tender, bowel sounds active   Extremities: trace edema  Pulses: 2+ and symmetric all extremities  Skin:  No rashes or lesions       Data Review:  All labs (24hrs): "   Recent Results (from the past 24 hour(s))   Calcium, Ionized    Collection Time: 01/03/23  9:14 AM    Specimen: Blood   Result Value Ref Range    Ionized Calcium 1.15 (L) 1.20 - 1.30 mmol/L   Comprehensive Metabolic Panel    Collection Time: 01/03/23  9:14 AM    Specimen: Blood   Result Value Ref Range    Glucose 122 (H) 65 - 99 mg/dL    BUN 36 (H) 8 - 23 mg/dL    Creatinine 0.62 (L) 0.76 - 1.27 mg/dL    Sodium 150 (H) 136 - 145 mmol/L    Potassium 4.7 3.5 - 5.2 mmol/L    Chloride 116 (H) 98 - 107 mmol/L    CO2 28.0 22.0 - 29.0 mmol/L    Calcium 8.0 (L) 8.6 - 10.5 mg/dL    Total Protein 5.1 (L) 6.0 - 8.5 g/dL    Albumin 2.4 (L) 3.5 - 5.2 g/dL    ALT (SGPT) 122 (H) 1 - 41 U/L    AST (SGOT) 44 (H) 1 - 40 U/L    Alkaline Phosphatase 120 (H) 39 - 117 U/L    Total Bilirubin 3.4 (H) 0.0 - 1.2 mg/dL    Globulin 2.7 gm/dL    A/G Ratio 0.9 g/dL    BUN/Creatinine Ratio 58.1 (H) 7.0 - 25.0    Anion Gap 6.0 5.0 - 15.0 mmol/L    eGFR 104.8 >60.0 mL/min/1.73   Magnesium    Collection Time: 01/03/23  9:14 AM    Specimen: Blood   Result Value Ref Range    Magnesium 2.0 1.6 - 2.4 mg/dL   Phosphorus    Collection Time: 01/03/23  9:14 AM    Specimen: Blood   Result Value Ref Range    Phosphorus 3.0 2.5 - 4.5 mg/dL   CBC Auto Differential    Collection Time: 01/03/23  9:14 AM    Specimen: Blood   Result Value Ref Range    WBC 8.20 3.40 - 10.80 10*3/mm3    RBC 3.50 (L) 4.14 - 5.80 10*6/mm3    Hemoglobin 11.1 (L) 13.0 - 17.7 g/dL    Hematocrit 34.1 (L) 37.5 - 51.0 %    MCV 97.2 (H) 79.0 - 97.0 fL    MCH 31.7 26.6 - 33.0 pg    MCHC 32.6 31.5 - 35.7 g/dL    RDW 16.5 (H) 12.3 - 15.4 %    RDW-SD 56.9 (H) 37.0 - 54.0 fl    MPV 10.2 6.0 - 12.0 fL    Platelets 104 (L) 140 - 450 10*3/mm3    Neutrophil % 86.8 (H) 42.7 - 76.0 %    Lymphocyte % 3.4 (L) 19.6 - 45.3 %    Monocyte % 9.4 5.0 - 12.0 %    Eosinophil % 0.1 (L) 0.3 - 6.2 %    Basophil % 0.3 0.0 - 1.5 %    Neutrophils, Absolute 7.10 (H) 1.70 - 7.00 10*3/mm3    Lymphocytes, Absolute 0.30  (L) 0.70 - 3.10 10*3/mm3    Monocytes, Absolute 0.80 0.10 - 0.90 10*3/mm3    Eosinophils, Absolute 0.00 0.00 - 0.40 10*3/mm3    Basophils, Absolute 0.00 0.00 - 0.20 10*3/mm3    nRBC 0.2 0.0 - 0.2 /100 WBC   Doppler Ankle Brachial Index Single Level CAR    Collection Time: 01/03/23 10:01 AM   Result Value Ref Range    Target HR (85%) 130 bpm    Max. Pred. HR (100%) 153 bpm    Upper arterial right arm brachial sys max 127 mmHg    RIGHT POST TIBIAL SYS      LEFT POST TIBIAL SYS      RIGHT DORSALIS PEDIS SYS      LEFT DORSALIS PEDIS SYS      RIGHT GREAT TOE SYS MAX 70     LEFT GREAT TOE SYS MAX 0     RIGHT BERNARDINO RATIO 1.31     LEFT BERNARDINO RATIO 1.35     RIGHT TBI RATIO 0.55     LEFT TBI RATIO 0    POC Glucose Once    Collection Time: 01/03/23 11:08 AM    Specimen: Blood   Result Value Ref Range    Glucose 115 (H) 70 - 105 mg/dL   POC Glucose Once    Collection Time: 01/03/23  5:32 PM    Specimen: Blood   Result Value Ref Range    Glucose 115 (H) 70 - 105 mg/dL   POC Glucose Once    Collection Time: 01/03/23 11:46 PM    Specimen: Blood   Result Value Ref Range    Glucose 113 (H) 70 - 105 mg/dL   Calcium, Ionized    Collection Time: 01/04/23  3:52 AM    Specimen: Blood   Result Value Ref Range    Ionized Calcium 1.09 (L) 1.20 - 1.30 mmol/L   Comprehensive Metabolic Panel    Collection Time: 01/04/23  3:52 AM    Specimen: Blood   Result Value Ref Range    Glucose 127 (H) 65 - 99 mg/dL    BUN 40 (H) 8 - 23 mg/dL    Creatinine 0.77 0.76 - 1.27 mg/dL    Sodium 148 (H) 136 - 145 mmol/L    Potassium 4.4 3.5 - 5.2 mmol/L    Chloride 108 (H) 98 - 107 mmol/L    CO2 32.0 (H) 22.0 - 29.0 mmol/L    Calcium 8.0 (L) 8.6 - 10.5 mg/dL    Total Protein 5.1 (L) 6.0 - 8.5 g/dL    Albumin 2.2 (L) 3.5 - 5.2 g/dL    ALT (SGPT) 93 (H) 1 - 41 U/L    AST (SGOT) 36 1 - 40 U/L    Alkaline Phosphatase 96 39 - 117 U/L    Total Bilirubin 3.5 (H) 0.0 - 1.2 mg/dL    Globulin 2.9 gm/dL    A/G Ratio 0.8 g/dL    BUN/Creatinine Ratio  51.9 (H) 7.0 - 25.0    Anion Gap 8.0 5.0 - 15.0 mmol/L    eGFR 98.1 >60.0 mL/min/1.73   Magnesium    Collection Time: 01/04/23  3:52 AM    Specimen: Blood   Result Value Ref Range    Magnesium 1.9 1.6 - 2.4 mg/dL   Phosphorus    Collection Time: 01/04/23  3:52 AM    Specimen: Blood   Result Value Ref Range    Phosphorus 3.6 2.5 - 4.5 mg/dL   CBC Auto Differential    Collection Time: 01/04/23  3:52 AM    Specimen: Blood   Result Value Ref Range    WBC 7.00 3.40 - 10.80 10*3/mm3    RBC 3.51 (L) 4.14 - 5.80 10*6/mm3    Hemoglobin 11.0 (L) 13.0 - 17.7 g/dL    Hematocrit 34.1 (L) 37.5 - 51.0 %    MCV 97.1 (H) 79.0 - 97.0 fL    MCH 31.3 26.6 - 33.0 pg    MCHC 32.3 31.5 - 35.7 g/dL    RDW 16.5 (H) 12.3 - 15.4 %    RDW-SD 56.9 (H) 37.0 - 54.0 fl    MPV 9.9 6.0 - 12.0 fL    Platelets 116 (L) 140 - 450 10*3/mm3    Neutrophil % 83.5 (H) 42.7 - 76.0 %    Lymphocyte % 4.2 (L) 19.6 - 45.3 %    Monocyte % 11.9 5.0 - 12.0 %    Eosinophil % 0.2 (L) 0.3 - 6.2 %    Basophil % 0.2 0.0 - 1.5 %    Neutrophils, Absolute 5.90 1.70 - 7.00 10*3/mm3    Lymphocytes, Absolute 0.30 (L) 0.70 - 3.10 10*3/mm3    Monocytes, Absolute 0.80 0.10 - 0.90 10*3/mm3    Eosinophils, Absolute 0.00 0.00 - 0.40 10*3/mm3    Basophils, Absolute 0.00 0.00 - 0.20 10*3/mm3    nRBC 0.0 0.0 - 0.2 /100 WBC   Duplex Venous Upper Extremity - Right CAR    Collection Time: 01/04/23  6:22 AM   Result Value Ref Range    Target HR (85%) 130 bpm    Max. Pred. HR (100%) 153 bpm          Imaging:  XR Shoulder 2+ View Right    Result Date: 12/27/2022   1. Severe degenerative changes of the glenohumeral joint as well as a high riding humeral head, suggestive of chronic rotator cuff pathology. No definite evidence of osteomyelitis is identified. However plain films cannot exclude septic arthritis.  Electronically Signed By-Juan Francisco Lozoya MD On:12/27/2022 1:42 PM This report was finalized on [id number] by  Juan Francisco Lozoya MD.    CT Head Without Contrast    Result Date:  12/31/2022  1. No acute intracranial abnormality. Specifically, no evidence of acute hemorrhage, mass effect or midline shift. 2. Mild global cerebral volume loss. 3. Mild bilateral air-fluid levels within the maxillary sinuses which can be seen with acute sinus disease in the correct clinical setting.  Electronically Signed By-Willy Baxter MD On:12/31/2022 9:01 PM This report was finalized on [id number] by  Willy Baxter MD.    CT Cervical Spine With Contrast    Result Date: 1/3/2023  1.Postoperative changes status post prior fusion. No definitive signs of hardware failure or loosening are noted. 2.No evidence for displaced fracture or malalignment throughout the cervical spine. 3.No evidence for acute soft tissue abnormality. There is no evidence for abnormal enhancement. No abnormal peripherally enhancing fluid collection is seen. 4.Changes of cervical spondylosis are noted throughout the cervical spine. Associated hypertrophic posterior facet changes and uncovertebral changes are also observed. Electronically Signed: Solo Ray  1/3/2023 10:25 PM EST  Workstation ID: [id number]    CT Thoracic Spine With Contrast    Result Date: 1/3/2023  1.No evidence for displaced fracture or malalignment throughout the thoracic spine. No evidence for erosive endplate changes. 2.No evidence for abnormal enhancement. There is no abnormal fluid collection within the paraspinal soft tissues. There is no evidence for epidural abscess. 3.Degenerative changes are noted throughout the thoracic spine. 4.Interstitial changes are seen throughout the lungs. Bibasilar infiltrates are noted. Bilateral pleural effusions are observed. Electronically Signed: Solo Finklett  1/3/2023 10:31 PM EST  Workstation ID: [id number]    CT Lumbar Spine With Contrast    Result Date: 1/3/2023  1.No evidence for displaced fracture or malalignment throughout the lumbar spine. No evidence for erosive endplate changes. 2.No evidence for abnormal  enhancement. There is no abnormal fluid collection within the paraspinal soft tissues. There is no evidence for epidural abscess. 3.Advanced degenerative changes are noted throughout the lumbar spine. 4.Evidence for bilateral spondylolysis with associated spondylolisthesis at the L5 level. Prior postoperative changes are also noted. Electronically Signed: Solo Ray  1/3/2023 10:35 PM EST  Workstation ID: [id number]    US Liver    Result Date: 12/30/2022  1. Normal hepatic parenchyma. 2. Cholelithiasis with adenomyomatosis involving gallbladder wall. 3. Common bile duct at the upper limits of normal measuring 6 to 7 mm.  Electronically Signed By-Genaro Canada MD On:12/30/2022 3:40 PM This report was finalized on [id number] by  Genaro Canada MD.    XR Chest 1 View    Result Date: 1/3/2023  Impression: 1. Radiographic changes consistent with congestive heart failure pulmonary edema with interval increase in pulmonary edema and development of small bilateral pleural effusions. Electronically Signed: Jaime Momin  1/3/2023 9:14 AM EST  Workstation ID: [id number]    XR Chest 1 View    Result Date: 12/30/2022  Cardiomegaly and increased pulmonary vascular congestion and suspected interstitial edema  Lines and tubes as above[  Electronically Signed By-Jaxon Aly On:12/30/2022 10:19 AM This report was finalized on [id number] by  Jaxon Aly, .    XR Chest 1 View    Result Date: 12/29/2022  The endotracheal tube is approximately 6 mm above the sarah. Recommend repositioning. Left subclavian central venous catheter has its catheter tip overlying the superior vena cava. The cardiac silhouette is moderately to significantly enlarged and the pulmonary vessels are within normal limits. There is no focal area of consolidation otherwise seen. Electronically signed by:  Los Mccoy D.O.  12/28/2022 11:13 PM Mountain Time    XR Chest 1 View    Result Date: 12/27/2022   1. Persistent diffuse interstitial  opacities. Differential includes interstitial pulmonary edema, or atypical infection.  Electronically Signed By-Juan Francisco Lozoya MD On:12/27/2022 8:46 AM This report was finalized on [id number] by  Juan Francisco Lozoya MD.    MRI Shoulder Right Without Contrast    Result Date: 12/28/2022  Impression: 1. Moderate-sized joint effusion with advanced degenerative changes and destructive changes of the glenohumeral joint. Findings may be related to septic arthritis given clinical history. Fluid aspiration is recommended for confirmation. Joint effusion with subcoracoid bursitis may also be reactive and related to severe osteoarthritis. 2. Suspected complete tears of the supraspinatus and infraspinatus tendons. 3. Completely macerated appearance of the labrum. 4. Moderate acromial clavicular osteoarthritis. Electronically Signed: Linnette Pacheco  12/28/2022 10:08 AM EST  Workstation ID: [id number]    MRI Shoulder Left Without Contrast    Result Date: 12/28/2022  Impression: Very large left-sided joint effusion with destructive changes of the glenohumeral joint with deformity of the glenoid likely related to septic arthritis. Fluid sampling is recommended for further characterization. Ancillary findings as described above. Electronically Signed: Linnette Pacheco  12/28/2022 10:06 AM EST  Workstation ID: [id number]    XR Abdomen KUB    Result Date: 12/30/2022  NG tube projects over the expected position of the body of the stomach  Electronically Signed By-Jaxon Aly On:12/30/2022 12:00 PM This report was finalized on [id number] by  Jaxon Aly, .      Assessment/Plan:     Hyponatremia    ACC/AHA stage C chronic systolic heart failure (HCC)    Cardiac amyloidosis (HCC)    Primary hypertension    Tobacco abuse    Cervicalgia, spine surgery 2017    Primary osteoarthritis involving multiple joints    Bacteremia due to methicillin susceptible Staphylococcus aureus (MSSA)    Staphylococcal arthritis of shoulder (HCC)    Acute kidney  injury superimposed on chronic kidney disease (HCC)   hyponatremia improving   Acute kidney injury on chronic kidney disease improving  Atrial fibrillation with rapid ventricular response  Metabolic acidosis  Urinary retention   Sepsis   Hypocalcemia   Hyperkalemia     Tolerating diuresis  Oxygenation better  Status post IV contrast study yesterday   Follow labs

## 2023-01-05 LAB
ALBUMIN SERPL-MCNC: 2.3 G/DL (ref 3.5–5.2)
ALBUMIN/GLOB SERPL: 0.8 G/DL
ALP SERPL-CCNC: 155 U/L (ref 39–117)
ALT SERPL W P-5'-P-CCNC: 61 U/L (ref 1–41)
ANION GAP SERPL CALCULATED.3IONS-SCNC: 7 MMOL/L (ref 5–15)
AST SERPL-CCNC: 33 U/L (ref 1–40)
BACTERIA SPEC AEROBE CULT: NORMAL
BASOPHILS # BLD AUTO: 0 10*3/MM3 (ref 0–0.2)
BASOPHILS NFR BLD AUTO: 0.3 % (ref 0–1.5)
BILIRUB SERPL-MCNC: 3.1 MG/DL (ref 0–1.2)
BUN SERPL-MCNC: 33 MG/DL (ref 8–23)
BUN/CREAT SERPL: 60 (ref 7–25)
CA-I SERPL ISE-MCNC: 1.09 MMOL/L (ref 1.2–1.3)
CALCIUM SPEC-SCNC: 7.7 MG/DL (ref 8.6–10.5)
CHLORIDE SERPL-SCNC: 104 MMOL/L (ref 98–107)
CO2 SERPL-SCNC: 33 MMOL/L (ref 22–29)
CREAT SERPL-MCNC: 0.55 MG/DL (ref 0.76–1.27)
DEPRECATED RDW RBC AUTO: 54.7 FL (ref 37–54)
EGFRCR SERPLBLD CKD-EPI 2021: 108.6 ML/MIN/1.73
EOSINOPHIL # BLD AUTO: 0 10*3/MM3 (ref 0–0.4)
EOSINOPHIL NFR BLD AUTO: 0.4 % (ref 0.3–6.2)
ERYTHROCYTE [DISTWIDTH] IN BLOOD BY AUTOMATED COUNT: 16.2 % (ref 12.3–15.4)
GLOBULIN UR ELPH-MCNC: 2.9 GM/DL
GLUCOSE BLDC GLUCOMTR-MCNC: 104 MG/DL (ref 70–105)
GLUCOSE BLDC GLUCOMTR-MCNC: 107 MG/DL (ref 70–105)
GLUCOSE BLDC GLUCOMTR-MCNC: 95 MG/DL (ref 70–105)
GLUCOSE SERPL-MCNC: 117 MG/DL (ref 65–99)
HCT VFR BLD AUTO: 30.6 % (ref 37.5–51)
HGB BLD-MCNC: 9.9 G/DL (ref 13–17.7)
LYMPHOCYTES # BLD AUTO: 0.4 10*3/MM3 (ref 0.7–3.1)
LYMPHOCYTES NFR BLD AUTO: 6.6 % (ref 19.6–45.3)
MAGNESIUM SERPL-MCNC: 1.7 MG/DL (ref 1.6–2.4)
MCH RBC QN AUTO: 31.2 PG (ref 26.6–33)
MCHC RBC AUTO-ENTMCNC: 32.5 G/DL (ref 31.5–35.7)
MCV RBC AUTO: 96.2 FL (ref 79–97)
MONOCYTES # BLD AUTO: 0.8 10*3/MM3 (ref 0.1–0.9)
MONOCYTES NFR BLD AUTO: 13.1 % (ref 5–12)
NEUTROPHILS NFR BLD AUTO: 4.7 10*3/MM3 (ref 1.7–7)
NEUTROPHILS NFR BLD AUTO: 79.6 % (ref 42.7–76)
NRBC BLD AUTO-RTO: 0.1 /100 WBC (ref 0–0.2)
PHOSPHATE SERPL-MCNC: 3.2 MG/DL (ref 2.5–4.5)
PLATELET # BLD AUTO: 121 10*3/MM3 (ref 140–450)
PMV BLD AUTO: 9.9 FL (ref 6–12)
POTASSIUM SERPL-SCNC: 3.9 MMOL/L (ref 3.5–5.2)
PROT SERPL-MCNC: 5.2 G/DL (ref 6–8.5)
RBC # BLD AUTO: 3.18 10*6/MM3 (ref 4.14–5.8)
SODIUM SERPL-SCNC: 144 MMOL/L (ref 136–145)
WBC NRBC COR # BLD: 5.9 10*3/MM3 (ref 3.4–10.8)

## 2023-01-05 PROCEDURE — 82962 GLUCOSE BLOOD TEST: CPT

## 2023-01-05 PROCEDURE — 85025 COMPLETE CBC W/AUTO DIFF WBC: CPT | Performed by: ORTHOPAEDIC SURGERY

## 2023-01-05 PROCEDURE — 92526 ORAL FUNCTION THERAPY: CPT

## 2023-01-05 PROCEDURE — 97163 PT EVAL HIGH COMPLEX 45 MIN: CPT

## 2023-01-05 PROCEDURE — 83735 ASSAY OF MAGNESIUM: CPT | Performed by: ORTHOPAEDIC SURGERY

## 2023-01-05 PROCEDURE — 97165 OT EVAL LOW COMPLEX 30 MIN: CPT

## 2023-01-05 PROCEDURE — 82330 ASSAY OF CALCIUM: CPT | Performed by: ORTHOPAEDIC SURGERY

## 2023-01-05 PROCEDURE — 80053 COMPREHEN METABOLIC PANEL: CPT | Performed by: ORTHOPAEDIC SURGERY

## 2023-01-05 PROCEDURE — 84100 ASSAY OF PHOSPHORUS: CPT | Performed by: ORTHOPAEDIC SURGERY

## 2023-01-05 PROCEDURE — 97535 SELF CARE MNGMENT TRAINING: CPT

## 2023-01-05 PROCEDURE — 97530 THERAPEUTIC ACTIVITIES: CPT

## 2023-01-05 PROCEDURE — 25010000002 ENOXAPARIN PER 10 MG: Performed by: NURSE PRACTITIONER

## 2023-01-05 PROCEDURE — 25010000002 CEFTRIAXONE PER 250 MG: Performed by: NURSE PRACTITIONER

## 2023-01-05 PROCEDURE — 25010000002 NA FERRIC GLUC CPLX PER 12.5 MG: Performed by: NURSE PRACTITIONER

## 2023-01-05 PROCEDURE — 25010000002 FUROSEMIDE PER 20 MG: Performed by: INTERNAL MEDICINE

## 2023-01-05 RX ORDER — DIPHENHYDRAMINE HYDROCHLORIDE AND LIDOCAINE HYDROCHLORIDE AND ALUMINUM HYDROXIDE AND MAGNESIUM HYDRO
5 KIT EVERY 6 HOURS SCHEDULED
Status: DISCONTINUED | OUTPATIENT
Start: 2023-01-05 | End: 2023-01-11 | Stop reason: HOSPADM

## 2023-01-05 RX ADMIN — Medication: at 15:31

## 2023-01-05 RX ADMIN — SODIUM CHLORIDE 250 MG: 9 INJECTION, SOLUTION INTRAVENOUS at 14:22

## 2023-01-05 RX ADMIN — CEFTRIAXONE SODIUM 2 G: 2 INJECTION, POWDER, FOR SOLUTION INTRAMUSCULAR; INTRAVENOUS at 01:08

## 2023-01-05 RX ADMIN — Medication 10 ML: at 20:06

## 2023-01-05 RX ADMIN — DIPHENHYDRAMINE HYDROCHLORIDE AND LIDOCAINE HYDROCHLORIDE AND ALUMINUM HYDROXIDE AND MAGNESIUM HYDRO 5 ML: KIT at 12:49

## 2023-01-05 RX ADMIN — DIPHENHYDRAMINE HYDROCHLORIDE AND LIDOCAINE HYDROCHLORIDE AND ALUMINUM HYDROXIDE AND MAGNESIUM HYDRO 5 ML: KIT at 09:09

## 2023-01-05 RX ADMIN — HYDROCODONE BITARTRATE AND ACETAMINOPHEN 1 TABLET: 10; 325 TABLET ORAL at 21:42

## 2023-01-05 RX ADMIN — Medication 10 ML: at 07:41

## 2023-01-05 RX ADMIN — DIPHENHYDRAMINE HYDROCHLORIDE AND LIDOCAINE HYDROCHLORIDE AND ALUMINUM HYDROXIDE AND MAGNESIUM HYDRO 5 ML: KIT at 17:04

## 2023-01-05 RX ADMIN — HYDROCODONE BITARTRATE AND ACETAMINOPHEN 1 TABLET: 10; 325 TABLET ORAL at 01:08

## 2023-01-05 RX ADMIN — Medication: at 21:42

## 2023-01-05 RX ADMIN — MIDODRINE HYDROCHLORIDE 10 MG: 5 TABLET ORAL at 09:09

## 2023-01-05 RX ADMIN — MIDODRINE HYDROCHLORIDE 10 MG: 5 TABLET ORAL at 01:55

## 2023-01-05 RX ADMIN — FUROSEMIDE 40 MG: 10 INJECTION, SOLUTION INTRAMUSCULAR; INTRAVENOUS at 12:49

## 2023-01-05 RX ADMIN — Medication 300 MG: at 07:41

## 2023-01-05 RX ADMIN — MIDODRINE HYDROCHLORIDE 10 MG: 5 TABLET ORAL at 17:04

## 2023-01-05 RX ADMIN — LANSOPRAZOLE 30 MG: 30 TABLET, ORALLY DISINTEGRATING ORAL at 05:48

## 2023-01-05 RX ADMIN — ENOXAPARIN SODIUM 140 MG: 150 INJECTION SUBCUTANEOUS at 12:49

## 2023-01-05 RX ADMIN — Medication: at 07:41

## 2023-01-05 NOTE — THERAPY EVALUATION
Patient Name: Jaime Bar  : 1955    MRN: [id number]                              Today's Date: 2023       Admit Date: 2022    Visit Dx:     ICD-10-CM ICD-9-CM   1. Pyogenic arthritis of right shoulder region, due to unspecified organism (HCC)  M00.9 711.01     Patient Active Problem List   Diagnosis   • Hyponatremia   • ACC/AHA stage C chronic systolic heart failure (HCC)   • Cardiac amyloidosis (HCC)   • History of prostate cancer   • Hx of neck surgery   • Neck pain   • Primary hypertension   • Tobacco abuse   • Cervicalgia, spine surgery 2017   • Primary osteoarthritis involving multiple joints   • Bacteremia due to methicillin susceptible Staphylococcus aureus (MSSA)   • Staphylococcal arthritis of shoulder (HCC)   • Acute kidney injury superimposed on chronic kidney disease (HCC)     History reviewed. No pertinent past medical history.  Past Surgical History:   Procedure Laterality Date   • SHOULDER ARTHROSCOPY Right 2022    Procedure: SHOULDER ARTHROSCOPY INCISION AND DRAINAGE.;  Surgeon: Nikunj Velez MD;  Location: [facility] OR;  Service: Orthopedics;  Laterality: Right;   • SHOULDER ARTHROSCOPY Left 2022    Procedure: SHOULDER ARTHROSCOPY INCISION AND DRAINAGE;  Surgeon: Nikunj Velez MD;  Location: [facility] OR;  Service: Orthopedics;  Laterality: Left;      General Information     Row Name 23 1703          OT Time and Intention    Document Type evaluation  -MP     Mode of Treatment occupational therapy  -MP     Row Name 23 1703          General Information    Patient Profile Reviewed yes  -MP     Prior Level of Function independent:;ADL's  -MP     Existing Precautions/Restrictions fall;oxygen therapy device and L/min  -MP     Row Name 23 1703          Living Environment    People in Home spouse  -MP     Row Name 23 1703          Cognition    Orientation Status (Cognition) disoriented to;time;situation;verbal cues/prompts  needed for orientation  -     Row Name 01/05/23 1703          Safety Issues, Functional Mobility    Impairments Affecting Function (Mobility) balance;cognition;endurance/activity tolerance;grasp;pain;postural/trunk control;range of motion (ROM);strength  -           User Key  (r) = Recorded By, (t) = Taken By, (c) = Cosigned By    Initials Name Provider Type    Peter Luis OT Occupational Therapist                 Mobility/ADL's     Row Name 01/05/23 1703          Bed Mobility    Bed Mobility bed mobility (all) activities  -     All Activities, [place] (Bed Mobility) maximum assist (25% patient effort);2 person assist  -     Assistive Device (Bed Mobility) head of bed elevated;draw sheet  -     Row Name 01/05/23 1703          Sit-Stand Transfer    Sit-Stand [place] (Transfers) not tested  -     Row Name 01/05/23 1703          Activities of Daily Living    BADL Assessment/Intervention lower body dressing;upper body dressing  -     Row Name 01/05/23 1703          Lower Body Dressing Assessment/Training    [place] Level (Lower Body Dressing) lower body dressing skills;dependent (less than 25% patient effort)  -     Row Name 01/05/23 1703          Upper Body Dressing Assessment/Training    [place] Level (Upper Body Dressing) upper body dressing skills;maximum assist (25% patient effort)  -           User Key  (r) = Recorded By, (t) = Taken By, (c) = Cosigned By    Initials Name Provider Type    Peter Luis OT Occupational Therapist               Obj/Interventions     Row Name 01/05/23 1705          Range of Motion Comprehensive    Comment, General Range of Motion LUE shoulder AROM impacted 50-75%, RUE AROM severely limited this date secondary to swelling w/ increased shoulder pain, PROM shoulder to 90*.  -     Row Name 01/05/23 1705          Strength Comprehensive (MMT)    Comment, General Manual Muscle Testing (MMT) Assessment RUE 2/5, LUE 3/5  -     Row  Name 01/05/23 1705          Balance    Dynamic Sitting Balance moderate assist;2-person assist  -MP     Balance Interventions sitting;supported;static  -MP           User Key  (r) = Recorded By, (t) = Taken By, (c) = Cosigned By    Initials Name Provider Type    Peter Luis OT Occupational Therapist               Goals/Plan     Row Name 01/05/23 1712          Bed Mobility Goal 1 (OT)    Activity/Assistive Device (Bed Mobility Goal 1, OT) bed mobility activities, all  -MP     [place] Level/Cues Needed (Bed Mobility Goal 1, OT) moderate assist (50-74% patient effort)  -MP     Time Frame (Bed Mobility Goal 1, OT) long term goal (LTG);2 weeks  -MP     Row Name 01/05/23 1712          Transfer Goal 1 (OT)    Activity/Assistive Device (Transfer Goal 1, OT) sit-to-stand/stand-to-sit;toilet  -MP     [place] Level/Cues Needed (Transfer Goal 1, OT) moderate assist (50-74% patient effort)  -MP     Time Frame (Transfer Goal 1, OT) long term goal (LTG);2 weeks  -MP     Row Name 01/05/23 1712          Dressing Goal 1 (OT)    Activity/Device (Dressing Goal 1, OT) lower body dressing  -MP     [place]/Cues Needed (Dressing Goal 1, OT) moderate assist (50-74% patient effort)  -MP     Time Frame (Dressing Goal 1, OT) long term goal (LTG);2 weeks  -MP           User Key  (r) = Recorded By, (t) = Taken By, (c) = Cosigned By    Initials Name Provider Type    Peter Luis OT Occupational Therapist               Clinical Impression     Row Name 01/05/23 1707          Pain Scale: FACES Pre/Post-Treatment    Pain: FACES Scale, Pretreatment 2-->hurts little bit  -MP     Posttreatment Pain Rating 6-->hurts even more  -MP     Row Name 01/05/23 1707          Plan of Care Review    Plan of Care Reviewed With patient;spouse;daughter  -MP     Progress no change  -MP     Outcome Evaluation Pt. is 66 y/o male presents with past medical history of cervicalgia, primary osteoarthritis involving multiple joints with  surgery on bilateral shoulders and right knee replacement, hyponatremia, prostate cancer, and cardiac amyloidosis presented to [facility] on 12/25/2022 with complaints of right shoulder pain, weakness, and altered mental status. He was found to be hypotensive and hyponatremic with elevated lactic acid. He was transferred to [facility] ICU for further treatment of septic shock requiring vasopressors requiring intubation. He was extubated on 12/29/22. Since admission he was found to be bacteremic and positive for staph aureus, source being bilateral shoulder septic arthritis. On 12/28/22, pt underwent bilateral shoulder arthroscopy with extensive debridement per Dr. Velez. Pt had LUE DVT and RUE SVT. Nephrology is managing his hyponatremia and STEVEN on CKD. Pt. poor historian this date, family present to provide PLOF, reports pt as ADL independent prior to admit and utilizing cane for balance support. Pt. w/ severe limitations to BUE AROM and strength this date secondary t oswelling and increased pain, RUE wrose than LUE. Educated patient and family on positioning to decreased edema. Pt. requires max A x 2 for all bed mobility this date, sitting on EOB approx 5-7 minutes prior to return to supine secondary to fatigue, requires max A for static sitting support. Pt. dependent for all LB ADLs and toileting this date, max A provided for donning new gown. Pt. far from baseline level of funcitonal independence and will require SNF at d/c to address aforementioned deficits.  -MP     Row Name 01/05/23 6309          Therapy Assessment/Plan (OT)    Rehab Potential (OT) good, to achieve stated therapy goals  -MP     Criteria for Skilled Therapeutic Interventions Met (OT) yes;meets criteria;skilled treatment is necessary  -MP     Therapy Frequency (OT) 5 times/wk  -MP     Row Name 01/05/23 1819          Therapy Plan Review/Discharge Plan (OT)    Anticipated Discharge Disposition (OT) skilled nursing facility   -MP     Row Name 01/05/23 1707          Vital Signs    Pre Patient Position Supine  -MP     Intra Patient Position Sitting  -MP     Post Patient Position Supine  -MP     Row Name 01/05/23 1707          Positioning and Restraints    Pre-Treatment Position in bed  -MP     Post Treatment Position bed  -MP     In Bed supine;encouraged to call for assist;call light within reach;exit alarm on  -MP           User Key  (r) = Recorded By, (t) = Taken By, (c) = Cosigned By    Initials Name Provider Type    Peter Luis OT Occupational Therapist               Outcome Measures     Row Name 01/05/23 1551          How much help from another person do you currently need...    Turning from your back to your side while in flat bed without using bedrails? 1  -BR     Moving from lying on back to sitting on the side of a flat bed without bedrails? 1  -BR     Moving to and from a bed to a chair (including a wheelchair)? 1  -BR     Standing up from a chair using your arms (e.g., wheelchair, bedside chair)? 1  -BR     Climbing 3-5 steps with a railing? 1  -BR     To walk in hospital room? 1  -BR     AM-PAC 6 Clicks Score (PT) 6  -BR     Highest level of mobility 2 --> Bed activities/dependent transfer  -BR     Row Name 01/05/23 1551          Functional Assessment    Outcome Measure Options AM-PAC 6 Clicks Basic Mobility (PT)  -BR           User Key  (r) = Recorded By, (t) = Taken By, (c) = Cosigned By    Initials Name Provider Type    Fatou Linares PT Physical Therapist                Occupational Therapy Education     Title: PT OT SLP Therapies (In Progress)     Topic: Occupational Therapy (In Progress)     Point: ADL training (Not Started)     Description:   Instruct learner(s) on proper safety adaptation and remediation techniques during self care or transfers.   Instruct in proper use of assistive devices.              Learner Progress:  Not documented in this visit.          Point: Home exercise program (Not  Started)     Description:   Instruct learner(s) on appropriate technique for monitoring, assisting and/or progressing therapeutic exercises/activities.              Learner Progress:  Not documented in this visit.          Point: Precautions (Not Started)     Description:   Instruct learner(s) on prescribed precautions during self-care and functional transfers.              Learner Progress:  Not documented in this visit.          Point: Body mechanics (Done)     Description:   Instruct learner(s) on proper positioning and spine alignment during self-care, functional mobility activities and/or exercises.              Learning Progress Summary           Patient Acceptance, E,TB, VU by TAMIA at 1/5/2023 1712                               User Key     Initials Effective Dates Name Provider Type Discipline    TAMIA 06/16/21 -  Peter Fatima OT Occupational Therapist OT              OT Recommendation and Plan  Therapy Frequency (OT): 5 times/wk  Plan of Care Review  Plan of Care Reviewed With: patient, spouse, daughter  Progress: no change  Outcome Evaluation: Pt. is 66 y/o male presents with past medical history of cervicalgia, primary osteoarthritis involving multiple joints with surgery on bilateral shoulders and right knee replacement, hyponatremia, prostate cancer, and cardiac amyloidosis presented to [facility] on 12/25/2022 with complaints of right shoulder pain, weakness, and altered mental status. He was found to be hypotensive and hyponatremic with elevated lactic acid. He was transferred to [facility] ICU for further treatment of septic shock requiring vasopressors requiring intubation. He was extubated on 12/29/22. Since admission he was found to be bacteremic and positive for staph aureus, source being bilateral shoulder septic arthritis. On 12/28/22, pt underwent bilateral shoulder arthroscopy with extensive debridement per Dr. Velez. Pt had LUE DVT and RUE SVT. Nephrology is managing  his hyponatremia and STEVEN on CKD. Pt. poor historian this date, family present to provide PLOF, reports pt as ADL independent prior to admit and utilizing cane for balance support. Pt. w/ severe limitations to BUE AROM and strength this date secondary t oswelling and increased pain, RUE wrose than LUE. Educated patient and family on positioning to decreased edema. Pt. requires max A x 2 for all bed mobility this date, sitting on EOB approx 5-7 minutes prior to return to supine secondary to fatigue, requires max A for static sitting support. Pt. dependent for all LB ADLs and toileting this date, max A provided for donning new gown. Pt. far from baseline level of funcitonal independence and will require SNF at d/c to address aforementioned deficits.     Time Calculation:    Time Calculation- OT     Row Name 01/05/23 1713             Time Calculation- OT    OT Start Time 1405  -MP      OT Stop Time 1449  -      OT Time Calculation (min) 44 min  -      Total Timed Code Minutes- OT 10 minute(s)  -      OT Received On 01/05/23  -      OT - Next Appointment 01/06/23  -      OT Goal Re-Cert Due Date 01/19/23  -            User Key  (r) = Recorded By, (t) = Taken By, (c) = Cosigned By    Initials Name Provider Type    Peter Luis OT Occupational Therapist              Therapy Charges for Today     Code Description Service Date Service Provider Modifiers Qty    [id number]  OT EVAL LOW COMPLEXITY 4 1/5/2023 Peter Fatima OT GO 1    [id number]  OT SELF CARE/MGMT/TRAIN EA 15 MIN 1/5/2023 Peter Fatima OT GO 1               Peter Fatima OT  1/5/2023

## 2023-01-05 NOTE — PROGRESS NOTES
Hematology/Oncology Inpatient Progress Note    PATIENT NAME: Jaime Bar  : 1955  MRN: [id number]    CHIEF COMPLAINT: Sepsis; Thrombocytopenia; provoked catheter associated LUE DVT, LUE SVT, and RUE SVT    HISTORY OF PRESENT ILLNESS:    67 y.o. male admitted to [facility] on transfer from [facility] on 2022.  The patient was intubated and unresponsive at time of consultation with histories obtained from review of records and discussion with patient's niece and nephew.  He reported a history of bilateral shoulder pain for few days prior to admission.  He had presented to [place] ED on 2022 where he was hypotensive and hyponatremic and transferred to [facility].  CXR on 2022 showed coarsened airspace and bronchovascular thickening with small airway disease such as bronchitis or asthma, edema, atypical/viral infection, and aspiration in differential.  A RUE venous Doppler showed acute RUE superficial thrombophlebitis in the cephalic vein and no evidence of DVT for which he was started on subcu heparin.  On 2022 CBC revealed WBC 7.2, hemoglobin 12.4, MCV 98.4, and platelets 99,000.  Creatinine was elevated to 2.38, BUN 55, sodium was low at 118, potassium was high at 5.4 and LFTs revealed T bili elevated to 4.8 (0-1.2) with transaminases normal.  Blood cultures grew Staph aureus.  Urinalysis was concerning for UTI.  Urine sodium was less than 20, a.m. cortisol 26.11 (6.02-18.4), and urine osmolality was 410 ().   X-rays and MRIs of the bilateral shoulders were performed 2022 revealing joint effusion and advanced degenerative changes/destructive changes of glenohumeral joint.  There was suspected complete tear of the supraspinatus and infraspinatus tendons and a completely macerated appearance of the labrum on the right shoulder.  On 2022 RUE venous Doppler was repeated and felt stable.  He then underwent bilateral shoulder arthroscopy incision and drainage  with culture growing heavy growth of Staph aureus.  He had been intubated prior to the surgery and remained sedated on ventilator with pressors postop.  At time of consultation 12/29/2022 LUE venous Doppler showed acute catheter associated DVT in the left axillary vein, SVT in the left basilic vein of the upper arm.  BLE venous Doppler was negative for DVT/SVT but did show deep venous valvular incompetence in the right popliteal vein.  CBC revealed WBC 14.2, hemoglobin 11.4, MCV 96.5, and platelets 46,000.  T bili was elevated to 5.3 and transaminases were now elevated with  and .  PT was 15.6 (9.6-11.7), PTT was 38.7 (24-31), and fibrinogen was 493 (210-450).  D-dimer was 11.83 (0-0.67).  Transesophageal echocardiogram showed LVEF 46-50%, mild aortic valve stenosis, and no evidence of vegetation or bacterial endocarditis.  Heparin was changed to argatroban.  ID was managing antibiotics with patient on ceftriaxone with plan for treatment x6 weeks and recommending replacing PICC line 2 days after starting anticoagulation.  His niece and nephew reported the patient to have lost some weight but they were unsure the amount.  They reported PMH significant for prostate cancer treated approximately 2010 with radiation alone and no evidence of recurrence to their knowledge.  He also was under the care of Dr. Damico at  for cardiac amyloid.  Chart review showed PSA was 0.1 (0-4) in August 2022.  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  IgG was 768 (603-1613), IgA was 238 (), IgM was 64 ().  Kappa/lambda ratio was 1.36 (0.2-1.65).  A note from Roosevelt Damico MD (cardiologist) at [facility] dated 7/13/2022 reported patient was followed for restrictive cardiomyopathy secondary to cardiac amyloidosis.  LVEF in February 2022 was 45-50%.  Cardiac MRI 4/6/2022 revealed a myocardial mass 276 g, global hypokinesis of left ventricle with ejection fraction 43%, no  myocardial iron overload, the thickness and appearance of intra-atrial septum was also consistent with cardiac amyloidosis.  The note stated that the patient was followed by Dr. Banks at  [facility] where he was on Vyndamax therapy for cardiac amyloid and had been on that treatment for about 2 weeks.  Additionally in January 2022 T bili was noted to be elevated to 2.6 (0.2-1.0) and he was anemic with hemoglobin 11.8 and MCV 86.5 platelets were normal at 261,000.     12/29/22  Hematology/Oncology was consulted by RITU Jimenez for thrombocytopenia.     Past Medical History: Prostate cancer approximately 2010 treated with radiation only.  Cardiac amyloid managed by Dr. Damico at .  CKD.  Surgical History: Several orthopedic surgeries in the past.  Bilateral shoulder arthroscopy with incision and drainage 12/28/2022.  Social History: He lives in [place], [place] with his spouse.  His spouse is known to have Alzheimer's disease.  He has smoked for many years.  He has no alcohol or recreational drug use however he was narcotic dependent secondary to chronic pain.  He is a retired .  Family History: No significant known family history.  Allergies: Tramadol causes him to feel weird and codeine causes nausea.     PCP: Provider, No Known    INTERVAL HISTORY:  • 12/30/2022- platelets 38,000, hemoglobin 11.1.  Folate 7.13 (4.78-24.2), vitamin B12 greater than 2000.  Iron 29 (), iron saturation 15% (20-50), TIBC 195 (298-536), and ferritin 3525 ().  Reticulocytes 1.05 (0.7-1.9), haptoglobin 148 (). PSA 0.234 (0-4).  T bili 5.3 with direct bili 4.2 (0-0.3).  Hepatitis panel nonreactive.  Extubated 12/29/2022 and off pressors.  • 12/31/2022- platelets 46,000, hemoglobin 11.3.  HIT antibody negative at 0.121 (0-0.4).  CMV IgM less than 30 (0-29.9) and EBV IgM less than 36 (0-35.9).  D-dimer mildly improved to 8.94 (0-0.67).  Transaminases rising with  and AST  520.  T bili 4.4.  Peripheral smear was negative for schistocytes again.  Abdominal ultrasound revealed cholelithiasis with adenomyomatosis involving the gallbladder wall.  The common bile duct was in the upper limits of normal.  The liver appeared normal.  • 1/1/2023- platelets 60,000, hemoglobin 11.4.  Argatroban was held with repeat PTT remaining high with subsequent continued hold of argatroban.  D-dimer 9.84.  PTT 61.1 (24-31).  CT head without contrast was negative for acute findings.  • 1/2/2023- hemoglobin 11.3 and platelets 78,000.  ALT improved to 200 and AST improved to 104.  T bili improved to 3.3.  Fibrinogen 445 (210-150).  • 1/3/2023- evaluated by neurology and felt to have multifactorial encephalopathy.  SPEP with no M spike.  Transaminases improved.  Hemoglobin 11.1, platelet count 104,000.  Kappa/lambda free light chain ratio 1.02 (0.26-1.25).  SHAR screen negative.  Right BERNARDINO normal with mild digital ischemia.  Left BERNARDINO normal with severe digital ischemia.  • 1/4/2023- hemoglobin 11.0 and platelets 116,000.  Copper 110 ().  ALT 93 (1-41), AST normalized, and T bili 3.5 (0-1.2).  RUE venous Doppler showed acute RUE superficial thrombophlebitis in the cephalic vein (forearm).  • 1/5/2023- hemoglobin 9.9, platelets 121,000.  ALT 61, T bili 3.1.  UIFE with no monoclonality detected.  SIFE with IgM monoclonal protein with lambda light chain specificity.  2 different results are present for immunoglobulins with different values.     History of present illness reviewed since last visit and changes noted on 01/05/23.    Subjective   He is complaining of dry mouth    ROS:   Review of Systems   Constitutional: Negative for activity change, chills, fatigue, fever and unexpected weight change.   HENT: Negative for congestion, dental problem, hearing loss, mouth sores, nosebleeds, sore throat and trouble swallowing.         Dry mouth   Eyes: Negative for photophobia and visual disturbance.   Respiratory:  "Negative for apnea, cough, chest tightness and shortness of breath.    Cardiovascular: Negative for chest pain, palpitations and leg swelling.   Gastrointestinal: Negative for abdominal distention, abdominal pain, blood in stool, constipation, diarrhea, nausea and vomiting.   Endocrine: Negative for cold intolerance and heat intolerance.   Genitourinary: Negative for decreased urine volume, difficulty urinating, frequency, hematuria and urgency.   Musculoskeletal: Negative for arthralgias and gait problem.   Skin: Negative for rash and wound.   Neurological: Negative for dizziness, tremors, seizures, weakness, light-headedness, numbness and headaches.   Hematological: Negative for adenopathy. Does not bruise/bleed easily.   Psychiatric/Behavioral: Negative for confusion and hallucinations. The patient is not nervous/anxious.    All other systems reviewed and are negative.       MEDICATIONS:    Scheduled Meds:  cefTRIAXone, 2 g, Intravenous, Q12H  enoxaparin, 1.5 mg/kg, Subcutaneous, Daily  ferrous sulfate, 300 mg, Nasogastric, Daily  First Mouthwash (Magic Mouthwash), 5 mL, Swish & Spit, Q6H  furosemide, 40 mg, Intravenous, Q12H  lansoprazole, 30 mg, Nasogastric, Q AM  midodrine, 10 mg, Nasogastric, Q8H  sodium chloride, 10 mL, Intravenous, Q12H  Zinc Oxide, , Apply externally, TID       Continuous Infusions:      PRN Meds:  •  acetaminophen **OR** acetaminophen  •  aluminum-magnesium hydroxide-simethicone  •  dextrose  •  dextrose  •  fentanyl  •  glucagon (human recombinant)  •  HYDROcodone-acetaminophen  •  ondansetron **OR** ondansetron  •  sodium chloride  •  sodium chloride  •  sodium chloride     ALLERGIES:    Allergies   Allergen Reactions   • Tramadol-Acetaminophen Anxiety     Worms in the brain feeling   • Codeine Other (See Comments)     nausea   • Tramadol Other (See Comments)     \"I just felt really crawly, it did weird things with my head\"       Objective    VITALS:   /80   Pulse 91   Temp 98.8 " "°F (37.1 °C) (Axillary)   Resp 20   Ht 167.6 cm (66\")   Wt 84.9 kg (187 lb 2.7 oz)   SpO2 97%   BMI 30.21 kg/m²     PHYSICAL EXAM:  Physical Exam  Vitals and nursing note reviewed.   Constitutional:       General: He is not in acute distress.     Appearance: Normal appearance. He is well-developed. He is ill-appearing. He is not diaphoretic.   HENT:      Head: Normocephalic and atraumatic.      Comments: Alopecia     Right Ear: External ear normal.      Left Ear: External ear normal.      Nose: Nose normal. No rhinorrhea.      Comments: NG tube.  O2 per NC.     Mouth/Throat:      Mouth: Mucous membranes are moist.      Pharynx: Oropharynx is clear. No oropharyngeal exudate or posterior oropharyngeal erythema.      Comments: Dental fillings.  Oral mucositis.  Eyes:      General: No scleral icterus.     Extraocular Movements: Extraocular movements intact.      Conjunctiva/sclera: Conjunctivae normal.      Pupils: Pupils are equal, round, and reactive to light.   Cardiovascular:      Rate and Rhythm: Regular rhythm. Tachycardia present.      Heart sounds: Normal heart sounds. No murmur heard.     Comments: Cardiac monitor leads  Pulmonary:      Effort: Pulmonary effort is normal. No respiratory distress.      Breath sounds: Normal breath sounds. No stridor. No wheezing or rales.   Abdominal:      General: Bowel sounds are normal. There is no distension.      Palpations: Abdomen is soft. There is no mass.      Tenderness: There is no abdominal tenderness. There is no guarding.      Hernia: No hernia is present.   Genitourinary:     Comments: Deferred  Musculoskeletal:         General: Swelling (1+ LUE and 2+ RUE edema) present. No tenderness or deformity. Normal range of motion.      Cervical back: Normal range of motion and neck supple.      Comments: Bilateral shoulder dressings with 2 drains on the left and one on the right.  BUE IVs. BLE SCDs.   Lymphadenopathy:      Cervical: No cervical adenopathy.      Upper " Body:      Right upper body: No supraclavicular adenopathy.      Left upper body: No supraclavicular adenopathy.   Skin:     General: Skin is warm and dry.      Coloration: Skin is not pale.      Findings: No bruising, erythema or rash.      Comments: Vertical scar right knee.     Neurological:      General: No focal deficit present.      Mental Status: He is alert.      Comments: More alert   Psychiatric:         Mood and Affect: Mood normal.         Behavior: Behavior normal.           RECENT LABS:  Lab Results (last 24 hours)     Procedure Component Value Units Date/Time    Comprehensive Metabolic Panel [[id number]]  (Abnormal) Collected: 01/05/23 0411    Specimen: Blood Updated: 01/05/23 0537     Glucose 117 mg/dL      BUN 33 mg/dL      Creatinine 0.55 mg/dL      Sodium 144 mmol/L      Potassium 3.9 mmol/L      Chloride 104 mmol/L      CO2 33.0 mmol/L      Calcium 7.7 mg/dL      Total Protein 5.2 g/dL      Albumin 2.3 g/dL      ALT (SGPT) 61 U/L      AST (SGOT) 33 U/L      Alkaline Phosphatase 155 U/L      Total Bilirubin 3.1 mg/dL      Globulin 2.9 gm/dL      A/G Ratio 0.8 g/dL      BUN/Creatinine Ratio 60.0     Anion Gap 7.0 mmol/L      eGFR 108.6 mL/min/1.73      Comment: National Kidney Foundation and American Society of Nephrology (ASN) Task Force recommended calculation based on the Chronic Kidney Disease Epidemiology Collaboration (CKD-EPI) equation refit without adjustment for race.       Narrative:      GFR Normal >60  Chronic Kidney Disease <60  Kidney Failure <15      Phosphorus [[id number]]  (Normal) Collected: 01/05/23 0411    Specimen: Blood Updated: 01/05/23 0536     Phosphorus 3.2 mg/dL     Magnesium [[id number]]  (Normal) Collected: 01/05/23 0411    Specimen: Blood Updated: 01/05/23 0536     Magnesium 1.7 mg/dL     Calcium, Ionized [[id number]]  (Abnormal) Collected: 01/05/23 0411    Specimen: Blood Updated: 01/05/23 0520     Ionized Calcium 1.09 mmol/L     CBC & Differential [[id number]]  (Abnormal)  Collected: 01/05/23 0411    Specimen: Blood Updated: 01/05/23 0512    Narrative:      The following orders were created for panel order CBC & Differential.  Procedure                               Abnormality         Status                     ---------                               -----------         ------                     CBC Auto Differential[[id number]]        Abnormal            Final result                 Please view results for these tests on the individual orders.    CBC Auto Differential [[id number]]  (Abnormal) Collected: 01/05/23 0411    Specimen: Blood Updated: 01/05/23 0512     WBC 5.90 10*3/mm3      RBC 3.18 10*6/mm3      Hemoglobin 9.9 g/dL      Hematocrit 30.6 %      MCV 96.2 fL      MCH 31.2 pg      MCHC 32.5 g/dL      RDW 16.2 %      RDW-SD 54.7 fl      MPV 9.9 fL      Platelets 121 10*3/mm3      Neutrophil % 79.6 %      Lymphocyte % 6.6 %      Monocyte % 13.1 %      Eosinophil % 0.4 %      Basophil % 0.3 %      Neutrophils, Absolute 4.70 10*3/mm3      Lymphocytes, Absolute 0.40 10*3/mm3      Monocytes, Absolute 0.80 10*3/mm3      Eosinophils, Absolute 0.00 10*3/mm3      Basophils, Absolute 0.00 10*3/mm3      nRBC 0.1 /100 WBC     POC Glucose Once [[id number]]  (Normal) Collected: 01/04/23 1702    Specimen: Blood Updated: 01/04/23 1703     Glucose 99 mg/dL      Comment: Serial Number: [id number]:  [id number]       IgG, IgA, IgM [[id number]]  (Abnormal) Collected: 01/04/23 0352    Specimen: Blood Updated: 01/04/23 1603     IgG 902 mg/dL      IgM 56 mg/dL      IgA 486 mg/dL     Immunofixation, Serum [[id number]]  (Abnormal) Collected: 12/29/22 2133    Specimen: Blood Updated: 01/04/23 1309     Immunofixation Result, Serum Comment     Comment: Immunofixation shows IgA monoclonal protein with lambda light chain  specificity.        IgG 496 mg/dL      IgA 166 mg/dL      IgM 35 mg/dL     Narrative:      Performed at:  [address] [place] [address]  [id number]  :  Leo Rod PhD, Phone:  [phone]    Blood Culture - Blood, Blood, PICC Line [[id number]]  (Normal) Collected: 12/31/22 1148    Specimen: Blood, PICC Line Updated: 01/04/23 1201     Blood Culture No growth at 4 days    Immunofixation, Urine - Urine, Clean Catch [[id number]] Collected: 12/30/22 0015    Specimen: Urine, Clean Catch Updated: 01/04/23 1112     LEONILA Interpretation:U Comment     Comment: No monoclonality detected.       Narrative:      Performed at:  [facility]  [id number]  : Leo Rod PhD, Phone:  [phone]    POC Glucose Once [[id number]]  (Normal) Collected: 01/04/23 1109    Specimen: Blood Updated: 01/04/23 1110     Glucose 96 mg/dL      Comment: Serial Number: [id number]:  [id number]             PENDING RESULTS:  Note from Dr. Sauceda at  and genetic testing from  ordered by Dr. Banks.    IMAGING REVIEWED:  CT Cervical Spine With Contrast    Result Date: 1/3/2023  1.Postoperative changes status post prior fusion. No definitive signs of hardware failure or loosening are noted. 2.No evidence for displaced fracture or malalignment throughout the cervical spine. 3.No evidence for acute soft tissue abnormality. There is no evidence for abnormal enhancement. No abnormal peripherally enhancing fluid collection is seen. 4.Changes of cervical spondylosis are noted throughout the cervical spine. Associated hypertrophic posterior facet changes and uncovertebral changes are also observed. Electronically Signed: Solo Ray  1/3/2023 10:25 PM EST  Workstation ID: [id number]    CT Thoracic Spine With Contrast    Result Date: 1/3/2023  1.No evidence for displaced fracture or malalignment throughout the thoracic spine. No evidence for erosive endplate changes. 2.No evidence for abnormal enhancement. There is no abnormal fluid collection within the paraspinal soft tissues. There is no evidence for epidural abscess. 3.Degenerative changes are  noted throughout the thoracic spine. 4.Interstitial changes are seen throughout the lungs. Bibasilar infiltrates are noted. Bilateral pleural effusions are observed. Electronically Signed: Solo Ray  1/3/2023 10:31 PM EST  Workstation ID: [id number]    CT Lumbar Spine With Contrast    Result Date: 1/3/2023  1.No evidence for displaced fracture or malalignment throughout the lumbar spine. No evidence for erosive endplate changes. 2.No evidence for abnormal enhancement. There is no abnormal fluid collection within the paraspinal soft tissues. There is no evidence for epidural abscess. 3.Advanced degenerative changes are noted throughout the lumbar spine. 4.Evidence for bilateral spondylolysis with associated spondylolisthesis at the L5 level. Prior postoperative changes are also noted. Electronically Signed: Solo Ray  1/3/2023 10:35 PM EST  Workstation ID: [id number]    XR Chest 1 View    Result Date: 1/3/2023  Impression: 1. Radiographic changes consistent with congestive heart failure pulmonary edema with interval increase in pulmonary edema and development of small bilateral pleural effusions. Electronically Signed: Jaime Momin  1/3/2023 9:14 AM EST  Workstation ID: [id number]      I have reviewed the patient's labs, imaging, reports, and other clinician documentation.    Assessment & Plan   ASSESSMENT:  1. Thrombocytopenia-platelets were normal in early 2022.  Platelets were low on admission and continued to drop.  HIT antibody negative.    On ceftriaxone which can cause thrombocytopenia but platelets have started to improve now while patient continued on ceftriaxone.  Coags mildly elevated with fibrinogen not low, not DIC but sepsis likely contributing to thrombocytopenia. No hemolysis and no schistocytes on peripheral smear ruling out TTP.  No vitamin B12 or folate deficiencies. SHAR negative. Coags remain elevated and D-dimer high.  Repeat fibrinogen remained elevated.    Platelets continue to  improve.  2. Acute on chronic anemia/IgA lambda monoclonal gammopathy/SWETHA- mild anemia with hemoglobin in the 11's in early 2022.  Gammopathy labs at that time showed IgA lambda monoclonal protein with normal immunoglobulins and free light chains.  Iron studies showed SWETHA and started oral iron.  No hemolysis or vitamin B12/folate deficiencies. Copper level normal.  SPEP with no M spike and free light chain ratio normal.  UIFE negative but SIFE with IgA lambda monoclonal protein.  2 different immunoglobulin values listed.  Hemoglobin was stable but now dropping and we will give Ferrlecit 250 mg IV daily x3 to correct iron deficit 747 mg.  Received notes from cardiology at  which mentioned they were going to involve Dr. Sauceda and will obtain any records available  3. Acute provoked catheter associated LUE DVT/SVT and RUE SVT- RUE SVT was diagnosed prior to LUE DVT and he had received several doses of subcu heparin starting on 12/26. LUE DVT provoked by PICC line which  has since been removed.  Argatroban was difficult to manage with elevated PTT.  Started low-dose Lovenox 1/1 and increased to full treatment dose 1/3 as platelets were rising.  Repeat RUE venous Doppler showed stable SVT.  4. Elevated LFT/cholelithiasis and adenomyomatosis of the gallbladder wall- possible shock liver.  T bili remains elevated with elevated direct bili and normal liver on US.  Hepatitis panel negative.  LFTs improving.  5. Bilateral shoulder septic arthritis/methicillin susceptible staph aureus bacteremia-on ceftriaxone per ID.  No vegetation on echocardiogram.  6. Acute hypoxic respiratory failure/smoking history-  transiently on ventilator postsurgery.  7. History of prostate cancer-PSA remains normal.   8. Cardiac amyloidosis- on chart review it has been treated at .  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  Records reviewed from  cardiology.  Genetic  testing was being done for hereditary ATTR amyloidosis and patient was referred to Dr. Nguyen to look for systemic amyloidosis.  9. STEVEN/hyponatremia-nephrology managing.  Creatinine now normal and sodium normalized.  10. A. fib with RVR- patient evaluated by EP and recommended start Eliquis 5 mg p.o. twice daily (not started as patient currently on Lovenox) with plan for cardioversion prior to discharge as long as he remains on anticoagulation.  11. Oral mucositis-adding Magic mouthwash.      PLAN:  1. Ferrlecit 250 mg IV daily x3.  2. Add Magic Mouthwash.  3. Continue Lovenox at 1.5 mg/kg daily.  4. Continue ferrous sulfate 325 mg daily (liquid given NGT).  5. Continue daily CBC.  6. Repeat immunoglobulins in near future.  7. Obtain records from Dr. Nguyen at .  8. Obtain genetic testing by Dr. Banks at  evaluating for hereditary ATTR amyloidosis    Note prepared by KURT Alicea.  Patient seen and examined by Vicente Mendoza MD.  Electronically signed by RITU Taylor, 01/05/23, 9:09 AM EST.    I have personally performed a face-to-face diagnostic evaluation on this patient. I have performed a complete history and physical examination, reviewed laboratory studies, and radiographic examinations.  I have completed the majority and substantive portion of the medical decision making.  I have formulated the assessment on this patient and the plan of action as noted above. I have discussed the case with Darrin Nixon NP, have edited/reviewed the note, and agree with the care plan.  The patient denies any problems on review of systems today.  The patient is complaining of a dry mouth.  On examination, he does have oral mucositis present.  He is more alert today.  Labs are significant for an IgA lambda monoclonal gammopathy on SIFE.  Have received partial records from  and await rest work-up results for amyloidosis.  Hemoglobin has dropped.  We will proceed with IV iron replacement and continue  anticoagulation.        I discussed the patient's findings and my recommendations with patient and nursing.    Part of this note may be an electronic transcription/translation of spoken language to printed text using the Dragon Dictation System.    Electronically signed by Vicente Mendoza MD, 01/05/23, 5:53 PM EST.

## 2023-01-05 NOTE — THERAPY RE-EVALUATION
Acute Care - Speech Language Pathology   Swallow Re-Evaluation  Michael     Patient Name: Jaime Bar  : 1955  MRN: [id number]  Today's Date: 2023               Admit Date: 2022    Visit Dx:     ICD-10-CM ICD-9-CM   1. Pyogenic arthritis of right shoulder region, due to unspecified organism (HCC)  M00.9 711.01     Patient Active Problem List   Diagnosis   • Hyponatremia   • ACC/AHA stage C chronic systolic heart failure (HCC)   • Cardiac amyloidosis (HCC)   • History of prostate cancer   • Hx of neck surgery   • Neck pain   • Primary hypertension   • Tobacco abuse   • Cervicalgia, spine surgery 2017   • Primary osteoarthritis involving multiple joints   • Bacteremia due to methicillin susceptible Staphylococcus aureus (MSSA)   • Staphylococcal arthritis of shoulder (HCC)   • Acute kidney injury superimposed on chronic kidney disease (HCC)     History reviewed. No pertinent past medical history.  Past Surgical History:   Procedure Laterality Date   • SHOULDER ARTHROSCOPY Right 2022    Procedure: SHOULDER ARTHROSCOPY INCISION AND DRAINAGE.;  Surgeon: Nikunj Velez MD;  Location: [facility];  Service: Orthopedics;  Laterality: Right;   • SHOULDER ARTHROSCOPY Left 2022    Procedure: SHOULDER ARTHROSCOPY INCISION AND DRAINAGE;  Surgeon: Nikunj Velez MD;  Location: [facility];  Service: Orthopedics;  Laterality: Left;       SLP Recommendation and Plan     SLP Diet Recommendation: NPO, temporary alternate methods of nutrition/hydration (23)     SLP Rec. for Method of Medication Administration: meds via alternate route (23)     Monitor for Signs of Aspiration: yes, notify SLP if any concerns (23)  Recommended Diagnostics: reassess via clinical swallow evaluation (23)           Therapy Frequency (Swallow): PRN (23)  Predicted Duration Therapy Intervention (Days): until discharge (23)            Treatment Assessment (SLP): continued, mild, oral dysphagia, suspected, pharyngeal dysphagia (01/05/23 1536)       SWALLOW EVALUATION (last 72 hours)     SLP Adult Swallow Evaluation     Row Name 01/05/23 1536       Rehab Evaluation    Document Type re-evaluation  -LF    Subjective Information no complaints  -LF    Patient Observations alert  -LF    Patient Effort good  -LF    Comment Pt seen for skilled ST targeting dysphagia this date. Upon entry, pt awake and alert in bed and eager to initiate a PO diet. Pt given trials of ice chips, and thin liquds by spoon and straw. All trials fed by SLP. Oral phase characterized by adequate labial seal w/ no anterior loss. Reduced manipulation of ice chips. Pharyngeal phase characterized by multiple weak swallows w/ each trial per palpation. Intermittent cough w/ wet vocal quality. Pt's O2 sats remained at 97 w/ PO. Pt does not appear safe/ready for PO at this time. Recommend pt remain NPO w/ alt means for nutrition. Pt may have a few ice chips or small sips of water by spoon w/ strict nursing supervision and following oral care. Education provided w/ pt and pt's family regarding NPO rationale and oral care. Both expressed understanding. ST will continue to follow for ongoing re-evaluation.  -LF    Symptoms Noted During/After Treatment none  -LF       General Information    Patient Profile Reviewed yes  -LF       SLP Treatment Clinical Impressions    Treatment Assessment (SLP) continued;mild;oral dysphagia;suspected;pharyngeal dysphagia  -LF       Recommendations    Therapy Frequency (Swallow) PRN  -LF    Predicted Duration Therapy Intervention (Days) until discharge  -LF    SLP Diet Recommendation NPO;temporary alternate methods of nutrition/hydration  -LF    Recommended Diagnostics reassess via clinical swallow evaluation  -LF    Oral Care Recommendations Oral Care BID/PRN;Before ice/water;Swab  -LF    SLP Rec. for Method of Medication Administration meds via alternate  route  -LF    Monitor for Signs of Aspiration yes;notify SLP if any concerns  -LF       Swallow Goals (SLP)    Swallow LTGs Swallow Long Term Goal (free text)  -LF    Swallow STGs diet tolerance goal selection (SLP)  -LF    Diet Tolerance Goal Selection (SLP) Swallow Short Term Goal 1  -LF       (LTG) Swallow    (LTG) Swallow Patient will tolerate safest and least restrictive diet without complications from aspiration.  -LF    Time Frame (Swallow Long Term Goal) by discharge  -LF    Progress/Outcomes (Swallow Long Term Goal) goal ongoing  -LF       (STG) Swallow 1    (STG) Swallow 1 The patient will participate in ongoing assessment of swallow, including reevaluation clinically and/or inclulding instrumental assessment of swallow if indicated to further assess swallow function in anticipation of initiation of PO diet.  -LF    Time Frame (Swallow Short Term Goal 1) 1 week  -LF    Progress/Outcomes (Swallow Short Term Goal 1) goal ongoing  -LF          User Key  (r) = Recorded By, (t) = Taken By, (c) = Cosigned By    Initials Name Effective Dates     Isa Puckett SLP 06/16/21 -      Padma Moss SLP 09/21/21 -                 EDUCATION  The patient has been educated in the following areas:   Dysphagia (Swallowing Impairment) Oral Care/Hydration NPO rationale.              Time Calculation:                MONSTER Chaves  1/5/2023

## 2023-01-05 NOTE — PROGRESS NOTES
[facility] Medicine Services Daily Progress Note    Patient Name: Jaime Bar  : 1955  MRN: [id number]  Primary Care Physician:  Provider, No Known  Date of admission: 2022      Subjective      Chief Complaint: AMS    Patient seen and examined this morning.  Continues to improve, asking for some water, speech to eval again today.  Follows commands.  Denies any significant pain.  Overall feels better.    Pertinent positives as noted in HPI/subjective.  All other systems were reviewed and are negative.        Objective      Vitals:   Temp:  [98 °F (36.7 °C)-100.4 °F (38 °C)] 98.8 °F (37.1 °C)  Heart Rate:  [] 96  Resp:  [19-27] 20  BP: (101-144)/(67-90) 132/83  Flow (L/min):  [4-5] 4    Physical Exam:    General: Awake, alert, ill-appearing, lying in bed  Cardiovascular: Regular rate and rhythm, no murmurs  Respiratory: Decreased breath sounds bilaterally, no wheezing or rales, unlabored breathing  Abdomen: Soft, nontender, positive bowel sounds, no guarding  Neurologic: A&O, nonfocal, follows commands  Skin: Warm, bilateral shoulder incisions bandaged, drains in place. Bilateral upper extremity pitting edema noted L>R         Result Review    Result Review:  I have personally reviewed the results from the time of this admission to 2023 11:05 EST and agree with these findings:  [x]  Laboratory  [x]  Microbiology  [x]  Radiology  [x]  EKG/Telemetry   []  Cardiology/Vascular   []  Pathology  []  Old records  []  Other:    Wounds (last 24 hours)     LDA Wound     Row Name 23 0757 23 0400 23 0000       Wound 22 Right shoulder Incision    Wound - Properties Group Placement Date: 22  -HB Placement Time:   -HB Side: Right  -HB Location: shoulder  -HB Primary Wound Type: Incision  -HB    Dressing Appearance dry;intact;no drainage  -KZ dry;intact;no drainage  -TS dry;intact;no drainage  -TS    Closure Sutures  -KZ Sutures  -TS Sutures  -TS     Base dressing in place, unable to visualize  -KZ dressing in place, unable to visualize  -TS dressing in place, unable to visualize  -TS    Drainage Amount none  -KZ small  -TS none  -TS    Dressing Care -- abdominal pad  -TS --    Retired Wound - Properties Group Placement Date: 12/28/22  -HB Placement Time: 1856  -HB Side: Right  -HB Location: shoulder  -HB Primary Wound Type: Incision  -HB    Retired Wound - Properties Group Date first assessed: 12/28/22  -HB Time first assessed: 1856  -HB Side: Right  -HB Location: shoulder  -HB Primary Wound Type: Incision  -HB       Wound 12/28/22 1940 Left shoulder Incision    Wound - Properties Group Placement Date: 12/28/22  -HB Placement Time: 1940  -HB Side: Left  -HB Location: shoulder  -HB Primary Wound Type: Incision  -HB    Dressing Appearance dry;intact;no drainage  -KZ dry;intact;no drainage  -TS dry;intact;no drainage  -TS    Closure Sutures  -KZ Sutures  -TS Sutures  -TS    Base dressing in place, unable to visualize  -KZ dressing in place, unable to visualize  -TS dressing in place, unable to visualize  -TS    Drainage Amount none  -KZ none  -TS none  -TS    Dressing Care -- abdominal pad  -TS --    Retired Wound - Properties Group Placement Date: 12/28/22  -HB Placement Time: 1940 -HB Side: Left  -HB Location: shoulder  -HB Primary Wound Type: Incision  -HB    Retired Wound - Properties Group Date first assessed: 12/28/22  -HB Time first assessed: 1940  -HB Side: Left  -HB Location: shoulder  -HB Primary Wound Type: Incision  -HB       Wound 12/30/22 2000 sacral spine Pressure Injury    Wound - Properties Group Placement Date: 12/30/22  -MB Placement Time: 2000  -MB Location: sacral spine  -MB Primary Wound Type: Pressure inj  -MB    Dressing Appearance -- open to air  -TS open to air  -TS    Base non-blanchable;maroon/purple  -KZ non-blanchable;maroon/purple  -TS non-blanchable;maroon/purple  -TS    Drainage Amount none  -KZ none  -TS none  -TS    Care,  Wound other (see comments)  zinc cream applied  -KZ -- --    Dressing Care -- silicone;hydrocolloid  -TS silicone;hydrocolloid  -TS    Retired Wound - Properties Group Placement Date: 12/30/22  -MB Placement Time: 2000 -MB Location: sacral spine  -MB Primary Wound Type: Pressure inj  -MB    Retired Wound - Properties Group Date first assessed: 12/30/22  -MB Time first assessed: 2000 -MB Location: sacral spine  -MB Primary Wound Type: Pressure inj  -MB    Row Name 01/04/23 2000 01/04/23 1600 01/04/23 1145       Wound 12/28/22 1856 Right shoulder Incision    Wound - Properties Group Placement Date: 12/28/22  -HB Placement Time: 1856  -HB Side: Right  -HB Location: shoulder  -HB Primary Wound Type: Incision  -HB    Dressing Appearance dry;intact;no drainage  -TS dry;intact;no drainage  -MN dry;intact;no drainage  -MN    Closure Sutures  -TS Adhesive bandage;Sutures  -MN Adhesive bandage;Sutures  -MN    Base dressing in place, unable to visualize  -TS dressing in place, unable to visualize  -MN dressing in place, unable to visualize  -MN    Drainage Amount none  -TS moderate  -MN --    Dressing Care abdominal pad;other (see comments)  foam tape  -TS -- --    Retired Wound - Properties Group Placement Date: 12/28/22  -HB Placement Time: 1856  -HB Side: Right  -HB Location: shoulder  -HB Primary Wound Type: Incision  -HB    Retired Wound - Properties Group Date first assessed: 12/28/22  -HB Time first assessed: 1856  -HB Side: Right  -HB Location: shoulder  -HB Primary Wound Type: Incision  -HB       Wound 12/28/22 1940 Left shoulder Incision    Wound - Properties Group Placement Date: 12/28/22  -HB Placement Time: 1940  -HB Side: Left  -HB Location: shoulder  -HB Primary Wound Type: Incision  -HB    Dressing Appearance dry;intact;no drainage  -TS dry;intact;no drainage  -MN dry;intact;no drainage  -MN    Closure Sutures  -TS Adhesive bandage;Sutures  -MN Adhesive bandage;Sutures  -MN    Base dressing in place, unable  to visualize  -TS dressing in place, unable to visualize  -MN dressing in place, unable to visualize  -MN    Drainage Amount none  -TS small  -MN --    Dressing Care abdominal pad;other (see comments)  foam tape  -TS -- --    Retired Wound - Properties Group Placement Date: 12/28/22  -HB Placement Time: 1940 -HB Side: Left  -HB Location: shoulder  -HB Primary Wound Type: Incision  -HB    Retired Wound - Properties Group Date first assessed: 12/28/22 -HB Time first assessed: 1940 -HB Side: Left  -HB Location: shoulder  -HB Primary Wound Type: Incision  -HB       Wound 12/30/22 2000 sacral spine Pressure Injury    Wound - Properties Group Placement Date: 12/30/22 -MB Placement Time: 2000 -MB Location: sacral spine  -MB Primary Wound Type: Pressure inj  -MB    Pressure Injury Stage -- -- DTPI  -MN    Dressing Appearance open to air  -TS open to air  -MN open to air  -MN    Base non-blanchable;maroon/purple  -TS non-blanchable;maroon/purple  -MN non-blanchable;maroon/purple  -MN    Drainage Amount none  -TS none  -MN none  -MN    Dressing Care hydrocolloid;silicone  -TS -- --    Retired Wound - Properties Group Placement Date: 12/30/22 -MB Placement Time: 2000 -MB Location: sacral spine  -MB Primary Wound Type: Pressure inj  -MB    Retired Wound - Properties Group Date first assessed: 12/30/22 -MB Time first assessed: 2000 -MB Location: sacral spine  -MB Primary Wound Type: Pressure inj  -MB          User Key  (r) = Recorded By, (t) = Taken By, (c) = Cosigned By    Initials Name Provider Type    Martha Monson, RN Registered Nurse    Carolyn Jay, RN Registered Nurse    Flaquita Newman, RN Registered Nurse    Lakesha Gomes, RN Registered Nurse    Kelle Goldman, RN Registered Nurse                  Assessment & Plan      Brief Patient Summary:  Jaime Bar is a 67 y.o. male with past medical history of cervicalgia, primary osteoarthritis involving multiple joints with surgery on  bilateral shoulders and right knee replacement but no recent procedure or surgery, hyponatremia, prostate cancer, and cardiac amyloidosis presented to [facility] on 12/25/2022 with complaints of right shoulder pain, weakness, and altered mental status.  He was found to be hypotensive and hyponatremic with elevated lactic acid.  Patient had reported decreased oral intake.  He was transferred to [facility] ICU for further treatment of septic shock requiring vasopressors and hyponatremia.      Since admission he was found to be bacteremic with 2 sets of blood cultures positive for staph aureus, source being bilateral shoulder septic arthritis. ID has been treating the patient with IV ceftriaxone 2G and he will need a total of 6 weeks of antibiotic therapy.  He has status post bilateral shoulder arthroscopy with extensive debridement by Dr. Velez 12/28/2022. He remained intubated after surgery until 12/29 and now extubated. He has been weaned off vasopressors. Nephrology is managing his hyponatremia and STEVEN on CKD. He was found to have LUE DVT provoked from PICC line currently on argatroban. Hematology has been consulted for his thrombocytopenia.       cefTRIAXone, 2 g, Intravenous, Q12H  enoxaparin, 1.5 mg/kg, Subcutaneous, Daily  ferric gluconate, 250 mg, Intravenous, Daily  ferrous sulfate, 300 mg, Nasogastric, Daily  First Mouthwash (Magic Mouthwash), 5 mL, Swish & Spit, Q6H  furosemide, 40 mg, Intravenous, Q12H  lansoprazole, 30 mg, Nasogastric, Q AM  midodrine, 10 mg, Nasogastric, Q8H  sodium chloride, 10 mL, Intravenous, Q12H  Zinc Oxide, , Apply externally, TID             Active Hospital Problems:  Active Hospital Problems    Diagnosis    • Bacteremia due to methicillin susceptible Staphylococcus aureus (MSSA)    • Staphylococcal arthritis of shoulder (HCC)    • Cervicalgia, spine surgery 2017    • Primary osteoarthritis involving multiple joints    • Hyponatremia    • Acute kidney  injury superimposed on chronic kidney disease (HCC)    • Cardiac amyloidosis (HCC)    • ACC/AHA stage C chronic systolic heart failure (HCC)    • Tobacco abuse    • Primary hypertension      Plan:    Bilateral shoulder septic arthritis  Septic shock secondary to MSSA bacteremia  -Synovial fluid culture was consistent with infection and cultures are growing 4+ Staph aureus  -s/p bilateral shoulder arthroscopy with extensive debridement by Dr. Velez 12/28/2022  - 2/2 blood cultures positive for MSSA secondary to bilateral shoulder septic arthritis   -ALMA DELIA on 12/28, Negative for endocarditis  -On IV Rocephin 2 g daily x6 weeks per ID  -Off IV vasopressors, remains on midodrine  -Discontinued art line, MAP greater than 65 mmHg at this time  -LUE PICC line removed and tip also sent for culture  -Fevers have resolved, CT of the spine does not show any acute abscess or infectious changes  -ID following     Acute metabolic encephalopathy, improved  Severe thrombocytopenia, improved  LUE DVT, provoked from PICC line  -Patient's mental status worsening  -Initially concern for TTP however no schistocytes seen on peripheral smear per hematology  -Platelets improving  -Off argatroban drip, PTT elevated  -On low-dose Lovenox twice daily for now per heme-onc pending DIC work-up  -Patient's left lower extremity is back to normal now, good pulses noted, BERNARDINO showed mild digital ischemia  -No peripheral cyanosis noted at this time    Acute respiratory failure with hypoxia, improved  Pulmonary edema, improved  -Postop respiratory failure, extubated on 12/39  -Chest x-ray showing congestion  -On diuresis per nephrology     STEVEN on CKD  Hyponatremia on admission and now hypernatremia  -Creatinine stable now but sodium trending up  -Free water flushes per nephrology  -Management per nephrology     PAF, s/p RVR  -Off amnio drip, rate controlled now  -Anticoagulation recommendations per cardiology and heme-onc  -Cardiology following, may need  cardioversion prior to discharge    Elevated LFTs  -Likely due to shock liver  -Hep panel negative  -Liver ultrasound showed normal hepatic parenchyma, cholelithiasis with adenomyomatosis involving gallbladder wall, CBD upper limit of normal  -Monitor    H/o cardiac amyloidosis  H/o prostate CA  Osteoarthritis     DVT/GI Ppx.    DNR/DNI.    CODE STATUS:    Medical Intervention Limits: NO intubation (DNI)  Level Of Support Discussed With: Next of Kin (If No Surrogate)  Code Status (Patient has no pulse and is not breathing): No CPR (Do Not Attempt to Resuscitate)  Medical Interventions (Patient has pulse or is breathing): Limited Support  Release to patient: Routine Release      Disposition: Will likely need placement.  Pending improvement.    Electronically signed by James Holguin DO, 01/05/23, 11:05 EST.  [facility]      Part of this note may be an electronic transcription/translation of spoken language to printed text using the Dragon Dictation System.

## 2023-01-05 NOTE — THERAPY EVALUATION
Patient Name: Jaime Bar  : 1955    MRN: [id number]                              Today's Date: 2023       Admit Date: 2022    Visit Dx:     ICD-10-CM ICD-9-CM   1. Pyogenic arthritis of right shoulder region, due to unspecified organism (HCC)  M00.9 711.01     Patient Active Problem List   Diagnosis   • Hyponatremia   • ACC/AHA stage C chronic systolic heart failure (HCC)   • Cardiac amyloidosis (HCC)   • History of prostate cancer   • Hx of neck surgery   • Neck pain   • Primary hypertension   • Tobacco abuse   • Cervicalgia, spine surgery 2017   • Primary osteoarthritis involving multiple joints   • Bacteremia due to methicillin susceptible Staphylococcus aureus (MSSA)   • Staphylococcal arthritis of shoulder (HCC)   • Acute kidney injury superimposed on chronic kidney disease (HCC)     History reviewed. No pertinent past medical history.  Past Surgical History:   Procedure Laterality Date   • SHOULDER ARTHROSCOPY Right 2022    Procedure: SHOULDER ARTHROSCOPY INCISION AND DRAINAGE.;  Surgeon: Nikunj Velez MD;  Location: [facility];  Service: Orthopedics;  Laterality: Right;   • SHOULDER ARTHROSCOPY Left 2022    Procedure: SHOULDER ARTHROSCOPY INCISION AND DRAINAGE;  Surgeon: Nikunj Velez MD;  Location: [facility] OR;  Service: Orthopedics;  Laterality: Left;      General Information     Row Name 23 1523          Physical Therapy Time and Intention    Document Type evaluation  -BR     Mode of Treatment co-treatment;physical therapy;occupational therapy  -BR     Row Name 23 1523          General Information    Patient Profile Reviewed yes  -BR     Prior Level of Function independent:;community mobility;driving  Pt used a cane most days and occasionally a rolling walker if needed.  -BR     Existing Precautions/Restrictions fall;oxygen therapy device and L/min  -BR     Barriers to Rehab medically complex  -BR     Row Name 23 152           Living Environment    People in Home spouse  -BR     Row Name 01/05/23 1523          Home Main Entrance    Number of Stairs, Main Entrance two  -BR     Stair Railings, Main Entrance none  -BR     Row Name 01/05/23 1523          Stairs Within Home, Primary    Number of Stairs, Within Home, Primary twelve  Pt's bedroom is on second floor.  -BR     Row Name 01/05/23 1523          Cognition    Orientation Status (Cognition) disoriented to;time;situation;verbal cues/prompts needed for orientation  -BR     Row Name 01/05/23 1523          Safety Issues, Functional Mobility    Impairments Affecting Function (Mobility) balance;cognition;endurance/activity tolerance;grasp;pain;postural/trunk control;range of motion (ROM);strength  -BR           User Key  (r) = Recorded By, (t) = Taken By, (c) = Cosigned By    Initials Name Provider Type    Fatou Linares PT Physical Therapist               Mobility     Row Name 01/05/23 1528          Bed Mobility    Bed Mobility bed mobility (all) activities  -BR     All Activities, [place] (Bed Mobility) maximum assist (25% patient effort);2 person assist  -BR     Assistive Device (Bed Mobility) head of bed elevated;draw sheet  -BR     Comment, (Bed Mobility) Pt sat at the edge of bed and dangled feet as tolerated for ~5 minutes with min assist of 2  -BR     Row Name 01/05/23 1528          Sit-Stand Transfer    Sit-Stand [place] (Transfers) not tested  -BR     Row Name 01/05/23 1528          Mobility    Extremity Weight-bearing Status other (see comments)  Pt has PADMINI drains to Bilateral shoulders: RUE edematous from upper arm to fingers  -BR           User Key  (r) = Recorded By, (t) = Taken By, (c) = Cosigned By    Initials Name Provider Type    Fatou Linares PT Physical Therapist               Obj/Interventions     Row Name 01/05/23 1532          Range of Motion Comprehensive    Comment, General Range of Motion BURICHARD AAROM deferred to OT; AA right knee ROM 0 to ~70  degrees; AA left knee ROM 0 to ~ 60 degrees; Bilateral ankles WFL; Bilateral hip ABD AARom 0 to ~ 25 degrees  -BR     Row Name 01/05/23 1532          Strength Comprehensive (MMT)    General Manual Muscle Testing (MMT) Assessment upper extremity strength deficits identified;lower extremity strength deficits identified  -BR     Comment, General Manual Muscle Testing (MMT) Assessment UE MMT deferred to OT with gross limitations present; BLE grossly 2+/5  -BR     Row Name 01/05/23 1532          Balance    Balance Assessment sitting static balance;sitting dynamic balance  -BR     Static Sitting Balance minimal assist;2-person assist  -BR     Dynamic Sitting Balance moderate assist;2-person assist  -BR     Position, Sitting Balance supported;sitting edge of bed  -BR     Row Name 01/05/23 1532          Sensory Assessment (Somatosensory)    Sensory Assessment (Somatosensory) unable/difficult to assess  -BR           User Key  (r) = Recorded By, (t) = Taken By, (c) = Cosigned By    Initials Name Provider Type    BR Fatou Hinojosa, PT Physical Therapist               Goals/Plan     Row Name 01/05/23 1550          Bed Mobility Goal 1 (PT)    Activity/Assistive Device (Bed Mobility Goal 1, PT) bed mobility activities, all  -BR     Divide Level/Cues Needed (Bed Mobility Goal 1, PT) contact guard required  -BR     Time Frame (Bed Mobility Goal 1, PT) long term goal (LTG);2 weeks  -BR     Row Name 01/05/23 1550          Transfer Goal 1 (PT)    Activity/Assistive Device (Transfer Goal 1, PT) transfers, all  -BR     Divide Level/Cues Needed (Transfer Goal 1, PT) contact guard required  -BR     Time Frame (Transfer Goal 1, PT) long term goal (LTG);2 weeks  -BR     Row Name 01/05/23 1550          Gait Training Goal 1 (PT)    Activity/Assistive Device (Gait Training Goal 1, PT) gait (walking locomotion);walker, rolling  -BR     Divide Level (Gait Training Goal 1, PT) minimum assist (75% or more patient effort)  -BR      Distance (Gait Training Goal 1, PT) 15  -BR     Time Frame (Gait Training Goal 1, PT) long term goal (LTG);2 weeks  -BR     Row Name 01/05/23 4364          Therapy Assessment/Plan (PT)    Planned Therapy Interventions (PT) balance training;bed mobility training;gait training;home exercise program;neuromuscular re-education;patient/family education;postural re-education;ROM (range of motion);strengthening;stair training;stretching;transfer training  -BR           User Key  (r) = Recorded By, (t) = Taken By, (c) = Cosigned By    Initials Name Provider Type    BR Fatou Hinojosa, PT Physical Therapist               Clinical Impression     Row Name 01/05/23 1536          Pain    Pain Location - Side/Orientation Bilateral  -BR     Pain Location - shoulder  -BR     Row Name 01/05/23 1536          Pain Scale: FACES Pre/Post-Treatment    Pain: FACES Scale, Pretreatment 2-->hurts little bit  -BR     Posttreatment Pain Rating 6-->hurts even more  -BR     Row Name 01/05/23 1537          Plan of Care Review    Plan of Care Reviewed With patient;spouse;daughter  -BR     Outcome Evaluation hank Bar presents as a 67 y.o. male with past medical history of cervicalgia, primary osteoarthritis involving multiple joints with surgery on bilateral shoulders and right knee replacement, hyponatremia, prostate cancer, and cardiac amyloidosis presented to [facility] on 12/25/2022 with complaints of right shoulder pain, weakness, and altered mental status. He was found to be hypotensive and hyponatremic with elevated lactic acid.    He was transferred to [facility] ICU for further treatment of septic shock requiring vasopressors requiring intubation. He was extubated on 12/29/22. Since admission he was found to be bacteremic and positive for staph aureus, source being bilateral shoulder septic arthritis. On 12/28/22, pt underwent bilateral shoulder arthroscopy with extensive debridement per Dr. Velez. Pt had FAVIO  DVT and RUE SVT. Nephrology is managing his hyponatremia and STEVEN on CKD. Pt has 2 PADMINI drains to B shoulders. Right 2nd toe is dark in color- Nsg aware. RUE is edematous from upper arm to fingers. BUE AROM and strength grossly limited- See OT notes for specifics. BLE strength grossly 2+/5 with ~ 50% limitation in  Bilateral knee and hip ROM due to generalized stiffness. At baseline, pt lives in a 2 story home with spouse and used a cane for household mobility and occasional use of rolling walker. Pt alert and requires cueing for time and situation. PT repositioned pt's UE for improved edema management. Pt transferred supine<>sit with max assist of 2 and required min assist of 2 to maintain static sitting balance at edge of bed. Pt is far below his baseline for mobility. PT recommendation presently is Skilled Nursing Facility. PT will follow.  -BR     Row Name 01/05/23 1536          Therapy Assessment/Plan (PT)    Rehab Potential (PT) good, to achieve stated therapy goals  -BR     Criteria for Skilled Interventions Met (PT) yes;meets criteria;skilled treatment is necessary  -BR     Therapy Frequency (PT) 5 times/wk  -BR     Predicted Duration of Therapy Intervention (PT) until D/C  -BR     Row Name 01/05/23 1536          Vital Signs    Pre Systolic BP Rehab 121  -BR     Pre Treatment Diastolic BP 74  -BR     Pretreatment Heart Rate (beats/min) 91  -BR     Pretreatment Resp Rate (breaths/min) 20  -BR     Pre SpO2 (%) 94  -BR     O2 Delivery Pre Treatment nasal cannula  2L  -BR     Post SpO2 (%) 97  -BR     O2 Delivery Post Treatment nasal cannula  2L  -BR     Row Name 01/05/23 1536          Positioning and Restraints    Pre-Treatment Position in bed  -BR     Post Treatment Position bed  -BR     In Bed notified nsg;fowlers;call light within reach;encouraged to call for assist;exit alarm on;side rails up x3;with family/caregiver;heels elevated;SCD pump applied  -BR           User Key  (r) = Recorded By, (t) = Taken By,  (c) = Cosigned By    Initials Name Provider Type    Fatou Linares PT Physical Therapist               Outcome Measures     Row Name 01/05/23 1551          How much help from another person do you currently need...    Turning from your back to your side while in flat bed without using bedrails? 1  -BR     Moving from lying on back to sitting on the side of a flat bed without bedrails? 1  -BR     Moving to and from a bed to a chair (including a wheelchair)? 1  -BR     Standing up from a chair using your arms (e.g., wheelchair, bedside chair)? 1  -BR     Climbing 3-5 steps with a railing? 1  -BR     To walk in hospital room? 1  -BR     AM-PAC 6 Clicks Score (PT) 6  -BR     Highest level of mobility 2 --> Bed activities/dependent transfer  -BR     Row Name 01/05/23 1551          Functional Assessment    Outcome Measure Options AM-PAC 6 Clicks Basic Mobility (PT)  -BR           User Key  (r) = Recorded By, (t) = Taken By, (c) = Cosigned By    Initials Name Provider Type    Fatou Linares PT Physical Therapist                             Physical Therapy Education     Title: PT OT SLP Therapies (Done)     Topic: Physical Therapy (Done)     Point: Mobility training (Done)     Learning Progress Summary           Patient Acceptance, E,D, VU,NR by BR at 1/5/2023 1551   Family Acceptance, E,D, VU,NR by BR at 1/5/2023 1551                   Point: Home exercise program (Done)     Learning Progress Summary           Patient Acceptance, E,D, VU,NR by BR at 1/5/2023 1551   Family Acceptance, E,D, VU,NR by BR at 1/5/2023 1551                   Point: Body mechanics (Done)     Learning Progress Summary           Patient Acceptance, E,D, VU,NR by BR at 1/5/2023 1551   Family Acceptance, E,D, VU,NR by BR at 1/5/2023 1551                   Point: Precautions (Done)     Learning Progress Summary           Patient Acceptance, E,D, VU,NR by BR at 1/5/2023 1551   Family Acceptance, E,D, VU,NR by BR at 1/5/2023 1551                                User Key     Initials Effective Dates Name Provider Type Discipline     02/01/22 -  Fatou Hinojosa, CARLOS Physical Therapist PT              PT Recommendation and Plan  Planned Therapy Interventions (PT): balance training, bed mobility training, gait training, home exercise program, neuromuscular re-education, patient/family education, postural re-education, ROM (range of motion), strengthening, stair training, stretching, transfer training  Plan of Care Reviewed With: patient, spouse, daughter  Outcome Evaluation: hank Bar presents as a 67 y.o. male with past medical history of cervicalgia, primary osteoarthritis involving multiple joints with surgery on bilateral shoulders and right knee replacement, hyponatremia, prostate cancer, and cardiac amyloidosis presented to [facility] on 12/25/2022 with complaints of right shoulder pain, weakness, and altered mental status. He was found to be hypotensive and hyponatremic with elevated lactic acid.    He was transferred to [facility] ICU for further treatment of septic shock requiring vasopressors requiring intubation. He was extubated on 12/29/22. Since admission he was found to be bacteremic and positive for staph aureus, source being bilateral shoulder septic arthritis. On 12/28/22, pt underwent bilateral shoulder arthroscopy with extensive debridement per Dr. Velez. Pt had LUE DVT and RUE SVT. Nephrology is managing his hyponatremia and STEVEN on CKD. Pt has 2 PADMINI drains to B shoulders. Right 2nd toe is dark in color- Nsg aware. RUE is edematous from upper arm to fingers. BUE AROM and strength grossly limited- See OT notes for specifics. BLE strength grossly 2+/5 with ~ 50% limitation in  Bilateral knee and hip ROM due to generalized stiffness. At baseline, pt lives in a 2 story home with spouse and used a cane for household mobility and occasional use of rolling walker. Pt alert and requires cueing for time and situation. PT  repositioned pt's UE for improved edema management. Pt transferred supine<>sit with max assist of 2 and required min assist of 2 to maintain static sitting balance at edge of bed. Pt is far below his baseline for mobility. PT recommendation presently is Skilled Nursing Facility. PT will follow.     Time Calculation:    PT Charges     Row Name 01/05/23 1552             Time Calculation    Start Time 1405  -BR      Stop Time 1449  -BR      Time Calculation (min) 44 min  -BR      PT Received On 01/05/23  -BR      PT - Next Appointment 01/06/23  -BR      PT Goal Re-Cert Due Date 01/19/23  -BR         Time Calculation- PT    Total Timed Code Minutes- PT 15 minute(s)  -BR            User Key  (r) = Recorded By, (t) = Taken By, (c) = Cosigned By    Initials Name Provider Type    BR Fatou Hinojosa, CARLOS Physical Therapist              Therapy Charges for Today     Code Description Service Date Service Provider Modifiers Qty    65571198740 HC PT EVAL HIGH COMPLEXITY 4 1/5/2023 Fatou Hinojosa, PT GP 1    67669756469 HC PT THERAPEUTIC ACT EA 15 MIN 1/5/2023 Fatou Hinojosa, PT GP 1          PT G-Codes  Outcome Measure Options: AM-PAC 6 Clicks Basic Mobility (PT)  AM-PAC 6 Clicks Score (PT): 6  PT Discharge Summary  Anticipated Discharge Disposition (PT): skilled nursing facility    Fatou Hinojosa PT  1/5/2023

## 2023-01-05 NOTE — PLAN OF CARE
Goal Outcome Evaluation:  Plan of Care Reviewed With: patient, spouse, daughter        Progress: no change  Outcome Evaluation: Pt. is 66 y/o male presents with past medical history of cervicalgia, primary osteoarthritis involving multiple joints with surgery on bilateral shoulders and right knee replacement, hyponatremia, prostate cancer, and cardiac amyloidosis presented to [facility] on 12/25/2022 with complaints of right shoulder pain, weakness, and altered mental status. He was found to be hypotensive and hyponatremic with elevated lactic acid. He was transferred to [facility] ICU for further treatment of septic shock requiring vasopressors requiring intubation. He was extubated on 12/29/22. Since admission he was found to be bacteremic and positive for staph aureus, source being bilateral shoulder septic arthritis. On 12/28/22, pt underwent bilateral shoulder arthroscopy with extensive debridement per Dr. Velez. Pt had LUE DVT and RUE SVT. Nephrology is managing his hyponatremia and STEVEN on CKD. Pt. poor historian this date, family present to provide PLOF, reports pt as ADL independent prior to admit and utilizing cane for balance support. Pt. w/ severe limitations to BUE AROM and strength this date secondary t oswelling and increased pain, RUE wrose than LUE. Educated patient and family on positioning to decreased edema. Pt. requires max A x 2 for all bed mobility this date, sitting on EOB approx 5-7 minutes prior to return to supine secondary to fatigue, requires max A for static sitting support. Pt. dependent for all LB ADLs and toileting this date, max A provided for donning new gown. Pt. far from baseline level of funcitonal independence and will require SNF at d/c to address aforementioned deficits.

## 2023-01-05 NOTE — PROGRESS NOTES
Patient stable and improving  Discussed with family  Continue present plan and call us if needed  Likely multifactorial toxic and metabolic encephalopathy, improving

## 2023-01-05 NOTE — PLAN OF CARE
Goal Outcome Evaluation:  Plan of Care Reviewed With: patient, spouse, daughter  Jaime Bar presents as a 67 y.o. male with past medical history of cervicalgia, primary osteoarthritis involving multiple joints with surgery on bilateral shoulders and right knee replacement, hyponatremia, prostate cancer, and cardiac amyloidosis presented to [facility] on 12/25/2022 with complaints of right shoulder pain, weakness, and altered mental status. He was found to be hypotensive and hyponatremic with elevated lactic acid.    He was transferred to [facility] ICU for further treatment of septic shock requiring vasopressors requiring intubation. He was extubated on 12/29/22. Since admission he was found to be bacteremic and positive for staph aureus, source being bilateral shoulder septic arthritis. On 12/28/22, pt underwent bilateral shoulder arthroscopy with extensive debridement per Dr. Velez. Pt had LUE DVT and RUE SVT. Nephrology is managing his hyponatremia and STEVEN on CKD. Pt has 2 PADMINI drains to B shoulders. Right 2nd toe is dark in color- Nsg aware. RUE is edematous from upper arm to fingers. BUE AROM and strength grossly limited- See OT notes for specifics. BLE strength grossly 2+/5 with ~ 50% limitation in  Bilateral knee and hip ROM due to generalized stiffness. At baseline, pt lives in a 2 story home with spouse and used a cane for household mobility and occasional use of rolling walker. Pt alert and requires cueing for time and situation. PT repositioned pt's UE for improved edema management. Pt transferred supine<>sit with max assist of 2 and required min assist of 2 to maintain static sitting balance at edge of bed. Pt is far below his baseline for mobility. PT recommendation presently is Skilled Nursing Facility. PT will follow.

## 2023-01-05 NOTE — PROGRESS NOTES
Nutrition Services    Patient Name: Jaime Bar  YOB: 1955  MRN: [id number]  Admission date: 12/26/2022    PPE Documentation        PPE Worn By Provider Did not enter room for this encounter   PPE Worn By Patient  N/A     PROGRESS NOTE      Encounter Information: Check on for tube feeding.  SLP saw 1/4 and recommends NPO.         PO Diet: NPO Diet NPO Type: Strict NPO   PO Supplements: None ordered    PO Intake:  NPO       Current nutrition support: Nutren 1.5 at 65 mL/hr + 150mL/hr water flush   Nutrition support review: Documented as above per EMR        Labs (reviewed below): Hypernatremia- improving        GI Function:  Last BM 1/3 (x 2 days ago)  Residuals WNL       Nutrition Intervention Updates: Continue tube feeding at goal of 65 mL/hour     Decrease water flush to 125 mL/hour water flush related to Na+ trends        Results from last 7 days   Lab Units 01/05/23 0411 01/04/23  0352 01/03/23  0914   SODIUM mmol/L 144 148* 150*   POTASSIUM mmol/L 3.9 4.4 4.7   CHLORIDE mmol/L 104 108* 116*   CO2 mmol/L 33.0* 32.0* 28.0   BUN mg/dL 33* 40* 36*   CREATININE mg/dL 0.55* 0.77 0.62*   CALCIUM mg/dL 7.7* 8.0* 8.0*   BILIRUBIN mg/dL 3.1* 3.5* 3.4*   ALK PHOS U/L 155* 96 120*   ALT (SGPT) U/L 61* 93* 122*   AST (SGOT) U/L 33 36 44*   GLUCOSE mg/dL 117* 127* 122*     Results from last 7 days   Lab Units 01/05/23 0411 01/04/23  0352 01/03/23  0914   MAGNESIUM mg/dL 1.7 1.9 2.0   PHOSPHORUS mg/dL 3.2 3.6 3.0   HEMOGLOBIN g/dL 9.9* 11.0* 11.1*   HEMATOCRIT % 30.6* 34.1* 34.1*     Lab Results   Component Value Date    HGBA1C 4.2 12/26/2022       RD to follow up per protocol.    Electronically signed by:  Gloria Park RD  01/05/23 07:32 EST

## 2023-01-05 NOTE — PROGRESS NOTES
"                                                                                                                                      Nephrology  Progress Note                                        [facility]    Patient Identification    Name: Jaime Bar  Age: 67 y.o.  Sex: male  :  1955  MRN: [id number]      DATE OF SERVICE:  2023        Subective     required more oxygen      Objective   Scheduled Meds:cefTRIAXone, 2 g, Intravenous, Q12H  enoxaparin, 1.5 mg/kg, Subcutaneous, Daily  ferrous sulfate, 300 mg, Nasogastric, Daily  furosemide, 40 mg, Intravenous, Q12H  lansoprazole, 30 mg, Nasogastric, Q AM  midodrine, 10 mg, Nasogastric, Q8H  sodium chloride, 10 mL, Intravenous, Q12H  Zinc Oxide, , Apply externally, TID          Continuous Infusions:     PRN Meds:•  acetaminophen **OR** acetaminophen  •  aluminum-magnesium hydroxide-simethicone  •  dextrose  •  dextrose  •  fentanyl  •  glucagon (human recombinant)  •  HYDROcodone-acetaminophen  •  ondansetron **OR** ondansetron  •  sodium chloride  •  sodium chloride  •  sodium chloride     Exam:  /71 (BP Location: Right arm, Patient Position: Lying)   Pulse 93   Temp 98.3 °F (36.8 °C) (Axillary)   Resp 20   Ht 167.6 cm (66\")   Wt 84.9 kg (187 lb 2.7 oz)   SpO2 97%   BMI 30.21 kg/m²     Intake/Output last 3 shifts:  I/O last 3 completed shifts:  In: 5585 [I.V.:104; Other:4050; NG/GT:1431]  Out: 3127 [Urine:2955; Drains:172]    Intake/Output this shift:  No intake/output data recorded.    Physical exam:  General Appearance: Confused  Head:  Normocephalic, without obvious abnormality, atraumatic  Eyes:  PERRL, conjunctiva/corneas clear     Neck:  Supple,  no adenopathy;      Lungs:  Decreased BS occasion ronchi  Heart:  Regular rate and rhythm, S1 and S2 normal  Abdomen:  Soft, non-tender, bowel sounds active   Extremities: trace edema  Pulses: 2+ and symmetric all extremities  Skin:  No rashes or lesions       Data " Review:  All labs (24hrs):   Recent Results (from the past 24 hour(s))   POC Glucose Once    Collection Time: 01/04/23 11:09 AM    Specimen: Blood   Result Value Ref Range    Glucose 96 70 - 105 mg/dL   POC Glucose Once    Collection Time: 01/04/23  5:02 PM    Specimen: Blood   Result Value Ref Range    Glucose 99 70 - 105 mg/dL   Calcium, Ionized    Collection Time: 01/05/23  4:11 AM    Specimen: Blood   Result Value Ref Range    Ionized Calcium 1.09 (L) 1.20 - 1.30 mmol/L   Comprehensive Metabolic Panel    Collection Time: 01/05/23  4:11 AM    Specimen: Blood   Result Value Ref Range    Glucose 117 (H) 65 - 99 mg/dL    BUN 33 (H) 8 - 23 mg/dL    Creatinine 0.55 (L) 0.76 - 1.27 mg/dL    Sodium 144 136 - 145 mmol/L    Potassium 3.9 3.5 - 5.2 mmol/L    Chloride 104 98 - 107 mmol/L    CO2 33.0 (H) 22.0 - 29.0 mmol/L    Calcium 7.7 (L) 8.6 - 10.5 mg/dL    Total Protein 5.2 (L) 6.0 - 8.5 g/dL    Albumin 2.3 (L) 3.5 - 5.2 g/dL    ALT (SGPT) 61 (H) 1 - 41 U/L    AST (SGOT) 33 1 - 40 U/L    Alkaline Phosphatase 155 (H) 39 - 117 U/L    Total Bilirubin 3.1 (H) 0.0 - 1.2 mg/dL    Globulin 2.9 gm/dL    A/G Ratio 0.8 g/dL    BUN/Creatinine Ratio 60.0 (H) 7.0 - 25.0    Anion Gap 7.0 5.0 - 15.0 mmol/L    eGFR 108.6 >60.0 mL/min/1.73   Magnesium    Collection Time: 01/05/23  4:11 AM    Specimen: Blood   Result Value Ref Range    Magnesium 1.7 1.6 - 2.4 mg/dL   Phosphorus    Collection Time: 01/05/23  4:11 AM    Specimen: Blood   Result Value Ref Range    Phosphorus 3.2 2.5 - 4.5 mg/dL   CBC Auto Differential    Collection Time: 01/05/23  4:11 AM    Specimen: Blood   Result Value Ref Range    WBC 5.90 3.40 - 10.80 10*3/mm3    RBC 3.18 (L) 4.14 - 5.80 10*6/mm3    Hemoglobin 9.9 (L) 13.0 - 17.7 g/dL    Hematocrit 30.6 (L) 37.5 - 51.0 %    MCV 96.2 79.0 - 97.0 fL    MCH 31.2 26.6 - 33.0 pg    MCHC 32.5 31.5 - 35.7 g/dL    RDW 16.2 (H) 12.3 - 15.4 %    RDW-SD 54.7 (H) 37.0 - 54.0 fl    MPV 9.9 6.0 - 12.0 fL    Platelets 121 (L) 140 -  450 10*3/mm3    Neutrophil % 79.6 (H) 42.7 - 76.0 %    Lymphocyte % 6.6 (L) 19.6 - 45.3 %    Monocyte % 13.1 (H) 5.0 - 12.0 %    Eosinophil % 0.4 0.3 - 6.2 %    Basophil % 0.3 0.0 - 1.5 %    Neutrophils, Absolute 4.70 1.70 - 7.00 10*3/mm3    Lymphocytes, Absolute 0.40 (L) 0.70 - 3.10 10*3/mm3    Monocytes, Absolute 0.80 0.10 - 0.90 10*3/mm3    Eosinophils, Absolute 0.00 0.00 - 0.40 10*3/mm3    Basophils, Absolute 0.00 0.00 - 0.20 10*3/mm3    nRBC 0.1 0.0 - 0.2 /100 WBC          Imaging:  CT Head Without Contrast    Result Date: 12/31/2022  1. No acute intracranial abnormality. Specifically, no evidence of acute hemorrhage, mass effect or midline shift. 2. Mild global cerebral volume loss. 3. Mild bilateral air-fluid levels within the maxillary sinuses which can be seen with acute sinus disease in the correct clinical setting.  Electronically Signed By-Willy Baxter MD On:12/31/2022 9:01 PM This report was finalized on [id number] by  Willy Baxter MD.    CT Cervical Spine With Contrast    Result Date: 1/3/2023  1.Postoperative changes status post prior fusion. No definitive signs of hardware failure or loosening are noted. 2.No evidence for displaced fracture or malalignment throughout the cervical spine. 3.No evidence for acute soft tissue abnormality. There is no evidence for abnormal enhancement. No abnormal peripherally enhancing fluid collection is seen. 4.Changes of cervical spondylosis are noted throughout the cervical spine. Associated hypertrophic posterior facet changes and uncovertebral changes are also observed. Electronically Signed: Solo Ray  1/3/2023 10:25 PM EST  Workstation ID: [id number]    CT Thoracic Spine With Contrast    Result Date: 1/3/2023  1.No evidence for displaced fracture or malalignment throughout the thoracic spine. No evidence for erosive endplate changes. 2.No evidence for abnormal enhancement. There is no abnormal fluid collection within the paraspinal soft tissues.  There is no evidence for epidural abscess. 3.Degenerative changes are noted throughout the thoracic spine. 4.Interstitial changes are seen throughout the lungs. Bibasilar infiltrates are noted. Bilateral pleural effusions are observed. Electronically Signed: Solo Ray  1/3/2023 10:31 PM EST  Workstation ID: [id number]    CT Lumbar Spine With Contrast    Result Date: 1/3/2023  1.No evidence for displaced fracture or malalignment throughout the lumbar spine. No evidence for erosive endplate changes. 2.No evidence for abnormal enhancement. There is no abnormal fluid collection within the paraspinal soft tissues. There is no evidence for epidural abscess. 3.Advanced degenerative changes are noted throughout the lumbar spine. 4.Evidence for bilateral spondylolysis with associated spondylolisthesis at the L5 level. Prior postoperative changes are also noted. Electronically Signed: Solo Ray  1/3/2023 10:35 PM EST  Workstation ID: [id number]    US Liver    Result Date: 12/30/2022  1. Normal hepatic parenchyma. 2. Cholelithiasis with adenomyomatosis involving gallbladder wall. 3. Common bile duct at the upper limits of normal measuring 6 to 7 mm.  Electronically Signed By-Genaro Canada MD On:12/30/2022 3:40 PM This report was finalized on [id number] by  Genaro Canada MD.    XR Chest 1 View    Result Date: 1/3/2023  Impression: 1. Radiographic changes consistent with congestive heart failure pulmonary edema with interval increase in pulmonary edema and development of small bilateral pleural effusions. Electronically Signed: Jaime Momin  1/3/2023 9:14 AM EST  Workstation ID: [id number]    XR Chest 1 View    Result Date: 12/30/2022  Cardiomegaly and increased pulmonary vascular congestion and suspected interstitial edema  Lines and tubes as above[  Electronically Signed By-Jaxon Aly On:12/30/2022 10:19 AM This report was finalized on [id number] by  Jaxon Aly, .    XR Chest 1 View    Result Date:  12/29/2022  The endotracheal tube is approximately 6 mm above the sarah. Recommend repositioning. Left subclavian central venous catheter has its catheter tip overlying the superior vena cava. The cardiac silhouette is moderately to significantly enlarged and the pulmonary vessels are within normal limits. There is no focal area of consolidation otherwise seen. Electronically signed by:  Los Mccoy D.O.  12/28/2022 11:13 PM Mountain Time    XR Abdomen KUB    Result Date: 12/30/2022  NG tube projects over the expected position of the body of the stomach  Electronically Signed By-Jaxon Aly On:12/30/2022 12:00 PM This report was finalized on [id number] by  Jaxon Aly, .      Assessment/Plan:     Hyponatremia    ACC/AHA stage C chronic systolic heart failure (HCC)    Cardiac amyloidosis (HCC)    Primary hypertension    Tobacco abuse    Cervicalgia, spine surgery 2017    Primary osteoarthritis involving multiple joints    Bacteremia due to methicillin susceptible Staphylococcus aureus (MSSA)    Staphylococcal arthritis of shoulder (HCC)    Acute kidney injury superimposed on chronic kidney disease (HCC)   hyponatremia improving   Acute kidney injury on chronic kidney disease improving  Atrial fibrillation with rapid ventricular response  Metabolic acidosis  Urinary retention   Sepsis   Hypocalcemia   Hyperkalemia     Tolerating diuresis  Oxygenation better  Status post IV contrast study yesterday   Follow labs

## 2023-01-05 NOTE — PLAN OF CARE
Working w/ PT/OT/ST today. Per ST, to remain NPO except for ice chips. On 2L NC. Tolerating tube feeds.

## 2023-01-05 NOTE — CASE MANAGEMENT/SOCIAL WORK
Continued Stay Note   Michael     Patient Name: Jaime Bar  MRN: [id number]  Today's Date: 1/5/2023    Admit Date: 12/26/2022    Plan: DC Plan: Anticipate home with family pending clinical course and PT/OT evals. Watch for home O2 needs. Watch for home IV abx needs.   Discharge Plan     Row Name 01/05/23 1236       Plan    Plan DC Plan: Anticipate home with family pending clinical course and PT/OT evals. Watch for home O2 needs. Watch for home IV abx needs.    Provided Post Acute Provider List? N/A    Provided Post Acute Provider Quality & Resource List? N/A    Plan Comments CM spoke with patient’s nurse and also reviewed chart documentation to obtain clinical updates. CM requested PT/OT eval if patient is appropriate to participate. CM will continue to follow for any further needs and adjust discharge plan accordingly. DC Barriers: O2@4L nc, NGT, PADMINI drains x2, IV abx/Lasix, Abnormal labs, plans for Cardioversion when improved.           Expected Discharge Date and Time     Expected Discharge Date Expected Discharge Time    Jan 9, 2023         Phone communication or documentation only- no physical contact with patient or family.      Melyssa Pascual RN     Office Phone: [phone]  Office Cell:     [phone]

## 2023-01-06 LAB
ALBUMIN SERPL-MCNC: 2 G/DL (ref 3.5–5.2)
ALBUMIN/GLOB SERPL: 0.6 G/DL
ALP SERPL-CCNC: 103 U/L (ref 39–117)
ALT SERPL W P-5'-P-CCNC: 52 U/L (ref 1–41)
ANION GAP SERPL CALCULATED.3IONS-SCNC: 9 MMOL/L (ref 5–15)
AST SERPL-CCNC: 39 U/L (ref 1–40)
BASOPHILS # BLD AUTO: 0 10*3/MM3 (ref 0–0.2)
BASOPHILS NFR BLD AUTO: 0.5 % (ref 0–1.5)
BILIRUB SERPL-MCNC: 3.4 MG/DL (ref 0–1.2)
BUN SERPL-MCNC: 32 MG/DL (ref 8–23)
BUN/CREAT SERPL: 58.2 (ref 7–25)
CA-I SERPL ISE-MCNC: 1.07 MMOL/L (ref 1.2–1.3)
CALCIUM SPEC-SCNC: 7.8 MG/DL (ref 8.6–10.5)
CHLORIDE SERPL-SCNC: 99 MMOL/L (ref 98–107)
CO2 SERPL-SCNC: 33 MMOL/L (ref 22–29)
CREAT SERPL-MCNC: 0.55 MG/DL (ref 0.76–1.27)
DEPRECATED RDW RBC AUTO: 53.4 FL (ref 37–54)
EGFRCR SERPLBLD CKD-EPI 2021: 108.6 ML/MIN/1.73
EOSINOPHIL # BLD AUTO: 0 10*3/MM3 (ref 0–0.4)
EOSINOPHIL NFR BLD AUTO: 0.2 % (ref 0.3–6.2)
ERYTHROCYTE [DISTWIDTH] IN BLOOD BY AUTOMATED COUNT: 16.1 % (ref 12.3–15.4)
GLOBULIN UR ELPH-MCNC: 3.1 GM/DL
GLUCOSE BLDC GLUCOMTR-MCNC: 124 MG/DL (ref 70–105)
GLUCOSE BLDC GLUCOMTR-MCNC: 74 MG/DL (ref 70–105)
GLUCOSE SERPL-MCNC: 106 MG/DL (ref 65–99)
HCT VFR BLD AUTO: 29.7 % (ref 37.5–51)
HGB BLD-MCNC: 9.9 G/DL (ref 13–17.7)
LYMPHOCYTES # BLD AUTO: 0.4 10*3/MM3 (ref 0.7–3.1)
LYMPHOCYTES NFR BLD AUTO: 7.4 % (ref 19.6–45.3)
MAGNESIUM SERPL-MCNC: 1.8 MG/DL (ref 1.6–2.4)
MCH RBC QN AUTO: 31.4 PG (ref 26.6–33)
MCHC RBC AUTO-ENTMCNC: 33.3 G/DL (ref 31.5–35.7)
MCV RBC AUTO: 94.5 FL (ref 79–97)
MONOCYTES # BLD AUTO: 0.7 10*3/MM3 (ref 0.1–0.9)
MONOCYTES NFR BLD AUTO: 12.4 % (ref 5–12)
NEUTROPHILS NFR BLD AUTO: 4.6 10*3/MM3 (ref 1.7–7)
NEUTROPHILS NFR BLD AUTO: 79.5 % (ref 42.7–76)
NRBC BLD AUTO-RTO: 0 /100 WBC (ref 0–0.2)
PHOSPHATE SERPL-MCNC: 2.8 MG/DL (ref 2.5–4.5)
PLATELET # BLD AUTO: 125 10*3/MM3 (ref 140–450)
PMV BLD AUTO: 9.6 FL (ref 6–12)
POTASSIUM SERPL-SCNC: 3.8 MMOL/L (ref 3.5–5.2)
PROT SERPL-MCNC: 5.1 G/DL (ref 6–8.5)
RBC # BLD AUTO: 3.14 10*6/MM3 (ref 4.14–5.8)
SODIUM SERPL-SCNC: 141 MMOL/L (ref 136–145)
WBC NRBC COR # BLD: 5.8 10*3/MM3 (ref 3.4–10.8)

## 2023-01-06 PROCEDURE — 92526 ORAL FUNCTION THERAPY: CPT

## 2023-01-06 PROCEDURE — 25010000002 CEFTRIAXONE PER 250 MG: Performed by: INTERNAL MEDICINE

## 2023-01-06 PROCEDURE — 97112 NEUROMUSCULAR REEDUCATION: CPT

## 2023-01-06 PROCEDURE — 83735 ASSAY OF MAGNESIUM: CPT | Performed by: ORTHOPAEDIC SURGERY

## 2023-01-06 PROCEDURE — 84100 ASSAY OF PHOSPHORUS: CPT | Performed by: ORTHOPAEDIC SURGERY

## 2023-01-06 PROCEDURE — 97535 SELF CARE MNGMENT TRAINING: CPT

## 2023-01-06 PROCEDURE — 82330 ASSAY OF CALCIUM: CPT | Performed by: ORTHOPAEDIC SURGERY

## 2023-01-06 PROCEDURE — 85025 COMPLETE CBC W/AUTO DIFF WBC: CPT | Performed by: ORTHOPAEDIC SURGERY

## 2023-01-06 PROCEDURE — 97110 THERAPEUTIC EXERCISES: CPT

## 2023-01-06 PROCEDURE — 97530 THERAPEUTIC ACTIVITIES: CPT

## 2023-01-06 PROCEDURE — 25010000002 ENOXAPARIN PER 10 MG: Performed by: NURSE PRACTITIONER

## 2023-01-06 PROCEDURE — 99232 SBSQ HOSP IP/OBS MODERATE 35: CPT | Performed by: NURSE PRACTITIONER

## 2023-01-06 PROCEDURE — 82962 GLUCOSE BLOOD TEST: CPT

## 2023-01-06 PROCEDURE — 25010000002 NA FERRIC GLUC CPLX PER 12.5 MG: Performed by: NURSE PRACTITIONER

## 2023-01-06 PROCEDURE — 25010000002 FUROSEMIDE PER 20 MG: Performed by: INTERNAL MEDICINE

## 2023-01-06 PROCEDURE — 80053 COMPREHEN METABOLIC PANEL: CPT | Performed by: ORTHOPAEDIC SURGERY

## 2023-01-06 RX ADMIN — FUROSEMIDE 40 MG: 10 INJECTION, SOLUTION INTRAMUSCULAR; INTRAVENOUS at 00:00

## 2023-01-06 RX ADMIN — HYDROCODONE BITARTRATE AND ACETAMINOPHEN 1 TABLET: 10; 325 TABLET ORAL at 20:06

## 2023-01-06 RX ADMIN — Medication: at 16:30

## 2023-01-06 RX ADMIN — Medication 10 ML: at 08:38

## 2023-01-06 RX ADMIN — CEFTRIAXONE 2 G: 2 INJECTION, POWDER, FOR SOLUTION INTRAMUSCULAR; INTRAVENOUS at 00:00

## 2023-01-06 RX ADMIN — DIPHENHYDRAMINE HYDROCHLORIDE AND LIDOCAINE HYDROCHLORIDE AND ALUMINUM HYDROXIDE AND MAGNESIUM HYDRO 5 ML: KIT at 18:19

## 2023-01-06 RX ADMIN — MIDODRINE HYDROCHLORIDE 10 MG: 5 TABLET ORAL at 03:15

## 2023-01-06 RX ADMIN — DIPHENHYDRAMINE HYDROCHLORIDE AND LIDOCAINE HYDROCHLORIDE AND ALUMINUM HYDROXIDE AND MAGNESIUM HYDRO 5 ML: KIT at 06:36

## 2023-01-06 RX ADMIN — HYDROCODONE BITARTRATE AND ACETAMINOPHEN 1 TABLET: 10; 325 TABLET ORAL at 03:21

## 2023-01-06 RX ADMIN — MIDODRINE HYDROCHLORIDE 10 MG: 5 TABLET ORAL at 11:05

## 2023-01-06 RX ADMIN — DIPHENHYDRAMINE HYDROCHLORIDE AND LIDOCAINE HYDROCHLORIDE AND ALUMINUM HYDROXIDE AND MAGNESIUM HYDRO 5 ML: KIT at 23:59

## 2023-01-06 RX ADMIN — SODIUM CHLORIDE 250 MG: 9 INJECTION, SOLUTION INTRAVENOUS at 08:38

## 2023-01-06 RX ADMIN — DIPHENHYDRAMINE HYDROCHLORIDE AND LIDOCAINE HYDROCHLORIDE AND ALUMINUM HYDROXIDE AND MAGNESIUM HYDRO 5 ML: KIT at 00:00

## 2023-01-06 RX ADMIN — Medication: at 20:07

## 2023-01-06 RX ADMIN — FUROSEMIDE 40 MG: 10 INJECTION, SOLUTION INTRAMUSCULAR; INTRAVENOUS at 23:59

## 2023-01-06 RX ADMIN — ENOXAPARIN SODIUM 140 MG: 150 INJECTION SUBCUTANEOUS at 13:33

## 2023-01-06 RX ADMIN — Medication: at 08:39

## 2023-01-06 RX ADMIN — HYDROCODONE BITARTRATE AND ACETAMINOPHEN 1 TABLET: 10; 325 TABLET ORAL at 13:33

## 2023-01-06 RX ADMIN — FUROSEMIDE 40 MG: 10 INJECTION, SOLUTION INTRAMUSCULAR; INTRAVENOUS at 11:05

## 2023-01-06 RX ADMIN — Medication 300 MG: at 08:38

## 2023-01-06 RX ADMIN — DIPHENHYDRAMINE HYDROCHLORIDE AND LIDOCAINE HYDROCHLORIDE AND ALUMINUM HYDROXIDE AND MAGNESIUM HYDRO 5 ML: KIT at 11:05

## 2023-01-06 RX ADMIN — HYDROCODONE BITARTRATE AND ACETAMINOPHEN 1 TABLET: 10; 325 TABLET ORAL at 08:38

## 2023-01-06 RX ADMIN — MIDODRINE HYDROCHLORIDE 10 MG: 5 TABLET ORAL at 18:19

## 2023-01-06 RX ADMIN — Medication 10 ML: at 20:07

## 2023-01-06 RX ADMIN — LANSOPRAZOLE 30 MG: 30 TABLET, ORALLY DISINTEGRATING ORAL at 06:36

## 2023-01-06 NOTE — PROGRESS NOTES
Hematology/Oncology Inpatient Progress Note    PATIENT NAME: Jaime Bar  : 1955  MRN: [id number]    CHIEF COMPLAINT: Sepsis; Thrombocytopenia; provoked catheter associated LUE DVT, LUE SVT, and RUE SVT    HISTORY OF PRESENT ILLNESS:    67 y.o. male admitted to [facility] on transfer from [facility] on 2022.  The patient was intubated and unresponsive at time of consultation with histories obtained from review of records and discussion with patient's niece and nephew.  He reported a history of bilateral shoulder pain for few days prior to admission.  He had presented to [facility] on 2022 where he was hypotensive and hyponatremic and transferred to [facility].  CXR on 2022 showed coarsened airspace and bronchovascular thickening with small airway disease such as bronchitis or asthma, edema, atypical/viral infection, and aspiration in differential.  A RUE venous Doppler showed acute RUE superficial thrombophlebitis in the cephalic vein and no evidence of DVT for which he was started on subcu heparin.  On 2022 CBC revealed WBC 7.2, hemoglobin 12.4, MCV 98.4, and platelets 99,000.  Creatinine was elevated to 2.38, BUN 55, sodium was low at 118, potassium was high at 5.4 and LFTs revealed T bili elevated to 4.8 (0-1.2) with transaminases normal.  Blood cultures grew Staph aureus.  Urinalysis was concerning for UTI.  Urine sodium was less than 20, a.m. cortisol 26.11 (6.02-18.4), and urine osmolality was 410 ().   X-rays and MRIs of the bilateral shoulders were performed 2022 revealing joint effusion and advanced degenerative changes/destructive changes of glenohumeral joint.  There was suspected complete tear of the supraspinatus and infraspinatus tendons and a completely macerated appearance of the labrum on the right shoulder.  On 2022 RUE venous Doppler was repeated and felt stable.  He then underwent bilateral shoulder arthroscopy incision and drainage  with culture growing heavy growth of Staph aureus.  He had been intubated prior to the surgery and remained sedated on ventilator with pressors postop.  At time of consultation 12/29/2022 LUE venous Doppler showed acute catheter associated DVT in the left axillary vein, SVT in the left basilic vein of the upper arm.  BLE venous Doppler was negative for DVT/SVT but did show deep venous valvular incompetence in the right popliteal vein.  CBC revealed WBC 14.2, hemoglobin 11.4, MCV 96.5, and platelets 46,000.  T bili was elevated to 5.3 and transaminases were now elevated with  and .  PT was 15.6 (9.6-11.7), PTT was 38.7 (24-31), and fibrinogen was 493 (210-450).  D-dimer was 11.83 (0-0.67).  Transesophageal echocardiogram showed LVEF 46-50%, mild aortic valve stenosis, and no evidence of vegetation or bacterial endocarditis.  Heparin was changed to argatroban.  ID was managing antibiotics with patient on ceftriaxone with plan for treatment x6 weeks and recommending replacing PICC line 2 days after starting anticoagulation.  His niece and nephew reported the patient to have lost some weight but they were unsure the amount.  They reported PMH significant for prostate cancer treated approximately 2010 with radiation alone and no evidence of recurrence to their knowledge.  He also was under the care of Dr. Damico at  for cardiac amyloid.  Chart review showed PSA was 0.1 (0-4) in August 2022.  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  IgG was 768 (603-1613), IgA was 238 (), IgM was 64 ().  Kappa/lambda ratio was 1.36 (0.2-1.65).  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  LVEF in February 2022 was 45-50%.  Cardiac MRI 4/6/2022 revealed a myocardial mass 276 g, global hypokinesis of left ventricle with ejection fraction 43%, no  myocardial iron overload, the thickness and appearance of intra-atrial septum was also consistent with cardiac amyloidosis.  The note stated that the patient was followed by Dr. Banks at  [facility] where he was on Vyndamax therapy for cardiac amyloid and had been on that treatment for about 2 weeks.  Additionally in January 2022 T bili was noted to be elevated to 2.6 (0.2-1.0) and he was anemic with hemoglobin 11.8 and MCV 86.5 platelets were normal at 261,000.     12/29/22  Hematology/Oncology was consulted by RITU Jimenez for thrombocytopenia.     Past Medical History: Prostate cancer approximately 2010 treated with radiation only.  Cardiac amyloid managed by Dr. Damico at .  CKD.  Surgical History: Several orthopedic surgeries in the past.  Bilateral shoulder arthroscopy with incision and drainage 12/28/2022.  Social History: He lives in [place], [place] with his spouse.  His spouse is known to have Alzheimer's disease.  He has smoked for many years.  He has no alcohol or recreational drug use however he was narcotic dependent secondary to chronic pain.  He is a retired .  Family History: No significant known family history.  Allergies: Tramadol causes him to feel weird and codeine causes nausea.     PCP: Provider, No Known    INTERVAL HISTORY:  • 12/30/2022- platelets 38,000, hemoglobin 11.1.  Folate 7.13 (4.78-24.2), vitamin B12 greater than 2000.  Iron 29 (), iron saturation 15% (20-50), TIBC 195 (298-536), and ferritin 3525 ().  Reticulocytes 1.05 (0.7-1.9), haptoglobin 148 (). PSA 0.234 (0-4).  T bili 5.3 with direct bili 4.2 (0-0.3).  Hepatitis panel nonreactive.  Extubated 12/29/2022 and off pressors.  • 12/31/2022- platelets 46,000, hemoglobin 11.3.  HIT antibody negative at 0.121 (0-0.4).  CMV IgM less than 30 (0-29.9) and EBV IgM less than 36 (0-35.9).  D-dimer mildly improved to 8.94 (0-0.67).  Transaminases rising with  and AST  520.  T bili 4.4.  Peripheral smear was negative for schistocytes again.  Abdominal ultrasound revealed cholelithiasis with adenomyomatosis involving the gallbladder wall.  The common bile duct was in the upper limits of normal.  The liver appeared normal.  • 1/1/2023- platelets 60,000, hemoglobin 11.4.  Argatroban was held with repeat PTT remaining high with subsequent continued hold of argatroban.  D-dimer 9.84.  PTT 61.1 (24-31).  CT head without contrast was negative for acute findings.  • 1/2/2023- hemoglobin 11.3 and platelets 78,000.  ALT improved to 200 and AST improved to 104.  T bili improved to 3.3.  Fibrinogen 445 (210-150).  • 1/3/2023- evaluated by neurology and felt to have multifactorial encephalopathy.  SPEP with no M spike.  Transaminases improved.  Hemoglobin 11.1, platelet count 104,000.  Kappa/lambda free light chain ratio 1.02 (0.26-1.25).  SHAR screen negative.  Right BERNARDINO normal with mild digital ischemia.  Left BERNARDINO normal with severe digital ischemia.  • 1/4/2023- hemoglobin 11.0 and platelets 116,000.  Copper 110 ().  ALT 93 (1-41), AST normalized, and T bili 3.5 (0-1.2).  RUE venous Doppler showed acute RUE superficial thrombophlebitis in the cephalic vein (forearm).  • 1/5/2023- hemoglobin 9.9, platelets 121,000.  ALT 61, T bili 3.1.  Ferrlecit 250 mg IV daily x3 initiated.  UIFE with no monoclonality detected.  SIFE with IgM monoclonal protein with lambda light chain specificity.  2 different results are present for immunoglobulins with different values.  • 1/6/2023-.  WBC 5.8, hemoglobin 9.9, platelets 125,000.  ALT 52.     History of present illness reviewed since last visit and changes noted on 01/06/23.    Subjective  He is denying any problems.    ROS:   Review of Systems   Constitutional: Negative for activity change, chills, fatigue, fever and unexpected weight change.   HENT: Negative for congestion, dental problem, hearing loss, mouth sores, nosebleeds, sore throat and  trouble swallowing.    Eyes: Negative for photophobia and visual disturbance.   Respiratory: Negative for apnea, cough, chest tightness and shortness of breath.    Cardiovascular: Negative for chest pain, palpitations and leg swelling.   Gastrointestinal: Negative for abdominal distention, abdominal pain, blood in stool, constipation, diarrhea, nausea and vomiting.   Endocrine: Negative for cold intolerance and heat intolerance.   Genitourinary: Negative for decreased urine volume, difficulty urinating, dysuria, frequency, hematuria and urgency.   Musculoskeletal: Negative for arthralgias and gait problem.   Skin: Negative for rash and wound.   Neurological: Negative for dizziness, tremors, seizures, weakness, light-headedness, numbness and headaches.   Hematological: Negative for adenopathy. Does not bruise/bleed easily.   Psychiatric/Behavioral: Negative for confusion and hallucinations. The patient is not nervous/anxious.    All other systems reviewed and are negative.       MEDICATIONS:    Scheduled Meds:  cefTRIAXone, 2 g, Intravenous, Q24H  enoxaparin, 1.5 mg/kg, Subcutaneous, Daily  ferric gluconate, 250 mg, Intravenous, Daily  ferrous sulfate, 300 mg, Nasogastric, Daily  First Mouthwash (Magic Mouthwash), 5 mL, Swish & Spit, Q6H  furosemide, 40 mg, Intravenous, Q12H  lansoprazole, 30 mg, Nasogastric, Q AM  midodrine, 10 mg, Nasogastric, Q8H  sodium chloride, 10 mL, Intravenous, Q12H  Zinc Oxide, , Apply externally, TID       Continuous Infusions:      PRN Meds:  •  acetaminophen **OR** acetaminophen  •  aluminum-magnesium hydroxide-simethicone  •  dextrose  •  dextrose  •  fentanyl  •  glucagon (human recombinant)  •  HYDROcodone-acetaminophen  •  ondansetron **OR** ondansetron  •  sodium chloride  •  sodium chloride     ALLERGIES:    Allergies   Allergen Reactions   • Tramadol-Acetaminophen Anxiety     Worms in the brain feeling   • Codeine Other (See Comments)     nausea   • Tramadol Other (See Comments)  "    \"I just felt really crawly, it did weird things with my head\"       Objective    VITALS:   /73   Pulse 81   Temp 98.6 °F (37 °C) (Oral)   Resp (!) 30   Ht 167.6 cm (66\")   Wt 86.3 kg (190 lb 4.1 oz)   SpO2 94%   BMI 30.71 kg/m²     PHYSICAL EXAM:  Physical Exam  Vitals and nursing note reviewed.   Constitutional:       General: He is not in acute distress.     Appearance: Normal appearance. He is well-developed. He is ill-appearing. He is not diaphoretic.   HENT:      Head: Normocephalic and atraumatic.      Comments: Alopecia     Right Ear: External ear normal.      Left Ear: External ear normal.      Nose: Nose normal. No rhinorrhea.      Comments: NG tube.  O2 per NC.     Mouth/Throat:      Mouth: Mucous membranes are moist.      Pharynx: Oropharynx is clear. No oropharyngeal exudate or posterior oropharyngeal erythema.      Comments: Dental fillings.  Oral mucositis is improving.  Eyes:      General: No scleral icterus.     Extraocular Movements: Extraocular movements intact.      Conjunctiva/sclera: Conjunctivae normal.      Pupils: Pupils are equal, round, and reactive to light.   Cardiovascular:      Rate and Rhythm: Normal rate and regular rhythm.      Heart sounds: Normal heart sounds. No murmur heard.     Comments: Cardiac monitor leads  Pulmonary:      Effort: Pulmonary effort is normal. No respiratory distress.      Breath sounds: Normal breath sounds. No stridor. No wheezing or rales.   Abdominal:      General: Bowel sounds are normal. There is no distension.      Palpations: Abdomen is soft. There is no mass.      Tenderness: There is no abdominal tenderness. There is no guarding.      Hernia: No hernia is present.   Genitourinary:     Comments: Deferred  Musculoskeletal:         General: Swelling (3+ RUE edema) present. No tenderness or deformity. Normal range of motion.      Cervical back: Normal range of motion and neck supple.      Comments: Bilateral shoulder dressings with 2 " drains on the left and one on the right.  RUE IV.  Right hand O2 monitor.  BLE SCDs and pressure boots.   Lymphadenopathy:      Cervical: No cervical adenopathy.      Upper Body:      Right upper body: No supraclavicular adenopathy.      Left upper body: No supraclavicular adenopathy.   Skin:     General: Skin is warm and dry.      Coloration: Skin is not pale.      Findings: Bruising (Ecchymosis LUE) present. No erythema or rash.      Comments: Vertical scar right knee.     Neurological:      General: No focal deficit present.      Mental Status: He is alert.      Comments: More alert   Psychiatric:         Mood and Affect: Mood normal.         Behavior: Behavior normal.           RECENT LABS:  Lab Results (last 24 hours)     Procedure Component Value Units Date/Time    POC Glucose Once [[id number]]  (Abnormal) Collected: 01/06/23 1106    Specimen: Blood Updated: 01/06/23 1112     Glucose 124 mg/dL      Comment: Serial Number: [id number]:  [id number]       Comprehensive Metabolic Panel [[id number]]  (Abnormal) Collected: 01/06/23 0357    Specimen: Blood Updated: 01/06/23 0451     Glucose 106 mg/dL      BUN 32 mg/dL      Creatinine 0.55 mg/dL      Sodium 141 mmol/L      Potassium 3.8 mmol/L      Comment: Slight hemolysis detected by analyzer. Results may be affected.        Chloride 99 mmol/L      CO2 33.0 mmol/L      Calcium 7.8 mg/dL      Total Protein 5.1 g/dL      Albumin 2.0 g/dL      ALT (SGPT) 52 U/L      AST (SGOT) 39 U/L      Comment: Slight hemolysis detected by analyzer. Results may be affected.        Alkaline Phosphatase 103 U/L      Total Bilirubin 3.4 mg/dL      Globulin 3.1 gm/dL      A/G Ratio 0.6 g/dL      BUN/Creatinine Ratio 58.2     Anion Gap 9.0 mmol/L      eGFR 108.6 mL/min/1.73      Comment: National Kidney Foundation and American Society of Nephrology (ASN) Task Force recommended calculation based on the Chronic Kidney Disease Epidemiology Collaboration (CKD-EPI) equation refit without  adjustment for race.       Narrative:      GFR Normal >60  Chronic Kidney Disease <60  Kidney Failure <15      Phosphorus [[id number]]  (Normal) Collected: 01/06/23 0357    Specimen: Blood Updated: 01/06/23 0451     Phosphorus 2.8 mg/dL     Magnesium [[id number]]  (Normal) Collected: 01/06/23 0357    Specimen: Blood Updated: 01/06/23 0451     Magnesium 1.8 mg/dL     Calcium, Ionized [[id number]]  (Abnormal) Collected: 01/06/23 0357    Specimen: Blood Updated: 01/06/23 0430     Ionized Calcium 1.07 mmol/L     CBC & Differential [[id number]]  (Abnormal) Collected: 01/06/23 0357    Specimen: Blood Updated: 01/06/23 0420    Narrative:      The following orders were created for panel order CBC & Differential.  Procedure                               Abnormality         Status                     ---------                               -----------         ------                     CBC Auto Differential[[id number]]        Abnormal            Final result                 Please view results for these tests on the individual orders.    CBC Auto Differential [[id number]]  (Abnormal) Collected: 01/06/23 0357    Specimen: Blood Updated: 01/06/23 0420     WBC 5.80 10*3/mm3      RBC 3.14 10*6/mm3      Hemoglobin 9.9 g/dL      Hematocrit 29.7 %      MCV 94.5 fL      MCH 31.4 pg      MCHC 33.3 g/dL      RDW 16.1 %      RDW-SD 53.4 fl      MPV 9.6 fL      Platelets 125 10*3/mm3      Neutrophil % 79.5 %      Lymphocyte % 7.4 %      Monocyte % 12.4 %      Eosinophil % 0.2 %      Basophil % 0.5 %      Neutrophils, Absolute 4.60 10*3/mm3      Lymphocytes, Absolute 0.40 10*3/mm3      Monocytes, Absolute 0.70 10*3/mm3      Eosinophils, Absolute 0.00 10*3/mm3      Basophils, Absolute 0.00 10*3/mm3      nRBC 0.0 /100 WBC     POC Glucose Once [[id number]]  (Normal) Collected: 01/05/23 2316    Specimen: Blood Updated: 01/05/23 2318     Glucose 104 mg/dL      Comment: Serial Number: [id number]:  [id number]       POC Glucose Once [[id number]]   (Abnormal) Collected: 01/05/23 1703    Specimen: Blood Updated: 01/05/23 1705     Glucose 107 mg/dL      Comment: Serial Number: [id number]:  [id number]             PENDING RESULTS:  Note from Dr. Sauceda at  and genetic testing from  ordered by Dr. Banks.    IMAGING REVIEWED:  No radiology results for the last day    I have reviewed the patient's labs, imaging, reports, and other clinician documentation.    Assessment & Plan   ASSESSMENT:  1. Thrombocytopenia-platelets were normal in early 2022.  Platelets were low on admission and continued to drop.  HIT antibody negative.    On ceftriaxone which can cause thrombocytopenia but platelets have started to improve now while patient continued on ceftriaxone.  Coags mildly elevated with fibrinogen not low, not DIC but sepsis likely contributing to thrombocytopenia. No hemolysis and no schistocytes on peripheral smear ruling out TTP.  No vitamin B12 or folate deficiencies. SHAR negative. Coags remain elevated and D-dimer high.  Repeat fibrinogen remained elevated.    Platelets continue to improve.  2. Acute on chronic anemia/IgA lambda monoclonal gammopathy/SWETHA- mild anemia with hemoglobin in the 11's in early 2022.  Gammopathy labs at that time showed IgA lambda monoclonal protein with normal immunoglobulins and free light chains.  Iron studies showed SWETHA and started oral iron.  No hemolysis or vitamin B12/folate deficiencies. Copper level normal.  SPEP with no M spike and free light chain ratio normal.  UIFE negative but SIFE with IgA lambda monoclonal protein.  2 different immunoglobulin values listed.  Hemoglobin was stable but then dropped and Ferrlecit 250 mg IV daily x3 initiated 1/5.  Received notes from cardiology at  which mentioned they were going to involve Dr. Sauceda and will obtain any records available  3. Acute provoked catheter associated LUE DVT/SVT and RUE SVT- RUE SVT was diagnosed prior to LUE DVT and he had received several doses of subcu  heparin starting on 12/26. LUE DVT provoked by PICC line which  has since been removed.  Argatroban was difficult to manage with elevated PTT.  Started low-dose Lovenox 1/1 and increased to full treatment dose 1/3 as platelets were rising.  Repeat RUE venous Doppler showed stable SVT.  4. Elevated LFT/cholelithiasis and adenomyomatosis of the gallbladder wall- possible shock liver.  T bili remains elevated with elevated direct bili and normal liver on US.  Hepatitis panel negative.  LFTs improving.  5. Bilateral shoulder septic arthritis/methicillin susceptible staph aureus bacteremia-on ceftriaxone per ID.  No vegetation on echocardiogram.  6. Acute hypoxic respiratory failure/smoking history-  transiently on ventilator postsurgery.  7. History of prostate cancer-PSA remains normal.   8. Cardiac amyloidosis- on chart review it has been treated at .  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  Records reviewed from  cardiology.  Genetic testing was being done for hereditary ATTR amyloidosis and patient was referred to Dr. Nguyen to look for systemic amyloidosis.  9. STEVEN/hyponatremia-nephrology managing.  Creatinine and sodium normalized.  10. A. fib with RVR- patient evaluated by EP and recommended start Eliquis 5 mg p.o. twice daily (not started as patient currently on Lovenox) with plan for cardioversion prior to discharge as long as he remains on anticoagulation.  11. Oral mucositis-added Magic mouthwash.  Improving.    PLAN:  1. Ferrlecit 250 mg IV (day 2/3).  2. Continue Magic Mouthwash.  3. Continue Lovenox at 1.5 mg/kg daily.  4. Continue ferrous sulfate 325 mg daily (liquid given NGT).  5. Continue daily CBC.  6. Repeat immunoglobulins in near future.  7. Skeletal survey when patient out of ICU.  8. Pending receipt of records from Dr. Nguyen at .  9. Pending receipt of genetic testing by Dr. Banks at  evaluating for hereditary ATTR  amyloidosis    Note prepared by KURT Alicea.  Patient seen and examined by Vicente Mendoza MD.  Electronically signed by RITU Taylor, 01/06/23, 4:26 PM EST.    I have personally performed a face-to-face diagnostic evaluation on this patient. I have performed a complete history and physical examination, reviewed laboratory studies, and radiographic examinations.  I have completed the majority and substantive portion of the medical decision making.  I have formulated the assessment on this patient and the plan of action as noted above. I have discussed the case with Darrin Nixon NP, have edited/reviewed the note, and agree with the care plan.  The patient seems to be feeling all right.  On examination, he has 3+ right upper extremity and 1+ left upper extremity edema.  Platelets continue to improve slowly.  Hemoglobin is low and he is receiving IV iron replacement.  He continues on Lovenox for DVT.  Skeletal survey will be performed once he is out of ICU.  Await further records from .         I discussed the patient's findings and my recommendations with patient and nursing.    Part of this note may be an electronic transcription/translation of spoken language to printed text using the Dragon Dictation System.    Electronically signed by Vicente Mendoza MD, 01/06/23, 5:32 PM EST.

## 2023-01-06 NOTE — PROGRESS NOTES
[facility] Medicine Services Daily Progress Note    Patient Name: Jaime Bar  : 1955  MRN: [id number]  Primary Care Physician:  Provider, No Known  Date of admission: 2022      Subjective      Chief Complaint: AMS    Patient seen and examined this morning.  Continues to improve, denies any complaints.  No acute events overnight per nursing.    Pertinent positives as noted in HPI/subjective.  All other systems were reviewed and are negative.        Objective      Vitals:   Temp:  [98 °F (36.7 °C)-99.3 °F (37.4 °C)] 98.5 °F (36.9 °C)  Heart Rate:  [] 89  Resp:  [25-36] 30  BP: ()/(61-88) 104/66  Flow (L/min):  [2-4] 2    Physical Exam:    General: Awake, alert, ill-appearing, lying in bed  Cardiovascular: Regular rate and rhythm, no murmurs  Respiratory: Decreased breath sounds bilaterally, no wheezing or rales, unlabored breathing  Abdomen: Soft, nontender, positive bowel sounds, no guarding  Neurologic: A&O, nonfocal, follows commands  Skin: Warm, bilateral shoulder incisions bandaged, drains in place. Bilateral upper extremity pitting edema noted L>R, improved         Result Review    Result Review:  I have personally reviewed the results from the time of this admission to 2023 10:24 EST and agree with these findings:  [x]  Laboratory  [x]  Microbiology  [x]  Radiology  [x]  EKG/Telemetry   []  Cardiology/Vascular   []  Pathology  []  Old records  []  Other:    Wounds (last 24 hours)     LDA Wound     Row Name 23 0830 23 0321 23 0015       Wound 22 1856 Right shoulder Incision    Wound - Properties Group Placement Date: 22  -HB Placement Time:   -HB Side: Right  -HB Location: shoulder  -HB Primary Wound Type: Incision  -HB    Dressing Appearance -- dry;intact  - dry;intact  -    Closure KATHRYN  -KW KATHRYN  - KATHRYN  -    Retired Wound - Properties Group Placement Date: 22  -HB Placement Time:   -HB Side: Right  -HB  Location: shoulder  -HB Primary Wound Type: Incision  -HB    Retired Wound - Properties Group Date first assessed: 12/28/22 -HB Time first assessed: 1856 -HB Side: Right  -HB Location: shoulder  -HB Primary Wound Type: Incision  -HB       Wound 12/28/22 1940 Left shoulder Incision    Wound - Properties Group Placement Date: 12/28/22  -HB Placement Time: 1940 -HB Side: Left  -HB Location: shoulder  -HB Primary Wound Type: Incision  -HB    Dressing Appearance -- dry;intact  - dry;intact  -    Closure KATHRYN  -KW KATHRYN  - KATHRYN  -    Retired Wound - Properties Group Placement Date: 12/28/22  -HB Placement Time: 1940 -HB Side: Left  -HB Location: shoulder  -HB Primary Wound Type: Incision  -HB    Retired Wound - Properties Group Date first assessed: 12/28/22 -HB Time first assessed: 1940 -HB Side: Left  -HB Location: shoulder  -HB Primary Wound Type: Incision  -HB       Wound 12/30/22 2000 sacral spine Pressure Injury    Wound - Properties Group Placement Date: 12/30/22 -MB Placement Time: 2000 -MB Location: sacral spine  -MB Primary Wound Type: Pressure inj  -MB    Dressing Appearance dry;intact  -KW -- --    Closure Adhesive bandage  -KW -- --    Base moist;red;non-blanchable  -KW -- --    Retired Wound - Properties Group Placement Date: 12/30/22 -MB Placement Time: 2000 -MB Location: sacral spine  -MB Primary Wound Type: Pressure inj  -MB    Retired Wound - Properties Group Date first assessed: 12/30/22 -MB Time first assessed: 2000 -MB Location: sacral spine  -MB Primary Wound Type: Pressure inj  -MB    Row Name 01/05/23 2300 01/05/23 2010 01/05/23 1536       Wound 12/28/22 1856 Right shoulder Incision    Wound - Properties Group Placement Date: 12/28/22 -HB Placement Time: 1856 -HB Side: Right  -HB Location: shoulder  -HB Primary Wound Type: Incision  -HB    Dressing Appearance -- dry;intact  - --    Closure -- KATHRYN  - Sutures  -KZ    Base -- -- dressing in place, unable to visualize  -KZ     Drainage Amount -- -- none  -KZ    Retired Wound - Properties Group Placement Date: 12/28/22  -HB Placement Time: 1856  -HB Side: Right  -HB Location: shoulder  -HB Primary Wound Type: Incision  -HB    Retired Wound - Properties Group Date first assessed: 12/28/22 -HB Time first assessed: 1856  -HB Side: Right  -HB Location: shoulder  -HB Primary Wound Type: Incision  -HB       Wound 12/28/22 1940 Left shoulder Incision    Wound - Properties Group Placement Date: 12/28/22  -HB Placement Time: 1940  -HB Side: Left  -HB Location: shoulder  -HB Primary Wound Type: Incision  -HB    Dressing Appearance -- dry;intact  - --    Closure -- KATHRYN  - Sutures  -KZ    Base -- -- dressing in place, unable to visualize  -KZ    Drainage Amount -- -- none  -KZ    Retired Wound - Properties Group Placement Date: 12/28/22  -HB Placement Time: 1940 -HB Side: Left  -HB Location: shoulder  -HB Primary Wound Type: Incision  -HB    Retired Wound - Properties Group Date first assessed: 12/28/22  -HB Time first assessed: 1940  -HB Side: Left  -HB Location: shoulder  -HB Primary Wound Type: Incision  -HB       Wound 12/30/22 2000 sacral spine Pressure Injury    Wound - Properties Group Placement Date: 12/30/22 -MB Placement Time: 2000 -MB Location: sacral spine  -MB Primary Wound Type: Pressure inj  -MB    Base moist;red;non-blanchable  - non-blanchable;red  - non-blanchable;maroon/purple  -KZ    Drainage Amount none  - -- none  -KZ    Dressing Care dressing applied;foam;silicone  - -- --    Retired Wound - Properties Group Placement Date: 12/30/22  -MB Placement Time: 2000 -MB Location: sacral spine  -MB Primary Wound Type: Pressure inj  -MB    Retired Wound - Properties Group Date first assessed: 12/30/22  -MB Time first assessed: 2000 -MB Location: sacral spine  -MB Primary Wound Type: Pressure inj  -MB    Row Name 01/05/23 1200             Wound 12/28/22 1856 Right shoulder Incision    Wound - Properties Group Placement  Date: 12/28/22  -HB Placement Time: 1856  -HB Side: Right  -HB Location: shoulder  -HB Primary Wound Type: Incision  -HB    Closure Sutures  -KZ      Base dressing in place, unable to visualize  -KZ      Drainage Amount none  -KZ      Retired Wound - Properties Group Placement Date: 12/28/22  -HB Placement Time: 1856  -HB Side: Right  -HB Location: shoulder  -HB Primary Wound Type: Incision  -HB    Retired Wound - Properties Group Date first assessed: 12/28/22  -HB Time first assessed: 1856  -HB Side: Right  -HB Location: shoulder  -HB Primary Wound Type: Incision  -HB       Wound 12/28/22 1940 Left shoulder Incision    Wound - Properties Group Placement Date: 12/28/22  -HB Placement Time: 1940  -HB Side: Left  -HB Location: shoulder  -HB Primary Wound Type: Incision  -HB    Closure Sutures  -KZ      Base dressing in place, unable to visualize  -KZ      Drainage Amount none  -KZ      Retired Wound - Properties Group Placement Date: 12/28/22 -HB Placement Time: 1940  -HB Side: Left  -HB Location: shoulder  -HB Primary Wound Type: Incision  -HB    Retired Wound - Properties Group Date first assessed: 12/28/22 -HB Time first assessed: 1940 -HB Side: Left  -HB Location: shoulder  -HB Primary Wound Type: Incision  -HB       Wound 12/30/22 2000 sacral spine Pressure Injury    Wound - Properties Group Placement Date: 12/30/22 -MB Placement Time: 2000 -MB Location: sacral spine  -MB Primary Wound Type: Pressure inj  -MB    Base non-blanchable;maroon/purple  -KZ      Drainage Amount none  -KZ      Retired Wound - Properties Group Placement Date: 12/30/22 -MB Placement Time: 2000 -MB Location: sacral spine  -MB Primary Wound Type: Pressure inj  -MB    Retired Wound - Properties Group Date first assessed: 12/30/22 -MB Time first assessed: 2000 -MB Location: sacral spine  -MB Primary Wound Type: Pressure inj  -MB          User Key  (r) = Recorded By, (t) = Taken By, (c) = Cosigned By    Initials Name Provider Type     Kiana Bowen, RN Registered Nurse    Martha Monson, RN Registered Nurse    Carolyn Jay, RN Registered Nurse    Guadalupe Wade RN Registered Nurse    Flaquita Newman, RN Registered Nurse                  Assessment & Plan      Brief Patient Summary:  Jaime Bar is a 67 y.o. male with past medical history of cervicalgia, primary osteoarthritis involving multiple joints with surgery on bilateral shoulders and right knee replacement but no recent procedure or surgery, hyponatremia, prostate cancer, and cardiac amyloidosis presented to [facility] on 12/25/2022 with complaints of right shoulder pain, weakness, and altered mental status.  He was found to be hypotensive and hyponatremic with elevated lactic acid.  Patient had reported decreased oral intake.  He was transferred to [facility] ICU for further treatment of septic shock requiring vasopressors and hyponatremia.      Since admission he was found to be bacteremic with 2 sets of blood cultures positive for staph aureus, source being bilateral shoulder septic arthritis. ID has been treating the patient with IV ceftriaxone 2G and he will need a total of 6 weeks of antibiotic therapy.  He has status post bilateral shoulder arthroscopy with extensive debridement by Dr. Velez 12/28/2022. He remained intubated after surgery until 12/29 and now extubated. He has been weaned off vasopressors. Nephrology is managing his hyponatremia and STEVEN on CKD. He was found to have LUE DVT provoked from PICC line currently on argatroban. Hematology has been consulted for his thrombocytopenia.       cefTRIAXone, 2 g, Intravenous, Q24H  enoxaparin, 1.5 mg/kg, Subcutaneous, Daily  ferric gluconate, 250 mg, Intravenous, Daily  ferrous sulfate, 300 mg, Nasogastric, Daily  First Mouthwash (Magic Mouthwash), 5 mL, Swish & Spit, Q6H  furosemide, 40 mg, Intravenous, Q12H  lansoprazole, 30 mg, Nasogastric, Q AM  midodrine, 10 mg,  Nasogastric, Q8H  sodium chloride, 10 mL, Intravenous, Q12H  Zinc Oxide, , Apply externally, TID             Active Hospital Problems:  Active Hospital Problems    Diagnosis    • Bacteremia due to methicillin susceptible Staphylococcus aureus (MSSA)    • Staphylococcal arthritis of shoulder (HCC)    • Cervicalgia, spine surgery 2017    • Primary osteoarthritis involving multiple joints    • Hyponatremia    • Acute kidney injury superimposed on chronic kidney disease (HCC)    • Cardiac amyloidosis (HCC)    • ACC/AHA stage C chronic systolic heart failure (HCC)    • Tobacco abuse    • Primary hypertension      Plan:    Bilateral shoulder septic arthritis  Septic shock secondary to MSSA bacteremia  -Synovial fluid culture was consistent with infection and cultures are growing 4+ Staph aureus  -s/p bilateral shoulder arthroscopy with extensive debridement by Dr. Velez 12/28/2022  - 2/2 blood cultures positive for MSSA secondary to bilateral shoulder septic arthritis   -ALMA DELIA on 12/28, Negative for endocarditis  -On IV Rocephin 2 g daily x6 weeks per ID  -Off IV vasopressors, remains on midodrine  -Discontinued art line, MAP greater than 65 mmHg at this time  -LUE PICC line removed and tip also sent for culture  -Fevers have resolved, CT of the spine does not show any acute abscess or infectious changes  -ID following     Acute metabolic encephalopathy, improved  Severe thrombocytopenia, improved  LUE DVT, provoked from PICC line  -Patient's mental status worsening  -Initially concern for TTP however no schistocytes seen on peripheral smear per hematology  -Platelets improving  -Off argatroban drip, PTT elevated  -On low-dose Lovenox twice daily for now per heme-onc pending DIC work-up  -Patient's left lower extremity is back to normal now, good pulses noted, BERNARDINO showed mild digital ischemia  -No peripheral cyanosis noted at this time    Acute respiratory failure with hypoxia, improved  Pulmonary edema, improved  -Postop  respiratory failure, extubated on 12/39  -Chest x-ray showing congestion  -On diuresis per nephrology     STEVEN on CKD  Hyponatremia on admission and now hypernatremia  -Creatinine stable now but sodium trending up  -Free water flushes per nephrology  -Management per nephrology     PAF, s/p RVR  -Off amnio drip, rate controlled now  -Anticoagulation recommendations per cardiology and heme-onc  -Cardiology following, may need cardioversion prior to discharge    Elevated LFTs  -Likely due to shock liver  -Hep panel negative  -Liver ultrasound showed normal hepatic parenchyma, cholelithiasis with adenomyomatosis involving gallbladder wall, CBD upper limit of normal  -Monitor    H/o cardiac amyloidosis  H/o prostate CA  Osteoarthritis     DVT/GI Ppx.    DNR/DNI.    CODE STATUS:    Medical Intervention Limits: NO intubation (DNI)  Level Of Support Discussed With: Next of Kin (If No Surrogate)  Code Status (Patient has no pulse and is not breathing): No CPR (Do Not Attempt to Resuscitate)  Medical Interventions (Patient has pulse or is breathing): Limited Support  Release to patient: Routine Release      Disposition: Will likely need placement.  Pending improvement.    Electronically signed by James Holguin DO, 01/06/23, 10:24 EST.  [facility]ist Team      Part of this note may be an electronic transcription/translation of spoken language to printed text using the Dragon Dictation System.

## 2023-01-06 NOTE — THERAPY TREATMENT NOTE
"Subjective: Pt agreeable to therapeutic plan of care.  Pt conversing appropriately.    Objective:   NEMOE weeping- Nsg aware    AAROM to BLE and right hand with facilitation of active muscle engagement  Bed mobility - Max-A and Assist x 2 for rolling for linen change and perianal clean-up; Pt was positioned with head of bed elevated to ~ 70 degrees    Vitals: WNL    Pain: 3 VAS   Location: Bilateral shoulders  Intervention for pain: Repositioned and RN provided medication    Education: Provided education on the importance of mobility in the acute care setting and Verbal/Tactile Cues    Assessment: Jaime Bar presents with functional mobility impairments which indicate the need for skilled intervention. Pt more alert and projecting more with his voice . RUE still very edematous with weeping present. Pt able to perform   Active heel slides RLE x 12 reps. Pt has fair isometric quad contraction BLE. Pt moving ankles through greater range today. Pt moving his head and eyes more today. Pain well managed this session.  aTolerating session today without incident. Will continue to follow and progress as tolerated.     Plan/Recommendations:   Moderate Intensity Therapy recommended post-acute care. This is recommended as therapy feels the patient would require 3-4 days per week and wouldn't tolerate \"3 hour daily\" rehab intensity. SNF would be the preferred choice. If the patient does not agree to SNF, arrange HH or OP depending on home bound status. If patient is medically complex, consider LTACH.. Pt requires no DME at discharge.     Pt desires Skilled Rehab placement at discharge. Pt cooperative; agreeable to therapeutic recommendations and plan of care.         Basic Mobility 6-click:  Rollin = Total, A lot = 2, A little = 3; 4 = None  Supine>Sit:   1 = Total, A lot = 2, A little = 3; 4 = None   Sit>Stand with arms:  1 = Total, A lot = 2, A little = 3; 4 = None  Bed>Chair:   1 = Total, A lot = 2, A little = 3; " 4 = None  Ambulate in room:  1 = Total, A lot = 2, A little = 3; 4 = None  3-5 Steps with railin = Total, A lot = 2, A little = 3; 4 = None  Score: 6    Modified [place]: N/A = No pre-op stroke/TIA    Post-Tx Position: Supine with HOB Elevated, Staff Present, Alarms activated and Call light and personal items within reach  PPE: gloves and surgical mask

## 2023-01-06 NOTE — PROGRESS NOTES
"                                                                                                                                      Nephrology  Progress Note                                        [facility]    Patient Identification    Name: Jaime Bar  Age: 67 y.o.  Sex: male  :  1955  MRN: [id number]      DATE OF SERVICE:  2023        Subective     required more oxygen      Objective   Scheduled Meds:cefTRIAXone, 2 g, Intravenous, Q24H  enoxaparin, 1.5 mg/kg, Subcutaneous, Daily  ferric gluconate, 250 mg, Intravenous, Daily  ferrous sulfate, 300 mg, Nasogastric, Daily  First Mouthwash (Magic Mouthwash), 5 mL, Swish & Spit, Q6H  furosemide, 40 mg, Intravenous, Q12H  lansoprazole, 30 mg, Nasogastric, Q AM  midodrine, 10 mg, Nasogastric, Q8H  sodium chloride, 10 mL, Intravenous, Q12H  Zinc Oxide, , Apply externally, TID          Continuous Infusions:     PRN Meds:•  acetaminophen **OR** acetaminophen  •  aluminum-magnesium hydroxide-simethicone  •  dextrose  •  dextrose  •  fentanyl  •  glucagon (human recombinant)  •  HYDROcodone-acetaminophen  •  ondansetron **OR** ondansetron  •  sodium chloride  •  sodium chloride     Exam:  /66   Pulse 89   Temp 99 °F (37.2 °C)   Resp (!) 30   Ht 167.6 cm (66\")   Wt 86.3 kg (190 lb 4.1 oz)   SpO2 95%   BMI 30.71 kg/m²     Intake/Output last 3 shifts:  I/O last 3 completed shifts:  In: 7511 [I.V.:104; Other:5257; NG/GT:2150]  Out: 2950 [Urine:2775; Drains:175]    Intake/Output this shift:  I/O this shift:  In: 3424 [I.V.:485; Other:1817; NG/GT:1122]  Out: 1828 [Urine:1750; Drains:78]    Physical exam:  General Appearance: Confused  Head:  Normocephalic, without obvious abnormality, atraumatic  Eyes:  PERRL, conjunctiva/corneas clear     Neck:  Supple,  no adenopathy;      Lungs:  Decreased BS occasion ronchi  Heart:  Regular rate and rhythm, S1 and S2 normal  Abdomen:  Soft, non-tender, bowel sounds active   Extremities: trace " edema  Pulses: 2+ and symmetric all extremities  Skin:  No rashes or lesions       Data Review:  All labs (24hrs):   Recent Results (from the past 24 hour(s))   POC Glucose Once    Collection Time: 01/05/23 11:48 AM    Specimen: Blood   Result Value Ref Range    Glucose 95 70 - 105 mg/dL   POC Glucose Once    Collection Time: 01/05/23  5:03 PM    Specimen: Blood   Result Value Ref Range    Glucose 107 (H) 70 - 105 mg/dL   POC Glucose Once    Collection Time: 01/05/23 11:16 PM    Specimen: Blood   Result Value Ref Range    Glucose 104 70 - 105 mg/dL   Calcium, Ionized    Collection Time: 01/06/23  3:57 AM    Specimen: Blood   Result Value Ref Range    Ionized Calcium 1.07 (L) 1.20 - 1.30 mmol/L   Comprehensive Metabolic Panel    Collection Time: 01/06/23  3:57 AM    Specimen: Blood   Result Value Ref Range    Glucose 106 (H) 65 - 99 mg/dL    BUN 32 (H) 8 - 23 mg/dL    Creatinine 0.55 (L) 0.76 - 1.27 mg/dL    Sodium 141 136 - 145 mmol/L    Potassium 3.8 3.5 - 5.2 mmol/L    Chloride 99 98 - 107 mmol/L    CO2 33.0 (H) 22.0 - 29.0 mmol/L    Calcium 7.8 (L) 8.6 - 10.5 mg/dL    Total Protein 5.1 (L) 6.0 - 8.5 g/dL    Albumin 2.0 (L) 3.5 - 5.2 g/dL    ALT (SGPT) 52 (H) 1 - 41 U/L    AST (SGOT) 39 1 - 40 U/L    Alkaline Phosphatase 103 39 - 117 U/L    Total Bilirubin 3.4 (H) 0.0 - 1.2 mg/dL    Globulin 3.1 gm/dL    A/G Ratio 0.6 g/dL    BUN/Creatinine Ratio 58.2 (H) 7.0 - 25.0    Anion Gap 9.0 5.0 - 15.0 mmol/L    eGFR 108.6 >60.0 mL/min/1.73   Magnesium    Collection Time: 01/06/23  3:57 AM    Specimen: Blood   Result Value Ref Range    Magnesium 1.8 1.6 - 2.4 mg/dL   Phosphorus    Collection Time: 01/06/23  3:57 AM    Specimen: Blood   Result Value Ref Range    Phosphorus 2.8 2.5 - 4.5 mg/dL   CBC Auto Differential    Collection Time: 01/06/23  3:57 AM    Specimen: Blood   Result Value Ref Range    WBC 5.80 3.40 - 10.80 10*3/mm3    RBC 3.14 (L) 4.14 - 5.80 10*6/mm3    Hemoglobin 9.9 (L) 13.0 - 17.7 g/dL    Hematocrit 29.7  (L) 37.5 - 51.0 %    MCV 94.5 79.0 - 97.0 fL    MCH 31.4 26.6 - 33.0 pg    MCHC 33.3 31.5 - 35.7 g/dL    RDW 16.1 (H) 12.3 - 15.4 %    RDW-SD 53.4 37.0 - 54.0 fl    MPV 9.6 6.0 - 12.0 fL    Platelets 125 (L) 140 - 450 10*3/mm3    Neutrophil % 79.5 (H) 42.7 - 76.0 %    Lymphocyte % 7.4 (L) 19.6 - 45.3 %    Monocyte % 12.4 (H) 5.0 - 12.0 %    Eosinophil % 0.2 (L) 0.3 - 6.2 %    Basophil % 0.5 0.0 - 1.5 %    Neutrophils, Absolute 4.60 1.70 - 7.00 10*3/mm3    Lymphocytes, Absolute 0.40 (L) 0.70 - 3.10 10*3/mm3    Monocytes, Absolute 0.70 0.10 - 0.90 10*3/mm3    Eosinophils, Absolute 0.00 0.00 - 0.40 10*3/mm3    Basophils, Absolute 0.00 0.00 - 0.20 10*3/mm3    nRBC 0.0 0.0 - 0.2 /100 WBC          Imaging:  CT Head Without Contrast    Result Date: 12/31/2022  1. No acute intracranial abnormality. Specifically, no evidence of acute hemorrhage, mass effect or midline shift. 2. Mild global cerebral volume loss. 3. Mild bilateral air-fluid levels within the maxillary sinuses which can be seen with acute sinus disease in the correct clinical setting.  Electronically Signed By-Willy Baxter MD On:12/31/2022 9:01 PM This report was finalized on [id number] by  Willy Baxter MD.    CT Cervical Spine With Contrast    Result Date: 1/3/2023  1.Postoperative changes status post prior fusion. No definitive signs of hardware failure or loosening are noted. 2.No evidence for displaced fracture or malalignment throughout the cervical spine. 3.No evidence for acute soft tissue abnormality. There is no evidence for abnormal enhancement. No abnormal peripherally enhancing fluid collection is seen. 4.Changes of cervical spondylosis are noted throughout the cervical spine. Associated hypertrophic posterior facet changes and uncovertebral changes are also observed. Electronically Signed: Solo Ray  1/3/2023 10:25 PM EST  Workstation ID: [id number]    CT Thoracic Spine With Contrast    Result Date: 1/3/2023  1.No evidence for  displaced fracture or malalignment throughout the thoracic spine. No evidence for erosive endplate changes. 2.No evidence for abnormal enhancement. There is no abnormal fluid collection within the paraspinal soft tissues. There is no evidence for epidural abscess. 3.Degenerative changes are noted throughout the thoracic spine. 4.Interstitial changes are seen throughout the lungs. Bibasilar infiltrates are noted. Bilateral pleural effusions are observed. Electronically Signed: Solo Ray  1/3/2023 10:31 PM EST  Workstation ID: [id number]    CT Lumbar Spine With Contrast    Result Date: 1/3/2023  1.No evidence for displaced fracture or malalignment throughout the lumbar spine. No evidence for erosive endplate changes. 2.No evidence for abnormal enhancement. There is no abnormal fluid collection within the paraspinal soft tissues. There is no evidence for epidural abscess. 3.Advanced degenerative changes are noted throughout the lumbar spine. 4.Evidence for bilateral spondylolysis with associated spondylolisthesis at the L5 level. Prior postoperative changes are also noted. Electronically Signed: Solo Ray  1/3/2023 10:35 PM EST  Workstation ID: [id number]    US Liver    Result Date: 12/30/2022  1. Normal hepatic parenchyma. 2. Cholelithiasis with adenomyomatosis involving gallbladder wall. 3. Common bile duct at the upper limits of normal measuring 6 to 7 mm.  Electronically Signed By-Genaro Canada MD On:12/30/2022 3:40 PM This report was finalized on [id number] by  Genaro Canada MD.    XR Chest 1 View    Result Date: 1/3/2023  Impression: 1. Radiographic changes consistent with congestive heart failure pulmonary edema with interval increase in pulmonary edema and development of small bilateral pleural effusions. Electronically Signed: Jaime Momin  1/3/2023 9:14 AM EST  Workstation ID: [id number]    XR Chest 1 View    Result Date: 12/30/2022  Cardiomegaly and increased pulmonary vascular congestion and suspected  interstitial edema  Lines and tubes as above[  Electronically Signed ByKehinde Aly On:12/30/2022 10:19 AM This report was finalized on [id number] by  Jaxon Aly, .    XR Abdomen KUB    Result Date: 12/30/2022  NG tube projects over the expected position of the body of the stomach  Electronically Signed By-Jaxon Aly On:12/30/2022 12:00 PM This report was finalized on [id number] by  Jaxon Aly, .      Assessment/Plan:     Hyponatremia    ACC/AHA stage C chronic systolic heart failure (HCC)    Cardiac amyloidosis (HCC)    Primary hypertension    Tobacco abuse    Cervicalgia, spine surgery 2017    Primary osteoarthritis involving multiple joints    Bacteremia due to methicillin susceptible Staphylococcus aureus (MSSA)    Staphylococcal arthritis of shoulder (HCC)    Acute kidney injury superimposed on chronic kidney disease (HCC)   hyponatremia improving   Acute kidney injury on chronic kidney disease improving  Atrial fibrillation with rapid ventricular response  Metabolic acidosis  Urinary retention   Sepsis   Hypocalcemia   Hyperkalemia     Tolerating diuresis  Oxygenation better  Status post IV contrast study yesterday   Follow labs

## 2023-01-06 NOTE — THERAPY RE-EVALUATION
Acute Care - Speech Language Pathology   Swallow Re-Evaluation HUE Rodriguez     Patient Name: Jaime Bar  : 1955  MRN: [id number]  Today's Date: 2023               Admit Date: 2022    Visit Dx:     ICD-10-CM ICD-9-CM   1. Pyogenic arthritis of right shoulder region, due to unspecified organism (HCC)  M00.9 711.01     Patient Active Problem List   Diagnosis   • Hyponatremia   • ACC/AHA stage C chronic systolic heart failure (HCC)   • Cardiac amyloidosis (HCC)   • History of prostate cancer   • Hx of neck surgery   • Neck pain   • Primary hypertension   • Tobacco abuse   • Cervicalgia, spine surgery 2017   • Primary osteoarthritis involving multiple joints   • Bacteremia due to methicillin susceptible Staphylococcus aureus (MSSA)   • Staphylococcal arthritis of shoulder (HCC)   • Acute kidney injury superimposed on chronic kidney disease (HCC)     History reviewed. No pertinent past medical history.  Past Surgical History:   Procedure Laterality Date   • SHOULDER ARTHROSCOPY Right 2022    Procedure: SHOULDER ARTHROSCOPY INCISION AND DRAINAGE.;  Surgeon: Nikunj Velez MD;  Location: [facility];  Service: Orthopedics;  Laterality: Right;   • SHOULDER ARTHROSCOPY Left 2022    Procedure: SHOULDER ARTHROSCOPY INCISION AND DRAINAGE;  Surgeon: Nikunj Velez MD;  Location: [facility];  Service: Orthopedics;  Laterality: Left;       SLP Recommendation and Plan     SLP Diet Recommendation: NPO, temporary alternate methods of nutrition/hydration (23 1000)     SLP Rec. for Method of Medication Administration: meds via alternate route (23 1000)     Monitor for Signs of Aspiration: yes, notify SLP if any concerns (23 1000)  Recommended Diagnostics: reassess via clinical swallow evaluation, reassess via VFSS (MBS) (23 1000)           Therapy Frequency (Swallow): PRN (23 1000)  Predicted Duration Therapy Intervention (Days): until discharge  (01/06/23 1000)        Treatment Assessment (SLP): continued, suspected, oral dysphagia, pharyngeal dysphagia (01/06/23 1000)     Plan for Continued Treatment (SLP): continue treatment per plan of care (01/06/23 1000)        SWALLOW EVALUATION (last 72 hours)     SLP Adult Swallow Evaluation     Row Name 01/06/23 1000       Rehab Evaluation    Document Type re-evaluation  -LF    Subjective Information no complaints  -LF    Patient Observations alert;cooperative;agree to therapy  -LF    Patient Effort good  -LF    Comment Pt seen for skilled ST targeting dysphagia this date. Upon entry, pt awake and alert laying in bed. Nursing repositioned pt to be upright at 90 degrees. Pt given trials of ice chips and water by all presentations. All trials fed by SLP. Oral phase characterized by adequate labial seal w/ no anterior loss. Poor manipulation of ice chips. Delayed oral transit. Pharyngeal phase characterized by multiple weak swallows w/ each trial per palpation. Intermittent weak cough and wet vocal quality. Pt does not appear safe/ready for a PO diet at this time. Recommend pt continue NPO w/ alt means for nutrition and Ni Water Protocol. ST will continue to follow for ongoing re-evaluation.  -LF    Symptoms Noted During/After Treatment none  -LF       General Information    Patient Profile Reviewed yes  -LF       SLP Treatment Clinical Impressions    Treatment Assessment (SLP) continued;suspected;oral dysphagia;pharyngeal dysphagia  -LF    Plan for Continued Treatment (SLP) continue treatment per plan of care  -LF       Recommendations    Therapy Frequency (Swallow) PRN  -LF    Predicted Duration Therapy Intervention (Days) until discharge  -LF    SLP Diet Recommendation NPO;temporary alternate methods of nutrition/hydration  -LF    Recommended Diagnostics reassess via clinical swallow evaluation;reassess via VFSS (MBS)  -LF    Oral Care Recommendations Oral Care BID/PRN;Before ice/water;Swab  -LF    SLP Rec.  for Method of Medication Administration meds via alternate route  -LF    Monitor for Signs of Aspiration yes;notify SLP if any concerns  -LF       Swallow Goals (SLP)    Swallow LTGs Swallow Long Term Goal (free text)  -LF    Swallow STGs diet tolerance goal selection (SLP)  -LF    Diet Tolerance Goal Selection (SLP) Swallow Short Term Goal 1  -LF       (LTG) Swallow    (LTG) Swallow Patient will tolerate safest and least restrictive diet without complications from aspiration.  -LF    Time Frame (Swallow Long Term Goal) by discharge  -LF    Progress/Outcomes (Swallow Long Term Goal) progress slower than expected  -LF       (STG) Swallow 1    (STG) Swallow 1 The patient will participate in ongoing assessment of swallow, including reevaluation clinically and/or inclulding instrumental assessment of swallow if indicated to further assess swallow function in anticipation of initiation of PO diet.  -LF    Time Frame (Swallow Short Term Goal 1) 1 week  -LF    Progress/Outcomes (Swallow Short Term Goal 1) goal ongoing;progress slower than expected  -LF          User Key  (r) = Recorded By, (t) = Taken By, (c) = Cosigned By    Initials Name Effective Dates    LF Isa Puckett SLP 06/16/21 -     CB Padma Moss SLP 09/21/21 -                 EDUCATION  The patient has been educated in the following areas:   Dysphagia (Swallowing Impairment) Oral Care/Hydration NPO rationale.              Time Calculation:       Therapy Charges for Today     Code Description Service Date Service Provider Modifiers Qty    [id number]  ST TREATMENT SWALLOW 4 1/5/2023 Isa Puckett, MONSTER GN 1               MONSTER Chaves  1/6/2023

## 2023-01-06 NOTE — CONSULTS
"Nutrition Services    Patient Name: Jaime Bar  YOB: 1955  MRN: [id number]  Admission date: 12/26/2022    Comment:  -- Continue Nutren 1.5 at 65 mL/hr     -- Decrease water flush to 50 mL/water flush related to Na+ trends      PPE Documentation        PPE Worn By Provider mask, gloves and eye protection   PPE Worn By Patient  None      CLINICAL NUTRITION ASSESSMENT      Reason for Assessment Follow up protocol   12/30: NPO x 4 days, Consult for tube feeding      H&P      History reviewed. No pertinent past medical history.    Past Surgical History:   Procedure Laterality Date   • SHOULDER ARTHROSCOPY Right 12/28/2022    Procedure: SHOULDER ARTHROSCOPY INCISION AND DRAINAGE.;  Surgeon: Nikunj Velez MD;  Location: [facility];  Service: Orthopedics;  Laterality: Right;   • SHOULDER ARTHROSCOPY Left 12/28/2022    Procedure: SHOULDER ARTHROSCOPY INCISION AND DRAINAGE;  Surgeon: Nikunj Velez MD;  Location: [facility];  Service: Orthopedics;  Laterality: Left;        Current Problems   Septic shock    Hyponatremia   - Nephrology following    Right upper extremity swelling  - I&D 12/28, extubated 12/29       Encounter Information        Trending Narrative     1/6: Since last review, patient continues on tube feeding.  RD visited patient at bedside.  Patient alert.  Reports no N/V.      12/30: RD visited patient at bedside.  Patient asleep.  No visitors present.  RN concerned about nutritional status, MD agreed to placement of NG tube and beginning tube feeding per discussion in rounds.  Extubated yesterday.  No pressors.  Unable to diagnosis with malnutrition at this time.  Patient with severe temporal and clavicle muscle loss, unable to assess multiple areas legs seems edematous despite documentation of no edema.  RD will continue to monitor.       Anthropometrics        Current Height, Weight Height: 167.6 cm (66\")  Weight: 86.3 kg (190 lb 4.1 oz) (01/06/23 0300)       Ideal " Body Weight (IBW) 142#   Usual Body Weight (UBW) Unable to obtain from patient        Trending Weight Hx     This admission: 1/6: 2.5% weight loss since last RD review (+16.7L since admission)  12/30: 180-195# range              PTA: No weight history to note     Wt Readings from Last 30 Encounters:   01/06/23 0300 86.3 kg (190 lb 4.1 oz)   01/04/23 0500 84.9 kg (187 lb 2.7 oz)   01/03/23 0700 92.3 kg (203 lb 7.8 oz)   12/31/22 0000 90.1 kg (198 lb 10.2 oz)   12/30/22 0435 88.7 kg (195 lb 8.8 oz)   12/29/22 0457 87.8 kg (193 lb 9 oz)   12/28/22 0516 87.2 kg (192 lb 3.9 oz)   12/27/22 0245 86 kg (189 lb 9.5 oz)   12/26/22 0542 81.8 kg (180 lb 5.4 oz)   12/26/22 0230 81.8 kg (180 lb 5.4 oz)      BMI kg/m2 Body mass index is 30.71 kg/m².       Labs        Pertinent Labs    Results from last 7 days   Lab Units 01/06/23  0357 01/05/23 0411 01/04/23  0352   SODIUM mmol/L 141 144 148*   POTASSIUM mmol/L 3.8 3.9 4.4   CHLORIDE mmol/L 99 104 108*   CO2 mmol/L 33.0* 33.0* 32.0*   BUN mg/dL 32* 33* 40*   CREATININE mg/dL 0.55* 0.55* 0.77   CALCIUM mg/dL 7.8* 7.7* 8.0*   BILIRUBIN mg/dL 3.4* 3.1* 3.5*   ALK PHOS U/L 103 155* 96   ALT (SGPT) U/L 52* 61* 93*   AST (SGOT) U/L 39 33 36   GLUCOSE mg/dL 106* 117* 127*     Results from last 7 days   Lab Units 01/06/23  0357 01/05/23 0411 01/04/23  0352   MAGNESIUM mg/dL 1.8 1.7 1.9   PHOSPHORUS mg/dL 2.8 3.2 3.6   HEMOGLOBIN g/dL 9.9* 9.9* 11.0*   HEMATOCRIT % 29.7* 30.6* 34.1*     SARS-CoV-2, CASANDRA   Date Value Ref Range Status   09/10/2021 Not Detected Not Detected Final     Comment:     Testing was performed using the lauren(R) SARS-CoV-2 test.  This nucleic acid amplification test was developed and its performance  characteristics determined by [employer]. Nucleic acid  amplification tests include RT-PCR and TMA. This test has not been  FDA cleared or approved. This test has been authorized by FDA under  an Emergency Use Authorization (EUA). This test is only  authorized  for the duration of time the declaration that circumstances exist  justifying the authorization of the emergency use of in vitro  diagnostic tests for detection of SARS-CoV-2 virus and/or diagnosis  of COVID-19 infection under section 564(b)(1) of the Act, 21 U.S.C.  360bbb-3(b) (1), unless the authorization is terminated or revoked  sooner.  When diagnostic testing is negative, the possibility of a false  negative result should be considered in the context of a patient's  recent exposures and the presence of clinical signs and symptoms  consistent with COVID-19. An individual without symptoms of COVID-19  and who is not shedding SARS-CoV-2 virus would expect to have a  negative (not detected) result in this assay.  [address], [place]  [id number]  : Leo Rod PhD, Phone:  [phone]     Lab Results   Component Value Date    HGBA1C 4.2 12/26/2022        Medications    Scheduled Medications cefTRIAXone, 2 g, Intravenous, Q24H  enoxaparin, 1.5 mg/kg, Subcutaneous, Daily  ferric gluconate, 250 mg, Intravenous, Daily  ferrous sulfate, 300 mg, Nasogastric, Daily  First Mouthwash (Magic Mouthwash), 5 mL, Swish & Spit, Q6H  furosemide, 40 mg, Intravenous, Q12H  lansoprazole, 30 mg, Nasogastric, Q AM  midodrine, 10 mg, Nasogastric, Q8H  sodium chloride, 10 mL, Intravenous, Q12H  Zinc Oxide, , Apply externally, TID        Infusions      PRN Medications •  acetaminophen **OR** acetaminophen  •  aluminum-magnesium hydroxide-simethicone  •  dextrose  •  dextrose  •  fentanyl  •  glucagon (human recombinant)  •  HYDROcodone-acetaminophen  •  ondansetron **OR** ondansetron  •  sodium chloride  •  sodium chloride     Physical Findings        Trending Physical   Appearance, NFPE 1/6: Continue to agree with below documentation.  Patient remains in ICU with limited areas available to assess.  Edema now documented with hinders assessment.      12/30: Unable to diagnosis with malnutrition at this  "time.  Patient with severe temporal and clavicle muscle loss, unable to assess multiple areas, legs seems edematous despite documentation of no edema.  Unsure of PO intakes PTA.  No weight history available.  RD will continue to monitor.      Fat Loss Unable  None  Mild  Moderate Severe   Orbital    x    Upper Arm x       Thoracic  x       Muscle Loss         Temple     x   Clavicle      x   Acromion  x       Scapular  x       Dorsal Hand         Patellar x       Thigh  x       Calf  x            --  Edema  3+ right arm, right hand  2+ dependent, left arm, left hand, legs   1+ generalized, feet      Bowel Function Last BM 1/3 (x 3 days ago)     Tubes NG tube      Chewing/Swallowing NPO      Skin Right shoulder incisions  Left shoulder incisions   Sacral wound (nonblanchable ecchymosis or deep tissue pressure injury per [facility]OC[facility] note 1/2)       Estimated/Assessed Needs    Estimated 12/30/22, continues to be appropriate 1/6/23   Energy Requirements    Height for Calculation  Height: 167.6 cm (66\")   Weight for Calculation 64.5 kg IBW   Method for Estimation  30-40 kcal/kg    EST Needs (kcal/day) 4909-0501 kcal/day        Protein Requirements    Weight for Calculation 64.5 kg IBW   EST Protein Needs (g/kg) 1.5-2.0 g/kg    EST Daily Needs (g/day)  grams/day       Fluid Requirements     Estimated Needs (mL/day) 1 mL/kcal, will monitor per hydration status        Fluid Deficit    Current Na Level (mEq/L)    Desired Na Level (mEq/L)    Estimated Fluid Deficit (L)       Current Nutrition Orders & Evaluation of Intake       Oral Nutrition     Food Allergies NKFA   Current PO Diet NPO Diet NPO Type: Strict NPO   Supplement None ordered    PO Evaluation     Trending % PO Intake 1/6: NPO  12/30: NPO      Enteral Nutrition    Enteral Route    TF Modular    TF Delivery Method    Current TF Order    Current Water Flush     TF Residual/Tolerance     TF Observation         Parenteral Nutrition     TPN Route    Current TPN Order  "   Lipids (mL/%/frequency)     MVI Frequency     Trace Element Frequency     Total # Days on TPN    TPN Observation         Nutrition Course Details    PO Diets: Regular 12/26  NPO 12/26 to current 1/6   Nutrition Support: Tube feeding began 12/30, continues 1/6     Nutritional Risk Screening        NRS-2002 Score          Nutrition Diagnosis         Nutrition Dx Problem 1 Inadequate oral intake related to clinical course as evidence by NPO, TF for nutrition.        Nutrition Dx Problem 2        Intervention Goal         Intervention Goal(s) Tolerate EN     Nutrition Intervention        RD Action Continue EN     Nutrition Prescription          Diet Prescription NPO   Supplement Prescription      Enteral Prescription End Goal:    Nutren 1.5 at 65 mL/hr + 50 mL/hour water flush     Calories  2145 kcals (111% lower end)    Protein  97 g (100% lower end)    Free water  1087 mL    Flushes  1100 mL      The above end goal rate is for 22 hrs/day to assume interruptions for ADLs. Water flushes adjusted based on clinical picture + Rx flushes/IV fluids          TPN Prescription      Monitor/Evaluation        Monitor Per protocol, I&O, Pertinent labs, EN delivery/tolerance, Weight, Skin status, GI status, Symptoms, POC/GOC       Electronically signed by:  Gloria Park RD  01/06/23 12:29 EST

## 2023-01-06 NOTE — PLAN OF CARE
Goal Outcome Evaluation:      Patient has had a quiet night. Pain controlled with prn doses of hydrocodone. Tolerating tube feeding at goal rate. Good urine output. Vitals stable.

## 2023-01-06 NOTE — THERAPY TREATMENT NOTE
"Subjective: Pt agreeable to therapeutic plan of care.  Cognition: oriented to Person    Objective:     Bed Mobility: Max-A   Functional Transfers: N/A or Not attempted.  Functional Ambulation: N/A or Not attempted.    Toileting: Dependent  ADL Position: supine      Grooming: Dependent  ADL Position: sitting up in bed      Vitals: WNL    Pain: 6 VAS  Location: B shoulder  Interventions for pain: Repositioned  Education: Provided education on the importance of mobility in the acute care setting    Assessment: Jaime Bar presents with ADL impairments below baseline abilities which indicate the need for continued skilled intervention while inpatient. Tolerating session today without incident. Will continue to follow and progress as tolerated.     Plan/Recommendations:   Moderate Intensity Therapy recommended post-acute care. This is recommended as therapy feels the patient would require 3-4 days per week and wouldn't tolerate \"3 hour daily\" rehab intensity. SNF would be the preferred choice. If the patient does not agree to SNF, arrange HH or OP depending on home bound status. If patient is medically complex, consider LTACH.. Pt requires no DME at discharge.     Pt desires Skilled Rehab placement at discharge. Pt cooperative; agreeable to therapeutic recommendations and plan of care.     Modified [place]: N/A = No pre-op stroke/TIA    Post-Tx Position: Supine with HOB Elevated  PPE: gloves and surgical mask    "

## 2023-01-06 NOTE — PROGRESS NOTES
"    Reason for follow-up: Atrial fibrillation, cardiomyopathy     Patient Care Team:  Provider, No Known as PCP - General  Kyle Haas MD as Consulting Physician (Nephrology)  Vicente Mendoza MD as Consulting Physician (Hematology and Oncology)    Subjective .   Jaime Bar is doing fair today, no current complaints.     Review of Systems   Constitutional: Positive for malaise/fatigue. Negative for fever.   Cardiovascular: Negative for chest pain, irregular heartbeat and palpitations.   Respiratory: Negative for cough and shortness of breath.    Gastrointestinal: Negative for nausea and vomiting.   Neurological: Negative for dizziness and headaches.   All other systems reviewed and are negative.      Tramadol-acetaminophen, Codeine, and Tramadol    Scheduled Meds:cefTRIAXone, 2 g, Intravenous, Q24H  enoxaparin, 1.5 mg/kg, Subcutaneous, Daily  ferric gluconate, 250 mg, Intravenous, Daily  ferrous sulfate, 300 mg, Nasogastric, Daily  First Mouthwash (Magic Mouthwash), 5 mL, Swish & Spit, Q6H  furosemide, 40 mg, Intravenous, Q12H  lansoprazole, 30 mg, Nasogastric, Q AM  midodrine, 10 mg, Nasogastric, Q8H  sodium chloride, 10 mL, Intravenous, Q12H  Zinc Oxide, , Apply externally, TID      Continuous Infusions:   PRN Meds:.•  acetaminophen **OR** acetaminophen  •  aluminum-magnesium hydroxide-simethicone  •  dextrose  •  dextrose  •  fentanyl  •  glucagon (human recombinant)  •  HYDROcodone-acetaminophen  •  ondansetron **OR** ondansetron  •  sodium chloride  •  sodium chloride      VITAL SIGNS  Vitals:    01/06/23 0325 01/06/23 0400 01/06/23 0510 01/06/23 0800   BP: 104/61 105/70 104/66    Pulse: 92 84 89    Resp:  (!) 30     Temp:    98.5 °F (36.9 °C)   TempSrc:    Oral   SpO2: 93% 94% 95%    Weight:       Height:           Flowsheet Rows    Flowsheet Row First Filed Value   Admission Height 167.6 cm (66\") Documented at 12/26/2022 0230   Admission Weight 81.8 kg (180 lb 5.4 oz) Documented at 12/26/2022 0230 "             Physical Exam  VITALS REVIEWED    General:      well developed, in no acute distress.    Head:      normocephalic and atraumatic.    Eyes:      PERRL/EOM intact, conjunctiva and sclera clear with out nystagmus.    Neck:      no masses, thyromegaly,  trachea central with normal respiratory effort and PMI displaced laterally  Lungs:      Clear  Heart:       Irregular rhythm  Msk:      no deformity or scoliosis noted of thoracic or lumbar spine.    Pulses:      pulses normal in all 4 extremities.    Extremities:       1+ edema  Neurologic:      no focal deficits.   alert oriented x3  Skin:      intact without lesions or rashes.  Drains present in each shoulder.  Purulent drainage in right drain  Psych:      alert and cooperative; normal mood and affect; normal attention span and concentration.          LAB RESULTS (LAST 7 DAYS)    CBC  Results from last 7 days   Lab Units 01/06/23 0357 01/05/23 0411 01/04/23 0352 01/03/23  0914 01/02/23  0606 01/01/23 0614 12/31/22  0520   WBC 10*3/mm3 5.80 5.90 7.00 8.20 9.30 7.80 9.80   RBC 10*6/mm3 3.14* 3.18* 3.51* 3.50* 3.56* 3.63* 3.48*   HEMOGLOBIN g/dL 9.9* 9.9* 11.0* 11.1* 11.3* 11.4* 11.3*   HEMATOCRIT % 29.7* 30.6* 34.1* 34.1* 34.6* 34.6* 32.6*   MCV fL 94.5 96.2 97.1* 97.2* 97.1* 95.5 93.7   PLATELETS 10*3/mm3 125* 121* 116* 104* 78* 60* 46*       BMP  Results from last 7 days   Lab Units 01/06/23  0357 01/05/23 0411 01/04/23  0352 01/03/23  0914 01/02/23  0606 01/01/23  0614 12/31/22  0520   SODIUM mmol/L 141 144 148* 150* 152* 146* 140   POTASSIUM mmol/L 3.8 3.9 4.4 4.7 4.3 4.0 3.8   CHLORIDE mmol/L 99 104 108* 116* 120* 113* 107   CO2 mmol/L 33.0* 33.0* 32.0* 28.0 27.0 25.0 22.0   BUN mg/dL 32* 33* 40* 36* 41* 52* 68*   CREATININE mg/dL 0.55* 0.55* 0.77 0.62* 0.69* 0.82 1.12   GLUCOSE mg/dL 106* 117* 127* 122* 144* 135* 144*   MAGNESIUM mg/dL 1.8 1.7 1.9 2.0 2.0 2.0 2.2   PHOSPHORUS mg/dL 2.8 3.2 3.6 3.0 2.6 3.0 2.9       CMP   Results from last 7 days    Lab Units 01/06/23  0357 01/05/23  0411 01/04/23  0352 01/03/23  0914 01/02/23  0606 01/01/23  0614 12/31/22  0520   SODIUM mmol/L 141 144 148* 150* 152* 146* 140   POTASSIUM mmol/L 3.8 3.9 4.4 4.7 4.3 4.0 3.8   CHLORIDE mmol/L 99 104 108* 116* 120* 113* 107   CO2 mmol/L 33.0* 33.0* 32.0* 28.0 27.0 25.0 22.0   BUN mg/dL 32* 33* 40* 36* 41* 52* 68*   CREATININE mg/dL 0.55* 0.55* 0.77 0.62* 0.69* 0.82 1.12   GLUCOSE mg/dL 106* 117* 127* 122* 144* 135* 144*   ALBUMIN g/dL 2.0* 2.3* 2.2* 2.4* 2.4* 2.2* 2.2*   BILIRUBIN mg/dL 3.4* 3.1* 3.5* 3.4* 3.3* 4.0* 4.4*   ALK PHOS U/L 103 155* 96 120* 102 107 157*   AST (SGOT) U/L 39 33 36 44* 104* 308* 520*   ALT (SGPT) U/L 52* 61* 93* 122* 200* 306* 347*         BNP        TROPONIN        CoAg  Results from last 7 days   Lab Units 01/01/23  0614 12/31/22  1729 12/31/22  0832 12/31/22  0413 12/31/22  0002 12/30/22  1943 12/30/22  1542   APTT seconds 61.1* 74.1* 94.8* 98.2* 105.2* 111.5* 119.9*       Creatinine Clearance  Estimated Creatinine Clearance: 134.2 mL/min (A) (by C-G formula based on SCr of 0.55 mg/dL (L)).    ABG  Results from last 7 days   Lab Units 12/31/22  0843   PH, ARTERIAL pH units 7.388   PCO2, ARTERIAL mm Hg 37.1   PO2 ART mm Hg 84.5   O2 SATURATION ART % 96.3   BASE EXCESS ART mmol/L -2.3*         EKG    I personally reviewed the patient's EKG/Telemetry data: Atrial fibrillation      Assessment & Plan       Hyponatremia    ACC/AHA stage C chronic systolic heart failure (HCC)    Cardiac amyloidosis (HCC)    Primary hypertension    Tobacco abuse    Cervicalgia, spine surgery 2017    Primary osteoarthritis involving multiple joints    Bacteremia due to methicillin susceptible Staphylococcus aureus (MSSA)    Staphylococcal arthritis of shoulder (HCC)    Acute kidney injury superimposed on chronic kidney disease (HCC)      Jaime Bar is a 67-year-old male patient who presented to the hospital with complaints of bilateral shoulder pain.  Patient was found to  be septic with staph aureus bacteremia and had bilateral shoulder abscesses.  He underwent arthroscopy and drainage of both shoulders on 12/28/2022.  A transesophageal echocardiogram done on 12/28/2022 did not show any endocarditis.  Patient however does have history of cardiac amyloidosis based on an MRI done in March 2022, he does have cardiomyopathy with ejection fraction of around 45% based on MRI as well as ALMA DELIA.  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  Patient was found to be in atrial fibrillation during this hospitalization.  The exact onset of A. fib is unknown, however in January 2022 he was in sinus rhythm based on EKG report from then.  It is likely that the A. fib was triggered by his infection.  Patient is receiving full dose Lovenox, he is already had a ALMA DELIA which did not show thrombus.  He is still frail, possibly still septic, still has drain in his left shoulder and had spiked fever earlier today.  For that I will defer cardioversion but we will keep following him and perform cardioversion prior to his discharge as long as he remains on anticoagulation.  Also consider switching to p.o. anticoagulation such as Xarelto or Eliquis if no further surgeries are anticipated.    Continue current medical therapy  Continues to be in atrial fibrillation with rate controlled  Continues on full dose Lovenox, will change to Eliquis or Xarelto when appropriate  Plan for cardioversion once patient no longer afebrile    Discussed findings and plan with patient and nurse.  Further assessment and recommendations per Dr. Yu.    RITU Hernandez  01/06/23  10:58 EST  Electronically signed by RITU Hernandez, 01/06/23, 11:04 AM EST.

## 2023-01-06 NOTE — PROGRESS NOTES
Infectious Diseases Progress Note      LOS: 11 days   Patient Care Team:  Provider, No Known as PCP - Kyle Gardner MD as Consulting Physician (Nephrology)  Vicente Mendoza MD as Consulting Physician (Hematology and Oncology)    Chief Complaint: Fatigue    Subjective       Patient had no high-grade fever during the last 24 hours.  The patient is awake and alert.  Currently on 2 L of oxygen.  On no vasopressors    Review of Systems:   Review of Systems   Constitutional: Positive for fatigue.   HENT: Negative.    Eyes: Negative.    Respiratory: Negative.    Cardiovascular: Negative.    Gastrointestinal: Negative.    Genitourinary: Negative.    Musculoskeletal: Positive for joint swelling.   Skin: Negative.    Neurological: Negative.    Hematological: Negative.    Psychiatric/Behavioral: Negative.         Objective     Vital Signs  Temp:  [98.5 °F (36.9 °C)-99.3 °F (37.4 °C)] 98.6 °F (37 °C)  Heart Rate:  [] 81  Resp:  [25-36] 30  BP: ()/(61-88) 112/73    Physical Exam:  Physical Exam  Vitals and nursing note reviewed.   Constitutional:       General: He is not in acute distress.     Appearance: He is well-developed and normal weight. He is ill-appearing. He is not diaphoretic.      Comments: The patient is more awake and alert today   HENT:      Head: Normocephalic and atraumatic.   Eyes:      Pupils: Pupils are equal, round, and reactive to light.   Cardiovascular:      Rate and Rhythm: Normal rate and regular rhythm.      Heart sounds: Normal heart sounds, S1 normal and S2 normal.   Pulmonary:      Effort: Pulmonary effort is normal. No respiratory distress.      Breath sounds: No stridor. Rales present. No wheezing.   Abdominal:      General: Abdomen is flat. Bowel sounds are normal. There is no distension.      Palpations: Abdomen is soft. There is no mass.      Tenderness: There is no abdominal tenderness. There is no guarding.      Comments: NG tube   Musculoskeletal:         General:  No deformity. Normal range of motion.      Cervical back: Neck supple.      Right lower leg: Edema present.      Left lower leg: Edema present.      Comments: Drain tubes are present in the right and left shoulders with bloody serous drainage    Bilateral upper extremity edema with right arm worse than left   Skin:     General: Skin is warm and dry.      Coloration: Skin is not pale.      Findings: No erythema or rash.   Neurological:      Mental Status: He is alert and oriented to person, place, and time.      Cranial Nerves: No cranial nerve deficit.          Results Review:    I have reviewed all clinical data, test, lab, and imaging results.     Radiology  No Radiology Exams Resulted Within Past 24 Hours    Cardiology    Laboratory    Results from last 7 days   Lab Units 01/06/23  0357 01/05/23  0411 01/04/23  0352 01/03/23  0914 01/02/23  0606 01/01/23  0614 12/31/22  0520   WBC 10*3/mm3 5.80 5.90 7.00 8.20 9.30 7.80 9.80   HEMOGLOBIN g/dL 9.9* 9.9* 11.0* 11.1* 11.3* 11.4* 11.3*   HEMATOCRIT % 29.7* 30.6* 34.1* 34.1* 34.6* 34.6* 32.6*   PLATELETS 10*3/mm3 125* 121* 116* 104* 78* 60* 46*     Results from last 7 days   Lab Units 01/06/23  0357 01/05/23 0411 01/04/23  0352 01/03/23  0914 01/02/23  0606 01/01/23 0614 12/31/22  0520   SODIUM mmol/L 141 144 148* 150* 152* 146* 140   POTASSIUM mmol/L 3.8 3.9 4.4 4.7 4.3 4.0 3.8   CHLORIDE mmol/L 99 104 108* 116* 120* 113* 107   CO2 mmol/L 33.0* 33.0* 32.0* 28.0 27.0 25.0 22.0   BUN mg/dL 32* 33* 40* 36* 41* 52* 68*   CREATININE mg/dL 0.55* 0.55* 0.77 0.62* 0.69* 0.82 1.12   GLUCOSE mg/dL 106* 117* 127* 122* 144* 135* 144*   ALBUMIN g/dL 2.0* 2.3* 2.2* 2.4* 2.4* 2.2* 2.2*   BILIRUBIN mg/dL 3.4* 3.1* 3.5* 3.4* 3.3* 4.0* 4.4*   ALK PHOS U/L 103 155* 96 120* 102 107 157*   AST (SGOT) U/L 39 33 36 44* 104* 308* 520*   ALT (SGPT) U/L 52* 61* 93* 122* 200* 306* 347*   CALCIUM mg/dL 7.8* 7.7* 8.0* 8.0* 8.2* 8.0* 8.0*                 Microbiology   Microbiology Results (last  10 days)     Procedure Component Value - Date/Time    Catheter Culture - Cath Tip, Hand, Left [[id number]] Collected: 12/31/22 1839    Lab Status: Final result Specimen: Cath Tip from Hand, Left Updated: 01/03/23 0958     CATHETER CULTURE No growth at 2 days    Blood Culture - Blood, Blood, PICC Line [[id number]]  (Normal) Collected: 12/31/22 1148    Lab Status: Final result Specimen: Blood, PICC Line Updated: 01/05/23 1200     Blood Culture No growth at 5 days    Blood Culture - Blood, Hand, Right [[id number]]  (Abnormal) Collected: 12/30/22 1251    Lab Status: Final result Specimen: Blood from Hand, Right Updated: 01/01/23 1030     Blood Culture Staphylococcus aureus     Comment:   Infectious disease consultation is highly recommended to rule out distant foci of infection.        Isolated from Aerobic Bottle     Gram Stain Aerobic Bottle Gram positive cocci in clusters    Narrative:      Less than seven (7) mL's of blood was collected.  Insufficient quantity may yield false negative results.    Refer to previous blood culture collected on 12/26/2022 0435 for MICs.    Blood Culture - Blood, Hand, Left [[id number]]  (Abnormal) Collected: 12/29/22 1015    Lab Status: Final result Specimen: Blood from Hand, Left Updated: 01/01/23 1030     Blood Culture Staphylococcus aureus     Comment:   Infectious disease consultation is highly recommended to rule out distant foci of infection.    Refer to previous blood culture collected on 12/26/2022 0435 for MICs.        Isolated from Aerobic Bottle     Blood Culture Staphylococcus, coagulase negative     Comment: Probable contaminant requires clinical correlation, susceptibility not performed unless requested by physician.          Isolated from --     Gram Stain Aerobic Bottle Gram positive cocci in clusters    Narrative:      Less than seven (7) mL's of blood was collected.  Insufficient quantity may yield false negative results.    Body Fluid Culture - Body Fluid, Shoulder, Right  [[id number]]  (Abnormal)  (Susceptibility) Collected: 12/28/22 0713    Lab Status: Final result Specimen: Body Fluid from Shoulder, Right Updated: 01/02/23 1505     Body Fluid Culture Heavy growth (4+) Staphylococcus aureus     Gram Stain Many (4+) WBCs seen      Many (4+) Gram positive cocci in clusters    Susceptibility      Staphylococcus aureus      PARMINDER      Gentamicin Susceptible      Oxacillin Susceptible      Rifampin Susceptible      Vancomycin Susceptible                       Susceptibility Comments     Staphylococcus aureus    This isolate does not demonstrate inducible clindamycin resistance in vitro.               Blood Culture - Blood, Arm, Left [[id number]]  (Abnormal) Collected: 12/27/22 1300    Lab Status: Final result Specimen: Blood from Arm, Left Updated: 12/29/22 0721     Blood Culture Staphylococcus aureus     Comment:   Infectious disease consultation is highly recommended to rule out distant foci of infection.        Isolated from Anaerobic Bottle     Gram Stain Anaerobic Bottle Gram positive cocci in clusters    Narrative:      Refer to previous blood culture collected on 12/26/2022 0435 for MICs    Less than seven (7) mL's of blood was collected.  Insufficient quantity may yield false negative results.          Medication Review:       Schedule Meds  cefTRIAXone, 2 g, Intravenous, Q24H  enoxaparin, 1.5 mg/kg, Subcutaneous, Daily  ferric gluconate, 250 mg, Intravenous, Daily  ferrous sulfate, 300 mg, Nasogastric, Daily  First Mouthwash (Magic Mouthwash), 5 mL, Swish & Spit, Q6H  furosemide, 40 mg, Intravenous, Q12H  lansoprazole, 30 mg, Nasogastric, Q AM  midodrine, 10 mg, Nasogastric, Q8H  sodium chloride, 10 mL, Intravenous, Q12H  Zinc Oxide, , Apply externally, TID        Infusion Meds       PRN Meds  •  acetaminophen **OR** acetaminophen  •  aluminum-magnesium hydroxide-simethicone  •  dextrose  •  dextrose  •  fentanyl  •  glucagon (human recombinant)  •  HYDROcodone-acetaminophen  •   ondansetron **OR** ondansetron  •  sodium chloride  •  sodium chloride        Assessment & Plan       Antimicrobial Therapy   1.  IV ceftriaxone        2.        3.        4.        5.            Assessment    Bilateral shoulder septic arthritis.  Synovial fluid culture was consistent with infection and cultures are growing 4+ methicillin susceptible Staph aureus    Methicillin susceptible Staph aureus bacteremia secondary to above.  Blood cultures were positive x2 sets on admission on December 26, 2022.  Repeat blood culture from December 27, 2022 turned positive  Repeat blood culture from December 29, 2022 is positive  ALMA DELIA was done on December 28, 2022 and showed no vegetation  -Repeat blood culture from 12/30/2022 is now positive  -PICC line was removed on 12/31/2022- cultures pending  -Blood culture from 12/31/2022 is negative so far  CT scan of the cervical, thoracic and lumbar spine with contrast showed no obvious infection    Mild septic shock.  Currently off vasopressors    Respiratory failure.  Patient was on the ventilator after shoulder surgeries.  Was extubated and currently requiring 15 L of oxygen    DVT of the left arm secondary to PICC line.  Hematology service was consulted and patient was started on anticoagulation    Elevated transaminase and hyperbilirubinemia secondary to sepsis and infection.  Liver enzymes are trending down    Worsening right upper extremity edema.  Venous Doppler of the right upper extremity showed SVT but there was no DVT    Toxic metabolic encephalopathy.  Improved significantly      Plan    Continue IV ceftriaxone 2 g daily for total of 6 weeks.  The last day of IV ceftriaxone will be February 11, 2023  Continue supportive care  A.m. labs   the patient will eventually need a PICC line for IV antibiotics before discharge to rehab    Antonina Delacruz MD  01/06/23  12:29 EST    Note is dictated utilizing voice recognition software/Dragon

## 2023-01-06 NOTE — DISCHARGE PLACEMENT REQUEST
"Diana Bar (67 y.o. Male)     Date of Birth   1955    Social Security Number       [place]   [address] [facility] [place] [facility]    Home Phone   [phone]    MRN   [id number]       [employer]    Marital Status   Unknown                            Admission Date   22    Admission Type   Urgent    Admitting Provider   James Holguin DO    Attending Provider   James Holguin DO    Department, Room/Bed   [facility] INTENSIVE CARE UNIT,        Discharge Date       Discharge Disposition       Discharge Destination                               Attending Provider: James Holguin DO    Allergies: Tramadol-acetaminophen, Codeine, Tramadol    Isolation: None   Infection: None   Code Status: No CPR    Ht: 167.6 cm (66\")   Wt: 86.3 kg (190 lb 4.1 oz)    Admission Cmt: None   Principal Problem: Shock, septic (HCC) [A41.9,R65.21]                 Active Insurance as of 2022     Primary Coverage     Payor Plan Insurance Group Employer/Plan Group    HUMANA MEDICARE REPLACEMENT HUMANA MEDICARE REPLACEMENT [id number]     Payor Plan Address Payor Plan Phone Number Payor Plan Fax Number Effective Dates    [address] [address] [phone]  2022 - None Entered    [facility] [id number]       Subscriber Name Subscriber Birth Date Member ID       DIANA BAR 1955 [id number]                 Emergency Contacts      (Rel.) Home Phone Work Phone Mobile Phone    Vibha Menendez (Sister) -- -- [phone]    Nadine Bar (Spouse) -- -- [phone]               History & Physical      Sofi Loya APRN at 22 0629     Attestation signed by Pascual Hogan MD at 22 1225    I have reviewed this documentation and agree.                  Critical Care History and Physical   Diana Bar : 1955 MRN:[id number] LOS:0 ROOM:      Reason for admission: Hyponatremia     Assessment / Plan     Septic Shock: ( HR>90, RR >20 or PCO2 <32, Lactic " acid>2, Creatinine >2, Change in mental status and Platelets <100 )  • Likely due to ?right shoulder septic joint, shoulder is red, very painful, hot to touch   • CRP, 28.17, Procal 7.20, lactic 2.5, sed rate 21  • Will do blood / urine   • Started on cefepime.   • Persistent hypotension in spite of adequate fluid resuscitation.   • C/w additional fluids as needed. Avoid fluid overload.   • Titrate vasopressors for a target MAP of 65.    Hyponatremia likely secondary to decreased intake, chronic pain and pain medication regimen   · Patient has chronic history   · Monitor neuro status  · IVF for rehydration    Code Status (Patient has no pulse and is not breathing): CPR (Attempt to Resuscitate)  Medical Interventions (Patient has pulse or is breathing): Full Support       Nutrition: Diet: Regular/House Diet; Texture: Regular Texture (IDDSI 7); Fluid Consistency: Thin (IDDSI 0)     DVT Prophylaxis:   Mechanical Order History:      Ordered        12/26/22 0245  Place Sequential Compression Device  Once            12/26/22 0245  Maintain Sequential Compression Device  Continuous                    Pharmalogical Order History:     None           History of Present illness     A 67 y.o. old male patient with PMH of cervicalgia, primary osteoarthritis involving multiple joints, hyponatremia, cardiac amloidosis presents to the hospital with complaints of worsening right shoulder pain for 3 days, weakness, and altered mental status. The patient stated that the pain has continued to increase in severity and today the pain was too much. Any movement increases pain. He states that his normal pain regimen for his osteoarthritis has not provided any pain relief. He reports no other alleviating factors. The patient sought care for his shoulder pain at [facility]. ER work-up showed that the patient was hypotensive and hyponatremic. Lactic was elevated at 3.9. The patient was given 1L fluid and started on dobutamine at 2.5  mcg/kg/min. The patient reported that he has not been eating due no appetite and has attemepted to drink some water but hasn't had anything since the night morning of 12/25/22. He denies nausea and vomiting and relates his decrease in oral intake to weakness. The patient is lethargic but oriented.He denies difficulty breathing, chest pain or pressures, fever, chills, abdominal pain.     ACP: Patient denies having advance directives and declines information at this time.    Patient was seen and examined on 12/26/22 at 06:29 EST .    Subjective / Review of systems     Review of Systems   Constitutional: Positive for activity change, appetite change and fatigue. Negative for fever.   Respiratory: Negative for chest tightness and shortness of breath.    Cardiovascular: Negative for chest pain.   Gastrointestinal: Negative for abdominal pain, nausea and vomiting.   Genitourinary: Positive for decreased urine volume.   Musculoskeletal: Positive for arthralgias and joint swelling.   Skin: Negative for rash.   Neurological: Negative for syncope and light-headedness.   Psychiatric/Behavioral: Positive for sleep disturbance. The patient is not nervous/anxious.         Past Medical/Surgical/Social/Family History & Allergies     History reviewed. No pertinent past medical history.   History reviewed. No pertinent surgical history.   Social History     Socioeconomic History   • Marital status: Unknown   Tobacco Use   • Smoking status: Every Day     Packs/day: 0.50     Years: 10.00     Pack years: 5.00     Types: Cigarettes   • Smokeless tobacco: Never   Vaping Use   • Vaping Use: Never used   Substance and Sexual Activity   • Alcohol use: Not Currently   • Drug use: Never   • Sexual activity: Defer      History reviewed. No pertinent family history.   Allergies   Allergen Reactions   • Tramadol-Acetaminophen Anxiety     Worms in the brain feeling   • Codeine Other (See Comments)     nausea   • Tramadol Other (See Comments)      "\"I just felt really crawly, it did weird things with my head\"        Home Medications     Prior to Admission medications    Not on File        Objective / Physical Exam     Vital signs:  Temp: 98 °F (36.7 °C)  BP: (!) 78/50  Heart Rate: 111  SpO2: 93 %  Weight: 81.8 kg (180 lb 5.4 oz)    Admission Weight: Weight: 81.8 kg (180 lb 5.4 oz)    Physical Exam     Labs     Results from last 7 days   Lab Units 12/26/22  0435   WBC 10*3/mm3 7.20   HEMATOCRIT % 38.0   PLATELETS 10*3/mm3 99*      Results from last 7 days   Lab Units 12/26/22  0435   SODIUM mmol/L 118*   POTASSIUM mmol/L 5.4*   CHLORIDE mmol/L 84*   CO2 mmol/L 16.0*   BUN mg/dL 55*   CREATININE mg/dL 2.38*        Imaging     Chest X ray: My independent assessment showed no infiltrates or effusions    EKG: My independent evaluation showed atrial fibrillation, no ST -T changes    Current Medications     Scheduled Meds:  amiodarone in dextrose 5%, , ,   calcium gluconate, 1 g, Intravenous, Once  cefepime, 1 g, Intravenous, Q12H  cefepime, 2 g, Intravenous, Once  dextrose, 50 mL, Intravenous, Once   And  insulin regular, 10 Units, Intravenous, Once  sodium bicarbonate, 50 mEq, Intravenous, Once  sodium chloride, 10 mL, Intravenous, Q12H         Continuous Infusions:  milrinone, 0.25-0.75 mcg/kg/min, Last Rate: 0.7 mcg/kg/min (12/26/22 6947)  sodium chloride, 100 mL/hr, Last Rate: 100 mL/hr (12/26/22 5398)           Electronically signed by RITU Ruby, 12/26/22, 6:30 AM EST.      RITU Ruby   Critical Care  12/26/22   06:29 EST       Electronically signed by Pascual Hogan MD at 12/29/22 1225       "

## 2023-01-06 NOTE — PLAN OF CARE
Goal Outcome Evaluation:  Plan of Care Reviewed With: patient, spouse, daughter        Pt. W/o significant progress made this date, continued edema and limited AROM B shoulders, increased RUE. Pt. Provided complete assist for toilet hygiene, difficulty manipulating objects secondary to impacts to FMC. Pt. Provided complete assist for toileting ADL, max A to anil bed L and R. Recommend SNF at d/c to address aforementioned deficits.

## 2023-01-06 NOTE — PLAN OF CARE
"Assessment: Jaime Bar presents with functional mobility impairments which indicate the need for skilled intervention. Pt more alert and projecting more with his voice . RUE still very edematous with weeping present. Pt able to perform   Active heel slides RLE x 12 reps. Pt has fair isometric quad contraction BLE. Pt moving ankles through greater range today. Pt moving his head and eyes more today. Pain well managed this session.  aTolerating session today without incident. Will continue to follow and progress as tolerated.     Plan/Recommendations:   Moderate Intensity Therapy recommended post-acute care. This is recommended as therapy feels the patient would require 3-4 days per week and wouldn't tolerate \"3 hour daily\" rehab intensity. SNF would be the preferred choice. If the patient does not agree to SNF, arrange HH or OP depending on home bound status. If patient is medically complex, consider LTACH.. Pt requires no DME at discharge.     Pt desires Skilled Rehab placement at discharge. Pt cooperative; agreeable to therapeutic recommendations and plan of care.     "

## 2023-01-07 ENCOUNTER — APPOINTMENT (OUTPATIENT)
Dept: GENERAL RADIOLOGY | Facility: HOSPITAL | Age: 68
DRG: 853 | End: 2023-01-07
Payer: MEDICARE

## 2023-01-07 LAB
ALBUMIN SERPL-MCNC: 2.2 G/DL (ref 3.5–5.2)
ALBUMIN/GLOB SERPL: 0.7 G/DL
ALP SERPL-CCNC: 91 U/L (ref 39–117)
ALT SERPL W P-5'-P-CCNC: 43 U/L (ref 1–41)
ANION GAP SERPL CALCULATED.3IONS-SCNC: 6 MMOL/L (ref 5–15)
ANISOCYTOSIS BLD QL: NORMAL
AST SERPL-CCNC: 33 U/L (ref 1–40)
BASOPHILS # BLD AUTO: 0.1 10*3/MM3 (ref 0–0.2)
BASOPHILS NFR BLD AUTO: 0.8 % (ref 0–1.5)
BILIRUB SERPL-MCNC: 3 MG/DL (ref 0–1.2)
BUN SERPL-MCNC: 28 MG/DL (ref 8–23)
BUN/CREAT SERPL: 52.8 (ref 7–25)
CA-I SERPL ISE-MCNC: 1.09 MMOL/L (ref 1.2–1.3)
CALCIUM SPEC-SCNC: 7.8 MG/DL (ref 8.6–10.5)
CHLORIDE SERPL-SCNC: 96 MMOL/L (ref 98–107)
CO2 SERPL-SCNC: 36 MMOL/L (ref 22–29)
CREAT SERPL-MCNC: 0.53 MG/DL (ref 0.76–1.27)
DEPRECATED RDW RBC AUTO: 53.4 FL (ref 37–54)
EGFRCR SERPLBLD CKD-EPI 2021: 109.8 ML/MIN/1.73
EOSINOPHIL # BLD AUTO: 0 10*3/MM3 (ref 0–0.4)
EOSINOPHIL NFR BLD AUTO: 0.4 % (ref 0.3–6.2)
ERYTHROCYTE [DISTWIDTH] IN BLOOD BY AUTOMATED COUNT: 15.9 % (ref 12.3–15.4)
GLOBULIN UR ELPH-MCNC: 3.1 GM/DL
GLUCOSE BLDC GLUCOMTR-MCNC: 102 MG/DL (ref 70–105)
GLUCOSE BLDC GLUCOMTR-MCNC: 104 MG/DL (ref 70–105)
GLUCOSE BLDC GLUCOMTR-MCNC: 107 MG/DL (ref 70–105)
GLUCOSE BLDC GLUCOMTR-MCNC: 115 MG/DL (ref 70–105)
GLUCOSE BLDC GLUCOMTR-MCNC: 121 MG/DL (ref 70–105)
GLUCOSE SERPL-MCNC: 94 MG/DL (ref 65–99)
HCT VFR BLD AUTO: 28.4 % (ref 37.5–51)
HGB BLD-MCNC: 9.4 G/DL (ref 13–17.7)
LARGE PLATELETS: NORMAL
LYMPHOCYTES # BLD AUTO: 0.3 10*3/MM3 (ref 0.7–3.1)
LYMPHOCYTES NFR BLD AUTO: 5 % (ref 19.6–45.3)
MAGNESIUM SERPL-MCNC: 1.7 MG/DL (ref 1.6–2.4)
MCH RBC QN AUTO: 31.9 PG (ref 26.6–33)
MCHC RBC AUTO-ENTMCNC: 33.3 G/DL (ref 31.5–35.7)
MCV RBC AUTO: 95.8 FL (ref 79–97)
MONOCYTES # BLD AUTO: 0.8 10*3/MM3 (ref 0.1–0.9)
MONOCYTES NFR BLD AUTO: 11.5 % (ref 5–12)
NEUTROPHILS NFR BLD AUTO: 5.7 10*3/MM3 (ref 1.7–7)
NEUTROPHILS NFR BLD AUTO: 82.3 % (ref 42.7–76)
NRBC BLD AUTO-RTO: 0.1 /100 WBC (ref 0–0.2)
PHOSPHATE SERPL-MCNC: 3.1 MG/DL (ref 2.5–4.5)
PLATELET # BLD AUTO: 156 10*3/MM3 (ref 140–450)
PMV BLD AUTO: 9.6 FL (ref 6–12)
POTASSIUM SERPL-SCNC: 3.8 MMOL/L (ref 3.5–5.2)
PROT SERPL-MCNC: 5.3 G/DL (ref 6–8.5)
RBC # BLD AUTO: 2.96 10*6/MM3 (ref 4.14–5.8)
SMALL PLATELETS BLD QL SMEAR: ADEQUATE
SODIUM SERPL-SCNC: 138 MMOL/L (ref 136–145)
WBC MORPH BLD: NORMAL
WBC NRBC COR # BLD: 6.9 10*3/MM3 (ref 3.4–10.8)

## 2023-01-07 PROCEDURE — 94761 N-INVAS EAR/PLS OXIMETRY MLT: CPT

## 2023-01-07 PROCEDURE — 82330 ASSAY OF CALCIUM: CPT | Performed by: ORTHOPAEDIC SURGERY

## 2023-01-07 PROCEDURE — 74018 RADEX ABDOMEN 1 VIEW: CPT

## 2023-01-07 PROCEDURE — 25010000002 CEFTRIAXONE PER 250 MG: Performed by: INTERNAL MEDICINE

## 2023-01-07 PROCEDURE — 25010000002 ONDANSETRON PER 1 MG: Performed by: ORTHOPAEDIC SURGERY

## 2023-01-07 PROCEDURE — 84100 ASSAY OF PHOSPHORUS: CPT | Performed by: ORTHOPAEDIC SURGERY

## 2023-01-07 PROCEDURE — 85025 COMPLETE CBC W/AUTO DIFF WBC: CPT | Performed by: ORTHOPAEDIC SURGERY

## 2023-01-07 PROCEDURE — 83735 ASSAY OF MAGNESIUM: CPT | Performed by: ORTHOPAEDIC SURGERY

## 2023-01-07 PROCEDURE — 92526 ORAL FUNCTION THERAPY: CPT

## 2023-01-07 PROCEDURE — 25010000002 ENOXAPARIN PER 10 MG: Performed by: NURSE PRACTITIONER

## 2023-01-07 PROCEDURE — 85007 BL SMEAR W/DIFF WBC COUNT: CPT | Performed by: ORTHOPAEDIC SURGERY

## 2023-01-07 PROCEDURE — 82962 GLUCOSE BLOOD TEST: CPT

## 2023-01-07 PROCEDURE — 94799 UNLISTED PULMONARY SVC/PX: CPT

## 2023-01-07 PROCEDURE — 80053 COMPREHEN METABOLIC PANEL: CPT | Performed by: ORTHOPAEDIC SURGERY

## 2023-01-07 PROCEDURE — 25010000002 FUROSEMIDE PER 20 MG: Performed by: INTERNAL MEDICINE

## 2023-01-07 PROCEDURE — 25010000002 NA FERRIC GLUC CPLX PER 12.5 MG: Performed by: NURSE PRACTITIONER

## 2023-01-07 RX ADMIN — Medication: at 21:01

## 2023-01-07 RX ADMIN — DIPHENHYDRAMINE HYDROCHLORIDE AND LIDOCAINE HYDROCHLORIDE AND ALUMINUM HYDROXIDE AND MAGNESIUM HYDRO 5 ML: KIT at 12:49

## 2023-01-07 RX ADMIN — Medication: at 08:32

## 2023-01-07 RX ADMIN — MIDODRINE HYDROCHLORIDE 10 MG: 5 TABLET ORAL at 10:02

## 2023-01-07 RX ADMIN — MIDODRINE HYDROCHLORIDE 10 MG: 5 TABLET ORAL at 01:53

## 2023-01-07 RX ADMIN — Medication: at 17:06

## 2023-01-07 RX ADMIN — FUROSEMIDE 40 MG: 10 INJECTION, SOLUTION INTRAMUSCULAR; INTRAVENOUS at 12:49

## 2023-01-07 RX ADMIN — ONDANSETRON 4 MG: 2 INJECTION INTRAMUSCULAR; INTRAVENOUS at 00:06

## 2023-01-07 RX ADMIN — DIPHENHYDRAMINE HYDROCHLORIDE AND LIDOCAINE HYDROCHLORIDE AND ALUMINUM HYDROXIDE AND MAGNESIUM HYDRO 5 ML: KIT at 05:25

## 2023-01-07 RX ADMIN — SODIUM CHLORIDE 250 MG: 9 INJECTION, SOLUTION INTRAVENOUS at 08:31

## 2023-01-07 RX ADMIN — DIPHENHYDRAMINE HYDROCHLORIDE AND LIDOCAINE HYDROCHLORIDE AND ALUMINUM HYDROXIDE AND MAGNESIUM HYDRO 5 ML: KIT at 17:35

## 2023-01-07 RX ADMIN — MIDODRINE HYDROCHLORIDE 10 MG: 5 TABLET ORAL at 17:35

## 2023-01-07 RX ADMIN — ENOXAPARIN SODIUM 140 MG: 150 INJECTION SUBCUTANEOUS at 14:18

## 2023-01-07 RX ADMIN — Medication 10 ML: at 21:00

## 2023-01-07 RX ADMIN — Medication 300 MG: at 08:31

## 2023-01-07 RX ADMIN — CEFTRIAXONE 2 G: 2 INJECTION, POWDER, FOR SOLUTION INTRAMUSCULAR; INTRAVENOUS at 00:00

## 2023-01-07 RX ADMIN — FUROSEMIDE 40 MG: 10 INJECTION, SOLUTION INTRAMUSCULAR; INTRAVENOUS at 23:14

## 2023-01-07 RX ADMIN — Medication 10 ML: at 08:31

## 2023-01-07 RX ADMIN — DIPHENHYDRAMINE HYDROCHLORIDE AND LIDOCAINE HYDROCHLORIDE AND ALUMINUM HYDROXIDE AND MAGNESIUM HYDRO 5 ML: KIT at 23:14

## 2023-01-07 NOTE — THERAPY RE-EVALUATION
Acute Care - Speech Language Pathology   Swallow Re-Evaluation HUE Rodriguez     Patient Name: Jaime Bar  : 1955  MRN: [id number]  Today's Date: 2023               Admit Date: 2022    Visit Dx:     ICD-10-CM ICD-9-CM   1. Pyogenic arthritis of right shoulder region, due to unspecified organism (HCC)  M00.9 711.01     Patient Active Problem List   Diagnosis   • Hyponatremia   • ACC/AHA stage C chronic systolic heart failure (HCC)   • Cardiac amyloidosis (HCC)   • History of prostate cancer   • Hx of neck surgery   • Neck pain   • Primary hypertension   • Tobacco abuse   • Cervicalgia, spine surgery 2017   • Primary osteoarthritis involving multiple joints   • Bacteremia due to methicillin susceptible Staphylococcus aureus (MSSA)   • Staphylococcal arthritis of shoulder (HCC)   • Acute kidney injury superimposed on chronic kidney disease (HCC)     History reviewed. No pertinent past medical history.  Past Surgical History:   Procedure Laterality Date   • SHOULDER ARTHROSCOPY Right 2022    Procedure: SHOULDER ARTHROSCOPY INCISION AND DRAINAGE.;  Surgeon: Nikunj Velez MD;  Location: [facility];  Service: Orthopedics;  Laterality: Right;   • SHOULDER ARTHROSCOPY Left 2022    Procedure: SHOULDER ARTHROSCOPY INCISION AND DRAINAGE;  Surgeon: Nikunj Velez MD;  Location: [facility];  Service: Orthopedics;  Laterality: Left;       SLP Recommendation and Plan     SLP Diet Recommendation: NPO, temporary alternate methods of nutrition/hydration (23 1300)     SLP Rec. for Method of Medication Administration: meds via alternate route (23 1300)     Monitor for Signs of Aspiration: yes, notify SLP if any concerns (23 1300)  Recommended Diagnostics: reassess via clinical swallow evaluation, reassess via VFSS (MBS) (23 1300)     Therapy Frequency (Swallow): PRN (23 1300)  Predicted Duration Therapy Intervention (Days): until discharge (23  1300)     SWALLOW EVALUATION (last 72 hours)     SLP Adult Swallow Evaluation     Row Name 01/07/23 1300          Document Type re-evaluation  -    Patient Effort good  -    Comment --    Therapy Frequency (Swallow) PRN  -    Predicted Duration Therapy Intervention (Days) until discharge  -    SLP Diet Recommendation NPO;temporary alternate methods of nutrition/hydration  -    Recommended Diagnostics reassess via clinical swallow evaluation;reassess via VFSS (MBS)  -    Oral Care Recommendations Oral Care BID/PRN;Before ice/water;Swab  -    SLP Rec. for Method of Medication Administration meds via alternate route  -    Monitor for Signs of Aspiration yes;notify SLP if any concerns  -       (LTG) Swallow Patient will tolerate safest and least restrictive diet without complications from aspiration.  -    Time Frame (Swallow Long Term Goal) by discharge  -    Progress/Outcomes (Swallow Long Term Goal) progress slower than expected  -          (STG) Swallow 1 The patient will participate in ongoing assessment of swallow, including reevaluation clinically and/or inclulding instrumental assessment of swallow if indicated to further assess swallow function in anticipation of initiation of PO diet.  -    Time Frame (Swallow Short Term Goal 1) 1 week  -    Progress/Outcomes (Swallow Short Term Goal 1) goal ongoing;progress slower than expected  -          User Key  (r) = Recorded By, (t) = Taken By, (c) = Cosigned By    Initials Name Effective Dates    MENDOZA Dori Doyle 06/16/21 -                 EDUCATION  The patient has been educated in the following areas:   Dysphagia (Swallowing Impairment) Oral Care/Hydration NPO rationale.     Pt seen for skilled ST targeting dysphagia this date. Upon entry, pt was awake and alert laying in bed. SLP repositioned pt to be upright at 90 degrees. Pt was pleasant and agreeable to PO trials. Pt given trials of ice chips and water by all presentations. All  trials fed by SLP. Pt attempted to raise arm to hold cup but was unable to. Oral phase characterized by adequate labial seal w/ no anterior loss. Slightly prolonged but functional ice chip mastication. Slightly delayed oral transit noted in ~25% of trials, WFL across all other trials. Immediate cough noted in 2/5 trials of ice chips. Pt demonstrated immediate cough in 1/3 trials of thins via cup, immediate throat clear in 2/3 trials of thins via straw. Pt does not appear safe/ready for a PO diet at this time.    Recommend pt continue NPO w/ alt means for nutrition and continued Ni Water Protocol. ST will continue to follow for ongoing re-evaluation/completion of VFSS as pt is appropriate to objectively determine safest and least restrictive diet recommendation.        SLP GOALS     Row Name 01/07/23 1300          (LTG) Swallow Patient will tolerate safest and least restrictive diet without complications from aspiration.  -KL    Time Frame (Swallow Long Term Goal) by discharge  -KL    Progress/Outcomes (Swallow Long Term Goal) progress slower than expected  -KL          (STG) Swallow 1 The patient will participate in ongoing assessment of swallow, including reevaluation clinically and/or inclulding instrumental assessment of swallow if indicated to further assess swallow function in anticipation of initiation of PO diet.  -KL    Time Frame (Swallow Short Term Goal 1) See above note.    1 week  -KL    Progress/Outcomes (Swallow Short Term Goal 1) goal ongoing;progress slower than expected  -KL    Row Name 01/04/23 1500          (LTG) Swallow Patient will tolerate safest and least restrictive diet without complications from aspiration.  -CB    Time Frame (Swallow Long Term Goal) by discharge  -CB          (STG) Swallow 1 The patient will participate in ongoing assessment of swallow, including reevaluation clinically and/or inclulding instrumental assessment of swallow if indicated to further assess swallow  function in anticipation of initiation of PO diet.  -CB    Time Frame (Swallow Short Term Goal 1) 1 week  -CB          User Key  (r) = Recorded By, (t) = Taken By, (c) = Cosigned By    Initials Name Provider Type    Dori Coats Speech and Language Pathologist                   Time Calculation:                Dori Doyle  1/7/2023

## 2023-01-07 NOTE — PLAN OF CARE
Goal Outcome Evaluation:      Pt. Is A&O and on 2L nasal cannula. No gtts currently infusing. TF running at goal rate with minimal residuals. Vital signs stable at this time.

## 2023-01-07 NOTE — PLAN OF CARE
Goal Outcome Evaluation:      Pt has increased swelling in right arm. Md's are aware. Pt seen by speech and advanced to small sips of water and ice chips. Pt still having dark phyllis colored urine but per renal kidneys have improved significantly. Hospitalist would like to see if/when drains can come out of shoulders and also for case mgmt to assist in setting pt up with post hospital rehab/in home care for transition out of hospital.

## 2023-01-07 NOTE — PROGRESS NOTES
"                                                                                                                                      Nephrology  Progress Note                                        [facility]    Patient Identification    Name: Jaime Bar  Age: 67 y.o.  Sex: male  :  1955  MRN: [id number]      DATE OF SERVICE:  2023        Subective     required more oxygen      Objective   Scheduled Meds:cefTRIAXone, 2 g, Intravenous, Q24H  enoxaparin, 1.5 mg/kg, Subcutaneous, Daily  ferric gluconate, 250 mg, Intravenous, Daily  ferrous sulfate, 300 mg, Nasogastric, Daily  First Mouthwash (Magic Mouthwash), 5 mL, Swish & Spit, Q6H  furosemide, 40 mg, Intravenous, Q12H  lansoprazole, 30 mg, Nasogastric, Q AM  midodrine, 10 mg, Nasogastric, Q8H  sodium chloride, 10 mL, Intravenous, Q12H  Zinc Oxide, , Apply externally, TID          Continuous Infusions:     PRN Meds:•  acetaminophen **OR** acetaminophen  •  aluminum-magnesium hydroxide-simethicone  •  dextrose  •  dextrose  •  fentanyl  •  glucagon (human recombinant)  •  HYDROcodone-acetaminophen  •  ondansetron **OR** ondansetron  •  sodium chloride  •  sodium chloride     Exam:  /78   Pulse 94   Temp 98.3 °F (36.8 °C) (Oral)   Resp 20   Ht 167.6 cm (66\")   Wt 85 kg (187 lb 6.3 oz)   SpO2 97%   BMI 30.25 kg/m²     Intake/Output last 3 shifts:  I/O last 3 completed shifts:  In: 6530 [I.V.:924; Other:3333; NG/GT:2273]  Out: 2968 [Urine:2800; Drains:168]    Intake/Output this shift:  No intake/output data recorded.    Physical exam:  General Appearance: Confused  Head:  Normocephalic, without obvious abnormality, atraumatic  Eyes:  PERRL, conjunctiva/corneas clear     Neck:  Supple,  no adenopathy;      Lungs:  Decreased BS occasion ronchi  Heart:  Regular rate and rhythm, S1 and S2 normal  Abdomen:  Soft, non-tender, bowel sounds active   Extremities: trace edema  Pulses: 2+ and symmetric all extremities  Skin:  No rashes " or lesions       Data Review:  All labs (24hrs):   Recent Results (from the past 24 hour(s))   POC Glucose Once    Collection Time: 01/06/23 11:06 AM    Specimen: Blood   Result Value Ref Range    Glucose 124 (H) 70 - 105 mg/dL   POC Glucose Once    Collection Time: 01/06/23  5:22 PM    Specimen: Blood   Result Value Ref Range    Glucose 74 70 - 105 mg/dL   POC Glucose Once    Collection Time: 01/07/23 12:18 AM    Specimen: Blood   Result Value Ref Range    Glucose 102 70 - 105 mg/dL   POC Glucose Once    Collection Time: 01/07/23  5:40 AM    Specimen: Blood   Result Value Ref Range    Glucose 115 (H) 70 - 105 mg/dL   Calcium, Ionized    Collection Time: 01/07/23  7:12 AM    Specimen: Blood   Result Value Ref Range    Ionized Calcium 1.09 (L) 1.20 - 1.30 mmol/L   Comprehensive Metabolic Panel    Collection Time: 01/07/23  7:12 AM    Specimen: Blood   Result Value Ref Range    Glucose 94 65 - 99 mg/dL    BUN 28 (H) 8 - 23 mg/dL    Creatinine 0.53 (L) 0.76 - 1.27 mg/dL    Sodium 138 136 - 145 mmol/L    Potassium 3.8 3.5 - 5.2 mmol/L    Chloride 96 (L) 98 - 107 mmol/L    CO2 36.0 (H) 22.0 - 29.0 mmol/L    Calcium 7.8 (L) 8.6 - 10.5 mg/dL    Total Protein 5.3 (L) 6.0 - 8.5 g/dL    Albumin 2.2 (L) 3.5 - 5.2 g/dL    ALT (SGPT) 43 (H) 1 - 41 U/L    AST (SGOT) 33 1 - 40 U/L    Alkaline Phosphatase 91 39 - 117 U/L    Total Bilirubin 3.0 (H) 0.0 - 1.2 mg/dL    Globulin 3.1 gm/dL    A/G Ratio 0.7 g/dL    BUN/Creatinine Ratio 52.8 (H) 7.0 - 25.0    Anion Gap 6.0 5.0 - 15.0 mmol/L    eGFR 109.8 >60.0 mL/min/1.73   Magnesium    Collection Time: 01/07/23  7:12 AM    Specimen: Blood   Result Value Ref Range    Magnesium 1.7 1.6 - 2.4 mg/dL   Phosphorus    Collection Time: 01/07/23  7:12 AM    Specimen: Blood   Result Value Ref Range    Phosphorus 3.1 2.5 - 4.5 mg/dL          Imaging:  CT Head Without Contrast    Result Date: 12/31/2022  1. No acute intracranial abnormality. Specifically, no evidence of acute hemorrhage, mass effect  or midline shift. 2. Mild global cerebral volume loss. 3. Mild bilateral air-fluid levels within the maxillary sinuses which can be seen with acute sinus disease in the correct clinical setting.  Electronically Signed By-Willy Baxter MD On:12/31/2022 9:01 PM This report was finalized on [id number] by  Willy Baxter MD.    CT Cervical Spine With Contrast    Result Date: 1/3/2023  1.Postoperative changes status post prior fusion. No definitive signs of hardware failure or loosening are noted. 2.No evidence for displaced fracture or malalignment throughout the cervical spine. 3.No evidence for acute soft tissue abnormality. There is no evidence for abnormal enhancement. No abnormal peripherally enhancing fluid collection is seen. 4.Changes of cervical spondylosis are noted throughout the cervical spine. Associated hypertrophic posterior facet changes and uncovertebral changes are also observed. Electronically Signed: Solo Ray  1/3/2023 10:25 PM EST  Workstation ID: [id number]    CT Thoracic Spine With Contrast    Result Date: 1/3/2023  1.No evidence for displaced fracture or malalignment throughout the thoracic spine. No evidence for erosive endplate changes. 2.No evidence for abnormal enhancement. There is no abnormal fluid collection within the paraspinal soft tissues. There is no evidence for epidural abscess. 3.Degenerative changes are noted throughout the thoracic spine. 4.Interstitial changes are seen throughout the lungs. Bibasilar infiltrates are noted. Bilateral pleural effusions are observed. Electronically Signed: Solo Ray  1/3/2023 10:31 PM EST  Workstation ID: [id number]    CT Lumbar Spine With Contrast    Result Date: 1/3/2023  1.No evidence for displaced fracture or malalignment throughout the lumbar spine. No evidence for erosive endplate changes. 2.No evidence for abnormal enhancement. There is no abnormal fluid collection within the paraspinal soft tissues. There is no evidence for  epidural abscess. 3.Advanced degenerative changes are noted throughout the lumbar spine. 4.Evidence for bilateral spondylolysis with associated spondylolisthesis at the L5 level. Prior postoperative changes are also noted. Electronically Signed: Solo Ray  1/3/2023 10:35 PM EST  Workstation ID: [id number]    US Liver    Result Date: 12/30/2022  1. Normal hepatic parenchyma. 2. Cholelithiasis with adenomyomatosis involving gallbladder wall. 3. Common bile duct at the upper limits of normal measuring 6 to 7 mm.  Electronically Signed By-Genaro Canada MD On:12/30/2022 3:40 PM This report was finalized on [id number] by  Genaro Canada MD.    XR Chest 1 View    Result Date: 1/3/2023  Impression: 1. Radiographic changes consistent with congestive heart failure pulmonary edema with interval increase in pulmonary edema and development of small bilateral pleural effusions. Electronically Signed: Jaime Momin  1/3/2023 9:14 AM EST  Workstation ID: [id number]    XR Chest 1 View    Result Date: 12/30/2022  Cardiomegaly and increased pulmonary vascular congestion and suspected interstitial edema  Lines and tubes as above[  Electronically Signed By-Jaxon Aly On:12/30/2022 10:19 AM This report was finalized on [id number] by  Jaxon Aly, .    XR Abdomen KUB    Result Date: 1/7/2023  Enteric tube in the stomach. Thank you for the referral of this patient. This exam was interpreted by an American Board of Radiology certified radiologist with subspecialty fellowship in Pediatric Radiology. If there are any questions regarding this exam please feel free to contact a radiologist directly at [phone]. Slot 64 Electronically signed by:  Mau Rodriguez M.D.  1/7/2023 4:48 AM Mountain Time    XR Abdomen KUB    Result Date: 12/30/2022  NG tube projects over the expected position of the body of the stomach  Electronically Signed By-Jaxon Aly On:12/30/2022 12:00 PM This report was finalized on [id number] by   Jaxon Aly, .      Assessment/Plan:     Hyponatremia    ACC/AHA stage C chronic systolic heart failure (HCC)    Cardiac amyloidosis (HCC)    Primary hypertension    Tobacco abuse    Cervicalgia, spine surgery 2017    Primary osteoarthritis involving multiple joints    Bacteremia due to methicillin susceptible Staphylococcus aureus (MSSA)    Staphylococcal arthritis of shoulder (HCC)    Acute kidney injury superimposed on chronic kidney disease (HCC)   hyponatremia improving   Acute kidney injury on chronic kidney disease improving  Atrial fibrillation with rapid ventricular response  Metabolic acidosis  Urinary retention   Sepsis   Hypocalcemia   Hyperkalemia     Tolerating diuresis  Oxygenation better  Status post IV contrast study yesterday   Follow labs

## 2023-01-07 NOTE — PROGRESS NOTES
[facility] Medicine Services Daily Progress Note    Patient Name: Jaime Bar  : 1955  MRN: [id number]  Primary Care Physician:  Provider, No Known  Date of admission: 2022      Subjective      Chief Complaint: AMS    Did okay overnight.  Tube feeds are at goal.  He is on 2 L nasal cannula.  Blood cultures from  remain negative.  Initial cultures grew MSSA, currently on Rocephin monotherapy.  Transesophageal echocardiogram from  looked good with no evidence of thrombus or infection.  Shoulder PADMINI drains remain in place, Ortho following remotely. Extensive chart review for service turnover today. Bedside rounding completed with the nursing staff. No other acute concerns.    Objective      Vitals:   Temp:  [96.8 °F (36 °C)-98.3 °F (36.8 °C)] 98.3 °F (36.8 °C)  Heart Rate:  [] 94  Resp:  [20-24] 20  BP: (110-142)/(63-84) 117/78  Flow (L/min):  [2] 2    Physical Exam:    General: Chronically ill-appearing gentleman, lying in bed in no acute distress  HEENT: NCAT, neck is supple, NG in place  Cardiovascular: RRR  Respiratory: Fairly clear to auscultation bilaterally with some bibasilar crackles, diminished throughout, nondistressed  Abdomen: Soft, nontender to exam, normoactive bowel sounds  Neurologic: A&O, nonfocal, follows commands  Skin: Warm, bilateral shoulder incisions bandaged, drains in place. Bilateral upper extremity pitting edema noted R>L, reportedly improved since initial presentation    Result Review    Result Review:  I have personally reviewed the results from the time of this admission to 2023 11:42 EST and agree with these findings:  [x]  Laboratory  [x]  Microbiology  [x]  Radiology  [x]  EKG/Telemetry   []  Cardiology/Vascular   []  Pathology  [x]  Old records  []  Other:    Wounds (last 24 hours)     LDA Wound     Row Name 23 0345 23 2345 23 1940       Wound 22 1856 Right shoulder Incision    Wound - Properties Group  Placement Date: 12/28/22  -HB Placement Time: 1856 -HB Side: Right  -HB Location: shoulder  -HB Primary Wound Type: Incision  -HB    Dressing Appearance dry;intact  -HF dry;intact  -HF dry;intact  -HF    Closure Sutures;KATHRYN  -HF Sutures  -HF KATHRYN  -HF    Base dressing in place, unable to visualize  -HF clean;dry;red  -HF dressing in place, unable to visualize  -HF    Drainage Amount none  -HF small  -HF none  -HF    Care, Wound -- cleansed with;sterile normal saline  -HF --    Dressing Care -- dressing changed;abdominal pad;foam  abd pad and foam tape  -HF --    Periwound Care -- dry periwound area maintained  -HF --    Retired Wound - Properties Group Placement Date: 12/28/22  -HB Placement Time: 1856 -HB Side: Right  -HB Location: shoulder  -HB Primary Wound Type: Incision  -HB    Retired Wound - Properties Group Date first assessed: 12/28/22  -HB Time first assessed: 1856  -HB Side: Right  -HB Location: shoulder  -HB Primary Wound Type: Incision  -HB       Wound 12/28/22 1940 Left shoulder Incision    Wound - Properties Group Placement Date: 12/28/22  -HB Placement Time: 1940 -HB Side: Left  -HB Location: shoulder  -HB Primary Wound Type: Incision  -HB    Dressing Appearance dry;intact  -HF dry;intact  -HF dry;intact  -HF    Closure Sutures;KATHRYN  -HF Sutures  -HF KATHRYN  -HF    Base dressing in place, unable to visualize  -HF clean;dry;red  -HF dressing in place, unable to visualize  -HF    Drainage Amount none  -HF none  -HF none  -HF    Care, Wound -- cleansed with;sterile normal saline  -HF --    Dressing Care -- dressing changed;abdominal pad;foam  abd pad and foam tape  -HF --    Periwound Care -- dry periwound area maintained  -HF --    Retired Wound - Properties Group Placement Date: 12/28/22  -HB Placement Time: 1940 -HB Side: Left  -HB Location: shoulder  -HB Primary Wound Type: Incision  -HB    Retired Wound - Properties Group Date first assessed: 12/28/22  -HB Time first assessed: 1940  -HB Side: Left   -HB Location: shoulder  -HB Primary Wound Type: Incision  -HB       Wound 12/30/22 2000 sacral spine Pressure Injury    Wound - Properties Group Placement Date: 12/30/22 -MB Placement Time: 2000 -MB Location: sacral spine  -MB Primary Wound Type: Pressure inj  -MB    Dressing Appearance dry;intact  -HF dry;intact  -HF dry;intact  -HF    Closure Adhesive bandage  -HF Adhesive bandage  -HF Adhesive bandage  -HF    Base dressing in place, unable to visualize  -HF dressing in place, unable to visualize  -HF moist;red;non-blanchable  -HF    Drainage Amount none  -HF none  -HF none  -HF    Retired Wound - Properties Group Placement Date: 12/30/22  -MB Placement Time: 2000 -MB Location: sacral spine  -MB Primary Wound Type: Pressure inj  -MB    Retired Wound - Properties Group Date first assessed: 12/30/22 -MB Time first assessed: 2000 -MB Location: sacral spine  -MB Primary Wound Type: Pressure inj  -MB    Row Name 01/06/23 1600             Wound 12/28/22 1856 Right shoulder Incision    Wound - Properties Group Placement Date: 12/28/22  -HB Placement Time: 1856  -HB Side: Right  -HB Location: shoulder  -HB Primary Wound Type: Incision  -HB    Closure KATHRYN  -MS      Retired Wound - Properties Group Placement Date: 12/28/22  -HB Placement Time: 1856 -HB Side: Right  -HB Location: shoulder  -HB Primary Wound Type: Incision  -HB    Retired Wound - Properties Group Date first assessed: 12/28/22  -HB Time first assessed: 1856  -HB Side: Right  -HB Location: shoulder  -HB Primary Wound Type: Incision  -HB       Wound 12/28/22 1940 Left shoulder Incision    Wound - Properties Group Placement Date: 12/28/22  -HB Placement Time: 1940  -HB Side: Left  -HB Location: shoulder  -HB Primary Wound Type: Incision  -HB    Closure KATHRYN  -MS      Retired Wound - Properties Group Placement Date: 12/28/22  -HB Placement Time: 1940  -HB Side: Left  -HB Location: shoulder  -HB Primary Wound Type: Incision  -HB    Retired Wound - Properties  Group Date first assessed: 12/28/22  -HB Time first assessed: 1940 -HB Side: Left  -HB Location: shoulder  -HB Primary Wound Type: Incision  -HB       Wound 12/30/22 2000 sacral spine Pressure Injury    Wound - Properties Group Placement Date: 12/30/22  -MB Placement Time: 2000 -MB Location: sacral spine  -MB Primary Wound Type: Pressure inj  -MB    Retired Wound - Properties Group Placement Date: 12/30/22 -MB Placement Time: 2000 -MB Location: sacral spine  -MB Primary Wound Type: Pressure inj  -MB    Retired Wound - Properties Group Date first assessed: 12/30/22 -MB Time first assessed: 2000 -MB Location: sacral spine  -MB Primary Wound Type: Pressure inj  -MB          User Key  (r) = Recorded By, (t) = Taken By, (c) = Cosigned By    Initials Name Provider Type    Martha Monson, RN Registered Nurse    Carolyn Jay RN Registered Nurse    Raine Shukla RN Registered Nurse    HF Anna, Precious, RN Registered Nurse              Assessment & Plan      Brief Patient Summary:  Jaime Bar is a 67 y.o. male with past medical history of cervicalgia, primary osteoarthritis involving multiple joints with surgery on bilateral shoulders and right knee replacement but no recent procedure or surgery, hyponatremia, prostate cancer, and cardiac amyloidosis presented to [facility] on 12/25/2022 with complaints of right shoulder pain, weakness, and altered mental status.  He was found to be hypotensive and hyponatremic with elevated lactic acid.  Patient had reported decreased oral intake.  He was transferred to [facility] ICU for further treatment of septic shock requiring vasopressors and hyponatremia.      Since admission he was found to be bacteremic with 2 sets of blood cultures positive for staph aureus, source being bilateral shoulder septic arthritis. ID has been treating the patient with IV ceftriaxone 2G and he will need a total of 6 weeks of antibiotic therapy.   He has status post bilateral shoulder arthroscopy with extensive debridement by Dr. Velez 12/28/2022. He remained intubated after surgery until 12/29 and now extubated. He has been weaned off vasopressors. Nephrology is managing his hyponatremia and STEVEN on CKD. He was found to have LUE DVT provoked from PICC line currently on argatroban. Hematology has been consulted for his thrombocytopenia.       cefTRIAXone, 2 g, Intravenous, Q24H  enoxaparin, 1.5 mg/kg, Subcutaneous, Daily  ferrous sulfate, 300 mg, Nasogastric, Daily  First Mouthwash (Magic Mouthwash), 5 mL, Swish & Spit, Q6H  furosemide, 40 mg, Intravenous, Q12H  lansoprazole, 30 mg, Nasogastric, Q AM  midodrine, 10 mg, Nasogastric, Q8H  sodium chloride, 10 mL, Intravenous, Q12H  Zinc Oxide, , Apply externally, TID             Active Hospital Problems:  Active Hospital Problems    Diagnosis    • Dysphagia    • Bacteremia due to methicillin susceptible Staphylococcus aureus (MSSA)    • Staphylococcal arthritis of shoulder (HCC)    • Cervicalgia, spine surgery 2017    • Primary osteoarthritis involving multiple joints    • Hyponatremia    • Acute kidney injury superimposed on chronic kidney disease (HCC)    • Cardiac amyloidosis (HCC)    • ACC/AHA stage C chronic systolic heart failure (HCC)    • Tobacco abuse    • Primary hypertension      Plan:    Bilateral shoulder septic arthritis  Septic shock secondary to MSSA bacteremia  -Synovial fluid culture was consistent with infection and cultures are growing 4+ Staph aureus  -s/p bilateral shoulder arthroscopy with extensive debridement by Dr. Velez 12/28/2022  - 2/2 blood cultures positive for MSSA secondary to bilateral shoulder septic arthritis   -ALMA DELIA on 12/28, Negative for endocarditis  -On IV Rocephin 2 g daily x6 weeks per ID (last day of therapy is [id number])  -Off IV vasopressors, remains on midodrine  -Discontinued art line, MAP greater than 65 mmHg at this time  -LUE PICC line removed and tip also sent for  culture  -Fevers have resolved, CT of the spine does not show any acute abscess or infectious changes  -ID following     Dysphagia  -Speech following, still not appropriate for PO intake  -NG tube feeds at goal. However, it's been 7 days with NG now.  -May need to consider PEG if ST doesn't think he can move towards PO intake    Acute metabolic encephalopathy, improved  Severe thrombocytopenia, improved  LUE DVT, provoked from PICC line/RUE showing superficial thrombus  -Patient's mental status worsening  -Initially concern for TTP however no schistocytes seen on peripheral smear per hematology  -Platelets improving  -Off argatroban drip, PTT elevated  -On low-dose Lovenox twice daily for now per heme-onc pending DIC work-up  -Patient's left lower extremity is back to normal now, good pulses noted, BERNARDINO showed mild digital ischemia  -No peripheral cyanosis noted at this time    Acute respiratory failure with hypoxia, improved  -Pulmonary edema, improved  -Postop respiratory failure, extubated on 12/39  -Remains on 2LNC, wean as tolerated  -On diuresis per nephrology     STEVEN on CKD  Hyponatremia on admission and now hypernatremia  -Creatinine stable now but sodium trending up  -Free water flushes per nephrology  -Management per nephrology     PAF, s/p RVR  -Off amnio drip, rate controlled now  -Anticoagulation recommendations per cardiology and heme-onc  -Cardiology following, may need cardioversion prior to discharge    Elevated LFTs  -Likely due to shock liver  -Hep panel negative  -Liver ultrasound showed normal hepatic parenchyma, cholelithiasis with adenomyomatosis involving gallbladder wall, CBD upper limit of normal  -Monitor    H/o cardiac amyloidosis  H/o prostate CA  Osteoarthritis     DVT/GI Ppx.    DNR/DNI.    CODE STATUS:    Medical Intervention Limits: NO intubation (DNI)  Level Of Support Discussed With: Next of Kin (If No Surrogate)  Code Status (Patient has no pulse and is not breathing): No CPR (Do  Not Attempt to Resuscitate)  Medical Interventions (Patient has pulse or is breathing): Limited Support  Release to patient: Routine Release    Disposition: Will very likely need placement and possible PEG tube. However, patient seems to feel that these issues could eventually be managed at home (wound care, tube feeds, ongoing therapy, etc).    Electronically signed by Matt Rodriguez MD, 01/07/23, 11:42 EST.  [facility]      Part of this note may be an electronic transcription/translation of spoken language to printed text using the Dragon Dictation System.

## 2023-01-07 NOTE — PROGRESS NOTES
Infectious Diseases Progress Note      LOS: 12 days   Patient Care Team:  Provider, No Known as PCP - Kyle Gardner MD as Consulting Physician (Nephrology)  Vicente Mendoza MD as Consulting Physician (Hematology and Oncology)    Chief Complaint: Fatigue    Subjective       Patient had no high-grade fever during the last 24 hours.  The patient is awake and alert.  Currently on 2 L of oxygen.  On no vasopressors    Review of Systems:   Review of Systems   Constitutional: Positive for fatigue.   HENT: Negative.    Eyes: Negative.    Respiratory: Negative.    Cardiovascular: Negative.    Gastrointestinal: Negative.    Genitourinary: Negative.    Musculoskeletal: Positive for joint swelling.   Skin: Negative.    Neurological: Negative.    Hematological: Negative.    Psychiatric/Behavioral: Negative.         Objective     Vital Signs  Temp:  [96.8 °F (36 °C)-98.8 °F (37.1 °C)] 98.8 °F (37.1 °C)  Heart Rate:  [] 94  Resp:  [20-24] 20  BP: (110-142)/(63-84) 117/78    Physical Exam:  Physical Exam  Vitals and nursing note reviewed.   Constitutional:       General: He is not in acute distress.     Appearance: He is well-developed and normal weight. He is ill-appearing. He is not diaphoretic.      Comments: The patient is more awake and alert today   HENT:      Head: Normocephalic and atraumatic.   Eyes:      Pupils: Pupils are equal, round, and reactive to light.   Cardiovascular:      Rate and Rhythm: Normal rate and regular rhythm.      Heart sounds: Normal heart sounds, S1 normal and S2 normal.   Pulmonary:      Effort: Pulmonary effort is normal. No respiratory distress.      Breath sounds: No stridor. Rales present. No wheezing.   Abdominal:      General: Abdomen is flat. Bowel sounds are normal. There is no distension.      Palpations: Abdomen is soft. There is no mass.      Tenderness: There is no abdominal tenderness. There is no guarding.      Comments: NG tube   Musculoskeletal:          General: No deformity. Normal range of motion.      Cervical back: Neck supple.      Right lower leg: Edema present.      Left lower leg: Edema present.      Comments: Drain tubes are present in the right and left shoulders with bloody serous drainage    Bilateral upper extremity edema with right arm worse than left   Skin:     General: Skin is warm and dry.      Coloration: Skin is not pale.      Findings: No erythema or rash.   Neurological:      Mental Status: He is alert and oriented to person, place, and time.      Cranial Nerves: No cranial nerve deficit.          Results Review:    I have reviewed all clinical data, test, lab, and imaging results.     Radiology  XR Abdomen KUB    Result Date: 1/7/2023  Frontal abdomen CLINICAL INDICATION: Line placement. COMPARISON: None. FINDINGS: Enteric tube tip in the stomach. Bowel gas pattern is nonobstructive. No intraperitoneal free air.     Enteric tube in the stomach. Thank you for the referral of this patient. This exam was interpreted by an [employer] Board of [employer] certified radiologist with subspecialty fellowship in Pediatric Radiology. If there are any questions regarding this exam please feel free to contact a radiologist directly at [phone]. Slot [id number] Electronically signed by:  Mau Rodriguez M.D.  1/7/2023 4:48 AM Mountain Time      Cardiology    Laboratory    Results from last 7 days   Lab Units 01/07/23 0712 01/06/23 0357 01/05/23 0411 01/04/23 0352 01/03/23 0914 01/02/23 0606 01/01/23 0614   WBC 10*3/mm3 6.90 5.80 5.90 7.00 8.20 9.30 7.80   HEMOGLOBIN g/dL 9.4* 9.9* 9.9* 11.0* 11.1* 11.3* 11.4*   HEMATOCRIT % 28.4* 29.7* 30.6* 34.1* 34.1* 34.6* 34.6*   PLATELETS 10*3/mm3 156 125* 121* 116* 104* 78* 60*     Results from last 7 days   Lab Units 01/07/23  0712 01/06/23 0357 01/05/23 0411 01/04/23  0352 01/03/23  0914 01/02/23  0606 01/01/23  0614   SODIUM mmol/L 138 141 144 148* 150* 152* 146*   POTASSIUM mmol/L 3.8 3.8 3.9 4.4 4.7 4.3 4.0    CHLORIDE mmol/L 96* 99 104 108* 116* 120* 113*   CO2 mmol/L 36.0* 33.0* 33.0* 32.0* 28.0 27.0 25.0   BUN mg/dL 28* 32* 33* 40* 36* 41* 52*   CREATININE mg/dL 0.53* 0.55* 0.55* 0.77 0.62* 0.69* 0.82   GLUCOSE mg/dL 94 106* 117* 127* 122* 144* 135*   ALBUMIN g/dL 2.2* 2.0* 2.3* 2.2* 2.4* 2.4* 2.2*   BILIRUBIN mg/dL 3.0* 3.4* 3.1* 3.5* 3.4* 3.3* 4.0*   ALK PHOS U/L 91 103 155* 96 120* 102 107   AST (SGOT) U/L 33 39 33 36 44* 104* 308*   ALT (SGPT) U/L 43* 52* 61* 93* 122* 200* 306*   CALCIUM mg/dL 7.8* 7.8* 7.7* 8.0* 8.0* 8.2* 8.0*                 Microbiology   Microbiology Results (last 10 days)     Procedure Component Value - Date/Time    Catheter Culture - Cath Tip, Hand, Left [[id number]] Collected: 12/31/22 1839    Lab Status: Final result Specimen: Cath Tip from Hand, Left Updated: 01/03/23 0958     CATHETER CULTURE No growth at 2 days    Blood Culture - Blood, Blood, PICC Line [[id number]]  (Normal) Collected: 12/31/22 1148    Lab Status: Final result Specimen: Blood, PICC Line Updated: 01/05/23 1200     Blood Culture No growth at 5 days    Blood Culture - Blood, Hand, Right [[id number]]  (Abnormal) Collected: 12/30/22 1251    Lab Status: Final result Specimen: Blood from Hand, Right Updated: 01/01/23 1030     Blood Culture Staphylococcus aureus     Comment:   Infectious disease consultation is highly recommended to rule out distant foci of infection.        Isolated from Aerobic Bottle     Gram Stain Aerobic Bottle Gram positive cocci in clusters    Narrative:      Less than seven (7) mL's of blood was collected.  Insufficient quantity may yield false negative results.    Refer to previous blood culture collected on 12/26/2022 0435 for MICs.    Blood Culture - Blood, Hand, Left [[id number]]  (Abnormal) Collected: 12/29/22 1015    Lab Status: Final result Specimen: Blood from Hand, Left Updated: 01/01/23 1030     Blood Culture Staphylococcus aureus     Comment:   Infectious disease consultation is highly  recommended to rule out distant foci of infection.    Refer to previous blood culture collected on 12/26/2022 0435 for MICs.        Isolated from Aerobic Bottle     Blood Culture Staphylococcus, coagulase negative     Comment: Probable contaminant requires clinical correlation, susceptibility not performed unless requested by physician.          Isolated from --     Gram Stain Aerobic Bottle Gram positive cocci in clusters    Narrative:      Less than seven (7) mL's of blood was collected.  Insufficient quantity may yield false negative results.          Medication Review:       Schedule Meds  cefTRIAXone, 2 g, Intravenous, Q24H  enoxaparin, 1.5 mg/kg, Subcutaneous, Daily  ferrous sulfate, 300 mg, Nasogastric, Daily  First Mouthwash (Magic Mouthwash), 5 mL, Swish & Spit, Q6H  furosemide, 40 mg, Intravenous, Q12H  lansoprazole, 30 mg, Nasogastric, Q AM  midodrine, 10 mg, Nasogastric, Q8H  sodium chloride, 10 mL, Intravenous, Q12H  Zinc Oxide, , Apply externally, TID        Infusion Meds       PRN Meds  •  acetaminophen **OR** acetaminophen  •  aluminum-magnesium hydroxide-simethicone  •  dextrose  •  dextrose  •  fentanyl  •  glucagon (human recombinant)  •  HYDROcodone-acetaminophen  •  ondansetron **OR** ondansetron  •  sodium chloride  •  sodium chloride        Assessment & Plan       Antimicrobial Therapy   1.  IV ceftriaxone        2.        3.        4.        5.            Assessment    Bilateral shoulder septic arthritis.  Synovial fluid culture was consistent with infection and cultures are growing 4+ methicillin susceptible Staph aureus    Methicillin susceptible Staph aureus bacteremia secondary to above.  Blood cultures were positive x2 sets on admission on December 26, 2022.  Repeat blood culture from December 27, 2022 turned positive  Repeat blood culture from December 29, 2022 is positive  ALMA DELIA was done on December 28, 2022 and showed no vegetation  -Repeat blood culture from 12/30/2022 is now  positive  -PICC line was removed on 12/31/2022- cultures pending  -Blood culture from 12/31/2022 is negative so far  CT scan of the cervical, thoracic and lumbar spine with contrast showed no obvious infection    Mild septic shock.  Currently off vasopressors    Respiratory failure.  Patient was on the ventilator after shoulder surgeries.  Was extubated and currently requiring 15 L of oxygen    DVT of the left arm secondary to PICC line.  Hematology service was consulted and patient was started on anticoagulation    Elevated transaminase and hyperbilirubinemia secondary to sepsis and infection.  Liver enzymes are trending down    Worsening right upper extremity edema.  Venous Doppler of the right upper extremity showed SVT but there was no DVT    Toxic metabolic encephalopathy.  Improved significantly      Plan    Continue IV ceftriaxone 2 g daily for total of 6 weeks.  The last day of IV ceftriaxone will be February 11, 2023  Continue supportive care  A.m. labs   the patient will eventually need a PICC line for IV antibiotics before discharge to rehab    The case was discussed with family at bedside    Antonina Delacruz MD  01/07/23  13:28 EST    Note is dictated utilizing voice recognition software/Dragon

## 2023-01-07 NOTE — PROGRESS NOTES
Hematology/Oncology Inpatient Progress Note    PATIENT NAME: Jaime Bar  : 1955  MRN: [id number]    CHIEF COMPLAINT: Sepsis; Thrombocytopenia; provoked catheter associated LUE DVT, LUE SVT, and RUE SVT    HISTORY OF PRESENT ILLNESS:    67 y.o. male admitted to [facility] on transfer from [facility] on 2022.  The patient was intubated and unresponsive at time of consultation with histories obtained from review of records and discussion with patient's niece and nephew.  He reported a history of bilateral shoulder pain for few days prior to admission.  He had presented to [place] ED on 2022 where he was hypotensive and hyponatremic and transferred to [facility].  CXR on 2022 showed coarsened airspace and bronchovascular thickening with small airway disease such as bronchitis or asthma, edema, atypical/viral infection, and aspiration in differential.  A RUE venous Doppler showed acute RUE superficial thrombophlebitis in the cephalic vein and no evidence of DVT for which he was started on subcu heparin.  On 2022 CBC revealed WBC 7.2, hemoglobin 12.4, MCV 98.4, and platelets 99,000.  Creatinine was elevated to 2.38, BUN 55, sodium was low at 118, potassium was high at 5.4 and LFTs revealed T bili elevated to 4.8 (0-1.2) with transaminases normal.  Blood cultures grew Staph aureus.  Urinalysis was concerning for UTI.  Urine sodium was less than 20, a.m. cortisol 26.11 (6.02-18.4), and urine osmolality was 410 ().   X-rays and MRIs of the bilateral shoulders were performed 2022 revealing joint effusion and advanced degenerative changes/destructive changes of glenohumeral joint.  There was suspected complete tear of the supraspinatus and infraspinatus tendons and a completely macerated appearance of the labrum on the right shoulder.  On 2022 RUE venous Doppler was repeated and felt stable.  He then underwent bilateral shoulder arthroscopy incision and drainage  with culture growing heavy growth of Staph aureus.  He had been intubated prior to the surgery and remained sedated on ventilator with pressors postop.  At time of consultation 12/29/2022 LUE venous Doppler showed acute catheter associated DVT in the left axillary vein, SVT in the left basilic vein of the upper arm.  BLE venous Doppler was negative for DVT/SVT but did show deep venous valvular incompetence in the right popliteal vein.  CBC revealed WBC 14.2, hemoglobin 11.4, MCV 96.5, and platelets 46,000.  T bili was elevated to 5.3 and transaminases were now elevated with  and .  PT was 15.6 (9.6-11.7), PTT was 38.7 (24-31), and fibrinogen was 493 (210-450).  D-dimer was 11.83 (0-0.67).  Transesophageal echocardiogram showed LVEF 46-50%, mild aortic valve stenosis, and no evidence of vegetation or bacterial endocarditis.  Heparin was changed to argatroban.  ID was managing antibiotics with patient on ceftriaxone with plan for treatment x6 weeks and recommending replacing PICC line 2 days after starting anticoagulation.  His niece and nephew reported the patient to have lost some weight but they were unsure the amount.  They reported PMH significant for prostate cancer treated approximately 2010 with radiation alone and no evidence of recurrence to their knowledge.  He also was under the care of Dr. Damico at  for cardiac amyloid.  Chart review showed PSA was 0.1 (0-4) in August 2022.  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  IgG was 768 (603-1613), IgA was 238 (), IgM was 64 ().  Kappa/lambda ratio was 1.36 (0.2-1.65).  A note from Roosevelt Damico MD (cardiologist) at [facility] dated 7/13/2022 reported patient was followed for restrictive cardiomyopathy secondary to cardiac amyloidosis.  LVEF in February 2022 was 45-50%.  Cardiac MRI 4/6/2022 revealed a myocardial mass 276 g, global hypokinesis of left ventricle with ejection fraction 43%, no  myocardial iron overload, the thickness and appearance of intra-atrial septum was also consistent with cardiac amyloidosis.  The note stated that the patient was followed by Dr. Banks at  [facility] where he was on Vyndamax therapy for cardiac amyloid and had been on that treatment for about 2 weeks.  Additionally in January 2022 T bili was noted to be elevated to 2.6 (0.2-1.0) and he was anemic with hemoglobin 11.8 and MCV 86.5 platelets were normal at 261,000.     12/29/22  Hematology/Oncology was consulted by RITU Jimenez for thrombocytopenia.     Past Medical History: Prostate cancer approximately 2010 treated with radiation only.  Cardiac amyloid managed by Dr. Damico at .  CKD.  Surgical History: Several orthopedic surgeries in the past.  Bilateral shoulder arthroscopy with incision and drainage 12/28/2022.  Social History: He lives in [place], [place] with his spouse.  His spouse is known to have Alzheimer's disease.  He has smoked for many years.  He has no alcohol or recreational drug use however he was narcotic dependent secondary to chronic pain.  He is a retired .  Family History: No significant known family history.  Allergies: Tramadol causes him to feel weird and codeine causes nausea.     PCP: Provider, No Known    INTERVAL HISTORY:  • 12/30/2022- platelets 38,000, hemoglobin 11.1.  Folate 7.13 (4.78-24.2), vitamin B12 greater than 2000.  Iron 29 (), iron saturation 15% (20-50), TIBC 195 (298-536), and ferritin 3525 ().  Reticulocytes 1.05 (0.7-1.9), haptoglobin 148 (). PSA 0.234 (0-4).  T bili 5.3 with direct bili 4.2 (0-0.3).  Hepatitis panel nonreactive.  Extubated 12/29/2022 and off pressors.  • 12/31/2022- platelets 46,000, hemoglobin 11.3.  HIT antibody negative at 0.121 (0-0.4).  CMV IgM less than 30 (0-29.9) and EBV IgM less than 36 (0-35.9).  D-dimer mildly improved to 8.94 (0-0.67).  Transaminases rising with  and AST  520.  T bili 4.4.  Peripheral smear was negative for schistocytes again.  Abdominal ultrasound revealed cholelithiasis with adenomyomatosis involving the gallbladder wall.  The common bile duct was in the upper limits of normal.  The liver appeared normal.  • 1/1/2023- platelets 60,000, hemoglobin 11.4.  Argatroban was held with repeat PTT remaining high with subsequent continued hold of argatroban.  D-dimer 9.84.  PTT 61.1 (24-31).  CT head without contrast was negative for acute findings.  • 1/2/2023- hemoglobin 11.3 and platelets 78,000.  ALT improved to 200 and AST improved to 104.  T bili improved to 3.3.  Fibrinogen 445 (210-150).  • 1/3/2023- evaluated by neurology and felt to have multifactorial encephalopathy.  SPEP with no M spike.  Transaminases improved.  Hemoglobin 11.1, platelet count 104,000.  Kappa/lambda free light chain ratio 1.02 (0.26-1.25).  SHAR screen negative.  Right BERNARDINO normal with mild digital ischemia.  Left BERNARDINO normal with severe digital ischemia.  • 1/4/2023- hemoglobin 11.0 and platelets 116,000.  Copper 110 ().  ALT 93 (1-41), AST normalized, and T bili 3.5 (0-1.2).  RUE venous Doppler showed acute RUE superficial thrombophlebitis in the cephalic vein (forearm).  • 1/5/2023- hemoglobin 9.9, platelets 121,000.  ALT 61, T bili 3.1.  Ferrlecit 250 mg IV daily x3 initiated.  UIFE with no monoclonality detected.  SIFE with IgM monoclonal protein with lambda light chain specificity.  2 different results are present for immunoglobulins with different values.  • 1/6/2023- WBC 5.8, hemoglobin 9.9, platelets 125,000.  ALT 52.  • 1/7/2023- hemoglobin 9.4, platelet count 156,000.     History of present illness reviewed since last visit and changes noted on 01/07/23.    Subjective  Complains of dry mouth.    ROS:   Review of Systems   Constitutional: Negative for activity change, chills, fatigue, fever and unexpected weight change.   HENT: Negative for congestion, dental problem, hearing loss,  "mouth sores, nosebleeds, sore throat and trouble swallowing.    Eyes: Negative for photophobia and visual disturbance.   Respiratory: Negative for apnea, cough, chest tightness and shortness of breath.    Cardiovascular: Negative for chest pain, palpitations and leg swelling.   Gastrointestinal: Negative for abdominal distention, abdominal pain, blood in stool, constipation, diarrhea, nausea and vomiting.   Endocrine: Negative for cold intolerance and heat intolerance.   Genitourinary: Negative for decreased urine volume, difficulty urinating, dysuria, frequency, hematuria and urgency.   Musculoskeletal: Negative for arthralgias and gait problem.   Skin: Negative for rash and wound.   Neurological: Negative for dizziness, tremors, seizures, weakness, light-headedness, numbness and headaches.   Hematological: Negative for adenopathy. Does not bruise/bleed easily.   Psychiatric/Behavioral: Negative for confusion and hallucinations. The patient is not nervous/anxious.    All other systems reviewed and are negative.       MEDICATIONS:    Scheduled Meds:  cefTRIAXone, 2 g, Intravenous, Q24H  enoxaparin, 1.5 mg/kg, Subcutaneous, Daily  ferrous sulfate, 300 mg, Nasogastric, Daily  First Mouthwash (Magic Mouthwash), 5 mL, Swish & Spit, Q6H  furosemide, 40 mg, Intravenous, Q12H  lansoprazole, 30 mg, Nasogastric, Q AM  midodrine, 10 mg, Nasogastric, Q8H  sodium chloride, 10 mL, Intravenous, Q12H  Zinc Oxide, , Apply externally, TID       Continuous Infusions:      PRN Meds:  •  acetaminophen **OR** acetaminophen  •  aluminum-magnesium hydroxide-simethicone  •  dextrose  •  dextrose  •  fentanyl  •  glucagon (human recombinant)  •  HYDROcodone-acetaminophen  •  ondansetron **OR** ondansetron  •  sodium chloride  •  sodium chloride     ALLERGIES:    Allergies   Allergen Reactions   • Tramadol-Acetaminophen Anxiety     Worms in the brain feeling   • Codeine Other (See Comments)     nausea   • Tramadol Other (See Comments)     \"I " "just felt really crawly, it did weird things with my head\"       Objective    VITALS:   /78   Pulse 94   Temp 98.3 °F (36.8 °C) (Oral)   Resp 20   Ht 167.6 cm (66\")   Wt 85 kg (187 lb 6.3 oz)   SpO2 97%   BMI 30.25 kg/m²     PHYSICAL EXAM:  Physical Exam  Vitals and nursing note reviewed.   Constitutional:       General: He is not in acute distress.     Appearance: Normal appearance. He is well-developed. He is ill-appearing. He is not diaphoretic.   HENT:      Head: Normocephalic and atraumatic.      Comments: Alopecia     Right Ear: External ear normal.      Left Ear: External ear normal.      Nose: Nose normal. No rhinorrhea.      Comments: NG tube.  O2 per NC.     Mouth/Throat:      Mouth: Mucous membranes are moist.      Pharynx: Oropharynx is clear. No oropharyngeal exudate or posterior oropharyngeal erythema.      Comments: Dental fillings.  Oral mucositis is improving.  Eyes:      General: No scleral icterus.     Extraocular Movements: Extraocular movements intact.      Conjunctiva/sclera: Conjunctivae normal.      Pupils: Pupils are equal, round, and reactive to light.   Cardiovascular:      Rate and Rhythm: Normal rate and regular rhythm.      Heart sounds: Normal heart sounds. No murmur heard.     Comments: Cardiac monitor leads  Pulmonary:      Effort: Pulmonary effort is normal. No respiratory distress.      Breath sounds: Normal breath sounds. No stridor. No wheezing or rales.   Abdominal:      General: Bowel sounds are normal. There is no distension.      Palpations: Abdomen is soft. There is no mass.      Tenderness: There is no abdominal tenderness. There is no guarding.      Hernia: No hernia is present.   Genitourinary:     Comments: Deferred  Musculoskeletal:         General: Swelling (3+ RUE edema) present. No tenderness or deformity. Normal range of motion.      Cervical back: Normal range of motion and neck supple.      Comments: Bilateral shoulder dressings with 1 drain on the " left and one on the right.  BUE IVs.  Right hand O2 monitor.  BLE SCDs and pressure boots.   Lymphadenopathy:      Cervical: No cervical adenopathy.      Upper Body:      Right upper body: No supraclavicular adenopathy.      Left upper body: No supraclavicular adenopathy.   Skin:     General: Skin is warm and dry.      Coloration: Skin is not jaundiced or pale.      Findings: Bruising (Ecchymosis LUE) present. No erythema or rash.      Comments: Vertical scar right knee.     Neurological:      General: No focal deficit present.      Mental Status: He is alert.      Comments: More alert   Psychiatric:         Mood and Affect: Mood normal.         Behavior: Behavior normal.           RECENT LABS:  Lab Results (last 24 hours)     Procedure Component Value Units Date/Time    CBC & Differential [[id number]]  (Abnormal) Collected: 01/07/23 0712    Specimen: Blood Updated: 01/07/23 1028    Narrative:      The following orders were created for panel order CBC & Differential.  Procedure                               Abnormality         Status                     ---------                               -----------         ------                     CBC Auto Differential[[id number]]        Abnormal            Final result               Scan Slide[[id number]]                                       Final result                 Please view results for these tests on the individual orders.    Scan Slide [[id number]] Collected: 01/07/23 0712    Specimen: Blood Updated: 01/07/23 1028     Anisocytosis Slight/1+     WBC Morphology Normal     Platelet Estimate Adequate     Large Platelets Slight/1+    CBC Auto Differential [[id number]]  (Abnormal) Collected: 01/07/23 0712    Specimen: Blood Updated: 01/07/23 1028     WBC 6.90 10*3/mm3      RBC 2.96 10*6/mm3      Hemoglobin 9.4 g/dL      Hematocrit 28.4 %      MCV 95.8 fL      MCH 31.9 pg      MCHC 33.3 g/dL      RDW 15.9 %      RDW-SD 53.4 fl      MPV 9.6 fL      Platelets 156 10*3/mm3       Neutrophil % 82.3 %      Lymphocyte % 5.0 %      Monocyte % 11.5 %      Eosinophil % 0.4 %      Basophil % 0.8 %      Neutrophils, Absolute 5.70 10*3/mm3      Lymphocytes, Absolute 0.30 10*3/mm3      Monocytes, Absolute 0.80 10*3/mm3      Eosinophils, Absolute 0.00 10*3/mm3      Basophils, Absolute 0.10 10*3/mm3      nRBC 0.1 /100 WBC     Comprehensive Metabolic Panel [[id number]]  (Abnormal) Collected: 01/07/23 0712    Specimen: Blood Updated: 01/07/23 0743     Glucose 94 mg/dL      BUN 28 mg/dL      Creatinine 0.53 mg/dL      Sodium 138 mmol/L      Potassium 3.8 mmol/L      Chloride 96 mmol/L      CO2 36.0 mmol/L      Calcium 7.8 mg/dL      Total Protein 5.3 g/dL      Albumin 2.2 g/dL      ALT (SGPT) 43 U/L      AST (SGOT) 33 U/L      Alkaline Phosphatase 91 U/L      Total Bilirubin 3.0 mg/dL      Globulin 3.1 gm/dL      A/G Ratio 0.7 g/dL      BUN/Creatinine Ratio 52.8     Anion Gap 6.0 mmol/L      eGFR 109.8 mL/min/1.73      Comment: National Kidney Foundation and American Society of Nephrology (ASN) Task Force recommended calculation based on the Chronic Kidney Disease Epidemiology Collaboration (CKD-EPI) equation refit without adjustment for race.       Narrative:      GFR Normal >60  Chronic Kidney Disease <60  Kidney Failure <15      Phosphorus [[id number]]  (Normal) Collected: 01/07/23 0712    Specimen: Blood Updated: 01/07/23 0743     Phosphorus 3.1 mg/dL     Magnesium [[id number]]  (Normal) Collected: 01/07/23 0712    Specimen: Blood Updated: 01/07/23 0743     Magnesium 1.7 mg/dL     Calcium, Ionized [[id number]]  (Abnormal) Collected: 01/07/23 0712    Specimen: Blood Updated: 01/07/23 0736     Ionized Calcium 1.09 mmol/L     POC Glucose Once [[id number]]  (Abnormal) Collected: 01/07/23 0540    Specimen: Blood Updated: 01/07/23 0542     Glucose 115 mg/dL      Comment: Serial Number: [id number]:  [id number]       POC Glucose Once [[id number]]  (Normal) Collected: 01/07/23 0018    Specimen: Blood Updated:  01/07/23 0020     Glucose 102 mg/dL      Comment: Serial Number: [id number]:  [id number]       POC Glucose Once [[id number]]  (Normal) Collected: 01/06/23 1722    Specimen: Blood Updated: 01/06/23 1723     Glucose 74 mg/dL      Comment: Serial Number: [id number]:  [id number]       POC Glucose Once [[id number]]  (Abnormal) Collected: 01/06/23 1106    Specimen: Blood Updated: 01/06/23 1112     Glucose 124 mg/dL      Comment: Serial Number: [id number]:  [id number]             PENDING RESULTS:  Note from Dr. Sauceda at  and genetic testing from  ordered by Dr. Banks.    IMAGING REVIEWED:  XR Abdomen KUB    Result Date: 1/7/2023  Enteric tube in the stomach. Thank you for the referral of this patient. This exam was interpreted by an American Board of Radiology certified radiologist with subspecialty fellowship in Pediatric Radiology. If there are any questions regarding this exam please feel free to contact a radiologist directly at [phone]. Slot 64 Electronically signed by:  Mau Rodriguez M.D.  1/7/2023 4:48 AM Mountain Time      I have reviewed the patient's labs, imaging, reports, and other clinician documentation.    Assessment & Plan   ASSESSMENT:  1. Thrombocytopenia-platelets were normal in early 2022.  Platelets were low on admission and continued to drop.  HIT antibody negative.    On ceftriaxone which can cause thrombocytopenia but platelets have started to improve now while patient continued on ceftriaxone.  Coags mildly elevated with fibrinogen not low, not DIC but sepsis likely contributing to thrombocytopenia. No hemolysis and no schistocytes on peripheral smear ruling out TTP.  No vitamin B12 or folate deficiencies. SHAR negative. Coags remain elevated and D-dimer high.  Repeat fibrinogen remained elevated.    Platelets normalized 1/7/2023.  2. Acute on chronic anemia/IgA lambda monoclonal gammopathy/SWETHA- mild anemia with hemoglobin in the 11's in early 2022.  Gammopathy labs at that  time showed IgA lambda monoclonal protein with normal immunoglobulins and free light chains.  Iron studies showed SWETHA and started oral iron.  No hemolysis or vitamin B12/folate deficiencies. Copper level normal.  SPEP with no M spike and free light chain ratio normal.  UIFE negative but SIFE with IgA lambda monoclonal protein.  2 different immunoglobulin values listed.  Hemoglobin was stable but then dropped and Ferrlecit 250 mg IV daily x3 initiated 1/5.  Received notes from cardiology at  which mentioned they were going to involve Dr. Sauceda and will obtain any records available  3. Acute provoked catheter associated LUE DVT/SVT and RUE SVT- RUE SVT was diagnosed prior to LUE DVT and he had received several doses of subcu heparin starting on 12/26. LUE DVT provoked by PICC line which  has since been removed.  Argatroban was difficult to manage with elevated PTT.  Started low-dose Lovenox 1/1 and increased to full treatment dose 1/3 as platelets were rising.  Repeat RUE venous Doppler showed stable SVT.  4. Elevated LFT/cholelithiasis and adenomyomatosis of the gallbladder wall- possible shock liver.  T bili remains elevated with elevated direct bili and normal liver on US.  Hepatitis panel negative.  LFTs improving.  5. Bilateral shoulder septic arthritis/methicillin susceptible staph aureus bacteremia-on ceftriaxone per ID.  No vegetation on echocardiogram.  Right shoulder drainage remains cloudy.  6. Acute hypoxic respiratory failure/smoking history-  transiently on ventilator postsurgery.  7. History of prostate cancer-PSA remains normal.   8. Cardiac amyloidosis- on chart review it has been treated at .  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  Records reviewed from  cardiology.  Genetic testing was being done for hereditary ATTR amyloidosis and patient was referred to Dr. Nguyen to look for systemic  amyloidosis.  9. STEVEN/hyponatremia-nephrology managing.  Creatinine and sodium normalized.  10. A. fib with RVR- patient evaluated by EP and recommended start Eliquis 5 mg p.o. twice daily (not started as patient currently on Lovenox) with plan for cardioversion prior to discharge as long as he remains on anticoagulation.  11. Oral mucositis-added Magic mouthwash.  Improving.    PLAN:  1. Ferrlecit 250 mg IV (day 3/3).  2. Continue Magic Mouthwash.  3. Continue Lovenox at 1.5 mg/kg daily.  4. Continue ferrous sulfate 325 mg daily (liquid given NGT).  5. Continue daily CBC.  6. Repeat immunoglobulins in near future.  7. Skeletal survey when patient out of ICU.  8. Pending receipt of records from Dr. Nguyen at .  9. Pending receipt of genetic testing by Dr. Banks at  evaluating for hereditary ATTR amyloidosis             I discussed the patient's findings and my recommendations with patient and nursing.    Part of this note may be an electronic transcription/translation of spoken language to printed text using the Dragon Dictation System.    Electronically signed by Vicente Mendoza MD, 01/07/23, 10:57 AM EST.

## 2023-01-08 PROBLEM — R13.10 DYSPHAGIA: Chronic | Status: ACTIVE | Noted: 2023-01-08

## 2023-01-08 LAB
ALBUMIN SERPL-MCNC: 2.1 G/DL (ref 3.5–5.2)
ALBUMIN/GLOB SERPL: 0.7 G/DL
ALP SERPL-CCNC: 111 U/L (ref 39–117)
ALT SERPL W P-5'-P-CCNC: 40 U/L (ref 1–41)
ANION GAP SERPL CALCULATED.3IONS-SCNC: 7 MMOL/L (ref 5–15)
AST SERPL-CCNC: 34 U/L (ref 1–40)
BASOPHILS # BLD AUTO: 0 10*3/MM3 (ref 0–0.2)
BASOPHILS NFR BLD AUTO: 0.3 % (ref 0–1.5)
BILIRUB SERPL-MCNC: 3 MG/DL (ref 0–1.2)
BUN SERPL-MCNC: 30 MG/DL (ref 8–23)
BUN/CREAT SERPL: 52.6 (ref 7–25)
CA-I SERPL ISE-MCNC: 1.1 MMOL/L (ref 1.2–1.3)
CALCIUM SPEC-SCNC: 8.1 MG/DL (ref 8.6–10.5)
CHLORIDE SERPL-SCNC: 96 MMOL/L (ref 98–107)
CO2 SERPL-SCNC: 35 MMOL/L (ref 22–29)
CREAT SERPL-MCNC: 0.57 MG/DL (ref 0.76–1.27)
DEPRECATED RDW RBC AUTO: 52.5 FL (ref 37–54)
EGFRCR SERPLBLD CKD-EPI 2021: 107.5 ML/MIN/1.73
EOSINOPHIL # BLD AUTO: 0 10*3/MM3 (ref 0–0.4)
EOSINOPHIL NFR BLD AUTO: 0.2 % (ref 0.3–6.2)
ERYTHROCYTE [DISTWIDTH] IN BLOOD BY AUTOMATED COUNT: 16 % (ref 12.3–15.4)
GLOBULIN UR ELPH-MCNC: 3.2 GM/DL
GLUCOSE BLDC GLUCOMTR-MCNC: 117 MG/DL (ref 70–105)
GLUCOSE BLDC GLUCOMTR-MCNC: 123 MG/DL (ref 70–105)
GLUCOSE BLDC GLUCOMTR-MCNC: 128 MG/DL (ref 70–105)
GLUCOSE BLDC GLUCOMTR-MCNC: 130 MG/DL (ref 70–105)
GLUCOSE SERPL-MCNC: 119 MG/DL (ref 65–99)
HCT VFR BLD AUTO: 27.4 % (ref 37.5–51)
HGB BLD-MCNC: 9.1 G/DL (ref 13–17.7)
LYMPHOCYTES # BLD AUTO: 0.4 10*3/MM3 (ref 0.7–3.1)
LYMPHOCYTES NFR BLD AUTO: 5.3 % (ref 19.6–45.3)
MAGNESIUM SERPL-MCNC: 1.8 MG/DL (ref 1.6–2.4)
MCH RBC QN AUTO: 31.1 PG (ref 26.6–33)
MCHC RBC AUTO-ENTMCNC: 33.2 G/DL (ref 31.5–35.7)
MCV RBC AUTO: 93.5 FL (ref 79–97)
MONOCYTES # BLD AUTO: 1 10*3/MM3 (ref 0.1–0.9)
MONOCYTES NFR BLD AUTO: 14.2 % (ref 5–12)
NEUTROPHILS NFR BLD AUTO: 5.6 10*3/MM3 (ref 1.7–7)
NEUTROPHILS NFR BLD AUTO: 80 % (ref 42.7–76)
NRBC BLD AUTO-RTO: 0.1 /100 WBC (ref 0–0.2)
PHOSPHATE SERPL-MCNC: 2.7 MG/DL (ref 2.5–4.5)
PLATELET # BLD AUTO: 175 10*3/MM3 (ref 140–450)
PMV BLD AUTO: 9.1 FL (ref 6–12)
POTASSIUM SERPL-SCNC: 3.5 MMOL/L (ref 3.5–5.2)
PROT SERPL-MCNC: 5.3 G/DL (ref 6–8.5)
RBC # BLD AUTO: 2.93 10*6/MM3 (ref 4.14–5.8)
SODIUM SERPL-SCNC: 138 MMOL/L (ref 136–145)
WBC NRBC COR # BLD: 7 10*3/MM3 (ref 3.4–10.8)

## 2023-01-08 PROCEDURE — 25010000002 FUROSEMIDE PER 20 MG: Performed by: INTERNAL MEDICINE

## 2023-01-08 PROCEDURE — 80053 COMPREHEN METABOLIC PANEL: CPT | Performed by: ORTHOPAEDIC SURGERY

## 2023-01-08 PROCEDURE — 82330 ASSAY OF CALCIUM: CPT | Performed by: ORTHOPAEDIC SURGERY

## 2023-01-08 PROCEDURE — 85025 COMPLETE CBC W/AUTO DIFF WBC: CPT | Performed by: ORTHOPAEDIC SURGERY

## 2023-01-08 PROCEDURE — 25010000002 CEFTRIAXONE PER 250 MG: Performed by: INTERNAL MEDICINE

## 2023-01-08 PROCEDURE — 82962 GLUCOSE BLOOD TEST: CPT

## 2023-01-08 PROCEDURE — 92526 ORAL FUNCTION THERAPY: CPT

## 2023-01-08 PROCEDURE — 83735 ASSAY OF MAGNESIUM: CPT | Performed by: ORTHOPAEDIC SURGERY

## 2023-01-08 PROCEDURE — 25010000002 ENOXAPARIN PER 10 MG: Performed by: NURSE PRACTITIONER

## 2023-01-08 PROCEDURE — 84100 ASSAY OF PHOSPHORUS: CPT | Performed by: ORTHOPAEDIC SURGERY

## 2023-01-08 RX ORDER — POLYETHYLENE GLYCOL 3350 17 G/17G
17 POWDER, FOR SOLUTION ORAL DAILY
Status: DISCONTINUED | OUTPATIENT
Start: 2023-01-08 | End: 2023-01-09

## 2023-01-08 RX ADMIN — POLYETHYLENE GLYCOL 3350 17 G: 17 POWDER, FOR SOLUTION ORAL at 18:38

## 2023-01-08 RX ADMIN — DIPHENHYDRAMINE HYDROCHLORIDE AND LIDOCAINE HYDROCHLORIDE AND ALUMINUM HYDROXIDE AND MAGNESIUM HYDRO 5 ML: KIT at 05:03

## 2023-01-08 RX ADMIN — HYDROCODONE BITARTRATE AND ACETAMINOPHEN 1 TABLET: 10; 325 TABLET ORAL at 08:46

## 2023-01-08 RX ADMIN — HYDROCODONE BITARTRATE AND ACETAMINOPHEN 1 TABLET: 10; 325 TABLET ORAL at 19:54

## 2023-01-08 RX ADMIN — Medication: at 15:14

## 2023-01-08 RX ADMIN — Medication 10 ML: at 08:47

## 2023-01-08 RX ADMIN — CEFTRIAXONE 2 G: 2 INJECTION, POWDER, FOR SOLUTION INTRAMUSCULAR; INTRAVENOUS at 01:52

## 2023-01-08 RX ADMIN — ACETAMINOPHEN 650 MG: 325 TABLET, FILM COATED ORAL at 08:46

## 2023-01-08 RX ADMIN — LANSOPRAZOLE 30 MG: 30 TABLET, ORALLY DISINTEGRATING ORAL at 05:03

## 2023-01-08 RX ADMIN — Medication 10 ML: at 20:01

## 2023-01-08 RX ADMIN — DIPHENHYDRAMINE HYDROCHLORIDE AND LIDOCAINE HYDROCHLORIDE AND ALUMINUM HYDROXIDE AND MAGNESIUM HYDRO 5 ML: KIT at 18:38

## 2023-01-08 RX ADMIN — DOCUSATE SODIUM LIQUID 50 MG: 100 LIQUID ORAL at 20:01

## 2023-01-08 RX ADMIN — MIDODRINE HYDROCHLORIDE 10 MG: 5 TABLET ORAL at 01:51

## 2023-01-08 RX ADMIN — DIPHENHYDRAMINE HYDROCHLORIDE AND LIDOCAINE HYDROCHLORIDE AND ALUMINUM HYDROXIDE AND MAGNESIUM HYDRO 5 ML: KIT at 11:39

## 2023-01-08 RX ADMIN — Medication: at 09:40

## 2023-01-08 RX ADMIN — Medication 300 MG: at 08:46

## 2023-01-08 RX ADMIN — DIPHENHYDRAMINE HYDROCHLORIDE AND LIDOCAINE HYDROCHLORIDE AND ALUMINUM HYDROXIDE AND MAGNESIUM HYDRO 5 ML: KIT at 23:24

## 2023-01-08 RX ADMIN — ENOXAPARIN SODIUM 140 MG: 150 INJECTION SUBCUTANEOUS at 15:14

## 2023-01-08 RX ADMIN — FUROSEMIDE 40 MG: 10 INJECTION, SOLUTION INTRAMUSCULAR; INTRAVENOUS at 23:24

## 2023-01-08 RX ADMIN — HYDROCODONE BITARTRATE AND ACETAMINOPHEN 1 TABLET: 10; 325 TABLET ORAL at 23:59

## 2023-01-08 RX ADMIN — FUROSEMIDE 40 MG: 10 INJECTION, SOLUTION INTRAMUSCULAR; INTRAVENOUS at 11:39

## 2023-01-08 RX ADMIN — Medication: at 20:01

## 2023-01-08 NOTE — PLAN OF CARE
Goal Outcome Evaluation:      Patient has not had any outstanding events to occur so far today. Patient is currently resting in bed with no complaints. Will continue to monitor and follow plan of care.

## 2023-01-08 NOTE — THERAPY RE-EVALUATION
Acute Care - Speech Language Pathology   Swallow Re-Evaluation [facility]     Patient Name: Jaime Bar  : 1955  MRN: [id number]  Today's Date: 2023               Admit Date: 2022  Patient was not wearing a face mask during this therapy encounter. Therapist used appropriate personal protective equipment including mask, eye protection and gloves.  Mask used was standard procedure mask. Appropriate PPE was worn during the entire therapy session. Hand hygiene was completed before and after therapy session. Patient is not in enhanced droplet precautions.       Visit Dx:     ICD-10-CM ICD-9-CM   1. Pyogenic arthritis of right shoulder region, due to unspecified organism (HCC)  M00.9 711.01     Patient Active Problem List   Diagnosis   • Hyponatremia   • ACC/AHA stage C chronic systolic heart failure (HCC)   • Cardiac amyloidosis (HCC)   • History of prostate cancer   • Hx of neck surgery   • Neck pain   • Primary hypertension   • Tobacco abuse   • Cervicalgia, spine surgery    • Primary osteoarthritis involving multiple joints   • Bacteremia due to methicillin susceptible Staphylococcus aureus (MSSA)   • Staphylococcal arthritis of shoulder (HCC)   • Acute kidney injury superimposed on chronic kidney disease (HCC)   • Dysphagia     Past Medical History:   Diagnosis Date   • Dysphagia 2023     Past Surgical History:   Procedure Laterality Date   • SHOULDER ARTHROSCOPY Right 2022    Procedure: SHOULDER ARTHROSCOPY INCISION AND DRAINAGE.;  Surgeon: Nikunj Velez MD;  Location: [facility] OR;  Service: Orthopedics;  Laterality: Right;   • SHOULDER ARTHROSCOPY Left 2022    Procedure: SHOULDER ARTHROSCOPY INCISION AND DRAINAGE;  Surgeon: Nikunj Velez MD;  Location: [facility] MAIN OR;  Service: Orthopedics;  Laterality: Left;          SWALLOW EVALUATION (last 72 hours)     SLP Adult Swallow Evaluation     Row Name 23 1600          Document Type re-evaluation  -CP     Subjective Information no complaints  -CP    Patient Observations alert;cooperative  -CP    Patient/Family/Caregiver Comments/Observations Pt was awake and responsive.  -CP    Patient Effort good  -CP    Comment Pt was seen for re-eval of swallow. Pt was alert and responsive. His vocal intensity is reduced, but he is able to increase the loudness with cues. Pt was brought upright in bed and given trials of ice chips and water by spoon to determine readiness for VFSS. Pt initially had dry mouth and throat and had consistent throat clearing on the ice and water, but oncwe sufficient moisture was achieved, pt exhibited timely swallow, clear vocal quality and no throat clear, cough or other overt s/s of aspiration. Pt does appear ready for VFSS. It is rec that pt have VFSS in the am. pt may have small sips of water or ice chips via the Ni Water Protocol (see guidelines below) with RN supervision. Education providfed to to pt on FWP guidelines and rationale for VFSS. He verbalized understanding. ST will follow up with recs from VFSS.      The rationale to recommend water when a PO diet cannot appropriately/functionally sustain nutrition (or if thickened liquids are prescribed due to aspiration of thin liquids) is because water is low risk for aspiration pna when compared to aspiration of food or other liquids.    Benefits of a water protocol include but are not limited to:     Oral gratification   Engagement of oropharyngeal swallow musculature   Decrease likelihood of dehydration      Guidelines for proper implementation include:  Waiting a minimum of 30 minutes after consuming any other PO   Thorough oral care prior to consuming water  Upright at 90 degree hip flexion  Small sips at slow rate  Monitor for any changes in respiratory status and discontinue if distress noted    The Ni Free Water Protocol is a research based protocol established in 1984.   -CP          User Key  (r) = Recorded By, (t) = Taken By,  (c) = Cosigned By    Initials Name Effective Dates    CP Marysol Laguerre, SLP 06/16/21 -                 EDUCATION  The patient has been educated in the following areas:   Dysphagia (Swallowing Impairment) Oral Care/Hydration NPO rationale.        SLP GOALS     Row Name 01/08/23 1600 01/07/23 1300 01/06/23 1000       (LTG) Swallow    (LTG) Swallow Patient will tolerate safest and least restrictive diet without complications from aspiration.  -CP Patient will tolerate safest and least restrictive diet without complications from aspiration.  -KL Patient will tolerate safest and least restrictive diet without complications from aspiration.  -LF    Time Frame (Swallow Long Term Goal) by discharge  -CP by discharge  -KL by discharge  -LF    Progress/Outcomes (Swallow Long Term Goal) good progress toward goal  -CP progress slower than expected  -KL progress slower than expected  -LF    Comment (Swallow Long Term Goal) See above re-eval  -CP -- --       (STG) Swallow 1    (STG) Swallow 1 The patient will participate in ongoing assessment of swallow, including reevaluation clinically and/or inclulding instrumental assessment of swallow if indicated to further assess swallow function in anticipation of initiation of PO diet.  -CP The patient will participate in ongoing assessment of swallow, including reevaluation clinically and/or inclulding instrumental assessment of swallow if indicated to further assess swallow function in anticipation of initiation of PO diet.  -KL The patient will participate in ongoing assessment of swallow, including reevaluation clinically and/or inclulding instrumental assessment of swallow if indicated to further assess swallow function in anticipation of initiation of PO diet.  -LF    Time Frame (Swallow Short Term Goal 1) 1 week  -CP 1 week  -KL 1 week  -LF    Progress/Outcomes (Swallow Short Term Goal 1) good progress toward goal  -CP goal ongoing;progress slower than expected  -KL goal  ongoing;progress slower than expected  -LF    Comment (Swallow Short Term Goal 1) See above re-eval. VFSS schedulaed for am  -CP -- --          User Key  (r) = Recorded By, (t) = Taken By, (c) = Cosigned By    Initials Name Provider Type    CP Marysol Laguerre, SLP Speech and Language Pathologist    Isa Navarro, SLP Speech and Language Pathologist    Dori Coats Speech and Language Pathologist                   Time Calculation:                MONSTER Hill  1/8/2023

## 2023-01-08 NOTE — PROGRESS NOTES
[facility] Medicine Services Daily Progress Note    Patient Name: Jaime Bar  : 1955  MRN: [id number]  Primary Care Physician:  Provider, No Known  Date of admission: 2022      Subjective      Chief Complaint: AMS    Did okay again overnight.  Tube feeds remain at goal.  He is on 2 L nasal cannula.  Shoulder PADMINI drains remain in place, Ortho following remotely. Minimal output on the left. Bedside rounding completed with the nursing staff. No other acute concerns.    Objective      Vitals:   Temp:  [97.4 °F (36.3 °C)-100.3 °F (37.9 °C)] 100.3 °F (37.9 °C)  Heart Rate:  [] 92  Resp:  [18-24] 20  BP: (104-144)/(60-97) 117/63  Flow (L/min):  [2] 2    Physical Exam:    General: Chronically ill-appearing gentleman, sleeping but arousable, no acute distress  HEENT: NCAT, neck is supple, NG in place  Cardiovascular: RRR  Respiratory: Fairly clear to auscultation bilaterally with some bibasilar crackles, diminished throughout, nondistressed  Abdomen: Soft, nontender to exam, normoactive bowel sounds  Neurologic: A&O, nonfocal, follows commands  Skin: Warm, bilateral shoulder incisions bandaged, drains in place. Bilateral upper extremity pitting edema noted R>L, reportedly improved since initial presentation    Result Review    Result Review:  I have personally reviewed the results from the time of this admission to 2023 10:28 EST and agree with these findings:  [x]  Laboratory  [x]  Microbiology  [x]  Radiology  [x]  EKG/Telemetry   []  Cardiology/Vascular   []  Pathology  [x]  Old records  []  Other:    Wounds (last 24 hours)     LDA Wound     Row Name 23 0900 23 0733 23 0400       Wound 22 Right shoulder Incision    Wound - Properties Group Placement Date: 22  -HB Placement Time:   -HB Side: Right  -HB Location: shoulder  -HB Primary Wound Type: Incision  -HB    Dressing Appearance -- -- dry;intact  -HF    Closure -- KATHRYN  -SN KATHRYN  -HF     Base -- dressing in place, unable to visualize  -SN dressing in place, unable to visualize  -HF    Drainage Amount -- none  -SN none  -HF    Wound Output (mL) 20  -SN -- --    Retired Wound - Properties Group Placement Date: 12/28/22  -HB Placement Time: 1856  -HB Side: Right  -HB Location: shoulder  -HB Primary Wound Type: Incision  -HB    Retired Wound - Properties Group Date first assessed: 12/28/22  -HB Time first assessed: 1856  -HB Side: Right  -HB Location: shoulder  -HB Primary Wound Type: Incision  -HB       Wound 12/28/22 1940 Left shoulder Incision    Wound - Properties Group Placement Date: 12/28/22  -HB Placement Time: 1940  -HB Side: Left  -HB Location: shoulder  -HB Primary Wound Type: Incision  -HB    Dressing Appearance -- -- dry;intact  -HF    Closure -- KATHRYN  -SN KATHRYN  -HF    Base -- dressing in place, unable to visualize  -SN dressing in place, unable to visualize  -HF    Drainage Amount -- none  -SN none  -HF    Wound Output (mL) 5  -SN -- --    Retired Wound - Properties Group Placement Date: 12/28/22  -HB Placement Time: 1940 -HB Side: Left  -HB Location: shoulder  -HB Primary Wound Type: Incision  -HB    Retired Wound - Properties Group Date first assessed: 12/28/22  -HB Time first assessed: 1940  -HB Side: Left  -HB Location: shoulder  - Primary Wound Type: Incision  -HB       Wound 12/30/22 2000 sacral spine Pressure Injury    Wound - Properties Group Placement Date: 12/30/22 -MB Placement Time: 2000 -MB Location: sacral spine  -MB Primary Wound Type: Pressure inj  -MB    Pressure Injury Stage -- DTPI  -SN --    Dressing Appearance -- dry;intact  -SN dry;intact  -HF    Closure -- Adhesive bandage  -SN Adhesive bandage  -HF    Base -- dressing in place, unable to visualize  -SN dressing in place, unable to visualize  -HF    Drainage Amount -- none  -SN none  -HF    Retired Wound - Properties Group Placement Date: 12/30/22  -MB Placement Time: 2000 -MB Location: sacral spine  -MB Primary  Wound Type: Pressure inj  -MB    Retired Wound - Properties Group Date first assessed: 12/30/22  -MB Time first assessed: 2000 -MB Location: sacral spine  -MB Primary Wound Type: Pressure inj  -MB    Row Name 01/08/23 0000 01/07/23 2100 01/07/23 2000       Wound 12/28/22 1856 Right shoulder Incision    Wound - Properties Group Placement Date: 12/28/22  -HB Placement Time: 1856  -HB Side: Right  -HB Location: shoulder  -HB Primary Wound Type: Incision  -HB    Dressing Appearance dry;intact  -HF -- dry;intact  -HF    Closure KATHRYN  -HF -- KATHRYN  -HF    Base dressing in place, unable to visualize  -HF -- dressing in place, unable to visualize  -HF    Drainage Amount none  -HF -- none  -HF    Retired Wound - Properties Group Placement Date: 12/28/22  -HB Placement Time: 1856  -HB Side: Right  -HB Location: shoulder  -HB Primary Wound Type: Incision  -HB    Retired Wound - Properties Group Date first assessed: 12/28/22  -HB Time first assessed: 1856  -HB Side: Right  -HB Location: shoulder  -HB Primary Wound Type: Incision  -HB       Wound 12/28/22 1940 Left shoulder Incision    Wound - Properties Group Placement Date: 12/28/22  -HB Placement Time: 1940  -HB Side: Left  -HB Location: shoulder  -HB Primary Wound Type: Incision  -HB    Dressing Appearance dry;intact  -HF -- dry;intact  -HF    Closure KATHRYN  -HF -- KATHRYN  -HF    Base dressing in place, unable to visualize  -HF -- dressing in place, unable to visualize  -HF    Drainage Amount none  -HF -- none  -HF    Retired Wound - Properties Group Placement Date: 12/28/22  -HB Placement Time: 1940  -HB Side: Left  -HB Location: shoulder  -HB Primary Wound Type: Incision  -HB    Retired Wound - Properties Group Date first assessed: 12/28/22  -HB Time first assessed: 1940  -HB Side: Left  -HB Location: shoulder  -HB Primary Wound Type: Incision  -HB       Wound 12/30/22 2000 sacral spine Pressure Injury    Wound - Properties Group Placement Date: 12/30/22  -MB Placement Time: 2000   -MB Location: sacral spine  -MB Primary Wound Type: Pressure inj  -MB    Pressure Injury Stage -- -- --  -HF    Dressing Appearance dry;intact  -HF -- dry;intact  -HF    Closure Adhesive bandage  -HF -- Adhesive bandage  -HF    Base dressing in place, unable to visualize  -HF -- dressing in place, unable to visualize  -HF    Drainage Amount none  -HF -- none  -HF    Care, Wound -- cleansed with;soap and water;barrier applied  -HF --    Dressing Care -- dressing changed  -HF --    Periwound Care -- dry periwound area maintained  -HF --    Retired Wound - Properties Group Placement Date: 12/30/22 -MB Placement Time: 2000 -MB Location: sacral spine  -MB Primary Wound Type: Pressure inj  -MB    Retired Wound - Properties Group Date first assessed: 12/30/22 -MB Time first assessed: 2000 -MB Location: sacral spine  -MB Primary Wound Type: Pressure inj  -MB    Row Name 01/07/23 1600 01/07/23 1200          Wound 12/28/22 1856 Right shoulder Incision    Wound - Properties Group Placement Date: 12/28/22  -HB Placement Time: 1856 -HB Side: Right  -HB Location: shoulder  -HB Primary Wound Type: Incision  -HB    Dressing Appearance dry;intact  -SV dry;intact  -SV     Closure KATHRYN  -SV KATHRYN  -SV     Base dressing in place, unable to visualize  -SV dressing in place, unable to visualize  -SV     Drainage Amount none  -SV none  -SV     Retired Wound - Properties Group Placement Date: 12/28/22  -HB Placement Time: 1856 -HB Side: Right  -HB Location: shoulder  -HB Primary Wound Type: Incision  -HB    Retired Wound - Properties Group Date first assessed: 12/28/22  -HB Time first assessed: 1856 -HB Side: Right  -HB Location: shoulder  -HB Primary Wound Type: Incision  -HB       Wound 12/28/22 1940 Left shoulder Incision    Wound - Properties Group Placement Date: 12/28/22  -HB Placement Time: 1940 -HB Side: Left  -HB Location: shoulder  -HB Primary Wound Type: Incision  -HB    Dressing Appearance dry;intact  -SV dry;intact  -SV      Closure KATHRYN  -SV KATHRYN  -SV     Base dressing in place, unable to visualize  -SV dressing in place, unable to visualize  -SV     Drainage Amount none  -SV none  -SV     Retired Wound - Properties Group Placement Date: 12/28/22 -HB Placement Time: 1940 -HB Side: Left  -HB Location: shoulder  -HB Primary Wound Type: Incision  -HB    Retired Wound - Properties Group Date first assessed: 12/28/22 -HB Time first assessed: 1940  -HB Side: Left  -HB Location: shoulder  -HB Primary Wound Type: Incision  -HB       Wound 12/30/22 2000 sacral spine Pressure Injury    Wound - Properties Group Placement Date: 12/30/22 -MB Placement Time: 2000 -MB Location: sacral spine  -MB Primary Wound Type: Pressure inj  -MB    Pressure Injury Stage DTPI  -SV DTPI  -SV     Dressing Appearance dry;intact  -SV dry;intact  -SV     Closure Adhesive bandage  -SV Adhesive bandage  -SV     Base dressing in place, unable to visualize  -SV dressing in place, unable to visualize  -SV     Drainage Amount none  -SV none  -SV     Retired Wound - Properties Group Placement Date: 12/30/22 -MB Placement Time: 2000 -MB Location: sacral spine  -MB Primary Wound Type: Pressure inj  -MB    Retired Wound - Properties Group Date first assessed: 12/30/22 -MB Time first assessed: 2000 -MB Location: sacral spine  -MB Primary Wound Type: Pressure inj  -MB          User Key  (r) = Recorded By, (t) = Taken By, (c) = Cosigned By    Initials Name Provider Type    Radha Neves RN Registered Nurse    Martha Monson RN Registered Nurse    Carolyn Jay RN Registered Nurse    Precious Campos RN Registered Nurse    Jacey Pratt RN Registered Nurse              Assessment & Plan      Brief Patient Summary:  Jaime Bar is a 67 y.o. male with past medical history of cervicalgia, primary osteoarthritis involving multiple joints with surgery on bilateral shoulders and right knee replacement but no recent procedure or surgery,  hyponatremia, prostate cancer, and cardiac amyloidosis presented to [facility] on 12/25/2022 with complaints of right shoulder pain, weakness, and altered mental status.  He was found to be hypotensive and hyponatremic with elevated lactic acid.  Patient had reported decreased oral intake.  He was transferred to [facility] ICU for further treatment of septic shock requiring vasopressors and hyponatremia.      Since admission he was found to be bacteremic with 2 sets of blood cultures positive for staph aureus, source being bilateral shoulder septic arthritis. ID has been treating the patient with IV ceftriaxone 2G and he will need a total of 6 weeks of antibiotic therapy.  He has status post bilateral shoulder arthroscopy with extensive debridement by Dr. Velez 12/28/2022. He remained intubated after surgery until 12/29 and now extubated. He has been weaned off vasopressors. Nephrology is managing his hyponatremia and STEVEN on CKD. He was found to have LUE DVT provoked from PICC line currently on argatroban. Hematology has been consulted for his thrombocytopenia.       cefTRIAXone, 2 g, Intravenous, Q24H  enoxaparin, 1.5 mg/kg, Subcutaneous, Daily  ferrous sulfate, 300 mg, Nasogastric, Daily  First Mouthwash (Magic Mouthwash), 5 mL, Swish & Spit, Q6H  furosemide, 40 mg, Intravenous, Q12H  lansoprazole, 30 mg, Nasogastric, Q AM  midodrine, 10 mg, Nasogastric, Q8H  sodium chloride, 10 mL, Intravenous, Q12H  Zinc Oxide, , Apply externally, TID             Active Hospital Problems:  Active Hospital Problems    Diagnosis    • Dysphagia    • Bacteremia due to methicillin susceptible Staphylococcus aureus (MSSA)    • Staphylococcal arthritis of shoulder (HCC)    • Cervicalgia, spine surgery 2017    • Primary osteoarthritis involving multiple joints    • Hyponatremia    • Acute kidney injury superimposed on chronic kidney disease (HCC)    • Cardiac amyloidosis (HCC)    • ACC/AHA stage C chronic  systolic heart failure (HCC)    • Tobacco abuse    • Primary hypertension      Plan:    Bilateral shoulder septic arthritis  Septic shock secondary to MSSA bacteremia  -Synovial fluid culture was consistent with infection and cultures are growing 4+ Staph aureus  -s/p bilateral shoulder arthroscopy with extensive debridement by Dr. Velez 12/28/2022  - 2/2 blood cultures positive for MSSA secondary to bilateral shoulder septic arthritis   -ALMA DELIA on 12/28, Negative for endocarditis  -On IV Rocephin 2 g daily x6 weeks per ID (last day of therapy is [id number])  -Off IV vasopressors, remains on midodrine  -Discontinued art line, MAP greater than 65 mmHg at this time  -LUE PICC line removed and tip also sent for culture  -Fevers have resolved, CT of the spine does not show any acute abscess or infectious changes  -ID following  -Left PADMINI with minimal serous output, right remains more copious and cloudy, management as per Ortho     Dysphagia  -Speech following, still not appropriate for PO intake  -NG tube feeds at goal. However, it's been 8 days with NG now.  -May need to consider PEG if ST doesn't think he can move towards PO intake    Acute metabolic encephalopathy, improved  Severe thrombocytopenia, improved  LUE DVT, provoked from PICC line/RUE showing superficial thrombus  -Patient's mental status worsening  -Initially concern for TTP however no schistocytes seen on peripheral smear per hematology  -Platelets improving  -Off argatroban drip, PTT elevated  -On low-dose Lovenox twice daily for now per heme-onc pending DIC work-up  -Patient's left lower extremity is back to normal now, good pulses noted, BERNARDINO showed mild digital ischemia  -No peripheral cyanosis noted at this time    Acute respiratory failure with hypoxia, improved  -Pulmonary edema, improved  -Postop respiratory failure, extubated on 12/39  -Remains on 2LNC, wean as tolerated  -On diuresis per nephrology     STEVEN on CKD  Hyponatremia on admission and now  hypernatremia  -Creatinine stable now but sodium trending up  -Free water flushes per nephrology  -Management per nephrology     PAF, s/p RVR  -Off amnio drip, rate controlled now  -Anticoagulation recommendations per cardiology and heme-onc  -Cardiology following, may need cardioversion prior to discharge    Elevated LFTs  -Likely due to shock liver  -Hep panel negative  -Liver ultrasound showed normal hepatic parenchyma, cholelithiasis with adenomyomatosis involving gallbladder wall, CBD upper limit of normal  -Monitor    H/o cardiac amyloidosis  H/o prostate CA  Osteoarthritis     DVT/GI Ppx.    DNR/DNI.    CODE STATUS:    Medical Intervention Limits: NO intubation (DNI)  Level Of Support Discussed With: Next of Kin (If No Surrogate)  Code Status (Patient has no pulse and is not breathing): No CPR (Do Not Attempt to Resuscitate)  Medical Interventions (Patient has pulse or is breathing): Limited Support  Release to patient: Routine Release    Disposition: Will very likely need placement and possible PEG tube. However, patient seems to feel that these issues could eventually be managed at home (wound care, tube feeds, ongoing therapy, etc).    Electronically signed by Matt Rodriguez MD, 01/08/23, 10:28 EST.  [facility]      Part of this note may be an electronic transcription/translation of spoken language to printed text using the Dragon Dictation System.

## 2023-01-08 NOTE — PLAN OF CARE
Goal Outcome Evaluation:   Pt was seen for re-eval of swallow. Pt was alert and responsive. His vocal intensity is reduced, but he is able to increase the loudness with cues. Pt was brought upright in bed and given trials of ice chips and water by spoon to determine readiness for VFSS. Pt initially had dry mouth and throat and had consistent throat clearing on the ice and water, but oncwe sufficient moisture was achieved, pt exhibited timely swallow, clear vocal quality and no throat clear, cough or other overt s/s of aspiration.     Pt does appear ready for VFSS. It is rec that pt have VFSS in the am. pt may have small sips of water or ice chips via the Ni Water Protocol (see guidelines below) with RN supervision. Education providfed to to pt on FWP guidelines and rationale for VFSS. He verbalized understanding. ST will follow up with recs from VFSS.       The rationale to recommend water when a PO diet cannot appropriately/functionally sustain nutrition (or if thickened liquids are prescribed due to aspiration of thin liquids) is because water is low risk for aspiration pna when compared to aspiration of food or other liquids.    Benefits of a water protocol include but are not limited to:      Oral gratification   Engagement of oropharyngeal swallow musculature   Decrease likelihood of dehydration       Guidelines for proper implementation include:  Waiting a minimum of 30 minutes after consuming any other PO   Thorough oral care prior to consuming water  Upright at 90 degree hip flexion  Small sips at slow rate  Monitor for any changes in respiratory status and discontinue if distress noted     The Ni Free Water Protocol is a research based protocol established in 1984.

## 2023-01-08 NOTE — PROGRESS NOTES
Hematology/Oncology Inpatient Progress Note    PATIENT NAME: Jaime Bar  : 1955  MRN: [id number]    CHIEF COMPLAINT: Thrombocytopenia; provoked catheter associated LUE DVT, LUE SVT, and RUE SVT    HISTORY OF PRESENT ILLNESS:    67 y.o. male admitted to [facility] on transfer from [facility] on 2022.  The patient was intubated and unresponsive at time of consultation with histories obtained from review of records and discussion with patient's niece and nephew.  He reported a history of bilateral shoulder pain for few days prior to admission.  He had presented to [place] ED on 2022 where he was hypotensive and hyponatremic and transferred to [facility].  CXR on 2022 showed coarsened airspace and bronchovascular thickening with small airway disease such as bronchitis or asthma, edema, atypical/viral infection, and aspiration in differential.  A RUE venous Doppler showed acute RUE superficial thrombophlebitis in the cephalic vein and no evidence of DVT for which he was started on subcu heparin.  On 2022 CBC revealed WBC 7.2, hemoglobin 12.4, MCV 98.4, and platelets 99,000.  Creatinine was elevated to 2.38, BUN 55, sodium was low at 118, potassium was high at 5.4 and LFTs revealed T bili elevated to 4.8 (0-1.2) with transaminases normal.  Blood cultures grew MRSA.  Urinalysis was concerning for UTI.  Urine sodium was < 20, a.m. cortisol 26.11 (6.02-18.4), and urine osmolality was 410 ().   X-rays and MRIs of the bilateral shoulders were performed 2022 revealing joint effusion and advanced degenerative changes/destructive changes of glenohumeral joint.  There was suspected complete tear of the supraspinatus and infraspinatus tendons and a completely macerated appearance of the labrum on the right shoulder.  On 2022 RUE venous Doppler was repeated and felt stable.  He then underwent bilateral shoulder arthroscopy incision and drainage with culture growing  heavy growth of Staph aureus.  He had been intubated prior to the surgery and remained sedated on ventilator with pressors postop.  At time of consultation 12/29/2022 LUE venous Doppler showed acute catheter associated DVT in the left axillary vein, SVT in the left basilic vein of the upper arm.  BLE venous Doppler was negative for DVT/SVT but did show deep venous valvular incompetence in the right popliteal vein.  CBC revealed WBC 14.2, hemoglobin 11.4, MCV 96.5, and platelets 46,000.  T bili was elevated to 5.3 and transaminases were now elevated with  and .  PT was 15.6 (9.6-11.7), PTT was 38.7 (24-31), and fibrinogen was 493 (210-450).  D-dimer was 11.83 (0-0.67).  Transesophageal echocardiogram showed LVEF 46-50%, mild aortic valve stenosis, and no evidence of vegetation or bacterial endocarditis.  Heparin was changed to argatroban.  ID was managing antibiotics with patient on ceftriaxone with plan for treatment x6 weeks and recommending replacing PICC line 2 days after starting anticoagulation.    PMH significant for prostate cancer treated approximately 2010 with radiation alone and no evidence of recurrence to their knowledge.  He also was under the care of Dr. Damico at  for cardiac amyloid.  Chart review showed PSA was 0.1 (0-4) in August 2022.  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  IgG was 768 (603-1613), IgA was 238 (), IgM was 64 ().  Kappa/lambda ratio was 1.36 (0.2-1.65).  A note from Roosevelt Damico MD (cardiologist) at [facility] dated 7/13/2022 reported patient was followed for restrictive cardiomyopathy secondary to cardiac amyloidosis.  LVEF in February 2022 was 45-50%.  Cardiac MRI 4/6/2022 revealed a myocardial mass 276 g, global hypokinesis of left ventricle with ejection fraction 43%, no myocardial iron overload, the thickness and appearance of intra-atrial septum was also consistent with cardiac amyloidosis.  The note  stated that the patient was followed by Dr. Banks at  [facility] where he was on Vyndamax therapy for cardiac amyloid and had been on that treatment for about 2 weeks.  Additionally in January 2022, T bili was noted to be elevated to 2.6 (0.2-1.0), hemoglobin 11.8, MCV 86.5 and platelets 261,000.     12/29/22  Hematology/Oncology was consulted by RITU Jimenez for thrombocytopenia.     Past Medical History: Prostate cancer approximately 2010 treated with radiation only.  Cardiac amyloid managed by Dr. Damico at .  CKD.  Surgical History: Several orthopedic surgeries in the past.  Bilateral shoulder arthroscopy with incision and drainage 12/28/2022.  Social History: He lives in [place], [place] with his spouse.  His spouse is known to have Alzheimer's disease.  He has smoked for many years.  He has no alcohol or recreational drug use however he was narcotic dependent secondary to chronic pain.  He is a retired .  Family History: No significant known family history.  Allergies: Tramadol causes him to feel weird and codeine causes nausea.     PCP: Provider, No Known    INTERVAL HISTORY:  • 12/30/2022- platelets 38,000, hemoglobin 11.1.  Folate 7.13 (4.78-24.2), vitamin B12 greater than 2000.  Iron 29 (), iron saturation 15% (20-50), TIBC 195 (298-536), and ferritin 3525 ().  Reticulocytes 1.05 (0.7-1.9), haptoglobin 148 (). PSA 0.234 (0-4).  T bili 5.3 with direct bili 4.2 (0-0.3).  Hepatitis panel nonreactive.  Extubated 12/29/2022 and off pressors.  • 12/31/2022- platelets 46,000, hemoglobin 11.3.  HIT antibody negative at 0.121 (0-0.4).  CMV IgM less than 30 (0-29.9) and EBV IgM less than 36 (0-35.9).  D-dimer mildly improved to 8.94 (0-0.67).  Transaminases rising with  and .  T bili 4.4.  Peripheral smear was negative for schistocytes again.  Abdominal ultrasound revealed cholelithiasis with adenomyomatosis involving the gallbladder  wall.  The common bile duct was in the upper limits of normal.  The liver appeared normal.  • 1/1/2023- platelets 60,000, hemoglobin 11.4.  Argatroban was held with repeat PTT remaining high with subsequent continued hold of argatroban.  D-dimer 9.84.  PTT 61.1 (24-31).  CT head without contrast was negative for acute findings.  • 1/2/2023- hemoglobin 11.3 and platelets 78,000.  ALT improved to 200 and AST improved to 104.  T bili improved to 3.3.  Fibrinogen 445 (210-150).  • 1/3/2023- evaluated by neurology and felt to have multifactorial encephalopathy.  SPEP with no M spike.  Transaminases improved.  Hemoglobin 11.1, platelet count 104,000.  Kappa/lambda free light chain ratio 1.02 (0.26-1.25).  SHAR screen negative.  Right BERNARDINO normal with mild digital ischemia.  Left BERNARDINO normal with severe digital ischemia.  • 1/4/2023- hemoglobin 11.0 and platelets 116,000.  Copper 110 ().  ALT 93 (1-41), AST normalized, and T bili 3.5 (0-1.2).  RUE venous Doppler showed acute RUE superficial thrombophlebitis in the cephalic vein (forearm).  • 1/5/2023- hemoglobin 9.9, platelets 121,000.  ALT 61, T bili 3.1.  Ferrlecit 250 mg IV daily x3 initiated.  UIFE with no monoclonality detected.  SIFE with IgM monoclonal protein with lambda light chain specificity.  2 different results are present for immunoglobulins with different values.  • 1/6/2023- WBC 5.8, hemoglobin 9.9, platelets 125,000.  ALT 52.  • 1/7/2023- hemoglobin 9.4, platelet count 156,000.  • 1/8/2023- hemoglobin 9.1.  Bilirubin 3.0.  Temp 100.3.     History of present illness reviewed since last visit and changes noted on 01/08/23.    Subjective  Denies problems.  Temperature 100.3 per nursing.    ROS:   Review of Systems   Constitutional: Positive for fever. Negative for activity change, chills, fatigue and unexpected weight change.   HENT: Negative for congestion, dental problem, hearing loss, mouth sores, nosebleeds, sore throat and trouble swallowing.    Eyes:  "Negative for photophobia, pain and visual disturbance.   Respiratory: Negative for apnea, cough, chest tightness and shortness of breath.    Cardiovascular: Negative for chest pain, palpitations and leg swelling.   Gastrointestinal: Negative for abdominal distention, abdominal pain, blood in stool, constipation, diarrhea, nausea and vomiting.   Endocrine: Negative for cold intolerance and heat intolerance.   Genitourinary: Negative for decreased urine volume, difficulty urinating, dysuria, frequency, hematuria and urgency.   Musculoskeletal: Negative for arthralgias and gait problem.   Skin: Negative for rash and wound.   Neurological: Negative for dizziness, tremors, seizures, weakness, light-headedness, numbness and headaches.   Hematological: Negative for adenopathy. Does not bruise/bleed easily.   Psychiatric/Behavioral: Negative for confusion and hallucinations. The patient is not nervous/anxious.    All other systems reviewed and are negative.       MEDICATIONS:    Scheduled Meds:  cefTRIAXone, 2 g, Intravenous, Q24H  enoxaparin, 1.5 mg/kg, Subcutaneous, Daily  ferrous sulfate, 300 mg, Nasogastric, Daily  First Mouthwash (Magic Mouthwash), 5 mL, Swish & Spit, Q6H  furosemide, 40 mg, Intravenous, Q12H  lansoprazole, 30 mg, Nasogastric, Q AM  midodrine, 10 mg, Nasogastric, Q8H  sodium chloride, 10 mL, Intravenous, Q12H  Zinc Oxide, , Apply externally, TID       Continuous Infusions:      PRN Meds:  •  acetaminophen **OR** acetaminophen  •  aluminum-magnesium hydroxide-simethicone  •  dextrose  •  dextrose  •  fentanyl  •  glucagon (human recombinant)  •  HYDROcodone-acetaminophen  •  ondansetron **OR** ondansetron  •  sodium chloride  •  sodium chloride     ALLERGIES:    Allergies   Allergen Reactions   • Tramadol-Acetaminophen Anxiety     Worms in the brain feeling   • Codeine Other (See Comments)     nausea   • Tramadol Other (See Comments)     \"I just felt really crawly, it did weird things with my head\" " "      Objective    VITALS:   /63   Pulse 102   Temp 100.3 °F (37.9 °C)   Resp 20   Ht 167.6 cm (66\")   Wt 83.4 kg (183 lb 13.8 oz)   SpO2 98%   BMI 29.68 kg/m²     PHYSICAL EXAM:  Physical Exam  Vitals and nursing note reviewed.   Constitutional:       General: He is not in acute distress.     Appearance: Normal appearance. He is well-developed. He is ill-appearing. He is not diaphoretic.   HENT:      Head: Normocephalic and atraumatic.      Comments: Alopecia     Right Ear: External ear normal.      Left Ear: External ear normal.      Nose: Nose normal. No rhinorrhea.      Comments: NG tube.  O2 per NC.     Mouth/Throat:      Mouth: Mucous membranes are moist.      Pharynx: Oropharynx is clear. No oropharyngeal exudate or posterior oropharyngeal erythema.      Comments: Dental fillings.  Oral mucositis is improving.  Eyes:      General: No scleral icterus.     Extraocular Movements: Extraocular movements intact.      Conjunctiva/sclera: Conjunctivae normal.      Pupils: Pupils are equal, round, and reactive to light.   Cardiovascular:      Rate and Rhythm: Regular rhythm. Tachycardia present.      Heart sounds: Normal heart sounds. No murmur heard.     Comments: Cardiac monitor leads  Pulmonary:      Effort: Pulmonary effort is normal. No respiratory distress.      Breath sounds: Normal breath sounds. No stridor. No wheezing or rales.   Abdominal:      General: Bowel sounds are normal. There is no distension.      Palpations: Abdomen is soft. There is no mass.      Tenderness: There is no abdominal tenderness. There is no guarding.      Hernia: No hernia is present.   Genitourinary:     Comments: Deferred  Musculoskeletal:         General: Swelling (2+ RUE edema) present. No tenderness or deformity. Normal range of motion.      Cervical back: Normal range of motion and neck supple.      Comments: Bilateral shoulder drains with intact dressings.  The right drain drainage is purulent. BUE IVs. BLE SCDs " and pressure boots.   Lymphadenopathy:      Cervical: No cervical adenopathy.      Upper Body:      Right upper body: No supraclavicular adenopathy.      Left upper body: No supraclavicular adenopathy.   Skin:     General: Skin is warm and dry.      Coloration: Skin is not jaundiced or pale.      Findings: No bruising, erythema or rash.      Comments: Vertical scar right knee.     Neurological:      General: No focal deficit present.      Mental Status: He is alert and oriented to person, place, and time.   Psychiatric:         Mood and Affect: Mood normal.         Behavior: Behavior normal.         Thought Content: Thought content normal.         Judgment: Judgment normal.           RECENT LABS:  Lab Results (last 24 hours)     Procedure Component Value Units Date/Time    Comprehensive Metabolic Panel [[id number]]  (Abnormal) Collected: 01/08/23 0357    Specimen: Blood Updated: 01/08/23 0501     Glucose 119 mg/dL      BUN 30 mg/dL      Creatinine 0.57 mg/dL      Sodium 138 mmol/L      Potassium 3.5 mmol/L      Chloride 96 mmol/L      CO2 35.0 mmol/L      Calcium 8.1 mg/dL      Total Protein 5.3 g/dL      Albumin 2.1 g/dL      ALT (SGPT) 40 U/L      AST (SGOT) 34 U/L      Alkaline Phosphatase 111 U/L      Total Bilirubin 3.0 mg/dL      Globulin 3.2 gm/dL      A/G Ratio 0.7 g/dL      BUN/Creatinine Ratio 52.6     Anion Gap 7.0 mmol/L      eGFR 107.5 mL/min/1.73      Comment: National Kidney Foundation and American Society of Nephrology (ASN) Task Force recommended calculation based on the Chronic Kidney Disease Epidemiology Collaboration (CKD-EPI) equation refit without adjustment for race.       Narrative:      GFR Normal >60  Chronic Kidney Disease <60  Kidney Failure <15      Phosphorus [[id number]]  (Normal) Collected: 01/08/23 0357    Specimen: Blood Updated: 01/08/23 0501     Phosphorus 2.7 mg/dL     Magnesium [[id number]]  (Normal) Collected: 01/08/23 0357    Specimen: Blood Updated: 01/08/23 0501     Magnesium  1.8 mg/dL     POC Glucose Once [[id number]]  (Abnormal) Collected: 01/08/23 0459    Specimen: Blood Updated: 01/08/23 0500     Glucose 117 mg/dL      Comment: Serial Number: [id number]:  [id number]       Calcium, Ionized [[id number]]  (Abnormal) Collected: 01/08/23 0357    Specimen: Blood Updated: 01/08/23 0455     Ionized Calcium 1.10 mmol/L     CBC & Differential [[id number]]  (Abnormal) Collected: 01/08/23 0357    Specimen: Blood Updated: 01/08/23 0437    Narrative:      The following orders were created for panel order CBC & Differential.  Procedure                               Abnormality         Status                     ---------                               -----------         ------                     CBC Auto Differential[[id number]]        Abnormal            Final result                 Please view results for these tests on the individual orders.    CBC Auto Differential [[id number]]  (Abnormal) Collected: 01/08/23 0357    Specimen: Blood Updated: 01/08/23 0437     WBC 7.00 10*3/mm3      RBC 2.93 10*6/mm3      Hemoglobin 9.1 g/dL      Hematocrit 27.4 %      MCV 93.5 fL      MCH 31.1 pg      MCHC 33.2 g/dL      RDW 16.0 %      RDW-SD 52.5 fl      MPV 9.1 fL      Platelets 175 10*3/mm3      Neutrophil % 80.0 %      Lymphocyte % 5.3 %      Monocyte % 14.2 %      Eosinophil % 0.2 %      Basophil % 0.3 %      Neutrophils, Absolute 5.60 10*3/mm3      Lymphocytes, Absolute 0.40 10*3/mm3      Monocytes, Absolute 1.00 10*3/mm3      Eosinophils, Absolute 0.00 10*3/mm3      Basophils, Absolute 0.00 10*3/mm3      nRBC 0.1 /100 WBC     POC Glucose Once [[id number]]  (Abnormal) Collected: 01/07/23 2316    Specimen: Blood Updated: 01/07/23 2317     Glucose 107 mg/dL      Comment: Serial Number: [id number]:  [id number]       POC Glucose Once [[id number]]  (Abnormal) Collected: 01/07/23 1754    Specimen: Blood Updated: 01/07/23 1755     Glucose 121 mg/dL      Comment: Serial Number: [id number]:   [id number]       POC Glucose Once [[id number]]  (Normal) Collected: 01/07/23 1245    Specimen: Blood Updated: 01/07/23 1246     Glucose 104 mg/dL      Comment: Serial Number: [id number]:  [id number]             PENDING RESULTS:  Note from Dr. Sauceda at  and genetic testing from  ordered by Dr. Banks.    IMAGING REVIEWED:  XR Abdomen KUB    Result Date: 1/7/2023  Enteric tube in the stomach. Thank you for the referral of this patient. This exam was interpreted by an American Board of Radiology certified radiologist with subspecialty fellowship in Pediatric Radiology. If there are any questions regarding this exam please feel free to contact a radiologist directly at [phone]. Slot 64 Electronically signed by:  Mau Rodriguez M.D.  1/7/2023 4:48 AM Mountain Time      I have reviewed the patient's labs, imaging, reports, and other clinician documentation.    Assessment & Plan   ASSESSMENT:  1. Acute Thrombocytopenia-platelets were normal in early 2022. HIT antibody negative.  Platelets improving on Rocephin.  LFTs elevated on admission but have now improved.  Bilirubin remains high.  Presumably due to sepsis.   No hemolysis and no schistocytes on peripheral smear ruling out TTP.  No vitamin B12 or folate deficiencies. SHAR, CMV and EBV titers are all negative. Coags and D-dimer high with a normal fibrinogen.  Platelets normalized 1/7/2023.  2. Acute on chronic anemia/IgA lambda monoclonal gammopathy/SWETHA- mild anemia with hemoglobin in the 11's in early 2022.  Gammopathy labs at that time showed IgA lambda monoclonal protein with normal immunoglobulins and free light chains.  Iron studies showed SWETHA and started oral iron.  No hemolysis or vitamin B12/folate or copper deficiencies. SPEP with no M spike and free light chain ratio normal.  UIFE negative but SIFE with IgA lambda monoclonal protein.  2 different immunoglobulin values listed.  Hemoglobin was stable but then dropped he was treated with Ferrlecit 250 mg  IV daily x3.  Received notes from cardiology at  which mentioned they were going to involve Dr. Sauceda and will obtain any records available.  3. Acute provoked catheter associated LUE DVT/SVT and RUE SVT- RUE SVT was diagnosed prior to LUE DVT and he had received several doses of subcu heparin starting on 12/26. LUE DVT provoked by PICC line which  has since been removed.  Argatroban was difficult to manage with liver dysfunction.  Started low-dose Lovenox 1/1 and increased to full treatment dose 1/3.  Repeat RUE venous Doppler showed stable SVT.  4. Elevated LFT/cholelithiasis and adenomyomatosis of the gallbladder wall- possible shock liver.  T bili remains elevated with elevated direct bili.  Normal liver on US and hepatitis panel negative.  LFTs improved but bilirubin remains elevated (direct).   5. Bilateral shoulder septic arthritis/MRSA bacteremia-on ceftriaxone per ID.  No vegetation on echocardiogram.  Right shoulder drainage remains cloudy.  6. History of prostate cancer-PSA remains normal.   7. Cardiac amyloidosis- on chart review it has been treated at .  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  Records reviewed from  cardiology.  Genetic testing was being done for hereditary ATTR amyloidosis and patient was referred to Dr. Nguyen to look for systemic amyloidosis.  8. STEVEN/hyponatremia-nephrology managing.  Resolved.   9. A. fib with RVR- patient evaluated by EP and recommended to start Eliquis 5 mg p.o. twice daily (not started as patient currently on Lovenox) with plan for cardioversion prior to discharge as long as he remains on anticoagulation.  10. Oral mucositis-added Magic mouthwash.  Improving.    PLAN:  1. Continue Lovenox at 1.5 mg/kg daily.  2. Continue ferrous sulfate 325 mg daily (liquid given NGT).  3. Continue daily CBC.  4. Repeat immunoglobulins in near future.  5. Skeletal survey when patient out of  ICU.  6. Pending receipt of records from Dr. Nguyen at .  7. Pending receipt of genetic testing by Dr. Banks at  evaluating for hereditary ATTR amyloidosis.    Patient seen and evaluated by Dr. Mendoza.  Electronically signed by RITU Quinonez, 01/08/23, 11:08 AM EST.         I have personally performed a face-to-face diagnostic evaluation on this patient. I have performed a complete history and physical examination, reviewed laboratory studies, and radiographic examinations.  I have completed the majority and substantive portion of the medical decision making.  I have formulated the assessment on this patient and the plan of action as noted above. I have discussed the case with Genie Shelton NP, have edited/reviewed the note, and agree with the care plan.  The patient claims to be feeling all right but did have a fever of 100.3 this morning.  Right upper extremity remains swollen.  Hemoglobin is 9.1.  He continues on Lovenox for left upper extremity DVT.  Await cardiac amyloidosis work-up results from [place].          I discussed the patient's findings and my recommendations with patient and nursing.    Part of this note may be an electronic transcription/translation of spoken language to printed text using the Dragon Dictation System.    Electronically signed by Vicente Mendoza MD, 01/08/23, 11:18 AM EST.

## 2023-01-08 NOTE — PLAN OF CARE
Goal Outcome Evaluation:      Pt. Remains alert and oriented and on 2L nasal cannula. No gtts infusing. TF running at goal rate with minimal residuals. No BM this shift but had large amount of urine output. No complaints of pain overnight. Vital signs currently stable.

## 2023-01-08 NOTE — PROGRESS NOTES
Nutrition Services    Patient Name: Jaime Bar  YOB: 1955  MRN: [id number]  Admission date: 12/26/2022    PPE Documentation        PPE Worn By Provider Did not enter room for this encounter   PPE Worn By Patient  N/A     PROGRESS NOTE      Encounter Information: Check on for tube feeding.  Nutren 1.5 is infusing at 65mL/hour as ordered, with minimal residual volumes. TF is at goal rate.       PO Diet: NPO Diet NPO Type: Ice Chips, Other; Other: Ni free water protocol   PO Supplements: None ordered    PO Intake:  NPO       Current nutrition support: Nutren 1.5 at 65 mL/hr + 50mL/hr water flush   Nutrition support review: Documented as above per EMR        Labs (reviewed below): Reviewed; will continue to monitor         GI Function:  Last BM 1/3 (x 4 days ago) - may need bowel regimen  Residuals WNL       Nutrition Intervention Updates: Continue TF as ordered, which is meeting needs.       Results from last 7 days   Lab Units 01/07/23  0712 01/06/23 0357 01/05/23  0411   SODIUM mmol/L 138 141 144   POTASSIUM mmol/L 3.8 3.8 3.9   CHLORIDE mmol/L 96* 99 104   CO2 mmol/L 36.0* 33.0* 33.0*   BUN mg/dL 28* 32* 33*   CREATININE mg/dL 0.53* 0.55* 0.55*   CALCIUM mg/dL 7.8* 7.8* 7.7*   BILIRUBIN mg/dL 3.0* 3.4* 3.1*   ALK PHOS U/L 91 103 155*   ALT (SGPT) U/L 43* 52* 61*   AST (SGOT) U/L 33 39 33   GLUCOSE mg/dL 94 106* 117*     Results from last 7 days   Lab Units 01/07/23  0712 01/06/23  0357 01/05/23  0411   MAGNESIUM mg/dL 1.7 1.8 1.7   PHOSPHORUS mg/dL 3.1 2.8 3.2   HEMOGLOBIN g/dL 9.4* 9.9* 9.9*   HEMATOCRIT % 28.4* 29.7* 30.6*     Lab Results   Component Value Date    HGBA1C 4.2 12/26/2022       RD to follow up per protocol.    Electronically signed by:  Jennifer Sullivan RD  01/07/23

## 2023-01-08 NOTE — PROGRESS NOTES
"                                                                                                                                      Nephrology  Progress Note                                        [facility]    Patient Identification    Name: Jaime Bar  Age: 67 y.o.  Sex: male  :  1955  MRN: [id number]      DATE OF SERVICE:  2023        Subective     required more oxygen      Objective   Scheduled Meds:cefTRIAXone, 2 g, Intravenous, Q24H  enoxaparin, 1.5 mg/kg, Subcutaneous, Daily  ferrous sulfate, 300 mg, Nasogastric, Daily  First Mouthwash (Magic Mouthwash), 5 mL, Swish & Spit, Q6H  furosemide, 40 mg, Intravenous, Q12H  lansoprazole, 30 mg, Nasogastric, Q AM  midodrine, 10 mg, Nasogastric, Q8H  sodium chloride, 10 mL, Intravenous, Q12H  Zinc Oxide, , Apply externally, TID          Continuous Infusions:     PRN Meds:•  acetaminophen **OR** acetaminophen  •  aluminum-magnesium hydroxide-simethicone  •  dextrose  •  dextrose  •  fentanyl  •  glucagon (human recombinant)  •  HYDROcodone-acetaminophen  •  ondansetron **OR** ondansetron  •  sodium chloride  •  sodium chloride     Exam:  /73   Pulse 100   Temp 100.3 °F (37.9 °C)   Resp 24   Ht 167.6 cm (66\")   Wt 83.4 kg (183 lb 13.8 oz)   SpO2 98%   BMI 29.68 kg/m²     Intake/Output last 3 shifts:  I/O last 3 completed shifts:  In: 3292 [I.V.:396; Other:1196; NG/GT:1700]  Out: 830 [Urine:700; Drains:130]    Intake/Output this shift:  No intake/output data recorded.    Physical exam:  General Appearance: Confused  Head:  Normocephalic, without obvious abnormality, atraumatic  Eyes:  PERRL, conjunctiva/corneas clear     Neck:  Supple,  no adenopathy;      Lungs:  Decreased BS occasion ronchi  Heart:  Regular rate and rhythm, S1 and S2 normal  Abdomen:  Soft, non-tender, bowel sounds active   Extremities: trace edema  Pulses: 2+ and symmetric all extremities  Skin:  No rashes or lesions       Data Review:  All labs (24hrs): "   Recent Results (from the past 24 hour(s))   POC Glucose Once    Collection Time: 01/07/23 12:45 PM    Specimen: Blood   Result Value Ref Range    Glucose 104 70 - 105 mg/dL   POC Glucose Once    Collection Time: 01/07/23  5:54 PM    Specimen: Blood   Result Value Ref Range    Glucose 121 (H) 70 - 105 mg/dL   POC Glucose Once    Collection Time: 01/07/23 11:16 PM    Specimen: Blood   Result Value Ref Range    Glucose 107 (H) 70 - 105 mg/dL   Calcium, Ionized    Collection Time: 01/08/23  3:57 AM    Specimen: Blood   Result Value Ref Range    Ionized Calcium 1.10 (L) 1.20 - 1.30 mmol/L   Comprehensive Metabolic Panel    Collection Time: 01/08/23  3:57 AM    Specimen: Blood   Result Value Ref Range    Glucose 119 (H) 65 - 99 mg/dL    BUN 30 (H) 8 - 23 mg/dL    Creatinine 0.57 (L) 0.76 - 1.27 mg/dL    Sodium 138 136 - 145 mmol/L    Potassium 3.5 3.5 - 5.2 mmol/L    Chloride 96 (L) 98 - 107 mmol/L    CO2 35.0 (H) 22.0 - 29.0 mmol/L    Calcium 8.1 (L) 8.6 - 10.5 mg/dL    Total Protein 5.3 (L) 6.0 - 8.5 g/dL    Albumin 2.1 (L) 3.5 - 5.2 g/dL    ALT (SGPT) 40 1 - 41 U/L    AST (SGOT) 34 1 - 40 U/L    Alkaline Phosphatase 111 39 - 117 U/L    Total Bilirubin 3.0 (H) 0.0 - 1.2 mg/dL    Globulin 3.2 gm/dL    A/G Ratio 0.7 g/dL    BUN/Creatinine Ratio 52.6 (H) 7.0 - 25.0    Anion Gap 7.0 5.0 - 15.0 mmol/L    eGFR 107.5 >60.0 mL/min/1.73   Magnesium    Collection Time: 01/08/23  3:57 AM    Specimen: Blood   Result Value Ref Range    Magnesium 1.8 1.6 - 2.4 mg/dL   Phosphorus    Collection Time: 01/08/23  3:57 AM    Specimen: Blood   Result Value Ref Range    Phosphorus 2.7 2.5 - 4.5 mg/dL   CBC Auto Differential    Collection Time: 01/08/23  3:57 AM    Specimen: Blood   Result Value Ref Range    WBC 7.00 3.40 - 10.80 10*3/mm3    RBC 2.93 (L) 4.14 - 5.80 10*6/mm3    Hemoglobin 9.1 (L) 13.0 - 17.7 g/dL    Hematocrit 27.4 (L) 37.5 - 51.0 %    MCV 93.5 79.0 - 97.0 fL    MCH 31.1 26.6 - 33.0 pg    MCHC 33.2 31.5 - 35.7 g/dL    RDW  16.0 (H) 12.3 - 15.4 %    RDW-SD 52.5 37.0 - 54.0 fl    MPV 9.1 6.0 - 12.0 fL    Platelets 175 140 - 450 10*3/mm3    Neutrophil % 80.0 (H) 42.7 - 76.0 %    Lymphocyte % 5.3 (L) 19.6 - 45.3 %    Monocyte % 14.2 (H) 5.0 - 12.0 %    Eosinophil % 0.2 (L) 0.3 - 6.2 %    Basophil % 0.3 0.0 - 1.5 %    Neutrophils, Absolute 5.60 1.70 - 7.00 10*3/mm3    Lymphocytes, Absolute 0.40 (L) 0.70 - 3.10 10*3/mm3    Monocytes, Absolute 1.00 (H) 0.10 - 0.90 10*3/mm3    Eosinophils, Absolute 0.00 0.00 - 0.40 10*3/mm3    Basophils, Absolute 0.00 0.00 - 0.20 10*3/mm3    nRBC 0.1 0.0 - 0.2 /100 WBC   POC Glucose Once    Collection Time: 01/08/23  4:59 AM    Specimen: Blood   Result Value Ref Range    Glucose 117 (H) 70 - 105 mg/dL          Imaging:  CT Head Without Contrast    Result Date: 12/31/2022  1. No acute intracranial abnormality. Specifically, no evidence of acute hemorrhage, mass effect or midline shift. 2. Mild global cerebral volume loss. 3. Mild bilateral air-fluid levels within the maxillary sinuses which can be seen with acute sinus disease in the correct clinical setting.  Electronically Signed By-Willy Baxter MD On:12/31/2022 9:01 PM This report was finalized on [id number] by  Willy Baxter MD.    CT Cervical Spine With Contrast    Result Date: 1/3/2023  1.Postoperative changes status post prior fusion. No definitive signs of hardware failure or loosening are noted. 2.No evidence for displaced fracture or malalignment throughout the cervical spine. 3.No evidence for acute soft tissue abnormality. There is no evidence for abnormal enhancement. No abnormal peripherally enhancing fluid collection is seen. 4.Changes of cervical spondylosis are noted throughout the cervical spine. Associated hypertrophic posterior facet changes and uncovertebral changes are also observed. Electronically Signed: Solo Ray  1/3/2023 10:25 PM EST  Workstation ID: [id number]    CT Thoracic Spine With Contrast    Result Date:  1/3/2023  1.No evidence for displaced fracture or malalignment throughout the thoracic spine. No evidence for erosive endplate changes. 2.No evidence for abnormal enhancement. There is no abnormal fluid collection within the paraspinal soft tissues. There is no evidence for epidural abscess. 3.Degenerative changes are noted throughout the thoracic spine. 4.Interstitial changes are seen throughout the lungs. Bibasilar infiltrates are noted. Bilateral pleural effusions are observed. Electronically Signed: Solo Flor  1/3/2023 10:31 PM EST  Workstation ID: [id number]    CT Lumbar Spine With Contrast    Result Date: 1/3/2023  1.No evidence for displaced fracture or malalignment throughout the lumbar spine. No evidence for erosive endplate changes. 2.No evidence for abnormal enhancement. There is no abnormal fluid collection within the paraspinal soft tissues. There is no evidence for epidural abscess. 3.Advanced degenerative changes are noted throughout the lumbar spine. 4.Evidence for bilateral spondylolysis with associated spondylolisthesis at the L5 level. Prior postoperative changes are also noted. Electronically Signed: Solo Flor  1/3/2023 10:35 PM EST  Workstation ID: [id number]    XR Chest 1 View    Result Date: 1/3/2023  Impression: 1. Radiographic changes consistent with congestive heart failure pulmonary edema with interval increase in pulmonary edema and development of small bilateral pleural effusions. Electronically Signed: Jaime Momin  1/3/2023 9:14 AM EST  Workstation ID: [id number]    XR Abdomen KUB    Result Date: 1/7/2023  Enteric tube in the stomach. Thank you for the referral of this patient. This exam was interpreted by an American Board of Radiology certified radiologist with subspecialty fellowship in Pediatric Radiology. If there are any questions regarding this exam please feel free to contact a radiologist directly at [phone]. Slot 64 Electronically signed by:  Mau Rodriguez M.D.   1/7/2023 4:48 AM Mountain Time      Assessment/Plan:     Hyponatremia    ACC/AHA stage C chronic systolic heart failure (HCC)    Cardiac amyloidosis (HCC)    Primary hypertension    Tobacco abuse    Cervicalgia, spine surgery 2017    Primary osteoarthritis involving multiple joints    Bacteremia due to methicillin susceptible Staphylococcus aureus (MSSA)    Staphylococcal arthritis of shoulder (HCC)    Acute kidney injury superimposed on chronic kidney disease (HCC)   hyponatremia improving   Acute kidney injury on chronic kidney disease improving  Atrial fibrillation with rapid ventricular response  Metabolic acidosis  Urinary retention   Sepsis   Hypocalcemia   Hyperkalemia     Tolerating diuresis  Oxygenation better  Status post IV contrast study yesterday   Follow labs

## 2023-01-08 NOTE — PROGRESS NOTES
Nutrition Services    Patient Name: Jaime Bar  YOB: 1955  MRN: [id number]  Admission date: 12/26/2022    PPE Documentation        PPE Worn By Provider Did not enter room for this encounter   PPE Worn By Patient  N/A     PROGRESS NOTE      Encounter Information: Check on for tube feeding.  Nutren 1.5 is infusing at 65mL/hour as ordered, with minimal residual volumes. TF is at goal rate. ST saw pt today and pt not safe for PO -- will be continuing on TF as sole source of nutrition.        PO Diet: NPO Diet NPO Type: Ice Chips, Other; Other: Ni free water protocol   PO Supplements: None ordered    PO Intake:  NPO       Current nutrition support: Nutren 1.5 at 65 mL/hr + 50mL/hr water flush   Nutrition support review: Documented as above per EMR        Labs (reviewed below): Reviewed; will continue to monitor         GI Function:  Last BM 1/3 (x 5 days ago) - secure message to attending to notify  Residuals WNL       Nutrition Intervention Updates: Continue TF as ordered, which is at goal rate.       Results from last 7 days   Lab Units 01/08/23 0357 01/07/23 0712 01/06/23 0357   SODIUM mmol/L 138 138 141   POTASSIUM mmol/L 3.5 3.8 3.8   CHLORIDE mmol/L 96* 96* 99   CO2 mmol/L 35.0* 36.0* 33.0*   BUN mg/dL 30* 28* 32*   CREATININE mg/dL 0.57* 0.53* 0.55*   CALCIUM mg/dL 8.1* 7.8* 7.8*   BILIRUBIN mg/dL 3.0* 3.0* 3.4*   ALK PHOS U/L 111 91 103   ALT (SGPT) U/L 40 43* 52*   AST (SGOT) U/L 34 33 39   GLUCOSE mg/dL 119* 94 106*     Results from last 7 days   Lab Units 01/08/23 0357 01/07/23 0712 01/06/23  0357   MAGNESIUM mg/dL 1.8 1.7 1.8   PHOSPHORUS mg/dL 2.7 3.1 2.8   HEMOGLOBIN g/dL 9.1* 9.4* 9.9*   HEMATOCRIT % 27.4* 28.4* 29.7*     Lab Results   Component Value Date    HGBA1C 4.2 12/26/2022       RD to follow up per protocol.    Electronically signed by:  Jennifer Sullivan RD  01/08/23

## 2023-01-08 NOTE — PROGRESS NOTES
Infectious Diseases Progress Note      LOS: 13 days   Patient Care Team:  Provider, No Known as PCP - Kyle Gardner MD as Consulting Physician (Nephrology)  Vicente Mendoza MD as Consulting Physician (Hematology and Oncology)    Chief Complaint: Fatigue    Subjective       Patient had no high-grade fever during the last 24 hours.  The patient is awake and alert.  Currently on 2 L of oxygen.  On no vasopressors  Patient is doing better today and is currently working with speech therapy    Review of Systems:   Review of Systems   Constitutional: Positive for fatigue.   HENT: Negative.    Eyes: Negative.    Respiratory: Negative.    Cardiovascular: Negative.    Gastrointestinal: Negative.    Genitourinary: Negative.    Musculoskeletal: Positive for joint swelling.   Skin: Negative.    Neurological: Negative.    Hematological: Negative.    Psychiatric/Behavioral: Negative.         Objective     Vital Signs  Temp:  [97.4 °F (36.3 °C)-100.3 °F (37.9 °C)] 99.4 °F (37.4 °C)  Heart Rate:  [] 103  Resp:  [18-25] 25  BP: (104-144)/(60-97) 104/61    Physical Exam:  Physical Exam  Vitals and nursing note reviewed.   Constitutional:       General: He is not in acute distress.     Appearance: He is well-developed and normal weight. He is ill-appearing. He is not diaphoretic.      Comments: The patient is more awake and alert today   HENT:      Head: Normocephalic and atraumatic.   Eyes:      Pupils: Pupils are equal, round, and reactive to light.   Cardiovascular:      Rate and Rhythm: Normal rate and regular rhythm.      Heart sounds: Normal heart sounds, S1 normal and S2 normal.   Pulmonary:      Effort: Pulmonary effort is normal. No respiratory distress.      Breath sounds: No stridor. Rales present. No wheezing.   Abdominal:      General: Abdomen is flat. Bowel sounds are normal. There is no distension.      Palpations: Abdomen is soft. There is no mass.      Tenderness: There is no abdominal tenderness.  There is no guarding.      Comments: NG tube   Musculoskeletal:         General: No deformity. Normal range of motion.      Cervical back: Neck supple.      Right lower leg: Edema present.      Left lower leg: Edema present.      Comments: Drain tubes are present in the right and left shoulders with bloody serous drainage    Bilateral upper extremity edema with right arm worse than left   Skin:     General: Skin is warm and dry.      Coloration: Skin is not pale.      Findings: No erythema or rash.   Neurological:      Mental Status: He is alert and oriented to person, place, and time.      Cranial Nerves: No cranial nerve deficit.          Results Review:    I have reviewed all clinical data, test, lab, and imaging results.     Radiology  No Radiology Exams Resulted Within Past 24 Hours    Cardiology    Laboratory    Results from last 7 days   Lab Units 01/08/23 0357 01/07/23 0712 01/06/23 0357 01/05/23  0411 01/04/23  0352 01/03/23  0914 01/02/23  0606   WBC 10*3/mm3 7.00 6.90 5.80 5.90 7.00 8.20 9.30   HEMOGLOBIN g/dL 9.1* 9.4* 9.9* 9.9* 11.0* 11.1* 11.3*   HEMATOCRIT % 27.4* 28.4* 29.7* 30.6* 34.1* 34.1* 34.6*   PLATELETS 10*3/mm3 175 156 125* 121* 116* 104* 78*     Results from last 7 days   Lab Units 01/08/23 0357 01/07/23 0712 01/06/23 0357 01/05/23  0411 01/04/23  0352 01/03/23  0914 01/02/23  0606   SODIUM mmol/L 138 138 141 144 148* 150* 152*   POTASSIUM mmol/L 3.5 3.8 3.8 3.9 4.4 4.7 4.3   CHLORIDE mmol/L 96* 96* 99 104 108* 116* 120*   CO2 mmol/L 35.0* 36.0* 33.0* 33.0* 32.0* 28.0 27.0   BUN mg/dL 30* 28* 32* 33* 40* 36* 41*   CREATININE mg/dL 0.57* 0.53* 0.55* 0.55* 0.77 0.62* 0.69*   GLUCOSE mg/dL 119* 94 106* 117* 127* 122* 144*   ALBUMIN g/dL 2.1* 2.2* 2.0* 2.3* 2.2* 2.4* 2.4*   BILIRUBIN mg/dL 3.0* 3.0* 3.4* 3.1* 3.5* 3.4* 3.3*   ALK PHOS U/L 111 91 103 155* 96 120* 102   AST (SGOT) U/L 34 33 39 33 36 44* 104*   ALT (SGPT) U/L 40 43* 52* 61* 93* 122* 200*   CALCIUM mg/dL 8.1* 7.8* 7.8* 7.7*  8.0* 8.0* 8.2*                 Microbiology   Microbiology Results (last 10 days)     Procedure Component Value - Date/Time    Catheter Culture - Cath Tip, Hand, Left [[id number]] Collected: 12/31/22 1839    Lab Status: Final result Specimen: Cath Tip from Hand, Left Updated: 01/03/23 0958     CATHETER CULTURE No growth at 2 days    Blood Culture - Blood, Blood, PICC Line [[id number]]  (Normal) Collected: 12/31/22 1148    Lab Status: Final result Specimen: Blood, PICC Line Updated: 01/05/23 1200     Blood Culture No growth at 5 days    Blood Culture - Blood, Hand, Right [[id number]]  (Abnormal) Collected: 12/30/22 1251    Lab Status: Final result Specimen: Blood from Hand, Right Updated: 01/01/23 1030     Blood Culture Staphylococcus aureus     Comment:   Infectious disease consultation is highly recommended to rule out distant foci of infection.        Isolated from Aerobic Bottle     Gram Stain Aerobic Bottle Gram positive cocci in clusters    Narrative:      Less than seven (7) mL's of blood was collected.  Insufficient quantity may yield false negative results.    Refer to previous blood culture collected on 12/26/2022 0435 for MICs.          Medication Review:       Schedule Meds  cefTRIAXone, 2 g, Intravenous, Q24H  enoxaparin, 1.5 mg/kg, Subcutaneous, Daily  ferrous sulfate, 300 mg, Nasogastric, Daily  First Mouthwash (Magic Mouthwash), 5 mL, Swish & Spit, Q6H  furosemide, 40 mg, Intravenous, Q12H  lansoprazole, 30 mg, Nasogastric, Q AM  midodrine, 10 mg, Nasogastric, Q8H  sodium chloride, 10 mL, Intravenous, Q12H  Zinc Oxide, , Apply externally, TID        Infusion Meds       PRN Meds  •  acetaminophen **OR** acetaminophen  •  aluminum-magnesium hydroxide-simethicone  •  dextrose  •  dextrose  •  fentanyl  •  glucagon (human recombinant)  •  HYDROcodone-acetaminophen  •  ondansetron **OR** ondansetron  •  sodium chloride  •  sodium chloride        Assessment & Plan       Antimicrobial Therapy   1.  IV  ceftriaxone        2.        3.        4.        5.            Assessment    Bilateral shoulder septic arthritis.  Synovial fluid culture was consistent with infection and cultures are growing 4+ methicillin susceptible Staph aureus    Methicillin susceptible Staph aureus bacteremia secondary to above.  Blood cultures were positive x2 sets on admission on December 26, 2022.  Repeat blood culture from December 27, 2022 turned positive  Repeat blood culture from December 29, 2022 is positive  ALMA DELIA was done on December 28, 2022 and showed no vegetation  -Repeat blood culture from 12/30/2022 is now positive  -PICC line was removed on 12/31/2022- cultures pending  -Blood culture from 12/31/2022 is negative so far  CT scan of the cervical, thoracic and lumbar spine with contrast showed no obvious infection    Mild septic shock.  Currently off vasopressors    Respiratory failure.  Patient was on the ventilator after shoulder surgeries.  Was extubated and currently requiring 15 L of oxygen    DVT of the left arm secondary to PICC line.  Hematology service was consulted and patient was started on anticoagulation    Elevated transaminase and hyperbilirubinemia secondary to sepsis and infection.  Liver enzymes are trending down    Worsening right upper extremity edema.  Venous Doppler of the right upper extremity showed SVT but there was no DVT    Toxic metabolic encephalopathy.  Improved significantly      Plan    Continue IV ceftriaxone 2 g daily for total of 6 weeks.  The last day of IV ceftriaxone will be February 11, 2023  Patient will need a PICC line before discharge-please remove when IV antibiotics are completed.  Weekly labs can CBC, creatinine and sed rate times x5 weeks  Patient is scheduled for a video swallow study tomorrow  Continue supportive care  A.m. labs    Please fax all post discharge lab results, imaging studies and correspondence to this fax number [phone]  For any question or concern please  contact our service number [phone]    Brooklyn Smith, RITU  01/08/23  15:27 EST    Note is dictated utilizing voice recognition software/Dragon

## 2023-01-09 ENCOUNTER — APPOINTMENT (OUTPATIENT)
Dept: GENERAL RADIOLOGY | Facility: HOSPITAL | Age: 68
DRG: 853 | End: 2023-01-09
Payer: MEDICARE

## 2023-01-09 LAB
ALBUMIN SERPL-MCNC: 2.1 G/DL (ref 3.5–5.2)
ALBUMIN/GLOB SERPL: 0.7 G/DL
ALP SERPL-CCNC: 135 U/L (ref 39–117)
ALT SERPL W P-5'-P-CCNC: 35 U/L (ref 1–41)
ANION GAP SERPL CALCULATED.3IONS-SCNC: 6 MMOL/L (ref 5–15)
AST SERPL-CCNC: 35 U/L (ref 1–40)
BASOPHILS # BLD AUTO: 0 10*3/MM3 (ref 0–0.2)
BASOPHILS NFR BLD AUTO: 0.6 % (ref 0–1.5)
BILIRUB SERPL-MCNC: 2.3 MG/DL (ref 0–1.2)
BUN SERPL-MCNC: 31 MG/DL (ref 8–23)
BUN/CREAT SERPL: 55.4 (ref 7–25)
CA-I SERPL ISE-MCNC: 1.1 MMOL/L (ref 1.2–1.3)
CALCIUM SPEC-SCNC: 7.9 MG/DL (ref 8.6–10.5)
CHLORIDE SERPL-SCNC: 96 MMOL/L (ref 98–107)
CO2 SERPL-SCNC: 37 MMOL/L (ref 22–29)
CREAT SERPL-MCNC: 0.56 MG/DL (ref 0.76–1.27)
DEPRECATED RDW RBC AUTO: 53.8 FL (ref 37–54)
EGFRCR SERPLBLD CKD-EPI 2021: 108 ML/MIN/1.73
EOSINOPHIL # BLD AUTO: 0 10*3/MM3 (ref 0–0.4)
EOSINOPHIL NFR BLD AUTO: 0.4 % (ref 0.3–6.2)
ERYTHROCYTE [DISTWIDTH] IN BLOOD BY AUTOMATED COUNT: 16.3 % (ref 12.3–15.4)
GLOBULIN UR ELPH-MCNC: 3.2 GM/DL
GLUCOSE BLDC GLUCOMTR-MCNC: 122 MG/DL (ref 70–105)
GLUCOSE BLDC GLUCOMTR-MCNC: 85 MG/DL (ref 70–105)
GLUCOSE BLDC GLUCOMTR-MCNC: 86 MG/DL (ref 70–105)
GLUCOSE BLDC GLUCOMTR-MCNC: 96 MG/DL (ref 70–105)
GLUCOSE SERPL-MCNC: 106 MG/DL (ref 65–99)
HCT VFR BLD AUTO: 25.2 % (ref 37.5–51)
HGB BLD-MCNC: 8.4 G/DL (ref 13–17.7)
LYMPHOCYTES # BLD AUTO: 0.4 10*3/MM3 (ref 0.7–3.1)
LYMPHOCYTES NFR BLD AUTO: 7.4 % (ref 19.6–45.3)
MAGNESIUM SERPL-MCNC: 1.9 MG/DL (ref 1.6–2.4)
MCH RBC QN AUTO: 31.2 PG (ref 26.6–33)
MCHC RBC AUTO-ENTMCNC: 33.3 G/DL (ref 31.5–35.7)
MCV RBC AUTO: 93.6 FL (ref 79–97)
MONOCYTES # BLD AUTO: 0.8 10*3/MM3 (ref 0.1–0.9)
MONOCYTES NFR BLD AUTO: 15.2 % (ref 5–12)
NEUTROPHILS NFR BLD AUTO: 4.1 10*3/MM3 (ref 1.7–7)
NEUTROPHILS NFR BLD AUTO: 76.4 % (ref 42.7–76)
NRBC BLD AUTO-RTO: 0.1 /100 WBC (ref 0–0.2)
PHOSPHATE SERPL-MCNC: 2.8 MG/DL (ref 2.5–4.5)
PLATELET # BLD AUTO: 170 10*3/MM3 (ref 140–450)
PMV BLD AUTO: 8.7 FL (ref 6–12)
POTASSIUM SERPL-SCNC: 3.5 MMOL/L (ref 3.5–5.2)
PROT SERPL-MCNC: 5.3 G/DL (ref 6–8.5)
QT INTERVAL: 420 MS
RBC # BLD AUTO: 2.69 10*6/MM3 (ref 4.14–5.8)
SODIUM SERPL-SCNC: 139 MMOL/L (ref 136–145)
WBC NRBC COR # BLD: 5.3 10*3/MM3 (ref 3.4–10.8)

## 2023-01-09 PROCEDURE — 97535 SELF CARE MNGMENT TRAINING: CPT

## 2023-01-09 PROCEDURE — 25010000002 FENTANYL CITRATE (PF) 50 MCG/ML SOLUTION: Performed by: STUDENT IN AN ORGANIZED HEALTH CARE EDUCATION/TRAINING PROGRAM

## 2023-01-09 PROCEDURE — 92611 MOTION FLUOROSCOPY/SWALLOW: CPT

## 2023-01-09 PROCEDURE — 97112 NEUROMUSCULAR REEDUCATION: CPT

## 2023-01-09 PROCEDURE — 93005 ELECTROCARDIOGRAM TRACING: CPT | Performed by: INTERNAL MEDICINE

## 2023-01-09 PROCEDURE — 82962 GLUCOSE BLOOD TEST: CPT

## 2023-01-09 PROCEDURE — 25010000002 FUROSEMIDE PER 20 MG: Performed by: INTERNAL MEDICINE

## 2023-01-09 PROCEDURE — 97530 THERAPEUTIC ACTIVITIES: CPT

## 2023-01-09 PROCEDURE — 82330 ASSAY OF CALCIUM: CPT | Performed by: ORTHOPAEDIC SURGERY

## 2023-01-09 PROCEDURE — 74230 X-RAY XM SWLNG FUNCJ C+: CPT

## 2023-01-09 PROCEDURE — 25010000002 ENOXAPARIN PER 10 MG: Performed by: NURSE PRACTITIONER

## 2023-01-09 PROCEDURE — 93010 ELECTROCARDIOGRAM REPORT: CPT | Performed by: INTERNAL MEDICINE

## 2023-01-09 PROCEDURE — 84100 ASSAY OF PHOSPHORUS: CPT | Performed by: ORTHOPAEDIC SURGERY

## 2023-01-09 PROCEDURE — 25010000002 CEFTRIAXONE PER 250 MG: Performed by: INTERNAL MEDICINE

## 2023-01-09 PROCEDURE — 85025 COMPLETE CBC W/AUTO DIFF WBC: CPT | Performed by: ORTHOPAEDIC SURGERY

## 2023-01-09 PROCEDURE — 83735 ASSAY OF MAGNESIUM: CPT | Performed by: ORTHOPAEDIC SURGERY

## 2023-01-09 PROCEDURE — 80053 COMPREHEN METABOLIC PANEL: CPT | Performed by: ORTHOPAEDIC SURGERY

## 2023-01-09 RX ORDER — POLYETHYLENE GLYCOL 3350 17 G/17G
17 POWDER, FOR SOLUTION ORAL DAILY
Status: DISCONTINUED | OUTPATIENT
Start: 2023-01-10 | End: 2023-01-11 | Stop reason: HOSPADM

## 2023-01-09 RX ORDER — MIDODRINE HYDROCHLORIDE 5 MG/1
10 TABLET ORAL EVERY 8 HOURS
Status: DISCONTINUED | OUTPATIENT
Start: 2023-01-10 | End: 2023-01-11 | Stop reason: HOSPADM

## 2023-01-09 RX ORDER — DOCUSATE SODIUM 100 MG/1
100 CAPSULE, LIQUID FILLED ORAL 2 TIMES DAILY
Status: DISCONTINUED | OUTPATIENT
Start: 2023-01-09 | End: 2023-01-11 | Stop reason: HOSPADM

## 2023-01-09 RX ADMIN — DOCUSATE SODIUM LIQUID 50 MG: 100 LIQUID ORAL at 08:42

## 2023-01-09 RX ADMIN — LANSOPRAZOLE 30 MG: 30 TABLET, ORALLY DISINTEGRATING ORAL at 05:39

## 2023-01-09 RX ADMIN — ENOXAPARIN SODIUM 140 MG: 150 INJECTION SUBCUTANEOUS at 15:27

## 2023-01-09 RX ADMIN — HYDROCODONE BITARTRATE AND ACETAMINOPHEN 1 TABLET: 10; 325 TABLET ORAL at 04:19

## 2023-01-09 RX ADMIN — POLYETHYLENE GLYCOL 3350 17 G: 17 POWDER, FOR SOLUTION ORAL at 08:42

## 2023-01-09 RX ADMIN — MIDODRINE HYDROCHLORIDE 10 MG: 5 TABLET ORAL at 01:42

## 2023-01-09 RX ADMIN — Medication: at 08:43

## 2023-01-09 RX ADMIN — HYDROCODONE BITARTRATE AND ACETAMINOPHEN 1 TABLET: 10; 325 TABLET ORAL at 18:12

## 2023-01-09 RX ADMIN — Medication 300 MG: at 08:42

## 2023-01-09 RX ADMIN — BARIUM SULFATE 50 ML: 400 SUSPENSION ORAL at 10:11

## 2023-01-09 RX ADMIN — MIDODRINE HYDROCHLORIDE 10 MG: 5 TABLET ORAL at 12:23

## 2023-01-09 RX ADMIN — CEFTRIAXONE 2 G: 2 INJECTION, POWDER, FOR SOLUTION INTRAMUSCULAR; INTRAVENOUS at 00:00

## 2023-01-09 RX ADMIN — MIDODRINE HYDROCHLORIDE 10 MG: 5 TABLET ORAL at 18:12

## 2023-01-09 RX ADMIN — DIPHENHYDRAMINE HYDROCHLORIDE AND LIDOCAINE HYDROCHLORIDE AND ALUMINUM HYDROXIDE AND MAGNESIUM HYDRO 5 ML: KIT at 05:39

## 2023-01-09 RX ADMIN — Medication: at 21:01

## 2023-01-09 RX ADMIN — DIPHENHYDRAMINE HYDROCHLORIDE AND LIDOCAINE HYDROCHLORIDE AND ALUMINUM HYDROXIDE AND MAGNESIUM HYDRO 5 ML: KIT at 12:23

## 2023-01-09 RX ADMIN — Medication 10 ML: at 08:43

## 2023-01-09 RX ADMIN — FUROSEMIDE 40 MG: 10 INJECTION, SOLUTION INTRAMUSCULAR; INTRAVENOUS at 12:23

## 2023-01-09 RX ADMIN — HYDROCODONE BITARTRATE AND ACETAMINOPHEN 1 TABLET: 10; 325 TABLET ORAL at 12:23

## 2023-01-09 RX ADMIN — Medication: at 15:37

## 2023-01-09 RX ADMIN — FENTANYL CITRATE 50 MCG: 50 INJECTION, SOLUTION INTRAMUSCULAR; INTRAVENOUS at 09:16

## 2023-01-09 RX ADMIN — DOCUSATE SODIUM 100 MG: 100 CAPSULE, LIQUID FILLED ORAL at 21:02

## 2023-01-09 RX ADMIN — FUROSEMIDE 40 MG: 10 INJECTION, SOLUTION INTRAMUSCULAR; INTRAVENOUS at 23:52

## 2023-01-09 RX ADMIN — Medication 10 ML: at 21:01

## 2023-01-09 NOTE — MBS/VFSS/FEES
Acute Care - Speech Language Pathology   Swallow Initial Evaluation  Michael     Patient Name: Jaime Bar  : 1955  MRN: [id number]  Today's Date: 2023               Admit Date: 2022    Visit Dx:     ICD-10-CM ICD-9-CM   1. Pyogenic arthritis of right shoulder region, due to unspecified organism (HCC)  M00.9 711.01     Patient Active Problem List   Diagnosis   • Hyponatremia   • ACC/AHA stage C chronic systolic heart failure (HCC)   • Cardiac amyloidosis (HCC)   • History of prostate cancer   • Hx of neck surgery   • Neck pain   • Primary hypertension   • Tobacco abuse   • Cervicalgia, spine surgery 2017   • Primary osteoarthritis involving multiple joints   • Bacteremia due to methicillin susceptible Staphylococcus aureus (MSSA)   • Staphylococcal arthritis of shoulder (HCC)   • Acute kidney injury superimposed on chronic kidney disease (HCC)   • Dysphagia     Past Medical History:   Diagnosis Date   • Dysphagia 2023     Past Surgical History:   Procedure Laterality Date   • SHOULDER ARTHROSCOPY Right 2022    Procedure: SHOULDER ARTHROSCOPY INCISION AND DRAINAGE.;  Surgeon: Nikunj Velez MD;  Location: [facility];  Service: Orthopedics;  Laterality: Right;   • SHOULDER ARTHROSCOPY Left 2022    Procedure: SHOULDER ARTHROSCOPY INCISION AND DRAINAGE;  Surgeon: Nikunj Velez MD;  Location: [facility] OR;  Service: Orthopedics;  Laterality: Left;       SLP Recommendation and Plan  SLP Swallowing Diagnosis: moderate, mod-severe, oral dysphagia, pharyngeal dysphagia (23 1400)  SLP Diet Recommendation: puree, nectar thick liquids (23 1400)  Recommended Precautions and Strategies: upright posture during/after eating, small bites of food and sips of liquid, alternate between small bites of food and sips of liquid, check mouth frequently for oral residue/pocketing, hard swallow with each bite or sip, general aspiration precautions, fatigue precautions,  1:1 supervision (01/09/23 1400)  SLP Rec. for Method of Medication Administration: meds whole, meds crushed, with thick liquids, with puree, as tolerated (01/09/23 1400)     Monitor for Signs of Aspiration: yes, notify SLP if any concerns, cough, elevated WBC count, gurgly voice, throat clearing, fever, upper respiratory, pneumonia, right lower lobe infiltrates (01/09/23 1400)  Recommended Diagnostics: reassess via clinical swallow evaluation, reassess via VFSS ([facility] [place] (01/09/23 1400)  Swallow Criteria for Skilled Therapeutic Interventions Met: demonstrates skilled criteria (01/09/23 1400)  Anticipated Discharge Disposition (SLP): skilled nursing facility (01/09/23 1400)  Rehab Potential/Prognosis, Swallowing: good, to achieve stated therapy goals (01/09/23 1400)  Therapy Frequency (Swallow): 3 days per week, 4 days per week (01/09/23 1400)  Predicted Duration Therapy Intervention (Days): until discharge (01/09/23 1400)       SWALLOW EVALUATION (last 72 hours)     SLP Adult Swallow Evaluation     Row Name 01/09/23 1400          Document Type evaluation  -SM    Subjective Information no complaints  -SM    Patient Observations alert;cooperative;agree to therapy  -SM    Patient/Family/Caregiver Comments/Observations No family/caregivers present at this time  -SM    Patient Effort good  -SM    Comment Video Swallow Study procedure completed this date  -SM    Symptoms Noted During/After Treatment fatigue  -SM          Patient Profile Reviewed yes  -SM    Pertinent History Of Current Problem Per H&P: Jaime Bar is a 67 y.o. male with past medical history of cervicalgia, primary osteoarthritis involving multiple joints with surgery on bilateral shoulders and right knee replacement but no recent procedure or surgery, hyponatremia, prostate cancer, and cardiac amyloidosis who presented initially to [facility] on 12/25/2022 with complaints of right shoulder pain, weakness, and altered mental  status.  He was found to be hypotensive and hyponatremic with elevated lactic acid.  Patient had reported decreased oral intake.  He was transferred to [facility] ICU for further treatment of septic shock requiring vasopressors and hyponatremia.      Since admission he was found to be bacteremic with 2 sets of blood cultures positive for staph aureus, source being bilateral shoulder septic arthritis. ID has been treating the patient with IV ceftriaxone 2G and he will need a total of 6 weeks of antibiotic therapy.  He has status post bilateral shoulder arthroscopy with extensive debridement by Dr. Velez 12/28/2022. He remained intubated after surgery until 12/29 and was extubated. ST has been following patient since 1/6/2023 and he appears ready for an instrumental assessment of swallow this date.     XR CHEST 1 VW     Date of Exam: 1/3/2023 8:55 AM EST     Indication: congestion.     Comparison: 12/30/2022     Findings:   Cardiac size is enlarged. There is pulmonary vascular congestion and interstitial pulmonary edema which appears worse than on the previous study. There are small bilateral pleural effusions. A nasoenteric tube is in place passing at least into the   stomach.   Impression:  Impression:     1. Radiographic changes consistent with congestive heart failure pulmonary edema with interval increase in pulmonary edema and development of small bilateral pleural effusions.     Electronically Signed: Jaime Momin      -     Current Method of Nutrition NPO;nasogastric feedings;large-bore  -SM    Precautions/Limitations, Vision WFL;for purposes of eval  -SM    Precautions/Limitations, Hearing WFL;for purposes of eval  -SM    Prior Level of Function-Communication WFL  -SM    Prior Level of Function-Swallowing unknown  -SM    Plans/Goals Discussed with patient;other (see comments)  Nurse  -SM    Barriers to Rehab medically complex  -SM          Dentition Assessment natural, present and adequate  -SM     Secretion Management dried secretions in oral cavity  -SM    Mucosal Quality cracked;dry  -SM          Oral Motor, Comment The patient is able to protrude his tongue upon reques. No overt weakness or deviation noted. Dried oral secretions noted. He was provided oral care prior to being given any po trials for this assessment. Glossal lateralization achieved to the L and R. He is able to elevate his tongue to the alveolar ridge adequately. Labial protrusion/retraction WFL bilaterally. He is verbal and presents with good intelligibility.  -SM          Respiratory Support Currently in Use room air;nasal cannula  Nurse reports he uses 2 L N/C when sleeping, however is typically on Room Air. He was on 2L n/c for this assessment however  -    Eating/Swallowing Skills fed by SLP  -    Positioning During Eating upright 90 degree;upright in chair  -SM          Utensils Used spoon;straw  -    Consistencies Trialed nectar/syrup-thick liquids;pureed;soft to chew textures;thin liquids  -SM          VFSS Summary Video Swallow Study completed this date with the patient seated up at a 90 degree angle and viewed in the lateral projection. The patient was assessed with NTL (2 trials by spoon and 4 trials by straw), puree (applesauce) x 2 trials, mechanical soft (peaches) x 1 trial, and thin liquid by straw (x 1 trial). All trials/consistencies administered by SLP due to patient weakness/fatigue. Labial seal on spoon and straw are adequate. He is able to pull thin and NTL via straw adequately. Swallow initiation appears slightly delayed, particularly with thin and NTL. Spillage over the base of the tongue with bolus head reaching the level of the pyriform sinus prior to swallow initiation. Reduced base of tongue retraction, decreased laryngeal elevation, incomplete laryngeal vestibular closure and reduced epiglottic inversion noted. This results in trace, transient penetration on 2 of 4 trials of NTL by straw (a 2 on the  Rosenbeck Penetration-Aspiration Scale). Penetration clears and does not result in aspiration. With puree consistency he presents with significant vallecular residue. He is able to use a dry swallow to clear some, but not all of this. A NTL was more effective in clearing some more of the pharyngeal retention. No evidence of penetration or aspiration noted with puree trials (a 1 on the Rosenbeck Penetration-Aspiration Scale). He presents with poor mastication of peaches, inability to fully masticate and swallowing some portions intact. Thin liquids were assessed at the end of the procedure with the patient able to pull from a straw. Decreased oral transit and bolus control noted. Premature spillage of the bolus evident with skylar aspiration occurring before, during and after the swallow. Following he elicits a weak/ineffective cough (a 7 on the Rosenbeck Penetration-Aspiration Scale). Regular solids not attempted this date due to the above difficulties masticating [facility] soft solids    RECOMMENDATIONS:  recommends that the patient initiate a puree diet with Nectar Thick Liquids at this time. ST will follow up with a meal assessment, dysphagia therapy/exercises and re-evaluation for potential diet upgrade as he progresses. Results/recommendations were discussed in detail with the patient and his nurse following the procedure. Both verbalized understanding and in agreement with the plan  -          SLP Swallowing Diagnosis moderate;mod-severe;oral dysphagia;pharyngeal dysphagia  -    Functional Impact risk of aspiration/pneumonia;risk of malnutrition;risk of dehydration  -    Rehab Potential/Prognosis, Swallowing good, to achieve stated therapy goals  -    Swallow Criteria for Skilled Therapeutic Interventions Met demonstrates skilled criteria  -          Therapy Frequency (Swallow) 3 days per week;4 days per week  -    Predicted Duration Therapy Intervention (Days) until discharge  -    SLP Diet  Recommendation puree;nectar thick liquids  -    Recommended Diagnostics reassess via clinical swallow evaluation;[place] [place]  -    Recommended Precautions and Strategies upright posture during/after eating;small bites of food and sips of liquid;alternate between small bites of food and sips of liquid;check mouth frequently for oral residue/pocketing;hard swallow with each bite or sip;general aspiration precautions;fatigue precautions;1:1 supervision  -    Oral Care Recommendations Oral Care BID/PRN;Before ice/water;Swab  -    SLP Rec. for Method of Medication Administration meds whole;meds crushed;with thick liquids;with puree;as tolerated  -    Monitor for Signs of Aspiration yes;notify SLP if any concerns;cough;elevated WBC count;gurgly voice;throat clearing;fever;upper respiratory;pneumonia;right lower lobe infiltrates  -    Anticipated Discharge Disposition (SLP) skilled nursing facility  -          Swallow LTGs Swallow Long Term Goal (free text)  -    Swallow STGs diet tolerance goal selection (SLP)  -    Diet Tolerance Goal Selection (SLP) Swallow Short Term Goal 1;Swallow Short Term Goal 2  -SM          (LTG) Swallow The patient will maximize swallow function for least restrictive po diet, exhibiting no complications associated with dysphagia, adequate po intake, and demonstrating independent use of safe swallow compensations.  -    [place] (Swallow Long Term Goal) with moderate cues (50-74% accuracy)  -    Time Frame (Swallow Long Term Goal) by discharge  -    Progress/Outcomes (Swallow Long Term Goal) goal ongoing;good progress toward goal  -    Comment (Swallow Long Term Goal) ST recommends that the patient initiate a puree diet with NTL at this time  -SM          (STG) Swallow 1 The patient will participate in ongoing assessment of swallow, including reevaluation clinically and/or inclulding instrumental assessment of swallow if indicated to further assess  swallow function in anticipation of initiation of PO diet.  -SM    [place] (Swallow Short Term Goal 1) with maximum cues (25-49% accuracy)  -SM    Time Frame (Swallow Short Term Goal 1) 1 week  -SM    Progress/Outcomes (Swallow Short Term Goal 1) goal ongoing;good progress toward goal  -SM    Comment (Swallow Short Term Goal 1) VFSS completed this date. ST recommends on-going clinical evaluation of swallow as a part of therapy with repeat instrumental assessment of swallow prior to considering liquid/diet upgrade.  -SM          (STG) Swallow 2 The patient will participate in a full meal assessment to determine safety and adequacy of recommended diet, independent use of safe swallow compensations, and additional goals/recommendations to follow  -SM    [place] (Swallow Short Term Goal 2) with 1:1 assist/ supervision  -SM    Time Frame (Swallow Short Term Goal 2) other (see comments)  24 - 48 hours  -SM    Progress/Outcomes (Swallow Short Term Goal 2) new goal  -SM          User Key  (r) = Recorded By, (t) = Taken By, (c) = Cosigned By    Initials Name Effective Dates    CP Marysol Laguerre, MONSTER 06/16/21 -     Destiny Montero SLP 06/16/21 -     Dori Coats 06/16/21 -                 EDUCATION  The patient has been educated in the following areas:   Dysphagia (Swallowing Impairment) Modified Diet Instruction.        SLP GOALS     Row Name 01/09/23 1400          (LTG) Swallow The patient will maximize swallow function for least restrictive po diet, exhibiting no complications associated with dysphagia, adequate po intake, and demonstrating independent use of safe swallow compensations.  -SM    [place] (Swallow Long Term Goal) with moderate cues (50-74% accuracy)  -SM    Time Frame (Swallow Long Term Goal) by discharge  -SM    Progress/Outcomes (Swallow Long Term Goal) goal ongoing;good progress toward goal  -SM    Comment (Swallow Long Term Goal) ST recommends that the patient initiate a puree diet  with NTL at this time  -SM          (STG) Swallow 1 The patient will participate in ongoing assessment of swallow, including reevaluation clinically and/or inclulding instrumental assessment of swallow if indicated to further assess swallow function in anticipation of initiation of PO diet.  -SM    [place] (Swallow Short Term Goal 1) with maximum cues (25-49% accuracy)  -SM    Time Frame (Swallow Short Term Goal 1) 1 week  -SM    Progress/Outcomes (Swallow Short Term Goal 1) goal ongoing;good progress toward goal  -SM    Comment (Swallow Short Term Goal 1) VFSS completed this date. ST recommends on-going clinical evaluation of swallow as a part of therapy with repeat instrumental assessment of swallow prior to considering liquid/diet upgrade.  -SM          (STG) Swallow 2 The patient will participate in a full meal assessment to determine safety and adequacy of recommended diet, independent use of safe swallow compensations, and additional goals/recommendations to follow  -SM    [place] (Swallow Short Term Goal 2) with 1:1 assist/ supervision  -SM    Time Frame (Swallow Short Term Goal 2) other (see comments)  24 - 48 hours  -SM    Progress/Outcomes (Swallow Short Term Goal 2) new goal  -SM          User Key  (r) = Recorded By, (t) = Taken By, (c) = Cosigned By    Initials Name Provider Type    Marysol Medina, SLP Speech and Language Pathologist    Destiny Montero, SLP Speech and Language Pathologist    Dori Coats Speech and Language Pathologist                   Time Calculation:                MONSTER Bailey  1/9/2023

## 2023-01-09 NOTE — THERAPY TREATMENT NOTE
"Subjective: Pt agreeable to therapeutic plan of care.  Cognition: oriented to Person, Place, Time and Situation    Objective:     Bed Mobility: Max-A, Assist x 2 and Dependent   Functional Transfers: Max-A, Assist x 2 and Dependent  Functional Ambulation: N/A or Not attempted.    Feeding: Max-A  ADL Position: supported sitting  ADL Comments: Chair position    Upper Body Dressing: Dependent  ADL Position: supported sitting  ADL Comments: EOB, supine    Vitals: WNL    Pain: 6 VAS  Location: shoulders, RUE > LUE  Interventions for pain: Repositioned and Increased Activity  Education: Provided education on the importance of mobility in the acute care setting, ADL training and Transfer Training    Assessment: Jaime Bar presents with ADL impairments below baseline abilities which indicate the need for continued skilled intervention while inpatient. Pt, spouse, and son in room and patient agreeable to work with OT services. Bed mobility Max A x 2 - dependent, limited by edema and weakness. Sits EOB >15 minutes, initially requiring Min A but progressing toward Supervision for balance/safety. Patient attempts 2 sit<>stand, but is unable to bear weight into legs well and requires prompt return to EOB.  [place] improved movement of BUE, including good scapular mobility and retraction with exercise.  Pt able to touch LUE thumb to chin, but more limited in RUE ROM due to edema.  Max A for self-feeding and Max A x - Dependent for all other ADLs. Tolerating session today without incident. Will continue to follow and progress as tolerated.     Plan/Recommendations:   Moderate Intensity Therapy recommended post-acute care. This is recommended as therapy feels the patient would require 3-4 days per week and wouldn't tolerate \"3 hour daily\" rehab intensity. SNF would be the preferred choice. If the patient does not agree to SNF, arrange HH or OP depending on home bound status. If patient is medically complex, consider LTACH.. Pt " requires no DME at discharge.     Pt desires Skilled Rehab placement at discharge. Pt cooperative; agreeable to therapeutic recommendations and plan of care.     Post-Tx Position: Supine with HOB Elevated, Alarms activated and Call light and personal items within reach  PPE: gloves and surgical mask

## 2023-01-09 NOTE — PROGRESS NOTES
Hematology/Oncology Inpatient Progress Note    PATIENT NAME: Jaime Bar  : 1955  MRN: [id number]    CHIEF COMPLAINT: Thrombocytopenia; provoked catheter associated LUE DVT, LUE SVT, and RUE SVT.    HISTORY OF PRESENT ILLNESS:    67 y.o. male admitted to [facility] on transfer from [facility] on 2022.  The patient was intubated and unresponsive at time of consultation with histories obtained from review of records and discussion with patient's niece and nephew.  He reported a history of bilateral shoulder pain for few days prior to admission.  He had presented to [place] ED on 2022 where he was hypotensive and hyponatremic and transferred to [facility].  CXR on 2022 showed coarsened airspace and bronchovascular thickening with small airway disease such as bronchitis or asthma, edema, atypical/viral infection, and aspiration in differential.  A RUE venous Doppler showed acute RUE superficial thrombophlebitis in the cephalic vein and no evidence of DVT for which he was started on subcu heparin.  On 2022 CBC revealed WBC 7.2, hemoglobin 12.4, MCV 98.4, and platelets 99,000.  Creatinine was elevated to 2.38, BUN 55, sodium was low at 118, potassium was high at 5.4 and LFTs revealed T bili elevated to 4.8 (0-1.2) with transaminases normal.  Blood cultures grew MRSA.  Urinalysis was concerning for UTI.  Urine sodium was < 20, a.m. cortisol 26.11 (6.02-18.4), and urine osmolality was 410 ().   X-rays and MRIs of the bilateral shoulders were performed 2022 revealing joint effusion and advanced degenerative changes/destructive changes of glenohumeral joint.  There was suspected complete tear of the supraspinatus and infraspinatus tendons and a completely macerated appearance of the labrum on the right shoulder.  On 2022 RUE venous Doppler was repeated and felt stable.  He then underwent bilateral shoulder arthroscopy incision and drainage with culture growing  heavy growth of Staph aureus.  He had been intubated prior to the surgery and remained sedated on ventilator with pressors postop.  At time of consultation 12/29/2022 LUE venous Doppler showed acute catheter associated DVT in the left axillary vein, SVT in the left basilic vein of the upper arm.  BLE venous Doppler was negative for DVT/SVT but did show deep venous valvular incompetence in the right popliteal vein.  CBC revealed WBC 14.2, hemoglobin 11.4, MCV 96.5, and platelets 46,000.  T bili was elevated to 5.3 and transaminases were now elevated with  and .  PT was 15.6 (9.6-11.7), PTT was 38.7 (24-31), and fibrinogen was 493 (210-450).  D-dimer was 11.83 (0-0.67).  Transesophageal echocardiogram showed LVEF 46-50%, mild aortic valve stenosis, and no evidence of vegetation or bacterial endocarditis.  Heparin was changed to argatroban.  ID was managing antibiotics with patient on ceftriaxone with plan for treatment x6 weeks and recommending replacing PICC line 2 days after starting anticoagulation.    PMH significant for prostate cancer treated approximately 2010 with radiation alone and no evidence of recurrence to their knowledge.  He also was under the care of Dr. Damico at  for cardiac amyloid.  Chart review showed PSA was 0.1 (0-4) in August 2022.  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  IgG was 768 (603-1613), IgA was 238 (), IgM was 64 ().  Kappa/lambda ratio was 1.36 (0.2-1.65).  A note from Roosevelt Damico MD (cardiologist) at [facility] dated 7/13/2022 reported patient was followed for restrictive cardiomyopathy secondary to cardiac amyloidosis.  LVEF in February 2022 was 45-50%.  Cardiac MRI 4/6/2022 revealed a myocardial mass 276 g, global hypokinesis of left ventricle with ejection fraction 43%, no myocardial iron overload, the thickness and appearance of intra-atrial septum was also consistent with cardiac amyloidosis.  The note  stated that the patient was followed by Dr. Banks at  [facility] where he was on Vyndamax therapy for cardiac amyloid and had been on that treatment for about 2 weeks.  Additionally in January 2022, T bili was noted to be elevated to 2.6 (0.2-1.0), hemoglobin 11.8, MCV 86.5 and platelets 261,000.     12/29/22  Hematology/Oncology was consulted by RITU Jimenez for thrombocytopenia.     Past Medical History: Prostate cancer approximately 2010 treated with radiation only.  Cardiac amyloid managed by Dr. Damico at .  CKD.  Surgical History: Several orthopedic surgeries in the past.  Bilateral shoulder arthroscopy with incision and drainage 12/28/2022.  Social History: He lives in [place], [place] with his spouse.  His spouse is known to have Alzheimer's disease.  He has smoked for many years.  He has no alcohol or recreational drug use however he was narcotic dependent secondary to chronic pain.  He is a retired .  Family History: No significant known family history.  Allergies: Tramadol causes him to feel weird and codeine causes nausea.     PCP: Provider, No Known    INTERVAL HISTORY:  • 12/30/2022- platelets 38,000, hemoglobin 11.1.  Folate 7.13 (4.78-24.2), vitamin B12 greater than 2000.  Iron 29 (), iron saturation 15% (20-50), TIBC 195 (298-536), and ferritin 3525 ().  Reticulocytes 1.05 (0.7-1.9), haptoglobin 148 (). PSA 0.234 (0-4).  T bili 5.3 with direct bili 4.2 (0-0.3).  Hepatitis panel nonreactive.  Extubated 12/29/2022 and off pressors.  • 12/31/2022- platelets 46,000, hemoglobin 11.3.  HIT antibody negative at 0.121 (0-0.4).  CMV IgM less than 30 (0-29.9) and EBV IgM less than 36 (0-35.9).  D-dimer mildly improved to 8.94 (0-0.67).  Transaminases rising with  and .  T bili 4.4.  Peripheral smear was negative for schistocytes again.  Abdominal ultrasound revealed cholelithiasis with adenomyomatosis involving the gallbladder  wall.  The common bile duct was in the upper limits of normal.  The liver appeared normal.  • 1/1/2023- platelets 60,000, hemoglobin 11.4.  Argatroban was held with repeat PTT remaining high with subsequent continued hold of argatroban.  D-dimer 9.84.  PTT 61.1 (24-31).  CT head without contrast was negative for acute findings.  • 1/2/2023- hemoglobin 11.3 and platelets 78,000.  ALT improved to 200 and AST improved to 104.  T bili improved to 3.3.  Fibrinogen 445 (210-150).  • 1/3/2023- evaluated by neurology and felt to have multifactorial encephalopathy.  SPEP with no M spike.  Transaminases improved.  Hemoglobin 11.1, platelet count 104,000.  Kappa/lambda free light chain ratio 1.02 (0.26-1.25).  SHAR screen negative.  Right BERNARDINO normal with mild digital ischemia.  Left BERNARDINO normal with severe digital ischemia.  • 1/4/2023- hemoglobin 11.0 and platelets 116,000.  Copper 110 ().  ALT 93 (1-41), AST normalized, and T bili 3.5 (0-1.2).  RUE venous Doppler showed acute RUE superficial thrombophlebitis in the cephalic vein (forearm).  • 1/5/2023- hemoglobin 9.9, platelets 121,000.  ALT 61, T bili 3.1.  Ferrlecit 250 mg IV daily x3 initiated.  UIFE with no monoclonality detected.  SIFE with IgM monoclonal protein with lambda light chain specificity.  2 different results are present for immunoglobulins with different values.  • 1/6/2023- WBC 5.8, hemoglobin 9.9, platelets 125,000.  ALT 52.  • 1/7/2023- hemoglobin 9.4, platelet count 156,000.  • 1/8/2023- hemoglobin 9.1.  Bilirubin 3.0.  Temp 100.3.  • 1/9/2023- hemoglobin 8.4.  Bilirubin 2.3.      History of present illness reviewed since last visit and changes noted on 01/09/23.    Subjective  Denies any problems.     ROS:   Review of Systems   Constitutional: Positive for fever. Negative for activity change, chills, fatigue and unexpected weight change.   HENT: Negative for congestion, dental problem, hearing loss, mouth sores, nosebleeds, sore throat and trouble  swallowing.    Eyes: Negative for photophobia, pain and visual disturbance.   Respiratory: Negative for apnea, cough, choking, chest tightness and shortness of breath.    Cardiovascular: Negative for chest pain, palpitations and leg swelling.   Gastrointestinal: Negative for abdominal distention, abdominal pain, blood in stool, constipation, diarrhea, nausea and vomiting.   Endocrine: Negative for cold intolerance and heat intolerance.   Genitourinary: Negative for decreased urine volume, difficulty urinating, dysuria, frequency, hematuria and urgency.   Musculoskeletal: Negative for arthralgias and gait problem.   Skin: Negative for rash and wound.   Neurological: Negative for dizziness, tremors, seizures, weakness, light-headedness, numbness and headaches.   Hematological: Negative for adenopathy. Does not bruise/bleed easily.   Psychiatric/Behavioral: Negative for confusion and hallucinations. The patient is not nervous/anxious.    All other systems reviewed and are negative.     MEDICATIONS:    Scheduled Meds:  Barium Sulfate, 1 teaspoon(s), Oral, Once in imaging  cefTRIAXone, 2 g, Intravenous, Q24H  docusate, 50 mg, Nasogastric, BID  enoxaparin, 1.5 mg/kg, Subcutaneous, Daily  ethyl alcohol, 2 swab, Nasal, Once  ferrous sulfate, 300 mg, Nasogastric, Daily  First Mouthwash (Magic Mouthwash), 5 mL, Swish & Spit, Q6H  furosemide, 40 mg, Intravenous, Q12H  lansoprazole, 30 mg, Nasogastric, Q AM  midodrine, 10 mg, Nasogastric, Q8H  polyethylene glycol, 17 g, Nasogastric, Daily  sodium chloride, 10 mL, Intravenous, Q12H  Zinc Oxide, , Apply externally, TID       Continuous Infusions:      PRN Meds:  •  acetaminophen **OR** acetaminophen  •  aluminum-magnesium hydroxide-simethicone  •  dextrose  •  dextrose  •  fentanyl  •  glucagon (human recombinant)  •  HYDROcodone-acetaminophen  •  ondansetron **OR** ondansetron  •  sodium chloride  •  sodium chloride     ALLERGIES:    Allergies   Allergen Reactions   •  "Tramadol-Acetaminophen Anxiety     Worms in the brain feeling   • Codeine Other (See Comments)     nausea   • Tramadol Other (See Comments)     \"I just felt really crawly, it did weird things with my head\"       Objective    VITALS:   /65 (BP Location: Left arm, Patient Position: Lying)   Pulse 88   Temp 98.3 °F (36.8 °C) (Axillary)   Resp 20   Ht 167.6 cm (66\")   Wt 83.1 kg (183 lb 3.2 oz)   SpO2 99%   BMI 29.57 kg/m²     PHYSICAL EXAM:  Physical Exam  Vitals and nursing note reviewed.   Constitutional:       General: He is not in acute distress.     Appearance: Normal appearance. He is well-developed. He is not diaphoretic.   HENT:      Head: Normocephalic and atraumatic.      Comments: Male pattern baldness.      Right Ear: External ear normal.      Left Ear: External ear normal.      Nose: Nose normal.      Comments: NG tube.   Oxygen via nasal cannula.      Mouth/Throat:      Mouth: Mucous membranes are moist.      Pharynx: Oropharynx is clear. No oropharyngeal exudate or posterior oropharyngeal erythema.      Comments: Dental fillings.   Eyes:      General: No scleral icterus.     Extraocular Movements: Extraocular movements intact.      Conjunctiva/sclera: Conjunctivae normal.      Pupils: Pupils are equal, round, and reactive to light.   Cardiovascular:      Rate and Rhythm: Normal rate and regular rhythm.      Heart sounds: Normal heart sounds. No murmur heard.     Comments: Cardiac monitor leads.   Pulmonary:      Effort: Pulmonary effort is normal. No respiratory distress.      Breath sounds: Normal breath sounds. No wheezing or rales.   Abdominal:      General: Bowel sounds are normal. There is no distension.      Palpations: Abdomen is soft. There is no mass.      Tenderness: There is no abdominal tenderness. There is no guarding.   Genitourinary:     Comments: Deferred   Musculoskeletal:         General: Swelling ( 2+ RUE, trace LUE) present. No tenderness or deformity. Normal range of " motion.      Cervical back: Normal range of motion and neck supple.      Right lower leg: No edema.      Left lower leg: No edema.      Comments: Bilateral peripheral IVs.   Bilateral SCDs.    Feet:      Comments: Waffle boots to BLE.   Lymphadenopathy:      Cervical: No cervical adenopathy.      Upper Body:      Right upper body: No supraclavicular adenopathy.      Left upper body: No supraclavicular adenopathy.   Skin:     General: Skin is warm and dry.      Coloration: Skin is not pale.      Findings: No bruising, erythema or rash.   Neurological:      General: No focal deficit present.      Mental Status: He is alert and oriented to person, place, and time.      Coordination: Coordination normal.   Psychiatric:         Mood and Affect: Mood normal.         Behavior: Behavior normal.         Thought Content: Thought content normal.           RECENT LABS:  Lab Results (last 24 hours)     Procedure Component Value Units Date/Time    POC Glucose Once [[id number]]  (Abnormal) Collected: 01/09/23 0541    Specimen: Blood Updated: 01/09/23 0543     Glucose 122 mg/dL      Comment: Serial Number: [id number]:  [id number]       Calcium, Ionized [[id number]]  (Abnormal) Collected: 01/09/23 0417    Specimen: Blood Updated: 01/09/23 0540     Ionized Calcium 1.10 mmol/L     Comprehensive Metabolic Panel [[id number]]  (Abnormal) Collected: 01/09/23 0417    Specimen: Blood Updated: 01/09/23 0535     Glucose 106 mg/dL      BUN 31 mg/dL      Creatinine 0.56 mg/dL      Sodium 139 mmol/L      Potassium 3.5 mmol/L      Chloride 96 mmol/L      CO2 37.0 mmol/L      Calcium 7.9 mg/dL      Total Protein 5.3 g/dL      Albumin 2.1 g/dL      ALT (SGPT) 35 U/L      AST (SGOT) 35 U/L      Alkaline Phosphatase 135 U/L      Total Bilirubin 2.3 mg/dL      Globulin 3.2 gm/dL      A/G Ratio 0.7 g/dL      BUN/Creatinine Ratio 55.4     Anion Gap 6.0 mmol/L      eGFR 108.0 mL/min/1.73      Comment: National Kidney Foundation and American Society of  Nephrology (ASN) Task Force recommended calculation based on the Chronic Kidney Disease Epidemiology Collaboration (CKD-EPI) equation refit without adjustment for race.       Narrative:      GFR Normal >60  Chronic Kidney Disease <60  Kidney Failure <15      Phosphorus [[id number]]  (Normal) Collected: 01/09/23 0417    Specimen: Blood Updated: 01/09/23 0535     Phosphorus 2.8 mg/dL     Magnesium [[id number]]  (Normal) Collected: 01/09/23 0417    Specimen: Blood Updated: 01/09/23 0535     Magnesium 1.9 mg/dL     CBC & Differential [[id number]]  (Abnormal) Collected: 01/09/23 0417    Specimen: Blood Updated: 01/09/23 0504    Narrative:      The following orders were created for panel order CBC & Differential.  Procedure                               Abnormality         Status                     ---------                               -----------         ------                     CBC Auto Differential[[id number]]        Abnormal            Final result                 Please view results for these tests on the individual orders.    CBC Auto Differential [[id number]]  (Abnormal) Collected: 01/09/23 0417    Specimen: Blood Updated: 01/09/23 0504     WBC 5.30 10*3/mm3      RBC 2.69 10*6/mm3      Hemoglobin 8.4 g/dL      Hematocrit 25.2 %      MCV 93.6 fL      MCH 31.2 pg      MCHC 33.3 g/dL      RDW 16.3 %      RDW-SD 53.8 fl      MPV 8.7 fL      Platelets 170 10*3/mm3      Neutrophil % 76.4 %      Lymphocyte % 7.4 %      Monocyte % 15.2 %      Eosinophil % 0.4 %      Basophil % 0.6 %      Neutrophils, Absolute 4.10 10*3/mm3      Lymphocytes, Absolute 0.40 10*3/mm3      Monocytes, Absolute 0.80 10*3/mm3      Eosinophils, Absolute 0.00 10*3/mm3      Basophils, Absolute 0.00 10*3/mm3      nRBC 0.1 /100 WBC     POC Glucose Once [[id number]]  (Abnormal) Collected: 01/08/23 2326    Specimen: Blood Updated: 01/08/23 2327     Glucose 123 mg/dL      Comment: Serial Number: [id number]:  [id number]       POC Glucose Once  [[id number]]  (Abnormal) Collected: 01/08/23 1838    Specimen: Blood Updated: 01/08/23 1839     Glucose 128 mg/dL      Comment: Serial Number: [id number]:  [id number]       POC Glucose Once [[id number]]  (Abnormal) Collected: 01/08/23 1139    Specimen: Blood Updated: 01/08/23 1140     Glucose 130 mg/dL      Comment: Serial Number: [id number]:  [id number]           PENDING RESULTS:  Swallow study.   Note from Dr. Sauceda at  and genetic testing from  ordered by Dr. Banks.      IMAGING REVIEWED:  No radiology results for the last day    I have reviewed the patient's labs, imaging, reports, and other clinician documentation.    Assessment & Plan   ASSESSMENT:  1. Acute Thrombocytopenia-platelets were normal in early 2022. HIT antibody negative.  Platelets improving on Rocephin.  LFTs elevated on admission but have now improved.  Bilirubin remains high.  Presumably due to sepsis.   No hemolysis and no schistocytes on peripheral smear ruling out TTP.  No vitamin B12 or folate deficiencies. SHAR, CMV and EBV titers are all negative. Coags and D-dimer high with a normal fibrinogen.  Platelets normalized 1/7/2023.  2. Acute on chronic anemia/IgA lambda monoclonal gammopathy/SWETHA- mild anemia with hemoglobin in the 11 range in early 2022.  Gammopathy labs at that time showed IgA lambda monoclonal protein with normal immunoglobulins and free light chains.  Iron studies showed SWETHA and started oral iron.  No hemolysis or vitamin B12/folate or copper deficiencies. SPEP with no M spike and free light chain ratio normal.  UIFE negative but SIFE with IgA lambda monoclonal protein.  2 different immunoglobulin values listed.  Hemoglobin was stable but then dropped he was treated with Ferrlecit 250 mg IV daily x3.  Received notes from cardiology at  which mentioned they were going to involve Dr. Sauceda and will obtain any records available.  3. Acute provoked catheter associated LUE DVT/SVT and RUE SVT- RUE SVT was  diagnosed prior to LUE DVT and he had received several doses of subcu heparin starting on 12/26. LUE DVT provoked by PICC line which  has since been removed.  Argatroban was difficult to manage with liver dysfunction.  Started low-dose Lovenox 1/1/2023 and increased to full treatment dose 1/3/2023.  Repeat RUE venous Doppler showed stable SVT.  4. Elevated LFT/cholelithiasis and adenomyomatosis of the gallbladder wall- possible shock liver.  T bili remains elevated with elevated direct bili.  Normal liver on US and hepatitis panel negative.  LFTs improved but bilirubin remains elevated (direct)-possibly related to ceftriaxone.   5. Bilateral shoulder septic arthritis/MRSA bacteremia-No vegetation on echocardiogram.  Right shoulder drainage remains cloudy. On antibiotics per ID until 2/11/2023.   6. History of prostate cancer-PSA remains normal.   7. Cardiac amyloidosis- on chart review it has been treated at .  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  Records reviewed from  cardiology.  Genetic testing was being done for hereditary ATTR amyloidosis and patient was referred to Dr. Sauceda to look for systemic amyloidosis.  8. STEVEN/hyponatremia-nephrology managing.  Resolved.   9. A. fib with RVR- patient evaluated by EP and recommended to start Eliquis 5 mg p.o. twice daily (not started as patient currently on Lovenox) with plan for cardioversion prior to discharge as long as he remains on anticoagulation.  10. Oral mucositis-added FIRST mouthwash.  Improving.    PLAN:  1. Continue daily CBC.  2. Repeat immunoglobulins in near future.  3. Continue Lovenox 1.5 mg/kg SQ daily.  4. Continue ferrous sulfate syrup 300 mg daily per NG tube.   5. Skeletal survey when patient out of ICU.  6. Pending receipt of records from Dr. Sauceda at .  7. Pending receipt of genetic testing by Dr. Banks at  evaluating for hereditary ATTR  amyloidosis.    Electronically signed by RITU Hastings, 01/09/23, 10:50 AM EST.      I have personally performed a face-to-face diagnostic evaluation on this patient. I have performed a complete history and physical examination, reviewed laboratory studies, and radiographic examinations.  I have completed the majority and substantive portion of the medical decision making.  I have formulated the assessment on this patient and the plan of action as noted above. I have discussed the case with Piper Jeffries NP, have edited/reviewed the note, and agree with the care plan.  The patient continues to deny any problems.  On examination, he has O2 supplementation by nasal cannula and has NG tube present.  Labs are significant for an anemia.  Bilirubin remains high and could be secondary to recovering liver disease or antibiotic use.  He continues on anticoagulation for left upper extremity DVT.  We will plan on checking a skeletal survey once he is stable enough to come out of ICU.    I discussed the patient's findings and my recommendations with patient and nursing.    Part of this note may be an electronic transcription/translation of spoken language to printed text using the Dragon Dictation System.    Electronically signed by Vicente Mendoza MD, 01/09/23, 1:02 PM EST.

## 2023-01-09 NOTE — PLAN OF CARE
"ssessment: Jaime Bar presents with functional mobility impairments which indicate the need for skilled intervention. Decrease oxygen demand today. Bilateral shoulders drains have been discontinued. Pt more alert and following commands very well. Pt requiring only SBA at end of session for static sitting balance. Pt is an excellent rehab candidate. Pt paced in modified chair position at end of session. Tolerating session today without incident. Will continue to follow and progress as tolerated.     Plan/Recommendations:   Moderate Intensity Therapy recommended post-acute care. This is recommended as therapy feels the patient would require 3-4 days per week and wouldn't tolerate \"3 hour daily\" rehab intensity. SNF would be the preferred choice. If the patient does not agree to SNF, arrange HH or OP depending on home bound status. If patient is medically complex, consider LTACH.. Pt requires no DME at discharge.     Pt desires Skilled Rehab placement at discharge. Pt cooperative; agreeable to therapeutic recommendations and plan of care.       "

## 2023-01-09 NOTE — PROGRESS NOTES
"                                                                                                                                      Nephrology  Progress Note                                        [facility]    Patient Identification    Name: Jaime Bar  Age: 67 y.o.  Sex: male  :  1955  MRN: [id number]      DATE OF SERVICE:  2023        Subective     required more oxygen      Objective   Scheduled Meds:cefTRIAXone, 2 g, Intravenous, Q24H  docusate, 50 mg, Nasogastric, BID  enoxaparin, 1.5 mg/kg, Subcutaneous, Daily  ethyl alcohol, 2 swab, Nasal, Once  ferrous sulfate, 300 mg, Nasogastric, Daily  First Mouthwash (Magic Mouthwash), 5 mL, Swish & Spit, Q6H  furosemide, 40 mg, Intravenous, Q12H  lansoprazole, 30 mg, Nasogastric, Q AM  midodrine, 10 mg, Nasogastric, Q8H  polyethylene glycol, 17 g, Nasogastric, Daily  sodium chloride, 10 mL, Intravenous, Q12H  Zinc Oxide, , Apply externally, TID          Continuous Infusions:     PRN Meds:•  acetaminophen **OR** acetaminophen  •  aluminum-magnesium hydroxide-simethicone  •  dextrose  •  dextrose  •  fentanyl  •  glucagon (human recombinant)  •  HYDROcodone-acetaminophen  •  ondansetron **OR** ondansetron  •  sodium chloride  •  sodium chloride     Exam:  /65 (BP Location: Left arm, Patient Position: Lying)   Pulse 88   Temp 98.3 °F (36.8 °C) (Axillary)   Resp 20   Ht 167.6 cm (66\")   Wt 83.1 kg (183 lb 3.2 oz)   SpO2 99%   BMI 29.57 kg/m²     Intake/Output last 3 shifts:  I/O last 3 completed shifts:  In: 3969 [I.V.:100; Other:1729; NG/GT:2140]  Out: 595 [Urine:500; Drains:70]    Intake/Output this shift:  I/O this shift:  In: 612 [I.V.:20; Other:220; NG/GT:372]  Out: 310 [Urine:300; Drains:10]    Physical exam:  General Appearance: Confused  Head:  Normocephalic, without obvious abnormality, atraumatic  Eyes:  PERRL, conjunctiva/corneas clear     Neck:  Supple,  no adenopathy;      Lungs:  Decreased BS occasion " skyler  Heart:  Regular rate and rhythm, S1 and S2 normal  Abdomen:  Soft, non-tender, bowel sounds active   Extremities: trace edema  Pulses: 2+ and symmetric all extremities  Skin:  No rashes or lesions       Data Review:  All labs (24hrs):   Recent Results (from the past 24 hour(s))   POC Glucose Once    Collection Time: 01/08/23 11:39 AM    Specimen: Blood   Result Value Ref Range    Glucose 130 (H) 70 - 105 mg/dL   POC Glucose Once    Collection Time: 01/08/23  6:38 PM    Specimen: Blood   Result Value Ref Range    Glucose 128 (H) 70 - 105 mg/dL   POC Glucose Once    Collection Time: 01/08/23 11:26 PM    Specimen: Blood   Result Value Ref Range    Glucose 123 (H) 70 - 105 mg/dL   Calcium, Ionized    Collection Time: 01/09/23  4:17 AM    Specimen: Blood   Result Value Ref Range    Ionized Calcium 1.10 (L) 1.20 - 1.30 mmol/L   Comprehensive Metabolic Panel    Collection Time: 01/09/23  4:17 AM    Specimen: Blood   Result Value Ref Range    Glucose 106 (H) 65 - 99 mg/dL    BUN 31 (H) 8 - 23 mg/dL    Creatinine 0.56 (L) 0.76 - 1.27 mg/dL    Sodium 139 136 - 145 mmol/L    Potassium 3.5 3.5 - 5.2 mmol/L    Chloride 96 (L) 98 - 107 mmol/L    CO2 37.0 (H) 22.0 - 29.0 mmol/L    Calcium 7.9 (L) 8.6 - 10.5 mg/dL    Total Protein 5.3 (L) 6.0 - 8.5 g/dL    Albumin 2.1 (L) 3.5 - 5.2 g/dL    ALT (SGPT) 35 1 - 41 U/L    AST (SGOT) 35 1 - 40 U/L    Alkaline Phosphatase 135 (H) 39 - 117 U/L    Total Bilirubin 2.3 (H) 0.0 - 1.2 mg/dL    Globulin 3.2 gm/dL    A/G Ratio 0.7 g/dL    BUN/Creatinine Ratio 55.4 (H) 7.0 - 25.0    Anion Gap 6.0 5.0 - 15.0 mmol/L    eGFR 108.0 >60.0 mL/min/1.73   Magnesium    Collection Time: 01/09/23  4:17 AM    Specimen: Blood   Result Value Ref Range    Magnesium 1.9 1.6 - 2.4 mg/dL   Phosphorus    Collection Time: 01/09/23  4:17 AM    Specimen: Blood   Result Value Ref Range    Phosphorus 2.8 2.5 - 4.5 mg/dL   CBC Auto Differential    Collection Time: 01/09/23  4:17 AM    Specimen: Blood   Result  Value Ref Range    WBC 5.30 3.40 - 10.80 10*3/mm3    RBC 2.69 (L) 4.14 - 5.80 10*6/mm3    Hemoglobin 8.4 (L) 13.0 - 17.7 g/dL    Hematocrit 25.2 (L) 37.5 - 51.0 %    MCV 93.6 79.0 - 97.0 fL    MCH 31.2 26.6 - 33.0 pg    MCHC 33.3 31.5 - 35.7 g/dL    RDW 16.3 (H) 12.3 - 15.4 %    RDW-SD 53.8 37.0 - 54.0 fl    MPV 8.7 6.0 - 12.0 fL    Platelets 170 140 - 450 10*3/mm3    Neutrophil % 76.4 (H) 42.7 - 76.0 %    Lymphocyte % 7.4 (L) 19.6 - 45.3 %    Monocyte % 15.2 (H) 5.0 - 12.0 %    Eosinophil % 0.4 0.3 - 6.2 %    Basophil % 0.6 0.0 - 1.5 %    Neutrophils, Absolute 4.10 1.70 - 7.00 10*3/mm3    Lymphocytes, Absolute 0.40 (L) 0.70 - 3.10 10*3/mm3    Monocytes, Absolute 0.80 0.10 - 0.90 10*3/mm3    Eosinophils, Absolute 0.00 0.00 - 0.40 10*3/mm3    Basophils, Absolute 0.00 0.00 - 0.20 10*3/mm3    nRBC 0.1 0.0 - 0.2 /100 WBC   POC Glucose Once    Collection Time: 01/09/23  5:41 AM    Specimen: Blood   Result Value Ref Range    Glucose 122 (H) 70 - 105 mg/dL          Imaging:  CT Cervical Spine With Contrast    Result Date: 1/3/2023  1.Postoperative changes status post prior fusion. No definitive signs of hardware failure or loosening are noted. 2.No evidence for displaced fracture or malalignment throughout the cervical spine. 3.No evidence for acute soft tissue abnormality. There is no evidence for abnormal enhancement. No abnormal peripherally enhancing fluid collection is seen. 4.Changes of cervical spondylosis are noted throughout the cervical spine. Associated hypertrophic posterior facet changes and uncovertebral changes are also observed. Electronically Signed: Solo Ray  1/3/2023 10:25 PM EST  Workstation ID: [id number]    CT Thoracic Spine With Contrast    Result Date: 1/3/2023  1.No evidence for displaced fracture or malalignment throughout the thoracic spine. No evidence for erosive endplate changes. 2.No evidence for abnormal enhancement. There is no abnormal fluid collection within the paraspinal soft  tissues. There is no evidence for epidural abscess. 3.Degenerative changes are noted throughout the thoracic spine. 4.Interstitial changes are seen throughout the lungs. Bibasilar infiltrates are noted. Bilateral pleural effusions are observed. Electronically Signed: Solo Ray  1/3/2023 10:31 PM EST  Workstation ID: [id number]    CT Lumbar Spine With Contrast    Result Date: 1/3/2023  1.No evidence for displaced fracture or malalignment throughout the lumbar spine. No evidence for erosive endplate changes. 2.No evidence for abnormal enhancement. There is no abnormal fluid collection within the paraspinal soft tissues. There is no evidence for epidural abscess. 3.Advanced degenerative changes are noted throughout the lumbar spine. 4.Evidence for bilateral spondylolysis with associated spondylolisthesis at the L5 level. Prior postoperative changes are also noted. Electronically Signed: Solo Ray  1/3/2023 10:35 PM EST  Workstation ID: [id number]    XR Chest 1 View    Result Date: 1/3/2023  Impression: 1. Radiographic changes consistent with congestive heart failure pulmonary edema with interval increase in pulmonary edema and development of small bilateral pleural effusions. Electronically Signed: Jaime Momin  1/3/2023 9:14 AM EST  Workstation ID: [id number]    XR Abdomen KUB    Result Date: 1/7/2023  Enteric tube in the stomach. Thank you for the referral of this patient. This exam was interpreted by an American Board of Radiology certified radiologist with subspecialty fellowship in Pediatric Radiology. If there are any questions regarding this exam please feel free to contact a radiologist directly at [phone]. Slot 64 Electronically signed by:  Mau Rodriguez M.D.  1/7/2023 4:48 AM Mountain Time      Assessment/Plan:     Hyponatremia    ACC/AHA stage C chronic systolic heart failure (HCC)    Cardiac amyloidosis (HCC)    Primary hypertension    Tobacco abuse    Cervicalgia, spine surgery 2017    Primary  osteoarthritis involving multiple joints    Bacteremia due to methicillin susceptible Staphylococcus aureus (MSSA)    Staphylococcal arthritis of shoulder (HCC)    Acute kidney injury superimposed on chronic kidney disease (HCC)    Dysphagia   hyponatremia improving   Acute kidney injury on chronic kidney disease improving  Atrial fibrillation with rapid ventricular response  Metabolic acidosis  Urinary retention   Sepsis   Hypocalcemia   Hyperkalemia     Tolerating diuresis  Oxygenation better  Status post IV contrast study yesterday   Follow labs

## 2023-01-09 NOTE — PLAN OF CARE
Goal Outcome Evaluation:   Video Swallow Study completed this date with the patient seated up at a 90 degree angle and viewed in the lateral projection. The patient was assessed with NTL (2 trials by spoon and 4 trials by straw), puree (applesauce) x 2 trials, mechanical soft (peaches) x 1 trial, and thin liquid by straw (x 1 trial). All trials/consistencies administered by SLP due to patient weakness/fatigue. Labial seal on spoon and straw are adequate. He is able to pull thin and NTL via straw adequately. Swallow initiation appears slightly delayed, particularly with thin and NTL. Spillage over the base of the tongue with bolus head reaching the level of the pyriform sinus prior to swallow initiation. Reduced base of tongue retraction, decreased laryngeal elevation, incomplete laryngeal vestibular closure and reduced epiglottic inversion noted. This results in trace, transient penetration on 2 of 4 trials of NTL by straw (a 2 on the Rosenbeck Penetration-Aspiration Scale). Penetration clears and does not result in aspiration. With puree consistency he presents with significant vallecular residue. He is able to use a dry swallow to clear some, but not all of this. A NTL was more effective in clearing some more of the pharyngeal retention. No evidence of penetration or aspiration noted with puree trials (a 1 on the Rosenbeck Penetration-Aspiration Scale). He presents with poor mastication of peaches, inability to fully masticate and swallowing some portions intact. Thin liquids were assessed at the end of the procedure with the patient able to pull from a straw. Decreased oral transit and bolus control noted. Premature spillage of the bolus evident with skylar aspiration occurring before, during and after the swallow. Following he elicits a weak/ineffective cough (a 7 on the Rosenbeck Penetration-Aspiration Scale). Regular solids not attempted this date due to the above difficulties masticating mech soft  solids    RECOMMENDATIONS: ST recommends that the patient initiate a puree diet with Nectar Thick Liquids at this time. ST will follow up with a meal assessment, dysphagia therapy/exercises and re-evaluation for potential diet upgrade as he progresses. Results/recommendations were discussed in detail with the patient and his nurse following the procedure. Both verbalized understanding and in agreement with the plan

## 2023-01-09 NOTE — PROGRESS NOTES
Nutrition Services    Patient Name: Jaime Bar  YOB: 1955  MRN: [id number]  Admission date: 12/26/2022     Addition of Magic Cups at lunch/dinner (Provides 580 kcals, 18 g protein if consumed)     PPE Documentation        PPE Worn By Provider Did not enter room for this encounter   PPE Worn By Patient  N/A     PROGRESS NOTE      Encounter Information: Check on for tube feeding. SLP recommending VFSS as of 1/8. Pt passed swallow study - regular pureed textures, nectar thickened liquid diet initiated. NG has been removed.       PO Diet: Diet: Regular/House Diet; Texture: Pureed (NDD 1); Fluid Consistency: Nectar Thick   PO Supplements: None ordered    PO Intake:  Diet initiated 1/9, no intakes recorded       Current nutrition support: Nutren 1.5 at 65 mL/hr + 50mL/hr water flush   Nutrition support review: No longer TF access       Labs (reviewed below): Reviewed, management per attending       GI Function:  Last BM 1/3 (x 6 days ago) - secure message to attending to notify on 1/8, colace and miralax in place.  Residuals WNL       Nutrition Intervention Updates: Leave TF orders in place at this time. Addition of Magic Cups at lunch/dinner (Provides 580 kcals, 18 g protein if consumed)        Results from last 7 days   Lab Units 01/09/23 0417 01/08/23 0357 01/07/23  0712   SODIUM mmol/L 139 138 138   POTASSIUM mmol/L 3.5 3.5 3.8   CHLORIDE mmol/L 96* 96* 96*   CO2 mmol/L 37.0* 35.0* 36.0*   BUN mg/dL 31* 30* 28*   CREATININE mg/dL 0.56* 0.57* 0.53*   CALCIUM mg/dL 7.9* 8.1* 7.8*   BILIRUBIN mg/dL 2.3* 3.0* 3.0*   ALK PHOS U/L 135* 111 91   ALT (SGPT) U/L 35 40 43*   AST (SGOT) U/L 35 34 33   GLUCOSE mg/dL 106* 119* 94     Results from last 7 days   Lab Units 01/09/23 0417 01/08/23 0357 01/07/23  0712   MAGNESIUM mg/dL 1.9 1.8 1.7   PHOSPHORUS mg/dL 2.8 2.7 3.1   HEMOGLOBIN g/dL 8.4* 9.1* 9.4*   HEMATOCRIT % 25.2* 27.4* 28.4*     Lab Results   Component Value Date    HGBA1C 4.2 12/26/2022       RD  to follow up per protocol.    Electronically signed by:  Umm Potter RD  01/09/23

## 2023-01-09 NOTE — CASE MANAGEMENT/SOCIAL WORK
Continued Stay Note   Michael     Patient Name: Jaime Bar  MRN: [id number]  Today's Date: 1/9/2023    Admit Date: 12/26/2022    Plan: DC Plan: [facility] accepted and following. Precert started 1/9/23.. PASRR per facilitiy.   Discharge Plan     Row Name 01/09/23 1531       Plan    Plan DC Plan: [facility] accepted and following. Precert started 1/9/23.. PASRR per facilitiy.    Provided Post Acute Provider List? N/A    Provided Post Acute Provider Quality & Resource List? N/A    Plan Comments CM spoke with patient’s nurse and also reviewed chart documentation to obtain clinical updates. Patient passed video swallow and started on oral diet today. NGT and PADMINI drains have been removed. CM reached out to liaison to inform of updates and precert start requested. Tania confirms precert started. CM will continue to follow for any further needs and adjust discharge plan accordingly. DC Barriers: Oral diet tolerance, and pending approval.           Expected Discharge Date and Time     Expected Discharge Date Expected Discharge Time    Jan 11, 2023         Phone communication or documentation only- no physical contact with patient or family.      Melyssa Pascual RN     Office Phone: [phone]  Office Cell:     [phone]

## 2023-01-09 NOTE — PLAN OF CARE
Jaime Bar presents with ADL impairments below baseline abilities which indicate the need for continued skilled intervention while inpatient. Pt, spouse, and son in room and patient agreeable to work with OT services. Bed mobility Max A x 2 - dependent, limited by edema and weakness. Sits EOB >15 minutes, initially requiring Min A but progressing toward Supervision for balance/safety. Patient attempts 2 sit<>stand, but is unable to bear weight into legs well and requires prompt return to EOB.  Demos improved movement of BUE, including good scapular mobility and retraction with exercise.  Pt able to touch LUE thumb to chin, but more limited in RUE ROM due to edema.  Max A for self-feeding and Max A x - Dependent for all other ADLs. Tolerating session today without incident. Will continue to follow and progress as tolerated.

## 2023-01-09 NOTE — PLAN OF CARE
Goal Outcome Evaluation:      Pt. A&Ox4. Seems more alert this shift. On 2L nasal cannula. No gtts infusing.    Requested PRN pain medication often to control pain. TF continue to run at goal rate with minimal residuals. Adequate urine output this shift but still no BM. Vital signs currently stable.

## 2023-01-09 NOTE — PROGRESS NOTES
[facility] Medicine Services Daily Progress Note    Patient Name: Jaime Bar  : 1955  MRN: [id number]  Primary Care Physician:  Lobo, No Known  Date of admission: 2022      Subjective      Chief Complaint: AMS  No events overnight  Seen and examined  Without new symptoms or events  Took over care today  Low-grade temperature noted  Saturating 100% on room air        Review of system  All other systems reviewed and negative except as stated above    Objective      Vitals:   Temp:  [98 °F (36.7 °C)-99.6 °F (37.6 °C)] 98 °F (36.7 °C)  Heart Rate:  [] 87  Resp:  [20-23] 20  BP: ()/(56-68) 107/68  Flow (L/min):  [2] 2    Physical Exam:    General: Chronically ill-appearing gentleman, sleeping but arousable, no acute distress  HEENT: NCAT, neck is supple, NG in place  Cardiovascular: RRR  Respiratory: Fairly clear to auscultation bilaterally with some bibasilar crackles, diminished throughout, nondistressed  Abdomen: Soft, nontender to exam, normoactive bowel sounds  Neurologic: A&O, nonfocal, follows commands  Skin: Warm, bilateral shoulder incisions bandaged, drains in place. Bilateral upper extremity pitting edema noted R>L, reportedly improved since initial presentation    Result Review    Result Review:  I have personally reviewed the results from the time of this admission to 2023 13:37 EST and agree with these findings:  [x]  Laboratory  [x]  Microbiology  [x]  Radiology  [x]  EKG/Telemetry   []  Cardiology/Vascular   []  Pathology  [x]  Old records  []  Other:    Wounds (last 24 hours)     LDA Wound     Row Name 23 0800 23 0345 23 2345       Wound 22 185 Right shoulder Incision    Wound - Properties Group Placement Date: 22  -HB Placement Time:   -HB Side: Right  -HB Location: shoulder  -HB Primary Wound Type: Incision  -HB    Dressing Appearance dry;intact  -JW dry;intact  -HF dry;intact  -HF    Closure KATHRYN  -JW KATHRYN  -HF  KATHRYN  -HF    Base dressing in place, unable to visualize  -JW dressing in place, unable to visualize  -HF dressing in place, unable to visualize  -HF    Drainage Amount none  -JW none  -HF none  -HF    Retired Wound - Properties Group Placement Date: 12/28/22  -HB Placement Time: 1856  -HB Side: Right  -HB Location: shoulder  -HB Primary Wound Type: Incision  -HB    Retired Wound - Properties Group Date first assessed: 12/28/22  -HB Time first assessed: 1856  -HB Side: Right  -HB Location: shoulder  -HB Primary Wound Type: Incision  -HB       Wound 12/28/22 1940 Left shoulder Incision    Wound - Properties Group Placement Date: 12/28/22  -HB Placement Time: 1940  -HB Side: Left  -HB Location: shoulder  -HB Primary Wound Type: Incision  -HB    Dressing Appearance dry;intact  -JW dry;intact  -HF dry;intact  -HF    Closure KATHRYN  -JW KATHRYN  -HF KATHRYN  -HF    Base dressing in place, unable to visualize  -JW dressing in place, unable to visualize  -HF dressing in place, unable to visualize  -HF    Drainage Amount none  -JW none  -HF none  -HF    Retired Wound - Properties Group Placement Date: 12/28/22  -HB Placement Time: 1940 -HB Side: Left  -HB Location: shoulder  -HB Primary Wound Type: Incision  -HB    Retired Wound - Properties Group Date first assessed: 12/28/22  -HB Time first assessed: 1940  -HB Side: Left  -HB Location: shoulder  -HB Primary Wound Type: Incision  -HB       Wound 12/30/22 2000 sacral spine Pressure Injury    Wound - Properties Group Placement Date: 12/30/22  -MB Placement Time: 2000 -MB Location: sacral spine  -MB Primary Wound Type: Pressure inj  -MB    Dressing Appearance dry;intact  -JW dry;intact  -HF dry;intact  -HF    Closure Adhesive bandage  -JW Adhesive bandage  -HF Adhesive bandage  -HF    Base dressing in place, unable to visualize  -JW dressing in place, unable to visualize  -HF dressing in place, unable to visualize  -HF    Drainage Amount none  -JW none  -HF none  -HF    Retired Wound -  Properties Group Placement Date: 12/30/22  -MB Placement Time: 2000 -MB Location: sacral spine  -MB Primary Wound Type: Pressure inj  -MB    Retired Wound - Properties Group Date first assessed: 12/30/22  -MB Time first assessed: 2000 -MB Location: sacral spine  -MB Primary Wound Type: Pressure inj  -MB    Row Name 01/08/23 1945 01/08/23 1530          Wound 12/28/22 1856 Right shoulder Incision    Wound - Properties Group Placement Date: 12/28/22  -HB Placement Time: 1856  -HB Side: Right  -HB Location: shoulder  -HB Primary Wound Type: Incision  -HB    Dressing Appearance dry;intact  -HF --     Closure KATHRYN  -HF KATHRYN  -SN     Base dressing in place, unable to visualize  -HF dressing in place, unable to visualize  -SN     Drainage Amount none  -HF none  -SN     Retired Wound - Properties Group Placement Date: 12/28/22  -HB Placement Time: 1856  -HB Side: Right  -HB Location: shoulder  -HB Primary Wound Type: Incision  -HB    Retired Wound - Properties Group Date first assessed: 12/28/22  -HB Time first assessed: 1856  -HB Side: Right  -HB Location: shoulder  -HB Primary Wound Type: Incision  -HB       Wound 12/28/22 1940 Left shoulder Incision    Wound - Properties Group Placement Date: 12/28/22  -HB Placement Time: 1940  -HB Side: Left  -HB Location: shoulder  -HB Primary Wound Type: Incision  -HB    Dressing Appearance dry;intact  -HF --     Closure KATHRYN  -HF KATHRYN  -SN     Base dressing in place, unable to visualize  -HF dressing in place, unable to visualize  -SN     Drainage Amount none  -HF none  -SN     Retired Wound - Properties Group Placement Date: 12/28/22  -HB Placement Time: 1940  -HB Side: Left  -HB Location: shoulder  -HB Primary Wound Type: Incision  -HB    Retired Wound - Properties Group Date first assessed: 12/28/22  -HB Time first assessed: 1940  -HB Side: Left  -HB Location: shoulder  -HB Primary Wound Type: Incision  -HB       Wound 12/30/22 2000 sacral spine Pressure Injury    Wound - Properties  Group Placement Date: 12/30/22 -MB Placement Time: 2000 -MB Location: sacral spine  -MB Primary Wound Type: Pressure inj  -MB    Dressing Appearance dry;intact  -HF --     Closure Adhesive bandage  -HF --     Base dressing in place, unable to visualize  -HF --     Drainage Amount none  -HF --     Retired Wound - Properties Group Placement Date: 12/30/22 -MB Placement Time: 2000 -MB Location: sacral spine  -MB Primary Wound Type: Pressure inj  -MB    Retired Wound - Properties Group Date first assessed: 12/30/22 -MB Time first assessed: 2000 -MB Location: sacral spine  -MB Primary Wound Type: Pressure inj  -MB          User Key  (r) = Recorded By, (t) = Taken By, (c) = Cosigned By    Initials Name Provider Type    Kelsi Mac RN Registered Nurse    Martha Monson, RN Registered Nurse    Carolyn Jay RN Registered Nurse    Precious Campos RN Registered Nurse    Jacey Pratt RN Registered Nurse              Assessment & Plan      Brief Patient Summary:  Jaime Bar is a 67 y.o. male with past medical history of cervicalgia, primary osteoarthritis involving multiple joints with surgery on bilateral shoulders and right knee replacement but no recent procedure or surgery, hyponatremia, prostate cancer, and cardiac amyloidosis presented to [facility] on 12/25/2022 with complaints of right shoulder pain, weakness, and altered mental status.  He was found to be hypotensive and hyponatremic with elevated lactic acid.  Patient had reported decreased oral intake.  He was transferred to [facility] ICU for further treatment of septic shock requiring vasopressors and hyponatremia.      Since admission he was found to be bacteremic with 2 sets of blood cultures positive for staph aureus, source being bilateral shoulder septic arthritis. ID has been treating the patient with IV ceftriaxone 2G and he will need a total of 6 weeks of antibiotic therapy.  He has status post  bilateral shoulder arthroscopy with extensive debridement by Dr. Velez 12/28/2022. He remained intubated after surgery until 12/29 and now extubated. He has been weaned off vasopressors. Nephrology is managing his hyponatremia and STEVEN on CKD. He was found to have LUE DVT provoked from PICC line currently on argatroban. Hematology has been consulted for his thrombocytopenia.       Barium Sulfate, 1 teaspoon(s), Oral, Once in imaging  cefTRIAXone, 2 g, Intravenous, Q24H  docusate, 50 mg, Nasogastric, BID  enoxaparin, 1.5 mg/kg, Subcutaneous, Daily  ethyl alcohol, 2 swab, Nasal, Once  ferrous sulfate, 300 mg, Nasogastric, Daily  First Mouthwash (Magic Mouthwash), 5 mL, Swish & Spit, Q6H  furosemide, 40 mg, Intravenous, Q12H  lansoprazole, 30 mg, Nasogastric, Q AM  midodrine, 10 mg, Nasogastric, Q8H  polyethylene glycol, 17 g, Nasogastric, Daily  sodium chloride, 10 mL, Intravenous, Q12H  Zinc Oxide, , Apply externally, TID             Active Hospital Problems:  Active Hospital Problems    Diagnosis    • Dysphagia    • Bacteremia due to methicillin susceptible Staphylococcus aureus (MSSA)    • Staphylococcal arthritis of shoulder (HCC)    • Cervicalgia, spine surgery 2017    • Primary osteoarthritis involving multiple joints    • Hyponatremia    • Acute kidney injury superimposed on chronic kidney disease (HCC)    • Cardiac amyloidosis (HCC)    • ACC/AHA stage C chronic systolic heart failure (HCC)    • Tobacco abuse    • Primary hypertension      Plan:    Bilateral shoulder septic arthritis  Septic shock secondary to MSSA bacteremia  -Synovial fluid culture was consistent with infection and cultures are growing 4+ Staph aureus  -s/p bilateral shoulder arthroscopy with extensive debridement by Dr. Velez 12/28/2022  - 2/2 blood cultures positive for MSSA secondary to bilateral shoulder septic arthritis   -ALMA DELIA on 12/28, Negative for endocarditis  -On IV Rocephin 2 g daily x6 weeks per ID (last day of therapy is  [id number])  -Off IV vasopressors, remains on midodrine  -Discontinued art line, MAP greater than 65 mmHg at this time  -LUE PICC line removed and tip also sent for culture  -Fevers have been intermittent  , CT of the spine does not show any acute abscess or infectious changes  -ID following  -Left PADMINI with minimal serous output, right remains more copious and cloudy, management as per Ortho     Dysphagia  -Speech following, still not appropriate for PO intake  -NG tube feeds at goal. However, it's been 8 days with NG now.  -May need to consider PEG if ST doesn't think he can move towards PO intake    Acute metabolic encephalopathy, improved  Severe thrombocytopenia, improved  LUE DVT, provoked from PICC line/RUE showing superficial thrombus  -Patient's mental status worsening  -Initially concern for TTP however no schistocytes seen on peripheral smear per hematology  -Platelets improving  -Off argatroban drip, PTT elevated  -On low-dose Lovenox twice daily for now per heme-onc pending DIC work-up  -Patient's left lower extremity is back to normal now, good pulses noted, BERNARDINO showed mild digital ischemia  -No peripheral cyanosis noted at this time  -Video-study tomorrow    Acute respiratory failure with hypoxia, improved  -Pulmonary edema, improved  -Postop respiratory failure, extubated on 12/39  -Remains on 2LNC, wean as tolerated  -On diuresis per nephrology     STEVEN on CKD  Hyponatremia on admission and now hypernatremia  Defer to nephrologist     PAF, s/p RVR  -Off amnio drip, rate controlled now  -Anticoagulation recommendations per cardiology and heme-onc  -Cardiology following, may need cardioversion prior to discharge  Continues to be in atrial fibrillation with rate controlled  Continues on full dose Lovenox, will change to Eliquis or Xarelto when appropriate  Plan for cardioversion once patient no longer afebrile    Elevated LFTs  -Likely due to shock liver  -Hep panel negative  -Liver ultrasound showed normal  hepatic parenchyma, cholelithiasis with adenomyomatosis involving gallbladder wall, CBD upper limit of normal  -Monitor    H/o cardiac amyloidosis  H/o prostate CA  Osteoarthritis     DVT/GI Ppx.    DNR/DNI.    CODE STATUS:    Medical Intervention Limits: NO intubation (DNI)  Level Of Support Discussed With: Next of Kin (If No Surrogate)  Code Status (Patient has no pulse and is not breathing): No CPR (Do Not Attempt to Resuscitate)  Medical Interventions (Patient has pulse or is breathing): Limited Support  Release to patient: Routine Release    Disposition: Will very likely need placement and possible PEG tube. However, patient seems to feel that these issues could eventually be managed at home (wound care, tube feeds, ongoing therapy, etc).    Electronically signed by Dante Simpson MD, 01/09/23, 13:37 [facility].  [facility]      Part of this note may be an electronic transcription/translation of spoken language to printed text using the Dragon Dictation System.

## 2023-01-09 NOTE — PROGRESS NOTES
Infectious Diseases Progress Note      LOS: 14 days   Patient Care Team:  Provider, No Known as PCP - Kyle Gardner MD as Consulting Physician (Nephrology)  Vicente Mendoza MD as Consulting Physician (Hematology and Oncology)    Chief Complaint: Fatigue    Subjective       Patient had no high-grade fever during the last 24 hours.  The patient is awake and alert.  Currently on 2 L of oxygen.  On no vasopressors  Patient is doing better today and is currently working with speech therapy    Review of Systems:   Review of Systems   Constitutional: Positive for fatigue.   HENT: Negative.    Eyes: Negative.    Respiratory: Negative.    Cardiovascular: Negative.    Gastrointestinal: Negative.    Genitourinary: Negative.    Musculoskeletal: Positive for joint swelling.   Skin: Negative.    Neurological: Negative.    Hematological: Negative.    Psychiatric/Behavioral: Negative.         Objective     Vital Signs  Temp:  [98 °F (36.7 °C)-99.6 °F (37.6 °C)] 98 °F (36.7 °C)  Heart Rate:  [] 90  Resp:  [20-23] 20  BP: ()/(56-68) 112/61    Physical Exam:  Physical Exam  Vitals and nursing note reviewed.   Constitutional:       General: He is not in acute distress.     Appearance: He is well-developed and normal weight. He is ill-appearing. He is not diaphoretic.      Comments: The patient is more awake and alert today   HENT:      Head: Normocephalic and atraumatic.   Eyes:      Pupils: Pupils are equal, round, and reactive to light.   Cardiovascular:      Rate and Rhythm: Normal rate and regular rhythm.      Heart sounds: Normal heart sounds, S1 normal and S2 normal.   Pulmonary:      Effort: Pulmonary effort is normal. No respiratory distress.      Breath sounds: No stridor. Rales present. No wheezing.   Abdominal:      General: Abdomen is flat. Bowel sounds are normal. There is no distension.      Palpations: Abdomen is soft. There is no mass.      Tenderness: There is no abdominal tenderness. There  is no guarding.      Comments: NG tube   Musculoskeletal:         General: No deformity. Normal range of motion.      Cervical back: Neck supple.      Right lower leg: Edema present.      Left lower leg: Edema present.      Comments: Drain tubes are present in the right and left shoulders with bloody serous drainage    Bilateral upper extremity edema with right arm worse than left   Skin:     General: Skin is warm and dry.      Coloration: Skin is not pale.      Findings: No erythema or rash.   Neurological:      Mental Status: He is alert and oriented to person, place, and time.      Cranial Nerves: No cranial nerve deficit.          Results Review:    I have reviewed all clinical data, test, lab, and imaging results.     Radiology  FL Video Swallow With Speech Single Contrast    Result Date: 1/9/2023  FL VIDEO SWALLOW W SPEECH SINGLE-CONTRAST Date of Exam: 1/9/2023 9:50 AM EST Indication: Evaluate swallow.  Comparison: None available. Technique:  This examination was performed in conjunction with speech pathology. Lateral video fluoroscopic evaluation of the swallowing mechanism was performed while correlate administering to the patient various consistency food items mixed with barium. Fluoroscopic Time:  1.8 minutes Number of Images: 10 spot fluoroscopic series images Findings: Modified barium swallow study was performed utilizing fluoroscopy. The study was performed by dedicated speech pathologist. I was present during the entire examination.     Impression: Modified barium swallow study utilizing fluoroscopy. Please refer to the speech pathologist report for findings and dietary recommendations. Electronically Signed: Antonia Coombs  1/9/2023 10:41 AM EST  Workstation ID: [id number]      Cardiology    Laboratory    Results from last 7 days   Lab Units 01/09/23  0417 01/08/23  0357 01/07/23  0712 01/06/23  0357 01/05/23  0411 01/04/23  0352 01/03/23  0914   WBC 10*3/mm3 5.30 7.00 6.90 5.80 5.90 7.00 8.20    HEMOGLOBIN g/dL 8.4* 9.1* 9.4* 9.9* 9.9* 11.0* 11.1*   HEMATOCRIT % 25.2* 27.4* 28.4* 29.7* 30.6* 34.1* 34.1*   PLATELETS 10*3/mm3 170 175 156 125* 121* 116* 104*     Results from last 7 days   Lab Units 01/09/23  0417 01/08/23 0357 01/07/23 0712 01/06/23 0357 01/05/23 0411 01/04/23 0352 01/03/23  0914   SODIUM mmol/L 139 138 138 141 144 148* 150*   POTASSIUM mmol/L 3.5 3.5 3.8 3.8 3.9 4.4 4.7   CHLORIDE mmol/L 96* 96* 96* 99 104 108* 116*   CO2 mmol/L 37.0* 35.0* 36.0* 33.0* 33.0* 32.0* 28.0   BUN mg/dL 31* 30* 28* 32* 33* 40* 36*   CREATININE mg/dL 0.56* 0.57* 0.53* 0.55* 0.55* 0.77 0.62*   GLUCOSE mg/dL 106* 119* 94 106* 117* 127* 122*   ALBUMIN g/dL 2.1* 2.1* 2.2* 2.0* 2.3* 2.2* 2.4*   BILIRUBIN mg/dL 2.3* 3.0* 3.0* 3.4* 3.1* 3.5* 3.4*   ALK PHOS U/L 135* 111 91 103 155* 96 120*   AST (SGOT) U/L 35 34 33 39 33 36 44*   ALT (SGPT) U/L 35 40 43* 52* 61* 93* 122*   CALCIUM mg/dL 7.9* 8.1* 7.8* 7.8* 7.7* 8.0* 8.0*                 Microbiology   Microbiology Results (last 10 days)     Procedure Component Value - Date/Time    Catheter Culture - Cath Tip, Hand, Left [[id number]] Collected: 12/31/22 2812    Lab Status: Final result Specimen: Cath Tip from Hand, Left Updated: 01/03/23 0958     CATHETER CULTURE No growth at 2 days    Blood Culture - Blood, Blood, PICC Line [[id number]]  (Normal) Collected: 12/31/22 1148    Lab Status: Final result Specimen: Blood, PICC Line Updated: 01/05/23 1200     Blood Culture No growth at 5 days    Blood Culture - Blood, Hand, Right [[id number]]  (Abnormal) Collected: 12/30/22 1251    Lab Status: Final result Specimen: Blood from Hand, Right Updated: 01/01/23 1030     Blood Culture Staphylococcus aureus     Comment:   Infectious disease consultation is highly recommended to rule out distant foci of infection.        Isolated from Aerobic Bottle     Gram Stain Aerobic Bottle Gram positive cocci in clusters    Narrative:      Less than seven (7) mL's of blood was collected.   Insufficient quantity may yield false negative results.    Refer to previous blood culture collected on 12/26/2022 0435 for MICs.          Medication Review:       Schedule Meds  Barium Sulfate, 1 teaspoon(s), Oral, Once in imaging  cefTRIAXone, 2 g, Intravenous, Q24H  docusate, 50 mg, Nasogastric, BID  enoxaparin, 1.5 mg/kg, Subcutaneous, Daily  ethyl alcohol, 2 swab, Nasal, Once  ferrous sulfate, 300 mg, Nasogastric, Daily  First Mouthwash (Magic Mouthwash), 5 mL, Swish & Spit, Q6H  furosemide, 40 mg, Intravenous, Q12H  lansoprazole, 30 mg, Nasogastric, Q AM  midodrine, 10 mg, Nasogastric, Q8H  polyethylene glycol, 17 g, Nasogastric, Daily  sodium chloride, 10 mL, Intravenous, Q12H  Zinc Oxide, , Apply externally, TID        Infusion Meds       PRN Meds  •  acetaminophen **OR** acetaminophen  •  aluminum-magnesium hydroxide-simethicone  •  dextrose  •  dextrose  •  fentanyl  •  glucagon (human recombinant)  •  HYDROcodone-acetaminophen  •  ondansetron **OR** ondansetron  •  sodium chloride  •  sodium chloride        Assessment & Plan       Antimicrobial Therapy   1.  IV ceftriaxone        2.        3.        4.        5.            Assessment    Bilateral shoulder septic arthritis.  Synovial fluid culture was consistent with infection and cultures are growing 4+ methicillin susceptible Staph aureus    Methicillin susceptible Staph aureus bacteremia secondary to above.  Blood cultures were positive x2 sets on admission on December 26, 2022.  Repeat blood culture from December 27, 2022 turned positive  Repeat blood culture from December 29, 2022 is positive  ALMA DELIA was done on December 28, 2022 and showed no vegetation  -Repeat blood culture from 12/30/2022 is now positive  -PICC line was removed on 12/31/2022- cultures pending  -Blood culture from 12/31/2022 is negative so far  CT scan of the cervical, thoracic and lumbar spine with contrast showed no obvious infection    Mild septic shock.  Currently off  vasopressors    Respiratory failure.  Patient was on the ventilator after shoulder surgeries.  Was extubated and currently requiring 15 L of oxygen    DVT of the left arm secondary to PICC line.  Hematology service was consulted and patient was started on anticoagulation    Elevated transaminase and hyperbilirubinemia secondary to sepsis and infection.  Liver enzymes are trending down    Worsening right upper extremity edema.  Venous Doppler of the right upper extremity showed SVT but there was no DVT    Toxic metabolic encephalopathy.  Improved significantly      Plan    Continue IV ceftriaxone 2 g daily for total of 6 weeks.  The last day of IV ceftriaxone will be February 11, 2023  Patient will need a PICC line before discharge-please remove when IV antibiotics are completed.  Weekly labs can CBC, creatinine and sed rate times x5 weeks  Patient is scheduled for a video swallow study tomorrow  Continue supportive care  A.m. labs    Please fax all post discharge lab results, imaging studies and correspondence to this fax number [phone]  For any question or concern please contact our service number [phone]    Antonina Delacruz MD  01/09/23  12:29 EST    Note is dictated utilizing voice recognition software/Dragon

## 2023-01-09 NOTE — THERAPY TREATMENT NOTE
"Subjective: Pt agreeable to therapeutic plan of care.     Objective:     Bed mobility - Mod-A and Assist x 2 for supine<>sit; max assist of 2 to scoot in bed  Transfers - Max-A for sit to partial standing x 2 attempts from edge of bed; Pt able to accept ~50% of weight onto BLE  Ambulation -pt unable to perform    Pt sat at the edge of bed for ~15 minutes with SBA/CGA of 2 with cues to engage abdominals for neutral sitting posture; Pt performed LAQ x 10 reps BLE , reaching activities LUE, and bilateral scapular exercises    Vitals: WNL    Pain: 2 VAS   Location: shoulders  Intervention for pain: Repositioned, RN provided medication and Increased Activity    Education: Provided education on the importance of mobility in the acute care setting, Verbal/Tactile Cues and Transfer Training    Assessment: Jaime Bar presents with functional mobility impairments which indicate the need for skilled intervention. Decrease oxygen demand today. Bilateral shoulders drains have been discontinued. Pt more alert and following commands very well. Pt requiring only SBA at end of session for static sitting balance. Pt is an excellent rehab candidate. Pt paced in modified chair position at end of session. Tolerating session today without incident. Will continue to follow and progress as tolerated.     Plan/Recommendations:   Moderate Intensity Therapy recommended post-acute care. This is recommended as therapy feels the patient would require 3-4 days per week and wouldn't tolerate \"3 hour daily\" rehab intensity. SNF would be the preferred choice. If the patient does not agree to SNF, arrange HH or OP depending on home bound status. If patient is medically complex, consider LTACH.. Pt requires no DME at discharge.     Pt desires Skilled Rehab placement at discharge. Pt cooperative; agreeable to therapeutic recommendations and plan of care.         Basic Mobility 6-click:  Rollin = Total, A lot = 2, A little = 3; 4 = " None  Supine>Sit:   1 = Total, A lot = 2, A little = 3; 4 = None   Sit>Stand with arms:  1 = Total, A lot = 2, A little = 3; 4 = None  Bed>Chair:   1 = Total, A lot = 2, A little = 3; 4 = None  Ambulate in room:  1 = Total, A lot = 2, A little = 3; 4 = None  3-5 Steps with railin = Total, A lot = 2, A little = 3; 4 = None  Score: 8    Modified Harding: N/A = No pre-op stroke/TIA    Post-Tx Position: Supine with HOB Elevated, Alarms activated and Call light and personal items within reach  PPE: gloves and surgical mask

## 2023-01-10 LAB
ALBUMIN SERPL-MCNC: 2.2 G/DL (ref 3.5–5.2)
ALBUMIN/GLOB SERPL: 0.7 G/DL
ALP SERPL-CCNC: 112 U/L (ref 39–117)
ALT SERPL W P-5'-P-CCNC: 38 U/L (ref 1–41)
AMMONIA BLD-SCNC: 13 UMOL/L (ref 16–60)
ANION GAP SERPL CALCULATED.3IONS-SCNC: 5 MMOL/L (ref 5–15)
AST SERPL-CCNC: 36 U/L (ref 1–40)
BILIRUB CONJ SERPL-MCNC: 2 MG/DL (ref 0–0.3)
BILIRUB SERPL-MCNC: 2.7 MG/DL (ref 0–1.2)
BUN SERPL-MCNC: 28 MG/DL (ref 8–23)
BUN/CREAT SERPL: 44.4 (ref 7–25)
CA-I SERPL ISE-MCNC: 1.11 MMOL/L (ref 1.2–1.3)
CALCIUM SPEC-SCNC: 8.4 MG/DL (ref 8.6–10.5)
CHLORIDE SERPL-SCNC: 96 MMOL/L (ref 98–107)
CO2 SERPL-SCNC: 39 MMOL/L (ref 22–29)
CREAT SERPL-MCNC: 0.63 MG/DL (ref 0.76–1.27)
DEPRECATED RDW RBC AUTO: 52.5 FL (ref 37–54)
EGFRCR SERPLBLD CKD-EPI 2021: 104.3 ML/MIN/1.73
ERYTHROCYTE [DISTWIDTH] IN BLOOD BY AUTOMATED COUNT: 16.1 % (ref 12.3–15.4)
GLOBULIN UR ELPH-MCNC: 3.2 GM/DL
GLUCOSE BLDC GLUCOMTR-MCNC: 83 MG/DL (ref 70–105)
GLUCOSE BLDC GLUCOMTR-MCNC: 87 MG/DL (ref 70–105)
GLUCOSE SERPL-MCNC: 93 MG/DL (ref 65–99)
HCT VFR BLD AUTO: 26 % (ref 37.5–51)
HGB BLD-MCNC: 8.8 G/DL (ref 13–17.7)
LYMPHOCYTES # BLD MANUAL: 0.46 10*3/MM3 (ref 0.7–3.1)
LYMPHOCYTES NFR BLD MANUAL: 6 % (ref 5–12)
MAGNESIUM SERPL-MCNC: 1.9 MG/DL (ref 1.6–2.4)
MCH RBC QN AUTO: 31.5 PG (ref 26.6–33)
MCHC RBC AUTO-ENTMCNC: 33.8 G/DL (ref 31.5–35.7)
MCV RBC AUTO: 93.2 FL (ref 79–97)
METAMYELOCYTES NFR BLD MANUAL: 1 % (ref 0–0)
MONOCYTES # BLD: 0.34 10*3/MM3 (ref 0.1–0.9)
NEUTROPHILS # BLD AUTO: 4.85 10*3/MM3 (ref 1.7–7)
NEUTROPHILS NFR BLD MANUAL: 77 % (ref 42.7–76)
NEUTS BAND NFR BLD MANUAL: 8 % (ref 0–5)
NEUTS VAC BLD QL SMEAR: ABNORMAL
PHOSPHATE SERPL-MCNC: 3.3 MG/DL (ref 2.5–4.5)
PLAT MORPH BLD: NORMAL
PLATELET # BLD AUTO: 200 10*3/MM3 (ref 140–450)
PMV BLD AUTO: 8.3 FL (ref 6–12)
POLYCHROMASIA BLD QL SMEAR: ABNORMAL
POTASSIUM SERPL-SCNC: 3.5 MMOL/L (ref 3.5–5.2)
PROT SERPL-MCNC: 5.4 G/DL (ref 6–8.5)
RBC # BLD AUTO: 2.8 10*6/MM3 (ref 4.14–5.8)
SCAN SLIDE: NORMAL
SODIUM SERPL-SCNC: 140 MMOL/L (ref 136–145)
STOMATOCYTES BLD QL SMEAR: ABNORMAL
TOXIC GRANULATION: ABNORMAL
VARIANT LYMPHS NFR BLD MANUAL: 1 % (ref 0–5)
VARIANT LYMPHS NFR BLD MANUAL: 7 % (ref 19.6–45.3)
WBC NRBC COR # BLD: 5.7 10*3/MM3 (ref 3.4–10.8)

## 2023-01-10 PROCEDURE — 83735 ASSAY OF MAGNESIUM: CPT | Performed by: ORTHOPAEDIC SURGERY

## 2023-01-10 PROCEDURE — 82962 GLUCOSE BLOOD TEST: CPT

## 2023-01-10 PROCEDURE — 80053 COMPREHEN METABOLIC PANEL: CPT | Performed by: ORTHOPAEDIC SURGERY

## 2023-01-10 PROCEDURE — 99232 SBSQ HOSP IP/OBS MODERATE 35: CPT | Performed by: NURSE PRACTITIONER

## 2023-01-10 PROCEDURE — 97530 THERAPEUTIC ACTIVITIES: CPT

## 2023-01-10 PROCEDURE — 85025 COMPLETE CBC W/AUTO DIFF WBC: CPT | Performed by: ORTHOPAEDIC SURGERY

## 2023-01-10 PROCEDURE — 25010000002 FUROSEMIDE PER 20 MG: Performed by: INTERNAL MEDICINE

## 2023-01-10 PROCEDURE — 82248 BILIRUBIN DIRECT: CPT | Performed by: INTERNAL MEDICINE

## 2023-01-10 PROCEDURE — C1751 CATH, INF, PER/CENT/MIDLINE: HCPCS

## 2023-01-10 PROCEDURE — 82140 ASSAY OF AMMONIA: CPT | Performed by: INTERNAL MEDICINE

## 2023-01-10 PROCEDURE — 97112 NEUROMUSCULAR REEDUCATION: CPT

## 2023-01-10 PROCEDURE — 25010000002 ENOXAPARIN PER 10 MG: Performed by: NURSE PRACTITIONER

## 2023-01-10 PROCEDURE — 85007 BL SMEAR W/DIFF WBC COUNT: CPT | Performed by: ORTHOPAEDIC SURGERY

## 2023-01-10 PROCEDURE — 25010000002 CEFTRIAXONE PER 250 MG: Performed by: INTERNAL MEDICINE

## 2023-01-10 PROCEDURE — 84100 ASSAY OF PHOSPHORUS: CPT | Performed by: ORTHOPAEDIC SURGERY

## 2023-01-10 PROCEDURE — 02HV33Z INSERTION OF INFUSION DEVICE INTO SUPERIOR VENA CAVA, PERCUTANEOUS APPROACH: ICD-10-PCS | Performed by: INTERNAL MEDICINE

## 2023-01-10 PROCEDURE — 97535 SELF CARE MNGMENT TRAINING: CPT

## 2023-01-10 PROCEDURE — 82330 ASSAY OF CALCIUM: CPT | Performed by: ORTHOPAEDIC SURGERY

## 2023-01-10 RX ORDER — MIDODRINE HYDROCHLORIDE 10 MG/1
10 TABLET ORAL EVERY 8 HOURS
Qty: 90 TABLET | Refills: 0 | Status: SHIPPED | OUTPATIENT
Start: 2023-01-10 | End: 2023-02-09

## 2023-01-10 RX ORDER — HYDROCODONE BITARTRATE AND ACETAMINOPHEN 10; 325 MG/1; MG/1
1 TABLET ORAL EVERY 4 HOURS PRN
Qty: 10 TABLET | Refills: 0 | Status: ON HOLD | OUTPATIENT
Start: 2023-01-10 | End: 2023-02-09 | Stop reason: SDUPTHER

## 2023-01-10 RX ORDER — FERROUS SULFATE 300 MG/5ML
300 LIQUID (ML) ORAL DAILY
Start: 2023-01-11

## 2023-01-10 RX ORDER — POLYETHYLENE GLYCOL 3350 17 G/17G
17 POWDER, FOR SOLUTION ORAL DAILY
Qty: 10 PACKET | Refills: 0 | Status: SHIPPED | OUTPATIENT
Start: 2023-01-11 | End: 2023-01-21

## 2023-01-10 RX ORDER — FUROSEMIDE 40 MG/1
40 TABLET ORAL 2 TIMES DAILY
Start: 2023-01-10 | End: 2023-01-11 | Stop reason: SDUPTHER

## 2023-01-10 RX ORDER — ZINC OXIDE 16 %
OINTMENT (GRAM) TOPICAL 3 TIMES DAILY
Start: 2023-01-10

## 2023-01-10 RX ADMIN — Medication 10 ML: at 08:57

## 2023-01-10 RX ADMIN — HYDROCODONE BITARTRATE AND ACETAMINOPHEN 1 TABLET: 10; 325 TABLET ORAL at 21:32

## 2023-01-10 RX ADMIN — DIPHENHYDRAMINE HYDROCHLORIDE AND LIDOCAINE HYDROCHLORIDE AND ALUMINUM HYDROXIDE AND MAGNESIUM HYDRO 5 ML: KIT at 18:30

## 2023-01-10 RX ADMIN — Medication: at 21:32

## 2023-01-10 RX ADMIN — Medication: at 15:42

## 2023-01-10 RX ADMIN — CEFTRIAXONE 2 G: 2 INJECTION, POWDER, FOR SOLUTION INTRAMUSCULAR; INTRAVENOUS at 01:59

## 2023-01-10 RX ADMIN — MIDODRINE HYDROCHLORIDE 10 MG: 5 TABLET ORAL at 01:59

## 2023-01-10 RX ADMIN — POLYETHYLENE GLYCOL 3350 17 G: 17 POWDER, FOR SOLUTION ORAL at 08:57

## 2023-01-10 RX ADMIN — DOCUSATE SODIUM 100 MG: 100 CAPSULE, LIQUID FILLED ORAL at 08:57

## 2023-01-10 RX ADMIN — Medication 300 MG: at 08:57

## 2023-01-10 RX ADMIN — Medication: at 08:57

## 2023-01-10 RX ADMIN — DOCUSATE SODIUM 100 MG: 100 CAPSULE, LIQUID FILLED ORAL at 21:32

## 2023-01-10 RX ADMIN — ENOXAPARIN SODIUM 140 MG: 150 INJECTION SUBCUTANEOUS at 15:41

## 2023-01-10 RX ADMIN — Medication 10 ML: at 21:32

## 2023-01-10 RX ADMIN — DIPHENHYDRAMINE HYDROCHLORIDE AND LIDOCAINE HYDROCHLORIDE AND ALUMINUM HYDROXIDE AND MAGNESIUM HYDRO 5 ML: KIT at 12:37

## 2023-01-10 RX ADMIN — FUROSEMIDE 40 MG: 10 INJECTION, SOLUTION INTRAMUSCULAR; INTRAVENOUS at 12:37

## 2023-01-10 NOTE — CONSULTS
PICC Line Insertion Procedure Note    Procedure: Insertion of #5 FR/16G PICC    Indications:  Home IV therapy    Active Time Out:  Correct patient: Yes  Correct procedure: Yes  Correct site: Yes  Verified with: fabienne medellin rn    Procedure Details:  Informed consent was obtained for the procedure.  Risk include, but are not limited to infection, air embolism, catheter tip moving, catheter blockage and phlebitis.     Maximum sterile technique was used including antiseptics, cap, gloves, gown, hand hygiene, mask and sheet.    Ultrasound Guidance: Yes    #5 FR/16G PICC inserted to the L Basilic vein per hospital protocol by little delacruz RN.   Non-pulsatile blood return: yes    Lot #: [id number]  Expiration date: 11/30/2023    Complications:  none  Findings:  Catheter inserted to 45 cm, with 0 cm exposed.   Mid upper arm circumference is 25 cm.   Catheter was flushed with 10 cc NS and sterile dressing applied.  Patient tolerated procedure well.  PICC tip verified by:       [x] Sapiens 3cg       [] Chest X-ray    Recommendations:  Verbal and/or written Care/Maintenance instructions provided to patient.   Primary nurse notified.    Tim Powell RN  01/10/23  18:55 EST

## 2023-01-10 NOTE — PLAN OF CARE
Goal Outcome Evaluation:  Plan of Care Reviewed With: patient, spouse, daughter     Pt. demonstrates improved bed mobility this date w/ mod A x 2 supine to sit, maintains static sitting EOB w/ CGA for safety, requires use of aric ;lift for bed to chair transfer this AM, and subsequent return to bed this PM. Pt. Provided max A for all ADLs including toileting, rolling in bed L and R to change bad and perform posterior hygiene. Pt. Provided complete assist for all LB ADLs. Recommend SNF at d/c to address aforementioned deficits.

## 2023-01-10 NOTE — PLAN OF CARE
"Assessment: Jaime Bar presents with functional mobility impairments which indicate the need for skilled intervention. Pt was able to sit up in recliner today; aric lift used. Pt was glad to be out of bed. Pt needing less assistance with bed mobility. Tolerating session today without incident. Will continue to follow and progress as tolerated.     Plan/Recommendations:   Moderate Intensity Therapy recommended post-acute care. This is recommended as therapy feels the patient would require 3-4 days per week and wouldn't tolerate \"3 hour daily\" rehab intensity. SNF would be the preferred choice. If the patient does not agree to SNF, arrange HH or OP depending on home bound status. If patient is medically complex, consider LTACH.. Pt requires no DME at discharge.     Pt desires Skilled Rehab placement at discharge. Pt cooperative; agreeable to therapeutic recommendations and plan of care.       "

## 2023-01-10 NOTE — CASE MANAGEMENT/SOCIAL WORK
Continued Stay Note   Michael     Patient Name: Jaime Bar  MRN: [id number]  Today's Date: 1/10/2023    Admit Date: 12/26/2022    Plan: DC Plan: [facility] accepted and following. Precert approved 1/10/23.. PASRR per facilitiy.   Discharge Plan     Row Name 01/10/23 1600       Plan    Plan DC Plan: [facility] accepted and following. Precert approved 1/10/23.. PASRR per facilitiy.    Provided Post Acute Provider List? N/A    Provided Post Acute Provider Quality & Resource List? N/A    Plan Comments CM informed MD and nursing that [facility] precert approved by insurance. Nursing informed CM that there is a plan for [facility] prior to discharge. CM was later notified that patient is requesting a second opinion from his personal cardiologist first on an OP basis. Cardiology does not feel this should delay discharge. Discharge orders placed. PICC line to be placed prior to discharge. CM notified  EMS of need for transportation secondary to immobility and weakness.  EMS unable to accommodate transportation today, and patient has been placed on schedule for transportation in the morning. CM completed medical necessity form and informed nursing of plan. No barriers to discharge.           Expected Discharge Date and Time     Expected Discharge Date Expected Discharge Time    Benito 10, 2023         Phone communication or documentation only- no physical contact with patient or family.      Melyssa Pascual RN     Office Phone: [phone]  Office Cell:     [phone]

## 2023-01-10 NOTE — PLAN OF CARE
"Assessment: Jaime Bar presents with functional mobility impairments which indicate the need for skilled intervention. Tolerating session today without incident. Will continue to follow and progress as tolerated.     Plan/Recommendations:   Moderate Intensity Therapy recommended post-acute care. This is recommended as therapy feels the patient would require 3-4 days per week and wouldn't tolerate \"3 hour daily\" rehab intensity. SNF would be the preferred choice. If the patient does not agree to SNF, arrange HH or OP depending on home bound status. If patient is medically complex, consider LTACH.. Pt requires no DME at discharge.     Pt desires Skilled Rehab placement at discharge. Pt cooperative; agreeable to therapeutic recommendations and plan of care.     "

## 2023-01-10 NOTE — PROGRESS NOTES
"                                                                                                                                      Nephrology  Progress Note                                        [facility]    Patient Identification    Name: Jaime Bar  Age: 67 y.o.  Sex: male  :  1955  MRN: [id number]      DATE OF SERVICE:  1/10/2023        Subective     required more oxygen      Objective   Scheduled Meds:Barium Sulfate, 1 teaspoon(s), Oral, Once in imaging  cefTRIAXone, 2 g, Intravenous, Q24H  docusate sodium, 100 mg, Oral, BID  enoxaparin, 1.5 mg/kg, Subcutaneous, Daily  ethyl alcohol, 2 swab, Nasal, Once  ferrous sulfate, 300 mg, Nasogastric, Daily  First Mouthwash (Magic Mouthwash), 5 mL, Swish & Spit, Q6H  furosemide, 40 mg, Intravenous, Q12H  lansoprazole, 30 mg, Nasogastric, Q AM  midodrine, 10 mg, Oral, Q8H  polyethylene glycol, 17 g, Oral, Daily  sodium chloride, 10 mL, Intravenous, Q12H  Zinc Oxide, , Apply externally, TID          Continuous Infusions:     PRN Meds:•  acetaminophen **OR** acetaminophen  •  aluminum-magnesium hydroxide-simethicone  •  dextrose  •  dextrose  •  fentanyl  •  glucagon (human recombinant)  •  HYDROcodone-acetaminophen  •  ondansetron **OR** ondansetron  •  sodium chloride  •  sodium chloride     Exam:  /76   Pulse 94   Temp 98.1 °F (36.7 °C) (Axillary)   Resp 24   Ht 167.6 cm (66\")   Wt 79.7 kg (175 lb 11.3 oz)   SpO2 99%   BMI 28.36 kg/m²     Intake/Output last 3 shifts:  I/O last 3 completed shifts:  In: 4644 [P.O.:420; I.V.:285; Other:1748; NG/GT:2191]  Out: 3540 [Urine:3500; Drains:40]    Intake/Output this shift:  I/O this shift:  In: 480 [P.O.:480]  Out: -     Physical exam:  General Appearance: Confused  Head:  Normocephalic, without obvious abnormality, atraumatic  Eyes:  PERRL, conjunctiva/corneas clear     Neck:  Supple,  no adenopathy;      Lungs:  Decreased BS occasion ronchi  Heart:  Regular rate and rhythm, S1 and S2 " normal  Abdomen:  Soft, non-tender, bowel sounds active   Extremities: trace edema  Pulses: 2+ and symmetric all extremities  Skin:  No rashes or lesions       Data Review:  All labs (24hrs):   Recent Results (from the past 24 hour(s))   POC Glucose Once    Collection Time: 01/09/23  4:50 PM    Specimen: Blood   Result Value Ref Range    Glucose 85 70 - 105 mg/dL   POC Glucose Once    Collection Time: 01/09/23 11:45 PM    Specimen: Blood   Result Value Ref Range    Glucose 86 70 - 105 mg/dL   Comprehensive Metabolic Panel    Collection Time: 01/10/23  4:20 AM    Specimen: Blood   Result Value Ref Range    Glucose 93 65 - 99 mg/dL    BUN 28 (H) 8 - 23 mg/dL    Creatinine 0.63 (L) 0.76 - 1.27 mg/dL    Sodium 140 136 - 145 mmol/L    Potassium 3.5 3.5 - 5.2 mmol/L    Chloride 96 (L) 98 - 107 mmol/L    CO2 39.0 (H) 22.0 - 29.0 mmol/L    Calcium 8.4 (L) 8.6 - 10.5 mg/dL    Total Protein 5.4 (L) 6.0 - 8.5 g/dL    Albumin 2.2 (L) 3.5 - 5.2 g/dL    ALT (SGPT) 38 1 - 41 U/L    AST (SGOT) 36 1 - 40 U/L    Alkaline Phosphatase 112 39 - 117 U/L    Total Bilirubin 2.7 (H) 0.0 - 1.2 mg/dL    Globulin 3.2 gm/dL    A/G Ratio 0.7 g/dL    BUN/Creatinine Ratio 44.4 (H) 7.0 - 25.0    Anion Gap 5.0 5.0 - 15.0 mmol/L    eGFR 104.3 >60.0 mL/min/1.73   Magnesium    Collection Time: 01/10/23  4:20 AM    Specimen: Blood   Result Value Ref Range    Magnesium 1.9 1.6 - 2.4 mg/dL   Phosphorus    Collection Time: 01/10/23  4:20 AM    Specimen: Blood   Result Value Ref Range    Phosphorus 3.3 2.5 - 4.5 mg/dL   CBC Auto Differential    Collection Time: 01/10/23  4:20 AM    Specimen: Blood   Result Value Ref Range    WBC 5.70 3.40 - 10.80 10*3/mm3    RBC 2.80 (L) 4.14 - 5.80 10*6/mm3    Hemoglobin 8.8 (L) 13.0 - 17.7 g/dL    Hematocrit 26.0 (L) 37.5 - 51.0 %    MCV 93.2 79.0 - 97.0 fL    MCH 31.5 26.6 - 33.0 pg    MCHC 33.8 31.5 - 35.7 g/dL    RDW 16.1 (H) 12.3 - 15.4 %    RDW-SD 52.5 37.0 - 54.0 fl    MPV 8.3 6.0 - 12.0 fL    Platelets 200 140 -  450 10*3/mm3   Scan Slide    Collection Time: 01/10/23  4:20 AM    Specimen: Blood   Result Value Ref Range    Scan Slide     Manual Differential    Collection Time: 01/10/23  4:20 AM    Specimen: Blood   Result Value Ref Range    Neutrophil % 77.0 (H) 42.7 - 76.0 %    Lymphocyte % 7.0 (L) 19.6 - 45.3 %    Monocyte % 6.0 5.0 - 12.0 %    Bands %  8.0 (H) 0.0 - 5.0 %    Metamyelocyte % 1.0 (H) 0.0 - 0.0 %    Atypical Lymphocyte % 1.0 0.0 - 5.0 %    Neutrophils Absolute 4.85 1.70 - 7.00 10*3/mm3    Lymphocytes Absolute 0.46 (L) 0.70 - 3.10 10*3/mm3    Monocytes Absolute 0.34 0.10 - 0.90 10*3/mm3    Polychromasia Slight/1+ None Seen    Stomatocytes Slight/1+ None Seen    Toxic Granulation Slight/1+ None Seen    Vacuolated Neutrophils Slight/1+ None Seen    Platelet Morphology Normal Normal   Calcium, Ionized    Collection Time: 01/10/23  4:21 AM    Specimen: Blood   Result Value Ref Range    Ionized Calcium 1.11 (L) 1.20 - 1.30 mmol/L   POC Glucose Once    Collection Time: 01/10/23 12:02 PM    Specimen: Blood   Result Value Ref Range    Glucose 83 70 - 105 mg/dL   Bilirubin, Direct    Collection Time: 01/10/23  3:25 PM    Specimen: Blood   Result Value Ref Range    Bilirubin, Direct 2.0 (H) 0.0 - 0.3 mg/dL   Ammonia    Collection Time: 01/10/23  3:25 PM    Specimen: Blood   Result Value Ref Range    Ammonia 13 (L) 16 - 60 umol/L          Imaging:  CT Cervical Spine With Contrast    Result Date: 1/3/2023  1.Postoperative changes status post prior fusion. No definitive signs of hardware failure or loosening are noted. 2.No evidence for displaced fracture or malalignment throughout the cervical spine. 3.No evidence for acute soft tissue abnormality. There is no evidence for abnormal enhancement. No abnormal peripherally enhancing fluid collection is seen. 4.Changes of cervical spondylosis are noted throughout the cervical spine. Associated hypertrophic posterior facet changes and uncovertebral changes are also observed.  Electronically Signed: Solo Ray  1/3/2023 10:25 PM EST  Workstation ID: [id number]    CT Thoracic Spine With Contrast    Result Date: 1/3/2023  1.No evidence for displaced fracture or malalignment throughout the thoracic spine. No evidence for erosive endplate changes. 2.No evidence for abnormal enhancement. There is no abnormal fluid collection within the paraspinal soft tissues. There is no evidence for epidural abscess. 3.Degenerative changes are noted throughout the thoracic spine. 4.Interstitial changes are seen throughout the lungs. Bibasilar infiltrates are noted. Bilateral pleural effusions are observed. Electronically Signed: Solo Ray  1/3/2023 10:31 PM EST  Workstation ID: [id number]    CT Lumbar Spine With Contrast    Result Date: 1/3/2023  1.No evidence for displaced fracture or malalignment throughout the lumbar spine. No evidence for erosive endplate changes. 2.No evidence for abnormal enhancement. There is no abnormal fluid collection within the paraspinal soft tissues. There is no evidence for epidural abscess. 3.Advanced degenerative changes are noted throughout the lumbar spine. 4.Evidence for bilateral spondylolysis with associated spondylolisthesis at the L5 level. Prior postoperative changes are also noted. Electronically Signed: Solo Ray  1/3/2023 10:35 PM EST  Workstation ID: [id number]    FL Video Swallow With Speech Single Contrast    Result Date: 1/9/2023  Impression: Modified barium swallow study utilizing fluoroscopy. Please refer to the speech pathologist report for findings and dietary recommendations. Electronically Signed: Antonia Coombs  1/9/2023 10:41 AM EST  Workstation ID: [id number]    XR Chest 1 View    Result Date: 1/3/2023  Impression: 1. Radiographic changes consistent with congestive heart failure pulmonary edema with interval increase in pulmonary edema and development of small bilateral pleural effusions. Electronically Signed: Jaime Momin  1/3/2023 9:14 AM EST   Workstation ID: [id number]    XR Abdomen KUB    Result Date: 1/7/2023  Enteric tube in the stomach. Thank you for the referral of this patient. This exam was interpreted by an American Board of Radiology certified radiologist with subspecialty fellowship in Pediatric Radiology. If there are any questions regarding this exam please feel free to contact a radiologist directly at [phone]. Slot 64 Electronically signed by:  Mau Rodriguez M.D.  1/7/2023 4:48 AM Mountain Time      Assessment/Plan:     Hyponatremia    ACC/AHA stage C chronic systolic heart failure (HCC)    Cardiac amyloidosis (HCC)    Primary hypertension    Tobacco abuse    Cervicalgia, spine surgery 2017    Primary osteoarthritis involving multiple joints    Bacteremia due to methicillin susceptible Staphylococcus aureus (MSSA)    Staphylococcal arthritis of shoulder (HCC)    Acute kidney injury superimposed on chronic kidney disease (HCC)    Dysphagia   hyponatremia improving   Acute kidney injury on chronic kidney disease improving  Atrial fibrillation with rapid ventricular response  Metabolic acidosis  Urinary retention   Sepsis   Hypocalcemia   Hyperkalemia     Tolerating diuresis  Oxygenation better  Status post IV contrast study yesterday   Follow labs

## 2023-01-10 NOTE — PROGRESS NOTES
Reason for follow-up: Atrial fibrillation, cardiomyopathy     Patient Care Team:  Provider, No Known as PCP - General  Kyle Haas MD as Consulting Physician (Nephrology)  Vicente Mendoza MD as Consulting Physician (Hematology and Oncology)    Subjective .   Jaime Bar is doing fair today, complains of fatigue.  Very sleepy but arousable.     Review of Systems   Constitutional: Positive for malaise/fatigue. Negative for fever.   Cardiovascular: Negative for chest pain, irregular heartbeat and palpitations.   Respiratory: Negative for cough and shortness of breath.    Gastrointestinal: Negative for nausea and vomiting.   Neurological: Negative for dizziness and headaches.   All other systems reviewed and are negative.      Tramadol-acetaminophen, Codeine, and Tramadol    Scheduled Meds:Barium Sulfate, 1 teaspoon(s), Oral, Once in imaging  cefTRIAXone, 2 g, Intravenous, Q24H  docusate sodium, 100 mg, Oral, BID  enoxaparin, 1.5 mg/kg, Subcutaneous, Daily  ethyl alcohol, 2 swab, Nasal, Once  ferrous sulfate, 300 mg, Nasogastric, Daily  First Mouthwash (Magic Mouthwash), 5 mL, Swish & Spit, Q6H  furosemide, 40 mg, Intravenous, Q12H  lansoprazole, 30 mg, Nasogastric, Q AM  midodrine, 10 mg, Oral, Q8H  polyethylene glycol, 17 g, Oral, Daily  sodium chloride, 10 mL, Intravenous, Q12H  Zinc Oxide, , Apply externally, TID      Continuous Infusions:   PRN Meds:.•  acetaminophen **OR** acetaminophen  •  aluminum-magnesium hydroxide-simethicone  •  dextrose  •  dextrose  •  fentanyl  •  glucagon (human recombinant)  •  HYDROcodone-acetaminophen  •  ondansetron **OR** ondansetron  •  sodium chloride  •  sodium chloride      VITAL SIGNS  Vitals:    01/10/23 0700 01/10/23 0800 01/10/23 0900 01/10/23 1000   BP:  134/80  129/67   Pulse: 91 89 94 82   Resp: 28 23     Temp:  99 °F (37.2 °C)     TempSrc:  Axillary     SpO2: 96% 99% 97% 98%   Weight:       Height:           Flowsheet Rows    Flowsheet Row First Filed  "Value   Admission Height 167.6 cm (66\") Documented at 12/26/2022 0230   Admission Weight 81.8 kg (180 lb 5.4 oz) Documented at 12/26/2022 0230             Physical Exam  VITALS REVIEWED    General:      well developed, in no acute distress.    Head:      normocephalic and atraumatic.    Eyes:      PERRL/EOM intact, conjunctiva and sclera clear with out nystagmus.    Neck:      no masses, thyromegaly,  trachea central with normal respiratory effort and PMI displaced laterally  Lungs:      Clear  Heart:       Irregular rhythm  Msk:      no deformity or scoliosis noted of thoracic or lumbar spine.    Pulses:      pulses normal in all 4 extremities.    Extremities:       1+ edema  Neurologic:      no focal deficits.   alert oriented x3  Skin:      intact without lesions or rashes.    Psych:      alert and cooperative; normal mood and affect; normal attention span and concentration.          LAB RESULTS (LAST 7 DAYS)    CBC  Results from last 7 days   Lab Units 01/10/23  0420 01/09/23  0417 01/08/23  0357 01/07/23  0712 01/06/23  0357 01/05/23  0411 01/04/23  0352   WBC 10*3/mm3 5.70 5.30 7.00 6.90 5.80 5.90 7.00   RBC 10*6/mm3 2.80* 2.69* 2.93* 2.96* 3.14* 3.18* 3.51*   HEMOGLOBIN g/dL 8.8* 8.4* 9.1* 9.4* 9.9* 9.9* 11.0*   HEMATOCRIT % 26.0* 25.2* 27.4* 28.4* 29.7* 30.6* 34.1*   MCV fL 93.2 93.6 93.5 95.8 94.5 96.2 97.1*   PLATELETS 10*3/mm3 200 170 175 156 125* 121* 116*       BMP  Results from last 7 days   Lab Units 01/10/23  0420 01/09/23 0417 01/08/23 0357 01/07/23 0712 01/06/23 0357 01/05/23 0411 01/04/23  0352   SODIUM mmol/L 140 139 138 138 141 144 148*   POTASSIUM mmol/L 3.5 3.5 3.5 3.8 3.8 3.9 4.4   CHLORIDE mmol/L 96* 96* 96* 96* 99 104 108*   CO2 mmol/L 39.0* 37.0* 35.0* 36.0* 33.0* 33.0* 32.0*   BUN mg/dL 28* 31* 30* 28* 32* 33* 40*   CREATININE mg/dL 0.63* 0.56* 0.57* 0.53* 0.55* 0.55* 0.77   GLUCOSE mg/dL 93 106* 119* 94 106* 117* 127*   MAGNESIUM mg/dL 1.9 1.9 1.8 1.7 1.8 1.7 1.9   PHOSPHORUS mg/dL " 3.3 2.8 2.7 3.1 2.8 3.2 3.6       CMP   Results from last 7 days   Lab Units 01/10/23  0420 01/09/23  0417 01/08/23  0357 01/07/23  0712 01/06/23  0357 01/05/23  0411 01/04/23  0352   SODIUM mmol/L 140 139 138 138 141 144 148*   POTASSIUM mmol/L 3.5 3.5 3.5 3.8 3.8 3.9 4.4   CHLORIDE mmol/L 96* 96* 96* 96* 99 104 108*   CO2 mmol/L 39.0* 37.0* 35.0* 36.0* 33.0* 33.0* 32.0*   BUN mg/dL 28* 31* 30* 28* 32* 33* 40*   CREATININE mg/dL 0.63* 0.56* 0.57* 0.53* 0.55* 0.55* 0.77   GLUCOSE mg/dL 93 106* 119* 94 106* 117* 127*   ALBUMIN g/dL 2.2* 2.1* 2.1* 2.2* 2.0* 2.3* 2.2*   BILIRUBIN mg/dL 2.7* 2.3* 3.0* 3.0* 3.4* 3.1* 3.5*   ALK PHOS U/L 112 135* 111 91 103 155* 96   AST (SGOT) U/L 36 35 34 33 39 33 36   ALT (SGPT) U/L 38 35 40 43* 52* 61* 93*         BNP        TROPONIN        CoAg        Creatinine Clearance  Estimated Creatinine Clearance: 113 mL/min (A) (by C-G formula based on SCr of 0.63 mg/dL (L)).    ABG          EKG    I personally reviewed the patient's EKG/Telemetry data: Atrial fibrillation      Assessment & Plan       Hyponatremia    ACC/AHA stage C chronic systolic heart failure (HCC)    Cardiac amyloidosis (HCC)    Primary hypertension    Tobacco abuse    Cervicalgia, spine surgery 2017    Primary osteoarthritis involving multiple joints    Bacteremia due to methicillin susceptible Staphylococcus aureus (MSSA)    Staphylococcal arthritis of shoulder (HCC)    Acute kidney injury superimposed on chronic kidney disease (HCC)    Michael Bar is a 67-year-old male patient who presented to the hospital with complaints of bilateral shoulder pain.  Patient was found to be septic with staph aureus bacteremia and had bilateral shoulder abscesses.  He underwent arthroscopy and drainage of both shoulders on 12/28/2022.  A transesophageal echocardiogram done on 12/28/2022 did not show any endocarditis.  Patient however does have history of cardiac amyloidosis based on an MRI done in March 2022, he does  have cardiomyopathy with ejection fraction of around 45% based on MRI as well as ALMA DELIA.  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  Patient was found to be in atrial fibrillation during this hospitalization.  The exact onset of A. fib is unknown, however in January 2022 he was in sinus rhythm based on EKG report from then.  It is likely that the A. fib was triggered by his infection.  Patient is receiving full dose Lovenox, he is already had a ALMA DELIA which did not show thrombus.  He is still frail, possibly still septic, still has drain in his left shoulder and had spiked fever earlier today.  For that I will defer cardioversion but we will keep following him and perform cardioversion prior to his discharge as long as he remains on anticoagulation.  Also consider switching to p.o. anticoagulation such as Xarelto or Eliquis if no further surgeries are anticipated.    Continue current medical therapy  Continues to be in atrial fibrillation with rate controlled  Continues on full dose Lovenox, will change to Eliquis or Xarelto when appropriate  Plan for cardioversion once patient no longer afebrile  We will continue to follow patient progress    Discussed findings and plan with patient and nurse.  Further assessment and recommendations per Dr. Yu.    RITU Hernandez  01/10/23  11:08 EST  Electronically signed by RITU Hernandez, 01/10/23, 11:16 AM EST.

## 2023-01-10 NOTE — DISCHARGE INSTRUCTIONS
ID recommendation  Continue IV ceftriaxone 2 g daily for total of 6 weeks.  The last day of IV ceftriaxone will be February 11, 2023  Patient will need a PICC line before discharge-please remove when IV antibiotics are completed.  Weekly labs can CBC, creatinine and sed rate times x5 weeks  Follow-up with orthopedic surgery in 2 to 4 weeks  Follow-up with GI in 2 to 4 weeks for abnormalities noted on gallbladder  Remove PICC line after completion of antibiotic therapy  W please continue current wound care  Consider compression stockings to the right upper extremity

## 2023-01-10 NOTE — PROGRESS NOTES
Infectious Diseases Progress Note      LOS: 15 days   Patient Care Team:  Provider, No Known as PCP - Kyle Gardner MD as Consulting Physician (Nephrology)  Vicente Mendoza MD as Consulting Physician (Hematology and Oncology)    Chief Complaint: Fatigue    Subjective       Patient had no high-grade fever during the last 24 hours.  The patient is awake and alert.  Currently on 2 L of oxygen.  On no vasopressors  The patient is being evaluated by cardiology service for possible cardioversion secondary to A. fib    Review of Systems:   Review of Systems   Constitutional: Positive for fatigue.   HENT: Negative.    Eyes: Negative.    Respiratory: Negative.    Cardiovascular: Negative.    Gastrointestinal: Negative.    Genitourinary: Negative.    Musculoskeletal: Positive for joint swelling.   Skin: Negative.    Neurological: Negative.    Hematological: Negative.    Psychiatric/Behavioral: Negative.         Objective     Vital Signs  Temp:  [98.1 °F (36.7 °C)-99 °F (37.2 °C)] 98.1 °F (36.7 °C)  Heart Rate:  [77-97] 87  Resp:  [16-28] 23  BP: ()/(56-82) 129/67    Physical Exam:  Physical Exam  Vitals and nursing note reviewed.   Constitutional:       General: He is not in acute distress.     Appearance: He is well-developed and normal weight. He is ill-appearing. He is not diaphoretic.      Comments: The patient is more awake and alert today   HENT:      Head: Normocephalic and atraumatic.   Eyes:      Pupils: Pupils are equal, round, and reactive to light.   Cardiovascular:      Rate and Rhythm: Normal rate and regular rhythm.      Heart sounds: Normal heart sounds, S1 normal and S2 normal.   Pulmonary:      Effort: Pulmonary effort is normal. No respiratory distress.      Breath sounds: No stridor. Rales present. No wheezing.   Abdominal:      General: Abdomen is flat. Bowel sounds are normal. There is no distension.      Palpations: Abdomen is soft. There is no mass.      Tenderness: There is no  abdominal tenderness. There is no guarding.      Comments: NG tube   Musculoskeletal:         General: No deformity. Normal range of motion.      Cervical back: Neck supple.      Right lower leg: Edema present.      Left lower leg: Edema present.      Comments: Drain tubes are present in the right and left shoulders with bloody serous drainage    Bilateral upper extremity edema with right arm worse than left   Skin:     General: Skin is warm and dry.      Coloration: Skin is not pale.      Findings: No erythema or rash.   Neurological:      Mental Status: He is alert and oriented to person, place, and time.      Cranial Nerves: No cranial nerve deficit.          Results Review:    I have reviewed all clinical data, test, lab, and imaging results.     Radiology  No Radiology Exams Resulted Within Past 24 Hours    Cardiology    Laboratory    Results from last 7 days   Lab Units 01/10/23  0420 01/09/23 0417 01/08/23 0357 01/07/23  0712 01/06/23 0357 01/05/23  0411 01/04/23  0352   WBC 10*3/mm3 5.70 5.30 7.00 6.90 5.80 5.90 7.00   HEMOGLOBIN g/dL 8.8* 8.4* 9.1* 9.4* 9.9* 9.9* 11.0*   HEMATOCRIT % 26.0* 25.2* 27.4* 28.4* 29.7* 30.6* 34.1*   PLATELETS 10*3/mm3 200 170 175 156 125* 121* 116*     Results from last 7 days   Lab Units 01/10/23  0420 01/09/23  0417 01/08/23 0357 01/07/23  0712 01/06/23 0357 01/05/23  0411 01/04/23  0352   SODIUM mmol/L 140 139 138 138 141 144 148*   POTASSIUM mmol/L 3.5 3.5 3.5 3.8 3.8 3.9 4.4   CHLORIDE mmol/L 96* 96* 96* 96* 99 104 108*   CO2 mmol/L 39.0* 37.0* 35.0* 36.0* 33.0* 33.0* 32.0*   BUN mg/dL 28* 31* 30* 28* 32* 33* 40*   CREATININE mg/dL 0.63* 0.56* 0.57* 0.53* 0.55* 0.55* 0.77   GLUCOSE mg/dL 93 106* 119* 94 106* 117* 127*   ALBUMIN g/dL 2.2* 2.1* 2.1* 2.2* 2.0* 2.3* 2.2*   BILIRUBIN mg/dL 2.7* 2.3* 3.0* 3.0* 3.4* 3.1* 3.5*   ALK PHOS U/L 112 135* 111 91 103 155* 96   AST (SGOT) U/L 36 35 34 33 39 33 36   ALT (SGPT) U/L 38 35 40 43* 52* 61* 93*   CALCIUM mg/dL 8.4* 7.9* 8.1*  7.8* 7.8* 7.7* 8.0*                 Microbiology   Microbiology Results (last 10 days)     Procedure Component Value - Date/Time    Catheter Culture - Cath Tip, Hand, Left [[id number]] Collected: 12/31/22 1839    Lab Status: Final result Specimen: Cath Tip from Hand, Left Updated: 01/03/23 0958     CATHETER CULTURE No growth at 2 days          Medication Review:       Schedule Meds  Barium Sulfate, 1 teaspoon(s), Oral, Once in imaging  cefTRIAXone, 2 g, Intravenous, Q24H  docusate sodium, 100 mg, Oral, BID  enoxaparin, 1.5 mg/kg, Subcutaneous, Daily  ethyl alcohol, 2 swab, Nasal, Once  ferrous sulfate, 300 mg, Nasogastric, Daily  First Mouthwash (Magic Mouthwash), 5 mL, Swish & Spit, Q6H  furosemide, 40 mg, Intravenous, Q12H  lansoprazole, 30 mg, Nasogastric, Q AM  midodrine, 10 mg, Oral, Q8H  polyethylene glycol, 17 g, Oral, Daily  sodium chloride, 10 mL, Intravenous, Q12H  Zinc Oxide, , Apply externally, TID        Infusion Meds       PRN Meds  •  acetaminophen **OR** acetaminophen  •  aluminum-magnesium hydroxide-simethicone  •  dextrose  •  dextrose  •  fentanyl  •  glucagon (human recombinant)  •  HYDROcodone-acetaminophen  •  ondansetron **OR** ondansetron  •  sodium chloride  •  sodium chloride        Assessment & Plan       Antimicrobial Therapy   1.  IV ceftriaxone        2.        3.        4.        5.            Assessment    Bilateral shoulder septic arthritis.  Synovial fluid culture was consistent with infection and cultures are growing 4+ methicillin susceptible Staph aureus    Methicillin susceptible Staph aureus bacteremia secondary to above.  Blood cultures were positive x2 sets on admission on December 26, 2022.  Repeat blood culture from December 27, 2022 turned positive  Repeat blood culture from December 29, 2022 is positive  ALMA DELIA was done on December 28, 2022 and showed no vegetation  -Repeat blood culture from 12/30/2022 is now positive  -PICC line was removed on 12/31/2022- cultures  pending  -Blood culture from 12/31/2022 is negative so far  CT scan of the cervical, thoracic and lumbar spine with contrast showed no obvious infection    Mild septic shock.  Currently off vasopressors    Respiratory failure.  Patient was on the ventilator after shoulder surgeries.  Was extubated and currently requiring 15 L of oxygen    DVT of the left arm secondary to PICC line.  Hematology service was consulted and patient was started on anticoagulation    Elevated transaminase and hyperbilirubinemia secondary to sepsis and infection.  Liver enzymes are trending down    Worsening right upper extremity edema.  Venous Doppler of the right upper extremity showed SVT but there was no DVT    Toxic metabolic encephalopathy.  Improved significantly      Plan    Continue IV ceftriaxone 2 g daily for total of 6 weeks.  The last day of IV ceftriaxone will be February 11, 2023  Patient will need a PICC line before discharge-please remove when IV antibiotics are completed.  Weekly labs can CBC, creatinine and sed rate times x5 weeks  Continue supportive care  A.m. labs    Please fax all post discharge lab results, imaging studies and correspondence to this fax number [phone]  For any question or concern please contact our service number [phone]    Antonina Delacruz MD  01/10/23  12:25 EST    Note is dictated utilizing voice recognition software/Dragon

## 2023-01-10 NOTE — THERAPY TREATMENT NOTE
"Subjective: Pt agreeable to therapeutic plan of care.  Cognition: oriented to Person and Place    Objective:     Bed Mobility: Mod-A and Assist x 2   Functional Transfers: Dependent  Functional Ambulation: N/A or Not attempted.    Toileting: Max-A and Assist x 2  ADL Position: supine and supported sitting      Lower Body Dressing: Max-A  ADL Position: supported sitting    Pt. demonstrates improved bed mobility this date w/ mod A x 2 supine to sit, maintains static sitting EOB w/ CGA for safety, requires use of aric ;lift for bed to chair transfer this AM, and subsequent return to bed this PM. Pt. Provided max A for all ADLs including toileting, rolling in bed L and R to change bad and perform posterior hygiene. Pt. Provided complete assist for all LB ADLs. Recommend SNF at d/c to address aforementioned deficits.     Vitals: WNL    Pain: 6 VAS  Location: B shoulder  Interventions for pain: Repositioned  Education: Provided education on the importance of mobility in the acute care setting    Assessment: Jaime Bar presents with ADL impairments below baseline abilities which indicate the need for continued skilled intervention while inpatient. Tolerating session today without incident. Will continue to follow and progress as tolerated.     Plan/Recommendations:   Moderate Intensity Therapy recommended post-acute care. This is recommended as therapy feels the patient would require 3-4 days per week and wouldn't tolerate \"3 hour daily\" rehab intensity. SNF would be the preferred choice. If the patient does not agree to SNF, arrange HH or OP depending on home bound status. If patient is medically complex, consider LTACH.. Pt requires no DME at discharge.     Pt desires Skilled Rehab placement at discharge. Pt cooperative; agreeable to therapeutic recommendations and plan of care.     Modified [place]: N/A = No pre-op stroke/TIA    Post-Tx Position: Supine with HOB Elevated  PPE: gloves and surgical mask    "

## 2023-01-10 NOTE — NURSING NOTE
Patient is awake and alert sitting up in chair mood.  Consult to assess patient's penis.  Patient has developed a hospital-acquired pressure injury which appears to be device related to the  Catheter.  Patient denies any tenderness or discomfort to the site.    Patient penis is retracted.  The head of the penis does have some ecchymosis noted.  Would recommend to continue with moisture barriers pain.  Keep next dwelling catheter off of the area.  Patient also has a hospital-acquired pressure injury to his sacrum.  I am not able to assess it today as he sitting up but will return to assess that.  Likely in the morning

## 2023-01-10 NOTE — THERAPY TREATMENT NOTE
"Subjective: Pt agreeable to therapeutic plan of care.    Objective:     Bed mobility - Mod-A and Assist x 2 for supine<>sit; mutiple rollings side to side for aric pad placement  Transfers - aric lift transfer bed to recliner    Pt sat at the edge of bed for ~12 minutes with CGA/SBA of 1 with cues for improved knee extension and midline posture    Vitals: WNL    Pain: 5 VAS   Location: right  shoulder  Intervention for pain: Repositioned and Therapeutic Presence      Education: Provided education on the importance of mobility in the acute care setting and Verbal/Tactile Cues    Assessment: Jaime Bar presents with functional mobility impairments which indicate the need for skilled intervention. Pt was able to sit up in recliner today; aric lift used. Pt was glad to be out of bed. Pt needing less assistance with bed mobility. Tolerating session today without incident. Will continue to follow and progress as tolerated.     Plan/Recommendations:   Moderate Intensity Therapy recommended post-acute care. This is recommended as therapy feels the patient would require 3-4 days per week and wouldn't tolerate \"3 hour daily\" rehab intensity. SNF would be the preferred choice. If the patient does not agree to SNF, arrange HH or OP depending on home bound status. If patient is medically complex, consider LTACH.. Pt requires no DME at discharge.     Pt desires Skilled Rehab placement at discharge. Pt cooperative; agreeable to therapeutic recommendations and plan of care.         Basic Mobility 6-click:  Rollin = Total, A lot = 2, A little = 3; 4 = None  Supine>Sit:   1 = Total, A lot = 2, A little = 3; 4 = None   Sit>Stand with arms:  1 = Total, A lot = 2, A little = 3; 4 = None  Bed>Chair:   1 = Total, A lot = 2, A little = 3; 4 = None  Ambulate in room:  1 = Total, A lot = 2, A little = 3; 4 = None  3-5 Steps with railin = Total, A lot = 2, A little = 3; 4 = None  Score: 8    Modified [place]: N/A = No " pre-op stroke/TIA    Post-Tx Position: Up in Chair and Call light and personal items within reach  PPE: gloves and surgical mask

## 2023-01-10 NOTE — PLAN OF CARE
Problem: Adult Inpatient Plan of Care  Goal: Plan of Care Review  Outcome: Ongoing, Progressing  Flowsheets (Taken 1/10/2023 1726)  Progress: improving  Plan of Care Reviewed With: patient  Goal: Patient-Specific Goal (Individualized)  Outcome: Ongoing, Progressing  Goal: Absence of Hospital-Acquired Illness or Injury  Outcome: Ongoing, Progressing  Intervention: Identify and Manage Fall Risk  Recent Flowsheet Documentation  Taken 1/10/2023 1548 by Sandra Mojica RN  Safety Promotion/Fall Prevention:   safety round/check completed   room organization consistent   fall prevention program maintained  Taken 1/10/2023 1500 by Sandra Mojica RN  Safety Promotion/Fall Prevention:   safety round/check completed   room organization consistent   fall prevention program maintained  Taken 1/10/2023 1400 by Sandra Mojica RN  Safety Promotion/Fall Prevention:   safety round/check completed   room organization consistent   fall prevention program maintained  Taken 1/10/2023 1300 by Sandra Mojica RN  Safety Promotion/Fall Prevention:   safety round/check completed   room organization consistent   fall prevention program maintained  Taken 1/10/2023 1200 by Sandra Mojica RN  Safety Promotion/Fall Prevention:   safety round/check completed   room organization consistent   fall prevention program maintained  Taken 1/10/2023 1100 by Sandra Mojica RN  Safety Promotion/Fall Prevention:   safety round/check completed   room organization consistent   fall prevention program maintained  Taken 1/10/2023 1000 by Sandra Mojica RN  Safety Promotion/Fall Prevention:   safety round/check completed   room organization consistent   fall prevention program maintained  Taken 1/10/2023 0900 by Sandra Mojica RN  Safety Promotion/Fall Prevention:   safety round/check completed   room organization consistent   fall prevention program maintained  Taken 1/10/2023 0800 by Sandra Mojica RN  Safety Promotion/Fall Prevention:   safety round/check  completed   room organization consistent   fall prevention program maintained  Taken 1/10/2023 0700 by Sandra Mojica RN  Safety Promotion/Fall Prevention:   safety round/check completed   room organization consistent   fall prevention program maintained  Intervention: Prevent Skin Injury  Recent Flowsheet Documentation  Taken 1/10/2023 1548 by Sandra Mojica RN  Skin Protection:   adhesive use limited   incontinence pads utilized   tubing/devices free from skin contact   skin-to-device areas padded   skin-to-skin areas padded  Taken 1/10/2023 1500 by Sandra Mojica RN  Body Position:   turned   right   upper extremity elevated  Taken 1/10/2023 1300 by Sandra Mojica RN  Body Position: (up in chair) weight shifting  Taken 1/10/2023 1200 by Sandra Mojica RN  Skin Protection:   tubing/devices free from skin contact   skin-to-skin areas padded   skin-to-device areas padded   adhesive use limited   incontinence pads utilized  Taken 1/10/2023 1100 by Sandra Mojica RN  Body Position: (up in chair) --  Taken 1/10/2023 0900 by Sandra Mojica RN  Body Position:   turned   left   upper extremity elevated  Taken 1/10/2023 0800 by Sandra Mojica RN  Skin Protection:   tubing/devices free from skin contact   transparent dressing maintained   skin-to-skin areas padded   skin-to-device areas padded   skin sealant/moisture barrier applied   silicone foam dressing in place   pulse oximeter probe site changed   incontinence pads utilized   adhesive use limited  Taken 1/10/2023 0700 by Sandra Mojica RN  Body Position:   turned   right   upper extremity elevated  Intervention: Prevent and Manage VTE (Venous Thromboembolism) Risk  Recent Flowsheet Documentation  Taken 1/10/2023 1548 by Sandra Mojica RN  Activity Management:   activity encouraged   activity adjusted per tolerance  VTE Prevention/Management: (SQ lovenox)   bilateral   sequential compression devices on  Range of Motion: ROM (range of motion) performed  Taken 1/10/2023  1400 by Mojica, Sandra, RN  Activity Management: back to bed  Taken 1/10/2023 1300 by Sandra Mojica RN  Activity Management: up in chair  Taken 1/10/2023 1200 by Sandra Mojica RN  Activity Management: up in chair  VTE Prevention/Management:   bilateral   sequential compression devices on  Range of Motion: ROM (range of motion) performed  Taken 1/10/2023 1100 by Sandra Mojica RN  Activity Management: up in chair  Taken 1/10/2023 0800 by Sandra Mojica RN  Activity Management: bedrest  VTE Prevention/Management: (SQ lovenox)   bilateral   sequential compression devices on  Range of Motion: ROM (range of motion) performed  Intervention: Prevent Infection  Recent Flowsheet Documentation  Taken 1/10/2023 1548 by Sandra Mojica RN  Infection Prevention:   single patient room provided   rest/sleep promoted   hand hygiene promoted   personal protective equipment utilized   equipment surfaces disinfected   environmental surveillance performed  Taken 1/10/2023 1200 by Sandra Mojica RN  Infection Prevention:   single patient room provided   rest/sleep promoted   personal protective equipment utilized   hand hygiene promoted   equipment surfaces disinfected   environmental surveillance performed  Taken 1/10/2023 0800 by Sandra Mojica RN  Infection Prevention:   single patient room provided   personal protective equipment utilized   rest/sleep promoted   hand hygiene promoted   equipment surfaces disinfected   environmental surveillance performed  Goal: Optimal Comfort and Wellbeing  Outcome: Ongoing, Progressing  Intervention: Monitor Pain and Promote Comfort  Recent Flowsheet Documentation  Taken 1/10/2023 1200 by Sandra Mojica RN  Pain Management Interventions:   care clustered   pillow support provided   position adjusted  Intervention: Provide Person-Centered Care  Recent Flowsheet Documentation  Taken 1/10/2023 1548 by Sandra Mojica RN  Trust Relationship/Rapport:   care explained   choices provided   reassurance  provided   thoughts/feelings acknowledged   questions answered   questions encouraged  Taken 1/10/2023 1200 by Sandra Mojica RN  Trust Relationship/Rapport:   care explained   choices provided   reassurance provided   thoughts/feelings acknowledged  Taken 1/10/2023 0800 by Sandra Mojica RN  Trust Relationship/Rapport:   care explained   choices provided   reassurance provided   thoughts/feelings acknowledged  Goal: Readiness for Transition of Care  Outcome: Ongoing, Progressing     Problem: Skin Injury Risk Increased  Goal: Skin Health and Integrity  Outcome: Ongoing, Progressing  Intervention: Promote and Optimize Oral Intake  Recent Flowsheet Documentation  Taken 1/10/2023 0800 by Sandra Mojica RN  Oral Nutrition Promotion: rest periods promoted  Intervention: Optimize Skin Protection  Recent Flowsheet Documentation  Taken 1/10/2023 1548 by Sandra Mojica RN  Pressure Reduction Techniques:   heels elevated off bed   positioned off wounds   pressure points protected   weight shift assistance provided  Pressure Reduction Devices:   pressure-redistributing mattress utilized   positioning supports utilized   heel offloading device utilized  Skin Protection:   adhesive use limited   incontinence pads utilized   tubing/devices free from skin contact   skin-to-device areas padded   skin-to-skin areas padded  Taken 1/10/2023 1500 by Sandra Mojica RN  Head of Bed (HOB) Positioning: HOB at 30 degrees  Taken 1/10/2023 1200 by Sandra Mojica RN  Pressure Reduction Techniques:   weight shift assistance provided   heels elevated off bed   positioned off wounds   pressure points protected  Pressure Reduction Devices:   pressure-redistributing mattress utilized   positioning supports utilized   heel offloading device utilized   foam padding utilized  Skin Protection:   tubing/devices free from skin contact   skin-to-skin areas padded   skin-to-device areas padded   adhesive use limited   incontinence pads utilized  Taken  1/10/2023 0900 by Sandra Mojica RN  Head of Bed ([facility]) Positioning: HOB at 30 degrees  Taken 1/10/2023 0800 by Sandra Mojica RN  Pressure Reduction Techniques:   heels elevated off bed   positioned off wounds   pressure points protected   weight shift assistance provided  Pressure Reduction Devices:   pressure-redistributing mattress utilized   positioning supports utilized   heel offloading device utilized   foam padding utilized  Skin Protection:   tubing/devices free from skin contact   transparent dressing maintained   skin-to-skin areas padded   skin-to-device areas padded   skin sealant/moisture barrier applied   silicone foam dressing in place   pulse oximeter probe site changed   incontinence pads utilized   adhesive use limited  Taken 1/10/2023 0700 by Sandra Mojica RN  Head of Bed ([facility]) Positioning: HOB at 30 degrees     Problem: Electrolyte Imbalance  Goal: Electrolyte Balance  Outcome: Ongoing, Progressing     Problem: Fall Injury Risk  Goal: Absence of Fall and Fall-Related Injury  Outcome: Ongoing, Progressing  Intervention: Identify and Manage Contributors  Recent Flowsheet Documentation  Taken 1/10/2023 1548 by Sandra Mojica RN  Medication Review/Management: medications reviewed  Taken 1/10/2023 1200 by Sandra Mojica RN  Medication Review/Management: medications reviewed  Taken 1/10/2023 1100 by Sandra Mojica RN  Medication Review/Management: medications reviewed  Taken 1/10/2023 0800 by Sandra Mojica RN  Medication Review/Management: medications reviewed  Taken 1/10/2023 0700 by Sandra Mojica RN  Medication Review/Management: medications reviewed  Intervention: Promote Injury-Free Environment  Recent Flowsheet Documentation  Taken 1/10/2023 1548 by Sandra Mojica RN  Safety Promotion/Fall Prevention:   safety round/check completed   room organization consistent   fall prevention program maintained  Taken 1/10/2023 1500 by Sandra Mojica RN  Safety Promotion/Fall Prevention:   safety  round/check completed   room organization consistent   fall prevention program maintained  Taken 1/10/2023 1400 by Sandra Mojica RN  Safety Promotion/Fall Prevention:   safety round/check completed   room organization consistent   fall prevention program maintained  Taken 1/10/2023 1300 by Sandra Mojica RN  Safety Promotion/Fall Prevention:   safety round/check completed   room organization consistent   fall prevention program maintained  Taken 1/10/2023 1200 by Sandra Mojica RN  Safety Promotion/Fall Prevention:   safety round/check completed   room organization consistent   fall prevention program maintained  Taken 1/10/2023 1100 by Sandra Mojica RN  Safety Promotion/Fall Prevention:   safety round/check completed   room organization consistent   fall prevention program maintained  Taken 1/10/2023 1000 by Sandra Mojica RN  Safety Promotion/Fall Prevention:   safety round/check completed   room organization consistent   fall prevention program maintained  Taken 1/10/2023 0900 by Sandra Mojica RN  Safety Promotion/Fall Prevention:   safety round/check completed   room organization consistent   fall prevention program maintained  Taken 1/10/2023 0800 by Sandra Mojica RN  Safety Promotion/Fall Prevention:   safety round/check completed   room organization consistent   fall prevention program maintained  Taken 1/10/2023 0700 by Sandra Mojica RN  Safety Promotion/Fall Prevention:   safety round/check completed   room organization consistent   fall prevention program maintained     Problem: Aspiration (Enteral Nutrition)  Goal: Absence of Aspiration Signs and Symptoms  Outcome: Ongoing, Progressing  Intervention: Minimize Aspiration Risk  Recent Flowsheet Documentation  Taken 1/10/2023 1718 by Sandra Mojica RN  Oral Care:   lip/mouth moisturizer applied   teeth brushed   tongue brushed  Taken 1/10/2023 1548 by Sandra Mojica RN  Oral Care: patient refused intervention  Taken 1/10/2023 1500 by Sandra Mojica  RN  Head of Bed ([facility]) Positioning: HOB at 30 degrees  Taken 1/10/2023 1300 by Sandra Mojica RN  Oral Care: patient refused intervention  Taken 1/10/2023 1100 by Sandra Mojica RN  Oral Care:   lip/mouth moisturizer applied   swabbed with antiseptic solution   teeth brushed   tongue brushed  Taken 1/10/2023 0900 by Sandra Mojica RN  Head of Bed ([facility]) Positioning: HOB at 30 degrees  Taken 1/10/2023 0800 by Sandra Mojica RN  Oral Care:   lip/mouth moisturizer applied   swabbed with antiseptic solution   teeth brushed   tongue brushed  Taken 1/10/2023 0700 by Sandra Mojica RN  Head of Bed ([facility]) Positioning: [facility] at 30 degrees     Problem: Device-Related Complication Risk (Enteral Nutrition)  Goal: Safe, Effective Therapy Delivery  Outcome: Ongoing, Progressing     Problem: Feeding Intolerance (Enteral Nutrition)  Goal: Feeding Tolerance  Outcome: Ongoing, Progressing  Intervention: Prevent and Manage Feeding Intolerance  Recent Flowsheet Documentation  Taken 1/10/2023 0800 by Sandra Mojica RN  Nutrition Support Management: weight trending reviewed   Goal Outcome Evaluation:  Plan of Care Reviewed With: patient        Progress: improving

## 2023-01-10 NOTE — PROGRESS NOTES
Hematology/Oncology Inpatient Progress Note    PATIENT NAME: Jaime Bar  : 1955  MRN: [id number]    CHIEF COMPLAINT: Thrombocytopenia; provoked catheter associated LUE DVT, LUE SVT, and RUE SVT.    HISTORY OF PRESENT ILLNESS:    67 y.o. male admitted to [facility] on transfer from [facility] on 2022.  The patient was intubated and unresponsive at time of consultation with histories obtained from review of records and discussion with patient's niece and nephew.  He reported a history of bilateral shoulder pain for few days prior to admission.  He had presented to [facility] ED on 2022 where he was hypotensive and hyponatremic and transferred to [facility].  CXR on 2022 showed coarsened airspace and bronchovascular thickening with small airway disease such as bronchitis or asthma, edema, atypical/viral infection, and aspiration in differential.  A RUE venous Doppler showed acute RUE superficial thrombophlebitis in the cephalic vein and no evidence of DVT for which he was started on subcu heparin.  On 2022 CBC revealed WBC 7.2, hemoglobin 12.4, MCV 98.4, and platelets 99,000.  Creatinine was elevated to 2.38, BUN 55, sodium was low at 118, potassium was high at 5.4 and LFTs revealed T bili elevated to 4.8 (0-1.2) with transaminases normal.  Blood cultures grew MRSA.  Urinalysis was concerning for UTI.  Urine sodium was < 20, a.m. cortisol 26.11 (6.02-18.4), and urine osmolality was 410 ().   X-rays and MRIs of the bilateral shoulders were performed 2022 revealing joint effusion and advanced degenerative changes/destructive changes of glenohumeral joint.  There was suspected complete tear of the supraspinatus and infraspinatus tendons and a completely macerated appearance of the labrum on the right shoulder.  On 2022 RUE venous Doppler was repeated and felt stable.  He then underwent bilateral shoulder arthroscopy incision and drainage with culture growing  heavy growth of Staph aureus.  He had been intubated prior to the surgery and remained sedated on ventilator with pressors postop.  At time of consultation 12/29/2022 LUE venous Doppler showed acute catheter associated DVT in the left axillary vein, SVT in the left basilic vein of the upper arm.  BLE venous Doppler was negative for DVT/SVT but did show deep venous valvular incompetence in the right popliteal vein.  CBC revealed WBC 14.2, hemoglobin 11.4, MCV 96.5, and platelets 46,000.  T bili was elevated to 5.3 and transaminases were now elevated with  and .  PT was 15.6 (9.6-11.7), PTT was 38.7 (24-31), and fibrinogen was 493 (210-450).  D-dimer was 11.83 (0-0.67).  Transesophageal echocardiogram showed LVEF 46-50%, mild aortic valve stenosis, and no evidence of vegetation or bacterial endocarditis.  Heparin was changed to argatroban.  ID was managing antibiotics with patient on ceftriaxone with plan for treatment x6 weeks and recommending replacing PICC line 2 days after starting anticoagulation.    PMH significant for prostate cancer treated approximately 2010 with radiation alone and no evidence of recurrence to their knowledge.  He also was under the care of Dr. Damico at  for cardiac amyloid.  Chart review showed PSA was 0.1 (0-4) in August 2022.  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  IgG was 768 (603-1613), IgA was 238 (), IgM was 64 ().  Kappa/lambda ratio was 1.36 (0.2-1.65).  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  LVEF in February 2022 was 45-50%.  Cardiac MRI 4/6/2022 revealed a myocardial mass 276 g, global hypokinesis of left ventricle with ejection fraction 43%, no myocardial iron overload, the thickness and appearance of intra-atrial septum was also consistent with cardiac amyloidosis.  The note  stated that the patient was followed by Dr. Banks at  [facility] where he was on Vyndamax therapy for cardiac amyloid and had been on that treatment for about 2 weeks.  Additionally in January 2022, T bili was noted to be elevated to 2.6 (0.2-1.0), hemoglobin 11.8, MCV 86.5 and platelets 261,000.     12/29/22  Hematology/Oncology was consulted by RITU Jimenez for thrombocytopenia.     Past Medical History: Prostate cancer approximately 2010 treated with radiation only.  Cardiac amyloid managed by Dr. Damico at .  CKD.  Surgical History: Several orthopedic surgeries in the past.  Bilateral shoulder arthroscopy with incision and drainage 12/28/2022.  Social History: He lives in [place], [place] with his spouse.  His spouse is known to have Alzheimer's disease.  He has smoked for many years.  He has no alcohol or recreational drug use however he was narcotic dependent secondary to chronic pain.  He is a retired .  Family History: No significant known family history.  Allergies: Tramadol causes him to feel weird and codeine causes nausea.     PCP: Provider, No Known    INTERVAL HISTORY:  • 12/30/2022- platelets 38,000, hemoglobin 11.1.  Folate 7.13 (4.78-24.2), vitamin B12 greater than 2000.  Iron 29 (), iron saturation 15% (20-50), TIBC 195 (298-536), and ferritin 3525 ().  Reticulocytes 1.05 (0.7-1.9), haptoglobin 148 (). PSA 0.234 (0-4).  T bili 5.3 with direct bili 4.2 (0-0.3).  Hepatitis panel nonreactive.  Extubated 12/29/2022 and off pressors.  • 12/31/2022- platelets 46,000, hemoglobin 11.3.  HIT antibody negative at 0.121 (0-0.4).  CMV IgM less than 30 (0-29.9) and EBV IgM less than 36 (0-35.9).  D-dimer mildly improved to 8.94 (0-0.67).  Transaminases rising with  and .  T bili 4.4.  Peripheral smear was negative for schistocytes again.  Abdominal ultrasound revealed cholelithiasis with adenomyomatosis involving the gallbladder  wall.  The common bile duct was in the upper limits of normal.  The liver appeared normal.  • 1/1/2023- platelets 60,000, hemoglobin 11.4.  Argatroban was held with repeat PTT remaining high with subsequent continued hold of argatroban.  D-dimer 9.84.  PTT 61.1 (24-31).  CT head without contrast was negative for acute findings.  • 1/2/2023- hemoglobin 11.3 and platelets 78,000.  ALT improved to 200 and AST improved to 104.  T bili improved to 3.3.  Fibrinogen 445 (210-150).  • 1/3/2023- evaluated by neurology and felt to have multifactorial encephalopathy.  SPEP with no M spike.  Transaminases improved.  Hemoglobin 11.1, platelet count 104,000.  Kappa/lambda free light chain ratio 1.02 (0.26-1.25).  SHAR screen negative.  Right BERNARDINO normal with mild digital ischemia.  Left BERNARDINO normal with severe digital ischemia.  • 1/4/2023- hemoglobin 11.0 and platelets 116,000.  Copper 110 ().  ALT 93 (1-41), AST normalized, and T bili 3.5 (0-1.2).  RUE venous Doppler showed acute RUE superficial thrombophlebitis in the cephalic vein (forearm).  • 1/5/2023- hemoglobin 9.9, platelets 121,000.  ALT 61, T bili 3.1.  Ferrlecit 250 mg IV daily x3 initiated.  UIFE with no monoclonality detected.  SIFE with IgM monoclonal protein with lambda light chain specificity.  2 different results are present for immunoglobulins with different values.  • 1/6/2023- WBC 5.8, hemoglobin 9.9, platelets 125,000.  ALT 52.  • 1/7/2023- hemoglobin 9.4, platelet count 156,000.  • 1/8/2023- hemoglobin 9.1.  Bilirubin 3.0.  Temp 100.3.  • 1/9/2023- hemoglobin 8.4.  Bilirubin 2.3.  NG tube and bilateral shoulder drains removed.  • 1/10/2023- hemoglobin 8.8.  Bilirubin 2.7.  Swallow study on 1/9/2023 with recommendation for pureed diet with Nectar Thick Liquids.      History of present illness reviewed since last visit and changes noted on 01/10/23.    Subjective  Denies any issues.     ROS:   Review of Systems   Constitutional: Negative for activity  change, chills, fatigue, fever and unexpected weight change.   HENT: Negative for congestion, dental problem, hearing loss, mouth sores, nosebleeds, sore throat and trouble swallowing.    Eyes: Negative for photophobia, discharge and visual disturbance.   Respiratory: Negative for cough, chest tightness and shortness of breath.    Cardiovascular: Negative for chest pain, palpitations and leg swelling.   Gastrointestinal: Negative for abdominal distention, abdominal pain, blood in stool, constipation, diarrhea, nausea and vomiting.   Endocrine: Negative for cold intolerance and heat intolerance.   Genitourinary: Negative for decreased urine volume, difficulty urinating, frequency, hematuria and urgency.   Musculoskeletal: Negative for arthralgias and gait problem.   Skin: Negative for rash and wound.   Neurological: Negative for dizziness, tremors, weakness, light-headedness, numbness and headaches.   Hematological: Negative for adenopathy. Does not bruise/bleed easily.   Psychiatric/Behavioral: Negative for confusion and hallucinations. The patient is not nervous/anxious.    All other systems reviewed and are negative.     MEDICATIONS:    Scheduled Meds:  Barium Sulfate, 1 teaspoon(s), Oral, Once in imaging  cefTRIAXone, 2 g, Intravenous, Q24H  docusate sodium, 100 mg, Oral, BID  enoxaparin, 1.5 mg/kg, Subcutaneous, Daily  ethyl alcohol, 2 swab, Nasal, Once  ferrous sulfate, 300 mg, Nasogastric, Daily  First Mouthwash (Magic Mouthwash), 5 mL, Swish & Spit, Q6H  furosemide, 40 mg, Intravenous, Q12H  lansoprazole, 30 mg, Nasogastric, Q AM  midodrine, 10 mg, Oral, Q8H  polyethylene glycol, 17 g, Oral, Daily  sodium chloride, 10 mL, Intravenous, Q12H  Zinc Oxide, , Apply externally, TID       Continuous Infusions:      PRN Meds:  •  acetaminophen **OR** acetaminophen  •  aluminum-magnesium hydroxide-simethicone  •  dextrose  •  dextrose  •  fentanyl  •  glucagon (human recombinant)  •  HYDROcodone-acetaminophen  •   "ondansetron **OR** ondansetron  •  sodium chloride  •  sodium chloride     ALLERGIES:    Allergies   Allergen Reactions   • Tramadol-Acetaminophen Anxiety     Worms in the brain feeling   • Codeine Other (See Comments)     nausea   • Tramadol Other (See Comments)     \"I just felt really crawly, it did weird things with my head\"     Objective    VITALS:   /67   Pulse 82   Temp 99 °F (37.2 °C) (Axillary)   Resp 23   Ht 167.6 cm (66\")   Wt 79.7 kg (175 lb 11.3 oz)   SpO2 98%   BMI 28.36 kg/m²     PHYSICAL EXAM:  Physical Exam  Vitals and nursing note reviewed.   Constitutional:       General: He is not in acute distress.     Appearance: Normal appearance. He is well-developed. He is not diaphoretic.   HENT:      Head: Normocephalic and atraumatic.      Comments: Male pattern baldness.      Right Ear: External ear normal.      Left Ear: External ear normal.      Nose: Nose normal.      Mouth/Throat:      Mouth: Mucous membranes are moist.      Pharynx: Oropharynx is clear. No oropharyngeal exudate or posterior oropharyngeal erythema.      Comments: Dental fillings.   Eyes:      General: No scleral icterus.     Extraocular Movements: Extraocular movements intact.      Conjunctiva/sclera: Conjunctivae normal.      Pupils: Pupils are equal, round, and reactive to light.   Cardiovascular:      Rate and Rhythm: Normal rate and regular rhythm.      Heart sounds: Normal heart sounds. No murmur heard.     Comments: Cardiac monitor leads.   Pulmonary:      Effort: Pulmonary effort is normal. No respiratory distress.      Breath sounds: Normal breath sounds. No wheezing or rales.   Abdominal:      General: Bowel sounds are normal. There is no distension.      Palpations: Abdomen is soft. There is no mass.      Tenderness: There is no abdominal tenderness. There is no guarding.   Genitourinary:     Comments: Deferred   Musculoskeletal:         General: Swelling ( 2+ RUE) present. No tenderness or deformity. Normal " range of motion.      Cervical back: Normal range of motion and neck supple. No rigidity.      Right lower leg: No edema.      Left lower leg: No edema.      Comments: Bilateral peripheral IVs.   Bilateral SCDs.    Feet:      Comments: Waffle boots to BLE.   Lymphadenopathy:      Cervical: No cervical adenopathy.      Upper Body:      Right upper body: No supraclavicular adenopathy.      Left upper body: No supraclavicular adenopathy.   Skin:     General: Skin is warm and dry.      Coloration: Skin is not pale.      Findings: No bruising, erythema or rash.      Comments: Dressing to bilateral shoulders.    Neurological:      General: No focal deficit present.      Mental Status: He is alert and oriented to person, place, and time.      Coordination: Coordination normal.   Psychiatric:         Mood and Affect: Mood normal.         Behavior: Behavior normal.         Thought Content: Thought content normal.         RECENT LABS:  Lab Results (last 24 hours)     Procedure Component Value Units Date/Time    Manual Differential [[id number]]  (Abnormal) Collected: 01/10/23 0420    Specimen: Blood Updated: 01/10/23 0625     Neutrophil % 77.0 %      Lymphocyte % 7.0 %      Monocyte % 6.0 %      Bands %  8.0 %      Metamyelocyte % 1.0 %      Atypical Lymphocyte % 1.0 %      Neutrophils Absolute 4.85 10*3/mm3      Lymphocytes Absolute 0.46 10*3/mm3      Monocytes Absolute 0.34 10*3/mm3      Polychromasia Slight/1+     Stomatocytes Slight/1+     Toxic Granulation Slight/1+     Vacuolated Neutrophils Slight/1+     Platelet Morphology Normal    CBC & Differential [[id number]]  (Abnormal) Collected: 01/10/23 0420    Specimen: Blood Updated: 01/10/23 0625    Narrative:      The following orders were created for panel order CBC & Differential.  Procedure                               Abnormality         Status                     ---------                               -----------         ------                     CBC Auto  Differential[[id number]]        Abnormal            Final result               Scan Slide[[id number]]                                       Final result                 Please view results for these tests on the individual orders.    Scan Slide [[id number]] Collected: 01/10/23 0420    Specimen: Blood Updated: 01/10/23 0625     Scan Slide --     Comment: See Manual Differential Results       CBC Auto Differential [[id number]]  (Abnormal) Collected: 01/10/23 0420    Specimen: Blood Updated: 01/10/23 0625     WBC 5.70 10*3/mm3      RBC 2.80 10*6/mm3      Hemoglobin 8.8 g/dL      Hematocrit 26.0 %      MCV 93.2 fL      MCH 31.5 pg      MCHC 33.8 g/dL      RDW 16.1 %      RDW-SD 52.5 fl      MPV 8.3 fL      Platelets 200 10*3/mm3     Narrative:      The previously reported component NRBC is no longer being reported. Previous result was 0.0 /100 WBC (Reference Range: 0.0-0.2 /100 WBC) on 1/10/2023 at 0448 EST.    Calcium, Ionized [[id number]]  (Abnormal) Collected: 01/10/23 0421    Specimen: Blood Updated: 01/10/23 0513     Ionized Calcium 1.11 mmol/L     Comprehensive Metabolic Panel [[id number]]  (Abnormal) Collected: 01/10/23 0420    Specimen: Blood Updated: 01/10/23 0509     Glucose 93 mg/dL      BUN 28 mg/dL      Creatinine 0.63 mg/dL      Sodium 140 mmol/L      Potassium 3.5 mmol/L      Chloride 96 mmol/L      CO2 39.0 mmol/L      Calcium 8.4 mg/dL      Total Protein 5.4 g/dL      Albumin 2.2 g/dL      ALT (SGPT) 38 U/L      AST (SGOT) 36 U/L      Alkaline Phosphatase 112 U/L      Total Bilirubin 2.7 mg/dL      Globulin 3.2 gm/dL      A/G Ratio 0.7 g/dL      BUN/Creatinine Ratio 44.4     Anion Gap 5.0 mmol/L      eGFR 104.3 mL/min/1.73      Comment: National Kidney Foundation and American Society of Nephrology (ASN) Task Force recommended calculation based on the Chronic Kidney Disease Epidemiology Collaboration (CKD-EPI) equation refit without adjustment for race.       Narrative:      GFR Normal >60  Chronic Kidney  Disease <60  Kidney Failure <15      Phosphorus [[id number]]  (Normal) Collected: 01/10/23 0420    Specimen: Blood Updated: 01/10/23 0509     Phosphorus 3.3 mg/dL     Magnesium [[id number]]  (Normal) Collected: 01/10/23 0420    Specimen: Blood Updated: 01/10/23 0509     Magnesium 1.9 mg/dL     POC Glucose Once [[id number]]  (Normal) Collected: 01/09/23 2345    Specimen: Blood Updated: 01/09/23 2347     Glucose 86 mg/dL      Comment: Serial Number: [id number]:  [id number]       POC Glucose Once [[id number]]  (Normal) Collected: 01/09/23 1650    Specimen: Blood Updated: 01/09/23 1652     Glucose 85 mg/dL      Comment: Serial Number: [id number]:  [id number]       POC Glucose Once [[id number]]  (Normal) Collected: 01/09/23 1222    Specimen: Blood Updated: 01/09/23 1223     Glucose 96 mg/dL      Comment: Serial Number: [id number]:  [id number]           PENDING RESULTS:  Note from Dr. Sauceda at  and genetic testing from  ordered by Dr. Banks.      IMAGING REVIEWED:  FL Video Swallow With Speech Single Contrast    Result Date: 1/9/2023  Impression: Modified barium swallow study utilizing fluoroscopy. Please refer to the speech pathologist report for findings and dietary recommendations. Electronically Signed: Antonia Coombs  1/9/2023 10:41 AM EST  Workstation ID: [id number]    I have reviewed the patient's labs, imaging, reports, and other clinician documentation.    Assessment & Plan   ASSESSMENT:  1. Acute Thrombocytopenia-platelets were normal in early 2022. HIT antibody negative.  Platelets improving on Rocephin.  LFTs elevated on admission but have now improved.  Bilirubin remains high.  Presumably due to sepsis.   No hemolysis and no schistocytes on peripheral smear ruling out TTP.  No vitamin B12 or folate deficiencies. SHAR, CMV and EBV titers are all negative. Coags and D-dimer high with a normal fibrinogen.  Platelets normalized 1/7/2023.  2. Acute on chronic anemia/IgA lambda monoclonal  gammopathy/SWETHA- mild anemia with hemoglobin in the 11 range in early 2022.  Gammopathy labs at that time showed IgA lambda monoclonal protein with normal immunoglobulins and free light chains.  Iron studies showed SWETHA and started oral iron.  No hemolysis or vitamin B12/folate or copper deficiencies. SPEP with no M spike and free light chain ratio normal.  UIFE negative but SIFE with IgA lambda monoclonal protein.  2 different immunoglobulin values listed.  Hemoglobin was stable but then dropped he was treated with Ferrlecit 250 mg IV daily x3.  Received notes from cardiology at  which mentioned they were going to involve Dr. Sauceda and will obtain any records available.  3. Acute provoked catheter associated LUE DVT/SVT and RUE SVT- RUE SVT was diagnosed prior to LUE DVT and he had received several doses of subcu heparin starting on 12/26. LUE DVT provoked by PICC line which  has since been removed.  Argatroban was difficult to manage with liver dysfunction.  Started low-dose Lovenox 1/1/2023 and increased to full treatment dose 1/3/2023.  Repeat RUE venous Doppler showed stable SVT.  4. Elevated LFT/cholelithiasis and adenomyomatosis of the gallbladder wall- possible shock liver.  T bili remains elevated with elevated direct bili.  Normal liver on US and hepatitis panel negative.  LFTs improved but bilirubin remains elevated (direct)-possibly related to ceftriaxone.   5. Bilateral shoulder septic arthritis/MRSA bacteremia-No vegetation on echocardiogram.  Right shoulder drainage remains cloudy. On antibiotics per ID until 2/11/2023.   6. History of prostate cancer-PSA remains normal.   7. Cardiac amyloidosis- on chart review it has been treated at .  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  Records reviewed from  cardiology.  Genetic testing was being done for hereditary ATTR amyloidosis and patient was referred to Dr. Sauceda to look for  systemic amyloidosis.  8. STEVEN/hyponatremia-nephrology managing.  Resolved.   9. A. fib with RVR- patient evaluated by EP and recommended to start Eliquis 5 mg p.o. twice daily (not started as patient currently on Lovenox) with plan for cardioversion prior to discharge as long as he remains on anticoagulation.  10. Oral mucositis-added FIRST mouthwash.  Improving.    PLAN:  1. Continue daily CBC.  2. Repeat immunoglobulins in near future.  3. Continue Lovenox 1.5 mg/kg SQ daily.  4. Continue ferrous sulfate syrup 300 mg daily per NG tube.   5. Skeletal survey when patient out of ICU.  6. Pending receipt of records from Dr. Sauceda at .  7. Pending receipt of genetic testing by Dr. Banks at  evaluating for hereditary ATTR amyloidosis.    Electronically signed by RITU Hastings, 01/10/23, 11:13 AM EST.      I have personally performed a face-to-face diagnostic evaluation on this patient. I have performed a complete history and physical examination, reviewed laboratory studies, and radiographic examinations.  I have completed the majority and substantive portion of the medical decision making.  I have formulated the assessment on this patient and the plan of action as noted above. I have discussed the case with Piper Jeffries NP, have edited/reviewed the note, and agree with the care plan.  The patient seems to be feeling all right.  On examination, the NG tube and shoulder drains have been removed.  Hemoglobin is 8.8.  He continues to slowly improve.  He is starting on oral feedings.  We will continue Lovenox and ferrous sulfate.    I discussed the patient's findings and my recommendations with patient and nursing.    Part of this note may be an electronic transcription/translation of spoken language to printed text using the Dragon Dictation System.    Electronically signed by Vicente Mendoza MD, 01/10/23, 6:04 PM EST.

## 2023-01-10 NOTE — DISCHARGE SUMMARY
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 male     Brief Patient Summary:  Jaime Bar is a 67 y.o. male with past medical history of cervicalgia, primary osteoarthritis involving multiple joints with surgery on bilateral shoulders and right knee replacement but no recent procedure or surgery, hyponatremia, prostate cancer, and cardiac amyloidosis presented to [facility] on 2022 with complaints of right shoulder pain, weakness, and altered mental status.  He was found to be hypotensive and hyponatremic with elevated lactic acid.  Patient had reported decreased oral intake.  He was transferred to [facility] ICU for further treatment of septic shock requiring vasopressors and hyponatremia.      Since admission he was found to be bacteremic  with 2 sets of blood cultures positive for staph aureus, source being bilateral shoulder septic arthritis. ID has been treating the patient with IV ceftriaxone 2G and he will need a total of 6 weeks of antibiotic therapy.  He has status post bilateral shoulder arthroscopy with extensive debridement by Dr. Velez 12/28/2022. He remained intubated after surgery until 12/29 and now extubated. He has been weaned off vasopressors. Nephrology is managing his hyponatremia and STEVEN on CKD. He was found to have LUE DVT provoked from PICC line currently on argatroban. Hematology has been consulted for his thrombocytopenia.         Barium Sulfate, 1 teaspoon(s), Oral, Once in imaging  cefTRIAXone, 2 g, Intravenous, Q24H  docusate sodium, 100 mg, Oral, BID  enoxaparin, 1.5 mg/kg, Subcutaneous, Daily  ethyl alcohol, 2 swab, Nasal, Once  ferrous sulfate, 300 mg, Nasogastric, Daily  First Mouthwash (Magic Mouthwash), 5 mL, Swish & Spit, Q6H  furosemide, 40 mg, Intravenous, Q12H  lansoprazole, 30 mg, Nasogastric, Q AM  midodrine, 10 mg, Oral, Q8H  polyethylene glycol, 17 g, Oral, Daily  sodium chloride, 10 mL, Intravenous, Q12H  Zinc Oxide, , Apply externally, TID               Active Hospital Problems:       Active Hospital Problems     Diagnosis     • Dysphagia     • Bacteremia due to methicillin susceptible Staphylococcus aureus (MSSA)     • Staphylococcal arthritis of shoulder (HCC)     • Cervicalgia, spine surgery 2017     • Primary osteoarthritis involving multiple joints     • Hyponatremia     • Acute kidney injury superimposed on chronic kidney disease (HCC)     • Cardiac amyloidosis (HCC)     • ACC/AHA stage C chronic systolic heart failure (HCC)     • Tobacco abuse     • Primary hypertension        Plan:     Bilateral shoulder septic arthritis  Septic shock secondary to MSSA bacteremia  -Synovial fluid culture was consistent with infection and cultures are growing 4+ Staph aureus  -s/p bilateral shoulder arthroscopy with  extensive debridement by Dr. Velez 12/28/2022  - 2/2 blood cultures positive for MSSA secondary to bilateral shoulder septic arthritis   -ALMA DELIA on 12/28, Negative for endocarditis  -On IV Rocephin 2 g daily x6 weeks per ID (last day of therapy is 11FEB23)  -Off IV vasopressors, remains on midodrine  -Discontinued art line, MAP greater than 65 mmHg at this time  -LUE PICC line removed and tip also sent for culture  -Fevers have been intermittent  , CT of the spine does not show any acute abscess or infectious changes  -ID following  -Left PADMINI with minimal serous output, right remains more copious and cloudy, management as per Ortho  ID recommendation  ontinue IV ceftriaxone 2 g daily for total of 6 weeks.  The last day of IV ceftriaxone will be February 11, 2023  Patient will need a PICC line before discharge-please remove when IV antibiotics are completed.  Weekly labs can CBC, creatinine and sed rate times x5 weeks        Dysphagia  -Speech following, still not appropriate for PO intake  -NG tube feeds at goal. However, it's been 8 days with NG now.  -May need to consider PEG if ST doesn't think he can move towards PO intake     Acute metabolic encephalopathy, improved  Severe thrombocytopenia, improved  LUE DVT, provoked from PICC line/RUE showing superficial thrombus  -Initially concern for TTP however no schistocytes seen on peripheral smear per hematology  -Platelets improving  -Off argatroban drip, PTT elevated  -On low-dose Lovenox twice daily and increase to full dose now.  -Patient's left lower extremity is back to normal now, good pulses noted, BERNARDINO showed mild digital ischemia  -No peripheral cyanosis noted at this time  -Video-study was done and patient given puréed diet with nectar thick liquids.-  Feeding tube removed     Acute respiratory failure with hypoxia, improved  -Pulmonary edema, improved  -Postop respiratory failure, extubated on 12/39  -Remains on 2LNC, wean as tolerated  -On diuresis per  nephrology     STEVEN on CKD  Hyponatremia on admission and then hypernatremia both normalized  Defer to nephrologist     PAF, s/p RVR  -Off amnio drip, rate controlled now  -Anticoagulation recommendations per cardiology and heme-onc  -Cardiology following, may need cardioversion prior to discharge  Continues to be in atrial fibrillation with rate controlled  Continues on full dose Lovenox, will change to Eliquis on discharge plan for cardioversion once patient no longer afebrile.  Reconsult cardiology.  But patient wanted another opinion and will see his primary cardiologist       . Cholelithiasis with adenomyomatosis involving gallbladder wall.   Common bile duct at the upper limits of normal measuring 6 to 7 mm.  Follow-up with GI in 2 to 3 weeks  Ammonia level is normal    Elevated LFTs  -Likely due to shock liver  -Hep panel negative  -Liver ultrasound showed normal hepatic parenchyma, cholelithiasis with adenomyomatosis involving gallbladder wall, CBD upper limit of normal  -Monitor  Ammonia level normal  Mild elevated bilirubin but direct bilirubin pending and levels have been trending down  Follow-up with GI as outlined above      H/o cardiac amyloidosis.  Patient may resume his home medicationTafamidis     H/o prostate CA  Osteoarthritis      DVT/GI Ppx.     DNR/DNI.           DISCHARGE Follow Up Recommendations for labs and diagnostics:     ID recommendation  Continue IV ceftriaxone 2 g daily for total of 6 weeks.  The last day of IV ceftriaxone will be February 11, 2023  Patient will need a PICC line before discharge-please remove when IV antibiotics are completed.  Weekly labs can CBC, creatinine and sed rate times x5 weeks  Follow-up with orthopedic surgery in 2 to 4 weeks  Follow-up with GI in 2 to 4 weeks for abnormalities noted on gallbladder  Remove PICC line after completion of antibiotic therapy  W please continue current wound care  Consider compression stockings to the right upper  extremity    Reasons For Change In Medications and Indications for New Medications:      Day of Discharge     Vital Signs:  Temp:  [98.1 °F (36.7 °C)-99 °F (37.2 °C)] 98.1 °F (36.7 °C)  Heart Rate:  [77-94] 94  Resp:  [18-28] 23  BP: ()/(56-82) 127/77  Flow (L/min):  [2] 2    Physical Exam:  Physical Exam   Heart rate controlled  Patient slightly drowsy but responds to questions appropriately  Right upper extremity swelling is the same without change but has good pulsations        Pertinent  and/or Most Recent Results     LAB RESULTS:      Lab 01/10/23  0420 01/09/23  0417 01/08/23  0357 01/07/23  0712 01/06/23  0357 01/05/23  0411   WBC 5.70 5.30 7.00 6.90 5.80 5.90   HEMOGLOBIN 8.8* 8.4* 9.1* 9.4* 9.9* 9.9*   HEMATOCRIT 26.0* 25.2* 27.4* 28.4* 29.7* 30.6*   PLATELETS 200 170 175 156 125* 121*   NEUTROS ABS 4.85 4.10 5.60 5.70 4.60 4.70   LYMPHS ABS  --  0.40* 0.40* 0.30* 0.40* 0.40*   MONOS ABS  --  0.80 1.00* 0.80 0.70 0.80   EOS ABS  --  0.00 0.00 0.00 0.00 0.00   MCV 93.2 93.6 93.5 95.8 94.5 96.2         Lab 01/10/23  0421 01/10/23  0420 01/09/23 0417 01/08/23 0357 01/07/23 0712 01/06/23 0357   SODIUM  --  140 139 138 138 141   POTASSIUM  --  3.5 3.5 3.5 3.8 3.8   CHLORIDE  --  96* 96* 96* 96* 99   CO2  --  39.0* 37.0* 35.0* 36.0* 33.0*   ANION GAP  --  5.0 6.0 7.0 6.0 9.0   BUN  --  28* 31* 30* 28* 32*   CREATININE  --  0.63* 0.56* 0.57* 0.53* 0.55*   EGFR  --  104.3 108.0 107.5 109.8 108.6   GLUCOSE  --  93 106* 119* 94 106*   CALCIUM  --  8.4* 7.9* 8.1* 7.8* 7.8*   IONIZED CALCIUM 1.11*  --  1.10* 1.10* 1.09* 1.07*   MAGNESIUM  --  1.9 1.9 1.8 1.7 1.8   PHOSPHORUS  --  3.3 2.8 2.7 3.1 2.8         Lab 01/10/23  0420 01/09/23  0417 01/08/23  0357 01/07/23  0712 01/06/23  0357   TOTAL PROTEIN 5.4* 5.3* 5.3* 5.3* 5.1*   ALBUMIN 2.2* 2.1* 2.1* 2.2* 2.0*   GLOBULIN 3.2 3.2 3.2 3.1 3.1   ALT (SGPT) 38 35 40 43* 52*   AST (SGOT) 36 35 34 33 39   BILIRUBIN 2.7* 2.3* 3.0* 3.0* 3.4*   ALK PHOS 112 135* 111 91  103                     Brief Urine Lab Results  (Last result in the past 365 days)      Color   Clarity   Blood   Leuk Est   Nitrite   Protein   CREAT   Urine HCG        12/26/22 1500 Ambar  Comment: Result checked     Clear   Trace   Negative   Positive   30 mg/dL (1+)               Microbiology Results (last 10 days)     Procedure Component Value - Date/Time    Catheter Culture - Cath Tip, Hand, Left [[id number]] Collected: 12/31/22 1839    Lab Status: Final result Specimen: Cath Tip from Hand, Left Updated: 01/03/23 0958     CATHETER CULTURE No growth at 2 days          XR Shoulder 2+ View Right    Result Date: 12/27/2022  Impression:  1. Severe degenerative changes of the glenohumeral joint as well as a high riding humeral head, suggestive of chronic rotator cuff pathology. No definite evidence of osteomyelitis is identified. However plain films cannot exclude septic arthritis.  Electronically Signed By-Juan Francisco Lozyoa MD On:12/27/2022 1:42 PM This report was finalized on [id number] by  Juan Francisco Lozoya MD.    CT Head Without Contrast    Result Date: 12/31/2022  Impression: 1. No acute intracranial abnormality. Specifically, no evidence of acute hemorrhage, mass effect or midline shift. 2. Mild global cerebral volume loss. 3. Mild bilateral air-fluid levels within the maxillary sinuses which can be seen with acute sinus disease in the correct clinical setting.  Electronically Signed By-Willy Baxter MD On:12/31/2022 9:01 PM This report was finalized on [id number] by  Willy Baxter MD.    CT Cervical Spine With Contrast    Result Date: 1/3/2023  Impression: 1.Postoperative changes status post prior fusion. No definitive signs of hardware failure or loosening are noted. 2.No evidence for displaced fracture or malalignment throughout the cervical spine. 3.No evidence for acute soft tissue abnormality. There is no evidence for abnormal enhancement. No abnormal peripherally enhancing fluid collection is  seen. 4.Changes of cervical spondylosis are noted throughout the cervical spine. Associated hypertrophic posterior facet changes and uncovertebral changes are also observed. Electronically Signed: Solo Ray  1/3/2023 10:25 PM EST  Workstation ID: [id number]    CT Thoracic Spine With Contrast    Result Date: 1/3/2023  Impression: 1.No evidence for displaced fracture or malalignment throughout the thoracic spine. No evidence for erosive endplate changes. 2.No evidence for abnormal enhancement. There is no abnormal fluid collection within the paraspinal soft tissues. There is no evidence for epidural abscess. 3.Degenerative changes are noted throughout the thoracic spine. 4.Interstitial changes are seen throughout the lungs. Bibasilar infiltrates are noted. Bilateral pleural effusions are observed. Electronically Signed: Solo Ray  1/3/2023 10:31 PM EST  Workstation ID: [id number]    CT Lumbar Spine With Contrast    Result Date: 1/3/2023  Impression: 1.No evidence for displaced fracture or malalignment throughout the lumbar spine. No evidence for erosive endplate changes. 2.No evidence for abnormal enhancement. There is no abnormal fluid collection within the paraspinal soft tissues. There is no evidence for epidural abscess. 3.Advanced degenerative changes are noted throughout the lumbar spine. 4.Evidence for bilateral spondylolysis with associated spondylolisthesis at the L5 level. Prior postoperative changes are also noted. Electronically Signed: Solo Ray  1/3/2023 10:35 PM EST  Workstation ID: [id number]    FL Video Swallow With Speech Single Contrast    Result Date: 1/9/2023  Impression: Impression: Modified barium swallow study utilizing fluoroscopy. Please refer to the speech pathologist report for findings and dietary recommendations. Electronically Signed: Antonia Coombs  1/9/2023 10:41 AM EST  Workstation ID: [id number]    US Liver    Result Date: 12/30/2022  Impression: 1. Normal hepatic parenchyma.  2. Cholelithiasis with adenomyomatosis involving gallbladder wall. 3. Common bile duct at the upper limits of normal measuring 6 to 7 mm.  Electronically Signed By-Genaro Canada MD On:12/30/2022 3:40 PM This report was finalized on [id number] by  Genaro Canada MD.    XR Chest 1 View    Result Date: 1/3/2023  Impression: Impression: 1. Radiographic changes consistent with congestive heart failure pulmonary edema with interval increase in pulmonary edema and development of small bilateral pleural effusions. Electronically Signed: Jaime Momin  1/3/2023 9:14 AM EST  Workstation ID: [id number]    XR Chest 1 View    Result Date: 12/30/2022  Impression: Cardiomegaly and increased pulmonary vascular congestion and suspected interstitial edema  Lines and tubes as above[  Electronically Signed By-Jaxon Aly On:12/30/2022 10:19 AM This report was finalized on [id number] by  Jaxon Aly, .    XR Chest 1 View    Result Date: 12/29/2022  Impression: The endotracheal tube is approximately 6 mm above the sarah. Recommend repositioning. Left subclavian central venous catheter has its catheter tip overlying the superior vena cava. The cardiac silhouette is moderately to significantly enlarged and the pulmonary vessels are within normal limits. There is no focal area of consolidation otherwise seen. Electronically signed by:  Los Mccoy D.O.  12/28/2022 11:13 PM Mountain Time    XR Chest 1 View    Result Date: 12/27/2022  Impression:  1. Persistent diffuse interstitial opacities. Differential includes interstitial pulmonary edema, or atypical infection.  Electronically Signed By-Juan Francisco Lozoya MD On:12/27/2022 8:46 AM This report was finalized on [id number] by  Juan Francisco Lozoya MD.    XR Chest 1 View    Result Date: 12/26/2022  Impression:   1.  Interval placement of left-sided PICC line with the tip projecting over the superior vena cava. 2.  No change in coarsened reticular interstitial markings throughout both  lungs.  No new airspace consolidation or pleural effusion.  Electronically Signed By-Los Diop MD On:12/26/2022 4:56 PM This report was finalized on [id number] by  Los Diop MD.    XR Chest 1 View    Result Date: 12/26/2022  Impression: 1.  Coarsened airspace, bronchovascular thickening.  Consider small airways disease (bronchitis, asthma), edema, atypical/viral infection, aspiration. 2.  Heart size is normal. 3.  No acute bony findings. Electronically signed by:  Bernadette Huddleston M.D.  12/26/2022 4:56 AM Mountain Time    MRI Shoulder Right Without Contrast    Result Date: 12/28/2022  Impression: Impression: 1. Moderate-sized joint effusion with advanced degenerative changes and destructive changes of the glenohumeral joint. Findings may be related to septic arthritis given clinical history. Fluid aspiration is recommended for confirmation. Joint effusion with subcoracoid bursitis may also be reactive and related to severe osteoarthritis. 2. Suspected complete tears of the supraspinatus and infraspinatus tendons. 3. Completely macerated appearance of the labrum. 4. Moderate acromial clavicular osteoarthritis. Electronically Signed: Linnette Pacheco  12/28/2022 10:08 AM EST  Workstation ID: [id number]    MRI Shoulder Left Without Contrast    Result Date: 12/28/2022  Impression: Impression: Very large left-sided joint effusion with destructive changes of the glenohumeral joint with deformity of the glenoid likely related to septic arthritis. Fluid sampling is recommended for further characterization. Ancillary findings as described above. Electronically Signed: Linnette Pacheco  12/28/2022 10:06 AM EST  Workstation ID: [id number]    XR Abdomen KUB    Result Date: 1/7/2023  Impression: Enteric tube in the stomach. Thank you for the referral of this patient. This exam was interpreted by an American Board of Radiology certified radiologist with subspecialty fellowship in Pediatric Radiology. If there are any questions  regarding this exam please feel free to contact a radiologist directly at [phone]. Slot 64 Electronically signed by:  Mau Rodriguez M.D.  1/7/2023 4:48 AM Mountain Time    XR Abdomen KUB    Result Date: 12/30/2022  Impression: NG tube projects over the expected position of the body of the stomach  Electronically Signed By-Jaxon Aly On:12/30/2022 12:00 PM This report was finalized on [id number] by  Jaxon Aly, .      Results for orders placed during the hospital encounter of 12/26/22    Duplex Venous Upper Extremity - Right CAR    Interpretation Summary  •  Acute right upper extremity superficial thrombophlebitis noted in the cephalic (forearm).  •  All other right sided vessels appear normal.      Results for orders placed during the hospital encounter of 12/26/22    Duplex Venous Upper Extremity - Right CAR    Interpretation Summary  •  Acute right upper extremity superficial thrombophlebitis noted in the cephalic (forearm).  •  All other right sided vessels appear normal.      Results for orders placed during the hospital encounter of 12/26/22    Adult Transesophageal Echo (ALMA DELIA) W/ Cont if Necessary Per Protocol (Cardiology Department)    Interpretation Summary  •  Left ventricular ejection fraction appears to be 46 - 50%.  •  Mild aortic valve stenosis is present.  •  Estimated right ventricular systolic pressure from tricuspid regurgitation is normal (<35 mmHg).  •  All 4 cardiac valves were well visualized, no vegetations were seen, no evidence of bacterial endocarditis.      Labs Pending at Discharge:      Procedures Performed  Procedure(s):  SHOULDER ARTHROSCOPY INCISION AND DRAINAGE.  SHOULDER ARTHROSCOPY INCISION AND DRAINAGE         Consults:   Consults     Date and Time Order Name Status Description    1/10/2023  9:17 AM Inpatient Cardiology Consult      1/3/2023  1:52 PM Inpatient Neurology Consult General Completed     1/3/2023  1:17 PM Inpatient Neurology Consult General       12/30/2022  9:55 AM Inpatient Hospitalist Consult      12/29/2022 11:15 AM Hematology & Oncology Inpatient Consult Completed     12/27/2022 12:00 PM Inpatient Orthopedic Surgery Consult Completed     12/27/2022  9:39 AM Inpatient Infectious Diseases Consult Completed     12/26/2022  2:44 PM Inpatient Cardiology Consult      12/26/2022  9:48 AM Inpatient Nephrology Consult Completed             Discharge Details        Discharge Medications      New Medications      Instructions Start Date   Boudreauxs Butt Paste 16 % ointment  Generic drug: Zinc Oxide   Apply externally, 3 Times Daily      cefTRIAXone 2 g in sodium chloride 0.9 % 100 mL IVPB   2 g, Intravenous, Every 24 Hours   Start Date: January 11, 2023     ferrous sulfate 300 (60 Fe) MG/5ML syrup   300 mg, Oral, Daily   Start Date: January 11, 2023     furosemide 40 MG tablet  Commonly known as: Lasix   40 mg, Oral, 2 Times Daily      midodrine 10 MG tablet  Commonly known as: PROAMATINE   10 mg, Oral, Every 8 Hours      polyethylene glycol 17 g packet  Commonly known as: MIRALAX   17 g, Oral, Daily   Start Date: January 11, 2023        Continue These Medications      Instructions Start Date   albuterol sulfate  (90 Base) MCG/ACT inhaler  Commonly known as: PROVENTIL HFA;VENTOLIN HFA;PROAIR HFA   2 puffs, Inhalation, Every 4 Hours PRN      HYDROcodone-acetaminophen  MG per tablet  Commonly known as: NORCO   1 tablet, Oral, Every 4 Hours PRN      omeprazole 20 MG capsule  Commonly known as: priLOSEC   20 mg, Oral, Daily      Stiolto Respimat 2.5-2.5 MCG/ACT aerosol solution inhaler  Generic drug: tiotropium bromide-olodaterol   2 puffs, Inhalation, Daily - RT         Stop These Medications    diclofenac 75 MG EC tablet  Commonly known as: VOLTAREN     metoprolol succinate XL 25 MG 24 hr tablet  Commonly known as: TOPROL-XL     sodium bicarbonate 650 MG tablet     spironolactone 25 MG tablet  Commonly known as: ALDACTONE            Allergies  "  Allergen Reactions   • Tramadol-Acetaminophen Anxiety     Worms in the brain feeling   • Codeine Other (See Comments)     nausea   • Tramadol Other (See Comments)     \"I just felt really crawly, it did weird things with my head\"         Discharge Disposition:     Skilled Nursing Facility (DC - External)    Diet:  Hospital:  Diet Order   Procedures   • NPO Diet NPO Type: Strict NPO         Discharge Activity:         CODE STATUS:  Code Status and Medical Interventions:   Ordered at: 12/27/22 1355     Medical Intervention Limits:    NO intubation (DNI)     Level Of Support Discussed With:    Next of Kin (If No Surrogate)     Code Status (Patient has no pulse and is not breathing):    No CPR (Do Not Attempt to Resuscitate)     Medical Interventions (Patient has pulse or is breathing):    Limited Support     Release to patient:    Routine Release         No future appointments.        Time spent on Discharge including face to face service:  25 minutes    This patient has been examined wearing appropriate Personal Protective Equipment and discussed with hospital infection control department. 01/10/23      Signature: Electronically signed by Dante Simpson MD, 01/10/23, 4:03 PM [facility] Hospitalist Team      "

## 2023-01-10 NOTE — PROGRESS NOTES
[facility] Daily Progress Note    Patient Name: Jaime Bar  : 1955  MRN: [id number]  Primary Care Physician:  Lobo, No Known  Date of admission: 2022      Subjective      Chief Complaint: AMS      Patient reports  No events overnight  Seen and examined  Without new symptoms or events  Patient remains stable so far and without significant temperature elevation but low-grade temperature noted 99.0  Cardiology reconsulted given patient still in A. fib and previous notes showing that patient would need cardioversion prior to discharge  Patient seen by speech therapist yesterday and given puréed diet with nectar thick liquids and feeding tube removed  Patient labs were reviewed and appear to be without significant change        Review of system  All other systems reviewed and negative except as stated above    Objective      Vitals:   Temp:  [98 °F (36.7 °C)-99 °F (37.2 °C)] 99 °F (37.2 °C)  Heart Rate:  [77-97] 94  Resp:  [14-28] 23  BP: ()/(56-82) 134/80  Flow (L/min):  [2] 2    Physical Exam:    General: Chronically ill-appearing gentleman, sleeping but arousable, no acute distress  HEENT: NCAT, neck is supple, NG in place  Cardiovascular: RRR  Respiratory: Fairly clear to auscultation bilaterally with some bibasilar crackles, diminished throughout, nondistressed  Abdomen: Soft, nontender to exam, normoactive bowel sounds  Neurologic: A&O, nonfocal, follows commands  Skin: Warm, bilateral shoulder incisions bandaged, drains in place. Bilateral upper extremity pitting edema noted R>L, reportedly improved since initial presentation    Result Review    Result Review:  I have personally reviewed the results from the time of this admission to 1/10/2023 09:18 EST and agree with these findings:  [x]  Laboratory  [x]  Microbiology  [x]  Radiology  [x]  EKG/Telemetry   []  Cardiology/Vascular   []  Pathology  [x]  Old records  []  Other:    Wounds (last 24  hours)     LDA Wound     Row Name 01/10/23 0415 01/10/23 0350 01/10/23 0015       Wound 12/28/22 1856 Right shoulder Incision    Wound - Properties Group Placement Date: 12/28/22  -HB Placement Time: 1856 -HB Side: Right  -HB Location: shoulder  -HB Primary Wound Type: Incision  -HB    Closure KATHRYN  -MC -- KATHRYN  -MC    Base dressing in place, unable to visualize  -MC -- dressing in place, unable to visualize  -MC    Drainage Amount none  -MC -- none  -MC    Dressing Care dressing changed  -MC -- --    Retired Wound - Properties Group Placement Date: 12/28/22  -HB Placement Time: 1856  -HB Side: Right  -HB Location: shoulder  -HB Primary Wound Type: Incision  -HB    Retired Wound - Properties Group Date first assessed: 12/28/22  -HB Time first assessed: 1856  -HB Side: Right  -HB Location: shoulder  -HB Primary Wound Type: Incision  -HB       Wound 12/28/22 1940 Left shoulder Incision    Wound - Properties Group Placement Date: 12/28/22  -HB Placement Time: 1940 -HB Side: Left  -HB Location: shoulder  -HB Primary Wound Type: Incision  -HB    Closure KATHRYN  -MC -- KATHRYN  -MC    Base dressing in place, unable to visualize  -MC -- dressing in place, unable to visualize  -MC    Drainage Amount none  -MC -- none  -MC    Dressing Care dressing changed  -MC -- --    Retired Wound - Properties Group Placement Date: 12/28/22  -HB Placement Time: 1940  -HB Side: Left  -HB Location: shoulder  -HB Primary Wound Type: Incision  -HB    Retired Wound - Properties Group Date first assessed: 12/28/22  -HB Time first assessed: 1940  -HB Side: Left  -HB Location: shoulder  -HB Primary Wound Type: Incision  -HB       Wound 12/30/22 2000 sacral spine Pressure Injury    Wound - Properties Group Placement Date: 12/30/22  -MB Placement Time: 2000  -MB Location: sacral spine  -MB Primary Wound Type: Pressure inj  -MB    Closure None  -MC -- None  -MC    Base -- -- clean;dry;closed/resurfaced;pink  some peeling skin on outter layer but appears to  be intact under peeling skin  -MC    Periwound intact;dry  -MC -- intact;dry  -MC    Periwound Temperature warm  -MC -- warm  -MC    Periwound Skin Turgor soft  -MC -- soft  -MC    Drainage Amount none  -MC -- none  -MC    Care, Wound cleansed with;soap and water  bath wipes  -MC -- --    Dressing Care skin barrier agent applied  -MC -- --    Periwound Care barrier ointment applied  -MC -- --    Retired Wound - Properties Group Placement Date: 12/30/22  -MB Placement Time: 2000 -MB Location: sacral spine  -MB Primary Wound Type: Pressure inj  -MB    Retired Wound - Properties Group Date first assessed: 12/30/22  -MB Time first assessed: 2000 -MB Location: sacral spine  -MB Primary Wound Type: Pressure inj  -MB       Wound 01/10/23 0300 penis    Wound - Properties Group Placement Date: 01/10/23  -MC Placement Time: 0300 -MC Location: penis  -MC    Wound Image -- Images linked: 2  -MC --    Care, Wound cleansed with  bath wipes  -MC -- --    Retired Wound - Properties Group Placement Date: 01/10/23  -MC Placement Time: 0300 -MC Location: penis  -MC    Retired Wound - Properties Group Date first assessed: 01/10/23  -MC Time first assessed: 0300 -MC Location: penis  -MC    Row Name 01/09/23 2015 01/09/23 1600 01/09/23 1200       Wound 12/28/22 1856 Right shoulder Incision    Wound - Properties Group Placement Date: 12/28/22  -HB Placement Time: 1856  -HB Side: Right  -HB Location: shoulder  -HB Primary Wound Type: Incision  -HB    Dressing Appearance -- dry;intact  -JW dry;intact  -JW    Closure KATHRYN  -MC KATHRYN  -JW --    Base dressing in place, unable to visualize  -MC dressing in place, unable to visualize  -JW epithelialization;pink  -JW    Periwound -- -- intact;dry;warm  -JW    Periwound Temperature -- -- warm  -JW    Periwound Skin Turgor -- -- firm  pt has bony shoulders  -JW    Edges -- -- open  -JW    Drainage Characteristics/Odor -- -- tan;creamy  -JW    Drainage Amount none  -MC none  -JW small  -JW     Care, Wound -- -- cleansed with;soap and water  -JW    Dressing Care -- foam  -JW foam;dressing applied  -JW    Periwound Care -- dry periwound area maintained  -JW dry periwound area maintained  -JW    Retired Wound - Properties Group Placement Date: 12/28/22  -HB Placement Time: 1856 -HB Side: Right  -HB Location: shoulder  -HB Primary Wound Type: Incision  -HB    Retired Wound - Properties Group Date first assessed: 12/28/22  -HB Time first assessed: 1856  -HB Side: Right  -HB Location: shoulder  -HB Primary Wound Type: Incision  -HB       Wound 12/28/22 1940 Left shoulder Incision    Wound - Properties Group Placement Date: 12/28/22  -HB Placement Time: 1940 -HB Side: Left  -HB Location: shoulder  -HB Primary Wound Type: Incision  -HB    Dressing Appearance -- dry;intact  -JW dry;intact  -JW    Closure KATHRYN  -MC KATHRYN  -JW --    Base dressing in place, unable to visualize  -MC dressing in place, unable to visualize  -JW epithelialization;clean;dry  -JW    Periwound -- -- intact;dry  -JW    Periwound Temperature -- -- warm  -JW    Periwound Skin Turgor -- -- firm  bony shoulders  -JW    Edges -- -- open  -JW    Drainage Amount none  -MC none  -JW none  -JW    Care, Wound -- -- cleansed with;soap and water  -JW    Dressing Care -- foam  -JW foam;dressing applied  -JW    Periwound Care -- dry periwound area maintained  -JW dry periwound area maintained  -JW    Retired Wound - Properties Group Placement Date: 12/28/22  -HB Placement Time: 1940 -HB Side: Left  -HB Location: shoulder  -HB Primary Wound Type: Incision  -HB    Retired Wound - Properties Group Date first assessed: 12/28/22  -HB Time first assessed: 1940  -HB Side: Left  -HB Location: shoulder  -HB Primary Wound Type: Incision  -HB       Wound 12/30/22 2000 sacral spine Pressure Injury    Wound - Properties Group Placement Date: 12/30/22  -MB Placement Time: 2000 -MB Location: sacral spine  -MB Primary Wound Type: Pressure inj  -MB    Dressing Appearance  -- open to air  -JW dry;intact  -    Closure None  - None  - Adhesive bandage  -JW    Base clean;dry;closed/resurfaced;pink  some peeling skin on outter layer but appears to be intact under peeling skin  - clean;dry;closed/resurfaced;pink  some peeling skin on outter layer but appears to be intact under peeling skin  -JW dressing in place, unable to visualize  -    Periwound intact;dry  - intact;dry  - --    Periwound Temperature warm  - warm  - --    Periwound Skin Turgor soft  - soft  -JW --    Drainage Amount none  - none  - none  -    Care, Wound -- cleansed with;soap and water  - --    Dressing Care -- skin barrier agent applied  zinc  -JW --    Periwound Care -- barrier ointment applied  -JW --    Retired Wound - Properties Group Placement Date: 12/30/22 -MB Placement Time: 2000 -MB Location: sacral spine  -MB Primary Wound Type: Pressure inj  -MB    Retired Wound - Properties Group Date first assessed: 12/30/22 -MB Time first assessed: 2000 -MB Location: sacral spine  -MB Primary Wound Type: Pressure inj  -MB          User Key  (r) = Recorded By, (t) = Taken By, (c) = Cosigned By    Initials Name Provider Type    Kelsi Mac RN Registered Nurse    Martha Monson RN Registered Nurse    Carolyn Jay RN Registered Nurse    Laura Lugo RN Registered Nurse    Laura Lugo RN Registered Nurse              Assessment & Plan      Brief Patient Summary:  Jaime Bar is a 67 y.o. male with past medical history of cervicalgia, primary osteoarthritis involving multiple joints with surgery on bilateral shoulders and right knee replacement but no recent procedure or surgery, hyponatremia, prostate cancer, and cardiac amyloidosis presented to [facility] on 12/25/2022 with complaints of right shoulder pain, weakness, and altered mental status.  He was found to be hypotensive and hyponatremic with elevated lactic acid.  Patient had  reported decreased oral intake.  He was transferred to [facility] ICU for further treatment of septic shock requiring vasopressors and hyponatremia.      Since admission he was found to be bacteremic with 2 sets of blood cultures positive for staph aureus, source being bilateral shoulder septic arthritis. ID has been treating the patient with IV ceftriaxone 2G and he will need a total of 6 weeks of antibiotic therapy.  He has status post bilateral shoulder arthroscopy with extensive debridement by Dr. Velez 12/28/2022. He remained intubated after surgery until 12/29 and now extubated. He has been weaned off vasopressors. Nephrology is managing his hyponatremia and STEVEN on CKD. He was found to have LUE DVT provoked from PICC line currently on argatroban. Hematology has been consulted for his thrombocytopenia.       Barium Sulfate, 1 teaspoon(s), Oral, Once in imaging  cefTRIAXone, 2 g, Intravenous, Q24H  docusate sodium, 100 mg, Oral, BID  enoxaparin, 1.5 mg/kg, Subcutaneous, Daily  ethyl alcohol, 2 swab, Nasal, Once  ferrous sulfate, 300 mg, Nasogastric, Daily  First Mouthwash (Magic Mouthwash), 5 mL, Swish & Spit, Q6H  furosemide, 40 mg, Intravenous, Q12H  lansoprazole, 30 mg, Nasogastric, Q AM  midodrine, 10 mg, Oral, Q8H  polyethylene glycol, 17 g, Oral, Daily  sodium chloride, 10 mL, Intravenous, Q12H  Zinc Oxide, , Apply externally, TID             Active Hospital Problems:  Active Hospital Problems    Diagnosis    • Dysphagia    • Bacteremia due to methicillin susceptible Staphylococcus aureus (MSSA)    • Staphylococcal arthritis of shoulder (HCC)    • Cervicalgia, spine surgery 2017    • Primary osteoarthritis involving multiple joints    • Hyponatremia    • Acute kidney injury superimposed on chronic kidney disease (HCC)    • Cardiac amyloidosis (HCC)    • ACC/AHA stage C chronic systolic heart failure (HCC)    • Tobacco abuse    • Primary hypertension      Plan:    Bilateral shoulder septic  arthritis  Septic shock secondary to MSSA bacteremia  -Synovial fluid culture was consistent with infection and cultures are growing 4+ Staph aureus  -s/p bilateral shoulder arthroscopy with extensive debridement by Dr. Velez 12/28/2022  - 2/2 blood cultures positive for MSSA secondary to bilateral shoulder septic arthritis   -ALMA DELIA on 12/28, Negative for endocarditis  -On IV Rocephin 2 g daily x6 weeks per ID (last day of therapy is [id number])  -Off IV vasopressors, remains on midodrine  -Discontinued art line, MAP greater than 65 mmHg at this time  -LUE PICC line removed and tip also sent for culture  -Fevers have been intermittent  , CT of the spine does not show any acute abscess or infectious changes  -ID following  -Left PADMINI with minimal serous output, right remains more copious and cloudy, management as per Ortho     Dysphagia  -Speech following, still not appropriate for PO intake  -NG tube feeds at goal. However, it's been 8 days with NG now.  -May need to consider PEG if ST doesn't think he can move towards PO intake    Acute metabolic encephalopathy, improved  Severe thrombocytopenia, improved  LUE DVT, provoked from PICC line/RUE showing superficial thrombus  -Initially concern for TTP however no schistocytes seen on peripheral smear per hematology  -Platelets improving  -Off argatroban drip, PTT elevated  -On low-dose Lovenox twice daily and increase to full dose now.  -Patient's left lower extremity is back to normal now, good pulses noted, BERNARDINO showed mild digital ischemia  -No peripheral cyanosis noted at this time  -Video-study was done and patient given puréed diet with nectar thick liquids.-  Feeding tube removed    Acute respiratory failure with hypoxia, improved  -Pulmonary edema, improved  -Postop respiratory failure, extubated on 12/39  -Remains on 2LNC, wean as tolerated  -On diuresis per nephrology     STEVEN on CKD  Hyponatremia on admission and then hypernatremia both normalized  Defer to  nephrologist     PAF, s/p RVR  -Off amnio drip, rate controlled now  -Anticoagulation recommendations per cardiology and heme-onc  -Cardiology following, may need cardioversion prior to discharge  Continues to be in atrial fibrillation with rate controlled  Continues on full dose Lovenox, will change to Eliquis or Xarelto when appropriate  Plan for cardioversion once patient no longer afebrile.  Reconsult cardiology.    Elevated LFTs  -Likely due to shock liver  -Hep panel negative  -Liver ultrasound showed normal hepatic parenchyma, cholelithiasis with adenomyomatosis involving gallbladder wall, CBD upper limit of normal  -Monitor    H/o cardiac amyloidosis.  Patient may resume his home medicationTafamidis    H/o prostate CA  Osteoarthritis     DVT/GI Ppx.    DNR/DNI.    CODE STATUS:    Medical Intervention Limits: NO intubation (DNI)  Level Of Support Discussed With: Next of Kin (If No Surrogate)  Code Status (Patient has no pulse and is not breathing): No CPR (Do Not Attempt to Resuscitate)  Medical Interventions (Patient has pulse or is breathing): Limited Support  Release to patient: Routine Release    Disposition: Will very likely need placement and possible PEG tube. However, patient seems to feel that these issues could eventually be managed at home (wound care, tube feeds, ongoing therapy, etc).    Electronically signed by Dante Simpson MD, 01/10/23, 09:18 EST.  [facility]ist Team      Part of this note may be an electronic transcription/translation of spoken language to printed text using the Dragon Dictation System.

## 2023-01-10 NOTE — PROGRESS NOTES
"Transitions-of-Care ([facility]) Pharmacy Future COST Assessment:     /Nurse requesting test claim:  [facility]    Pharmacy ran test claim for the following:     Drug Sig Covered/PA required Patient Copay per month   Eliquis (apixaban) 5 mg BID Covered without PA $  47   Xarelto (rivaroxaban) 20 mg Qd Covered without PA $ 47     Patient Insurance Type: Medicare - this means they are NOT eligible for a monthly discount card in the future (see \"free month\" note below)    Deductible/Medicare Gap Issue? Yes - this means the price may change in the future of this calendar year.  Our test claim did not provide information about the specific price at this time.    Is patient signed up for M2B service? No    Is above drug(s) eligible for 1 month free through M2B service? Yes - free coupon is available   If eligible,  or [facility] pharmacy can provide coupon upon request.           For billing questions, reach out to [facility] at [phone]  For M2B questions, reach out to Retail Pharmacy at [phone]    Sulma Rivas PharmD   1/10/2023 14:25 EST    "

## 2023-01-11 ENCOUNTER — INPATIENT HOSPITAL (OUTPATIENT)
Dept: URBAN - METROPOLITAN AREA HOSPITAL 84 | Facility: HOSPITAL | Age: 68
End: 2023-01-11
Payer: MEDICARE

## 2023-01-11 VITALS
HEART RATE: 96 BPM | DIASTOLIC BLOOD PRESSURE: 70 MMHG | OXYGEN SATURATION: 99 % | SYSTOLIC BLOOD PRESSURE: 125 MMHG | TEMPERATURE: 97.8 F | WEIGHT: 174.82 LBS | BODY MASS INDEX: 28.1 KG/M2 | HEIGHT: 66 IN | RESPIRATION RATE: 20 BRPM

## 2023-01-11 DIAGNOSIS — R74.8 ABNORMAL LEVELS OF OTHER SERUM ENZYMES: ICD-10-CM

## 2023-01-11 LAB
ALBUMIN SERPL-MCNC: 2.4 G/DL (ref 3.5–5.2)
ALBUMIN/GLOB SERPL: 0.8 G/DL
ALP SERPL-CCNC: 101 U/L (ref 39–117)
ALPHA-FETOPROTEIN: <2 NG/ML (ref 0–8.3)
ALPHA1 GLOB MFR UR ELPH: 261 MG/DL (ref 90–200)
ALT SERPL W P-5'-P-CCNC: 39 U/L (ref 1–41)
ANION GAP SERPL CALCULATED.3IONS-SCNC: 7 MMOL/L (ref 5–15)
ANION GAP SERPL CALCULATED.3IONS-SCNC: 8 MMOL/L (ref 5–15)
AST SERPL-CCNC: 43 U/L (ref 1–40)
BASOPHILS # BLD AUTO: 0 10*3/MM3 (ref 0–0.2)
BASOPHILS NFR BLD AUTO: 0.3 % (ref 0–1.5)
BILIRUB SERPL-MCNC: 2.9 MG/DL (ref 0–1.2)
BUN SERPL-MCNC: 24 MG/DL (ref 8–23)
BUN SERPL-MCNC: 26 MG/DL (ref 8–23)
BUN/CREAT SERPL: 43.3 (ref 7–25)
BUN/CREAT SERPL: 46.2 (ref 7–25)
CA-I SERPL ISE-MCNC: 1.12 MMOL/L (ref 1.2–1.3)
CALCIUM SPEC-SCNC: 8.2 MG/DL (ref 8.6–10.5)
CALCIUM SPEC-SCNC: 8.3 MG/DL (ref 8.6–10.5)
CERULOPLASMIN SERPL-MCNC: 28 MG/DL (ref 16–31)
CHLORIDE SERPL-SCNC: 94 MMOL/L (ref 98–107)
CHLORIDE SERPL-SCNC: 94 MMOL/L (ref 98–107)
CO2 SERPL-SCNC: 38 MMOL/L (ref 22–29)
CO2 SERPL-SCNC: 38 MMOL/L (ref 22–29)
CREAT SERPL-MCNC: 0.52 MG/DL (ref 0.76–1.27)
CREAT SERPL-MCNC: 0.6 MG/DL (ref 0.76–1.27)
DEPRECATED RDW RBC AUTO: 55.6 FL (ref 37–54)
EGFRCR SERPLBLD CKD-EPI 2021: 105.8 ML/MIN/1.73
EGFRCR SERPLBLD CKD-EPI 2021: 110.5 ML/MIN/1.73
EOSINOPHIL # BLD AUTO: 0 10*3/MM3 (ref 0–0.4)
EOSINOPHIL NFR BLD AUTO: 0.2 % (ref 0.3–6.2)
ERYTHROCYTE [DISTWIDTH] IN BLOOD BY AUTOMATED COUNT: 16.2 % (ref 12.3–15.4)
FERRITIN SERPL-MCNC: 1814 NG/ML (ref 30–400)
GGT SERPL-CCNC: 65 U/L (ref 8–61)
GLOBULIN UR ELPH-MCNC: 3.1 GM/DL
GLUCOSE BLDC GLUCOMTR-MCNC: 95 MG/DL (ref 70–105)
GLUCOSE BLDC GLUCOMTR-MCNC: 98 MG/DL (ref 70–105)
GLUCOSE SERPL-MCNC: 106 MG/DL (ref 65–99)
GLUCOSE SERPL-MCNC: 91 MG/DL (ref 65–99)
HAV IGM SERPL QL IA: NORMAL
HBV CORE IGM SERPL QL IA: NORMAL
HBV SURFACE AG SERPL QL IA: NORMAL
HCT VFR BLD AUTO: 25.5 % (ref 37.5–51)
HCV AB SER DONR QL: NORMAL
HGB BLD-MCNC: 8.7 G/DL (ref 13–17.7)
LYMPHOCYTES # BLD AUTO: 0.3 10*3/MM3 (ref 0.7–3.1)
LYMPHOCYTES NFR BLD AUTO: 6.8 % (ref 19.6–45.3)
MAGNESIUM SERPL-MCNC: 1.8 MG/DL (ref 1.6–2.4)
MAGNESIUM SERPL-MCNC: 1.9 MG/DL (ref 1.6–2.4)
MCH RBC QN AUTO: 31.5 PG (ref 26.6–33)
MCHC RBC AUTO-ENTMCNC: 34.1 G/DL (ref 31.5–35.7)
MCV RBC AUTO: 92.2 FL (ref 79–97)
MONOCYTES # BLD AUTO: 0.6 10*3/MM3 (ref 0.1–0.9)
MONOCYTES NFR BLD AUTO: 11.7 % (ref 5–12)
NEUTROPHILS NFR BLD AUTO: 3.9 10*3/MM3 (ref 1.7–7)
NEUTROPHILS NFR BLD AUTO: 81 % (ref 42.7–76)
NRBC BLD AUTO-RTO: 0 /100 WBC (ref 0–0.2)
PHOSPHATE SERPL-MCNC: 3.3 MG/DL (ref 2.5–4.5)
PLATELET # BLD AUTO: 210 10*3/MM3 (ref 140–450)
PMV BLD AUTO: 7.5 FL (ref 6–12)
POTASSIUM SERPL-SCNC: 2.9 MMOL/L (ref 3.5–5.2)
POTASSIUM SERPL-SCNC: 3.4 MMOL/L (ref 3.5–5.2)
PROT SERPL-MCNC: 5.5 G/DL (ref 6–8.5)
RBC # BLD AUTO: 2.77 10*6/MM3 (ref 4.14–5.8)
SODIUM SERPL-SCNC: 139 MMOL/L (ref 136–145)
SODIUM SERPL-SCNC: 140 MMOL/L (ref 136–145)
WBC NRBC COR # BLD: 4.8 10*3/MM3 (ref 3.4–10.8)

## 2023-01-11 PROCEDURE — 25010000002 ENOXAPARIN PER 10 MG: Performed by: NURSE PRACTITIONER

## 2023-01-11 PROCEDURE — 80074 ACUTE HEPATITIS PANEL: CPT | Performed by: NURSE PRACTITIONER

## 2023-01-11 PROCEDURE — 86381 MITOCHONDRIAL ANTIBODY EACH: CPT | Performed by: NURSE PRACTITIONER

## 2023-01-11 PROCEDURE — 84100 ASSAY OF PHOSPHORUS: CPT | Performed by: ORTHOPAEDIC SURGERY

## 2023-01-11 PROCEDURE — 82728 ASSAY OF FERRITIN: CPT | Performed by: NURSE PRACTITIONER

## 2023-01-11 PROCEDURE — 80053 COMPREHEN METABOLIC PANEL: CPT | Performed by: ORTHOPAEDIC SURGERY

## 2023-01-11 PROCEDURE — 86376 MICROSOMAL ANTIBODY EACH: CPT | Performed by: NURSE PRACTITIONER

## 2023-01-11 PROCEDURE — 82962 GLUCOSE BLOOD TEST: CPT

## 2023-01-11 PROCEDURE — 99221 1ST HOSP IP/OBS SF/LOW 40: CPT | Performed by: NURSE PRACTITIONER

## 2023-01-11 PROCEDURE — 25010000002 FUROSEMIDE PER 20 MG: Performed by: INTERNAL MEDICINE

## 2023-01-11 PROCEDURE — 92526 ORAL FUNCTION THERAPY: CPT

## 2023-01-11 PROCEDURE — 25010000002 CEFTRIAXONE PER 250 MG: Performed by: INTERNAL MEDICINE

## 2023-01-11 PROCEDURE — 97110 THERAPEUTIC EXERCISES: CPT

## 2023-01-11 PROCEDURE — 82330 ASSAY OF CALCIUM: CPT | Performed by: ORTHOPAEDIC SURGERY

## 2023-01-11 PROCEDURE — 82977 ASSAY OF GGT: CPT | Performed by: NURSE PRACTITIONER

## 2023-01-11 PROCEDURE — 86038 ANTINUCLEAR ANTIBODIES: CPT | Performed by: NURSE PRACTITIONER

## 2023-01-11 PROCEDURE — 86015 ACTIN ANTIBODY EACH: CPT | Performed by: NURSE PRACTITIONER

## 2023-01-11 PROCEDURE — 82390 ASSAY OF CERULOPLASMIN: CPT | Performed by: NURSE PRACTITIONER

## 2023-01-11 PROCEDURE — 82105 ALPHA-FETOPROTEIN SERUM: CPT | Performed by: NURSE PRACTITIONER

## 2023-01-11 PROCEDURE — 83735 ASSAY OF MAGNESIUM: CPT | Performed by: INTERNAL MEDICINE

## 2023-01-11 PROCEDURE — 83735 ASSAY OF MAGNESIUM: CPT | Performed by: ORTHOPAEDIC SURGERY

## 2023-01-11 PROCEDURE — 82103 ALPHA-1-ANTITRYPSIN TOTAL: CPT | Performed by: NURSE PRACTITIONER

## 2023-01-11 PROCEDURE — 85025 COMPLETE CBC W/AUTO DIFF WBC: CPT | Performed by: ORTHOPAEDIC SURGERY

## 2023-01-11 RX ORDER — POTASSIUM CHLORIDE 1.5 G/1.77G
40 POWDER, FOR SOLUTION ORAL AS NEEDED
Status: DISCONTINUED | OUTPATIENT
Start: 2023-01-11 | End: 2023-01-11 | Stop reason: HOSPADM

## 2023-01-11 RX ORDER — POTASSIUM CHLORIDE 20 MEQ/1
40 TABLET, EXTENDED RELEASE ORAL AS NEEDED
Status: DISCONTINUED | OUTPATIENT
Start: 2023-01-11 | End: 2023-01-11 | Stop reason: HOSPADM

## 2023-01-11 RX ORDER — POTASSIUM CHLORIDE 20 MEQ/1
20 TABLET, EXTENDED RELEASE ORAL 2 TIMES DAILY
Qty: 60 TABLET | Refills: 0
Start: 2023-01-11 | End: 2023-02-10

## 2023-01-11 RX ORDER — POTASSIUM CHLORIDE 7.45 MG/ML
10 INJECTION INTRAVENOUS
Status: DISCONTINUED | OUTPATIENT
Start: 2023-01-11 | End: 2023-01-11 | Stop reason: HOSPADM

## 2023-01-11 RX ORDER — MAGNESIUM SULFATE 1 G/100ML
1 INJECTION INTRAVENOUS AS NEEDED
Status: DISCONTINUED | OUTPATIENT
Start: 2023-01-11 | End: 2023-01-11 | Stop reason: HOSPADM

## 2023-01-11 RX ORDER — MAGNESIUM SULFATE HEPTAHYDRATE 40 MG/ML
2 INJECTION, SOLUTION INTRAVENOUS AS NEEDED
Status: DISCONTINUED | OUTPATIENT
Start: 2023-01-11 | End: 2023-01-11 | Stop reason: HOSPADM

## 2023-01-11 RX ORDER — FUROSEMIDE 40 MG/1
40 TABLET ORAL DAILY
Start: 2023-01-11

## 2023-01-11 RX ORDER — FUROSEMIDE 10 MG/ML
40 INJECTION INTRAMUSCULAR; INTRAVENOUS DAILY
Status: DISCONTINUED | OUTPATIENT
Start: 2023-01-12 | End: 2023-01-11 | Stop reason: HOSPADM

## 2023-01-11 RX ADMIN — DOCUSATE SODIUM 100 MG: 100 CAPSULE, LIQUID FILLED ORAL at 15:09

## 2023-01-11 RX ADMIN — Medication 300 MG: at 09:04

## 2023-01-11 RX ADMIN — Medication 10 ML: at 09:04

## 2023-01-11 RX ADMIN — ENOXAPARIN SODIUM 140 MG: 150 INJECTION SUBCUTANEOUS at 15:08

## 2023-01-11 RX ADMIN — LANSOPRAZOLE 30 MG: 30 TABLET, ORALLY DISINTEGRATING ORAL at 05:38

## 2023-01-11 RX ADMIN — MIDODRINE HYDROCHLORIDE 10 MG: 5 TABLET ORAL at 06:00

## 2023-01-11 RX ADMIN — DIPHENHYDRAMINE HYDROCHLORIDE AND LIDOCAINE HYDROCHLORIDE AND ALUMINUM HYDROXIDE AND MAGNESIUM HYDRO 5 ML: KIT at 12:17

## 2023-01-11 RX ADMIN — POTASSIUM CHLORIDE 40 MEQ: 1500 TABLET, EXTENDED RELEASE ORAL at 09:04

## 2023-01-11 RX ADMIN — DIPHENHYDRAMINE HYDROCHLORIDE AND LIDOCAINE HYDROCHLORIDE AND ALUMINUM HYDROXIDE AND MAGNESIUM HYDRO 5 ML: KIT at 00:26

## 2023-01-11 RX ADMIN — POTASSIUM CHLORIDE 40 MEQ: 1500 TABLET, EXTENDED RELEASE ORAL at 06:49

## 2023-01-11 RX ADMIN — POLYETHYLENE GLYCOL 3350 17 G: 17 POWDER, FOR SOLUTION ORAL at 15:08

## 2023-01-11 RX ADMIN — POTASSIUM CHLORIDE 40 MEQ: 1500 TABLET, EXTENDED RELEASE ORAL at 12:17

## 2023-01-11 RX ADMIN — DIPHENHYDRAMINE HYDROCHLORIDE AND LIDOCAINE HYDROCHLORIDE AND ALUMINUM HYDROXIDE AND MAGNESIUM HYDRO 5 ML: KIT at 05:38

## 2023-01-11 RX ADMIN — Medication: at 09:04

## 2023-01-11 RX ADMIN — HYDROCODONE BITARTRATE AND ACETAMINOPHEN 1 TABLET: 10; 325 TABLET ORAL at 05:44

## 2023-01-11 RX ADMIN — CEFTRIAXONE 2 G: 2 INJECTION, POWDER, FOR SOLUTION INTRAMUSCULAR; INTRAVENOUS at 00:26

## 2023-01-11 RX ADMIN — FUROSEMIDE 40 MG: 10 INJECTION, SOLUTION INTRAMUSCULAR; INTRAVENOUS at 00:26

## 2023-01-11 NOTE — CONSULTS
GI CONSULT  NOTE:    Referring Provider:  Dr. Simpson    Chief complaint: No complaints, consultation for elevated bilirubin    Subjective .     History of present illness: Patient is a 67-year-old male who was admitted on 12/26/2022.  He had been experiencing worsening shoulder pain and transferred from [facility] in [place] with septic shock.  He had hypotension at that time.  Found to have bilateral shoulder septic arthritis and underwent shoulder debridement on 12/28/2022.  He also had respiratory failure during admission and was intubated.  Had A. fib with RVR, acute kidney injury, and bacteremia.  He has improved and there are plans for discharge soon.  GI was asked to consult due to elevated bilirubin.  Patient has no previous history of liver disease or family history of liver disease.  He denies ever being jaundiced.  No acholic stools or tea colored urine.  He denies having any abdominal pain or nausea/vomiting.  Has recently been able to eat.  He previously had NG tube during this stay.      Endo History:  He reports colonoscopy a few years ago at [facility].    Past Medical History:  Past Medical History:   Diagnosis Date   • Dysphagia 1/8/2023       Past Surgical History:  Past Surgical History:   Procedure Laterality Date   • SHOULDER ARTHROSCOPY Right 12/28/2022    Procedure: SHOULDER ARTHROSCOPY INCISION AND DRAINAGE.;  Surgeon: Nikunj Velez MD;  Location: [facility];  Service: Orthopedics;  Laterality: Right;   • SHOULDER ARTHROSCOPY Left 12/28/2022    Procedure: SHOULDER ARTHROSCOPY INCISION AND DRAINAGE;  Surgeon: Nikunj Velez MD;  Location: [facility];  Service: Orthopedics;  Laterality: Left;       Social History:  Social History     Tobacco Use   • Smoking status: Every Day     Packs/day: 0.50     Years: 10.00     Pack years: 5.00     Types: Cigarettes   • Smokeless tobacco: Never   Vaping Use   • Vaping Use: Never used   Substance Use Topics    • Alcohol use: Not Currently   • Drug use: Never       Family History:  History reviewed. No pertinent family history.    Medications:  Medications Prior to Admission   Medication Sig Dispense Refill Last Dose   • albuterol sulfate  (90 Base) MCG/ACT inhaler Inhale 2 puffs Every 4 (Four) Hours As Needed for Wheezing or Shortness of Air.      • diclofenac (VOLTAREN) 75 MG EC tablet Take 75 mg by mouth Daily As Needed.      • HYDROcodone-acetaminophen (NORCO)  MG per tablet Take 1 tablet by mouth Every 4 (Four) Hours As Needed for Moderate Pain.      • metoprolol succinate XL (TOPROL-XL) 25 MG 24 hr tablet Take 25 mg by mouth Daily.      • omeprazole (priLOSEC) 20 MG capsule Take 20 mg by mouth Daily.      • sodium bicarbonate 650 MG tablet Take 650 mg by mouth 2 (Two) Times a Day.      • spironolactone (ALDACTONE) 25 MG tablet Take 12.5 mg by mouth Daily.      • tiotropium bromide-olodaterol (Stiolto Respimat) 2.5-2.5 MCG/ACT aerosol solution inhaler Inhale 2 puffs Daily.          Scheduled Meds:Barium Sulfate, 1 teaspoon(s), Oral, Once in imaging  cefTRIAXone, 2 g, Intravenous, Q24H  docusate sodium, 100 mg, Oral, BID  enoxaparin, 1.5 mg/kg, Subcutaneous, Daily  ethyl alcohol, 2 swab, Nasal, Once  ferrous sulfate, 300 mg, Nasogastric, Daily  First Mouthwash (Magic Mouthwash), 5 mL, Swish & Spit, Q6H  [START ON 1/12/2023] furosemide, 40 mg, Intravenous, Daily  lansoprazole, 30 mg, Nasogastric, Q AM  midodrine, 10 mg, Oral, Q8H  polyethylene glycol, 17 g, Oral, Daily  sodium chloride, 10 mL, Intravenous, Q12H  Zinc Oxide, , Apply externally, TID      Continuous Infusions:   PRN Meds:.•  acetaminophen **OR** acetaminophen  •  aluminum-magnesium hydroxide-simethicone  •  dextrose  •  dextrose  •  fentanyl  •  glucagon (human recombinant)  •  HYDROcodone-acetaminophen  •  magnesium sulfate **OR** magnesium sulfate in D5W 1g/100mL (PREMIX)  •  ondansetron **OR** ondansetron  •  potassium chloride **OR**  "potassium chloride **OR** potassium chloride  •  sodium chloride  •  sodium chloride    ALLERGIES:  Tramadol-acetaminophen, Codeine, and Tramadol    ROS:  Review of Systems   Respiratory: Negative for cough and shortness of breath.    Cardiovascular: Negative for chest pain and palpitations.   Gastrointestinal: Negative for abdominal pain, blood in stool, nausea and vomiting.   Neurological: Positive for weakness. Negative for dizziness.   Psychiatric/Behavioral: Negative for agitation and confusion.       Objective     Vital Signs:   Visit Vitals  /87   Pulse 93   Temp 97.9 °F (36.6 °C) (Oral)   Resp 20   Ht 167.6 cm (66\")   Wt 79.3 kg (174 lb 13.2 oz)   SpO2 99%   BMI 28.22 kg/m²       Physical Exam:      General Appearance:    Awake and alert, in no acute distress   Head:    Normocephalic, without obvious abnormality, atraumatic   Eyes:            Conjunctivae normal, anicteric sclera   Ears:    Ears appear intact with no abnormalities noted   Throat:   No oral lesions, no thrush, oral mucosa moist       Lungs:     Respirations regular, even and unlabored       Chest Wall:    No abnormalities observed   Abdomen:     Soft, non-tender, no rebound or guarding, non-distended, no hepatosplenomegaly   Rectal:     Deferred   Extremities:  Weakness of extremity, no edema, no cyanosis, no             redness   Pulses:   Pulses palpable and equal bilaterally   Skin:   No bleeding, bruising or rash, no jaundice   Lymph nodes:   No palpable adenopathy   Neurologic:   Sensation intact       Results Review:   I reviewed the patient's labs and imaging.  CBC  Results from last 7 days   Lab Units 01/11/23  0559 01/10/23  0420 01/09/23  0417 01/08/23  0357 01/07/23  0712 01/06/23  0357 01/05/23  0411   RBC 10*6/mm3 2.77* 2.80* 2.69* 2.93* 2.96* 3.14* 3.18*   WBC 10*3/mm3 4.80 5.70 5.30 7.00 6.90 5.80 5.90   HEMOGLOBIN g/dL 8.7* 8.8* 8.4* 9.1* 9.4* 9.9* 9.9*   PLATELETS 10*3/mm3 210 200 170 175 156 125* 121* "       CMP  Results from last 7 days   Lab Units 01/11/23  0559 01/10/23  1525 01/10/23  0420 01/09/23  0417 01/08/23  0357 01/07/23  0712 01/06/23  0357 01/05/23  0411   SODIUM mmol/L 139  --  140 139 138 138 141 144   POTASSIUM mmol/L 2.9*  --  3.5 3.5 3.5 3.8 3.8 3.9   CHLORIDE mmol/L 94*  --  96* 96* 96* 96* 99 104   CO2 mmol/L 38.0*  --  39.0* 37.0* 35.0* 36.0* 33.0* 33.0*   BUN mg/dL 24*  --  28* 31* 30* 28* 32* 33*   CREATININE mg/dL 0.52*  --  0.63* 0.56* 0.57* 0.53* 0.55* 0.55*   GLUCOSE mg/dL 91  --  93 106* 119* 94 106* 117*   ALBUMIN g/dL 2.4*  --  2.2* 2.1* 2.1* 2.2* 2.0* 2.3*   BILIRUBIN mg/dL 2.9*  --  2.7* 2.3* 3.0* 3.0* 3.4* 3.1*   ALK PHOS U/L 101  --  112 135* 111 91 103 155*   AST (SGOT) U/L 43*  --  36 35 34 33 39 33   ALT (SGPT) U/L 39  --  38 35 40 43* 52* 61*   AMMONIA umol/L  --  13*  --   --   --   --   --   --        Amylase and Lipase        CRP         Imaging Results (Last 24 Hours)     ** No results found for the last 24 hours. **            ASSESSMENT AND PLAN:    Elevated liver enzymes  Cholelithiasis and adenomyomatosis gallbladder wall and  Sepsis on admission with hypotension  Staph aureus bacteremia  Bilateral shoulder septic arthritis  Hypokalemia  Normocytic anemia  Respiratory failure during hospital admission requiring intubation  A. Fib/RVR  History of cardiac amyloidosis    Active Problems:    Hyponatremia    ACC/AHA stage C chronic systolic heart failure (HCC)    Cardiac amyloidosis (HCC)    Primary hypertension    Tobacco abuse    Cervicalgia, spine surgery 2017    Primary osteoarthritis involving multiple joints    Bacteremia due to methicillin susceptible Staphylococcus aureus (MSSA)    Staphylococcal arthritis of shoulder (HCC)    Acute kidney injury superimposed on chronic kidney disease (HCC)    Dysphagia     Plan:  67-year-old male has been admitted to the hospital since 12/26/2022 when he presented with sepsis and bacteremia.  Had bilateral shoulder septic arthritis  and complicated hospitalization.  Initially had significantly elevated liver enzymes with total bilirubin as high as 5.2, , and .  His liver enzymes have improved with AST down to 43 and total bilirubin 2.9.  ALT and alk phos now normal.  Right upper quadrant ultrasound did show cholelithiasis and adenomyomatosis of gallbladder wall.  Patient has no abdominal pain or nausea/vomiting and is able to eat well.  Suspect liver enzymes elevated related to sepsis or medication induced.  Patient is cleared for discharge from GI standpoint.  Recommend that he follow-up in our office for liver serologies and monitoring ultrasound.  Continue diet as tolerated.    I discussed the patients findings and my recommendations with the patient.  Isa Carreno, RITU  01/11/23  11:24 EST

## 2023-01-11 NOTE — PROGRESS NOTES
[facility] Medicine Services Daily Progress Note    Patient Name: Jaime Bar  : 1955  MRN: [id number]  Primary Care Physician:  Provider, No Known  Date of admission: 2022      Subjective      Chief Complaint: AMS      Patient reports  Had discharged yesterday because of increasing bilirubin as well as abnormal ultrasound    Potassium level was low this morning and repeat evaluation requested by after replacement that was initiated today  GI is okay with discharge  Bilirubin level slightly higher today but work-up will be done as an outpatient per GI  We will add scheduled potassium on discharge as well awaiting final recommendation from nephrologist          Review of system  All other systems reviewed and negative except as stated above    Objective      Vitals:   Temp:  [97.9 °F (36.6 °C)-98.6 °F (37 °C)] 97.9 °F (36.6 °C)  Heart Rate:  [87-99] 93  Resp:  [20-24] 20  BP: (103-135)/(58-87) 124/87  Flow (L/min):  [2] 2    Physical Exam:    General: Chronically ill-appearing gentleman, sleeping but arousable, no acute distress  HEENT: NCAT, neck is supple, NG in place  Cardiovascular: RRR  Respiratory: Fairly clear to auscultation bilaterally with some bibasilar crackles, diminished throughout, nondistressed  Abdomen: Soft, nontender to exam, normoactive bowel sounds  Neurologic: A&O, nonfocal, follows commands  Skin: Warm, bilateral shoulder incisions bandaged, drains in place. Bilateral upper extremity pitting edema noted R>L, reportedly improved since initial presentation    Result Review    Result Review:  I have personally reviewed the results from the time of this admission to 2023 10:33 EST and agree with these findings:  [x]  Laboratory  [x]  Microbiology  [x]  Radiology  [x]  EKG/Telemetry   []  Cardiology/Vascular   []  Pathology  [x]  Old records  []  Other:    Wounds (last 24 hours)     LDA Wound     Row Name 23 0830 23 0345 23 0025        Wound 12/28/22 1856 Right shoulder Incision    Wound - Properties Group Placement Date: 12/28/22  -HB Placement Time: 1856  -HB Side: Right  -HB Location: shoulder  -HB Primary Wound Type: Incision  -HB    Dressing Appearance -- dry;intact  - dry;intact  -    Closure KATHRYN  -SS KATHRYN  - KATHRYN  -    Retired Wound - Properties Group Placement Date: 12/28/22  -HB Placement Time: 1856  -HB Side: Right  -HB Location: shoulder  -HB Primary Wound Type: Incision  -HB    Retired Wound - Properties Group Date first assessed: 12/28/22  -HB Time first assessed: 1856  -HB Side: Right  -HB Location: shoulder  -HB Primary Wound Type: Incision  -HB       Wound 12/28/22 1940 Left shoulder Incision    Wound - Properties Group Placement Date: 12/28/22  -HB Placement Time: 1940  -HB Side: Left  -HB Location: shoulder  -HB Primary Wound Type: Incision  -HB    Dressing Appearance -- dry;intact  - dry;intact  -    Closure -- -- KATHRYN  -    Retired Wound - Properties Group Placement Date: 12/28/22  -HB Placement Time: 1940  -HB Side: Left  -HB Location: shoulder  -HB Primary Wound Type: Incision  -HB    Retired Wound - Properties Group Date first assessed: 12/28/22  -HB Time first assessed: 1940 -HB Side: Left  -HB Location: shoulder  -HB Primary Wound Type: Incision  -HB       Wound 12/30/22 2000 sacral spine Pressure Injury    Wound - Properties Group Placement Date: 12/30/22 -MB Placement Time: 2000 -MB Location: sacral spine  -MB Primary Wound Type: Pressure inj  -MB    Dressing Appearance -- open to air  - --    Base dry;pink  -SS dry;pink  - --    Care, Wound -- barrier applied  - --    Retired Wound - Properties Group Placement Date: 12/30/22 -MB Placement Time: 2000 -MB Location: sacral spine  -MB Primary Wound Type: Pressure inj  -MB    Retired Wound - Properties Group Date first assessed: 12/30/22 -MB Time first assessed: 2000 -MB Location: sacral spine  -MB Primary Wound Type: Pressure inj  -MB       Wound  01/10/23 0300 penis    Wound - Properties Group Placement Date: 01/10/23  -MC Placement Time: 0300 -MC Location: penis  -MC    Base scab;maroon/purple  -SS scab;maroon/purple  - --    Retired Wound - Properties Group Placement Date: 01/10/23  -MC Placement Time: 0300  -MC Location: penis  -MC    Retired Wound - Properties Group Date first assessed: 01/10/23  - Time first assessed: 0300 -MC Location: penis  -MC    Row Name 01/10/23 1920             Wound 12/28/22 1856 Right shoulder Incision    Wound - Properties Group Placement Date: 12/28/22  -HB Placement Time: 1856 -HB Side: Right  -HB Location: shoulder  -HB Primary Wound Type: Incision  -HB    Dressing Appearance dry;intact  -      Closure KATHRYN  -      Retired Wound - Properties Group Placement Date: 12/28/22  -HB Placement Time: 1856  -HB Side: Right  -HB Location: shoulder  -HB Primary Wound Type: Incision  -HB    Retired Wound - Properties Group Date first assessed: 12/28/22  -HB Time first assessed: 1856  -HB Side: Right  -HB Location: shoulder  -HB Primary Wound Type: Incision  -HB       Wound 12/28/22 1940 Left shoulder Incision    Wound - Properties Group Placement Date: 12/28/22  -HB Placement Time: 1940  -HB Side: Left  -HB Location: shoulder  -HB Primary Wound Type: Incision  -HB    Dressing Appearance dry;intact  -      Closure KATHRYN  -      Retired Wound - Properties Group Placement Date: 12/28/22  -HB Placement Time: 1940  -HB Side: Left  -HB Location: shoulder  -HB Primary Wound Type: Incision  -HB    Retired Wound - Properties Group Date first assessed: 12/28/22  -HB Time first assessed: 1940  -HB Side: Left  -HB Location: shoulder  -HB Primary Wound Type: Incision  -HB       Wound 12/30/22 2000 sacral spine Pressure Injury    Wound - Properties Group Placement Date: 12/30/22  -MB Placement Time: 2000  -MB Location: sacral spine  -MB Primary Wound Type: Pressure inj  -MB    Base dry;pink  -      Care, Wound barrier applied  -       Retired Wound - Properties Group Placement Date: 12/30/22  -MB Placement Time: 2000 -MB Location: sacral spine  -MB Primary Wound Type: Pressure inj  -MB    Retired Wound - Properties Group Date first assessed: 12/30/22 -MB Time first assessed: 2000 -MB Location: sacral spine  -MB Primary Wound Type: Pressure inj  -MB       Wound 01/10/23 0300 penis    Wound - Properties Group Placement Date: 01/10/23  - Placement Time: 0300 -MC Location: penis  -MC    Dressing Appearance open to air  -      Base purple;red  -      Retired Wound - Properties Group Placement Date: 01/10/23  - Placement Time: 0300 -MC Location: penis  -MC    Retired Wound - Properties Group Date first assessed: 01/10/23  - Time first assessed: 0300 -MC Location: penis  -MC          User Key  (r) = Recorded By, (t) = Taken By, (c) = Cosigned By    Initials Name Provider Type    Martha Monson, RN Registered Nurse    Carolyn Jay, RN Registered Nurse    Laura Lugo RN Registered Nurse    Sofi Garcia RN Registered Nurse    Guadalupe Wade RN Registered Nurse              Assessment & Plan      Brief Patient Summary:  Jaime Bar is a 67 y.o. male with past medical history of cervicalgia, primary osteoarthritis involving multiple joints with surgery on bilateral shoulders and right knee replacement but no recent procedure or surgery, hyponatremia, prostate cancer, and cardiac amyloidosis presented to [facility] on 12/25/2022 with complaints of right shoulder pain, weakness, and altered mental status.  He was found to be hypotensive and hyponatremic with elevated lactic acid.  Patient had reported decreased oral intake.  He was transferred to [facility] ICU for further treatment of septic shock requiring vasopressors and hyponatremia.      Since admission he was found to be bacteremic with 2 sets of blood cultures positive for staph aureus, source being bilateral shoulder septic  arthritis. ID has been treating the patient with IV ceftriaxone 2G and he will need a total of 6 weeks of antibiotic therapy.  He has status post bilateral shoulder arthroscopy with extensive debridement by Dr. Velez 12/28/2022. He remained intubated after surgery until 12/29 and now extubated. He has been weaned off vasopressors. Nephrology is managing his hyponatremia and STEVEN on CKD. He was found to have LUE DVT provoked from PICC line currently on argatroban. Hematology has been consulted for his thrombocytopenia.       Barium Sulfate, 1 teaspoon(s), Oral, Once in imaging  cefTRIAXone, 2 g, Intravenous, Q24H  docusate sodium, 100 mg, Oral, BID  enoxaparin, 1.5 mg/kg, Subcutaneous, Daily  ethyl alcohol, 2 swab, Nasal, Once  ferrous sulfate, 300 mg, Nasogastric, Daily  First Mouthwash (Magic Mouthwash), 5 mL, Swish & Spit, Q6H  [START ON 1/12/2023] furosemide, 40 mg, Intravenous, Daily  lansoprazole, 30 mg, Nasogastric, Q AM  midodrine, 10 mg, Oral, Q8H  polyethylene glycol, 17 g, Oral, Daily  sodium chloride, 10 mL, Intravenous, Q12H  Zinc Oxide, , Apply externally, TID             Active Hospital Problems:  Active Hospital Problems    Diagnosis    • Dysphagia    • Bacteremia due to methicillin susceptible Staphylococcus aureus (MSSA)    • Staphylococcal arthritis of shoulder (HCC)    • Cervicalgia, spine surgery 2017    • Primary osteoarthritis involving multiple joints    • Hyponatremia    • Acute kidney injury superimposed on chronic kidney disease (HCC)    • Cardiac amyloidosis (HCC)    • ACC/AHA stage C chronic systolic heart failure (HCC)    • Tobacco abuse    • Primary hypertension      Plan:    Bilateral shoulder septic arthritis  Septic shock secondary to MSSA bacteremia  -Synovial fluid culture was consistent with infection and cultures are growing 4+ Staph aureus  -s/p bilateral shoulder arthroscopy with extensive debridement by Dr. Velez 12/28/2022  - 2/2 blood cultures positive for MSSA secondary to  bilateral shoulder septic arthritis   -ALMA DELIA on 12/28, Negative for endocarditis  -On IV Rocephin 2 g daily x6 weeks per ID (last day of therapy is [id number])  -Off IV vasopressors, remains on midodrine  -Discontinued art line, MAP greater than 65 mmHg at this time  -LUE PICC line removed and tip also sent for culture  -Fevers resolved  , CT of the spine does not show any acute abscess or infectious changes  -ID following  -Left PADMINI with minimal serous output, right remains more copious and cloudy, management as per Ortho  PADMINI drain removed       Dysphagia  -Speech following, still not appropriate for PO intake  NG tube discontinued and patient tolerating diet now after evaluation by speech therapist  - Seen by GI for hyperbilirubinemia and elevated liver enzymes abnormal ultrasound gallbladder  No further work-up needed and thought to be related to sepsis  Follow-up with GI in 1 month    Acute metabolic encephalopathy, improved  Severe thrombocytopenia, improved  LUE DVT, provoked from PICC line/RUE showing superficial thrombus  -Initially concern for TTP however no schistocytes seen on peripheral smear per hematology  -Platelets improving  -Off argatroban drip, PTT elevated  -On low-dose Lovenox twice daily and increase to full dose now.  -Patient's left lower extremity is back to normal now, good pulses noted, BERNARDINO showed mild digital ischemia  -No peripheral cyanosis noted at this time  -Video-study was done and patient given puréed diet with nectar thick liquids.-  Feeding tube removed    Acute respiratory failure with hypoxia, improved  -Pulmonary edema, improved  -Postop respiratory failure, extubated on 12/39  -Remains on 2LNC, wean as tolerated  -On diuresis per nephrology     STEVEN on CKD  Hyponatremia on admission and then hypernatremia both normalized  Defer to nephrologist     PAF, s/p RVR  -Off amnio drip, rate controlled now  -Anticoagulation recommendations per cardiology and heme-onc  -Cardiology following,  may need cardioversion prior to discharge  Continues to be in atrial fibrillation with rate controlled  Continues on full dose Lovenox,   Plan for cardioversion once patient no longer afebrile.  Reconsult cardiology.  Patient wants to have another cardiology opinion.  Continue Eliquis on discharge      Elevated LFTs  -Likely due to shock liver  -Hep panel negative  -Liver ultrasound showed normal hepatic parenchyma, cholelithiasis with adenomyomatosis involving gallbladder wall, CBD upper limit of normal  -Monitor  Seen by GI  As outlined above    H/o cardiac amyloidosis.  Patient may resume his home medicationTafamidis    H/o prostate CA  Osteoarthritis     DVT/GI Ppx.    DNR/DNI.    CODE STATUS:    Medical Intervention Limits: NO intubation (DNI)  Level Of Support Discussed With: Next of Kin (If No Surrogate)  Code Status (Patient has no pulse and is not breathing): No CPR (Do Not Attempt to Resuscitate)  Medical Interventions (Patient has pulse or is breathing): Limited Support  Release to patient: Routine Release    Disposition: Will very likely need placement and possible PEG tube. However, patient seems to feel that these issues could eventually be managed at home (wound care, tube feeds, ongoing therapy, etc).    Electronically signed by Dante Simpson MD, 01/11/23, 10:33 EST.  [facility] Hospitalist Team      Part of this note may be an electronic transcription/translation of spoken language to printed text using the Dragon Dictation System.

## 2023-01-11 NOTE — PROGRESS NOTES
"                                                                                                                                      Nephrology  Progress Note                                        [facility]    Patient Identification    Name: Jaime Bar  Age: 67 y.o.  Sex: male  :  1955  MRN: [id number]      DATE OF SERVICE:  2023        Subective     required more oxygen      Objective   Scheduled Meds:Barium Sulfate, 1 teaspoon(s), Oral, Once in imaging  cefTRIAXone, 2 g, Intravenous, Q24H  docusate sodium, 100 mg, Oral, BID  enoxaparin, 1.5 mg/kg, Subcutaneous, Daily  ethyl alcohol, 2 swab, Nasal, Once  ferrous sulfate, 300 mg, Nasogastric, Daily  First Mouthwash (Magic Mouthwash), 5 mL, Swish & Spit, Q6H  furosemide, 40 mg, Intravenous, Q12H  lansoprazole, 30 mg, Nasogastric, Q AM  midodrine, 10 mg, Oral, Q8H  polyethylene glycol, 17 g, Oral, Daily  sodium chloride, 10 mL, Intravenous, Q12H  Zinc Oxide, , Apply externally, TID          Continuous Infusions:     PRN Meds:•  acetaminophen **OR** acetaminophen  •  aluminum-magnesium hydroxide-simethicone  •  dextrose  •  dextrose  •  fentanyl  •  glucagon (human recombinant)  •  HYDROcodone-acetaminophen  •  magnesium sulfate **OR** magnesium sulfate in D5W 1g/100mL (PREMIX)  •  ondansetron **OR** ondansetron  •  potassium chloride **OR** potassium chloride **OR** potassium chloride  •  sodium chloride  •  sodium chloride     Exam:  /58   Pulse 94   Temp 98.6 °F (37 °C) (Oral)   Resp 20   Ht 167.6 cm (66\")   Wt 79.3 kg (174 lb 13.2 oz)   SpO2 99%   BMI 28.22 kg/m²     Intake/Output last 3 shifts:  I/O last 3 completed shifts:  In: 1045 [P.O.:900; I.V.:145]  Out: 3400 [Urine:3400]    Intake/Output this shift:  No intake/output data recorded.    Physical exam:  General Appearance: Confused  Head:  Normocephalic, without obvious abnormality, atraumatic  Eyes:  PERRL, conjunctiva/corneas clear     Neck:  Supple,  no " adenopathy;      Lungs:  Decreased BS occasion ronchi  Heart:  Regular rate and rhythm, S1 and S2 normal  Abdomen:  Soft, non-tender, bowel sounds active   Extremities: trace edema  Pulses: 2+ and symmetric all extremities  Skin:  No rashes or lesions       Data Review:  All labs (24hrs):   Recent Results (from the past 24 hour(s))   POC Glucose Once    Collection Time: 01/10/23 12:02 PM    Specimen: Blood   Result Value Ref Range    Glucose 83 70 - 105 mg/dL   Bilirubin, Direct    Collection Time: 01/10/23  3:25 PM    Specimen: Blood   Result Value Ref Range    Bilirubin, Direct 2.0 (H) 0.0 - 0.3 mg/dL   Ammonia    Collection Time: 01/10/23  3:25 PM    Specimen: Blood   Result Value Ref Range    Ammonia 13 (L) 16 - 60 umol/L   POC Glucose Once    Collection Time: 01/10/23 11:39 PM    Specimen: Blood   Result Value Ref Range    Glucose 87 70 - 105 mg/dL   Calcium, Ionized    Collection Time: 01/11/23  5:59 AM    Specimen: Blood   Result Value Ref Range    Ionized Calcium 1.12 (L) 1.20 - 1.30 mmol/L   Comprehensive Metabolic Panel    Collection Time: 01/11/23  5:59 AM    Specimen: Blood   Result Value Ref Range    Glucose 91 65 - 99 mg/dL    BUN 24 (H) 8 - 23 mg/dL    Creatinine 0.52 (L) 0.76 - 1.27 mg/dL    Sodium 139 136 - 145 mmol/L    Potassium 2.9 (L) 3.5 - 5.2 mmol/L    Chloride 94 (L) 98 - 107 mmol/L    CO2 38.0 (H) 22.0 - 29.0 mmol/L    Calcium 8.2 (L) 8.6 - 10.5 mg/dL    Total Protein 5.5 (L) 6.0 - 8.5 g/dL    Albumin 2.4 (L) 3.5 - 5.2 g/dL    ALT (SGPT) 39 1 - 41 U/L    AST (SGOT) 43 (H) 1 - 40 U/L    Alkaline Phosphatase 101 39 - 117 U/L    Total Bilirubin 2.9 (H) 0.0 - 1.2 mg/dL    Globulin 3.1 gm/dL    A/G Ratio 0.8 g/dL    BUN/Creatinine Ratio 46.2 (H) 7.0 - 25.0    Anion Gap 7.0 5.0 - 15.0 mmol/L    eGFR 110.5 >60.0 mL/min/1.73   Magnesium    Collection Time: 01/11/23  5:59 AM    Specimen: Blood   Result Value Ref Range    Magnesium 1.8 1.6 - 2.4 mg/dL   Phosphorus    Collection Time: 01/11/23  5:59 AM     Specimen: Blood   Result Value Ref Range    Phosphorus 3.3 2.5 - 4.5 mg/dL   CBC Auto Differential    Collection Time: 01/11/23  5:59 AM    Specimen: Blood   Result Value Ref Range    WBC 4.80 3.40 - 10.80 10*3/mm3    RBC 2.77 (L) 4.14 - 5.80 10*6/mm3    Hemoglobin 8.7 (L) 13.0 - 17.7 g/dL    Hematocrit 25.5 (L) 37.5 - 51.0 %    MCV 92.2 79.0 - 97.0 fL    MCH 31.5 26.6 - 33.0 pg    MCHC 34.1 31.5 - 35.7 g/dL    RDW 16.2 (H) 12.3 - 15.4 %    RDW-SD 55.6 (H) 37.0 - 54.0 fl    MPV 7.5 6.0 - 12.0 fL    Platelets 210 140 - 450 10*3/mm3    Neutrophil % 81.0 (H) 42.7 - 76.0 %    Lymphocyte % 6.8 (L) 19.6 - 45.3 %    Monocyte % 11.7 5.0 - 12.0 %    Eosinophil % 0.2 (L) 0.3 - 6.2 %    Basophil % 0.3 0.0 - 1.5 %    Neutrophils, Absolute 3.90 1.70 - 7.00 10*3/mm3    Lymphocytes, Absolute 0.30 (L) 0.70 - 3.10 10*3/mm3    Monocytes, Absolute 0.60 0.10 - 0.90 10*3/mm3    Eosinophils, Absolute 0.00 0.00 - 0.40 10*3/mm3    Basophils, Absolute 0.00 0.00 - 0.20 10*3/mm3    nRBC 0.0 0.0 - 0.2 /100 WBC   POC Glucose Once    Collection Time: 01/11/23  7:41 AM    Specimen: Blood   Result Value Ref Range    Glucose 95 70 - 105 mg/dL          Imaging:  CT Cervical Spine With Contrast    Result Date: 1/3/2023  1.Postoperative changes status post prior fusion. No definitive signs of hardware failure or loosening are noted. 2.No evidence for displaced fracture or malalignment throughout the cervical spine. 3.No evidence for acute soft tissue abnormality. There is no evidence for abnormal enhancement. No abnormal peripherally enhancing fluid collection is seen. 4.Changes of cervical spondylosis are noted throughout the cervical spine. Associated hypertrophic posterior facet changes and uncovertebral changes are also observed. Electronically Signed: Solo Ray  1/3/2023 10:25 PM EST  Workstation ID: [id number]    CT Thoracic Spine With Contrast    Result Date: 1/3/2023  1.No evidence for displaced fracture or malalignment throughout the  thoracic spine. No evidence for erosive endplate changes. 2.No evidence for abnormal enhancement. There is no abnormal fluid collection within the paraspinal soft tissues. There is no evidence for epidural abscess. 3.Degenerative changes are noted throughout the thoracic spine. 4.Interstitial changes are seen throughout the lungs. Bibasilar infiltrates are noted. Bilateral pleural effusions are observed. Electronically Signed: Solo Ray  1/3/2023 10:31 PM EST  Workstation ID: [id number]    CT Lumbar Spine With Contrast    Result Date: 1/3/2023  1.No evidence for displaced fracture or malalignment throughout the lumbar spine. No evidence for erosive endplate changes. 2.No evidence for abnormal enhancement. There is no abnormal fluid collection within the paraspinal soft tissues. There is no evidence for epidural abscess. 3.Advanced degenerative changes are noted throughout the lumbar spine. 4.Evidence for bilateral spondylolysis with associated spondylolisthesis at the L5 level. Prior postoperative changes are also noted. Electronically Signed: Solo Finklett  1/3/2023 10:35 PM EST  Workstation ID: [id number]    FL Video Swallow With Speech Single Contrast    Result Date: 1/9/2023  Impression: Modified barium swallow study utilizing fluoroscopy. Please refer to the speech pathologist report for findings and dietary recommendations. Electronically Signed: Antonia Coombs  1/9/2023 10:41 AM EST  Workstation ID: [id number]    XR Chest 1 View    Result Date: 1/3/2023  Impression: 1. Radiographic changes consistent with congestive heart failure pulmonary edema with interval increase in pulmonary edema and development of small bilateral pleural effusions. Electronically Signed: Jaime Momin  1/3/2023 9:14 AM EST  Workstation ID: [id number]    XR Abdomen KUB    Result Date: 1/7/2023  Enteric tube in the stomach. Thank you for the referral of this patient. This exam was interpreted by an American Board of Radiology certified  radiologist with subspecialty fellowship in Pediatric Radiology. If there are any questions regarding this exam please feel free to contact a radiologist directly at [phone]. Slot 64 Electronically signed by:  Mau Rodriguez M.D.  1/7/2023 4:48 AM Mountain Time      Assessment/Plan:     Hyponatremia    ACC/AHA stage C chronic systolic heart failure (HCC)    Cardiac amyloidosis (HCC)    Primary hypertension    Tobacco abuse    Cervicalgia, spine surgery 2017    Primary osteoarthritis involving multiple joints    Bacteremia due to methicillin susceptible Staphylococcus aureus (MSSA)    Staphylococcal arthritis of shoulder (HCC)    Acute kidney injury superimposed on chronic kidney disease (HCC)    Dysphagia   hyponatremia improving   Acute kidney injury on chronic kidney disease improving  Atrial fibrillation with rapid ventricular response  Metabolic acidosis  Urinary retention   Sepsis   Hypocalcemia   Hyperkalemia      reduce Lasix dose to once a day  Replace potassium  Watch kidney function

## 2023-01-11 NOTE — CASE MANAGEMENT/SOCIAL WORK
Continued Stay Note   Michael     Patient Name: Jaime Bar  MRN: [id number]  Today's Date: 1/11/2023    Admit Date: 12/26/2022    Plan: DC Plan: [facility] accepted and following. Precert approved 1/10/23.. PASRR per facilitiy.   Discharge Plan     Row Name 01/11/23 1541       Plan    Plan DC Plan: [facility] accepted and following. Precert approved 1/10/23.. PASRR per facilitiy.    Provided Post Acute Provider List? N/A    Provided Post Acute Provider Quality & Resource List? N/A    Plan Comments GI consult will follow up with patient on an outpatient basis. MD placed discharge orders. Patient to be transported by [facility]. [facility] unavailable to accommodate.  time scheduled for 16:00 today. Liaison informed. No barriers to discharge.           Expected Discharge Date and Time     Expected Discharge Date Expected Discharge Time    Jan 11, 2023         Phone communication or documentation only- no physical contact with patient or family.      Melyssa Pascual RN      Office Phone: [phone]  Office Cell:     [phone]

## 2023-01-11 NOTE — PLAN OF CARE
"Assessment: Jaime Bar presents with functional mobility impairments which indicate the need for skilled intervention. Bilateral knee stiffness inhibits pt's ability to stand with left being worse than right. Tolerating session today without incident. Will continue to follow and progress as tolerated.     Plan/Recommendations:   Moderate Intensity Therapy recommended post-acute care. This is recommended as therapy feels the patient would require 3-4 days per week and wouldn't tolerate \"3 hour daily\" rehab intensity. SNF would be the preferred choice. If the patient does not agree to SNF, arrange HH or OP depending on home bound status. If patient is medically complex, consider LTACH.. Pt requires no DME at discharge.     Pt desires Skilled Rehab placement at discharge. Pt cooperative; agreeable to therapeutic recommendations and plan of care.   "

## 2023-01-11 NOTE — PLAN OF CARE
Goal Outcome Evaluation:         Pt transferring to [facility]. Vitals stable. K replaced. Pt alert and oriented and in good spirits

## 2023-01-11 NOTE — PROGRESS NOTES
Infectious Diseases Progress Note      LOS: 16 days   Patient Care Team:  Provider, No Known as PCP - Kyle Gardner MD as Consulting Physician (Nephrology)  Vicente Mendoza MD as Consulting Physician (Hematology and Oncology)    Chief Complaint: Fatigue    Subjective       Patient had no high-grade fever during the last 24 hours.  The patient is awake and alert.  Currently on 2 L of oxygen.  On no vasopressors    Review of Systems:   Review of Systems   Constitutional: Positive for fatigue.   HENT: Negative.    Eyes: Negative.    Respiratory: Negative.    Cardiovascular: Negative.    Gastrointestinal: Negative.    Genitourinary: Negative.    Musculoskeletal: Positive for joint swelling.   Skin: Negative.    Neurological: Negative.    Hematological: Negative.    Psychiatric/Behavioral: Negative.         Objective     Vital Signs  Temp:  [97.8 °F (36.6 °C)-98.6 °F (37 °C)] 97.8 °F (36.6 °C)  Heart Rate:  [87-99] 96  Resp:  [20-24] 20  BP: (103-135)/(58-87) 125/70    Physical Exam:  Physical Exam  Vitals and nursing note reviewed.   Constitutional:       General: He is not in acute distress.     Appearance: He is well-developed and normal weight. He is ill-appearing. He is not diaphoretic.      Comments: The patient is more awake and alert today   HENT:      Head: Normocephalic and atraumatic.   Eyes:      Pupils: Pupils are equal, round, and reactive to light.   Cardiovascular:      Rate and Rhythm: Normal rate and regular rhythm.      Heart sounds: Normal heart sounds, S1 normal and S2 normal.   Pulmonary:      Effort: Pulmonary effort is normal. No respiratory distress.      Breath sounds: No stridor. Rales present. No wheezing.   Abdominal:      General: Abdomen is flat. Bowel sounds are normal. There is no distension.      Palpations: Abdomen is soft. There is no mass.      Tenderness: There is no abdominal tenderness. There is no guarding.      Comments: NG tube   Musculoskeletal:         General:  No deformity. Normal range of motion.      Cervical back: Neck supple.      Right lower leg: Edema present.      Left lower leg: Edema present.      Comments: Both right and left shoulder drains were removed    Patient has a PICC line in the left arm   Skin:     General: Skin is warm and dry.      Coloration: Skin is not pale.      Findings: No erythema or rash.   Neurological:      Mental Status: He is alert and oriented to person, place, and time.      Cranial Nerves: No cranial nerve deficit.          Results Review:    I have reviewed all clinical data, test, lab, and imaging results.     Radiology  No Radiology Exams Resulted Within Past 24 Hours    Cardiology    Laboratory    Results from last 7 days   Lab Units 01/11/23  0559 01/10/23  0420 01/09/23  0417 01/08/23  0357 01/07/23  0712 01/06/23 0357 01/05/23 0411   WBC 10*3/mm3 4.80 5.70 5.30 7.00 6.90 5.80 5.90   HEMOGLOBIN g/dL 8.7* 8.8* 8.4* 9.1* 9.4* 9.9* 9.9*   HEMATOCRIT % 25.5* 26.0* 25.2* 27.4* 28.4* 29.7* 30.6*   PLATELETS 10*3/mm3 210 200 170 175 156 125* 121*     Results from last 7 days   Lab Units 01/11/23  1050 01/11/23  0559 01/10/23  1525 01/10/23  0420 01/09/23  0417 01/08/23  0357 01/07/23  0712 01/06/23 0357 01/05/23 0411   SODIUM mmol/L 140 139  --  140 139 138 138 141 144   POTASSIUM mmol/L 3.4* 2.9*  --  3.5 3.5 3.5 3.8 3.8 3.9   CHLORIDE mmol/L 94* 94*  --  96* 96* 96* 96* 99 104   CO2 mmol/L 38.0* 38.0*  --  39.0* 37.0* 35.0* 36.0* 33.0* 33.0*   BUN mg/dL 26* 24*  --  28* 31* 30* 28* 32* 33*   CREATININE mg/dL 0.60* 0.52*  --  0.63* 0.56* 0.57* 0.53* 0.55* 0.55*   GLUCOSE mg/dL 106* 91  --  93 106* 119* 94 106* 117*   ALBUMIN g/dL  --  2.4*  --  2.2* 2.1* 2.1* 2.2* 2.0* 2.3*   BILIRUBIN mg/dL  --  2.9*  --  2.7* 2.3* 3.0* 3.0* 3.4* 3.1*   ALK PHOS U/L  --  101  --  112 135* 111 91 103 155*   AST (SGOT) U/L  --  43*  --  36 35 34 33 39 33   ALT (SGPT) U/L  --  39  --  38 35 40 43* 52* 61*   AMMONIA umol/L  --   --  13*  --   --   --    --   --   --    CALCIUM mg/dL 8.3* 8.2*  --  8.4* 7.9* 8.1* 7.8* 7.8* 7.7*                 Microbiology   Microbiology Results (last 10 days)     ** No results found for the last 240 hours. **          Medication Review:       Schedule Meds  Barium Sulfate, 1 teaspoon(s), Oral, Once in imaging  cefTRIAXone, 2 g, Intravenous, Q24H  docusate sodium, 100 mg, Oral, BID  enoxaparin, 1.5 mg/kg, Subcutaneous, Daily  ethyl alcohol, 2 swab, Nasal, Once  ferrous sulfate, 300 mg, Nasogastric, Daily  First Mouthwash (Magic Mouthwash), 5 mL, Swish & Spit, Q6H  [START ON 1/12/2023] furosemide, 40 mg, Intravenous, Daily  lansoprazole, 30 mg, Nasogastric, Q AM  midodrine, 10 mg, Oral, Q8H  polyethylene glycol, 17 g, Oral, Daily  sodium chloride, 10 mL, Intravenous, Q12H  Zinc Oxide, , Apply externally, TID        Infusion Meds       PRN Meds  •  acetaminophen **OR** acetaminophen  •  aluminum-magnesium hydroxide-simethicone  •  dextrose  •  dextrose  •  fentanyl  •  glucagon (human recombinant)  •  HYDROcodone-acetaminophen  •  magnesium sulfate **OR** magnesium sulfate in D5W 1g/100mL (PREMIX)  •  ondansetron **OR** ondansetron  •  potassium chloride **OR** potassium chloride **OR** potassium chloride  •  sodium chloride  •  sodium chloride        Assessment & Plan       Antimicrobial Therapy   1.  IV ceftriaxone        2.        3.        4.        5.            Assessment    Bilateral shoulder septic arthritis.  Synovial fluid culture was consistent with infection and cultures are growing 4+ methicillin susceptible Staph aureus    Methicillin susceptible Staph aureus bacteremia secondary to above.  Blood cultures were positive x2 sets on admission on December 26, 2022.  Repeat blood culture from December 27, 2022 turned positive  Repeat blood culture from December 29, 2022 is positive  ALMA DELIA was done on December 28, 2022 and showed no vegetation  -Repeat blood culture from 12/30/2022 is now positive  -PICC line was removed on  12/31/2022- cultures pending  -Blood culture from 12/31/2022 is negative so far  CT scan of the cervical, thoracic and lumbar spine with contrast showed no obvious infection    Mild septic shock.  Currently off vasopressors    Respiratory failure.  Patient was on the ventilator after shoulder surgeries.  Was extubated and currently requiring 15 L of oxygen    DVT of the left arm secondary to PICC line.  Hematology service was consulted and patient was started on anticoagulation    Elevated transaminase and hyperbilirubinemia secondary to sepsis and infection.  Liver enzymes are trending down    Worsening right upper extremity edema.  Venous Doppler of the right upper extremity showed SVT but there was no DVT    Toxic metabolic encephalopathy.  Improved significantly      Plan    Continue IV ceftriaxone 2 g daily for total of 6 weeks.  The last day of IV ceftriaxone will be February 11, 2023  Patient currently has a PICC line which was placed on January 10, 2023.  Recommend to remove the PICC line after finishing last dose of IV ceftriaxone on February 11, 2023  Weekly labs can CBC, creatinine and sed rate times until patient finishes antibiotics    Okay to discharge to rehab facility at any time    Please fax all post discharge lab results, imaging studies and correspondence to this fax number [phone]  For any question or concern please contact our service number [phone]    Antonina Delacruz MD  01/11/23  12:46 EST    Note is dictated utilizing voice recognition software/Dragon

## 2023-01-11 NOTE — THERAPY TREATMENT NOTE
Acute Care - Speech Language Pathology   Swallow Treatment Note  Michael     Patient Name: Jaime Bar  : 1955  MRN: [id number]  Today's Date: 2023               Admit Date: 2022    Visit Dx:     ICD-10-CM ICD-9-CM   1. Pyogenic arthritis of right shoulder region, due to unspecified organism (HCC)  M00.9 711.01   2. Staphylococcal arthritis of shoulder, unspecified laterality (HCC)  M00.019 711.01     Patient Active Problem List   Diagnosis   • Hyponatremia   • ACC/AHA stage C chronic systolic heart failure (HCC)   • Cardiac amyloidosis (HCC)   • History of prostate cancer   • Hx of neck surgery   • Neck pain   • Primary hypertension   • Tobacco abuse   • Cervicalgia, spine surgery    • Primary osteoarthritis involving multiple joints   • Bacteremia due to methicillin susceptible Staphylococcus aureus (MSSA)   • Staphylococcal arthritis of shoulder (HCC)   • Acute kidney injury superimposed on chronic kidney disease (HCC)   • Dysphagia     Past Medical History:   Diagnosis Date   • Dysphagia 2023     Past Surgical History:   Procedure Laterality Date   • SHOULDER ARTHROSCOPY Right 2022    Procedure: SHOULDER ARTHROSCOPY INCISION AND DRAINAGE.;  Surgeon: Nikunj Velez MD;  Location: [facility] OR;  Service: Orthopedics;  Laterality: Right;   • SHOULDER ARTHROSCOPY Left 2022    Procedure: SHOULDER ARTHROSCOPY INCISION AND DRAINAGE;  Surgeon: Nikunj Velez MD;  Location: [facility] MAIN OR;  Service: Orthopedics;  Laterality: Left;       SLP Recommendation and Plan     SLP Diet Recommendation: puree, nectar thick liquids (23 1400)  Recommended Precautions and Strategies: upright posture during/after eating, small bites of food and sips of liquid, alternate between small bites of food and sips of liquid, check mouth frequently for oral residue/pocketing, hard swallow with each bite or sip, general aspiration precautions, fatigue precautions, 1:1 supervision  "(01/11/23 1400)  SLP Rec. for Method of Medication Administration: meds whole, meds crushed, with thick liquids, with puree, as tolerated (01/11/23 1400)     Monitor for Signs of Aspiration: yes, notify SLP if any concerns, cough, elevated WBC count, gurgly voice, throat clearing, fever, upper respiratory, pneumonia, right lower lobe infiltrates (01/11/23 1400)  Recommended Diagnostics: reassess via clinical swallow evaluation, reassess via VFSS ([facility]) (01/11/23 1400)           Therapy Frequency (Swallow): 3 days per week, 4 days per week (01/11/23 1400)  Predicted Duration Therapy Intervention (Days): until discharge (01/11/23 1400)     Patient was not wearing a face mask during this therapy encounter. Therapist used appropriate personal protective equipment including mask and gloves.  Mask used was standard procedure mask. Appropriate PPE was worn during the entire therapy session. Hand hygiene was completed before and after therapy session. Patient is not in enhanced droplet precautions.     SWALLOW EVALUATION (last 72 hours)     SLP Adult Swallow Evaluation     Row Name 01/11/23 1400          Document Type therapy note (daily note)  -RZ    Subjective Information no complaints  -RZ    Patient Observations alert;cooperative;agree to therapy  -RZ    Patient/Family/Caregiver Comments/Observations --    Patient Effort adequate  -RZ    Comment Skilled ST targeting dysphagia conducted on today's date. Per VFSS completed on 1/9/23, diet recommendations are puree and nectar thick liquids (NTL). Pt was seen during a meal tray; however reported that \"none of the food sounds good.” Pt agreed to  trials of NTL by straw and puree. Throat clear noted x2 and cough x1 noted after trials of puree. No throat clear or cough noted after trials of NTL by straw. No \"wet\"/hoarse VQ noted after trials. No oral pocketing/residue. No other overt s/s of aspiration noted at this time. Minimal trials completed on today’s date to pt reporting " decreased appetite.      ST RECOMMEDNTIONS: ST recommends that pt continue a puree and NTL diet at this time. Pt should remain upright during meals/medication, alternate between bites/sips, and monitor for s/s of aspiration. ST will continue to follow while in house to ensure this is pt's safest/least restrictive diet at this time and to teach/complete swallow exercises.  -RZ    Symptoms Noted During/After Treatment --          Patient Profile Reviewed yes  -RZ    Pertinent History Of Current Problem --    Current Method of Nutrition --    Precautions/Limitations, Vision --    Precautions/Limitations, Hearing --    Prior Level of Function-Communication --    Prior Level of Function-Swallowing --    Plans/Goals Discussed with --    Barriers to Rehab --          Dentition Assessment --    Secretion Management --    Mucosal Quality --          Oral Motor, Comment --          Respiratory Support Currently in Use --    Eating/Swallowing Skills --    Positioning During Eating --          Utensils Used --    Consistencies Trialed --          VFSS Summary --          SLP Swallowing Diagnosis --    Functional Impact --    Rehab Potential/Prognosis, Swallowing --    Swallow Criteria for Skilled Therapeutic Interventions Met --          Therapy Frequency (Swallow) 3 days per week;4 days per week  -RZ    Predicted Duration Therapy Intervention (Days) until discharge  -RZ    SLP Diet Recommendation puree;nectar thick liquids  -RZ    Recommended Diagnostics reassess via clinical swallow evaluation;reassess [place]  -RZ    Recommended Precautions and Strategies upright posture during/after eating;small bites of food and sips of liquid;alternate between small bites of food and sips of liquid;check mouth frequently for oral residue/pocketing;hard swallow with each bite or sip;general aspiration precautions;fatigue precautions;1:1 supervision  -RZ    Oral Care Recommendations Oral Care BID/PRN;Before ice/water;Swab  -RZ    SLP  Rec. for Method of Medication Administration meds whole;meds crushed;with thick liquids;with puree;as tolerated  -RZ    Monitor for Signs of Aspiration yes;notify SLP if any concerns;cough;elevated WBC count;gurgly voice;throat clearing;fever;upper respiratory;pneumonia;right lower lobe infiltrates  -RZ    Anticipated Discharge Disposition (SLP) --          Swallow LTGs Swallow Long Term Goal (free text)  -RZ    Swallow STGs diet tolerance goal selection (SLP)  -RZ    Diet Tolerance Goal Selection (SLP) Swallow Short Term Goal 1;Swallow Short Term Goal 2  -RZ          (LTG) Swallow The patient will maximize swallow function for least restrictive po diet, exhibiting no complications associated with dysphagia, adequate po intake, and demonstrating independent use of safe swallow compensations.  -RZ    [place] (Swallow Long Term Goal) with moderate cues (50-74% accuracy)  -RZ    Time Frame (Swallow Long Term Goal) by discharge  -RZ    Progress/Outcomes (Swallow Long Term Goal) goal ongoing  -RZ    Comment (Swallow Long Term Goal) see above note  -RZ          (STG) Swallow 1 The patient will participate in ongoing assessment of swallow, including reevaluation clinically and/or inclulding instrumental assessment of swallow if indicated to further assess swallow function in anticipation of initiation of PO diet.  -RZ    [place] (Swallow Short Term Goal 1) with maximum cues (25-49% accuracy)  -RZ    Time Frame (Swallow Short Term Goal 1) 1 week  -RZ    Progress/Outcomes (Swallow Short Term Goal 1) goal ongoing  -RZ    Comment (Swallow Short Term Goal 1) see above note.  -RZ          (STG) Swallow 2 The patient will participate in a full meal assessment to determine safety and adequacy of recommended diet, independent use of safe swallow compensations, and additional goals/recommendations to follow  -RZ    [place] (Swallow Short Term Goal 2) with 1:1 assist/ supervision  -RZ    Time Frame (Swallow Short Term  Goal 2) other (see comments)  24 - 48 hours  -RZ    Progress/Outcomes (Swallow Short Term Goal 2) goal ongoing  -RZ    Comment (Swallow Short Term Goal 2) see above note,  -RZ          User Key  (r) = Recorded By, (t) = Taken By, (c) = Cosigned By    Initials Name Effective Dates    CP Marysol Laguerre, SLP 06/16/21 -     SM Destiny Gonzáles, SLP 06/16/21 -     Aj Jeffery, SLP 08/01/22 -                 EDUCATION  The patient has been educated in the following areas:   Dysphagia (Swallowing Impairment).        SLP GOALS     Row Name 01/11/23 1400          (LTG) Swallow The patient will maximize swallow function for least restrictive po diet, exhibiting no complications associated with dysphagia, adequate po intake, and demonstrating independent use of safe swallow compensations.  -RZ    Real (Swallow Long Term Goal) with moderate cues (50-74% accuracy)  -RZ    Time Frame (Swallow Long Term Goal) by discharge  -RZ    Progress/Outcomes (Swallow Long Term Goal) goal ongoing  -RZ    Comment (Swallow Long Term Goal) see above note  -RZ          (STG) Swallow 1 The patient will participate in ongoing assessment of swallow, including reevaluation clinically and/or inclulding instrumental assessment of swallow if indicated to further assess swallow function in anticipation of initiation of PO diet.  -RZ    Real (Swallow Short Term Goal 1) with maximum cues (25-49% accuracy)  -RZ    Time Frame (Swallow Short Term Goal 1) 1 week  -RZ    Progress/Outcomes (Swallow Short Term Goal 1) goal ongoing  -RZ    Comment (Swallow Short Term Goal 1) see above note.  -RZ          (STG) Swallow 2 The patient will participate in a full meal assessment to determine safety and adequacy of recommended diet, independent use of safe swallow compensations, and additional goals/recommendations to follow  -RZ    Real (Swallow Short Term Goal 2) with 1:1 assist/ supervision  -RZ    Time Frame (Swallow Short Term Goal  2) other (see comments)  24 - 48 hours  -RZ    Progress/Outcomes (Swallow Short Term Goal 2) goal ongoing  -RZ    Comment (Swallow Short Term Goal 2) see above note,  -RZ          User Key  (r) = Recorded By, (t) = Taken By, (c) = Cosigned By    Initials Name Provider Type    CP Marysol Laguerre, SLP Speech and Language Pathologist    Destiny Montero, SLP Speech and Language Pathologist    Aj Jeffery, SLP Speech and Language Pathologist                   Time Calculation:         MONSTER Collazo  1/11/2023

## 2023-01-11 NOTE — PLAN OF CARE
Goal Outcome Evaluation:            Patient has improving mobility in LUE. Still has swelling and weakness in RUE. Vitals stable overnight. Shoulder pain controlled with prn analgesic.

## 2023-01-11 NOTE — DISCHARGE SUMMARY
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 male     Brief Patient Summary:  Jaime Bar is a 67 y.o. male with past medical history of cervicalgia, primary osteoarthritis involving multiple joints with surgery on bilateral shoulders and right knee replacement but no recent procedure or surgery, hyponatremia, prostate cancer, and cardiac amyloidosis presented to [facility] on 2022 with complaints of right shoulder pain, weakness, and altered mental status.  He was found to be hypotensive and hyponatremic with elevated lactic acid.  Patient had reported decreased oral intake.  He was transferred to [facility] ICU for further treatment of septic shock requiring vasopressors and hyponatremia.      Since admission he was found to be bacteremic  with 2 sets of blood cultures positive for staph aureus, source being bilateral shoulder septic arthritis. ID has been treating the patient with IV ceftriaxone 2G and he will need a total of 6 weeks of antibiotic therapy.  He has status post bilateral shoulder arthroscopy with extensive debridement by Dr. Velez 12/28/2022. He remained intubated after surgery until 12/29 and now extubated. He has been weaned off vasopressors. Nephrology is managing his hyponatremia and STEVEN on CKD. He was found to have LUE DVT provoked from PICC line currently on argatroban. Hematology has been consulted for his thrombocytopenia.         Barium Sulfate, 1 teaspoon(s), Oral, Once in imaging  cefTRIAXone, 2 g, Intravenous, Q24H  docusate sodium, 100 mg, Oral, BID  enoxaparin, 1.5 mg/kg, Subcutaneous, Daily  ethyl alcohol, 2 swab, Nasal, Once  ferrous sulfate, 300 mg, Nasogastric, Daily  First Mouthwash (Magic Mouthwash), 5 mL, Swish & Spit, Q6H  furosemide, 40 mg, Intravenous, Q12H  lansoprazole, 30 mg, Nasogastric, Q AM  midodrine, 10 mg, Oral, Q8H  polyethylene glycol, 17 g, Oral, Daily  sodium chloride, 10 mL, Intravenous, Q12H  Zinc Oxide, , Apply externally, TID               Active Hospital Problems:       Active Hospital Problems     Diagnosis     • Dysphagia     • Bacteremia due to methicillin susceptible Staphylococcus aureus (MSSA)     • Staphylococcal arthritis of shoulder (HCC)     • Cervicalgia, spine surgery 2017     • Primary osteoarthritis involving multiple joints     • Hyponatremia     • Acute kidney injury superimposed on chronic kidney disease (HCC)     • Cardiac amyloidosis (HCC)     • ACC/AHA stage C chronic systolic heart failure (HCC)     • Tobacco abuse     • Primary hypertension        Plan:     Bilateral shoulder septic arthritis  Septic shock secondary to MSSA bacteremia  -Synovial fluid culture was consistent with infection and cultures are growing 4+ Staph aureus  -s/p bilateral shoulder arthroscopy with  extensive debridement by Dr. Velez 12/28/2022  - 2/2 blood cultures positive for MSSA secondary to bilateral shoulder septic arthritis   -ALMA DELIA on 12/28, Negative for endocarditis  -On IV Rocephin 2 g daily x6 weeks per ID (last day of therapy is 11FEB23)  -Off IV vasopressors, remains on midodrine  -Discontinued art line, MAP greater than 65 mmHg at this time  -LUE PICC line removed and tip also sent for culture  -Fevers have been intermittent  , CT of the spine does not show any acute abscess or infectious changes  -ID following  -Left PADMINI with minimal serous output, right remains more copious and cloudy, management as per Ortho  ID recommendation  ontinue IV ceftriaxone 2 g daily for total of 6 weeks.  The last day of IV ceftriaxone will be February 11, 2023  Patient will need a PICC line before discharge-please remove when IV antibiotics are completed.  Weekly labs can CBC, creatinine and sed rate times x5 weeks        Dysphagia  -Speech following, still not appropriate for PO intake  -NG tube feeds at goal. However, it's been 8 days with NG now.  -May need to consider PEG if ST doesn't think he can move towards PO intake     Acute metabolic encephalopathy, improved  Severe thrombocytopenia, improved  LUE DVT, provoked from PICC line/RUE showing superficial thrombus  -Initially concern for TTP however no schistocytes seen on peripheral smear per hematology  -Platelets improving  -Off argatroban drip, PTT elevated  -On low-dose Lovenox twice daily and increase to full dose now.  -Patient's left lower extremity is back to normal now, good pulses noted, BERNARDINO showed mild digital ischemia  -No peripheral cyanosis noted at this time  -Video-study was done and patient given puréed diet with nectar thick liquids.-  Feeding tube removed     Acute respiratory failure with hypoxia, improved  -Pulmonary edema, improved  -Postop respiratory failure, extubated on 12/39  -Remains on 2LNC, wean as tolerated  -On diuresis per  nephrology     STEVEN on CKD  Hyponatremia on admission and then hypernatremia both normalized  Defer to nephrologist     PAF, s/p RVR  -Off amnio drip, rate controlled now  -Anticoagulation recommendations per cardiology and heme-onc  -Cardiology following, may need cardioversion prior to discharge  Continues to be in atrial fibrillation with rate controlled  Continues on full dose Lovenox, will change to Eliquis on discharge plan for cardioversion once patient no longer afebrile.  Reconsult cardiology.  But patient wanted another opinion and will see his primary cardiologist       . Cholelithiasis with adenomyomatosis involving gallbladder wall.   Common bile duct at the upper limits of normal measuring 6 to 7 mm.  Follow-up with GI in 2 to 3 weeks  Ammonia level is normal    Elevated LFTs  -Likely due to shock liver  -Hep panel negative  -Liver ultrasound showed normal hepatic parenchyma, cholelithiasis with adenomyomatosis involving gallbladder wall, CBD upper limit of normal  -Monitor  Ammonia level normal  Mild elevated bilirubin but direct bilirubin pending and levels have been trending down  Follow-up with GI as outlined above      H/o cardiac amyloidosis.  Patient may resume his home medicationTafamidis     H/o prostate CA  Osteoarthritis      DVT/GI Ppx.     DNR/DNI.           DISCHARGE Follow Up Recommendations for labs and diagnostics:     ID recommendation  Continue IV ceftriaxone 2 g daily for total of 6 weeks.  The last day of IV ceftriaxone will be February 11, 2023  Patient will need a PICC line before discharge-please remove when IV antibiotics are completed.  Weekly labs can CBC, creatinine and sed rate times x5 weeks  Follow-up with orthopedic surgery in 2 to 4 weeks  Follow-up with GI in 2 to 4 weeks for abnormalities noted on gallbladder  Remove PICC line after completion of antibiotic therapy  W please continue current wound care  Consider compression stockings to the right upper  extremity    Reasons For Change In Medications and Indications for New Medications:      Day of Discharge     Vital Signs:  Temp:  [97.8 °F (36.6 °C)-98.6 °F (37 °C)] 97.8 °F (36.6 °C)  Heart Rate:  [87-99] 96  Resp:  [20-24] 20  BP: (103-135)/(58-87) 125/70  Flow (L/min):  [2] 2    Physical Exam:  Physical Exam   Heart rate controlled  Patient slightly drowsy but responds to questions appropriately  Right upper extremity swelling is the same without change but has good pulsations        Pertinent  and/or Most Recent Results     LAB RESULTS:      Lab 01/11/23  0559 01/10/23  0420 01/09/23  0417 01/08/23  0357 01/07/23  0712 01/06/23 0357   WBC 4.80 5.70 5.30 7.00 6.90 5.80   HEMOGLOBIN 8.7* 8.8* 8.4* 9.1* 9.4* 9.9*   HEMATOCRIT 25.5* 26.0* 25.2* 27.4* 28.4* 29.7*   PLATELETS 210 200 170 175 156 125*   NEUTROS ABS 3.90 4.85 4.10 5.60 5.70 4.60   LYMPHS ABS 0.30*  --  0.40* 0.40* 0.30* 0.40*   MONOS ABS 0.60  --  0.80 1.00* 0.80 0.70   EOS ABS 0.00  --  0.00 0.00 0.00 0.00   MCV 92.2 93.2 93.6 93.5 95.8 94.5         Lab 01/11/23  1050 01/11/23  0559 01/10/23  0421 01/10/23  0420 01/09/23  0417 01/08/23 0357 01/07/23  0712   SODIUM 140 139  --  140 139 138 138   POTASSIUM 3.4* 2.9*  --  3.5 3.5 3.5 3.8   CHLORIDE 94* 94*  --  96* 96* 96* 96*   CO2 38.0* 38.0*  --  39.0* 37.0* 35.0* 36.0*   ANION GAP 8.0 7.0  --  5.0 6.0 7.0 6.0   BUN 26* 24*  --  28* 31* 30* 28*   CREATININE 0.60* 0.52*  --  0.63* 0.56* 0.57* 0.53*   EGFR 105.8 110.5  --  104.3 108.0 107.5 109.8   GLUCOSE 106* 91  --  93 106* 119* 94   CALCIUM 8.3* 8.2*  --  8.4* 7.9* 8.1* 7.8*   IONIZED CALCIUM  --  1.12* 1.11*  --  1.10* 1.10* 1.09*   MAGNESIUM 1.9 1.8  --  1.9 1.9 1.8 1.7   PHOSPHORUS  --  3.3  --  3.3 2.8 2.7 3.1         Lab 01/11/23  0559 01/10/23  1525 01/10/23  0420 01/09/23  0417 01/08/23  0357 01/07/23  0712   TOTAL PROTEIN 5.5*  --  5.4* 5.3* 5.3* 5.3*   ALBUMIN 2.4*  --  2.2* 2.1* 2.1* 2.2*   GLOBULIN 3.1  --  3.2 3.2 3.2 3.1   ALT (SGPT)  39  --  38 35 40 43*   AST (SGOT) 43*  --  36 35 34 33   BILIRUBIN 2.9*  --  2.7* 2.3* 3.0* 3.0*   BILIRUBIN DIRECT  --  2.0*  --   --   --   --    ALK PHOS 101  --  112 135* 111 91                 Lab 01/11/23  1050   FERRITIN 1,814.00*         Brief Urine Lab Results  (Last result in the past 365 days)      Color   Clarity   Blood   Leuk Est   Nitrite   Protein   CREAT   Urine HCG        12/26/22 1500 Ambar  Comment: Result checked     Clear   Trace   Negative   Positive   30 mg/dL (1+)               Microbiology Results (last 10 days)     ** No results found for the last 240 hours. **          XR Shoulder 2+ View Right    Result Date: 12/27/2022  Impression:  1. Severe degenerative changes of the glenohumeral joint as well as a high riding humeral head, suggestive of chronic rotator cuff pathology. No definite evidence of osteomyelitis is identified. However plain films cannot exclude septic arthritis.  Electronically Signed By-Juan Francisco Lozoya MD On:12/27/2022 1:42 PM This report was finalized on [id number] by  Juan Francisco Lozoya MD.    CT Head Without Contrast    Result Date: 12/31/2022  Impression: 1. No acute intracranial abnormality. Specifically, no evidence of acute hemorrhage, mass effect or midline shift. 2. Mild global cerebral volume loss. 3. Mild bilateral air-fluid levels within the maxillary sinuses which can be seen with acute sinus disease in the correct clinical setting.  Electronically Signed By-Willy Baxter MD On:12/31/2022 9:01 PM This report was finalized on [id number] by  Willy Baxter MD.    CT Cervical Spine With Contrast    Result Date: 1/3/2023  Impression: 1.Postoperative changes status post prior fusion. No definitive signs of hardware failure or loosening are noted. 2.No evidence for displaced fracture or malalignment throughout the cervical spine. 3.No evidence for acute soft tissue abnormality. There is no evidence for abnormal enhancement. No abnormal peripherally enhancing  fluid collection is seen. 4.Changes of cervical spondylosis are noted throughout the cervical spine. Associated hypertrophic posterior facet changes and uncovertebral changes are also observed. Electronically Signed: Solo Ray  1/3/2023 10:25 PM EST  Workstation ID: [id number]    CT Thoracic Spine With Contrast    Result Date: 1/3/2023  Impression: 1.No evidence for displaced fracture or malalignment throughout the thoracic spine. No evidence for erosive endplate changes. 2.No evidence for abnormal enhancement. There is no abnormal fluid collection within the paraspinal soft tissues. There is no evidence for epidural abscess. 3.Degenerative changes are noted throughout the thoracic spine. 4.Interstitial changes are seen throughout the lungs. Bibasilar infiltrates are noted. Bilateral pleural effusions are observed. Electronically Signed: Solo Ray  1/3/2023 10:31 PM EST  Workstation ID: [id number]    CT Lumbar Spine With Contrast    Result Date: 1/3/2023  Impression: 1.No evidence for displaced fracture or malalignment throughout the lumbar spine. No evidence for erosive endplate changes. 2.No evidence for abnormal enhancement. There is no abnormal fluid collection within the paraspinal soft tissues. There is no evidence for epidural abscess. 3.Advanced degenerative changes are noted throughout the lumbar spine. 4.Evidence for bilateral spondylolysis with associated spondylolisthesis at the L5 level. Prior postoperative changes are also noted. Electronically Signed: Solo Ray  1/3/2023 10:35 PM EST  Workstation ID: [id number]    FL Video Swallow With Speech Single Contrast    Result Date: 1/9/2023  Impression: Impression: Modified barium swallow study utilizing fluoroscopy. Please refer to the speech pathologist report for findings and dietary recommendations. Electronically Signed: Antonia Coombs  1/9/2023 10:41 AM EST  Workstation ID: [id number]    US Liver    Result Date: 12/30/2022  Impression: 1. Normal  hepatic parenchyma. 2. Cholelithiasis with adenomyomatosis involving gallbladder wall. 3. Common bile duct at the upper limits of normal measuring 6 to 7 mm.  Electronically Signed By-Genaro Canada MD On:12/30/2022 3:40 PM This report was finalized on [id number] by  Genaro Canada MD.    XR Chest 1 View    Result Date: 1/3/2023  Impression: Impression: 1. Radiographic changes consistent with congestive heart failure pulmonary edema with interval increase in pulmonary edema and development of small bilateral pleural effusions. Electronically Signed: Jaime Momin  1/3/2023 9:14 AM EST  Workstation ID: [id number]    XR Chest 1 View    Result Date: 12/30/2022  Impression: Cardiomegaly and increased pulmonary vascular congestion and suspected interstitial edema  Lines and tubes as above[  Electronically Signed By-Jaxon Aly On:12/30/2022 10:19 AM This report was finalized on [id number] by  Jaxon Aly, .    XR Chest 1 View    Result Date: 12/29/2022  Impression: The endotracheal tube is approximately 6 mm above the sarah. Recommend repositioning. Left subclavian central venous catheter has its catheter tip overlying the superior vena cava. The cardiac silhouette is moderately to significantly enlarged and the pulmonary vessels are within normal limits. There is no focal area of consolidation otherwise seen. Electronically signed by:  Los Mccoy D.O.  12/28/2022 11:13 PM Mountain Time    XR Chest 1 View    Result Date: 12/27/2022  Impression:  1. Persistent diffuse interstitial opacities. Differential includes interstitial pulmonary edema, or atypical infection.  Electronically Signed By-Juan Francisco Lozoya MD On:12/27/2022 8:46 AM This report was finalized on [id number] by  Juan Francisco Lozoya MD.    XR Chest 1 View    Result Date: 12/26/2022  Impression:   1.  Interval placement of left-sided PICC line with the tip projecting over the superior vena cava. 2.  No change in coarsened reticular interstitial  markings throughout both lungs.  No new airspace consolidation or pleural effusion.  Electronically Signed By-Los Diop MD On:12/26/2022 4:56 PM This report was finalized on [id number] by  Los Diop MD.    XR Chest 1 View    Result Date: 12/26/2022  Impression: 1.  Coarsened airspace, bronchovascular thickening.  Consider small airways disease (bronchitis, asthma), edema, atypical/viral infection, aspiration. 2.  Heart size is normal. 3.  No acute bony findings. Electronically signed by:  Bernadette Huddleston M.D.  12/26/2022 4:56 AM Mountain Time    MRI Shoulder Right Without Contrast    Result Date: 12/28/2022  Impression: Impression: 1. Moderate-sized joint effusion with advanced degenerative changes and destructive changes of the glenohumeral joint. Findings may be related to septic arthritis given clinical history. Fluid aspiration is recommended for confirmation. Joint effusion with subcoracoid bursitis may also be reactive and related to severe osteoarthritis. 2. Suspected complete tears of the supraspinatus and infraspinatus tendons. 3. Completely macerated appearance of the labrum. 4. Moderate acromial clavicular osteoarthritis. Electronically Signed: Linnette Pacheco  12/28/2022 10:08 AM EST  Workstation ID: [id number]    MRI Shoulder Left Without Contrast    Result Date: 12/28/2022  Impression: Impression: Very large left-sided joint effusion with destructive changes of the glenohumeral joint with deformity of the glenoid likely related to septic arthritis. Fluid sampling is recommended for further characterization. Ancillary findings as described above. Electronically Signed: Linnette Pacheco  12/28/2022 10:06 AM EST  Workstation ID: [id number]    XR Abdomen KUB    Result Date: 1/7/2023  Impression: Enteric tube in the stomach. Thank you for the referral of this patient. This exam was interpreted by an American Board of Radiology certified radiologist with subspecialty fellowship in Pediatric Radiology. If  there are any questions regarding this exam please feel free to contact a radiologist directly at [phone]. Slot 64 Electronically signed by:  Mau Rodriguez M.D.  1/7/2023 4:48 AM Mountain Time    XR Abdomen KUB    Result Date: 12/30/2022  Impression: NG tube projects over the expected position of the body of the stomach  Electronically Signed By-Jaxon Aly On:12/30/2022 12:00 PM This report was finalized on [id number] by  Jaxon Aly, .      Results for orders placed during the hospital encounter of 12/26/22    Duplex Venous Upper Extremity - Right CAR    Interpretation Summary  •  Acute right upper extremity superficial thrombophlebitis noted in the cephalic (forearm).  •  All other right sided vessels appear normal.      Results for orders placed during the hospital encounter of 12/26/22    Duplex Venous Upper Extremity - Right CAR    Interpretation Summary  •  Acute right upper extremity superficial thrombophlebitis noted in the cephalic (forearm).  •  All other right sided vessels appear normal.      Results for orders placed during the hospital encounter of 12/26/22    Adult Transesophageal Echo (ALMA DELIA) W/ Cont if Necessary Per Protocol (Cardiology Department)    Interpretation Summary  •  Left ventricular ejection fraction appears to be 46 - 50%.  •  Mild aortic valve stenosis is present.  •  Estimated right ventricular systolic pressure from tricuspid regurgitation is normal (<35 mmHg).  •  All 4 cardiac valves were well visualized, no vegetations were seen, no evidence of bacterial endocarditis.      Labs Pending at Discharge:  Pending Labs     Order Current Status    SHAR Collected (01/11/23 1225)    AFP Tumor Marker In process    Alpha - 1 - Antitrypsin In process    Anti-Smooth Muscle Antibody Titer In process    Anti-microsomal Antibody In process    Ceruloplasmin In process    Gamma GT In process    Hepatitis Panel, Acute In process    Mitochondrial Antibodies, M2 In process           Procedures Performed  Procedure(s):  SHOULDER ARTHROSCOPY INCISION AND DRAINAGE.  SHOULDER ARTHROSCOPY INCISION AND DRAINAGE         Consults:   Consults     Date and Time Order Name Status Description    1/10/2023  4:40 PM Inpatient Gastroenterology Consult      1/10/2023  9:17 AM Inpatient Cardiology Consult      1/3/2023  1:52 PM Inpatient Neurology Consult General Completed     1/3/2023  1:17 PM Inpatient Neurology Consult General      12/30/2022  9:55 AM Inpatient Hospitalist Consult      12/29/2022 11:15 AM Hematology & Oncology Inpatient Consult Completed     12/27/2022 12:00 PM Inpatient Orthopedic Surgery Consult Completed     12/27/2022  9:39 AM Inpatient Infectious Diseases Consult Completed     12/26/2022  2:44 PM Inpatient Cardiology Consult      12/26/2022  9:48 AM Inpatient Nephrology Consult Completed             Discharge Details        Discharge Medications      New Medications      Instructions Start Date   apixaban 5 MG tablet tablet  Commonly known as: ELIQUIS   5 mg, Oral, Every 12 Hours Scheduled      Boudreauxs Butt Paste 16 % ointment  Generic drug: Zinc Oxide   Apply externally, 3 Times Daily      cefTRIAXone 2 g in sodium chloride 0.9 % 100 mL IVPB   2 g, Intravenous, Every 24 Hours      ferrous sulfate 300 (60 Fe) MG/5ML syrup   300 mg, Oral, Daily      furosemide 40 MG tablet  Commonly known as: Lasix   40 mg, Oral, Daily      midodrine 10 MG tablet  Commonly known as: PROAMATINE   10 mg, Oral, Every 8 Hours      polyethylene glycol 17 g packet  Commonly known as: MIRALAX   17 g, Oral, Daily      potassium chloride 20 MEQ CR tablet  Commonly known as: K-DUR,KLOR-CON   20 mEq, Oral, 2 Times Daily         Continue These Medications      Instructions Start Date   albuterol sulfate  (90 Base) MCG/ACT inhaler  Commonly known as: PROVENTIL HFA;VENTOLIN HFA;PROAIR HFA   2 puffs, Inhalation, Every 4 Hours PRN      HYDROcodone-acetaminophen  MG per tablet  Commonly known as:  "NORCO   1 tablet, Oral, Every 4 Hours PRN      omeprazole 20 MG capsule  Commonly known as: priLOSEC   20 mg, Oral, Daily      Stiolto Respimat 2.5-2.5 MCG/ACT aerosol solution inhaler  Generic drug: tiotropium bromide-olodaterol   2 puffs, Inhalation, Daily - RT         Stop These Medications    diclofenac 75 MG EC tablet  Commonly known as: VOLTAREN     metoprolol succinate XL 25 MG 24 hr tablet  Commonly known as: TOPROL-XL     sodium bicarbonate 650 MG tablet     spironolactone 25 MG tablet  Commonly known as: ALDACTONE            Allergies   Allergen Reactions   • Tramadol-Acetaminophen Anxiety     Worms in the brain feeling   • Codeine Other (See Comments)     nausea   • Tramadol Other (See Comments)     \"I just felt really crawly, it did weird things with my head\"         Discharge Disposition:     Skilled Nursing Facility (DC - External)    Diet:  Hospital:  Diet Order   Procedures   • Diet: Regular/House Diet; Texture: Pureed (NDD 1); Fluid Consistency: Nectar Thick         Discharge Activity:         CODE STATUS:  Code Status and Medical Interventions:   Ordered at: 12/27/22 1355     Medical Intervention Limits:    NO intubation (DNI)     Level Of Support Discussed With:    Next of Kin (If No Surrogate)     Code Status (Patient has no pulse and is not breathing):    No CPR (Do Not Attempt to Resuscitate)     Medical Interventions (Patient has pulse or is breathing):    Limited Support     Release to patient:    Routine Release         No future appointments.        Time spent on Discharge including face to face service:  25 minutes    This patient has been examined wearing appropriate Personal Protective Equipment and discussed with hospital infection control department. 01/11/23      Signature: Electronically signed by Dante Simpson MD, 01/10/23, 4:03 PM [facility] Hospitalist Team      "

## 2023-01-11 NOTE — NURSING NOTE
67-year-old male who presented to the hospital on December 26 with weakness, shoulder pain and altered mental status changes.  Patient seen today for follow-up to a sacral pressure injury.  Patient reports no issues in regards to discomfort to the site.  Patient is awake and alert and is able to partially assist with turn and position changes.    Patient initially presented with a deep tissue pressure injury the area has evolved it is now open.  The ecchymosis is gone however the base of the wound is mostly yellow.  Therefore we will be considered an unstageable sacral pressure injury at this time.  Approximate size of the area 1 x 1-1/2 cm.  Small amount of serous exudate is noted.  Will recommend treating with a silicone foam border dressing.  He is currently on a critical care surface and does have pressure injury prevention strategies in place.  We will continue to follow

## 2023-01-11 NOTE — PROGRESS NOTES
Hematology/Oncology Inpatient Progress Note    PATIENT NAME: Jaime Bar  : 1955  MRN: [id number]    CHIEF COMPLAINT: Thrombocytopenia; provoked catheter associated LUE DVT, LUE SVT, and RUE SVT.    HISTORY OF PRESENT ILLNESS:    67 y.o. male admitted to [facility] on transfer from [facility] on 2022.  The patient was intubated and unresponsive at time of consultation with histories obtained from review of records and discussion with patient's niece and nephew.  He reported a history of bilateral shoulder pain for few days prior to admission.  He had presented to [place] ED on 2022 where he was hypotensive and hyponatremic and transferred to [facility].  CXR on 2022 showed coarsened airspace and bronchovascular thickening with small airway disease such as bronchitis or asthma, edema, atypical/viral infection, and aspiration in differential.  A RUE venous Doppler showed acute RUE superficial thrombophlebitis in the cephalic vein and no evidence of DVT for which he was started on subcu heparin.  On 2022 CBC revealed WBC 7.2, hemoglobin 12.4, MCV 98.4, and platelets 99,000.  Creatinine was elevated to 2.38, BUN 55, sodium was low at 118, potassium was high at 5.4 and LFTs revealed T bili elevated to 4.8 (0-1.2) with transaminases normal.  Blood cultures grew MRSA.  Urinalysis was concerning for UTI.  Urine sodium was < 20, a.m. cortisol 26.11 (6.02-18.4), and urine osmolality was 410 ().   X-rays and MRIs of the bilateral shoulders were performed 2022 revealing joint effusion and advanced degenerative changes/destructive changes of glenohumeral joint.  There was suspected complete tear of the supraspinatus and infraspinatus tendons and a completely macerated appearance of the labrum on the right shoulder.  On 2022 RUE venous Doppler was repeated and felt stable.  He then underwent bilateral shoulder arthroscopy incision and drainage with culture growing  heavy growth of Staph aureus.  He had been intubated prior to the surgery and remained sedated on ventilator with pressors postop.  At time of consultation 12/29/2022 LUE venous Doppler showed acute catheter associated DVT in the left axillary vein, SVT in the left basilic vein of the upper arm.  BLE venous Doppler was negative for DVT/SVT but did show deep venous valvular incompetence in the right popliteal vein.  CBC revealed WBC 14.2, hemoglobin 11.4, MCV 96.5, and platelets 46,000.  T bili was elevated to 5.3 and transaminases were now elevated with  and .  PT was 15.6 (9.6-11.7), PTT was 38.7 (24-31), and fibrinogen was 493 (210-450).  D-dimer was 11.83 (0-0.67).  Transesophageal echocardiogram showed LVEF 46-50%, mild aortic valve stenosis, and no evidence of vegetation or bacterial endocarditis.  Heparin was changed to argatroban.  ID was managing antibiotics with patient on ceftriaxone with plan for treatment x6 weeks and recommending replacing PICC line 2 days after starting anticoagulation.    PMH significant for prostate cancer treated approximately 2010 with radiation alone and no evidence of recurrence to their knowledge.  He also was under the care of Dr. Damico at  for cardiac amyloidosis.  Chart review showed PSA was 0.1 (0-4) in August 2022.  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  IgG was 768 (603-1613), IgA was 238 (), IgM was 64 ().  Kappa/lambda ratio was 1.36 (0.2-1.65).  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  LVEF in February 2022 was 45-50%.  Cardiac MRI 4/6/2022 revealed a myocardial mass 276 g, global hypokinesis of left ventricle with ejection fraction 43%, no myocardial iron overload, the thickness and appearance of intra-atrial septum was also consistent with cardiac amyloidosis.  The  note stated that the patient was followed by Dr. Banks at  [facility] where he was on Vyndamax therapy for cardiac amyloid and had been on that treatment for about 2 weeks.  Additionally in January 2022, T bili was noted to be elevated to 2.6 (0.2-1.0), hemoglobin 11.8, MCV 86.5 and platelets 261,000.     12/29/22  Hematology/Oncology was consulted by RITU Jimenez for thrombocytopenia.     Past Medical History: Prostate cancer approximately 2010 treated with radiation only.  Cardiac amyloid managed by Dr. Damico at .  CKD.  Surgical History: Several orthopedic surgeries in the past.  Bilateral shoulder arthroscopy with incision and drainage 12/28/2022.  Social History: He lives in [place], [place] with his spouse.  His spouse is known to have Alzheimer's disease.  He has smoked for many years.  He has no alcohol or recreational drug use however he was narcotic dependent secondary to chronic pain.  He is a retired .  Family History: No significant known family history.  Allergies: Tramadol causes him to feel weird and codeine causes nausea.     PCP: Provider, No Known    INTERVAL HISTORY:  • 12/30/2022- platelets 38,000, hemoglobin 11.1.  Folate 7.13 (4.78-24.2), vitamin B12 greater than 2000.  Iron 29 (), iron saturation 15% (20-50), TIBC 195 (298-536), and ferritin 3525 ().  Reticulocytes 1.05 (0.7-1.9), haptoglobin 148 (). PSA 0.234 (0-4).  T bili 5.3 with direct bili 4.2 (0-0.3).  Hepatitis panel nonreactive.  Extubated 12/29/2022 and off pressors.  • 12/31/2022- platelets 46,000, hemoglobin 11.3.  HIT antibody negative at 0.121 (0-0.4).  CMV IgM less than 30 (0-29.9) and EBV IgM less than 36 (0-35.9).  D-dimer mildly improved to 8.94 (0-0.67).  Transaminases rising with  and .  T bili 4.4.  Peripheral smear was negative for schistocytes again.  Abdominal ultrasound revealed cholelithiasis with adenomyomatosis involving the gallbladder  wall.  The common bile duct was in the upper limits of normal.  The liver appeared normal.  • 1/1/2023- platelets 60,000, hemoglobin 11.4.  Argatroban was held with repeat PTT remaining high with subsequent continued hold of argatroban.  D-dimer 9.84.  PTT 61.1 (24-31).  CT head without contrast was negative for acute findings.  • 1/2/2023- hemoglobin 11.3 and platelets 78,000.  ALT improved to 200 and AST improved to 104.  T bili improved to 3.3.  Fibrinogen 445 (210-150).  • 1/3/2023- evaluated by neurology and felt to have multifactorial encephalopathy.  SPEP with no M spike.  Transaminases improved.  Hemoglobin 11.1, platelet count 104,000.  Kappa/lambda free light chain ratio 1.02 (0.26-1.25).  SHAR screen negative.  Right BERNARDINO normal with mild digital ischemia.  Left BERNARDINO normal with severe digital ischemia.  • 1/4/2023- hemoglobin 11.0 and platelets 116,000.  Copper 110 ().  ALT 93 (1-41), AST normalized, and T bili 3.5 (0-1.2).  RUE venous Doppler showed acute RUE superficial thrombophlebitis in the cephalic vein (forearm).  • 1/5/2023- hemoglobin 9.9, platelets 121,000.  ALT 61, T bili 3.1.  Ferrlecit 250 mg IV daily x3 initiated.  UIFE with no monoclonality detected.  SIFE with IgM monoclonal protein with lambda light chain specificity.  2 different results are present for immunoglobulins with different values.  • 1/6/2023- WBC 5.8, hemoglobin 9.9, platelets 125,000.  ALT 52.  • 1/7/2023- hemoglobin 9.4, platelet count 156,000.  • 1/8/2023- hemoglobin 9.1.  Bilirubin 3.0.  Temp 100.3.  • 1/9/2023- hemoglobin 8.4.  Bilirubin 2.3.  NG tube and bilateral shoulder drains removed.  • 1/10/2023- hemoglobin 8.8.  Bilirubin 2.7.  Swallow study on 1/9/2023 with recommendation for pureed diet with Nectar Thick Liquids.    • 1/11/2023 - hemoglobin 8.7.  Bilirubin 2.9. On 5/2/2022 genetic testing revealed no clinically relative variant to detect TTR gene by sequencing.    History of present illness reviewed since  last visit and changes noted on 01/11/23.    Subjective  Denies any issues.     ROS:   Review of Systems   Constitutional: Negative for activity change, chills, fatigue, fever and unexpected weight change.   HENT: Negative for congestion, dental problem, hearing loss, mouth sores, nosebleeds, sore throat and trouble swallowing.    Eyes: Negative for photophobia, discharge and visual disturbance.   Respiratory: Negative for cough, chest tightness and shortness of breath.    Cardiovascular: Negative for chest pain, palpitations and leg swelling.   Gastrointestinal: Negative for abdominal distention, abdominal pain, blood in stool, constipation, diarrhea, nausea and vomiting.   Endocrine: Negative for cold intolerance and heat intolerance.   Genitourinary: Negative for decreased urine volume, difficulty urinating, dysuria, frequency, hematuria and urgency.   Musculoskeletal: Negative for arthralgias and gait problem.   Skin: Negative for rash and wound.   Neurological: Negative for dizziness, tremors, weakness, light-headedness, numbness and headaches.   Hematological: Negative for adenopathy. Does not bruise/bleed easily.   Psychiatric/Behavioral: Negative for confusion and hallucinations. The patient is not nervous/anxious.    All other systems reviewed and are negative.     MEDICATIONS:    Scheduled Meds:  Barium Sulfate, 1 teaspoon(s), Oral, Once in imaging  cefTRIAXone, 2 g, Intravenous, Q24H  docusate sodium, 100 mg, Oral, BID  enoxaparin, 1.5 mg/kg, Subcutaneous, Daily  ethyl alcohol, 2 swab, Nasal, Once  ferrous sulfate, 300 mg, Nasogastric, Daily  First Mouthwash (Magic Mouthwash), 5 mL, Swish & Spit, Q6H  [START ON 1/12/2023] furosemide, 40 mg, Intravenous, Daily  lansoprazole, 30 mg, Nasogastric, Q AM  midodrine, 10 mg, Oral, Q8H  polyethylene glycol, 17 g, Oral, Daily  sodium chloride, 10 mL, Intravenous, Q12H  Zinc Oxide, , Apply externally, TID       Continuous Infusions:      PRN Meds:  •  acetaminophen  "**OR** acetaminophen  •  aluminum-magnesium hydroxide-simethicone  •  dextrose  •  dextrose  •  fentanyl  •  glucagon (human recombinant)  •  HYDROcodone-acetaminophen  •  magnesium sulfate **OR** magnesium sulfate in D5W 1g/100mL (PREMIX)  •  ondansetron **OR** ondansetron  •  potassium chloride **OR** potassium chloride **OR** potassium chloride  •  sodium chloride  •  sodium chloride     ALLERGIES:    Allergies   Allergen Reactions   • Tramadol-Acetaminophen Anxiety     Worms in the brain feeling   • Codeine Other (See Comments)     nausea   • Tramadol Other (See Comments)     \"I just felt really crawly, it did weird things with my head\"     Objective    VITALS:   /70   Pulse 96   Temp 97.8 °F (36.6 °C) (Oral)   Resp 20   Ht 167.6 cm (66\")   Wt 79.3 kg (174 lb 13.2 oz)   SpO2 99%   BMI 28.22 kg/m²     PHYSICAL EXAM:  Physical Exam  Vitals and nursing note reviewed.   Constitutional:       General: He is not in acute distress.     Appearance: Normal appearance. He is well-developed. He is not diaphoretic.   HENT:      Head: Normocephalic and atraumatic.      Comments: Male pattern baldness.      Right Ear: External ear normal.      Left Ear: External ear normal.      Nose: Nose normal.      Mouth/Throat:      Mouth: Mucous membranes are moist.      Pharynx: Oropharynx is clear. No oropharyngeal exudate or posterior oropharyngeal erythema.      Comments: Dental fillings.   Eyes:      General: No scleral icterus.     Extraocular Movements: Extraocular movements intact.      Conjunctiva/sclera: Conjunctivae normal.      Pupils: Pupils are equal, round, and reactive to light.   Cardiovascular:      Rate and Rhythm: Normal rate and regular rhythm.      Heart sounds: Normal heart sounds. No murmur heard.     Comments: Cardiac monitor leads.   Pulmonary:      Effort: Pulmonary effort is normal. No respiratory distress.      Breath sounds: Normal breath sounds. No wheezing or rales.   Abdominal:      General: " Bowel sounds are normal. There is no distension.      Palpations: Abdomen is soft. There is no mass.      Tenderness: There is no abdominal tenderness. There is no guarding.   Genitourinary:     Comments: Deferred   Musculoskeletal:         General: Swelling ( 2+ RUE) present. No tenderness or deformity. Normal range of motion.      Cervical back: Normal range of motion and neck supple. No rigidity or tenderness.      Right lower leg: No edema.      Left lower leg: No edema.      Comments: Bilateral peripheral IVs.   Bilateral SCDs.    Feet:      Comments: Waffle boots to BLE.   Lymphadenopathy:      Cervical: No cervical adenopathy.      Upper Body:      Right upper body: No supraclavicular adenopathy.      Left upper body: No supraclavicular adenopathy.   Skin:     General: Skin is warm and dry.      Coloration: Skin is not pale.      Findings: No bruising, erythema or rash.      Comments: Dressing to bilateral shoulders.    Neurological:      General: No focal deficit present.      Mental Status: He is alert and oriented to person, place, and time.      Coordination: Coordination normal.   Psychiatric:         Mood and Affect: Mood normal.         Behavior: Behavior normal.         Thought Content: Thought content normal.         RECENT LABS:  Lab Results (last 24 hours)     Procedure Component Value Units Date/Time    SHAR [[id number]] Collected: 01/11/23 1225    Specimen: Blood Updated: 01/11/23 1335    Hepatitis Panel, Acute [[id number]]  (Normal) Collected: 01/11/23 1225    Specimen: Blood Updated: 01/11/23 1302     Hepatitis B Surface Ag Non-Reactive     Hep A IgM Non-Reactive     Hep B C IgM Non-Reactive     Hepatitis C Ab Non-Reactive    Narrative:      Results may be falsely decreased if patient taking Biotin.     Anti-microsomal Antibody [[id number]] Collected: 01/11/23 1225    Specimen: Blood Updated: 01/11/23 1231    Anti-Smooth Muscle Antibody Titer [[id number]] Collected: 01/11/23 1225    Specimen: Blood  Updated: 01/11/23 1231    Mitochondrial Antibodies, M2 [[id number]] Collected: 01/11/23 1225    Specimen: Blood Updated: 01/11/23 1231    AFP Tumor Marker [[id number]] Collected: 01/11/23 1225    Specimen: Blood Updated: 01/11/23 1231    Ferritin [[id number]]  (Abnormal) Collected: 01/11/23 1050    Specimen: Blood Updated: 01/11/23 1201     Ferritin 1,814.00 ng/mL     Narrative:      Results may be falsely decreased if patient taking Biotin.      Ceruloplasmin [[id number]] Collected: 01/11/23 1050    Specimen: Blood Updated: 01/11/23 1139    Alpha - 1 - Antitrypsin [[id number]] Collected: 01/11/23 1050    Specimen: Blood Updated: 01/11/23 1139    Gamma GT [[id number]] Collected: 01/11/23 1050    Specimen: Blood Updated: 01/11/23 1139    Basic Metabolic Panel [[id number]]  (Abnormal) Collected: 01/11/23 1050    Specimen: Blood Updated: 01/11/23 1134     Glucose 106 mg/dL      BUN 26 mg/dL      Creatinine 0.60 mg/dL      Sodium 140 mmol/L      Potassium 3.4 mmol/L      Chloride 94 mmol/L      CO2 38.0 mmol/L      Calcium 8.3 mg/dL      BUN/Creatinine Ratio 43.3     Anion Gap 8.0 mmol/L      eGFR 105.8 mL/min/1.73      Comment: National Kidney Foundation and American Society of Nephrology (ASN) Task Force recommended calculation based on the Chronic Kidney Disease Epidemiology Collaboration (CKD-EPI) equation refit without adjustment for race.       Narrative:      GFR Normal >60  Chronic Kidney Disease <60  Kidney Failure <15      Magnesium [[id number]]  (Normal) Collected: 01/11/23 1050    Specimen: Blood Updated: 01/11/23 1134     Magnesium 1.9 mg/dL     POC Glucose Once [[id number]]  (Normal) Collected: 01/11/23 1116    Specimen: Blood Updated: 01/11/23 1117     Glucose 98 mg/dL      Comment: Serial Number: [id number]:  [id number]       POC Glucose Once [[id number]]  (Normal) Collected: 01/11/23 0741    Specimen: Blood Updated: 01/11/23 0743     Glucose 95 mg/dL      Comment: Serial Number: [id number]:   [id number]       Comprehensive Metabolic Panel [[id number]]  (Abnormal) Collected: 01/11/23 0559    Specimen: Blood Updated: 01/11/23 0626     Glucose 91 mg/dL      BUN 24 mg/dL      Creatinine 0.52 mg/dL      Sodium 139 mmol/L      Potassium 2.9 mmol/L      Chloride 94 mmol/L      CO2 38.0 mmol/L      Calcium 8.2 mg/dL      Total Protein 5.5 g/dL      Albumin 2.4 g/dL      ALT (SGPT) 39 U/L      AST (SGOT) 43 U/L      Alkaline Phosphatase 101 U/L      Total Bilirubin 2.9 mg/dL      Globulin 3.1 gm/dL      A/G Ratio 0.8 g/dL      BUN/Creatinine Ratio 46.2     Anion Gap 7.0 mmol/L      eGFR 110.5 mL/min/1.73      Comment: National Kidney Foundation and American Society of Nephrology (ASN) Task Force recommended calculation based on the Chronic Kidney Disease Epidemiology Collaboration (CKD-EPI) equation refit without adjustment for race.       Narrative:      GFR Normal >60  Chronic Kidney Disease <60  Kidney Failure <15      Phosphorus [[id number]]  (Normal) Collected: 01/11/23 0559    Specimen: Blood Updated: 01/11/23 0626     Phosphorus 3.3 mg/dL     Magnesium [[id number]]  (Normal) Collected: 01/11/23 0559    Specimen: Blood Updated: 01/11/23 0626     Magnesium 1.8 mg/dL     Calcium, Ionized [[id number]]  (Abnormal) Collected: 01/11/23 0559    Specimen: Blood Updated: 01/11/23 0614     Ionized Calcium 1.12 mmol/L     CBC & Differential [[id number]]  (Abnormal) Collected: 01/11/23 0559    Specimen: Blood Updated: 01/11/23 0606    Narrative:      The following orders were created for panel order CBC & Differential.  Procedure                               Abnormality         Status                     ---------                               -----------         ------                     CBC Auto Differential[[id number]]        Abnormal            Final result                 Please view results for these tests on the individual orders.    CBC Auto Differential [[id number]]  (Abnormal) Collected: 01/11/23 0559    Specimen:  Blood Updated: 01/11/23 0606     WBC 4.80 10*3/mm3      RBC 2.77 10*6/mm3      Hemoglobin 8.7 g/dL      Hematocrit 25.5 %      MCV 92.2 fL      MCH 31.5 pg      MCHC 34.1 g/dL      RDW 16.2 %      RDW-SD 55.6 fl      MPV 7.5 fL      Platelets 210 10*3/mm3      Neutrophil % 81.0 %      Lymphocyte % 6.8 %      Monocyte % 11.7 %      Eosinophil % 0.2 %      Basophil % 0.3 %      Neutrophils, Absolute 3.90 10*3/mm3      Lymphocytes, Absolute 0.30 10*3/mm3      Monocytes, Absolute 0.60 10*3/mm3      Eosinophils, Absolute 0.00 10*3/mm3      Basophils, Absolute 0.00 10*3/mm3      nRBC 0.0 /100 WBC     POC Glucose Once [[id number]]  (Normal) Collected: 01/10/23 2339    Specimen: Blood Updated: 01/10/23 2341     Glucose 87 mg/dL      Comment: Serial Number: [id number]:  [id number]       Bilirubin, Direct [[id number]]  (Abnormal) Collected: 01/10/23 1525    Specimen: Blood Updated: 01/10/23 1600     Bilirubin, Direct 2.0 mg/dL         PENDING RESULTS:  None      IMAGING REVIEWED:  No radiology results for the last day  I have reviewed the patient's labs, imaging, reports, and other clinician documentation.    Assessment & Plan   ASSESSMENT:  1. Acute thrombocytopenia-platelets were normal in early 2022. HIT antibody negative.  Platelets improving on Rocephin.  LFTs elevated on admission but have now improved.  Bilirubin remains high.  Presumably due to sepsis.   No hemolysis and no schistocytes on peripheral smear ruling out TTP.  No vitamin B12 or folate deficiencies. SHAR, CMV and EBV titers are all negative. Coags and D-dimer high with a normal fibrinogen.  Platelets normalized 1/7/2023.  2. Acute on chronic anemia/IgA lambda monoclonal gammopathy/SWETHA- mild anemia with hemoglobin in the 11 range in early 2022.  Gammopathy labs at that time showed IgA lambda monoclonal protein with normal immunoglobulins and free light chains.  Iron studies showed SWETHA and started oral iron.  No hemolysis or vitamin B12/folate or copper  deficiencies. SPEP with no M spike and free light chain ratio normal.  UIFE negative but SIFE with IgA lambda monoclonal protein.  2 different immunoglobulin values listed.  Hemoglobin was stable but then dropped he was treated with Ferrlecit 250 mg IV daily x3.  Notes from cardiology at  mentioned they were going to involve Dr. Sauceda and we were going to obtain any records but the patient has not been seen by Dr. Sauceda.   3. Acute provoked catheter associated LUE DVT/SVT and RUE SVT- RUE SVT was diagnosed prior to LUE DVT and he had received several doses of subcu heparin starting on 12/26. LUE DVT provoked by PICC line which  has since been removed.  Argatroban was difficult to manage with liver dysfunction.  Started low-dose Lovenox 1/1/2023 and increased to full treatment dose 1/3/2023.  Repeat RUE venous Doppler showed stable SVT.  4. Elevated LFT/cholelithiasis and adenomyomatosis of the gallbladder wall- possible shock liver.  T bili remains elevated with elevated direct bili.  Normal liver on US and hepatitis panel negative.  LFTs improved but bilirubin remains elevated (direct)-possibly related to ceftriaxone. GI to perform outpatient hyperbilirubinemia work-up.   5. Bilateral shoulder septic arthritis/MRSA bacteremia-No vegetation on echocardiogram.  Right shoulder drainage was cloudy.  Bilateral shoulder drains were removed.on 1/9/2023. On antibiotics per ID until 2/11/2023.  6. History of prostate cancer-PSA remains normal.   7. Cardiac amyloidosis- on chart review it has been treated at .  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  Records reviewed from  cardiology.  Genetic testing was negative for hereditary ATTR amyloidosis and patient was referred to Dr. Sauceda for evaluation of systemic amyloidosis but had not had appointment.  8. STEVEN/Hyponatremia/Hypocalcemia-nephrology managing.  STEVEN and hyponatremia resolved.   9. A.  fib with RVR- patient evaluated by EP and recommended to start Eliquis 5 mg p.o. twice daily (not started as patient currently on Lovenox) with plan for cardioversion prior to discharge as long as he remains on anticoagulation.  10. Oral mucositis-added FIRST mouthwash.  Improving.    PLAN:  1. Continue daily CBC.  2. Repeat immunoglobulins in near future.  3. Continue Lovenox 1.5 mg/kg SQ daily.  4. Continue ferrous sulfate syrup 300 mg daily per NG tube.   5. Skeletal survey as outpatient.      Electronically signed by RITU aHstings, 01/11/23, 4:09 PM EST.      I have personally performed a face-to-face diagnostic evaluation on this patient. I have performed a complete history and physical examination, reviewed laboratory studies, and radiographic examinations.  I have completed the majority and substantive portion of the medical decision making.  I have formulated the assessment on this patient and the plan of action as noted above. I have discussed the case with Piper Jeffries NP, have edited/reviewed the note, and agree with the care plan.   The patient claims to be feeling all right.  On examination he has bilateral upper extremity IVs and the swelling on the right upper extremity has improved.  Hemoglobin is 8.7.  He is being transferred to a rehab facility.  He will stay on Lovenox at present and will schedule outpatient follow-up for further work-up of gammopathy/amyloidosis.    I discussed the patient's findings and my recommendations with patient and nursing.    Part of this note may be an electronic transcription/translation of spoken language to printed text using the Dragon Dictation System.    Electronically signed by Vicente Mendoza MD, 01/11/23, 7:16 PM EST.

## 2023-01-11 NOTE — PROGRESS NOTES
Nutrition Services    Patient Name: Jaime Bar  YOB: 1955  MRN: [id number]  Admission date: 12/26/2022       PPE Documentation        PPE Worn By Provider Did not enter room for this encounter   PPE Worn By Patient  N/A     PROGRESS NOTE      Encounter Information: Check on for PO intakes. NG removed 1/9. Plan for discharge today.       PO Diet: Diet: Regular/House Diet; Texture: Pureed (NDD 1); Fluid Consistency: Nectar Thick   PO Supplements: Magic Cups at lunch/dinner (Provides 580 kcals, 18 g protein if consumed)    PO Intake:  Pt ate 25% x 1 meal recorded since diet initiation       Current nutrition support: None   Nutrition support review: No longer TF access       Labs (reviewed below): Reviewed, management per attending       GI Function:  Last BM 1/3 (x 8 days ago) - scheduled bowel regimen in place, Secure chat message sent to RN to confirm accuracy of last BM date.       Nutrition Intervention Updates: Continue current diet and ONS.       Results from last 7 days   Lab Units 01/11/23  1050 01/11/23  0559 01/10/23  0420 01/09/23  0417   SODIUM mmol/L 140 139 140 139   POTASSIUM mmol/L 3.4* 2.9* 3.5 3.5   CHLORIDE mmol/L 94* 94* 96* 96*   CO2 mmol/L 38.0* 38.0* 39.0* 37.0*   BUN mg/dL 26* 24* 28* 31*   CREATININE mg/dL 0.60* 0.52* 0.63* 0.56*   CALCIUM mg/dL 8.3* 8.2* 8.4* 7.9*   BILIRUBIN mg/dL  --  2.9* 2.7* 2.3*   ALK PHOS U/L  --  101 112 135*   ALT (SGPT) U/L  --  39 38 35   AST (SGOT) U/L  --  43* 36 35   GLUCOSE mg/dL 106* 91 93 106*     Results from last 7 days   Lab Units 01/11/23  1050 01/11/23  0559 01/10/23  0420   MAGNESIUM mg/dL 1.9 1.8 1.9   PHOSPHORUS mg/dL  --  3.3 3.3   HEMOGLOBIN g/dL  --  8.7* 8.8*   HEMATOCRIT %  --  25.5* 26.0*     Lab Results   Component Value Date    HGBA1C 4.2 12/26/2022       RD to follow up per protocol.    Electronically signed by:  Umm Potter RD  01/11/23

## 2023-01-11 NOTE — THERAPY TREATMENT NOTE
"Subjective: Pt agreeable to therapeutic plan of care.    Objective:     Bed mobility - Mod-A and Assist x 2  Transfers - N/A or Not attempted.  Ambulation - 0 feet N/A or Not attempted.     AAROM to bilateral hips, knees, and ankles    Vitals: WNL    Pain: 4 VAS   Location: B shoulders  Intervention for pain: Repositioned    Education: Provided education on the importance of mobility in the acute care setting and Verbal/Tactile Cues    Assessment: Jaime Bar presents with functional mobility impairments which indicate the need for skilled intervention. Bilateral knee stiffness inhibits pt's ability to stand with left being worse than right. Tolerating session today without incident. Will continue to follow and progress as tolerated.     Plan/Recommendations:   Moderate Intensity Therapy recommended post-acute care. This is recommended as therapy feels the patient would require 3-4 days per week and wouldn't tolerate \"3 hour daily\" rehab intensity. SNF would be the preferred choice. If the patient does not agree to SNF, arrange HH or OP depending on home bound status. If patient is medically complex, consider LTACH.. Pt requires no DME at discharge.     Pt desires Skilled Rehab placement at discharge. Pt cooperative; agreeable to therapeutic recommendations and plan of care.         Basic Mobility 6-click:  Rollin = Total, A lot = 2, A little = 3; 4 = None  Supine>Sit:   1 = Total, A lot = 2, A little = 3; 4 = None   Sit>Stand with arms:  1 = Total, A lot = 2, A little = 3; 4 = None  Bed>Chair:   1 = Total, A lot = 2, A little = 3; 4 = None  Ambulate in room:  1 = Total, A lot = 2, A little = 3; 4 = None  3-5 Steps with railin = Total, A lot = 2, A little = 3; 4 = None  Score: 8    Modified Aleah: N/A = No pre-op stroke/TIA    Post-Tx Position: Supine with HOB Elevated, Alarms activated and Call light and personal items within reach  PPE: gloves and surgical mask  "

## 2023-01-12 LAB
LKM-1 AB SER-ACNC: 1.3 UNITS (ref 0–20)
MITOCHONDRIA M2 IGG SER-ACNC: 27.3 UNITS (ref 0–20)
SMA IGG SER-ACNC: 5 UNITS (ref 0–19)

## 2023-01-12 NOTE — CASE MANAGEMENT/SOCIAL WORK
Case Management Discharge Note      Selected Continued Care - Discharged on 1/11/2023 Admission date: 12/26/2022 - Discharge disposition: Skilled Nursing Facility (DC - External)    Destination Coordination complete.    Service Provider Selected Services Address Phone Fax Patient Preferred    [facility] [address] [place] [address] [phone] --           Transportation Services  Ambulance:  ([facility])    Final Discharge Disposition Code: 03 - skilled nursing facility (SNF)

## 2023-01-13 LAB — ANA SER QL: NEGATIVE

## 2023-01-16 LAB — QT INTERVAL: 334 MS

## 2023-01-30 ENCOUNTER — TELEPHONE (OUTPATIENT)
Dept: ORTHOPEDIC SURGERY | Facility: CLINIC | Age: 68
End: 2023-01-30
Payer: MEDICARE

## 2023-01-30 NOTE — TELEPHONE ENCOUNTER
Caller: ROSA DUMONT     Relationship to patient: [facility]    Best call back number: [phone]    Chief complaint: SHOULDER ARTHROSCOPY INCISION AND DRAINAGE, RIGHT AND SHOULDER ARTHROSCOPY INCISION AND DRAINAGE, LEFT    Type of visit: FIRST POST OP APPT ( SURGERY WAS ON 12.28.22.)    Requested date: ASAP- PATIENT WAS SUPPOSE TO FOLLOW UP IN 2-4 WEEKS FROM SURGERY PER ROSA.    Additional notes: ROSA WITH [facility] WAS CALLING TO SCHEDULE PATIENT'S FIRST POST OP APPT WITH DR. NIXON ASAP. PATIENT HAD SURGERY WITH DR. NIXON ON 12.28.22 AND WAS TOLD TO FOLLOW UP IN 2-4 WEEKS PER ROSA. I ATTEMPTED TO WARM TRANSFER CALL TO THE OFFICE DUE TO NO AVAILABILITY UNTIL 02.20.23 BUT RECEIVED NO ANSWER. ROSA IS REQUESTING A CALL BACK TO DISCUSS SCHEDULING. THANK YOU!

## 2023-02-02 ENCOUNTER — OFFICE VISIT (OUTPATIENT)
Dept: ORTHOPEDIC SURGERY | Facility: CLINIC | Age: 68
End: 2023-02-02
Payer: MEDICARE

## 2023-02-02 ENCOUNTER — PREP FOR SURGERY (OUTPATIENT)
Dept: OTHER | Facility: HOSPITAL | Age: 68
End: 2023-02-02
Payer: MEDICARE

## 2023-02-02 VITALS — WEIGHT: 174 LBS | HEIGHT: 66 IN | BODY MASS INDEX: 27.97 KG/M2 | OXYGEN SATURATION: 98 %

## 2023-02-02 DIAGNOSIS — M00.9: Primary | ICD-10-CM

## 2023-02-02 DIAGNOSIS — Z47.89 ORTHOPEDIC AFTERCARE: Primary | ICD-10-CM

## 2023-02-02 PROCEDURE — 99024 POSTOP FOLLOW-UP VISIT: CPT | Performed by: ORTHOPAEDIC SURGERY

## 2023-02-02 RX ORDER — MULTIPLE VITAMINS W/ MINERALS TAB 9MG-400MCG
1 TAB ORAL DAILY
COMMUNITY

## 2023-02-02 NOTE — PROGRESS NOTES
"     Patient ID: Jaime Bar is a 67 y.o. male.    12/28/22 bilateral shoulder scope and I&D for septic arthritis.  Mild pain bilateral with some left shoulder swelling    Objective:    Ht 167.6 cm (66\")   Wt 78.9 kg (174 lb)   SpO2 98%   BMI 28.08 kg/m²     Physical Examination:  Right shoulder demonstrates healed incisions no remaining swelling with crepitance on range of motion left side however demonstrates a moderate joint effusion with mild redness of the anterior portal       Imaging:      Assessment:  Doing well after I&D right shoulder  Left shoulder effusion    Plan:  Understanding and after verbal consent I aspirated 4050 cc apparent material from the left shoulder recommend repeat arthroscopic incision and drainage will likely recommend a period of overnight observation we will hold his blood thinner starting Sunday  Risks and benefits, specifically risks of bleeding, scar, infection, fracture, stiffness, retear, nerve, tendon or artery damage, the need for further surgery, DVT, and loss of life or limb and rehab were discussed. All questions were answered and addressed.  "

## 2023-02-03 RX ORDER — LORAZEPAM 0.5 MG/1
0.5 TABLET ORAL EVERY 8 HOURS PRN
COMMUNITY

## 2023-02-03 RX ORDER — SENNOSIDES 8.6 MG
650 CAPSULE ORAL EVERY 8 HOURS PRN
COMMUNITY

## 2023-02-03 RX ORDER — MORPHINE SULFATE 10 MG/.5ML
10 SOLUTION ORAL
Status: ON HOLD | COMMUNITY
End: 2023-02-09 | Stop reason: SDUPTHER

## 2023-02-03 RX ORDER — BISACODYL 5 MG/1
5 TABLET, DELAYED RELEASE ORAL DAILY PRN
COMMUNITY

## 2023-02-03 RX ORDER — YOHIMBE BARK 500 MG
CAPSULE ORAL
COMMUNITY

## 2023-02-03 RX ORDER — COLLAGENASE SANTYL 250 [ARB'U]/G
1 OINTMENT TOPICAL DAILY
COMMUNITY

## 2023-02-08 ENCOUNTER — ANESTHESIA EVENT (OUTPATIENT)
Dept: PERIOP | Facility: HOSPITAL | Age: 68
End: 2023-02-08
Payer: MEDICARE

## 2023-02-08 ENCOUNTER — HOSPITAL ENCOUNTER (OUTPATIENT)
Facility: HOSPITAL | Age: 68
Discharge: NURSING, REHAB OR LONG-TERM CARE | End: 2023-02-09
Attending: ORTHOPAEDIC SURGERY | Admitting: INTERNAL MEDICINE
Payer: MEDICARE

## 2023-02-08 ENCOUNTER — ANESTHESIA (OUTPATIENT)
Dept: PERIOP | Facility: HOSPITAL | Age: 68
End: 2023-02-08
Payer: MEDICARE

## 2023-02-08 DIAGNOSIS — M00.019: ICD-10-CM

## 2023-02-08 DIAGNOSIS — M15.9 PRIMARY OSTEOARTHRITIS INVOLVING MULTIPLE JOINTS: Primary | ICD-10-CM

## 2023-02-08 DIAGNOSIS — M00.9: ICD-10-CM

## 2023-02-08 LAB
ABO GROUP BLD: NORMAL
BLD GP AB SCN SERPL QL: NEGATIVE
RH BLD: POSITIVE
T&S EXPIRATION DATE: NORMAL

## 2023-02-08 PROCEDURE — 63710000001 OXYCODONE 5 MG TABLET: Performed by: ORTHOPAEDIC SURGERY

## 2023-02-08 PROCEDURE — 25010000002 PROPOFOL 10 MG/ML EMULSION: Performed by: NURSE ANESTHETIST, CERTIFIED REGISTERED

## 2023-02-08 PROCEDURE — 25010000002 DEXAMETHASONE PER 1 MG: Performed by: NURSE ANESTHETIST, CERTIFIED REGISTERED

## 2023-02-08 PROCEDURE — 85018 HEMOGLOBIN: CPT | Performed by: ORTHOPAEDIC SURGERY

## 2023-02-08 PROCEDURE — 25010000002 CEFTRIAXONE PER 250 MG: Performed by: ORTHOPAEDIC SURGERY

## 2023-02-08 PROCEDURE — 25010000002 MORPHINE PER 10 MG: Performed by: ORTHOPAEDIC SURGERY

## 2023-02-08 PROCEDURE — 25010000002 PHENYLEPHRINE 10 MG/ML SOLUTION: Performed by: NURSE ANESTHETIST, CERTIFIED REGISTERED

## 2023-02-08 PROCEDURE — 86900 BLOOD TYPING SEROLOGIC ABO: CPT | Performed by: ORTHOPAEDIC SURGERY

## 2023-02-08 PROCEDURE — 25010000002 ONDANSETRON PER 1 MG: Performed by: NURSE ANESTHETIST, CERTIFIED REGISTERED

## 2023-02-08 PROCEDURE — 85014 HEMATOCRIT: CPT | Performed by: ORTHOPAEDIC SURGERY

## 2023-02-08 PROCEDURE — 80048 BASIC METABOLIC PNL TOTAL CA: CPT | Performed by: ORTHOPAEDIC SURGERY

## 2023-02-08 PROCEDURE — A9270 NON-COVERED ITEM OR SERVICE: HCPCS | Performed by: ORTHOPAEDIC SURGERY

## 2023-02-08 PROCEDURE — G0378 HOSPITAL OBSERVATION PER HR: HCPCS

## 2023-02-08 PROCEDURE — 86901 BLOOD TYPING SEROLOGIC RH(D): CPT | Performed by: ORTHOPAEDIC SURGERY

## 2023-02-08 PROCEDURE — 25010000002 FENTANYL CITRATE (PF) 50 MCG/ML SOLUTION: Performed by: NURSE ANESTHETIST, CERTIFIED REGISTERED

## 2023-02-08 PROCEDURE — 29822 SHO ARTHRS SRG LMTD DBRDMT: CPT | Performed by: PHYSICIAN ASSISTANT

## 2023-02-08 PROCEDURE — 29822 SHO ARTHRS SRG LMTD DBRDMT: CPT | Performed by: ORTHOPAEDIC SURGERY

## 2023-02-08 PROCEDURE — 86850 RBC ANTIBODY SCREEN: CPT | Performed by: ORTHOPAEDIC SURGERY

## 2023-02-08 PROCEDURE — 63710000001 FAMOTIDINE 20 MG TABLET: Performed by: ORTHOPAEDIC SURGERY

## 2023-02-08 PROCEDURE — 25010000002 NEOSTIGMINE 10 MG/10ML SOLUTION: Performed by: NURSE ANESTHETIST, CERTIFIED REGISTERED

## 2023-02-08 RX ORDER — BISACODYL 5 MG/1
5 TABLET, DELAYED RELEASE ORAL DAILY PRN
Status: DISCONTINUED | OUTPATIENT
Start: 2023-02-08 | End: 2023-02-09 | Stop reason: HOSPADM

## 2023-02-08 RX ORDER — ROCURONIUM BROMIDE 10 MG/ML
INJECTION, SOLUTION INTRAVENOUS AS NEEDED
Status: DISCONTINUED | OUTPATIENT
Start: 2023-02-08 | End: 2023-02-08 | Stop reason: SURG

## 2023-02-08 RX ORDER — ONDANSETRON 2 MG/ML
4 INJECTION INTRAMUSCULAR; INTRAVENOUS ONCE AS NEEDED
Status: DISCONTINUED | OUTPATIENT
Start: 2023-02-08 | End: 2023-02-08 | Stop reason: HOSPADM

## 2023-02-08 RX ORDER — PROMETHAZINE HYDROCHLORIDE 25 MG/1
25 SUPPOSITORY RECTAL ONCE AS NEEDED
Status: DISCONTINUED | OUTPATIENT
Start: 2023-02-08 | End: 2023-02-08 | Stop reason: HOSPADM

## 2023-02-08 RX ORDER — OXYCODONE HYDROCHLORIDE 5 MG/1
5 TABLET ORAL EVERY 4 HOURS PRN
Status: DISCONTINUED | OUTPATIENT
Start: 2023-02-08 | End: 2023-02-09 | Stop reason: HOSPADM

## 2023-02-08 RX ORDER — SODIUM CHLORIDE, SODIUM LACTATE, POTASSIUM CHLORIDE, CALCIUM CHLORIDE 600; 310; 30; 20 MG/100ML; MG/100ML; MG/100ML; MG/100ML
INJECTION, SOLUTION INTRAVENOUS CONTINUOUS PRN
Status: DISCONTINUED | OUTPATIENT
Start: 2023-02-08 | End: 2023-02-08 | Stop reason: SURG

## 2023-02-08 RX ORDER — ONDANSETRON 4 MG/1
4 TABLET, FILM COATED ORAL EVERY 6 HOURS PRN
Status: DISCONTINUED | OUTPATIENT
Start: 2023-02-08 | End: 2023-02-09 | Stop reason: HOSPADM

## 2023-02-08 RX ORDER — DIPHENHYDRAMINE HYDROCHLORIDE 50 MG/ML
12.5 INJECTION INTRAMUSCULAR; INTRAVENOUS
Status: DISCONTINUED | OUTPATIENT
Start: 2023-02-08 | End: 2023-02-08 | Stop reason: HOSPADM

## 2023-02-08 RX ORDER — SODIUM CHLORIDE 0.9 % (FLUSH) 0.9 %
10 SYRINGE (ML) INJECTION AS NEEDED
Status: DISCONTINUED | OUTPATIENT
Start: 2023-02-08 | End: 2023-02-09 | Stop reason: HOSPADM

## 2023-02-08 RX ORDER — PHENYLEPHRINE HYDROCHLORIDE 10 MG/ML
INJECTION INTRAVENOUS AS NEEDED
Status: DISCONTINUED | OUTPATIENT
Start: 2023-02-08 | End: 2023-02-08 | Stop reason: SURG

## 2023-02-08 RX ORDER — DEXAMETHASONE SODIUM PHOSPHATE 4 MG/ML
INJECTION, SOLUTION INTRA-ARTICULAR; INTRALESIONAL; INTRAMUSCULAR; INTRAVENOUS; SOFT TISSUE AS NEEDED
Status: DISCONTINUED | OUTPATIENT
Start: 2023-02-08 | End: 2023-02-08 | Stop reason: SURG

## 2023-02-08 RX ORDER — PROMETHAZINE HYDROCHLORIDE 25 MG/1
25 TABLET ORAL ONCE AS NEEDED
Status: DISCONTINUED | OUTPATIENT
Start: 2023-02-08 | End: 2023-02-08 | Stop reason: HOSPADM

## 2023-02-08 RX ORDER — AMOXICILLIN 250 MG
2 CAPSULE ORAL 2 TIMES DAILY
Status: DISCONTINUED | OUTPATIENT
Start: 2023-02-08 | End: 2023-02-09 | Stop reason: HOSPADM

## 2023-02-08 RX ORDER — SODIUM CHLORIDE 9 MG/ML
40 INJECTION, SOLUTION INTRAVENOUS AS NEEDED
Status: DISCONTINUED | OUTPATIENT
Start: 2023-02-08 | End: 2023-02-09 | Stop reason: HOSPADM

## 2023-02-08 RX ORDER — GLYCOPYRROLATE 1 MG/5 ML
SYRINGE (ML) INTRAVENOUS AS NEEDED
Status: DISCONTINUED | OUTPATIENT
Start: 2023-02-08 | End: 2023-02-08 | Stop reason: SURG

## 2023-02-08 RX ORDER — FAMOTIDINE 20 MG/1
40 TABLET, FILM COATED ORAL DAILY
Status: DISCONTINUED | OUTPATIENT
Start: 2023-02-08 | End: 2023-02-09 | Stop reason: HOSPADM

## 2023-02-08 RX ORDER — MAGNESIUM HYDROXIDE 1200 MG/15ML
LIQUID ORAL AS NEEDED
Status: DISCONTINUED | OUTPATIENT
Start: 2023-02-08 | End: 2023-02-08 | Stop reason: HOSPADM

## 2023-02-08 RX ORDER — BISACODYL 10 MG
10 SUPPOSITORY, RECTAL RECTAL DAILY PRN
Status: DISCONTINUED | OUTPATIENT
Start: 2023-02-08 | End: 2023-02-09 | Stop reason: HOSPADM

## 2023-02-08 RX ORDER — SODIUM CHLORIDE 9 MG/ML
75 INJECTION, SOLUTION INTRAVENOUS CONTINUOUS
Status: DISCONTINUED | OUTPATIENT
Start: 2023-02-08 | End: 2023-02-09 | Stop reason: HOSPADM

## 2023-02-08 RX ORDER — SODIUM CHLORIDE 0.9 % (FLUSH) 0.9 %
10 SYRINGE (ML) INJECTION EVERY 12 HOURS SCHEDULED
Status: DISCONTINUED | OUTPATIENT
Start: 2023-02-08 | End: 2023-02-09 | Stop reason: HOSPADM

## 2023-02-08 RX ORDER — DIPHENHYDRAMINE HCL 25 MG
25 CAPSULE ORAL
Status: DISCONTINUED | OUTPATIENT
Start: 2023-02-08 | End: 2023-02-08 | Stop reason: HOSPADM

## 2023-02-08 RX ORDER — ONDANSETRON 2 MG/ML
INJECTION INTRAMUSCULAR; INTRAVENOUS AS NEEDED
Status: DISCONTINUED | OUTPATIENT
Start: 2023-02-08 | End: 2023-02-08 | Stop reason: SURG

## 2023-02-08 RX ORDER — FENTANYL CITRATE 50 UG/ML
INJECTION, SOLUTION INTRAMUSCULAR; INTRAVENOUS AS NEEDED
Status: DISCONTINUED | OUTPATIENT
Start: 2023-02-08 | End: 2023-02-08 | Stop reason: SURG

## 2023-02-08 RX ORDER — ONDANSETRON 2 MG/ML
4 INJECTION INTRAMUSCULAR; INTRAVENOUS EVERY 6 HOURS PRN
Status: DISCONTINUED | OUTPATIENT
Start: 2023-02-08 | End: 2023-02-09 | Stop reason: HOSPADM

## 2023-02-08 RX ORDER — FENTANYL CITRATE 50 UG/ML
50 INJECTION, SOLUTION INTRAMUSCULAR; INTRAVENOUS
Status: DISCONTINUED | OUTPATIENT
Start: 2023-02-08 | End: 2023-02-08 | Stop reason: HOSPADM

## 2023-02-08 RX ORDER — PROPOFOL 10 MG/ML
VIAL (ML) INTRAVENOUS AS NEEDED
Status: DISCONTINUED | OUTPATIENT
Start: 2023-02-08 | End: 2023-02-08 | Stop reason: SURG

## 2023-02-08 RX ORDER — NALOXONE HCL 0.4 MG/ML
0.2 VIAL (ML) INJECTION AS NEEDED
Status: DISCONTINUED | OUTPATIENT
Start: 2023-02-08 | End: 2023-02-08 | Stop reason: HOSPADM

## 2023-02-08 RX ORDER — FLUMAZENIL 0.1 MG/ML
0.2 INJECTION INTRAVENOUS AS NEEDED
Status: DISCONTINUED | OUTPATIENT
Start: 2023-02-08 | End: 2023-02-08 | Stop reason: HOSPADM

## 2023-02-08 RX ORDER — NEOSTIGMINE METHYLSULFATE 1 MG/ML
INJECTION, SOLUTION INTRAVENOUS AS NEEDED
Status: DISCONTINUED | OUTPATIENT
Start: 2023-02-08 | End: 2023-02-08 | Stop reason: SURG

## 2023-02-08 RX ORDER — NALOXONE HCL 0.4 MG/ML
0.4 VIAL (ML) INJECTION
Status: DISCONTINUED | OUTPATIENT
Start: 2023-02-08 | End: 2023-02-09 | Stop reason: HOSPADM

## 2023-02-08 RX ORDER — LIDOCAINE HYDROCHLORIDE 10 MG/ML
INJECTION, SOLUTION EPIDURAL; INFILTRATION; INTRACAUDAL; PERINEURAL AS NEEDED
Status: DISCONTINUED | OUTPATIENT
Start: 2023-02-08 | End: 2023-02-08 | Stop reason: SURG

## 2023-02-08 RX ORDER — POLYETHYLENE GLYCOL 3350 17 G/17G
17 POWDER, FOR SOLUTION ORAL DAILY PRN
Status: DISCONTINUED | OUTPATIENT
Start: 2023-02-08 | End: 2023-02-09 | Stop reason: HOSPADM

## 2023-02-08 RX ADMIN — OXYCODONE 5 MG: 5 TABLET ORAL at 18:40

## 2023-02-08 RX ADMIN — SODIUM CHLORIDE 75 ML/HR: 9 INJECTION, SOLUTION INTRAVENOUS at 18:41

## 2023-02-08 RX ADMIN — PHENYLEPHRINE HYDROCHLORIDE 100 MCG: 10 INJECTION INTRAVENOUS at 15:56

## 2023-02-08 RX ADMIN — FENTANYL CITRATE 50 MCG: 50 INJECTION, SOLUTION INTRAMUSCULAR; INTRAVENOUS at 15:22

## 2023-02-08 RX ADMIN — ROCURONIUM BROMIDE 40 MG: 10 INJECTION, SOLUTION INTRAVENOUS at 15:22

## 2023-02-08 RX ADMIN — CEFTRIAXONE 2 G: 2 INJECTION, POWDER, FOR SOLUTION INTRAMUSCULAR; INTRAVENOUS at 18:40

## 2023-02-08 RX ADMIN — LIDOCAINE HYDROCHLORIDE 50 MG: 10 INJECTION, SOLUTION EPIDURAL; INFILTRATION; INTRACAUDAL; PERINEURAL at 15:22

## 2023-02-08 RX ADMIN — FENTANYL CITRATE 50 MCG: 50 INJECTION, SOLUTION INTRAMUSCULAR; INTRAVENOUS at 15:41

## 2023-02-08 RX ADMIN — PROPOFOL 120 MG: 10 INJECTION, EMULSION INTRAVENOUS at 15:22

## 2023-02-08 RX ADMIN — MORPHINE SULFATE 1 MG: 4 INJECTION, SOLUTION INTRAMUSCULAR; INTRAVENOUS at 22:07

## 2023-02-08 RX ADMIN — NEOSTIGMINE METHYLSULFATE 3 MG: 1 INJECTION, SOLUTION INTRAVENOUS at 16:29

## 2023-02-08 RX ADMIN — Medication 0.2 MG: at 16:29

## 2023-02-08 RX ADMIN — SODIUM CHLORIDE, SODIUM LACTATE, POTASSIUM CHLORIDE, AND CALCIUM CHLORIDE: .6; .31; .03; .02 INJECTION, SOLUTION INTRAVENOUS at 15:15

## 2023-02-08 RX ADMIN — DEXAMETHASONE SODIUM PHOSPHATE 4 MG: 4 INJECTION, SOLUTION INTRAMUSCULAR; INTRAVENOUS at 15:31

## 2023-02-08 RX ADMIN — FAMOTIDINE 40 MG: 20 TABLET, FILM COATED ORAL at 18:40

## 2023-02-08 RX ADMIN — ONDANSETRON 4 MG: 2 INJECTION INTRAMUSCULAR; INTRAVENOUS at 16:07

## 2023-02-08 NOTE — ANESTHESIA PREPROCEDURE EVALUATION
Anesthesia Evaluation     NPO Solid Status: > 8 hours  NPO Liquid Status: > 8 hours           Airway   Mallampati: II  TM distance: >3 FB  Neck ROM: full  Dental - normal exam     Pulmonary - normal exam   Cardiovascular - normal exam    (+) hypertension, dysrhythmias Atrial Fib, CHF Systolic <55%,     ROS comment: 12/2022 ALMA DELIA:  •  Left ventricular ejection fraction appears to be 46 - 50%.  •  Mild aortic valve stenosis is present.  •  Estimated right ventricular systolic pressure from tricuspid regurgitation is normal (<35 mmHg).  •  All 4 cardiac valves were well visualized, no vegetations were seen, no evidence of bacterial endocarditis.      Neuro/Psych  GI/Hepatic/Renal/Endo    (+)  GERD,      Musculoskeletal     (+) neck pain,       ROS comment: Septic arthritis left shoulder  Abdominal  - normal exam   Substance History      OB/GYN          Other   arthritis, blood dyscrasia anemia,     ROS/Med Hx Other: High daily narcotic use                Anesthesia Plan    ASA 3     general     intravenous induction     Anesthetic plan, risks, benefits, and alternatives have been provided, discussed and informed consent has been obtained with: patient.    Plan discussed with CRNA and CAA.        CODE STATUS:

## 2023-02-08 NOTE — ANESTHESIA POSTPROCEDURE EVALUATION
Patient: Jaime Bar    Procedure Summary     Date: 02/08/23 Room / Location: [facility] OR 04 / [facility] MAIN OR    Anesthesia Start: 1515 Anesthesia Stop: 1646    Procedure: SHOULDER ARTHROSCOPY INCISION AND DRAINAGE (Left: Shoulder) Diagnosis:       Septic arthritis of shoulder, left (HCC)      (Septic arthritis of shoulder, left (HCC) [M00.9])    Surgeons: Nikunj Velez MD Provider: Kevin Sneed MD    Anesthesia Type: general ASA Status: 3          Anesthesia Type: general    Vitals  Vitals Value Taken Time   /81 02/08/23 1709   Temp 96.8 °F (36 °C) 02/08/23 1647   Pulse 107 02/08/23 1709   Resp 14 02/08/23 1702   SpO2 93 % 02/08/23 1709   Vitals shown include unvalidated device data.        Post Anesthesia Care and Evaluation    Patient location during evaluation: PACU  Patient participation: complete - patient participated  Level of consciousness: awake  Pain scale: See nurse's notes for pain score.  Pain management: adequate    Airway patency: patent  Anesthetic complications: No anesthetic complications  PONV Status: none  Cardiovascular status: acceptable  Respiratory status: acceptable  Hydration status: acceptable    Comments: Patient seen and examined postoperatively; vital signs stable; SpO2 greater than or equal to 90%; cardiopulmonary status stable; nausea/vomiting adequately controlled; pain adequately controlled; no apparent anesthesia complications; patient discharged from anesthesia care when discharge criteria were met

## 2023-02-08 NOTE — ANESTHESIA PROCEDURE NOTES
Airway  Urgency: elective    Date/Time: 2/8/2023 3:24 PM  Airway not difficult    General Information and Staff    Patient location during procedure: OR  CRNA/CAA: Aníbal Goldstein CRNA    Indications and Patient Condition  Indications for airway management: airway protection    Preoxygenated: yes  MILS maintained throughout  Mask difficulty assessment: 1 - vent by mask    Final Airway Details  Final airway type: endotracheal airway      Successful airway: ETT  Cuffed: yes   Successful intubation technique: direct laryngoscopy  Endotracheal tube insertion site: oral  Blade: [employer]  Blade size: 3  ETT size (mm): 7.0  Cormack-Lehane Classification: grade I - full view of glottis  Placement verified by: chest auscultation   Measured from: teeth  ETT/EBT  to teeth (cm): 22  Number of attempts at approach: 1  Assessment: lips, teeth, and gum same as pre-op and atraumatic intubation

## 2023-02-09 ENCOUNTER — TELEPHONE (OUTPATIENT)
Dept: ORTHOPEDIC SURGERY | Facility: CLINIC | Age: 68
End: 2023-02-09

## 2023-02-09 VITALS
OXYGEN SATURATION: 96 % | DIASTOLIC BLOOD PRESSURE: 81 MMHG | RESPIRATION RATE: 19 BRPM | SYSTOLIC BLOOD PRESSURE: 132 MMHG | TEMPERATURE: 97.6 F | HEART RATE: 95 BPM

## 2023-02-09 LAB
ANION GAP SERPL CALCULATED.3IONS-SCNC: 16 MMOL/L (ref 5–15)
BUN SERPL-MCNC: 12 MG/DL (ref 8–23)
BUN/CREAT SERPL: 22.6 (ref 7–25)
CALCIUM SPEC-SCNC: 8.4 MG/DL (ref 8.6–10.5)
CHLORIDE SERPL-SCNC: 91 MMOL/L (ref 98–107)
CO2 SERPL-SCNC: 22 MMOL/L (ref 22–29)
CREAT SERPL-MCNC: 0.53 MG/DL (ref 0.76–1.27)
EGFRCR SERPLBLD CKD-EPI 2021: 109.8 ML/MIN/1.73
GLUCOSE SERPL-MCNC: 153 MG/DL (ref 65–99)
HCT VFR BLD AUTO: 28 % (ref 37.5–51)
HGB BLD-MCNC: 9.2 G/DL (ref 13–17.7)
POTASSIUM SERPL-SCNC: 4.3 MMOL/L (ref 3.5–5.2)
SODIUM SERPL-SCNC: 129 MMOL/L (ref 136–145)

## 2023-02-09 PROCEDURE — 63710000001 FAMOTIDINE 20 MG TABLET: Performed by: ORTHOPAEDIC SURGERY

## 2023-02-09 PROCEDURE — 25010000002 CEFTRIAXONE PER 250 MG: Performed by: ORTHOPAEDIC SURGERY

## 2023-02-09 PROCEDURE — A9270 NON-COVERED ITEM OR SERVICE: HCPCS | Performed by: ORTHOPAEDIC SURGERY

## 2023-02-09 PROCEDURE — 63710000001 OXYCODONE 5 MG TABLET: Performed by: ORTHOPAEDIC SURGERY

## 2023-02-09 PROCEDURE — A9270 NON-COVERED ITEM OR SERVICE: HCPCS | Performed by: INTERNAL MEDICINE

## 2023-02-09 PROCEDURE — 63710000001 METOPROLOL SUCCINATE XL 25 MG TABLET SUSTAINED-RELEASE 24 HOUR: Performed by: INTERNAL MEDICINE

## 2023-02-09 PROCEDURE — 63710000001 APIXABAN 5 MG TABLET: Performed by: ORTHOPAEDIC SURGERY

## 2023-02-09 PROCEDURE — G0378 HOSPITAL OBSERVATION PER HR: HCPCS

## 2023-02-09 PROCEDURE — 63710000001 SENNOSIDES-DOCUSATE 8.6-50 MG TABLET: Performed by: ORTHOPAEDIC SURGERY

## 2023-02-09 RX ORDER — HYDROCODONE BITARTRATE AND ACETAMINOPHEN 10; 325 MG/1; MG/1
1 TABLET ORAL EVERY 4 HOURS PRN
Qty: 10 TABLET | Refills: 0 | Status: SHIPPED | OUTPATIENT
Start: 2023-02-09

## 2023-02-09 RX ORDER — METOPROLOL SUCCINATE 25 MG/1
25 TABLET, EXTENDED RELEASE ORAL
Status: DISCONTINUED | OUTPATIENT
Start: 2023-02-09 | End: 2023-02-09 | Stop reason: HOSPADM

## 2023-02-09 RX ORDER — AMOXICILLIN 250 MG
2 CAPSULE ORAL 2 TIMES DAILY
Qty: 30 TABLET | Refills: 0 | Status: SHIPPED | OUTPATIENT
Start: 2023-02-09

## 2023-02-09 RX ORDER — METOPROLOL SUCCINATE 25 MG/1
25 TABLET, EXTENDED RELEASE ORAL
Qty: 30 TABLET | Refills: 0 | Status: SHIPPED | OUTPATIENT
Start: 2023-02-10

## 2023-02-09 RX ORDER — MORPHINE SULFATE 10 MG/.5ML
10 SOLUTION ORAL
Qty: 15 ML | Refills: 0 | Status: SHIPPED | OUTPATIENT
Start: 2023-02-09

## 2023-02-09 RX ADMIN — OXYCODONE 5 MG: 5 TABLET ORAL at 04:41

## 2023-02-09 RX ADMIN — CEFTRIAXONE 2 G: 2 INJECTION, POWDER, FOR SOLUTION INTRAMUSCULAR; INTRAVENOUS at 16:36

## 2023-02-09 RX ADMIN — METOPROLOL SUCCINATE 25 MG: 25 TABLET, EXTENDED RELEASE ORAL at 09:33

## 2023-02-09 RX ADMIN — FAMOTIDINE 40 MG: 20 TABLET, FILM COATED ORAL at 08:02

## 2023-02-09 RX ADMIN — OXYCODONE 5 MG: 5 TABLET ORAL at 09:33

## 2023-02-09 RX ADMIN — OXYCODONE 5 MG: 5 TABLET ORAL at 00:18

## 2023-02-09 RX ADMIN — OXYCODONE 5 MG: 5 TABLET ORAL at 19:33

## 2023-02-09 RX ADMIN — Medication 10 ML: at 08:02

## 2023-02-09 RX ADMIN — APIXABAN 5 MG: 5 TABLET, FILM COATED ORAL at 08:02

## 2023-02-09 RX ADMIN — DOCUSATE SODIUM AND SENNOSIDES 2 TABLET: 8.6; 5 TABLET, FILM COATED ORAL at 08:02

## 2023-02-09 NOTE — TELEPHONE ENCOUNTER
Caller: DIANA POLLARD    Relationship to patient: SELF    Best call back number: [phone]    Patient is needing: PATIENT IS CURRENTLY AT [facility], [place] AND NEEDS TO DISCUSS BEING DISCHARGED EARLY.  PATIENT'S WIFE IS VERY ILL, AND HE IS HAVING MANY CONCERNS REGARDING THAT.  UNABLE TO WARM TRANSFER.

## 2023-02-09 NOTE — TELEPHONE ENCOUNTER
Caller: Jaime Bar    Relationship to patient: Self    Best call back number:     Patient is needing: THE PATIENT HAS NOT HEARD ANYTHING ABOUT BEING DISCHARGED AND HE WOULD LIKE TO KNOW WHEN HE WILL BE ABLE TO BE DISCHARGED    HE WOULD LIKE A CALL BACK REGARDING DISCHARGE STATUS

## 2023-02-10 ENCOUNTER — TELEPHONE (OUTPATIENT)
Dept: ORTHOPEDIC SURGERY | Facility: CLINIC | Age: 68
End: 2023-02-10

## 2023-02-13 NOTE — TELEPHONE ENCOUNTER
Drains stay in he continues antibiotics and I see him in 2 weeks no physical therapy can use his arms for personal care at the side

## 2023-02-13 NOTE — TELEPHONE ENCOUNTER
I called and spoke to Mary she stated one of the drains came out overnight and she believes they are sending patient to the hospital.

## 2023-02-13 NOTE — TELEPHONE ENCOUNTER
Deidre called from [facility] asking if patient needs to go to the hospital.  The NP at [facility] has looked at area and it looks fine and patient still has one drain in with steristrips placed.  I discussed with Dr Velez and patient doesn't need to go to hospital.  Deidre was given instructions again and she was transferred to make appointment.

## 2023-02-17 ENCOUNTER — TELEPHONE (OUTPATIENT)
Dept: ORTHOPEDIC SURGERY | Facility: CLINIC | Age: 68
End: 2023-02-17
Payer: MEDICARE

## 2023-02-20 ENCOUNTER — OFFICE VISIT (OUTPATIENT)
Dept: ORTHOPEDIC SURGERY | Facility: CLINIC | Age: 68
End: 2023-02-20
Payer: MEDICARE

## 2023-02-20 VITALS — HEART RATE: 87 BPM

## 2023-02-20 DIAGNOSIS — Z47.89 ORTHOPEDIC AFTERCARE: Primary | ICD-10-CM

## 2023-02-20 PROCEDURE — 99024 POSTOP FOLLOW-UP VISIT: CPT | Performed by: ORTHOPAEDIC SURGERY

## 2023-02-21 RX ORDER — MIRTAZAPINE 15 MG/1
15 TABLET, FILM COATED ORAL NIGHTLY
COMMUNITY

## 2023-02-21 RX ORDER — POTASSIUM CHLORIDE 1.5 G/1.77G
20 POWDER, FOR SOLUTION ORAL 2 TIMES DAILY
COMMUNITY

## 2023-05-08 ENCOUNTER — TELEPHONE (OUTPATIENT)
Dept: ORTHOPEDIC SURGERY | Facility: CLINIC | Age: 68
End: 2023-05-08

## 2023-05-08 NOTE — TELEPHONE ENCOUNTER
Caller: APRIL    Relationship to patient: [facility]    Best call back number:     Chief complaint: RIGHT SHOULDER    Type of visit: FOLLOW UP    Requested date: ASAP    Additional notes: THE PATIENT'S PCP WANTS HIM TO BE SEEN ASAP    DR NIXON'S NEXT AVAIL IS 6/22/23  AND OLGA LIDIA BARRIENTOS'S IS 6/8/23

## 2023-05-09 ENCOUNTER — OFFICE VISIT (OUTPATIENT)
Dept: ORTHOPEDIC SURGERY | Facility: CLINIC | Age: 68
End: 2023-05-09
Payer: MEDICARE

## 2023-05-09 VITALS — HEIGHT: 66 IN | HEART RATE: 53 BPM | BODY MASS INDEX: 27.97 KG/M2 | WEIGHT: 174 LBS

## 2023-05-09 DIAGNOSIS — M00.9 PYOGENIC ARTHRITIS OF RIGHT SHOULDER REGION, DUE TO UNSPECIFIED ORGANISM: Primary | ICD-10-CM

## 2023-05-09 DIAGNOSIS — M25.511 ACUTE PAIN OF RIGHT SHOULDER: ICD-10-CM

## 2023-05-09 NOTE — PROGRESS NOTES
"Jaime is a 68 y.o. year old male presents to [facility]    Chief Complaint   Patient presents with   • Right Shoulder - Initial Evaluation, Pain     Pain 6       History of Present Illness  Jaime Bar is a 68 y.o. male presents to clinic for evaluation of right shoulder pain that has been present since January. Denies any trauma. Reports pain 7/10 with forward motion and a 1/10 at rest. Currently in physical therapy/OT to restore function and range of motion to right and left shoulders. Reports 2 previous rotator cuff repairs to the right shoulder.     I have reviewed the patient's medical, family, and social history in detail and updated the computerized patient record.    Objective:  Pulse 53   Ht 167.6 cm (66\")   Wt 78.9 kg (174 lb)   BMI 28.08 kg/m²      Physical Exam    Vital signs reviewed.   General: No acute distress.  Eyes: conjunctiva clear  ENT: external ears atraumatic  CV: no peripheral edema  Resp: normal respiratory effort  Skin: no rashes or wounds; normal turgor  Psych: mood and affect appropriate; recent and remote memory intact  Neuro: sensation to light touch intact    MSK Exam  Right shoulder:    Large area of erythema with a white center measuring approximately 3.5 cm in diameter along the anterior/superior aspect.   Active fwd flexion 60, abduction 35  Passive forward flexion 100, abduction 80    Imaging:  XR Shoulder 2+ View Right (05/09/2023 13:18)  Findings:  There is no acute fracture or dislocation. Acromioclavicular and glenohumeral joints appear intact. No erosions. Acromiohumeral and coracoclavicular distances are well-maintained. Moderate to severe acromioclavicular and glenohumeral osteoarthritic   changes are present. The adjacent lung and ribs appear intact.     IMPRESSION:    No acute osseous abnormality. Moderate to severe acromioclavicular and glenohumeral osteoarthritic changes are present, similar as compared to the previous " study.      Electronically Signed: Linnette Pacheco    5/10/2023 8:47 AM EDT     Assessment:  Diagnoses and all orders for this visit:    Pyogenic arthritis of right shoulder region, due to unspecified organism    Acute pain of right shoulder  -     XR Shoulder 2+ View Right    Other orders  -     Tafamidis (VYNDAMAX PO); Take  by mouth.      Plan: Due to his repeated septic shoulders and multiple washouts I reocmmend referral to  [facility] for evaluation of septic right shoulder and  Possible humeral head amputation.         Follow Up   No follow-ups on file.  Patient was given instructions and counseling regarding his condition or for health maintenance advice. Please see specific information pulled into the AVS if appropriate.     EMR Dragon/Transcription disclaimer:    Much of this encounter note is an electronic transcription/translation of spoken language to printed text.  The electronic translation of spoken language may permit erroneous, or at times, nonsensical words or phrases to be inadvertently transcribed.  Although I have reviewed the note for such errors some may still exist.

## 2024-02-09 NOTE — PROGRESS NOTES
Nutrition Services    Patient Name: Jaime Bar  YOB: 1955  MRN: [id number]  Admission date: 12/26/2022    PPE Documentation        PPE Worn By Provider Did not enter room for this encounter   PPE Worn By Patient  N/A     PROGRESS NOTE      Encounter Information: Check on for tube feeding.         PO Diet: NPO Diet NPO Type: Strict NPO   PO Supplements: None ordered    PO Intake:  NPO       Current nutrition support: Nutren 1.5 at 65 mL/hr + 25mL/hr water flush   Nutrition support review: Documented as above per EMR        Labs (reviewed below): Hypernatremia- will increase water flush further, estimated 1.8-2.5L fluid deficit        GI Function:  Last BM 12/31 (x 2 days ago)  Residuals WNL       Nutrition Intervention Updates: Continue tube feeding at goal of 65 mL/hour     Increase water flush to 75 mL/hour        Results from last 7 days   Lab Units 01/02/23  0606 01/01/23  0614 12/31/22  0520   SODIUM mmol/L 152* 146* 140   POTASSIUM mmol/L 4.3 4.0 3.8   CHLORIDE mmol/L 120* 113* 107   CO2 mmol/L 27.0 25.0 22.0   BUN mg/dL 41* 52* 68*   CREATININE mg/dL 0.69* 0.82 1.12   CALCIUM mg/dL 8.2* 8.0* 8.0*   BILIRUBIN mg/dL 3.3* 4.0* 4.4*   ALK PHOS U/L 102 107 157*   ALT (SGPT) U/L 200* 306* 347*   AST (SGOT) U/L 104* 308* 520*   GLUCOSE mg/dL 144* 135* 144*     Results from last 7 days   Lab Units 01/02/23  0606 01/01/23  0614 12/31/22  0520   MAGNESIUM mg/dL 2.0 2.0 2.2   PHOSPHORUS mg/dL 2.6 3.0 2.9   HEMOGLOBIN g/dL 11.3* 11.4* 11.3*   HEMATOCRIT % 34.6* 34.6* 32.6*     Lab Results   Component Value Date    HGBA1C 4.2 12/26/2022       RD to follow up per protocol.    Electronically signed by:  Gloria Park RD  01/02/23 07:38 EST     Pt states he had an abn Cardiac calcium score and is asking if 2/16/24 is soon enough to be seen. Please call pt to advise

## (undated) DEVICE — PAD,ABDOMINAL,5"X9",STERILE,LF,1/PK: Brand: MEDLINE INDUSTRIES, INC.

## (undated) DEVICE — CVR HNDL LT SURG ACCSSRY BLU STRL

## (undated) DEVICE — ADHS SKIN PREMIERPRO EXOFIN TOPICAL HI/VISC .5ML

## (undated) DEVICE — GLV SURG SIGNATURE ESSENTIAL PF LTX SZ8

## (undated) DEVICE — SUT ETHLN 3/0 FS1 30IN 669H

## (undated) DEVICE — THE STERILE LIGHT HANDLE COVER IS USED WITH STERIS SURGICAL LIGHTING AND VISUALIZATION SYSTEMS.

## (undated) DEVICE — BLD SHAVER EXCALIBUR CRV 4MM

## (undated) DEVICE — UNDERGLV SURG BIOGEL/PI PF SYNTH SURG SZ8.5 BLU 50/BX

## (undated) DEVICE — PK SHLDR ARTHROSCOPY 50

## (undated) DEVICE — SUT PDS 0 CT-1 Z340H

## (undated) DEVICE — CANN PASSPORT BUTN 8MM 3CM

## (undated) DEVICE — GLV SURG SIGNATURE ESSENTIAL PF LTX SZ8.5

## (undated) DEVICE — TUBING, SUCTION, 1/4" X 12', STRAIGHT: Brand: MEDLINE

## (undated) DEVICE — GLV SURG SENSICARE PI ORTHO SZ7.5 LF STRL

## (undated) DEVICE — SPNG GZ AVANT 6PLY 4X4IN STRL PK/2

## (undated) DEVICE — DRN WND EVAC BULB 100CC

## (undated) DEVICE — 3M™ STERI-STRIP™ REINFORCED ADHESIVE SKIN CLOSURES, R1547, 1/2 IN X 4 IN (12 MM X 100 MM), 6 STRIPS/ENVELOPE: Brand: 3M™ STERI-STRIP™

## (undated) DEVICE — SLV SCD CALF HEMOFORCE DVT THERP REPROC MD

## (undated) DEVICE — KT SURG TURNOVER 050

## (undated) DEVICE — UNDERGLV SURG BIOGEL INDICAT PI SZ8 BLU

## (undated) DEVICE — DRN WND RND END PERF NO/TROC 10IN HL 3/16IN

## (undated) DEVICE — GLV SURG SENSICARE PI ORTHO SZ8.5 LF STRL

## (undated) DEVICE — GOWN,SLEEVE,STERILE,W/CSR WRAP,1/P: Brand: MEDLINE

## (undated) DEVICE — TBG ARTHSCP PT W CONN/REDUC 8FT

## (undated) DEVICE — TBG ARTHSCP PUMP W CONN/REDUC 8FT

## (undated) DEVICE — TBG ARTHSCP DUALWAVE

## (undated) DEVICE — DRSNG SURESITE WNDW 4X4.5

## (undated) DEVICE — DRSNG TELFA PAD NONADH STR 1S 3X8IN

## (undated) DEVICE — SOL IRR NACL 0.9PCT ARTHROMATIC 3000ML

## (undated) DEVICE — BLD CUT FORMLA AGGR ANGL 4MM

## (undated) DEVICE — BLD SHAVER COOLCUT BONECUTTER GEN TISS  5.5MM

## (undated) DEVICE — GLV SURG SENSICARE PI ORTHO SZ8 LF STRL

## (undated) DEVICE — BLAKE SILICONE DRAIN, 15 FR ROUND, HUBLESS: Brand: BLAKE

## (undated) DEVICE — DRSNG SURESITE123 8X12IN

## (undated) DEVICE — CANN TRPL DAM 7X7MM

## (undated) DEVICE — TOWEL,OR,DSP,ST,NATURAL,DLX,4/PK,20PK/CS: Brand: MEDLINE

## (undated) DEVICE — ABL ASP APOLLO RF XL 90D